# Patient Record
Sex: FEMALE | Race: BLACK OR AFRICAN AMERICAN | NOT HISPANIC OR LATINO | Employment: OTHER | URBAN - METROPOLITAN AREA
[De-identification: names, ages, dates, MRNs, and addresses within clinical notes are randomized per-mention and may not be internally consistent; named-entity substitution may affect disease eponyms.]

---

## 2017-11-04 ENCOUNTER — HOSPITAL ENCOUNTER (INPATIENT)
Facility: HOSPITAL | Age: 72
LOS: 1 days | Discharge: HOME | DRG: 305 | End: 2017-11-06
Attending: EMERGENCY MEDICINE | Admitting: INTERNAL MEDICINE
Payer: MEDICARE

## 2017-11-04 ENCOUNTER — APPOINTMENT (EMERGENCY)
Dept: CT IMAGING | Facility: HOSPITAL | Age: 72
DRG: 305 | End: 2017-11-04
Payer: MEDICARE

## 2017-11-04 ENCOUNTER — APPOINTMENT (EMERGENCY)
Dept: RADIOLOGY | Facility: HOSPITAL | Age: 72
DRG: 305 | End: 2017-11-04
Payer: MEDICARE

## 2017-11-04 ENCOUNTER — APPOINTMENT (OUTPATIENT)
Dept: CT IMAGING | Facility: HOSPITAL | Age: 72
DRG: 305 | End: 2017-11-04
Payer: MEDICARE

## 2017-11-04 DIAGNOSIS — R07.9 CHEST PAIN: Primary | ICD-10-CM

## 2017-11-04 DIAGNOSIS — R07.89 CHEST DISCOMFORT: ICD-10-CM

## 2017-11-04 PROBLEM — R42 DIZZINESS: Status: ACTIVE | Noted: 2017-11-04

## 2017-11-04 LAB
ALBUMIN SERPL BCP-MCNC: 4.7 G/DL (ref 3.5–5)
ALP SERPL-CCNC: 89 U/L (ref 46–116)
ALT SERPL W P-5'-P-CCNC: 81 U/L (ref 12–78)
ANION GAP SERPL CALCULATED.3IONS-SCNC: 12 MMOL/L (ref 4–13)
AST SERPL W P-5'-P-CCNC: 69 U/L (ref 5–45)
BASOPHILS # BLD MANUAL: 0 THOUSAND/UL (ref 0–0.1)
BASOPHILS NFR MAR MANUAL: 0 % (ref 0–1)
BILIRUB SERPL-MCNC: 0.8 MG/DL (ref 0.2–1)
BUN SERPL-MCNC: 7 MG/DL (ref 5–25)
CALCIUM SERPL-MCNC: 9.8 MG/DL (ref 8.3–10.1)
CHLORIDE SERPL-SCNC: 99 MMOL/L (ref 100–108)
CO2 SERPL-SCNC: 29 MMOL/L (ref 21–32)
CREAT SERPL-MCNC: 0.77 MG/DL (ref 0.6–1.3)
EOSINOPHIL # BLD MANUAL: 0.06 THOUSAND/UL (ref 0–0.4)
EOSINOPHIL NFR BLD MANUAL: 2 % (ref 0–6)
ERYTHROCYTE [DISTWIDTH] IN BLOOD BY AUTOMATED COUNT: 17.9 % (ref 11.6–15.1)
GFR SERPL CREATININE-BSD FRML MDRD: 90 ML/MIN/1.73SQ M
GLUCOSE SERPL-MCNC: 101 MG/DL (ref 65–140)
HCT VFR BLD AUTO: 33.8 % (ref 34.8–46.1)
HGB BLD-MCNC: 11 G/DL (ref 11.5–15.4)
LYMPHOCYTES # BLD AUTO: 0.96 THOUSAND/UL (ref 0.6–4.47)
LYMPHOCYTES # BLD AUTO: 32 % (ref 14–44)
MCH RBC QN AUTO: 28 PG (ref 26.8–34.3)
MCHC RBC AUTO-ENTMCNC: 32.5 G/DL (ref 31.4–37.4)
MCV RBC AUTO: 86 FL (ref 82–98)
MONOCYTES # BLD AUTO: 0.18 THOUSAND/UL (ref 0–1.22)
MONOCYTES NFR BLD: 6 % (ref 4–12)
NEUTROPHILS # BLD MANUAL: 1.81 THOUSAND/UL (ref 1.85–7.62)
NEUTS BAND NFR BLD MANUAL: 2 % (ref 0–8)
NEUTS SEG NFR BLD AUTO: 58 % (ref 43–75)
NRBC BLD AUTO-RTO: 3 /100 WBCS
PLATELET # BLD AUTO: 164 THOUSANDS/UL (ref 149–390)
PLATELET BLD QL SMEAR: ADEQUATE
PMV BLD AUTO: 9.1 FL (ref 8.9–12.7)
POTASSIUM SERPL-SCNC: 3.3 MMOL/L (ref 3.5–5.3)
PROT SERPL-MCNC: 8.8 G/DL (ref 6.4–8.2)
RBC # BLD AUTO: 3.93 MILLION/UL (ref 3.81–5.12)
RBC MORPH BLD: NORMAL
SODIUM SERPL-SCNC: 140 MMOL/L (ref 136–145)
TOTAL CELLS COUNTED SPEC: 100
TROPONIN I SERPL-MCNC: <0.02 NG/ML
WBC # BLD AUTO: 3.01 THOUSAND/UL (ref 4.31–10.16)

## 2017-11-04 PROCEDURE — 99285 EMERGENCY DEPT VISIT HI MDM: CPT

## 2017-11-04 PROCEDURE — 85027 COMPLETE CBC AUTOMATED: CPT | Performed by: PHYSICIAN ASSISTANT

## 2017-11-04 PROCEDURE — 71020 HB CHEST X-RAY 2VW FRONTAL&LATL: CPT

## 2017-11-04 PROCEDURE — 70450 CT HEAD/BRAIN W/O DYE: CPT

## 2017-11-04 PROCEDURE — 93005 ELECTROCARDIOGRAM TRACING: CPT

## 2017-11-04 PROCEDURE — 36415 COLL VENOUS BLD VENIPUNCTURE: CPT | Performed by: PHYSICIAN ASSISTANT

## 2017-11-04 PROCEDURE — 72125 CT NECK SPINE W/O DYE: CPT

## 2017-11-04 PROCEDURE — 93005 ELECTROCARDIOGRAM TRACING: CPT | Performed by: PHYSICIAN ASSISTANT

## 2017-11-04 PROCEDURE — 84484 ASSAY OF TROPONIN QUANT: CPT | Performed by: PHYSICIAN ASSISTANT

## 2017-11-04 PROCEDURE — 85007 BL SMEAR W/DIFF WBC COUNT: CPT | Performed by: PHYSICIAN ASSISTANT

## 2017-11-04 PROCEDURE — 84484 ASSAY OF TROPONIN QUANT: CPT | Performed by: INTERNAL MEDICINE

## 2017-11-04 PROCEDURE — 80053 COMPREHEN METABOLIC PANEL: CPT | Performed by: PHYSICIAN ASSISTANT

## 2017-11-04 RX ORDER — NICOTINE 21 MG/24HR
1 PATCH, TRANSDERMAL 24 HOURS TRANSDERMAL DAILY
Status: DISCONTINUED | OUTPATIENT
Start: 2017-11-05 | End: 2017-11-05

## 2017-11-04 RX ORDER — LOSARTAN POTASSIUM 50 MG/1
50 TABLET ORAL DAILY
Status: DISCONTINUED | OUTPATIENT
Start: 2017-11-05 | End: 2017-11-05

## 2017-11-04 RX ORDER — ASPIRIN 81 MG/1
81 TABLET, CHEWABLE ORAL DAILY
Status: DISCONTINUED | OUTPATIENT
Start: 2017-11-04 | End: 2017-11-06 | Stop reason: HOSPADM

## 2017-11-04 RX ORDER — FOLIC ACID 1 MG/1
1 TABLET ORAL DAILY
Status: DISCONTINUED | OUTPATIENT
Start: 2017-11-04 | End: 2017-11-06 | Stop reason: HOSPADM

## 2017-11-04 RX ORDER — ASPIRIN 81 MG/1
81 TABLET, CHEWABLE ORAL DAILY
Status: DISCONTINUED | OUTPATIENT
Start: 2017-11-05 | End: 2017-11-04

## 2017-11-04 RX ORDER — POTASSIUM CHLORIDE 20 MEQ/1
40 TABLET, EXTENDED RELEASE ORAL ONCE
Status: COMPLETED | OUTPATIENT
Start: 2017-11-04 | End: 2017-11-04

## 2017-11-04 RX ORDER — MECLIZINE HCL 12.5 MG/1
12.5 TABLET ORAL ONCE
Status: COMPLETED | OUTPATIENT
Start: 2017-11-04 | End: 2017-11-04

## 2017-11-04 RX ORDER — METOPROLOL TARTRATE 5 MG/5ML
5 INJECTION INTRAVENOUS ONCE
Status: COMPLETED | OUTPATIENT
Start: 2017-11-04 | End: 2017-11-04

## 2017-11-04 RX ORDER — LORAZEPAM 0.5 MG/1
0.5 TABLET ORAL EVERY 8 HOURS PRN
Status: DISCONTINUED | OUTPATIENT
Start: 2017-11-04 | End: 2017-11-06 | Stop reason: HOSPADM

## 2017-11-04 RX ORDER — THIAMINE MONONITRATE (VIT B1) 100 MG
100 TABLET ORAL DAILY
Status: DISCONTINUED | OUTPATIENT
Start: 2017-11-04 | End: 2017-11-06 | Stop reason: HOSPADM

## 2017-11-04 RX ADMIN — Medication 100 MG: at 17:53

## 2017-11-04 RX ADMIN — MECLIZINE 12.5 MG: 12.5 TABLET ORAL at 16:34

## 2017-11-04 RX ADMIN — Medication 1 TABLET: at 17:53

## 2017-11-04 RX ADMIN — FOLIC ACID 1 MG: 1 TABLET ORAL at 17:53

## 2017-11-04 RX ADMIN — METOPROLOL TARTRATE 5 MG: 5 INJECTION, SOLUTION INTRAVENOUS at 19:27

## 2017-11-04 RX ADMIN — POTASSIUM CHLORIDE 40 MEQ: 1500 TABLET, EXTENDED RELEASE ORAL at 17:15

## 2017-11-04 RX ADMIN — ASPIRIN 81 MG 81 MG: 81 TABLET ORAL at 17:53

## 2017-11-04 NOTE — PLAN OF CARE
CARDIOVASCULAR - ADULT     Maintains optimal cardiac output and hemodynamic stability Progressing     Absence of cardiac dysrhythmias or at baseline rhythm Progressing        DISCHARGE PLANNING     Discharge to home or other facility with appropriate resources Progressing        Knowledge Deficit     Patient/family/caregiver demonstrates understanding of disease process, treatment plan, medications, and discharge instructions Progressing

## 2017-11-04 NOTE — H&P
History and Physical - [facility]    Patient Information: Macrina Merino 80 y o  female MRN: [id number]  Unit/Bed#: ED 08 Encounter: [id number]  Admitting Physician: Sahra Cifuentes MD  PCP: No primary care provider on file  Date of Admission:  11/04/17    Assessment/Plan:    Hospital Problem List:     Principal Problem:    Chest pain  Active Problems:    Dizziness      Plan for the Primary Problem(s):-    1   Dizziness, with neck pain, CT cervical spine, no nystagmus, no presyncopal symptomatology loss of consciousness mention, consider neurology evaluation of the pinch symptoms persistent otherwise outpatient follow-up   2  Chest pain, at this time seems reproducible has a strong family history, active smoker, likely hyperlipidemia, obesity, hypertensive in the ER has not seen physician in a while, cardiac enzymes, telemetry monitoring  3   Transthoracic echo, stress test would be beneficial either inpatient or outpatient  4  Will start the patient on losartan for blood pressure control  5   Mentioned history of alcohol use, unclear how much she drinks, will put her on thiamine folic acid multivitamin she does not seem volume depleted though, LFTs are elevated  Check comprehensive metabolic panel tomorrow  6  Nicotine patch  7  Lovenox DVT prophylaxis  8  Will start aspirin 81 mg    VTE Prophylaxis: Enoxaparin (Lovenox)  /      Anticipated Length of Stay:  Patient will be admitted on an Observation basis with an anticipated length of stay of  <2 midnights  Justification for Hospital Stay: cx pain    Total Time for Visit, including Counseling / Coordination of Care: 45 minutes  Greater than 50% of this total time spent on direct patient counseling and coordination of care      Chief Complaint:   - S, chest pain    History of Present Illness:    Macrina Merino is a 80 y o  female who presents with multiple complaints, the patient mentioned that her primary concern was dizziness, and pain in her neck which started 2-3 days ago she does not recall having a trauma, patient mentioned that she was feeling dizzy, which means that she was drifting in either direction, denied having a spinning movement, also denied having any visual disturbances headaches, complained of neck pain on the lateral side right, symptoms as per her were not worsened by movements of the head and did not get worse on getting from bed to a sitting position and then standing  She complained of chest pain which on my exam has been reproducible, on both right and left side, does mention that the chest pain has been there at least for a week, but 10 also mentions that she has had this chest pain before, particularly when she exerts herself or is emotional   The patient smokes at least half a pack a day of cigarettes, could be more, and mentions that she drinks 1-2 rum and Coke drinks almost on a daily basis as per the family, but she mentioned she does of 2 to 3 times a week, the patient does mention that she has been eating and drinking okay, which seems to be accurate, the patient's blood pressure is been very high in the 216 systolic, the loss of she saw physician was at least 6-7 years ago and mentioned that that is when she believes she had some blood work done and it is possible that she was told that she has high cholesterol, she has family history of heart disease, both her sisters have diabetes, father had heart disease, and her brother  unclear reason  She is pain-free right now also mentions to me that her dizziness has resolved, but on pressing her chest the patient does complain of the pain which she says is different from the kind of pain which she constantly has  No shortness of breath no lower extremity edema abdominal pain fevers chills, no visual disturbances of nystagmus  Review of Systems:    Review of Systems   Constitutional: Negative for activity change and appetite change     HENT: Negative for congestion  Eyes: Negative for discharge  Respiratory: Negative for apnea and shortness of breath  Cardiovascular: Negative for chest pain  Gastrointestinal: Negative for abdominal distention  Endocrine: Negative for cold intolerance  Genitourinary: Negative for difficulty urinating  Musculoskeletal: Negative for arthralgias  Allergic/Immunologic: Negative for environmental allergies  Neurological: Negative for dizziness  Hematological: Negative for adenopathy  Psychiatric/Behavioral: Negative for agitation  Past Medical and Surgical History:     History reviewed  No pertinent past medical history  Past Surgical History:   Procedure Laterality Date    APPENDECTOMY         Meds/Allergies:    Prior to Admission medications    Not on File     I have reviewed home medications with patient personally  Allergies: No Known Allergies    Social History:     Marital Status:      Substance Use History:   History   Alcohol Use    Yes     Comment: weekly     History   Smoking Status    Current Every Day Smoker    Packs/day: 0 25    Types: Cigarettes   Smokeless Tobacco    Never Used     History   Drug Use No       Family History:    non-contributory    Physical Exam:     Vitals:   Blood Pressure: (!) 187/89 (11/04/17 1635)  Pulse: 79 (11/04/17 1635)  Temperature: 98 7 °F (37 1 °C) (11/04/17 1502)  Temp Source: Oral (11/04/17 1502)  Respirations: 18 (11/04/17 1635)  Height: 5' 4" (162 6 cm) (11/04/17 1502)  Weight - Scale: 88 9 kg (195 lb 15 8 oz) (11/04/17 1502)  SpO2: 97 % (11/04/17 1635)    Physical Exam   Constitutional: She is oriented to person, place, and time  No distress  HENT:   Head: Normocephalic  Eyes: Pupils are equal, round, and reactive to light  Neck: No tracheal deviation present  Cardiovascular: Normal rate  No murmur heard  Pulmonary/Chest: No respiratory distress  She has no wheezes  Abdominal: She exhibits no distension  There is no tenderness  There is no rebound  Musculoskeletal: She exhibits no edema  Neurological: She is alert and oriented to person, place, and time  Skin: She is not diaphoretic  Additional Data:     Lab Results: I have personally reviewed pertinent reports  Results from last 7 days  Lab Units 11/04/17  1629   WBC Thousand/uL 3 01*   HEMOGLOBIN g/dL 11 0*   HEMATOCRIT % 33 8*   PLATELETS Thousands/uL 164   LYMPHO PCT % 32   MONO PCT MAN % 6   EOSINO PCT MANUAL % 2       Results from last 7 days  Lab Units 11/04/17  1629   SODIUM mmol/L 140   POTASSIUM mmol/L 3 3*   CHLORIDE mmol/L 99*   CO2 mmol/L 29   BUN mg/dL 7   CREATININE mg/dL 0 77   CALCIUM mg/dL 9 8   TOTAL PROTEIN g/dL 8 8*   BILIRUBIN TOTAL mg/dL 0 80   ALK PHOS U/L 89   ALT U/L 81*   AST U/L 69*   GLUCOSE RANDOM mg/dL 101           Imaging: I have personally reviewed pertinent reports  Xr Chest 2 Views    Result Date: 11/4/2017  Narrative: CHEST DUAL ENERGY INDICATION:  Chest pain  Smoker  COMPARISON:  None VIEWS:  PA (including soft tissue and bone algorithms) and lateral projections; 4 images FINDINGS:     Cardiomediastinal silhouette appears unremarkable  The lungs are clear  No pneumothorax or pleural effusion  Visualized osseous structures appear within normal limits for the patient's age  Impression: No active pulmonary disease  Workstation performed: [id number]     Ct Head Without Contrast    Result Date: 11/4/2017  Narrative: CT BRAIN - WITHOUT CONTRAST INDICATION: Headache  COMPARISON:    None TECHNIQUE:  Multiple contiguous axial CT images were obtained at 2 5 mm intervals through the posterior fossa followed by 5 mm intervals through the remainder of the brain  This examination, like all CT scans performed in the [facility],  was performed utilizing techniques to minimize radiation dose exposure, including the use of iterative reconstruction and automated exposure control    Rad dosage 989 mGy-cm IMAGE QUALITY: Diagnostic FINDINGS:  PARENCHYMA: Mild age-appropriate atrophy  Scattered periventricular white matter hypodensity suggestive of chronic microvascular ischemic disease  No mass, mass effect or midline shift  No hemorrhage  No signs of acute ischemia  VENTRICLES AND EXTRA-AXIAL SPACES:    Mildly enlarged, consistent with the degree of atrophy  VISUALIZED ORBITS AND PARANASAL SINUSES:  Normal  CALVARIUM AND EXTRACRANIAL SOFT TISSUES:  Normal      Impression: 1  No acute intracranial pathology  Workstation performed: [id number]       EKG, Pathology, and Other Studies Reviewed on Admission:   · EKG:     Allscripts / Epic Records Reviewed: No     ** Please Note: This note has been constructed using a voice recognition system   **

## 2017-11-04 NOTE — DISCHARGE INSTRUCTIONS

## 2017-11-04 NOTE — ED PROVIDER NOTES
History  Chief Complaint   Patient presents with    Chest Pain     patient is c/o substernal chest pain x 1 week  also notes pain behind her right ear  difficulty sleeping, headache,dizziness  Manish Stanley is a 80 y o  female w PMH none significant who presents for evaluation of chest pain  Pt not reliable historian  She reports chest pain, dizziness and headache but cant tell me chronicity for any sx other than dizziness ongoing x 1 week  Reports initially dizziness only comes on w her anxiety but then later does tell me it is positional in nature  No abd pain / n/v/d/c  No d/c  Ha ha of the R side of head and R cheek but sparing eye  Spreads to frontal region, came on gradually this week, unlike prior ha but mild in severity  None       History reviewed  No pertinent past medical history  Past Surgical History:   Procedure Laterality Date    APPENDECTOMY         History reviewed  No pertinent family history  I have reviewed and agree with the history as documented  Social History   Substance Use Topics    Smoking status: Current Every Day Smoker     Packs/day: 0 25     Types: Cigarettes    Smokeless tobacco: Never Used    Alcohol use Yes      Comment: weekly        Review of Systems   Constitutional: Negative for chills, diaphoresis and fever  HENT: Negative for congestion and sore throat  Eyes: Negative for visual disturbance  Respiratory: Negative for cough, chest tightness, shortness of breath and wheezing  Cardiovascular: Negative for chest pain and leg swelling  Gastrointestinal: Negative for abdominal pain, constipation, diarrhea, nausea and vomiting  Genitourinary: Negative for difficulty urinating, dysuria, frequency, hematuria, urgency, vaginal bleeding, vaginal discharge and vaginal pain  Musculoskeletal: Negative for arthralgias and myalgias  Neurological: Positive for dizziness and light-headedness  Negative for weakness, numbness and headaches  Psychiatric/Behavioral: The patient is not nervous/anxious  Physical Exam  ED Triage Vitals [11/04/17 1502]   Temperature Pulse Respirations Blood Pressure SpO2   98 7 °F (37 1 °C) 94 16 (!) 187/89 94 %      Temp Source Heart Rate Source Patient Position - Orthostatic VS BP Location FiO2 (%)   Oral Monitor Sitting Right arm --      Pain Score       No Pain           Orthostatic Vital Signs  Vitals:    11/04/17 1502 11/04/17 1635 11/04/17 1753   BP: (!) 187/89 (!) 187/89 (!) 176/98   Pulse: 94 79 80   Patient Position - Orthostatic VS: Sitting Lying Sitting       Physical Exam   Constitutional: She is oriented to person, place, and time  She appears well-developed and well-nourished  No distress  HENT:   Head: Normocephalic and atraumatic  Eyes: Pupils are equal, round, and reactive to light  Neck: Neck supple  No tracheal deviation present  Cardiovascular: Normal rate, regular rhythm, normal heart sounds and intact distal pulses  Exam reveals no gallop and no friction rub  No murmur heard  Pulmonary/Chest: Effort normal and breath sounds normal  No respiratory distress  She has no wheezes  She has no rales  Abdominal: Soft  Bowel sounds are normal  She exhibits no distension and no mass  There is no tenderness  There is no guarding  Musculoskeletal: She exhibits no edema or deformity  Neurological: She is alert and oriented to person, place, and time  gcs 15 non focal    Skin: Skin is warm and dry  She is not diaphoretic  Psychiatric: She has a normal mood and affect  Her behavior is normal    Nursing note and vitals reviewed        ED Medications  Medications   losartan (COZAAR) tablet 50 mg (not administered)   thiamine (VITAMIN B1) tablet 100 mg (100 mg Oral Given 79/5/69 4004)   folic acid (FOLVITE) tablet 1 mg (1 mg Oral Given 11/4/17 1753)   multivitamin-minerals (CENTRUM) tablet 1 tablet (1 tablet Oral Given 11/4/17 1753)   aspirin chewable tablet 81 mg (81 mg Oral Given 11/4/17 1753)   LORazepam (ATIVAN) tablet 0 5 mg (not administered)   nicotine (NICODERM CQ) 14 mg/24hr TD 24 hr patch 1 patch (not administered)   enoxaparin (LOVENOX) subcutaneous injection 40 mg (not administered)   meclizine (ANTIVERT) tablet 12 5 mg (12 5 mg Oral Given 11/4/17 1634)   potassium chloride (K-DUR,KLOR-CON) CR tablet 40 mEq (40 mEq Oral Given 11/4/17 1715)       Diagnostic Studies  Results Reviewed     Procedure Component Value Units Date/Time    Troponin I [[id number]]     Lab Status:  No result Specimen:  Blood     CBC and differential [[id number]]  (Abnormal) Collected:  11/04/17 1629    Lab Status:  Final result Specimen:  Blood from Arm, Left Updated:  11/04/17 1712     WBC 3 01 (L) Thousand/uL      RBC 3 93 Million/uL      Hemoglobin 11 0 (L) g/dL      Hematocrit 33 8 (L) %      MCV 86 fL      MCH 28 0 pg      MCHC 32 5 g/dL      RDW 17 9 (H) %      MPV 9 1 fL      Platelets 689 Thousands/uL      nRBC 3 /100 WBCs     Narrative: This is an appended report  These results have been appended to a previously verified report  Troponin I [[id number]]  (Normal) Collected:  11/04/17 1629    Lab Status:  Final result Specimen:  Blood from Arm, Left Updated:  11/04/17 1654     Troponin I <0 02 ng/mL     Narrative:         [employer] Chemistry analyzer 99% cutoff is > 0 04 ng/mL in network labs    o cTnI 99% cutoff is useful only when applied to patients in the clinical setting of myocardial ischemia  o cTnI 99% cutoff should be interpreted in the context of clinical history, ECG findings and possibly cardiac imaging to establish correct diagnosis  o cTnI 99% cutoff may be suggestive but clearly not indicative of a coronary event without the clinical setting of myocardial ischemia      Comprehensive metabolic panel [[id number]]  (Abnormal) Collected:  11/04/17 1629    Lab Status:  Final result Specimen:  Blood from Arm, Left Updated:  11/04/17 1650     Sodium 140 mmol/L      Potassium 3 3 (L) mmol/L      Chloride 99 (L) mmol/L      CO2 29 mmol/L      Anion Gap 12 mmol/L      BUN 7 mg/dL      Creatinine 0 77 mg/dL      Glucose 101 mg/dL      Calcium 9 8 mg/dL      AST 69 (H) U/L      ALT 81 (H) U/L      Alkaline Phosphatase 89 U/L      Total Protein 8 8 (H) g/dL      Albumin 4 7 g/dL      Total Bilirubin 0 80 mg/dL      eGFR 90 ml/min/1 73sq m     Narrative:         National Kidney Disease Education Program recommendations are as follows:  GFR calculation is accurate only with a steady state creatinine  Chronic Kidney disease less than 60 ml/min/1 73 sq  meters  Kidney failure less than 15 ml/min/1 73 sq  meters  CT spine cervical wo contrast   Final Result by Eloy Hickey MD (11/04 1755)      No cervical spine fracture or traumatic malalignment  Thyroid goiter with bilateral nodules  Incidental thyroid nodule(s) for which thyroid ultrasound is recommended  ##sigslh##sigslh                   Workstation performed: [id number]         XR chest 2 views   ED Interpretation by Caren Cabrera PA-C (11/04 7300)   No acute cardiopulmonary disease       Final Result by Eloy Hickey MD (11/04 1721)      No active pulmonary disease  Workstation performed: [id number]         CT head without contrast   Final Result by Eloy Hickey MD (11/04 1602)      1  No acute intracranial pathology  Workstation performed: [id number]                    Procedures  Procedures       Phone Contacts  ED Phone Contact    ED Course  ED Course as of Nov 04 1758   Sat Nov 04, 2017   1642 EKG Interpretation    Rate: 95 BPM  Rhythm: NSR  Axis: normal  Intervals: Normal, no blocks, QTc 467ms  Q waves: no  T waves: flattened in III   ST segments: no depressions / elevations     Impression: nonspecific EKG  EKG for comparison: none  EKG interpreted by me              HEART Risk Score    Flowsheet Row Most Recent Value   History  0 Filed at: 11/04/2017 1658   ECG  1 Filed at: 11/04/2017 1658   Age  2 Filed at: 11/04/2017 1658   Risk Factors  1 [smoker / HTN ] Filed at: 11/04/2017 1658   Troponin  0 Filed at: 11/04/2017 1658   Heart Score Risk Calculator   History  0 Filed at: 11/04/2017 1658   ECG  1 Filed at: 11/04/2017 1658   Age  2 Filed at: 11/04/2017 1658   Risk Factors  1 [smoker / HTN ] Filed at: 11/04/2017 1658   Troponin  0 Filed at: 11/04/2017 1658   HEART Score  4 Filed at: 11/04/2017 1658   HEART Score  4 Filed at: 11/04/2017 1658                            MDM  Number of Diagnoses or Management Options  Diagnosis management comments: DDX includes but not ltd to:   acs vs bronchitis vs msk related pain vs anxiety  Dizziness seems most consistent w peripheral dizziness or anxiety    Plan is to obtain:  EKG/trop to check for ischemic changes   CXR to check for congestive changes, active pulmonary disease   CBC to check for anemia, leukocytosis, hydration status  Chemistry panel to check for lyte abnormalities, organ function   CT head to check for any acute intracranial abnormality/ ICH/ SAH/ SDH/ lesion/ mass/ CVA     Based on results:  Admit to medicine for CP obvs given CP and her RF  I am concerned she has uncontrolled HTN at baseline that I am uncomfortable lowering here as do not want to cause acute decrease in cerebral perfusion  Return parameters discussed  Pt requires f/u as an outpt  Pt expresses understanding w above treatment plan  All questions answered prior to d/c  Portions of the record may have been created with voice recognition software   Occasional wrong word or "sound a like" substitutions may have occurred due to the inherent limitations of voice recognition software   Read the chart carefully and recognize, using context, where substitutions have occurred        CritCare Time    Disposition  Final diagnoses:   Chest pain     Time reflects when diagnosis was documented in both MDM as applicable and the Disposition within this note     Time User Action Codes Description Comment 11/4/2017  5:09 PM TiffanySaBryan Add [R07 9] Chest pain     11/4/2017  5:53 PM Lester Hill [R07 89] Chest tightness     11/4/2017  5:53 PM Jose J Miles [R07 89] Chest tightness       ED Disposition     ED Disposition Condition Comment    Discharge  [place] discharge to home/self care  Condition at discharge: Good        Follow-up Information     Follow up With Specialties Details Why Contact Info Additional Information    [address] [place] [address] Emergency Department Emergency Medicine  If symptoms worsen [address] [place] [address] ED, [address] [place] [address], [facility]        Patient's Medications    No medications on file     No discharge procedures on file      ED Provider  Electronically Signed by           Dakota Sewell PA-C  11/04/17 9035

## 2017-11-05 ENCOUNTER — APPOINTMENT (OUTPATIENT)
Dept: NON INVASIVE DIAGNOSTICS | Facility: HOSPITAL | Age: 72
DRG: 305 | End: 2017-11-05
Payer: MEDICARE

## 2017-11-05 LAB
ALBUMIN SERPL BCP-MCNC: 3.9 G/DL (ref 3.5–5)
ALP SERPL-CCNC: 75 U/L (ref 46–116)
ALT SERPL W P-5'-P-CCNC: 79 U/L (ref 12–78)
ANION GAP SERPL CALCULATED.3IONS-SCNC: 9 MMOL/L (ref 4–13)
AST SERPL W P-5'-P-CCNC: 86 U/L (ref 5–45)
BILIRUB SERPL-MCNC: 0.6 MG/DL (ref 0.2–1)
BUN SERPL-MCNC: 8 MG/DL (ref 5–25)
CALCIUM SERPL-MCNC: 9.2 MG/DL (ref 8.3–10.1)
CHLORIDE SERPL-SCNC: 101 MMOL/L (ref 100–108)
CHOLEST SERPL-MCNC: 164 MG/DL (ref 50–200)
CO2 SERPL-SCNC: 27 MMOL/L (ref 21–32)
CREAT SERPL-MCNC: 0.7 MG/DL (ref 0.6–1.3)
ERYTHROCYTE [DISTWIDTH] IN BLOOD BY AUTOMATED COUNT: 17.9 % (ref 11.6–15.1)
EST. AVERAGE GLUCOSE BLD GHB EST-MCNC: 120 MG/DL
GFR SERPL CREATININE-BSD FRML MDRD: 102 ML/MIN/1.73SQ M
GLUCOSE SERPL-MCNC: 98 MG/DL (ref 65–140)
HBA1C MFR BLD: 5.8 % (ref 4.2–6.3)
HCT VFR BLD AUTO: 30.8 % (ref 34.8–46.1)
HDLC SERPL-MCNC: 51 MG/DL (ref 40–60)
HGB BLD-MCNC: 10.2 G/DL (ref 11.5–15.4)
LDLC SERPL CALC-MCNC: 105 MG/DL (ref 0–100)
MAGNESIUM SERPL-MCNC: 1.6 MG/DL (ref 1.6–2.6)
MCH RBC QN AUTO: 28.7 PG (ref 26.8–34.3)
MCHC RBC AUTO-ENTMCNC: 33.1 G/DL (ref 31.4–37.4)
MCV RBC AUTO: 87 FL (ref 82–98)
PLATELET # BLD AUTO: 143 THOUSANDS/UL (ref 149–390)
PMV BLD AUTO: 9.8 FL (ref 8.9–12.7)
POTASSIUM SERPL-SCNC: 3.7 MMOL/L (ref 3.5–5.3)
PROT SERPL-MCNC: 7.4 G/DL (ref 6.4–8.2)
RBC # BLD AUTO: 3.55 MILLION/UL (ref 3.81–5.12)
SODIUM SERPL-SCNC: 137 MMOL/L (ref 136–145)
TRIGL SERPL-MCNC: 40 MG/DL
TSH SERPL DL<=0.05 MIU/L-ACNC: 0.36 UIU/ML (ref 0.36–3.74)
WBC # BLD AUTO: 3.16 THOUSAND/UL (ref 4.31–10.16)

## 2017-11-05 PROCEDURE — G8978 MOBILITY CURRENT STATUS: HCPCS

## 2017-11-05 PROCEDURE — 80053 COMPREHEN METABOLIC PANEL: CPT | Performed by: INTERNAL MEDICINE

## 2017-11-05 PROCEDURE — 80061 LIPID PANEL: CPT | Performed by: INTERNAL MEDICINE

## 2017-11-05 PROCEDURE — 97162 PT EVAL MOD COMPLEX 30 MIN: CPT

## 2017-11-05 PROCEDURE — 83735 ASSAY OF MAGNESIUM: CPT | Performed by: INTERNAL MEDICINE

## 2017-11-05 PROCEDURE — 93306 TTE W/DOPPLER COMPLETE: CPT

## 2017-11-05 PROCEDURE — 83036 HEMOGLOBIN GLYCOSYLATED A1C: CPT | Performed by: INTERNAL MEDICINE

## 2017-11-05 PROCEDURE — 85027 COMPLETE CBC AUTOMATED: CPT | Performed by: INTERNAL MEDICINE

## 2017-11-05 PROCEDURE — 84443 ASSAY THYROID STIM HORMONE: CPT | Performed by: INTERNAL MEDICINE

## 2017-11-05 PROCEDURE — 97116 GAIT TRAINING THERAPY: CPT

## 2017-11-05 PROCEDURE — G8979 MOBILITY GOAL STATUS: HCPCS

## 2017-11-05 RX ORDER — LOSARTAN POTASSIUM 25 MG/1
25 TABLET ORAL DAILY
Status: DISCONTINUED | OUTPATIENT
Start: 2017-11-06 | End: 2017-11-06 | Stop reason: HOSPADM

## 2017-11-05 RX ORDER — NICOTINE 21 MG/24HR
21 PATCH, TRANSDERMAL 24 HOURS TRANSDERMAL DAILY
Status: DISCONTINUED | OUTPATIENT
Start: 2017-11-05 | End: 2017-11-06 | Stop reason: HOSPADM

## 2017-11-05 RX ADMIN — NICOTINE 21 MG: 21 PATCH, EXTENDED RELEASE TRANSDERMAL at 12:27

## 2017-11-05 RX ADMIN — NICOTINE 1 PATCH: 14 PATCH TRANSDERMAL at 08:09

## 2017-11-05 RX ADMIN — METOPROLOL TARTRATE 12.5 MG: 25 TABLET ORAL at 21:54

## 2017-11-05 RX ADMIN — ASPIRIN 81 MG 81 MG: 81 TABLET ORAL at 08:08

## 2017-11-05 RX ADMIN — Medication 100 MG: at 08:08

## 2017-11-05 RX ADMIN — ENOXAPARIN SODIUM 40 MG: 40 INJECTION SUBCUTANEOUS at 08:09

## 2017-11-05 RX ADMIN — FOLIC ACID 1 MG: 1 TABLET ORAL at 08:09

## 2017-11-05 RX ADMIN — Medication 1 TABLET: at 08:08

## 2017-11-05 RX ADMIN — LOSARTAN POTASSIUM 50 MG: 50 TABLET, FILM COATED ORAL at 08:08

## 2017-11-05 RX ADMIN — METOPROLOL TARTRATE 12.5 MG: 25 TABLET ORAL at 13:18

## 2017-11-05 NOTE — PHYSICAL THERAPY NOTE
Physical Therapy Evaluation Note     11/05/17 0835   Pain Assessment   Pain Assessment No/denies pain   Home Living   Type of Home Apartment   Home Layout Performs ADLs on one level;Elevator   Home Equipment Other (Comment)  (no DME )   Prior Function   Level of Jay Independent with ADLs and functional mobility   Lives With Family   Receives Help From Family   ADL Assistance Independent   IADLs Independent   Falls in the last 6 months 0   Vocational Retired   Comments +driving    Restrictions/Precautions   [place] Bearing Precautions Per Order No   General   Family/Caregiver Present No   Cognition   Overall Cognitive Status WFL   Arousal/Participation Alert   Orientation Level Oriented X4   Memory Within functional limits   Following Commands Follows all commands and directions without difficulty   Strength RLE   RLE Overall Strength 4-/5   Strength LLE   LLE Overall Strength 4-/5   Coordination   Movements are Fluid and Coordinated 1   Sensation WFL   Light Touch   RLE Light Touch Grossly intact   LLE Light Touch Grossly intact   Bed Mobility   Additional Comments pt was received in recliner   Transfers   Sit to Stand 5  Supervision   Additional items Assist x 1   Stand to Sit 5  Supervision   Additional items Assist x 1   Ambulation/Elevation   Gait pattern Narrow ANDRZEJ; Decreased R stance;Decreased L stance   Gait Assistance 5  Supervision  (SBA)   Additional items Assist x 1   Assistive Device None   Distance 100'   Balance   Static Sitting Good   Dynamic Sitting Good   Static Standing Fair   Dynamic Standing Fair   Ambulatory Fair   Endurance Deficit   Endurance Deficit Yes   Endurance Deficit Description weakness of BLE; pt reports her legs are tired from short walk   Activity Tolerance   Activity Tolerance Patient limited by fatigue   Medical Staff Stephon 1923 nurse cleared pt for PT amy and was made aware of PT session and recommendation   Assessment   Prognosis Good   Problem List Decreased strength;Decreased endurance;Decreased mobility   Assessment Pt is a 81 y/o female who went to the hospital due to chest pain, dizziness and pain in the neck  Pt lives in an apartment building that has elevator to get to her unit on the 3rd floor  She reports that she still drives and does not have any DMEs in the house  She lives with her sister and granddaughter and was independent with PLOF  Today, pt denies any pain and reports feeling okay  Supervision with transfers, SBA with ambulation without AD but reports that her legs are tired after short distance of walking  +SOB with RPE dyspnea scale of 1 and was able to recuperate after resting  Discussed with pt plan for PT to work on strengthening, endurance and balance as well as the recommendation for home PT to assess her at home when she is discharged  Pt was agreeable to plan  Pt will benefit from skilled PT intervention to help improve mobility, balance and endurance and help return to home/community safely  Pt was left in comfortable position in the recliner, needs within reach and ready to eat breakfast   Care was endorsed back to [address] [place] [address]  Barriers to Discharge None   Goals   Patient Goals to go home   STG Expiration Date 11/15/17   Short Term Goal #1 Improve strength of BLE to 4/5 to assist with mobility; Amb without  ft modified independently; improve ambulation balance to fair plus to dec risk of falling  Treatment Day 1   Plan   Treatment/Interventions Functional transfer training;LE strengthening/ROM; Therapeutic exercise; Endurance training;Patient/family training;Gait training   PT Frequency 5x/wk   Recommendation   Recommendation Home PT   Equipment Recommended Walker   PT - OK to Discharge Yes   Additional Comments when medically cleared    Modified Lawrence Scale   Modified Lawrence Scale 3   Barthel Index   Feeding 10   Bathing 5   Grooming Score 5   Dressing Score 10   Bladder Score 10   Bowels Score 10   Toilet Use Score 10 Transfers (Bed/Chair) Score 10   Mobility (Level Surface) Score 10   Stairs Score 5   Barthel Index Score 85       Mere Watts, PT

## 2017-11-05 NOTE — PLAN OF CARE
CARDIOVASCULAR - ADULT     Maintains optimal cardiac output and hemodynamic stability Progressing     Absence of cardiac dysrhythmias or at baseline rhythm Progressing        DISCHARGE PLANNING     Discharge to home or other facility with appropriate resources Progressing        Knowledge Deficit     Patient/family/caregiver demonstrates understanding of disease process, treatment plan, medications, and discharge instructions Progressing        Potential for Falls     Patient will remain free of falls Progressing

## 2017-11-05 NOTE — CONSULTS
Consultation - Cardiology    Tammie Gonzalez 80 y o  female MRN: [id number]  Unit/Bed#: -01 Encounter: [id number]    Physician Requesting Consult: Feliz Veronica MD    Reason for Consult / Principal Problem:  Chest pain    HPI: Tammie Gonzalez is a 80y o  year old female presented with complaints chest pain  She describes it as a central and right-sided chest pressure that is typically exacerbated exertion or emotional stress  She had admits that she has had chest pain, TAN, and palpitations for some time now however she did not seek medical treatment  She has poor follow-up with primary care physician has not seen a doctor in over 10 years  She denies prior known history of hypertension, hyperlipidemia, diabetes mellitus, CAD/mi, PVD  However, she was noted to have elevated blood pressure this admission was subsequently started on antihypertensive medications  Patient is also complaining of dizziness associated with right-sided neck pain  Patient has current every day smoker of 1/2 to 1 pack daily  She also admits to fairly regular alcohol use of 1-2 drinks daily  She admits to high sodium diet  She states that her father  in his 62s of MI  Historical Information   History reviewed  No pertinent past medical history  Past Surgical History:   Procedure Laterality Date    APPENDECTOMY       History   Alcohol Use    Yes     Comment: weekly     History   Drug Use No     History   Smoking Status    Current Every Day Smoker    Packs/day: 0 25    Types: Cigarettes   Smokeless Tobacco    Never Used       FAMILY HISTORY:  History reviewed  No pertinent family history      MEDS & ALLERGIES:  all current active meds have been reviewed and current meds:   Current Facility-Administered Medications   Medication Dose Route Frequency    aspirin chewable tablet 81 mg  81 mg Oral Daily    enoxaparin (LOVENOX) subcutaneous injection 40 mg  40 mg Subcutaneous Daily    folic acid (FOLVITE) tablet 1 mg  1 bed   Head: Normocephalic  Atraumatic  HEENT: Both pupils normal sized, round and reactive to light  Nonicteric  Neck: JVP not elevated  Trachea central    Respiratory: Bilateral bronchovascular breath sounds with no crackles or rhonchi  Cardiovascular: RRR  S1 and S2 normal  No murmur, rub or gallop  GI: Abdomen soft, nontender  No guarding or rigidity  Neurologic: Patient is awake, alert, responding to commands  Well-oriented to time, place and person   Moving all extremities  Extremities: No clubbing, cyanosis or edema  Integument: No skin rashes or ulceration      LABORATORY RESULTS:    Results from last 7 days  Lab Units 11/04/17  2302 11/04/17  1941 11/04/17  1629   TROPONIN I ng/mL <0 02 <0 02 <0 02       CBC with diff:   Results from last 7 days  Lab Units 11/05/17  0440 11/04/17  1629   WBC Thousand/uL 3 16* 3 01*   HEMOGLOBIN g/dL 10 2* 11 0*   HEMATOCRIT % 30 8* 33 8*   MCV fL 87 86   PLATELETS Thousands/uL 143* 164   MCH pg 28 7 28 0   MCHC g/dL 33 1 32 5   RDW % 17 9* 17 9*   MPV fL 9 8 9 1   NRBC AUTO /100 WBCs  --  3       CMP:  Results from last 7 days  Lab Units 11/05/17  0440 11/04/17  1629   SODIUM mmol/L 137 140   POTASSIUM mmol/L 3 7 3 3*   CHLORIDE mmol/L 101 99*   CO2 mmol/L 27 29   ANION GAP mmol/L 9 12   BUN mg/dL 8 7   CREATININE mg/dL 0 70 0 77   GLUCOSE RANDOM mg/dL 98 101   CALCIUM mg/dL 9 2 9 8   AST U/L 86* 69*   ALT U/L 79* 81*   ALK PHOS U/L 75 89   TOTAL PROTEIN g/dL 7 4 8 8*   ALBUMIN g/dL 3 9 4 7   BILIRUBIN TOTAL mg/dL 0 60 0 80   EGFR ml/min/1 73sq m 102 90       BMP:  Results from last 7 days  Lab Units 11/05/17  0440 11/04/17  1629   SODIUM mmol/L 137 140   POTASSIUM mmol/L 3 7 3 3*   CHLORIDE mmol/L 101 99*   CO2 mmol/L 27 29   BUN mg/dL 8 7   CREATININE mg/dL 0 70 0 77   GLUCOSE RANDOM mg/dL 98 101   CALCIUM mg/dL 9 2 9 8                              Lipid Profile:   Lab Results   Component Value Date    CHOL 164 11/05/2017     Lab Results   Component Value Date    HDL 51 11/05/2017     Lab Results   Component Value Date    LDLCALC 105 (H) 11/05/2017     Lab Results   Component Value Date    TRIG 40 11/05/2017           Imaging: I have personally reviewed pertinent reports  Telemetry reviewed personally:  Multiple periods of tachycardia, likely SVT, however interpretation complicated by baseline artifact      Assessment and Plan:  1  Tachycardia- likely SVT  -May be contributing to patient's symptoms of chest pain   -will decrease dose of losartan and start patient on beta-blocker  2   Chest pain/TAN  -patient has strong family history as well as undiagnosed risk factors  -would recommend stress test to evaluate for ischemic etiology    3  Hypertension  -Controlled after addition of losartan  However may need to adjust medications due to tachycardia     Code Status: Level 1 - Full Code      Thank you for allowing us to participate in this patient's care  Counseling / Coordination of Care  Total floor / unit time spent today 35 minutes  Greater than 50% of total time was spent with the patient and / or family counseling and / or coordination of care  A description of the counseling / coordination of care: Review of history, current assessment, development of a plan         Cassia Bonds PA-C  11/5/2017,9:36 AM

## 2017-11-05 NOTE — PROGRESS NOTES
[facility] Progress Note  Patient: Dinora Eldridge 80 y o  female   MRN: [id number]  PCP: No primary care provider on file  Unit/Bed#: -01 Encounter: [id number]  Date Of Visit: 17    Assessment:    Principal Problem:    Chest pain  Active Problems:    Dizziness      Plan:    1  Chest pain yesterday which was reproducible, cardiology has evaluated on multiple risk factors therefore due for cardiac stress test tomorrow  2  SVTs since yesterday, beta-blocker added by Cardiology, continue to monitor  3  Mentioned history of alcohol abuse by the family, this time p r n  had Ativan thiamine folic acid multivitamin  4  Lovenox for DVT prophylaxis  5  Will check a reflex TSH because of thyroid nodule incidentally found on the CT cervical spine  6  Symptoms of dizziness have not returned likely was benign positional to vertigo  CT cervical spine unremarkable  7  Uncontrolled hypertension has improved yesterday agree with losartan and low-dose beta-blocker    VTE Pharmacologic Prophylaxis:   Pharmacologic: Enoxaparin (Lovenox)  Mechanical VTE Prophylaxis in Place: Yes    Patient Centered Rounds: I have performed bedside rounds with nursing staff today  Discussions with Specialists or Other Care Team Provider: y    Education and Discussions with Family / Patient: y    Time Spent for Care: 20 minutes  More than 50% of total time spent on counseling and coordination of care as described above      Current Length of Stay: 0 day(s)    Current Patient Status: Inpatient   Certification Statement: The patient will continue to require additional inpatient hospital stay due to cx pain      Code Status: Level 1 - Full Code      Subjective:   Patient mentions she feels okay is just anxious but staying in the hospital    Objective:     Vitals:   Temp (24hrs), Av 5 °F (36 9 °C), Min:98 2 °F (36 8 °C), Max:98 8 °F (37 1 °C)    HR:  [] 77  Resp:  [16-23] 20  BP: (115-198)/(66-98) 136/72  SpO2: [94 %-100 %] 100 %  Body mass index is 33 64 kg/m²  Input and Output Summary (last 24 hours): Intake/Output Summary (Last 24 hours) at 11/05/17 1319  Last data filed at 11/05/17 0900   Gross per 24 hour   Intake              380 ml   Output              100 ml   Net              280 ml       Physical Exam:     Physical Exam   Constitutional: She is oriented to person, place, and time  No distress  HENT:   Head: Normocephalic and atraumatic  Eyes: Pupils are equal, round, and reactive to light  Neck: No tracheal deviation present  Cardiovascular: Normal rate  No murmur heard  Pulmonary/Chest: No respiratory distress  She has no wheezes  Abdominal: She exhibits no distension  There is no tenderness  Musculoskeletal: She exhibits no edema  Neurological: She is alert and oriented to person, place, and time  Skin: She is not diaphoretic  Additional Data:     Labs:      Results from last 7 days  Lab Units 11/05/17  0440 11/04/17  1629   WBC Thousand/uL 3 16* 3 01*   HEMOGLOBIN g/dL 10 2* 11 0*   HEMATOCRIT % 30 8* 33 8*   PLATELETS Thousands/uL 143* 164   LYMPHO PCT %  --  32   MONO PCT MAN %  --  6   EOSINO PCT MANUAL %  --  2       Results from last 7 days  Lab Units 11/05/17  0440   SODIUM mmol/L 137   POTASSIUM mmol/L 3 7   CHLORIDE mmol/L 101   CO2 mmol/L 27   BUN mg/dL 8   CREATININE mg/dL 0 70   CALCIUM mg/dL 9 2   TOTAL PROTEIN g/dL 7 4   BILIRUBIN TOTAL mg/dL 0 60   ALK PHOS U/L 75   ALT U/L 79*   AST U/L 86*   GLUCOSE RANDOM mg/dL 98           * I Have Reviewed All Lab Data Listed Above  * Additional Pertinent Lab Tests Reviewed:  [facility] Admission Reviewed    Imaging:      Recent Cultures (last 7 days):           Last 24 Hours Medication List:     aspirin 81 mg Oral Daily   enoxaparin 40 mg Subcutaneous Daily   folic acid 1 mg Oral Daily   [START ON 11/6/2017] losartan 25 mg Oral Daily   metoprolol tartrate 12 5 mg Oral Q12H [facility] & half-way   multivitamin-minerals 1 tablet Oral Daily   nicotine 21 mg Transdermal Daily   thiamine 100 mg Oral Daily        Today, Patient Was Seen By: Sahra Cifuentes MD    ** Please Note: Dragon 360 Dictation voice to text software may have been used in the creation of this document   **

## 2017-11-05 NOTE — CASE MANAGEMENT
Initial Clinical Review    Admission: Date/Time/Statement:   OBS  ORDER    11/4  @    1715    Orders Placed This Encounter   Procedures    Place in Observation (expected length of stay for this patient is less than two midnights)     Standing Status:   Standing     Number of Occurrences:   1     Order Specific Question:   Admitting Physician     Answer:   Concetta Krause [[id number]]     Order Specific Question:   Level of Care     Answer:   Med Surg [16]         ED: Date/Time/Mode of Arrival:   ED Arrival Information     Expected Arrival Acuity Means of Arrival Escorted By Service Admission Type    - 11/4/2017 14:54 Urgent Walk-In Family Member General Medicine Urgent    Arrival Complaint    Chest Pain          Chief Complaint:   Chief Complaint   Patient presents with    Chest Pain     patient is c/o substernal chest pain x 1 week  also notes pain behind her right ear  difficulty sleeping, headache,dizziness  History of Illness:   Josephine Mukherjee is a 80 y o  female who presents with multiple complaints, the patient mentioned that her primary concern was dizziness, and pain in her neck which started 2-3 days ago she does not recall having a trauma, patient mentioned that she was feeling dizzy, which means that she was drifting in either direction, denied having a spinning movement, also denied having any visual disturbances headaches, complained of neck pain on the lateral side right, symptoms as per her were not worsened by movements of the head and did not get worse on getting from bed to a sitting position and then standing    She complained of chest pain which on my exam has been reproducible, on both right and left side, does mention that the chest pain has been there at least for a week, but 10 also mentions that she has had this chest pain before, particularly when she exerts herself or is emotional   The patient smokes at least half a pack a day of cigarettes, could be more, and mentions that she drinks 1-2 rum and Coke drinks almost on a daily basis as per the family, but she mentioned she does of 2 to 3 times a week, the patient does mention that she has been eating and drinking okay, which seems to be accurate, the patient's blood pressure is been very high in the 846 systolic, the loss of she saw physician was at least 6-7 years ago and mentioned that that is when she believes she had some blood work done and it is possible that she was told that she has high cholesterol, she has family history of heart disease, both her sisters have diabetes, father had heart disease, and her brother  unclear reason  She is pain-free right now also mentions to me that her dizziness has resolved, but on pressing her chest the patient does complain of the pain which she says is different from the kind of pain which she constantly has    No shortness of breath no lower extremity edema abdominal pain fevers chills, no visual disturbances of nystagmus    ED Vital Signs:   ED Triage Vitals [17 1502]   Temperature Pulse Respirations Blood Pressure SpO2   98 7 °F (37 1 °C) 94 16 (!) 187/89 94 %      Temp Source Heart Rate Source Patient Position - Orthostatic VS BP Location FiO2 (%)   Oral Monitor Sitting Right arm --      Pain Score       No Pain        Wt Readings from Last 1 Encounters:   17 88 9 kg (195 lb 15 8 oz)       Vital Signs (abnormal):  above    Abnormal Labs/Diagnostic Test Results:    WBC    3 01  H/H    8  K  3 3  AST   69  ALT    81  Ct  C/spine:  No Fx   Or  Traumatic malalignemnt  CXR:  NAD  Ct  Head:    No acute intracranial abnormality    ED Treatment:   Medication Administration from 2017 1454 to 2017 1814       Date/Time Order Dose Route Action Action by Comments     2017 1634 meclizine (ANTIVERT) tablet 12 5 mg 12 5 mg Oral Given Meagan Mauro RN      2017 1715 potassium chloride (K-DUR,KLOR-CON) CR tablet 40 mEq 40 mEq Oral Given Meagan Mauro RN 11/04/2017 1753 thiamine (VITAMIN B1) tablet 100 mg 100 mg Oral Given Mekhi Gross RN      77/56/6726 3600 folic acid (FOLVITE) tablet 1 mg 1 mg Oral Given Mekhi Gross RN      11/04/2017 1753 multivitamin-minerals (CENTRUM) tablet 1 tablet 1 tablet Oral Given Mekhi Gross RN      11/04/2017 1753 aspirin chewable tablet 81 mg 81 mg Oral Given Mekhi Gross RN           Past Medical/Surgical History: Active Ambulatory Problems     Diagnosis Date Noted    No Active Ambulatory Problems     Resolved Ambulatory Problems     Diagnosis Date Noted    No Resolved Ambulatory Problems     No Additional Past Medical History       Admitting Diagnosis: Dizziness [R42]  Chest pain [R07 9]    Age/Sex: 80 y o  female    Assessment/Plan:    Dizziness, with neck pain, CT cervical spine, no nystagmus, no presyncopal symptomatology loss of consciousness mention, consider neurology evaluation of the pinch symptoms persistent otherwise outpatient follow-up   2  Chest pain, at this time seems reproducible has a strong family history, active smoker, likely hyperlipidemia, obesity, hypertensive in the ER has not seen physician in a while, cardiac enzymes, telemetry monitoring  3   Transthoracic echo, stress test would be beneficial either inpatient or outpatient  4  Will start the patient on losartan for blood pressure control  5   Mentioned history of alcohol use, unclear how much she drinks, will put her on thiamine folic acid multivitamin she does not seem volume depleted though, LFTs are elevated  Check comprehensive metabolic panel tomorrow  6  Nicotine patch  7  Lovenox DVT prophylaxis  8  Will start aspirin 81 mg     VTE Prophylaxis: Enoxaparin (Lovenox)  /        Anticipated Length of Stay:  Patient will be admitted on an Observation basis with an anticipated length of stay of  <2 midnights     Justification for Hospital Stay: cx pain    Admission Orders:   OBS  ORDER    11/4  @    [id number]  Scheduled Meds: aspirin 81 mg Oral Daily   enoxaparin 40 mg Subcutaneous Daily   folic acid 1 mg Oral Daily   losartan 50 mg Oral Daily   multivitamin-minerals 1 tablet Oral Daily   nicotine 1 patch Transdermal Daily   thiamine 100 mg Oral Daily     Continuous Infusions:    PRN Meds: influenza vaccine    LORazepam    pneumococcal 13-valent conjugate vaccine     Tele  PT/OT  Serial troponin  Cardiac  diet

## 2017-11-06 ENCOUNTER — APPOINTMENT (INPATIENT)
Dept: NON INVASIVE DIAGNOSTICS | Facility: HOSPITAL | Age: 72
DRG: 305 | End: 2017-11-06
Payer: MEDICARE

## 2017-11-06 ENCOUNTER — APPOINTMENT (INPATIENT)
Dept: NUCLEAR MEDICINE | Facility: HOSPITAL | Age: 72
DRG: 305 | End: 2017-11-06
Payer: MEDICARE

## 2017-11-06 VITALS
HEART RATE: 68 BPM | WEIGHT: 195.99 LBS | RESPIRATION RATE: 19 BRPM | BODY MASS INDEX: 33.46 KG/M2 | TEMPERATURE: 97.9 F | SYSTOLIC BLOOD PRESSURE: 128 MMHG | HEIGHT: 64 IN | DIASTOLIC BLOOD PRESSURE: 60 MMHG | OXYGEN SATURATION: 96 %

## 2017-11-06 LAB
ATRIAL RATE: 93 BPM
ATRIAL RATE: 95 BPM
ATRIAL RATE: 95 BPM
ERYTHROCYTE [DISTWIDTH] IN BLOOD BY AUTOMATED COUNT: 17.9 % (ref 11.6–15.1)
HCT VFR BLD AUTO: 33.2 % (ref 34.8–46.1)
HGB BLD-MCNC: 10.8 G/DL (ref 11.5–15.4)
MAX DIASTOLIC BP: 90 MMHG
MAX HEART RATE: 109 BPM
MAX PREDICTED HEART RATE: 150 BPM
MAX. SYSTOLIC BP: 140 MMHG
MCH RBC QN AUTO: 28.1 PG (ref 26.8–34.3)
MCHC RBC AUTO-ENTMCNC: 32.5 G/DL (ref 31.4–37.4)
MCV RBC AUTO: 87 FL (ref 82–98)
P AXIS: 55 DEGREES
P AXIS: 64 DEGREES
P AXIS: 74 DEGREES
PLATELET # BLD AUTO: 153 THOUSANDS/UL (ref 149–390)
PMV BLD AUTO: 9.4 FL (ref 8.9–12.7)
PR INTERVAL: 178 MS
PR INTERVAL: 180 MS
PR INTERVAL: 182 MS
PROTOCOL NAME: NORMAL
QRS AXIS: 65 DEGREES
QRS AXIS: 68 DEGREES
QRS AXIS: 75 DEGREES
QRSD INTERVAL: 70 MS
QRSD INTERVAL: 76 MS
QRSD INTERVAL: 78 MS
QT INTERVAL: 348 MS
QT INTERVAL: 372 MS
QT INTERVAL: 378 MS
QTC INTERVAL: 437 MS
QTC INTERVAL: 467 MS
QTC INTERVAL: 469 MS
RBC # BLD AUTO: 3.84 MILLION/UL (ref 3.81–5.12)
REASON FOR TERMINATION: NORMAL
T WAVE AXIS: 37 DEGREES
T WAVE AXIS: 43 DEGREES
T WAVE AXIS: 74 DEGREES
TARGET HR FORMULA: NORMAL
TEST INDICATION: NORMAL
TIME IN EXERCISE PHASE: 180 S
VENTRICULAR RATE: 93 BPM
VENTRICULAR RATE: 95 BPM
VENTRICULAR RATE: 95 BPM
WBC # BLD AUTO: 3.53 THOUSAND/UL (ref 4.31–10.16)

## 2017-11-06 PROCEDURE — 93017 CV STRESS TEST TRACING ONLY: CPT

## 2017-11-06 PROCEDURE — 90670 PCV13 VACCINE IM: CPT | Performed by: INTERNAL MEDICINE

## 2017-11-06 PROCEDURE — 78452 HT MUSCLE IMAGE SPECT MULT: CPT

## 2017-11-06 PROCEDURE — A9502 TC99M TETROFOSMIN: HCPCS

## 2017-11-06 PROCEDURE — G0008 ADMIN INFLUENZA VIRUS VAC: HCPCS | Performed by: INTERNAL MEDICINE

## 2017-11-06 PROCEDURE — 85027 COMPLETE CBC AUTOMATED: CPT | Performed by: INTERNAL MEDICINE

## 2017-11-06 PROCEDURE — 90686 IIV4 VACC NO PRSV 0.5 ML IM: CPT | Performed by: INTERNAL MEDICINE

## 2017-11-06 PROCEDURE — G0009 ADMIN PNEUMOCOCCAL VACCINE: HCPCS | Performed by: INTERNAL MEDICINE

## 2017-11-06 RX ORDER — LANOLIN ALCOHOL/MO/W.PET/CERES
100 CREAM (GRAM) TOPICAL DAILY
Qty: 30 TABLET | Refills: 0 | Status: SHIPPED | OUTPATIENT
Start: 2017-11-06 | End: 2018-04-09

## 2017-11-06 RX ORDER — LOSARTAN POTASSIUM 25 MG/1
25 TABLET ORAL DAILY
Qty: 30 TABLET | Refills: 0 | Status: SHIPPED | OUTPATIENT
Start: 2017-11-06 | End: 2019-05-31

## 2017-11-06 RX ORDER — ASPIRIN 81 MG/1
81 TABLET, CHEWABLE ORAL DAILY
Qty: 30 TABLET | Refills: 0 | Status: SHIPPED | OUTPATIENT
Start: 2017-11-06 | End: 2020-02-06 | Stop reason: HOSPADM

## 2017-11-06 RX ORDER — LOSARTAN POTASSIUM 25 MG/1
25 TABLET ORAL DAILY
Qty: 30 TABLET | Refills: 0 | Status: SHIPPED | OUTPATIENT
Start: 2017-11-06 | End: 2017-11-06

## 2017-11-06 RX ORDER — FOLIC ACID 1 MG/1
1 TABLET ORAL DAILY
Qty: 30 TABLET | Refills: 0 | Status: SHIPPED | OUTPATIENT
Start: 2017-11-06 | End: 2018-04-09 | Stop reason: SDUPTHER

## 2017-11-06 RX ORDER — FOLIC ACID 1 MG/1
1 TABLET ORAL DAILY
Qty: 30 TABLET | Refills: 0 | Status: SHIPPED | OUTPATIENT
Start: 2017-11-06 | End: 2017-11-06

## 2017-11-06 RX ORDER — ASPIRIN 81 MG/1
81 TABLET, CHEWABLE ORAL DAILY
Qty: 30 TABLET | Refills: 0 | Status: SHIPPED | OUTPATIENT
Start: 2017-11-06 | End: 2017-11-06

## 2017-11-06 RX ADMIN — LOSARTAN POTASSIUM 25 MG: 25 TABLET, FILM COATED ORAL at 12:12

## 2017-11-06 RX ADMIN — Medication 100 MG: at 12:13

## 2017-11-06 RX ADMIN — INFLUENZA VIRUS VACCINE 0.5 ML: 15; 15; 15; 15 SUSPENSION INTRAMUSCULAR at 14:24

## 2017-11-06 RX ADMIN — Medication 1 TABLET: at 12:13

## 2017-11-06 RX ADMIN — REGADENOSON 0.4 MG: 0.08 INJECTION, SOLUTION INTRAVENOUS at 10:42

## 2017-11-06 RX ADMIN — ENOXAPARIN SODIUM 40 MG: 40 INJECTION SUBCUTANEOUS at 12:14

## 2017-11-06 RX ADMIN — NICOTINE 21 MG: 21 PATCH, EXTENDED RELEASE TRANSDERMAL at 12:15

## 2017-11-06 RX ADMIN — PNEUMOCOCCAL 13-VALENT CONJUGATE VACCINE 0.5 ML: 2.2; 2.2; 2.2; 2.2; 2.2; 4.4; 2.2; 2.2; 2.2; 2.2; 2.2; 2.2; 2.2 INJECTION, SUSPENSION INTRAMUSCULAR at 14:23

## 2017-11-06 RX ADMIN — FOLIC ACID 1 MG: 1 TABLET ORAL at 12:12

## 2017-11-06 RX ADMIN — METOPROLOL TARTRATE 12.5 MG: 25 TABLET ORAL at 12:13

## 2017-11-06 RX ADMIN — ASPIRIN 81 MG 81 MG: 81 TABLET ORAL at 12:13

## 2017-11-06 NOTE — DISCHARGE SUMMARY
Discharge Summary - [facility]    Patient Information: Marcia Robert 80 y o  female MRN: [id number]  Unit/Bed#: -01 Encounter: [id number]    Discharging Physician / Practitioner: Key Mcqueen MD  PCP: No primary care provider on file  Admission Date: 11/4/2017  Discharge Date: 11/06/17    Reason for Admission:  Chest pain    Discharge Diagnoses:     Principal Problem:    Chest pain  Active Problems:    Dizziness  Resolved Problems:    * No resolved hospital problems  *      Consultations During Hospital Stay:  Cardiology    Hospital Course:     Marcia Robert is a 80 y o  female patient who originally presented to the hospital on 11/4/2017 due to initial complaints of chest pain, central right-sided, also when I saw her in the ER was complaining of left-sided pain and neck pain, CT cervical spine unremarkable other than the thyroid nodule, I checked a TSH, which was not elevated, further workup of the thyroid nodule can be done outpatient, which I have discussed with the patient and the patient's daughter, will find a PCP appointment for her and she can order the thyroid ultrasound  Patient will get a nuclear stress test today, echocardiogram has already been done reviewed and unremarkable, dizziness which she had initially did not recur likely was benign positional, no more symptomatology, did well, blood pressure was very high which could also have contributed to the dizziness, responded well to low-dose losartan and metoprolol  Likely SVTs responded well to but beta-blocker as mentioned, discussed with Cardiology if stress test negative could be discharged home  She has risk factors he is a smoker has hypertension, currently will hold off on a statin will defer to the primary care physician LDL is 105  Also will have to await the stress test results  If normal will discharge her home    Discussed this in detail with the patient's daughter  No active signs of withdrawal were noted will send her home with thiamine folic acid and asked her to take multivitamins  Discharge Day Visit / Exam:     Subjective:  No further pains since initial episodes, was anxious to go home  Vitals: Blood Pressure: 131/69 (11/06/17 0700)  Pulse: 66 (11/06/17 0700)  Temperature: 98 1 °F (36 7 °C) (11/06/17 0700)  Temp Source: Oral (11/06/17 0700)  Respirations: 19 (11/06/17 0700)  Height: 5' 4" (162 6 cm) (11/04/17 1502)  Weight - Scale: 88 9 kg (195 lb 15 8 oz) (11/04/17 1502)  SpO2: 98 % (11/06/17 0700)  Exam:   Physical Exam   Constitutional: She is oriented to person, place, and time  No distress  HENT:   Head: Normocephalic and atraumatic  Eyes: Pupils are equal, round, and reactive to light  Neck: No tracheal deviation present  Cardiovascular: Normal rate  No murmur heard  Pulmonary/Chest: No respiratory distress  She has no wheezes  Abdominal: She exhibits no distension  There is no tenderness  Musculoskeletal: She exhibits no edema  Neurological: She is alert and oriented to person, place, and time  Skin: She is not diaphoretic  Discussion with Family:  Daughter    Discharge instructions/Information to patient and family:   See after visit summary for information provided to patient and family  Provisions for Follow-Up Care:  See after visit summary for information related to follow-up care and any pertinent home health orders  Disposition:     Home      Planned Readmission: no     Discharge Statement:  I spent 30 minutes discharging the patient  This time was spent on the day of discharge  I had direct contact with the patient on the day of discharge  Greater than 50% of the total time was spent examining patient, answering all patient questions, arranging and discussing plan of care with patient as well as directly providing post-discharge instructions  Additional time then spent on discharge activities      Discharge Medications:  See after visit summary for reconciled discharge medications provided to patient and family        ** Please Note: This note has been constructed using a voice recognition system **

## 2017-11-06 NOTE — PROGRESS NOTES
General Cardiology   Progress Note   [place] 80 y o  female MRN: [id number]  Unit/Bed#: -01 Encounter: [id number]        Subjective:    No significant events since the last encounter  Patient returned from stress test  Felt a reflux sensation during the test but was able to finish the stress test without any other issues  She currently has no chest pain, SOB, lightheadedness, or palpitations  Objective:   Vitals:  Vitals:    11/06/17 0700   BP: 131/69   Pulse: 66   Resp: 19   Temp: 98 1 °F (36 7 °C)   SpO2: 98%       Body mass index is 33 64 kg/m²  Systolic (56EUG), WWO:804 , Min:110 , GZB:403     Diastolic (39VHW), TVK:64, Min:56, Max:74      Intake/Output Summary (Last 24 hours) at 11/06/17 1202  Last data filed at 11/05/17 1801   Gross per 24 hour   Intake              400 ml   Output                0 ml   Net              400 ml     Weight (last 2 days)     Date/Time   Weight    11/04/17 1502  88 9 (195 99)                PHYSICAL EXAMS:  General:  Patient is not in acute distress, laying in the bed comfortably, awake, alert responding to commands  Head: Normocephalic, Atraumatic  HEENT: White sclera, pink conjunctiva,  PERRLA,pharynx benign  Neck:  Supple, no neck vein distention, carotids+2/+2 no bruits, thyromegaly, adenopathy  Respiratory: clear to P/A  Cardiovascular:  PMI normal, S1-S2 normal, No  Murmurs, thrills, gallops, rubs   Regular rhythm  GI:  Abdomen soft nontender   No hepatosplenomegaly, adenopathy, ascites,or rebound tenderness  Extremities: No edema, normal pulses, no calf tenderness, no joint deformities, no venous disease   Integument:  No skin rashes or ulceration  Lymphatic:  No cervical or inguinal lymphadenopathy  Neurologic:  Patient is awake alert, responding to command, well-oriented to time and place and person moving all extremities      LABORATORY RESULTS:    Results from last 7 days  Lab Units 11/04/17  2302 11/04/17  1941 11/04/17  1629   TROPONIN I ng/mL <0 02 <0 02 <0 02     CBC with diff:   Results from last 7 days  Lab Units 11/06/17  0611 11/05/17  0440 11/04/17  1629   WBC Thousand/uL 3 53* 3 16* 3 01*   HEMOGLOBIN g/dL 10 8* 10 2* 11 0*   HEMATOCRIT % 33 2* 30 8* 33 8*   MCV fL 87 87 86   PLATELETS Thousands/uL 153 143* 164   MCH pg 28 1 28 7 28 0   MCHC g/dL 32 5 33 1 32 5   RDW % 17 9* 17 9* 17 9*   MPV fL 9 4 9 8 9 1   NRBC AUTO /100 WBCs  --   --  3       CMP:  Results from last 7 days  Lab Units 11/05/17  0440 11/04/17  1629   SODIUM mmol/L 137 140   POTASSIUM mmol/L 3 7 3 3*   CHLORIDE mmol/L 101 99*   CO2 mmol/L 27 29   ANION GAP mmol/L 9 12   BUN mg/dL 8 7   CREATININE mg/dL 0 70 0 77   GLUCOSE RANDOM mg/dL 98 101   CALCIUM mg/dL 9 2 9 8   AST U/L 86* 69*   ALT U/L 79* 81*   ALK PHOS U/L 75 89   TOTAL PROTEIN g/dL 7 4 8 8*   ALBUMIN g/dL 3 9 4 7   BILIRUBIN TOTAL mg/dL 0 60 0 80   EGFR ml/min/1 73sq m 102 90       BMP:  Results from last 7 days  Lab Units 11/05/17  0440 11/04/17  1629   SODIUM mmol/L 137 140   POTASSIUM mmol/L 3 7 3 3*   CHLORIDE mmol/L 101 99*   CO2 mmol/L 27 29   BUN mg/dL 8 7   CREATININE mg/dL 0 70 0 77   GLUCOSE RANDOM mg/dL 98 101   CALCIUM mg/dL 9 2 9 8                Results from last 7 days  Lab Units 11/05/17  0440   MAGNESIUM mg/dL 1 6       Results from last 7 days  Lab Units 11/05/17  0440   HEMOGLOBIN A1C % 5 8       Results from last 7 days  Lab Units 11/05/17  0440   TSH 3RD GENERATON uIU/mL 0 364           Lipid Profile:   Lab Results   Component Value Date    CHOL 164 11/05/2017     Lab Results   Component Value Date    HDL 51 11/05/2017     Lab Results   Component Value Date    LDLCALC 105 (H) 11/05/2017     Lab Results   Component Value Date    TRIG 40 11/05/2017       Cardiac testing:   Results for orders placed during the hospital encounter of 11/04/17   Echo complete with contrast if indicated    Narrative [address] [place] [address],[address] Paresh [address]  [phone]    Transthoracic Echocardiogram  2D, M-mode, Doppler, and Color Doppler    Study date:  2017    Patient: Jona Cardenas  MR number: [id number]  Account number: [de-identified]  : [id number]  Age: 80 years  Gender: Female  Status: Inpatient  Location: Bedside  Height: 64 in  Weight: 194 9 lb  BP: 115/ 67 mmHg    Indications: Chest Pain    Diagnoses: R07 9 - Chest pain, unspecified    Sonographer:  HARPAL Davey,RDCS  Interpreting Physician:  Marcus Lund MD  Referring Physician:  Kendal Rider MD  Group:  [facility]    SUMMARY    PROCEDURE INFORMATION:  This was a technically difficult study  Echocardiographic views were limited due to poor patient compliance, lung interference, and combative patient  LEFT VENTRICLE:  Systolic function was normal  Ejection fraction was estimated to be 60 %  There were no regional wall motion abnormalities  Wall thickness was mildly increased  There was mild concentric hypertrophy  Doppler parameters were consistent with abnormal left ventricular relaxation (grade 1 diastolic dysfunction)  RIGHT VENTRICLE:  The size was normal   Systolic function was normal     LEFT ATRIUM:  The atrium was mildly dilated  RIGHT ATRIUM:  The atrium was mildly dilated  MITRAL VALVE:  There was mild annular calcification  There was trace regurgitation  TRICUSPID VALVE:  There was trace regurgitation  Pulmonary artery systolic pressure was within the normal range  AORTA:  The root exhibited normal size and mild fibrocalcific change  HISTORY: PRIOR HISTORY: Chest Pain, Dizziness    PROCEDURE: The procedure was performed at the bedside  This was a routine study  The transthoracic approach was used  The study included complete 2D imaging, M-mode, complete spectral Doppler, and color Doppler  The heart rate was 72 bpm,  at the start of the study  Images were obtained from the parasternal, apical, subcostal, and suprasternal notch acoustic windows  Echocardiographic views were limited due to poor patient compliance, lung interference, and combative  patient  This was a technically difficult study  LEFT VENTRICLE: Size was normal  Systolic function was normal  Ejection fraction was estimated to be 60 %  There were no regional wall motion abnormalities  Wall thickness was mildly increased  There was mild concentric hypertrophy  No  evidence of apical thrombus  DOPPLER: Doppler parameters were consistent with abnormal left ventricular relaxation (grade 1 diastolic dysfunction)  RIGHT VENTRICLE: The size was normal  Systolic function was normal  Wall thickness was normal     LEFT ATRIUM: The atrium was mildly dilated  RIGHT ATRIUM: The atrium was mildly dilated  MITRAL VALVE: There was mild annular calcification  There was normal leaflet separation  DOPPLER: The transmitral velocity was within the normal range  There was no evidence for stenosis  There was trace regurgitation  AORTIC VALVE: The valve was trileaflet  Leaflets exhibited normal thickness and normal cuspal separation  DOPPLER: Transaortic velocity was within the normal range  There was no evidence for stenosis  There was no significant  regurgitation  TRICUSPID VALVE: The valve structure was normal  There was normal leaflet separation  DOPPLER: The transtricuspid velocity was within the normal range  There was no evidence for stenosis  There was trace regurgitation  Pulmonary artery  systolic pressure was within the normal range  PULMONIC VALVE: Leaflets exhibited normal thickness, no calcification, and normal cuspal separation  DOPPLER: The transpulmonic velocity was within the normal range  There was no significant regurgitation  PERICARDIUM: There was no pericardial effusion  The pericardium was normal in appearance  AORTA: The root exhibited normal size and mild fibrocalcific change  SYSTEMIC VEINS: IVC: The inferior vena cava was normal in size      SYSTEM MEASUREMENT TABLES    2D  %FS: 30 9 %  Ao Diam: 3 1 cm  EDV(Teich): 88 3 ml  EF(Teich): 58 8 %  ESV(Teich): 36 4 ml  IVSd: 1 3 cm  LA Area: 20 5 cm2  LA Diam: 4 5 cm  LVEDV MOD A4C: 87 8 ml  LVEF MOD A4C: 56 1 %  LVESV MOD A4C: 38 5 ml  LVIDd: 4 4 cm  LVIDs: 3 cm  LVLd A4C: 7 5 cm  LVLs A4C: 6 1 cm  LVOT Diam: 2 cm  LVPWd: 1 2 cm  RA Area: 25 3 cm2  RVIDd: 3 4 cm  SV MOD A4C: 49 3 ml  SV(Teich): 51 9 ml    CW  AV Env  Ti: 319 7 ms  AV VTI: 36 9 cm  AV Vmax: 1 9 m/s  AV Vmean: 1 2 m/s  AV maxP 7 mmHg  AV meanP 4 mmHg  HR: 62 8 BPM  MV VTI: 43 1 cm  MV Vmax: 1 5 m/s  MV Vmean: 0 7 m/s  MV maxP 5 mmHg  MV meanP 3 mmHg  TR Vmax: 2 6 m/s  TR maxP 8 mmHg    MM  TAPSE: 2 6 cm    PW  DIANA (VTI): 2 3 cm2  DIANA Vmax: 2 2 cm2  E': 0 1 m/s  E/E': 21 7  LVOT Env  Ti: 350 1 ms  LVOT VTI: 27 7 cm  LVOT Vmax: 1 3 m/s  LVOT Vmean: 0 8 m/s  LVOT maxP 2 mmHg  LVOT meanPG: 3 2 mmHg  LVSV Dopp: 85 ml  MV A Duglas: 1 4 m/s  MV Dec Howell: 3 9 m/s2  MV DecT: 281 7 ms  MV E Duglas: 1 1 m/s  MV E/A Ratio: 0 8  MV PHT: 81 7 ms  MVA (VTI): 2 cm2  MVA By PHT: 2 7 cm2    [employer] Accredited Echocardiography Laboratory    Prepared and electronically signed by    Danna Davis MD  Signed [id number] 14:15:19         Meds/Allergies   all current active meds have been reviewed and current meds:   Current Facility-Administered Medications   Medication Dose Route Frequency    aspirin chewable tablet 81 mg  81 mg Oral Daily    enoxaparin (LOVENOX) subcutaneous injection 40 mg  40 mg Subcutaneous Daily    folic acid (FOLVITE) tablet 1 mg  1 mg Oral Daily    influenza inactivated quadrivalent vaccine (FLULAVAL) IM injection 0 5 mL  0 5 mL Intramuscular Prior to discharge    LORazepam (ATIVAN) tablet 0 5 mg  0 5 mg Oral Q8H PRN    losartan (COZAAR) tablet 25 mg  25 mg Oral Daily    metoprolol tartrate (LOPRESSOR) partial tablet 12 5 mg  12 5 mg Oral Q12H [address]    multivitamin-minerals (CENTRUM) tablet 1 tablet  1 tablet Oral Daily    nicotine (NICODERM CQ) 21 mg/24 hr TD 24 hr patch 21 mg  21 mg Transdermal Daily    pneumococcal 13-valent conjugate vaccine (PREVNAR-13) IM injection 0 5 mL  0 5 mL Intramuscular Prior to discharge    thiamine (VITAMIN B1) tablet 100 mg  100 mg Oral Daily     No prescriptions prior to admission  Assessment/Plan:  1  Tachycardia- likely SVT: Pulse is now within normal limits  Continue Losartan 25 mg daily, Lopressor 12 5 mg BID  2  Chest pain/TAN: ECHO showed EF 60% with no regional wall motion abnormalities  Nuclear stress test showed normal perfusion  Patient is cleared for discharge from a cardiac standpoint  Continue ASA 81 mg     3  HTN: /69  Stable  Continue Losartan 25 mg daily, Lopressor 12 5 mg BID  Patient cleared for discharge from a cardiac standpoint  ** Please Note: Dragon 360 Dictation voice to text software may have been used in the creation of this document   **

## 2017-11-06 NOTE — SOCIAL WORK
CM met with pt at bedside to discuss PCP appointment  Pt was agreeable  Pt has no other needs at this time  CM department to follow pt through discharge

## 2017-11-06 NOTE — PLAN OF CARE
Problem: DISCHARGE PLANNING - CARE MANAGEMENT  Goal: Discharge to post-acute care or home with appropriate resources  INTERVENTIONS:  - Conduct assessment to determine patient/family and health care team treatment goals, and need for post-acute services based on payer coverage, community resources, and patient preferences, and barriers to discharge  - Address psychosocial, clinical, and financial barriers to discharge as identified in assessment in conjunction with the patient/family and health care team  - Arrange appropriate level of post-acute services according to patients   needs and preference and payer coverage in collaboration with the physician and health care team  - Communicate with and update the patient/family, physician, and health care team regarding progress on the discharge plan  - Arrange appropriate transportation to post-acute venues   Outcome: Completed Date Met: 11/06/17  CM met with pt at bedside to discuss PCP appointment  Pt was agreeable  Pt has no other needs at this time  CM department to follow pt through discharge

## 2017-11-06 NOTE — SOCIAL WORK
LOS: 1 CM met with pt at bedside  Pt reports she lives in [place] with her sister and granddaughter  Pt lives in 3 story apartment building on 3rd floor and uses elevator  Pt denies DME  Pt reports she can ambulate and complete ADL's independently  Pt denies hx of HHC and Rehab  Pt uses [facility] and can afford her medication as prescribed  Pt reports she has anxiety and does not have PCP  Pt denies drug use and reports she drinks rum and coke, 2-3 times per week  Pt's son is POA  Pt is retired and drives  Pt transported herself here and plans to drive home  CM discussed discharge planning and pt was agreeable to [facility] for home PT  CM reported she would make [facility] aware of anxiety and setup PCP appointment  CM spoke with [facility] KHOURY sent referral for [facility]  CM spoke with Jamey Obrien's Office to setup new PCP appointment and scheduled for 11/10/17 at [address] [place] [address]  CM attempted to follow up with pt at bedside and she was at stress test  Pt has no other needs at this time  CM to follow pt through discharge

## 2017-11-06 NOTE — PLAN OF CARE
Problem: DISCHARGE PLANNING - CARE MANAGEMENT  Goal: Discharge to post-acute care or home with appropriate resources  INTERVENTIONS:  - Conduct assessment to determine patient/family and health care team treatment goals, and need for post-acute services based on payer coverage, community resources, and patient preferences, and barriers to discharge  - Address psychosocial, clinical, and financial barriers to discharge as identified in assessment in conjunction with the patient/family and health care team  - Arrange appropriate level of post-acute services according to patients   needs and preference and payer coverage in collaboration with the physician and health care team  - Communicate with and update the patient/family, physician, and health care team regarding progress on the discharge plan  - Arrange appropriate transportation to post-acute venues  Outcome: Progressing  LOS: 1 CM met with pt at bedside  Pt reports she lives in [facility] with her sister and granddaughter  Pt lives in 3 story apartment building on 3rd floor and uses elevator  Pt denies DME  Pt reports she can ambulate and complete ADL's independently  Pt denies hx of HHC and Rehab  Pt uses [facility] and can afford her medication as prescribed  Pt reports she has anxiety and does not have PCP  Pt denies drug use and reports she drinks rum and coke, 2-3 times per week  Pt's son is POA  Pt is retired and drives  Pt transported herself here and plans to drive home  CM discussed discharge planning and pt was agreeable to [facility] for home PT  CM reported she would make [facility] aware of anxiety and setup PCP appointment  CM spoke with [facility]  CM sent referral for [facility]  CM spoke with Jamey Obrien's Office to setup new PCP appointment and scheduled for 11/10/17 at [address] [place] [address]  CM attempted to follow up with pt at bedside and she was at stress test  Pt has no other needs at this time  CM to follow pt through discharge

## 2017-11-06 NOTE — CASE MANAGEMENT
Estefany Alejandro, RN Registered Nurse Signed   Case Management Date of Service: 11/5/2017  9:10 AM           Initial Clinical Review     Admission: Date/Time/Statement:     OBS  ORDER    11/4  @    [address] [place] [address] 11/05/17 @ 1315            Orders Placed This Encounter   Procedures    Place in Observation (expected length of stay for this patient is less than two midnights)       Standing Status:   Standing       Number of Occurrences:   1       Order Specific Question:   Admitting Physician       Answer:   Todd Kaye [[id number]]       Order Specific Question:   Level of Care       Answer:   Med Surg [16]         Admitting Physician ODALIS ROYAL    Level of Care Med Surg    Estimated length of stay More than 2 Midnights    Certification I certify that inpatient services are medically necessary for this patient for a duration of greater than two midnights  See H&P and MD Progress Notes for additional information about the patient's course of treatment            ED: Date/Time/Mode of Arrival:             ED Arrival Information      Expected Arrival Acuity Means of Arrival Escorted By Service Admission Type     - 11/4/2017 14:54 Urgent Walk-In Family Member General Medicine Urgent     Arrival Complaint     Chest Pain             Chief Complaint:        Chief Complaint   Patient presents with    Chest Pain       patient is c/o substernal chest pain x 1 week  also notes pain behind her right ear   difficulty sleeping, headache,dizziness          History of Illness:   Sung Divers a 80 y o  female who presents with multiple complaints, the patient mentioned that her primary concern was dizziness, and pain in her neck which started 2-3 days ago she does not recall having a trauma, patient mentioned that she was feeling dizzy, which means that she was drifting in either direction, denied having a spinning movement, also denied having any visual disturbances headaches, complained of neck pain on the lateral side right, symptoms as per her were not worsened by movements of the head and did not get worse on getting from bed to a sitting position and then standing   She complained of chest pain which on my exam has been reproducible, on both right and left side, does mention that the chest pain has been there at least for a week, but 10 also mentions that she has had this chest pain before, particularly when she exerts herself or is emotional   The patient smokes at least half a pack a day of cigarettes, could be more, and mentions that she drinks 1-2 rum and Coke drinks almost on a daily basis as per the family, but she mentioned she does of 2 to 3 times a week, the patient does mention that she has been eating and drinking okay, which seems to be accurate, the patient's blood pressure is been very high in the 961 systolic, the loss of she saw physician was at least 6-7 years ago and mentioned that that is when she believes she had some blood work done and it is possible that she was told that she has high cholesterol, she has family history of heart disease, both her sisters have diabetes, father had heart disease, and her brother  unclear reason  Liliana Mariee is pain-free right now also mentions to me that her dizziness has resolved, but on pressing her chest the patient does complain of the pain which she says is different from the kind of pain which she constantly has   No shortness of breath no lower extremity edema abdominal pain fevers chills, no visual disturbances of nystagmus     ED Vital Signs:            ED Triage Vitals [17 1502]   Temperature Pulse Respirations Blood Pressure SpO2   98 7 °F (37 1 °C) 94 16 (!) 187/89 94 %       Temp Source Heart Rate Source Patient Position - Orthostatic VS BP Location FiO2 (%)   Oral Monitor Sitting Right arm --       Pain Score           No Pain                Wt Readings from Last 1 Encounters:   17 88 9 kg (195 lb 15 8 oz)         Vital Signs (abnormal): above     Abnormal Labs/Diagnostic Test Results:    WBC    3 01  H/H   11/33 8  K  3 3  AST   69  ALT    81  Ct  C/spine:  No Fx   Or  Traumatic malalignemnt  CXR:  NAD  Ct  Head:    No acute intracranial abnormality     ED Treatment:              Medication Administration from 11/04/2017 1454 to 11/04/2017 1814        Date/Time Order Dose Route Action Action by Comments       11/04/2017 1634 meclizine (ANTIVERT) tablet 12 5 mg 12 5 mg Oral Given Raman Balbuena RN         11/04/2017 1715 potassium chloride (K-DUR,KLOR-CON) CR tablet 40 mEq 40 mEq Oral Given Raman Balbuena RN         11/04/2017 1753 thiamine (VITAMIN B1) tablet 100 mg 100 mg Oral Given Raman Balbuena RN         18/00/9963 6857 folic acid (FOLVITE) tablet 1 mg 1 mg Oral Given Raman Balbuena RN         11/04/2017 1753 multivitamin-minerals (CENTRUM) tablet 1 tablet 1 tablet Oral Given Raman Balbuena RN         11/04/2017 1753 aspirin chewable tablet 81 mg 81 mg Oral Given Raman Balbuena RN               Past Medical/Surgical History: Active Ambulatory Problems     Diagnosis Date Noted    No Active Ambulatory Problems           Resolved Ambulatory Problems     Diagnosis Date Noted    No Resolved Ambulatory Problems      No Additional Past Medical History         Admitting Diagnosis: Dizziness [R42]  Chest pain [R07 9]     Age/Sex: 80 y o  female     Assessment/Plan:    Dizziness, with neck pain, CT cervical spine, no nystagmus, no presyncopal symptomatology loss of consciousness mention, consider neurology evaluation of the pinch symptoms persistent otherwise outpatient follow-up   2   Chest pain, at this time seems reproducible has a strong family history, active smoker, likely hyperlipidemia, obesity, hypertensive in the ER has not seen physician in a while, cardiac enzymes, telemetry monitoring    3   Transthoracic echo, stress test would be beneficial either inpatient or outpatient  4   Will start the patient on losartan for blood pressure control    5   Mentioned history of alcohol use, unclear how much she drinks, will put her on thiamine folic acid multivitamin she does not seem volume depleted though, LFTs are elevated   Check comprehensive metabolic panel tomorrow  6   Nicotine patch  7   Lovenox DVT prophylaxis  8   Will start aspirin 81 mg     VTE Prophylaxis: Enoxaparin (Lovenox)  /        Anticipated Length of Stay: Anh Orozco will be admitted on an Observation basis with an anticipated length of stay of  <2 midnights    Justification for Hospital Stay: cx pain     Admission Orders:   OBS  ORDER    11/4  @    1715  Scheduled Meds:   aspirin 81 mg Oral Daily   enoxaparin 40 mg Subcutaneous Daily   folic acid 1 mg Oral Daily   losartan 50 mg Oral Daily   multivitamin-minerals 1 tablet Oral Daily   nicotine 1 patch Transdermal Daily   thiamine 100 mg Oral Daily      Continuous Infusions:    PRN Meds: influenza vaccine    LORazepam    pneumococcal 13-valent conjugate vaccine      Tele  PT/OT  Serial troponin  Cardiac  diet

## 2017-11-07 NOTE — PHYSICIAN ADVISOR
Current patient class: Inpatient  The patient is currently on Hospital Day: 3      The patient was admitted to the hospital at 1315 on 11/5/17 for the following diagnosis:  Dizziness [R42]  Chest pain [R07 9]       There is documentation in the medical record of an expected length of stay of at least 2 midnights  The patient is therefore expected to satisfy the 2 midnight benchmark and given the 2 midnight presumption is appropriate for INPATIENT ADMISSION  Given this expectation of a satisfying stay, CMS instructs us that the patient is most often appropriate for inpatient admission under part A provided medical necessity is documented in the chart  After review of the relevant documentation, labs, vital signs and test results, the patient is appropriate for INPATIENT ADMISSION  Admission to the hospital as an inpatient is a complex decision making process which requires the practitioner to consider the patients presenting complaint, history and physical examination and all relevant testing  With this in mind, in this case, the patient was deemed appropriate for INPATIENT ADMISSION  After review of the documentation and testing available at the time of the admission I concur with this clinical determination of medical necessity  Rationale is as follows: The patient is a 80 yrs old Female who presented to the ED at 11/4/2017  2:58 PM with a chief complaint of Chest Pain (patient is c/o substernal chest pain x 1 week  also notes pain behind her right ear  difficulty sleeping, headache,dizziness )    The patients vitals on arrival were ED Triage Vitals [11/04/17 1502]   Temperature Pulse Respirations Blood Pressure SpO2   98 7 °F (37 1 °C) 94 16 (!) 187/89 94 %      Temp Source Heart Rate Source Patient Position - Orthostatic VS BP Location FiO2 (%)   Oral Monitor Sitting Right arm --      Pain Score       No Pain           History reviewed  No pertinent past medical history    Past Surgical History: Procedure Laterality Date    APPENDECTOMY             Consults have been placed to:   IP CONSULT TO CARDIOLOGY  IP CONSULT TO CASE MANAGEMENT    Vitals:    11/05/17 2300 11/06/17 0308 11/06/17 0700 11/06/17 1100   BP: 117/56 122/74 131/69 128/60   Pulse: 67 63 66 68   Resp: 20 19 19 19   Temp: 98 2 °F (36 8 °C) 98 °F (36 7 °C) 98 1 °F (36 7 °C) 97 9 °F (36 6 °C)   TempSrc: Oral Oral Oral Oral   SpO2: 96% 94% 98% 96%   Weight:       Height:           Most recent labs:    Recent Labs      11/04/17   1629   11/04/17   2302  11/05/17   0440  11/06/17   0611   WBC  3 01*   --    --   3 16*  3 53*   HGB  11 0*   --    --   10 2*  10 8*   HCT  33 8*   --    --   30 8*  33 2*   PLT  164   --    --   143*  153   K  3 3*   --    --   3 7   --    NA  140   --    --   137   --    CALCIUM  9 8   --    --   9 2   --    BUN  7   --    --   8   --    CREATININE  0 77   --    --   0 70   --    TROPONINI  <0 02   < >  <0 02   --    --    AST  69*   --    --   86*   --    ALT  81*   --    --   79*   --    ALKPHOS  89   --    --   75   --    BILITOT  0 80   --    --   0 60   --     < > = values in this interval not displayed  Scheduled Meds:  Continuous Infusions:  No current facility-administered medications for this encounter  PRN Meds:      Surgical procedures (if appropriate):

## 2017-11-16 ENCOUNTER — ALLSCRIPTS OFFICE VISIT (OUTPATIENT)
Dept: OTHER | Facility: OTHER | Age: 72
End: 2017-11-16

## 2017-11-16 DIAGNOSIS — Z12.31 ENCOUNTER FOR SCREENING MAMMOGRAM FOR MALIGNANT NEOPLASM OF BREAST: ICD-10-CM

## 2017-11-16 DIAGNOSIS — Z78.0 ASYMPTOMATIC MENOPAUSAL STATE: ICD-10-CM

## 2017-11-17 NOTE — PROGRESS NOTES
Assessment  Assessed    1  Paroxysmal SVT (supraventricular tachycardia) (427 0) (I47 1)   2  History of Benign essential hypertension (401 1) (I10)   3  Family history of myocardial infarction (V17 3) (Z82 49) : Father   4  Current smoker (305 1) (F17 200)    Plan  Paroxysmal SVT (supraventricular tachycardia)    · Metoprolol Tartrate 25 MG Oral Tablet; TAKE 1 TABLET TWICE DAILY   Rx By: Leif Medina; Dispense: 30 Days ; #:60 Tablet; Refill: 11; For: Paroxysmal SVT (supraventricular tachycardia); TRISTON = N; Sent To: [id number]  PMH: Benign essential hypertension    · Metoprolol Succinate ER 25 MG Oral Tablet Extended Release 24 Hour   Rx By: Gricelda Contreras; Dispense: 90 Days ; #:90 Tablet Extended Release 24 Hour; Refill: 1;PMH: Benign essential hypertension; TRISTON = N; Transmitted To: [facility]; Last Updated By: Leif Medina; 11/16/2017 3:36:25 PM  SocHx: Current smoker    · Follow-up visit in 3 months Evaluation and Treatment  Follow-up  Status: Hold For -Scheduling  Requested for: [id number]   Ordered;SocHx: Current smoker; Ordered By: Leif Medina Performed:  Due: 65PQX4271    Discussion/Summary  Cardiology Discussion Summary Free Text Note Form [facility]:   #1  Paroxysmal SVT, asymptomatic  I'll increase the dose of metoprolol to 25 mg by mouth twice a day  Hypertension, blood pressure well-controlled  Chest pain, probably noncardiac  Recommend smoking cessation  follow up in 3 months to evaluate for response to higher dose of metoprolol  Chief Complaint  Chief Complaint Free Text Note Form: hospital follow-up for chest pain and palpitations      History of Present Illness  Cardiology HPI Free Text Note [facility]: 67-year-old female patient with past medical history of hypertension is here for follow-up after admission to the hospital with chest pain and palpitations   She had SVT while she was the hospital  Since she left the hospital she had one episode of palpitations yesterday that lasted for about an hour and resolved spontaneously  showed mild Concentric left ventricle hypertrophy, normal systolic function  stress test was normal      Review of Systems  Cardiology Female ROS:    Cardiac: No complaints of chest pain, no palpitations, no fainting  Skin: No complaints of nonhealing sores or skin rash  Genitourinary: No complaints of recurrent urinary tract infections, frequent urination at night, difficult urination, blood in urine, kidney stones, loss of bladder control, kidney problems, denies any birth control or hormone replacement, is not post menopausal, not currently pregnant  Psychological: No complaints of feeling depressed, anxiety, panic attacks, or difficulty concentrating  General: No complaints of trouble sleeping, lack of energy, fatigue, appetite changes, weight changes, fever, frequent infections, or night sweats  Respiratory: No complaints of shortness of breath, cough with sputum, or wheezing  HEENT: No complaints of serious problems, hearing problems, nose problems, throat problems, or snoring  Gastrointestinal: No complaints of liver problems, nausea, vomiting, heartburn, constipation, bloody stools, diarrhea, problems swallowing, adbominal pain, or rectal bleeding  Hematologic: No complaints of bleeding disorders, anemia, blood clots, or excessive brusing  Neurological: No complaints of numbness, tingling, dizziness, weakness, seizures, headaches, syncope or fainting, AM fatigue, daytime sleepiness, no witnessed apnea episodes  Musculoskeletal: No complaints of arthritis, back pain, or painfull swelling  ROS Reviewed:   ROS reviewed  Active Problems  Problems    1  Alcohol abuse (305 00) (F10 10)   2  Anemia of chronic disease (285 29) (D63 8)   3  Asymptomatic postmenopausal state (V49 81) (Z78 0)   4  Cigarette nicotine dependence without complication (744 8) (B73 130)   5  Encounter for screening mammogram for breast cancer (V76 12) (Z12 31)   6  Gastroesophageal reflux disease (530 81) (K21 9)   7  Hyperglycemia (790 29) (R73 9)   8  No advance directives (V49 89) (Z78 9)   9  Obesity (278 00) (E66 9)   10  Screening for genitourinary condition (V81 6) (Z13 89)   11  Sinus tachycardia (427 89) (R00 0)   12  Urinary incontinence (788 30) (R32)    Past Medical History  Problems    1  History of Benign essential hypertension (401 1) (I10)  Active Problems And Past Medical History Reviewed: The active problems and past medical history were reviewed and updated today  Surgical History  Problems    1  Denied: History Of Prior Surgery  Surgical History Reviewed: The surgical history was reviewed and updated today  Family History  Father    1  Family history of myocardial infarction (V17 3) (Z82 49)  Family History Reviewed: The family history was reviewed and updated today  Social History  Problems    · Alcohol abuse (305 00) (F10 10)   · Current smoker (305 1) (F17 200)   · No advance directives (V49 89) (Z78 9)  Social History Reviewed: The social history was reviewed and updated today  The social history was reviewed and is unchanged  Current Meds   1  Aspirin EC 81 MG Oral Tablet Delayed Release; Take one tablet once daily; Therapy: 08ZMC3963 to (Complete:68Fvx7905) Recorded   2  Folic Acid 1 MG Oral Tablet; TAKE 1 TABLET DAILY; Therapy: 56RWH0745 to ([facility] Last)  Requested for: 33SYB1661; Last Rx:16Nov2017 Ordered   3  Losartan Potassium 25 MG Oral Tablet; TAKE 1 TABLET DAILY AS DIRECTED; Therapy: 52XUP6135 to ([facility] Last)  Requested for: 77AXP8950; Last Rx:16Nov2017 Ordered   4  Metoprolol Succinate ER 25 MG Oral Tablet Extended Release 24 Hour; TAKE 1 TABLET DAILY; Therapy: 27SNH7453 to ([facility] Last)  Requested for: 30HJH3926; Last Rx:16Nov2017 Ordered   5  Pantoprazole Sodium 40 MG Oral Tablet Delayed Release; TAKE 1 TABLET DAILY;  Therapy: 61GZJ7851 to ([facility] Last)  Requested for: 79TZJ1835; Last Rx:16Nov2017 Ordered   6  RA Vitamin B-1 100 MG Oral Tablet; TAKE 1 TABLET DAILY AS DIRECTED; Therapy: 97ZTS5067 to ([facility])  Requested for: 81EUZ4697; Last Rx:49Dcw5116 Ordered  Medication List Reviewed: The medication list was reviewed and updated today  Allergies  Medication    1  No Known Drug Allergies  Non-Medication    2  No Known Environmental Allergies   3  No Known Food Allergies    Vitals  Vital Signs    Recorded: 82OFO2606 04:01PM   Heart Rate 70   Systolic 445   Diastolic 74   Height 5 ft 2 in   Weight 195 lb    BMI Calculated 35 67   BSA Calculated 1 89   O2 Saturation 99       Physical Exam   Constitutional  General appearance: No acute distress, well appearing and well nourished  Eyes  Conjunctiva and Sclera examination: Conjunctiva pink, sclera anicteric  Ears, Nose, Mouth, and Throat - Oropharynx: Clear, nares are clear, mucous membranes are moist   Neck  Neck and thyroid: Normal, supple, trachea midline, no thyromegaly  Pulmonary  Respiratory effort: No increased work of breathing or signs of respiratory distress  Auscultation of lungs: Clear to auscultation, no rales, no rhonchi, no wheezing, good air movement  Cardiovascular  Auscultation of heart: Normal rate and rhythm, normal S1 and S2, no murmurs  Carotid pulses: Normal, 2+ bilaterally  Peripheral vascular exam: Normal pulses throughout, no tenderness, erythema or swelling  Pedal pulses: Normal, 2+ bilaterally  Examination of extremities for edema and/or varicosities: Abnormal   bilateral pretibial TRACE+ pitting edema-- and-- right pretibial TRACE+ pitting edema  Abdomen  Abdomen: Non-tender and no distention  Liver and spleen: No hepatomegaly or splenomegaly  Musculoskeletal Gait and station: Normal gait  -- Digits and nails: Normal without clubbing or cyanosis  -- Inspection/palpation of joints, bones, and muscles: Normal, ROM normal    Skin - Skin and subcutaneous tissue: Normal without rashes or lesions   Skin is warm and well perfused, normal turgor  Neurologic - Cranial nerves: II - XII intact  -- Speech: Normal    Psychiatric - Orientation to person, place, and time: Normal -- Mood and affect: Normal       Future Appointments    Date/Time Provider Specialty Site   04/02/2018 09:45 AM LOAN Islas   Internal Medicine [facility]       Signatures   Electronically signed by : LOAN Meléndez ; Nov 16 2017  4:19PM EST                       (Author)

## 2018-01-12 VITALS
BODY MASS INDEX: 35.88 KG/M2 | HEIGHT: 62 IN | SYSTOLIC BLOOD PRESSURE: 136 MMHG | HEART RATE: 70 BPM | WEIGHT: 195 LBS | DIASTOLIC BLOOD PRESSURE: 74 MMHG | OXYGEN SATURATION: 99 %

## 2018-01-13 VITALS
DIASTOLIC BLOOD PRESSURE: 70 MMHG | HEIGHT: 62 IN | HEART RATE: 96 BPM | WEIGHT: 195.38 LBS | BODY MASS INDEX: 35.95 KG/M2 | OXYGEN SATURATION: 93 % | SYSTOLIC BLOOD PRESSURE: 134 MMHG

## 2018-03-16 DIAGNOSIS — I10 ESSENTIAL (PRIMARY) HYPERTENSION: ICD-10-CM

## 2018-03-16 DIAGNOSIS — R73.9 HYPERGLYCEMIA: ICD-10-CM

## 2018-04-06 RX ORDER — PANTOPRAZOLE SODIUM 40 MG/1
1 TABLET, DELAYED RELEASE ORAL DAILY
COMMUNITY
Start: 2017-11-16 | End: 2018-09-27 | Stop reason: SDUPTHER

## 2018-04-06 RX ORDER — GAUZE BANDAGE 2" X 2"
1 BANDAGE TOPICAL DAILY
COMMUNITY
Start: 2017-11-16 | End: 2019-05-31

## 2018-04-09 ENCOUNTER — OFFICE VISIT (OUTPATIENT)
Dept: INTERNAL MEDICINE CLINIC | Facility: CLINIC | Age: 73
End: 2018-04-09
Payer: MEDICARE

## 2018-04-09 VITALS
OXYGEN SATURATION: 97 % | WEIGHT: 193 LBS | RESPIRATION RATE: 16 BRPM | SYSTOLIC BLOOD PRESSURE: 142 MMHG | HEART RATE: 90 BPM | DIASTOLIC BLOOD PRESSURE: 84 MMHG | BODY MASS INDEX: 35.51 KG/M2 | HEIGHT: 62 IN

## 2018-04-09 DIAGNOSIS — R41.3 MEMORY DIFFICULTIES: ICD-10-CM

## 2018-04-09 DIAGNOSIS — F17.210 CIGARETTE NICOTINE DEPENDENCE WITHOUT COMPLICATION: ICD-10-CM

## 2018-04-09 DIAGNOSIS — D63.8 ANEMIA OF CHRONIC DISEASE: ICD-10-CM

## 2018-04-09 DIAGNOSIS — E66.9 OBESITY (BMI 30-39.9): ICD-10-CM

## 2018-04-09 DIAGNOSIS — I47.1 PAROXYSMAL SVT (SUPRAVENTRICULAR TACHYCARDIA) (HCC): ICD-10-CM

## 2018-04-09 DIAGNOSIS — R41.3 MEMORY DIFFICULTIES: Primary | ICD-10-CM

## 2018-04-09 DIAGNOSIS — R26.9 GAIT DISTURBANCE: ICD-10-CM

## 2018-04-09 DIAGNOSIS — K21.9 GASTROESOPHAGEAL REFLUX DISEASE WITHOUT ESOPHAGITIS: ICD-10-CM

## 2018-04-09 DIAGNOSIS — R32 URINARY INCONTINENCE, UNSPECIFIED TYPE: ICD-10-CM

## 2018-04-09 DIAGNOSIS — I10 BENIGN ESSENTIAL HYPERTENSION: ICD-10-CM

## 2018-04-09 DIAGNOSIS — R73.9 HYPERGLYCEMIA: ICD-10-CM

## 2018-04-09 PROBLEM — R42 DIZZINESS: Status: RESOLVED | Noted: 2017-11-04 | Resolved: 2018-04-09

## 2018-04-09 PROBLEM — R00.0 SINUS TACHYCARDIA: Status: ACTIVE | Noted: 2017-11-16

## 2018-04-09 PROBLEM — R07.9 CHEST PAIN: Status: RESOLVED | Noted: 2017-11-04 | Resolved: 2018-04-09

## 2018-04-09 PROCEDURE — 99214 OFFICE O/P EST MOD 30 MIN: CPT | Performed by: INTERNAL MEDICINE

## 2018-04-09 RX ORDER — FOLIC ACID 1 MG/1
1 TABLET ORAL DAILY
Qty: 90 TABLET | Refills: 1 | Status: SHIPPED | OUTPATIENT
Start: 2018-04-09 | End: 2019-05-31

## 2018-04-09 RX ORDER — FOLIC ACID 1 MG/1
1 TABLET ORAL DAILY
Qty: 90 TABLET | Refills: 1 | Status: SHIPPED | OUTPATIENT
Start: 2018-04-09 | End: 2018-04-09 | Stop reason: SDUPTHER

## 2018-04-09 NOTE — PROGRESS NOTES
INTERNAL MEDICINE OFFICE VISIT  [facility] AT [address]  Tel: [phone]      NAME: Jorge Butcher  AGE: 71 y o  SEX: female  : 1945   MRN: [id number]    DATE: 2018  TIME: 1:18 PM      Assessment and Plan:  1  Memory difficulties  Patient has recently realized that she has difficulty remembering things  She has also started to lose her way as she does not remember where she is going  She was told to make an appointment with the neurologist for further workup and treatment  - Ambulatory referral to Neurology; Future  - folic acid (FOLVITE) 1 mg tablet; Take 1 tablet (1 mg total) by mouth daily  Dispense: 90 tablet; Refill: 1    2  Gait disturbance  She has difficulty walking and says that it feels she is losing her balance at times  3  Benign essential hypertension  Blood pressure is well controlled, continue same medications  4  Gastroesophageal reflux disease without esophagitis  Symptoms are stable on pantoprazole, continue the same  5  Anemia of chronic disease  Follow-up CBC    6  Hyperglycemia  She was told to cut down on the carbohydrates  7  Paroxysmal SVT (supraventricular tachycardia) (HCC)  Stable, follows up with Cardiology    8  Urinary incontinence, unspecified type  Still has symptoms, was told to do Kegel exercises  9  Cigarette nicotine dependence without complication  She continues to smoke, she was counseled to quit smoking  10  Obesity (BMI 30-39  9)  She was told to cut down on the calories and increase her activity  - Counseling Documentation: patient was counseled regarding: diagnostic results, instructions for management, risk factor reductions, prognosis, patient and family education, impressions, risks and benefits of treatment options and importance of compliance with treatment  - Medication Side Effects:  Adverse side effects of medications were reviewed with the patient/guardian today  Return for follow up visit in 4 months or earlier, if needed  Chief Complaint:  Chief Complaint   Patient presents with    Well Check     5 month         History of Present Illness:   Memory problems-recently she has started to forget a lot of things and she is concerned about it  Gait disturbance-she loses her balance when she is walking and also complains of pain in her thighs  Hypertension-blood pressure stable on present medications  GERD-stable on pantoprazole  Anemia of chronic disease-will follow up labs  Hyperglycemia-she has been told to cut down the carbohydrates  PSVT-follows up with Cardiology  Urinary incontinence-stable  Current smoker-continues to smoke  Obesity-she has not been able to lose weight  Active Problem List:  Patient Active Problem List   Diagnosis    Anemia of chronic disease    Benign essential hypertension    Cigarette nicotine dependence without complication    Gastroesophageal reflux disease without esophagitis    Hyperglycemia    Paroxysmal SVT (supraventricular tachycardia) (HCC)    Sinus tachycardia    Urinary incontinence    Memory difficulties    Gait disturbance    Obesity (BMI 30-39  9)         Past Medical History:  Past Medical History:   Diagnosis Date    Benign essential hypertension     last assessed - [id number]    Chest pain 11/4/2017         Past Surgical History:  Past Surgical History:   Procedure Laterality Date    APPENDECTOMY           Family History:  Family History   Problem Relation Age of Onset    Heart attack Father      myocardial infarction         Social History:  Social History     Social History    Marital status:      Spouse name: N/A    Number of children: N/A    Years of education: N/A     Social History Main Topics    Smoking status: Current Every Day Smoker     Packs/day: 0 25     Types: Cigarettes    Smokeless tobacco: Never Used    Alcohol use Yes      Comment: weekly;  Per [employer] - Alcohol abuse    Drug use: No    Sexual activity: No     Other Topics Concern    None     Social History Narrative    No advance directives         Allergies:  No Known Allergies      Medications:    Current Outpatient Prescriptions:     aspirin 81 mg chewable tablet, Chew 1 tablet daily, Disp: 30 tablet, Rfl: 0    folic acid (FOLVITE) 1 mg tablet, Take 1 tablet (1 mg total) by mouth daily, Disp: 90 tablet, Rfl: 1    losartan (COZAAR) 25 mg tablet, Take 1 tablet by mouth daily, Disp: 30 tablet, Rfl: 0    metoprolol tartrate (LOPRESSOR) 25 mg tablet, Take 0 5 tablets by mouth every 12 (twelve) hours, Disp: 60 tablet, Rfl: 0    pantoprazole (PROTONIX) 40 mg tablet, Take 1 tablet by mouth daily, Disp: , Rfl:     Thiamine Mononitrate (RA VITAMIN B-1) 100 MG TABS, Take 1 tablet by mouth daily, Disp: , Rfl:       The following portions of the patient's history were reviewed and updated as appropriate: past medical history, past surgical history, family history, social history, allergies, current medications and active problem list       Review of Systems:  Constitutional: Denies fever, chills, weight gain, weight loss, fatigue  Eyes: Denies eye redness, eye discharge, double vision, change in visual acuity  ENT: Denies hearing loss, tinnitus, sneezing, nasal congestion, nasal discharge, sore throat   Respiratory: Denies cough, expectoration, hemoptysis, shortness of breath, wheezing  Cardiovascular: Denies chest pain, palpitations, lower extremity swelling, orthopnea, PND  Gastrointestinal: Denies abdominal pain, heartburn, nausea, vomiting, hematemesis, diarrhea, bloody stools  Genito-Urinary: Denies dysuria, frequency, difficulty in micturition, nocturia, incontinence  Musculoskeletal: Denies back pain, joint pain, muscle pain  Neurologic: Denies confusion, lightheadedness, syncope, headache, focal weakness, sensory changes, seizures  She complains of memory problems    Endocrine: Denies polyuria, polydipsia, temperature intolerance  Allergy and Immunology: Denies hives, insect bite sensitivity  Hematological and Lymphatic: Denies bleeding problems, swollen glands   Psychological: Denies depression, suicidal ideation, anxiety, panic, mood swings  Dermatological: Denies pruritus, rash, skin lesion changes      Vitals:  Vitals:    04/09/18 1257   BP: 142/84   Pulse: 90   Resp: 16   SpO2: 97%       Body mass index is 35 3 kg/m²  Weight (last 2 days)     Date/Time   Weight    04/09/18 1257  87 5 (193)                Physical Examination:  General: Patient is not in acute distress  Awake, alert, responding to commands  No weight gain or loss  Head: Normocephalic  Atraumatic  Eyes: Conjunctiva and lids with no swelling, erythema or discharge  Both pupils normal sized, round and reactive to light  Sclera nonicteric  ENT: External examination of nose and ear normal  Otoscopic examination shows translucent tympanic membranes with patent canals without erythema  Oropharynx moist with no erythema, edema, exudate or lesions  Neck: Supple  JVP not raised  Trachea midline  No masses  No thyromegaly  Lungs: No signs of increased work of breathing or respiratory distress  Bilateral bronchovascular breath sounds with no crackles or rhonchi  Chest wall: No tenderness  Cardiovascular: Normal PMI  No thrills  Regular rate and rhythm  S1 and S2 normal  No murmur, rub or gallop  Gastrointestinal: Abdomen soft, nontender  No guarding or rigidity  Liver and spleen not palpable  Bowel sounds present  Neurologic: Cranial nerves II-XII intact   Cortical functions normal  Motor system - Reflexes 2+ and symmetrical  Sensations normal  Musculoskeletal: Gait normal  No joint tenderness  Integumentary: Skin normal with no rash or lesions  Lymphatic: No palpable lymph nodes in neck, axilla or groin  Extremities: No clubbing, cyanosis, edema or varicosities  Psychological: Judgement and insight normal  Mood and affect normal      Laboratory Results:  CBC with diff:   Lab Results   Component Value Date    WBC 3 53 (L) 11/06/2017    RBC 3 84 11/06/2017    HGB 10 8 (L) 11/06/2017    HCT 33 2 (L) 11/06/2017    MCV 87 11/06/2017    MCH 28 1 11/06/2017    RDW 17 9 (H) 11/06/2017     11/06/2017       CMP:  Lab Results   Component Value Date    CREATININE 0 70 11/05/2017    BUN 8 11/05/2017     11/05/2017    K 3 7 11/05/2017     11/05/2017    CO2 27 11/05/2017    GLUCOSE 98 11/05/2017    PROT 7 4 11/05/2017    ALKPHOS 75 11/05/2017    ALT 79 (H) 11/05/2017    AST 86 (H) 11/05/2017       Lab Results   Component Value Date    HGBA1C 5 8 11/05/2017    MG 1 6 11/05/2017       Lab Results   Component Value Date    TROPONINI <0 02 11/04/2017    TROPONINI <0 02 11/04/2017    TROPONINI <0 02 11/04/2017       Lipid Profile:   Lab Results   Component Value Date    CHOL 164 11/05/2017     Lab Results   Component Value Date    HDL 51 11/05/2017     Lab Results   Component Value Date    LDLCALC 105 (H) 11/05/2017     Lab Results   Component Value Date    TRIG 40 11/05/2017         Health Maintenance:  Health Maintenance   Topic Date Due    Hepatitis C Screening  1945    COLONOSCOPY  1945    DTaP,Tdap,and Td Vaccines (1 - Tdap) 11/13/1967    GLAUCOMA SCREENING 67+ YR  11/13/2013    Fall Risk  11/16/2018    Depression Screening PHQ-9  11/16/2018    Urinary Incontinence Screening  11/16/2018    INFLUENZA VACCINE  Completed    PNEUMOCOCCAL POLYSACCHARIDE VACCINE AGE 72 AND OVER  Completed     Immunization History   Administered Date(s) Administered    Influenza Quadrivalent Preservative Free 3 years and older IM 11/06/2017    Influenza Split High Dose Preservative Free IM 1945    Pneumococcal Conjugate 13-Valent 11/06/2017    Pneumococcal Polysaccharide PPV23 1945         Heidi Diaz MD  4/9/2018,1:18 PM

## 2018-04-10 ENCOUNTER — TELEPHONE (OUTPATIENT)
Dept: NEUROLOGY | Facility: CLINIC | Age: 73
End: 2018-04-10

## 2018-09-27 DIAGNOSIS — I10 ESSENTIAL HYPERTENSION: Primary | ICD-10-CM

## 2018-09-27 DIAGNOSIS — K21.9 GASTROESOPHAGEAL REFLUX DISEASE WITHOUT ESOPHAGITIS: ICD-10-CM

## 2018-09-28 RX ORDER — PANTOPRAZOLE SODIUM 40 MG/1
TABLET, DELAYED RELEASE ORAL
Qty: 90 TABLET | Refills: 1 | Status: SHIPPED | OUTPATIENT
Start: 2018-09-28 | End: 2019-05-31

## 2018-09-28 RX ORDER — METHION/INOS/CHOL BT/B COM/LIV 110MG-86MG
CAPSULE ORAL
Qty: 90 TABLET | Refills: 1 | Status: SHIPPED | OUTPATIENT
Start: 2018-09-28 | End: 2019-05-31 | Stop reason: SDUPTHER

## 2018-09-28 RX ORDER — METOPROLOL SUCCINATE 25 MG/1
TABLET, EXTENDED RELEASE ORAL
Qty: 90 TABLET | Refills: 1 | Status: SHIPPED | OUTPATIENT
Start: 2018-09-28 | End: 2019-05-31 | Stop reason: SDUPTHER

## 2018-12-11 ENCOUNTER — TELEPHONE (OUTPATIENT)
Dept: INTERNAL MEDICINE CLINIC | Facility: CLINIC | Age: 73
End: 2018-12-11

## 2019-04-11 DIAGNOSIS — K21.9 GASTROESOPHAGEAL REFLUX DISEASE WITHOUT ESOPHAGITIS: ICD-10-CM

## 2019-04-11 DIAGNOSIS — I10 ESSENTIAL HYPERTENSION: ICD-10-CM

## 2019-04-12 RX ORDER — PANTOPRAZOLE SODIUM 40 MG/1
TABLET, DELAYED RELEASE ORAL
Qty: 90 TABLET | Refills: 1 | OUTPATIENT
Start: 2019-04-12

## 2019-04-12 RX ORDER — METOPROLOL SUCCINATE 25 MG/1
TABLET, EXTENDED RELEASE ORAL
Qty: 90 TABLET | Refills: 1 | OUTPATIENT
Start: 2019-04-12

## 2019-05-31 ENCOUNTER — OFFICE VISIT (OUTPATIENT)
Dept: INTERNAL MEDICINE CLINIC | Facility: CLINIC | Age: 74
End: 2019-05-31
Payer: MEDICARE

## 2019-05-31 ENCOUNTER — TELEPHONE (OUTPATIENT)
Dept: INTERNAL MEDICINE CLINIC | Facility: CLINIC | Age: 74
End: 2019-05-31

## 2019-05-31 VITALS
SYSTOLIC BLOOD PRESSURE: 146 MMHG | HEIGHT: 64 IN | OXYGEN SATURATION: 98 % | WEIGHT: 182 LBS | HEART RATE: 85 BPM | DIASTOLIC BLOOD PRESSURE: 80 MMHG | BODY MASS INDEX: 31.07 KG/M2

## 2019-05-31 DIAGNOSIS — K21.9 GASTROESOPHAGEAL REFLUX DISEASE WITHOUT ESOPHAGITIS: ICD-10-CM

## 2019-05-31 DIAGNOSIS — R73.9 HYPERGLYCEMIA: ICD-10-CM

## 2019-05-31 DIAGNOSIS — D63.8 ANEMIA OF CHRONIC DISEASE: ICD-10-CM

## 2019-05-31 DIAGNOSIS — Z12.11 COLON CANCER SCREENING: ICD-10-CM

## 2019-05-31 DIAGNOSIS — I10 BENIGN ESSENTIAL HYPERTENSION: Primary | ICD-10-CM

## 2019-05-31 DIAGNOSIS — I47.1 PAROXYSMAL SVT (SUPRAVENTRICULAR TACHYCARDIA) (HCC): ICD-10-CM

## 2019-05-31 DIAGNOSIS — F10.10 ALCOHOL ABUSE: ICD-10-CM

## 2019-05-31 DIAGNOSIS — N39.46 MIXED STRESS AND URGE URINARY INCONTINENCE: ICD-10-CM

## 2019-05-31 DIAGNOSIS — E28.39 MENOPAUSE OVARIAN FAILURE: ICD-10-CM

## 2019-05-31 DIAGNOSIS — F17.210 CIGARETTE NICOTINE DEPENDENCE WITHOUT COMPLICATION: ICD-10-CM

## 2019-05-31 DIAGNOSIS — E66.9 OBESITY (BMI 30-39.9): ICD-10-CM

## 2019-05-31 DIAGNOSIS — Z12.39 SCREENING FOR MALIGNANT NEOPLASM OF BREAST: ICD-10-CM

## 2019-05-31 PROCEDURE — 99214 OFFICE O/P EST MOD 30 MIN: CPT | Performed by: INTERNAL MEDICINE

## 2019-05-31 RX ORDER — METOPROLOL SUCCINATE 25 MG/1
25 TABLET, EXTENDED RELEASE ORAL DAILY
Qty: 90 TABLET | Refills: 3 | Status: ON HOLD | OUTPATIENT
Start: 2019-05-31 | End: 2020-02-06 | Stop reason: SDUPTHER

## 2019-05-31 RX ORDER — METHION/INOS/CHOL BT/B COM/LIV 110MG-86MG
100 CAPSULE ORAL DAILY
Qty: 90 TABLET | Refills: 3 | Status: SHIPPED | OUTPATIENT
Start: 2019-05-31 | End: 2019-05-31 | Stop reason: SDUPTHER

## 2019-05-31 RX ORDER — METOPROLOL SUCCINATE 25 MG/1
25 TABLET, EXTENDED RELEASE ORAL DAILY
Qty: 90 TABLET | Refills: 3 | Status: SHIPPED | OUTPATIENT
Start: 2019-05-31 | End: 2019-05-31 | Stop reason: SDUPTHER

## 2019-05-31 RX ORDER — METHION/INOS/CHOL BT/B COM/LIV 110MG-86MG
100 CAPSULE ORAL DAILY
Qty: 90 TABLET | Refills: 3
Start: 2019-05-31 | End: 2020-01-28

## 2019-06-07 ENCOUNTER — LAB (OUTPATIENT)
Dept: LAB | Facility: CLINIC | Age: 74
End: 2019-06-07
Payer: MEDICARE

## 2019-06-07 DIAGNOSIS — R73.9 HYPERGLYCEMIA: ICD-10-CM

## 2019-06-07 DIAGNOSIS — I10 BENIGN ESSENTIAL HYPERTENSION: ICD-10-CM

## 2019-06-07 PROCEDURE — 84443 ASSAY THYROID STIM HORMONE: CPT

## 2019-06-07 PROCEDURE — 85007 BL SMEAR W/DIFF WBC COUNT: CPT

## 2019-06-07 PROCEDURE — 80061 LIPID PANEL: CPT

## 2019-06-07 PROCEDURE — 36415 COLL VENOUS BLD VENIPUNCTURE: CPT

## 2019-06-07 PROCEDURE — 85027 COMPLETE CBC AUTOMATED: CPT

## 2019-06-07 PROCEDURE — 80053 COMPREHEN METABOLIC PANEL: CPT

## 2019-06-07 PROCEDURE — 83036 HEMOGLOBIN GLYCOSYLATED A1C: CPT

## 2019-06-08 LAB
ALBUMIN SERPL BCP-MCNC: 3.6 G/DL (ref 3.5–5)
ALP SERPL-CCNC: 143 U/L (ref 46–116)
ALT SERPL W P-5'-P-CCNC: 23 U/L (ref 12–78)
ANION GAP SERPL CALCULATED.3IONS-SCNC: 10 MMOL/L (ref 4–13)
ANISOCYTOSIS BLD QL SMEAR: PRESENT
AST SERPL W P-5'-P-CCNC: 34 U/L (ref 5–45)
BASOPHILS # BLD MANUAL: 0 THOUSAND/UL (ref 0–0.1)
BASOPHILS NFR MAR MANUAL: 0 % (ref 0–1)
BILIRUB SERPL-MCNC: 0.68 MG/DL (ref 0.2–1)
BUN SERPL-MCNC: 9 MG/DL (ref 5–25)
CALCIUM SERPL-MCNC: 9.1 MG/DL (ref 8.3–10.1)
CHLORIDE SERPL-SCNC: 109 MMOL/L (ref 100–108)
CHOLEST SERPL-MCNC: 141 MG/DL (ref 50–200)
CO2 SERPL-SCNC: 26 MMOL/L (ref 21–32)
CREAT SERPL-MCNC: 0.69 MG/DL (ref 0.6–1.3)
EOSINOPHIL # BLD MANUAL: 0.04 THOUSAND/UL (ref 0–0.4)
EOSINOPHIL NFR BLD MANUAL: 1 % (ref 0–6)
ERYTHROCYTE [DISTWIDTH] IN BLOOD BY AUTOMATED COUNT: 22.5 % (ref 11.6–15.1)
EST. AVERAGE GLUCOSE BLD GHB EST-MCNC: 94 MG/DL
GFR SERPL CREATININE-BSD FRML MDRD: 101 ML/MIN/1.73SQ M
GLUCOSE SERPL-MCNC: 91 MG/DL (ref 65–140)
HBA1C MFR BLD: 4.9 % (ref 4.2–6.3)
HCT VFR BLD AUTO: 26.6 % (ref 34.8–46.1)
HDLC SERPL-MCNC: 26 MG/DL (ref 40–60)
HGB BLD-MCNC: 8.1 G/DL (ref 11.5–15.4)
HYPERCHROMIA BLD QL SMEAR: PRESENT
LDLC SERPL CALC-MCNC: 93 MG/DL (ref 0–100)
LYMPHOCYTES # BLD AUTO: 1.04 THOUSAND/UL (ref 0.6–4.47)
LYMPHOCYTES # BLD AUTO: 28 % (ref 14–44)
MCH RBC QN AUTO: 28 PG (ref 26.8–34.3)
MCHC RBC AUTO-ENTMCNC: 30.5 G/DL (ref 31.4–37.4)
MCV RBC AUTO: 92 FL (ref 82–98)
MONOCYTES # BLD AUTO: 0.22 THOUSAND/UL (ref 0–1.22)
MONOCYTES NFR BLD: 6 % (ref 4–12)
NEUTROPHILS # BLD MANUAL: 2.24 THOUSAND/UL (ref 1.85–7.62)
NEUTS SEG NFR BLD AUTO: 60 % (ref 43–75)
NONHDLC SERPL-MCNC: 115 MG/DL
NRBC BLD AUTO-RTO: 7 /100 WBC (ref 0–2)
NRBC BLD AUTO-RTO: 7 /100 WBCS
PLATELET # BLD AUTO: 68 THOUSANDS/UL (ref 149–390)
PLATELET BLD QL SMEAR: ABNORMAL
POIKILOCYTOSIS BLD QL SMEAR: PRESENT
POLYCHROMASIA BLD QL SMEAR: PRESENT
POTASSIUM SERPL-SCNC: 3.5 MMOL/L (ref 3.5–5.3)
PROT SERPL-MCNC: 6.7 G/DL (ref 6.4–8.2)
RBC # BLD AUTO: 2.89 MILLION/UL (ref 3.81–5.12)
RBC MORPH BLD: PRESENT
SODIUM SERPL-SCNC: 145 MMOL/L (ref 136–145)
TARGETS BLD QL SMEAR: PRESENT
TRIGL SERPL-MCNC: 108 MG/DL
TSH SERPL DL<=0.05 MIU/L-ACNC: 0.45 UIU/ML (ref 0.36–3.74)
VARIANT LYMPHS # BLD AUTO: 5 %
WBC # BLD AUTO: 3.73 THOUSAND/UL (ref 4.31–10.16)

## 2019-06-10 ENCOUNTER — TELEPHONE (OUTPATIENT)
Dept: INTERNAL MEDICINE CLINIC | Facility: CLINIC | Age: 74
End: 2019-06-10

## 2020-01-15 ENCOUNTER — TELEPHONE (OUTPATIENT)
Dept: INTERNAL MEDICINE CLINIC | Facility: CLINIC | Age: 75
End: 2020-01-15

## 2020-01-28 ENCOUNTER — TELEPHONE (OUTPATIENT)
Dept: OTHER | Facility: OTHER | Age: 75
End: 2020-01-28

## 2020-01-28 ENCOUNTER — APPOINTMENT (OUTPATIENT)
Dept: LAB | Facility: CLINIC | Age: 75
End: 2020-01-28
Payer: MEDICARE

## 2020-01-28 ENCOUNTER — OFFICE VISIT (OUTPATIENT)
Dept: INTERNAL MEDICINE CLINIC | Facility: CLINIC | Age: 75
End: 2020-01-28
Payer: MEDICARE

## 2020-01-28 ENCOUNTER — HOSPITAL ENCOUNTER (INPATIENT)
Facility: HOSPITAL | Age: 75
LOS: 8 days | Discharge: HOME HEALTH | DRG: 808 | End: 2020-02-06
Attending: EMERGENCY MEDICINE | Admitting: ANESTHESIOLOGY
Payer: MEDICARE

## 2020-01-28 VITALS
BODY MASS INDEX: 29.5 KG/M2 | HEART RATE: 90 BPM | OXYGEN SATURATION: 100 % | HEIGHT: 64 IN | SYSTOLIC BLOOD PRESSURE: 126 MMHG | WEIGHT: 172.8 LBS | DIASTOLIC BLOOD PRESSURE: 60 MMHG

## 2020-01-28 DIAGNOSIS — D61.818 PANCYTOPENIA (HCC): Primary | ICD-10-CM

## 2020-01-28 DIAGNOSIS — Z11.59 NEED FOR HEPATITIS C SCREENING TEST: ICD-10-CM

## 2020-01-28 DIAGNOSIS — D63.8 ANEMIA OF CHRONIC DISEASE: ICD-10-CM

## 2020-01-28 DIAGNOSIS — J96.01 ACUTE RESPIRATORY FAILURE WITH HYPOXIA (HCC): ICD-10-CM

## 2020-01-28 DIAGNOSIS — I10 BENIGN ESSENTIAL HYPERTENSION: ICD-10-CM

## 2020-01-28 DIAGNOSIS — G93.40 ENCEPHALOPATHY: ICD-10-CM

## 2020-01-28 DIAGNOSIS — R77.8 ELEVATED TROPONIN: ICD-10-CM

## 2020-01-28 DIAGNOSIS — K70.30 ALCOHOLIC CIRRHOSIS OF LIVER WITHOUT ASCITES (HCC): ICD-10-CM

## 2020-01-28 DIAGNOSIS — F17.210 CIGARETTE NICOTINE DEPENDENCE WITHOUT COMPLICATION: ICD-10-CM

## 2020-01-28 DIAGNOSIS — I47.1 PAROXYSMAL SVT (SUPRAVENTRICULAR TACHYCARDIA) (HCC): ICD-10-CM

## 2020-01-28 DIAGNOSIS — E55.9 VITAMIN D DEFICIENCY: ICD-10-CM

## 2020-01-28 DIAGNOSIS — D64.9 NORMOCYTIC ANEMIA: ICD-10-CM

## 2020-01-28 DIAGNOSIS — I10 BENIGN ESSENTIAL HYPERTENSION: Primary | ICD-10-CM

## 2020-01-28 DIAGNOSIS — E66.3 OVERWEIGHT (BMI 25.0-29.9): ICD-10-CM

## 2020-01-28 DIAGNOSIS — R41.3 MEMORY DIFFICULTIES: ICD-10-CM

## 2020-01-28 DIAGNOSIS — Z00.00 MEDICARE ANNUAL WELLNESS VISIT, INITIAL: ICD-10-CM

## 2020-01-28 DIAGNOSIS — D64.9 SYMPTOMATIC ANEMIA: ICD-10-CM

## 2020-01-28 DIAGNOSIS — F10.11 HISTORY OF ALCOHOL ABUSE: ICD-10-CM

## 2020-01-28 DIAGNOSIS — K74.60 CIRRHOSIS (HCC): ICD-10-CM

## 2020-01-28 DIAGNOSIS — F33.41 RECURRENT MAJOR DEPRESSIVE DISORDER, IN PARTIAL REMISSION (HCC): ICD-10-CM

## 2020-01-28 PROBLEM — E66.9 OBESITY (BMI 30-39.9): Status: RESOLVED | Noted: 2018-04-09 | Resolved: 2020-01-28

## 2020-01-28 PROBLEM — K21.9 GASTROESOPHAGEAL REFLUX DISEASE WITHOUT ESOPHAGITIS: Status: RESOLVED | Noted: 2017-11-16 | Resolved: 2020-01-28

## 2020-01-28 LAB
25(OH)D3 SERPL-MCNC: 45.6 NG/ML (ref 30–100)
ALBUMIN SERPL BCP-MCNC: 3.5 G/DL (ref 3.5–5)
ALP SERPL-CCNC: 238 U/L (ref 46–116)
ALT SERPL W P-5'-P-CCNC: 20 U/L (ref 12–78)
ANION GAP SERPL CALCULATED.3IONS-SCNC: 8 MMOL/L (ref 4–13)
AST SERPL W P-5'-P-CCNC: 38 U/L (ref 5–45)
BILIRUB SERPL-MCNC: 0.79 MG/DL (ref 0.2–1)
BUN SERPL-MCNC: 7 MG/DL (ref 5–25)
CALCIUM SERPL-MCNC: 9.5 MG/DL (ref 8.3–10.1)
CHLORIDE SERPL-SCNC: 110 MMOL/L (ref 100–108)
CHOLEST SERPL-MCNC: 103 MG/DL (ref 50–200)
CO2 SERPL-SCNC: 23 MMOL/L (ref 21–32)
CREAT SERPL-MCNC: 0.77 MG/DL (ref 0.6–1.3)
GFR SERPL CREATININE-BSD FRML MDRD: 89 ML/MIN/1.73SQ M
GLUCOSE SERPL-MCNC: 59 MG/DL (ref 65–140)
HCV AB SER QL: NORMAL
HDLC SERPL-MCNC: 27 MG/DL
LDLC SERPL CALC-MCNC: 66 MG/DL (ref 0–100)
NONHDLC SERPL-MCNC: 76 MG/DL
POTASSIUM SERPL-SCNC: 4.1 MMOL/L (ref 3.5–5.3)
PROT SERPL-MCNC: 7.3 G/DL (ref 6.4–8.2)
SODIUM SERPL-SCNC: 141 MMOL/L (ref 136–145)
TRIGL SERPL-MCNC: 50 MG/DL
TSH SERPL DL<=0.05 MIU/L-ACNC: 0.84 UIU/ML (ref 0.36–3.74)

## 2020-01-28 PROCEDURE — 85007 BL SMEAR W/DIFF WBC COUNT: CPT | Performed by: PHYSICIAN ASSISTANT

## 2020-01-28 PROCEDURE — 36430 TRANSFUSION BLD/BLD COMPNT: CPT

## 2020-01-28 PROCEDURE — 83540 ASSAY OF IRON: CPT | Performed by: PHYSICIAN ASSISTANT

## 2020-01-28 PROCEDURE — 86901 BLOOD TYPING SEROLOGIC RH(D): CPT | Performed by: PHYSICIAN ASSISTANT

## 2020-01-28 PROCEDURE — 85027 COMPLETE CBC AUTOMATED: CPT | Performed by: PHYSICIAN ASSISTANT

## 2020-01-28 PROCEDURE — 86803 HEPATITIS C AB TEST: CPT

## 2020-01-28 PROCEDURE — 80053 COMPREHEN METABOLIC PANEL: CPT

## 2020-01-28 PROCEDURE — 83550 IRON BINDING TEST: CPT | Performed by: PHYSICIAN ASSISTANT

## 2020-01-28 PROCEDURE — 82728 ASSAY OF FERRITIN: CPT | Performed by: PHYSICIAN ASSISTANT

## 2020-01-28 PROCEDURE — 82746 ASSAY OF FOLIC ACID SERUM: CPT | Performed by: PHYSICIAN ASSISTANT

## 2020-01-28 PROCEDURE — 36415 COLL VENOUS BLD VENIPUNCTURE: CPT

## 2020-01-28 PROCEDURE — G0438 PPPS, INITIAL VISIT: HCPCS | Performed by: INTERNAL MEDICINE

## 2020-01-28 PROCEDURE — 99214 OFFICE O/P EST MOD 30 MIN: CPT | Performed by: INTERNAL MEDICINE

## 2020-01-28 PROCEDURE — 82306 VITAMIN D 25 HYDROXY: CPT

## 2020-01-28 PROCEDURE — 86900 BLOOD TYPING SEROLOGIC ABO: CPT | Performed by: PHYSICIAN ASSISTANT

## 2020-01-28 PROCEDURE — 86850 RBC ANTIBODY SCREEN: CPT | Performed by: PHYSICIAN ASSISTANT

## 2020-01-28 PROCEDURE — 99285 EMERGENCY DEPT VISIT HI MDM: CPT

## 2020-01-28 PROCEDURE — 84443 ASSAY THYROID STIM HORMONE: CPT

## 2020-01-28 PROCEDURE — 82607 VITAMIN B-12: CPT | Performed by: PHYSICIAN ASSISTANT

## 2020-01-28 PROCEDURE — 85007 BL SMEAR W/DIFF WBC COUNT: CPT

## 2020-01-28 PROCEDURE — 1123F ACP DISCUSS/DSCN MKR DOCD: CPT | Performed by: INTERNAL MEDICINE

## 2020-01-28 PROCEDURE — 36415 COLL VENOUS BLD VENIPUNCTURE: CPT | Performed by: PHYSICIAN ASSISTANT

## 2020-01-28 PROCEDURE — 85027 COMPLETE CBC AUTOMATED: CPT

## 2020-01-28 PROCEDURE — 80061 LIPID PANEL: CPT

## 2020-01-28 PROCEDURE — 84484 ASSAY OF TROPONIN QUANT: CPT | Performed by: PHYSICIAN ASSISTANT

## 2020-01-28 PROCEDURE — 80053 COMPREHEN METABOLIC PANEL: CPT | Performed by: PHYSICIAN ASSISTANT

## 2020-01-28 NOTE — PROGRESS NOTES
Assessment and Plan:     Problem List Items Addressed This Visit        Cardiovascular and Mediastinum    Benign essential hypertension - Primary    Relevant Orders    Lipid panel    TSH, 3rd generation    Paroxysmal SVT (supraventricular tachycardia) (HCC)       Other    Anemia of chronic disease    Relevant Orders    CBC and differential    Comprehensive metabolic panel    Memory difficulties    Overweight (BMI 25 0-29  9)    Vitamin D deficiency    Relevant Orders    Vitamin D 25 hydroxy      Other Visit Diagnoses     Medicare annual wellness visit, initial        Need for hepatitis C screening test        Relevant Orders    Hepatitis C antibody           Preventive health issues were discussed with patient, and age appropriate screening tests were ordered as noted in patient's After Visit Summary  Personalized health advice and appropriate referrals for health education or preventive services given if needed, as noted in patient's After Visit Summary  History of Present Illness:     Patient presents for Medicare Annual Wellness visit    Patient Care Team:  Christine Bolse MD as PCP - General     Problem List:     Patient Active Problem List   Diagnosis    Anemia of chronic disease    Benign essential hypertension    Cigarette nicotine dependence without complication    Hyperglycemia    Paroxysmal SVT (supraventricular tachycardia) (HCC)    Sinus tachycardia    Urinary incontinence    Memory difficulties    Gait disturbance    Overweight (BMI 25 0-29  9)    Vitamin D deficiency      Past Medical and Surgical History:     Past Medical History:   Diagnosis Date    Benign essential hypertension     last assessed - [id number]    Chest pain 11/4/2017    Gastroesophageal reflux disease without esophagitis 11/16/2017     Past Surgical History:   Procedure Laterality Date    APPENDECTOMY        Family History:     Family History   Problem Relation Age of Onset    Heart attack Father         myocardial infarction      Social History:     Social History     Socioeconomic History    Marital status:      Spouse name: None    Number of children: None    Years of education: None    Highest education level: None   Occupational History    None   Social Needs    Financial resource strain: None    Food insecurity:     Worry: None     Inability: None    Transportation needs:     Medical: None     Non-medical: None   Tobacco Use    Smoking status: Current Every Day Smoker     Packs/day: 0 25     Types: Cigarettes    Smokeless tobacco: Never Used   Substance and Sexual Activity    Alcohol use: Yes     Frequency: Monthly or less     Drinks per session: 1 or 2     Binge frequency: Never     Comment: weekly; Per [employer] - Alcohol abuse    Drug use: No    Sexual activity: Never   Lifestyle    Physical activity:     Days per week: 3 days     Minutes per session: 20 min    Stress: None   Relationships    Social connections:     Talks on phone: None     Gets together: None     Attends Buddhist service: None     Active member of club or organization: None     Attends meetings of clubs or organizations: None     Relationship status: None    Intimate partner violence:     Fear of current or ex partner: None     Emotionally abused: None     Physically abused: None     Forced sexual activity: None   Other Topics Concern    None   Social History Narrative    No advance directives       Medications and Allergies:     Current Outpatient Medications   Medication Sig Dispense Refill    aspirin 81 mg chewable tablet Chew 1 tablet daily 30 tablet 0    metoprolol succinate (TOPROL-XL) 25 mg 24 hr tablet Take 1 tablet (25 mg total) by mouth daily 90 tablet 3     No current facility-administered medications for this visit        No Known Allergies   Immunizations:     Immunization History   Administered Date(s) Administered    Influenza Quadrivalent Preservative Free 3 years and older IM 11/06/2017    Influenza Split High Dose Preservative Free IM 1945    Pneumococcal Conjugate 13-Valent 11/06/2017    Pneumococcal Polysaccharide PPV23 1945      Health Maintenance:         Topic Date Due    Hepatitis C Screening  1945    MAMMOGRAM  1945    DXA SCAN  1945    CRC Screening: Colonoscopy  1945         Topic Date Due    DTaP,Tdap,and Td Vaccines (1 - Tdap) 11/13/1957    Pneumococcal Vaccine: 65+ Years (2 of 2 - PPSV23) 11/06/2018    Influenza Vaccine  07/01/2019      Medicare Health Risk Assessment:     /60   Pulse 90   Ht 5' 4" (1 626 m)   Wt 78 4 kg (172 lb 12 8 oz)   SpO2 100%   BMI 29 66 kg/m²      Leena De Paz is here for her Initial Wellness visit  Health Risk Assessment:   Patient rates overall health as good  Patient feels that their physical health rating is slightly worse  Eyesight was rated as same  Hearing was rated as same  Patient feels that their emotional and mental health rating is slightly worse  Pain experienced in the last 7 days has been some  Patient's pain rating has been 5/10  Depression Screening:   PHQ-2 Score: 5  PHQ-9 Score: 17      Fall Risk Screening: In the past year, patient has experienced: no history of falling in past year      Urinary Incontinence Screening:   Patient has leaked urine accidently in the last six months  Home Safety:  Patient has trouble with stairs inside or outside of their home  Patient has working smoke alarms and has working carbon monoxide detector  Home safety hazards include: none  Nutrition:   Current diet is Regular and Limited junk food  Medications:   Patient is currently taking over-the-counter supplements  OTC medications include: see medication list  Patient is not able to manage medications       Activities of Daily Living (ADLs)/Instrumental Activities of Daily Living (IADLs):   Walk and transfer into and out of bed and chair?: Yes  Dress and groom yourself?: Yes    Bathe or shower yourself?: Yes    Feed yourself? Yes  Do your laundry/housekeeping?: No  Manage your money, pay your bills and track your expenses?: No  Make your own meals?: No    Do your own shopping?: No    Previous Hospitalizations:   Any hospitalizations or ED visits within the last 12 months?: No      Advance Care Planning:   Living will: No    End of Life Decisions reviewed with patient: Yes      Cognitive Screening:   Provider or family/friend/caregiver concerned regarding cognition?: No    PREVENTIVE SCREENINGS      Cardiovascular Screening:    General: Screening Current      Diabetes Screening:     General: Screening Current      Colorectal Cancer Screening:     General: Risks and Benefits Discussed      Breast Cancer Screening:     General: Risks and Benefits Discussed      Cervical Cancer Screening:    General: Screening Not Indicated      Osteoporosis Screening:    General: Risks and Benefits Discussed      Abdominal Aortic Aneurysm (AAA) Screening:        General: Risks and Benefits Discussed      Lung Cancer Screening:     General: Risks and Benefits Discussed      Hepatitis C Screening:    General: Risks and Benefits Discussed    Other Counseling Topics:   Alcohol use counseling, car/seat belt/driving safety, skin self-exam, sunscreen and calcium and vitamin D intake and regular weightbearing exercise         Beth Molina MD

## 2020-01-28 NOTE — PROGRESS NOTES
INTERNAL MEDICINE OFFICE VISIT  [facility] [place] [address], [address]  Tel: [phone]      NAME: Rogelio Sparrow  AGE: 69 y o  SEX: female  : 1945   MRN: [id number]    DATE: 2020  TIME: 2:53 PM      Assessment and Plan:  1  Benign essential hypertension   continue metoprolol  - Lipid panel; Future  - TSH, 3rd generation; Future    2  Paroxysmal SVT (supraventricular tachycardia) (HCC)   continue metoprolol    3  Anemia of chronic disease   follow-up CBC  She was advised to eat iron rich foods  She was also advised to do the Cologuard exam to  rule out blood in the stools    - CBC and differential; Future  - Comprehensive metabolic panel; Future    4  Memory difficulties   patient is increasingly having problems with her memory  I did not start her on any medication at present  If her symptoms increase, the family will let me know if they need me to start the medication    5  Depression  She does not want me to start her on any medication at present  She is also refusing therapy  BMI Counseling: Body mass index is 29 66 kg/m²  The BMI is above normal  Nutrition recommendations include decreasing portion sizes, encouraging healthy choices of fruits and vegetables and moderation in carbohydrate intake  Exercise recommendations include moderate physical activity 150 minutes/week  No pharmacotherapy was ordered  Depression Screening and Follow-up Plan: Patient's depression screening was positive with a PHQ-2 score of 5  Their PHQ-9 score was 17  Patient assessed for underlying major depression  Brief counseling provided and recommend additional follow-up/re-evaluation next office visit  6  Vitamin D deficiency  Check a vitamin-D level  - Vitamin D 25 hydroxy; Future     7  Overweight (BMI 25 0-29 9)   she was told to try to lose weight   8  Medicare annual wellness visit, initial       9   Need for hepatitis C screening test    - Hepatitis C antibody; Future      - Counseling Documentation: patient was counseled regarding: diagnostic results, instructions for management, risk factor reductions, prognosis, patient and family education, risks and benefits of treatment options and importance of compliance with treatment  - Medication Side Effects: Adverse side effects of medications were reviewed with the patient/guardian today  Return for follow up visit in  6 months or earlier, if needed  Chief Complaint:  Chief Complaint   Patient presents with    Medicare Wellness Visit         History of Present Illness:    hypertension- blood pressure stable on present medication  PSVT - the heart rate is under control with the metoprolol   Anemia -her hemoglobin 6 months ago was 8 and she was advised to get colonoscopy done which she refused  She was given the Cologuard which she also did not do  Memory difficulties- according to the family she is having problems with recent memory and recall  Depression -patient is getting increasingly depressed and says that she does not want do anything and wants to be left alone in the house  Vitamin-D deficiency - she was deficient in vitamin-D in the past but is presently not taking any supplement   Overweight -she was advised to lose weight  Active Problem List:  Patient Active Problem List   Diagnosis    Anemia of chronic disease    Benign essential hypertension    Cigarette nicotine dependence without complication    Hyperglycemia    Paroxysmal SVT (supraventricular tachycardia) (HCC)    Sinus tachycardia    Urinary incontinence    Memory difficulties    Gait disturbance    Overweight (BMI 25 0-29  9)    Vitamin D deficiency    [facility]         Past Medical History:  Past Medical History:   Diagnosis Date    Benign essential hypertension     last assessed - [id number]    Chest pain 11/4/2017    Gastroesophageal reflux disease without esophagitis 11/16/2017         Past Surgical History:  Past Surgical History:   Procedure Laterality Date    APPENDECTOMY           Family History:  Family History   Problem Relation Age of Onset    Heart attack Father         myocardial infarction         Social History:  Social History     Socioeconomic History    Marital status:      Spouse name: None    Number of children: None    Years of education: None    Highest education level: None   Occupational History    None   Social Needs    Financial resource strain: None    Food insecurity:     Worry: None     Inability: None    Transportation needs:     Medical: None     Non-medical: None   Tobacco Use    Smoking status: Current Every Day Smoker     Packs/day: 0 25     Types: Cigarettes    Smokeless tobacco: Never Used   Substance and Sexual Activity    Alcohol use: Yes     Frequency: Monthly or less     Drinks per session: 1 or 2     Binge frequency: Never     Comment: weekly;  Per [employer] - Alcohol abuse    Drug use: No    Sexual activity: Never   Lifestyle    Physical activity:     Days per week: 3 days     Minutes per session: 20 min    Stress: None   Relationships    Social connections:     Talks on phone: None     Gets together: None     Attends Yarsani service: None     Active member of club or organization: None     Attends meetings of clubs or organizations: None     Relationship status: None    Intimate partner violence:     Fear of current or ex partner: None     Emotionally abused: None     Physically abused: None     Forced sexual activity: None   Other Topics Concern    None   Social History Narrative    No advance directives         Allergies:  No Known Allergies      Medications:    Current Outpatient Medications:     aspirin 81 mg chewable tablet, Chew 1 tablet daily, Disp: 30 tablet, Rfl: 0    metoprolol succinate (TOPROL-XL) 25 mg 24 hr tablet, Take 1 tablet (25 mg total) by mouth daily, Disp: 90 tablet, Rfl: 3      The following portions of the patient's history were reviewed and updated as appropriate: past medical history, past surgical history, family history, social history, allergies, current medications and active problem list       Review of Systems:  Constitutional: Denies fever, chills, weight gain, weight loss, fatigue  Eyes: Denies eye redness, eye discharge, double vision, change in visual acuity  ENT: Denies hearing loss, tinnitus, sneezing, nasal congestion, nasal discharge, sore throat   Respiratory: Denies cough, expectoration, hemoptysis, shortness of breath, wheezing  Cardiovascular: Denies chest pain, palpitations, lower extremity swelling, orthopnea, PND  Gastrointestinal: Denies abdominal pain, heartburn, nausea, vomiting, hematemesis, diarrhea, bloody stools  Genito-Urinary: Denies dysuria, frequency, difficulty in micturition, nocturia, incontinence  Musculoskeletal: Denies back pain, joint pain, muscle pain  Neurologic: Denies confusion, lightheadedness, syncope, headache, focal weakness, sensory changes, seizures  Endocrine: Denies polyuria, polydipsia, temperature intolerance  Allergy and Immunology: Denies hives, insect bite sensitivity  Hematological and Lymphatic: Denies bleeding problems, swollen glands   Psychological: has depression  Dermatological: Denies pruritus, rash, skin lesion changes      Vitals:  Vitals:    01/28/20 1316   BP: 126/60   Pulse: 90   SpO2: 100%       Body mass index is 29 66 kg/m²  Weight (last 2 days)     Date/Time   Weight    01/28/20 1316   78 4 (172 8)                Physical Examination:  General: Patient is not in acute distress  Awake, alert, responding to commands  No weight gain or loss  Head: Normocephalic  Atraumatic  Eyes: Conjunctiva and lids with no swelling, erythema or discharge  Both pupils normal sized, round and reactive to light   Sclera nonicteric  ENT: External examination of nose and ear normal  Otoscopic examination shows translucent tympanic membranes with patent canals without erythema  Oropharynx moist with no erythema, edema, exudate or lesions  Neck: Supple  JVP not raised  Trachea midline  No masses  No thyromegaly  Lungs: No signs of increased work of breathing or respiratory distress  Bilateral bronchovascular breath sounds with no crackles or rhonchi  Chest wall: No tenderness  Cardiovascular: Normal PMI  No thrills  Regular rate and rhythm  S1 and S2 normal  No murmur, rub or gallop  Gastrointestinal: Abdomen soft, nontender  No guarding or rigidity  Liver and spleen not palpable  Bowel sounds present  Neurologic: Cranial nerves II-XII intact   Cortical functions normal  Motor system - Reflexes 2+ and symmetrical  Sensations normal  Musculoskeletal: Gait normal  No joint tenderness  Integumentary: Skin normal with no rash or lesions  Lymphatic: No palpable lymph nodes in neck, axilla or groin  Extremities: has edema   Psychological: Judgement and insight normal  Depressed and has dementia      Laboratory Results:  CBC with diff:   Lab Results   Component Value Date    WBC 3 73 (L) 06/07/2019    RBC 2 89 (L) 06/07/2019    HGB 8 1 (L) 06/07/2019    HCT 26 6 (L) 06/07/2019    MCV 92 06/07/2019    MCH 28 0 06/07/2019    RDW 22 5 (H) 06/07/2019    PLT 68 (L) 06/07/2019       CMP:  Lab Results   Component Value Date    CREATININE 0 69 06/07/2019    BUN 9 06/07/2019    K 3 5 06/07/2019     (H) 06/07/2019    CO2 26 06/07/2019    ALKPHOS 143 (H) 06/07/2019    ALT 23 06/07/2019    AST 34 06/07/2019       Lab Results   Component Value Date    HGBA1C 4 9 06/07/2019    MG 1 6 11/05/2017       Lab Results   Component Value Date    TROPONINI <0 02 11/04/2017    TROPONINI <0 02 11/04/2017    TROPONINI <0 02 11/04/2017       Lipid Profile:   No results found for: CHOL  Lab Results   Component Value Date    HDL 26 (L) 06/07/2019    HDL 51 11/05/2017     Lab Results   Component Value Date    LDLCALC 93 06/07/2019    LDLCALC 105 (H) 2017     Lab Results   Component Value Date    TRIG 108 2019    TRIG 40 2017       Imaging Results:  Stress strip  Confirmed by Angela Ordoñez ([id number]),  Martha Gastelum ([id number]) on 2017 12:50:52 PM  NM myocardial perfusion spect (rx stress and/or rest)  [address] [facility] 89 [phone]    Rest/Stress Gated SPECT Myocardial Perfusion Imaging After Regadenoson    Patient: Cristiana Tobias  MR number: [id number]  Account number: [de-identified]  : 1945  Age: 80 years  Gender: Female  Status: Inpatient  Location: Stress lab  Height: 64 in  Weight: 195 lb  BP: 118/ 80 mmHg    Allergies: NO KNOWN ALLERGIES    Diagnosis: R06 00 - Dyspnea, unspecified, R07 9 - Chest pain, unspecified    RN:  Daiana Hester RN  Interpreting Physician:  Zee Schwarz MD  Referring Physician:  Zee Schwarz MD  Technician:  Kyara Vargas  Group:  [facility]  Report Prepared By[de-identified]  Kyara Vargas    INDICATIONS: Detection of coronary artery disease  HISTORY: The patient is a 80year old  female  Chest pain status: chest pain  Other symptoms: dyspnea and decreased effort tolerance  Coronary artery disease risk factors: hypertension, smoking, and family history of  premature coronary artery disease  Cardiovascular history: none significant  Co-morbidity: obesity  PHYSICAL EXAM: Baseline physical exam screening: no wheezes audible  REST ECG: Normal baseline ECG  PROCEDURE: The study was performed in the stress lab  A regadenoson infusion pharmacologic stress test was performed  Gated SPECT myocardial perfusion imaging was performed after stress and at rest  Systolic blood pressure was 118 mmHg, at  the start of the study  Diastolic blood pressure was 80 mmHg, at the start of the study  The heart rate was 78 bpm, at the start of the study   Patient was not experiencing chest pain at the time of the injection of the radiopharmaceutical   Regadenoson protocol:  HR bpm SBP mmHg DBP mmHg Symptoms ST change Rhythm/conduct  Baseline 78 118 80 none none NSR, no ectopy  1 min 98 140 90 dyspnea -- --  4 min 97 140 78 none -- --  No medications or fluids given  The patient also performed low level exercise with hand   STRESS SUMMARY: Duration of pharmacologic stress was 3 min and 0 sec  Maximal heart rate during stress was 109 bpm  The heart rate response to stress was normal  There was normal resting blood pressure with an appropriate response to  stress  The rate-pressure product for the peak heart rate and blood pressure was 48320  There was no chest pain during stress  The stress test was terminated due to protocol completion  Pre oxygen saturation: 93 %  Peak oxygen saturation:  97 %  The stress ECG was negative for ischemia and normal  Arrhythmia during stress: isolated atrial premature beats and isolated premature ventricular beats  ISOTOPE ADMINISTRATION:  Resting isotope administration Stress isotope administration  Agent Sestamibi Sestamibi  Dose 11 mCi 33 mCi  Date 11/06/2017 11/06/2017  Injection-image interval 30 min 45 min    The radiopharmaceutical was injected at the peak effect of pharmacologic stress  MYOCARDIAL PERFUSION IMAGING:  The image quality was fair  Left ventricular size was normal     PERFUSION DEFECTS:  -  There were no perfusion defects  A fixed perfusion defect in basal-mid inferior wall was not seen in the prone stress images indicating that it was likely a diaphragmatic attenuation artifact  A reversible perfusion defect seen in the mid-apical anterior wall, apical cap and apical inferior wall in the supine stress images was not seen in the prone stress images indicating that it was likely a breast attenuation artifact  GATED SPECT:  The calculated left ventricular ejection fraction was 59 %   Left ventricular ejection fraction was within normal limits by visual estimate  There was no diagnostic evidence for left ventricular regional abnormality  SUMMARY:  -  Stress results: There was no chest pain during stress  -  ECG conclusions: The stress ECG was negative for ischemia and normal  Arrhythmia during stress: isolated atrial premature beats and isolated premature ventricular beats  -  Perfusion imaging: There were no perfusion defects  A fixed perfusion defect in basal-mid inferior wall was not seen in the prone stress images indicating that it was likely a diaphragmatic attenuation artifact  A reversible perfusion defect seen in the mid-apical anterior wall, apical cap and apical inferior wall in the supine stress images was not seen in the prone stress images indicating that it was likely a breast attenuation artifact   -  Gated SPECT: The calculated left ventricular ejection fraction was 59 %  Left ventricular ejection fraction was within normal limits by visual estimate  There was no diagnostic evidence for left ventricular regional abnormality  IMPRESSIONS: Normal study after pharmacologic stress  Myocardial perfusion imaging was normal at rest and with stress   Left ventricular systolic function was normal     Prepared and signed by    Dustin Kelley MD  Signed 11/06/2017 12:25:23       Health Maintenance:  Health Maintenance   Topic Date Due    Hepatitis C Screening  1945    MAMMOGRAM  1945    DXA SCAN  1945    CRC Screening: Colonoscopy  1945    DTaP,Tdap,and Td Vaccines (1 - Tdap) 11/13/1957    Annual Physical  11/13/1964    Pneumococcal Vaccine: 65+ Years (2 of 2 - PPSV23) 11/06/2018    Influenza Vaccine  07/01/2019    Fall Risk  05/31/2020    Depression Screening PHQ  05/31/2020    BMI: Followup Plan  05/31/2020    BMI: Adult  05/31/2020    Pneumococcal Vaccine: Pediatrics (0 to 5 Years) and At-Risk Patients (6 to 59 Years)  Aged Out    HIB Vaccine  Aged Out    Hepatitis B Vaccine  Aged Out    IPV Vaccine  Aged Out    Hepatitis A Vaccine  Aged Out    Meningococcal ACWY Vaccine  Aged Out    HPV Vaccine  Aged Out     Immunization History   Administered Date(s) Administered    Influenza Quadrivalent Preservative Free 3 years and older IM 11/06/2017    Influenza Split High Dose Preservative Free IM 1945    Pneumococcal Conjugate 13-Valent 11/06/2017    Pneumococcal Polysaccharide PPV23 1945         Jacy Rivera MD  1/28/2020,2:53 PM

## 2020-01-29 ENCOUNTER — APPOINTMENT (EMERGENCY)
Dept: CT IMAGING | Facility: HOSPITAL | Age: 75
DRG: 808 | End: 2020-01-29
Payer: MEDICARE

## 2020-01-29 ENCOUNTER — APPOINTMENT (INPATIENT)
Dept: ULTRASOUND IMAGING | Facility: HOSPITAL | Age: 75
DRG: 808 | End: 2020-01-29
Payer: MEDICARE

## 2020-01-29 PROBLEM — G92.8 TOXIC METABOLIC ENCEPHALOPATHY: Status: ACTIVE | Noted: 2020-01-29

## 2020-01-29 PROBLEM — D64.9 SYMPTOMATIC ANEMIA: Status: ACTIVE | Noted: 2017-11-16

## 2020-01-29 PROBLEM — D61.818 PANCYTOPENIA (HCC): Status: ACTIVE | Noted: 2020-01-29

## 2020-01-29 PROBLEM — F10.11 HISTORY OF ALCOHOL ABUSE: Status: ACTIVE | Noted: 2020-01-29

## 2020-01-29 LAB
ABO GROUP BLD: NORMAL
ABO GROUP BLD: NORMAL
ALBUMIN SERPL BCP-MCNC: 3.5 G/DL (ref 3.5–5)
ALP SERPL-CCNC: 243 U/L (ref 46–116)
ALT SERPL W P-5'-P-CCNC: 19 U/L (ref 12–78)
AMMONIA PLAS-SCNC: 59 UMOL/L (ref 11–35)
ANION GAP SERPL CALCULATED.3IONS-SCNC: 11 MMOL/L (ref 4–13)
ANISOCYTOSIS BLD QL SMEAR: PRESENT
ANISOCYTOSIS BLD QL SMEAR: PRESENT
APTT PPP: 38 SECONDS (ref 23–37)
AST SERPL W P-5'-P-CCNC: 41 U/L (ref 5–45)
ATRIAL RATE: 92 BPM
BASOPHILS # BLD MANUAL: 0 THOUSAND/UL (ref 0–0.1)
BASOPHILS # BLD MANUAL: 0 THOUSAND/UL (ref 0–0.1)
BASOPHILS NFR MAR MANUAL: 0 % (ref 0–1)
BASOPHILS NFR MAR MANUAL: 0 % (ref 0–1)
BILIRUB SERPL-MCNC: 0.9 MG/DL (ref 0.2–1)
BLD GP AB SCN SERPL QL: NEGATIVE
BLD GP AB SCN SERPL QL: NEGATIVE
BUN SERPL-MCNC: 9 MG/DL (ref 5–25)
CALCIUM SERPL-MCNC: 9.2 MG/DL (ref 8.3–10.1)
CHLORIDE SERPL-SCNC: 104 MMOL/L (ref 100–108)
CO2 SERPL-SCNC: 26 MMOL/L (ref 21–32)
CREAT SERPL-MCNC: 0.96 MG/DL (ref 0.6–1.3)
EOSINOPHIL # BLD MANUAL: 0.03 THOUSAND/UL (ref 0–0.4)
EOSINOPHIL # BLD MANUAL: 0.1 THOUSAND/UL (ref 0–0.4)
EOSINOPHIL NFR BLD MANUAL: 1 % (ref 0–6)
EOSINOPHIL NFR BLD MANUAL: 3 % (ref 0–6)
ERYTHROCYTE [DISTWIDTH] IN BLOOD BY AUTOMATED COUNT: 20.7 % (ref 11.6–15.1)
ERYTHROCYTE [DISTWIDTH] IN BLOOD BY AUTOMATED COUNT: 26.4 % (ref 11.6–15.1)
FERRITIN SERPL-MCNC: 777 NG/ML (ref 8–388)
FOLATE SERPL-MCNC: >20 NG/ML (ref 3.1–17.5)
GFR SERPL CREATININE-BSD FRML MDRD: 68 ML/MIN/1.73SQ M
GLUCOSE SERPL-MCNC: 95 MG/DL (ref 65–140)
HCT VFR BLD AUTO: 16.9 % (ref 34.8–46.1)
HCT VFR BLD AUTO: 22.2 % (ref 34.8–46.1)
HGB BLD-MCNC: 5.2 G/DL (ref 11.5–15.4)
HGB BLD-MCNC: 7.2 G/DL (ref 11.5–15.4)
HYPERCHROMIA BLD QL SMEAR: PRESENT
HYPERCHROMIA BLD QL SMEAR: PRESENT
INR PPP: 1.32 (ref 0.84–1.19)
IRON SATN MFR SERPL: 91 %
IRON SERPL-MCNC: 320 UG/DL (ref 50–170)
LDH SERPL-CCNC: 442 U/L (ref 81–234)
LYMPHOCYTES # BLD AUTO: 1.22 THOUSAND/UL (ref 0.6–4.47)
LYMPHOCYTES # BLD AUTO: 1.69 THOUSAND/UL (ref 0.6–4.47)
LYMPHOCYTES # BLD AUTO: 38 % (ref 14–44)
LYMPHOCYTES # BLD AUTO: 49 % (ref 14–44)
MACROCYTES BLD QL AUTO: PRESENT
MCH RBC QN AUTO: 28.4 PG (ref 26.8–34.3)
MCH RBC QN AUTO: 28.8 PG (ref 26.8–34.3)
MCHC RBC AUTO-ENTMCNC: 30.8 G/DL (ref 31.4–37.4)
MCHC RBC AUTO-ENTMCNC: 32.4 G/DL (ref 31.4–37.4)
MCV RBC AUTO: 89 FL (ref 82–98)
MCV RBC AUTO: 92 FL (ref 82–98)
METAMYELOCYTES NFR BLD MANUAL: 1 % (ref 0–1)
METAMYELOCYTES NFR BLD MANUAL: 1 % (ref 0–1)
MICROCYTES BLD QL AUTO: PRESENT
MONOCYTES # BLD AUTO: 0.23 THOUSAND/UL (ref 0–1.22)
MONOCYTES # BLD AUTO: 0.35 THOUSAND/UL (ref 0–1.22)
MONOCYTES NFR BLD: 10 % (ref 4–12)
MONOCYTES NFR BLD: 7 % (ref 4–12)
NEUTROPHILS # BLD MANUAL: 1.21 THOUSAND/UL (ref 1.85–7.62)
NEUTROPHILS # BLD MANUAL: 1.71 THOUSAND/UL (ref 1.85–7.62)
NEUTS BAND NFR BLD MANUAL: 2 % (ref 0–8)
NEUTS BAND NFR BLD MANUAL: 4 % (ref 0–8)
NEUTS SEG NFR BLD AUTO: 31 % (ref 43–75)
NEUTS SEG NFR BLD AUTO: 51 % (ref 43–75)
NRBC BLD AUTO-RTO: 10 /100 WBC (ref 0–2)
NRBC BLD AUTO-RTO: 10 /100 WBCS
NRBC BLD AUTO-RTO: 10 /100 WBCS
NRBC BLD AUTO-RTO: 19 /100 WBC (ref 0–2)
OVALOCYTES BLD QL SMEAR: PRESENT
OVALOCYTES BLD QL SMEAR: PRESENT
P AXIS: 84 DEGREES
PLATELET # BLD AUTO: 32 THOUSANDS/UL (ref 149–390)
PLATELET # BLD AUTO: 43 THOUSANDS/UL (ref 149–390)
PLATELET BLD QL SMEAR: ABNORMAL
PLATELET BLD QL SMEAR: ABNORMAL
POIKILOCYTOSIS BLD QL SMEAR: PRESENT
POIKILOCYTOSIS BLD QL SMEAR: PRESENT
POLYCHROMASIA BLD QL SMEAR: PRESENT
POLYCHROMASIA BLD QL SMEAR: PRESENT
POTASSIUM SERPL-SCNC: 4.1 MMOL/L (ref 3.5–5.3)
PR INTERVAL: 186 MS
PROT SERPL-MCNC: 7.2 G/DL (ref 6.4–8.2)
PROTHROMBIN TIME: 16.4 SECONDS (ref 11.6–14.5)
QRS AXIS: 77 DEGREES
QRSD INTERVAL: 78 MS
QT INTERVAL: 392 MS
QTC INTERVAL: 484 MS
RBC # BLD AUTO: 1.83 MILLION/UL (ref 3.81–5.12)
RBC # BLD AUTO: 2.5 MILLION/UL (ref 3.81–5.12)
RH BLD: POSITIVE
RH BLD: POSITIVE
SCHISTOCYTES BLD QL SMEAR: PRESENT
SCHISTOCYTES BLD QL SMEAR: PRESENT
SODIUM SERPL-SCNC: 141 MMOL/L (ref 136–145)
SPECIMEN EXPIRATION DATE: NORMAL
SPECIMEN EXPIRATION DATE: NORMAL
T WAVE AXIS: 74 DEGREES
TIBC SERPL-MCNC: 351 UG/DL (ref 250–450)
TOTAL CELLS COUNTED SPEC: 100
TOTAL CELLS COUNTED SPEC: 100
TROPONIN I SERPL-MCNC: <0.02 NG/ML
VARIANT LYMPHS # BLD AUTO: 2 %
VENTRICULAR RATE: 92 BPM
VIT B12 SERPL-MCNC: 1169 PG/ML (ref 100–900)
WBC # BLD AUTO: 3.22 THOUSAND/UL (ref 4.31–10.16)
WBC # BLD AUTO: 3.45 THOUSAND/UL (ref 4.31–10.16)

## 2020-01-29 PROCEDURE — P9016 RBC LEUKOCYTES REDUCED: HCPCS

## 2020-01-29 PROCEDURE — 85007 BL SMEAR W/DIFF WBC COUNT: CPT | Performed by: PHYSICIAN ASSISTANT

## 2020-01-29 PROCEDURE — 99223 1ST HOSP IP/OBS HIGH 75: CPT | Performed by: INTERNAL MEDICINE

## 2020-01-29 PROCEDURE — 86920 COMPATIBILITY TEST SPIN: CPT

## 2020-01-29 PROCEDURE — 30233N1 TRANSFUSION OF NONAUTOLOGOUS RED BLOOD CELLS INTO PERIPHERAL VEIN, PERCUTANEOUS APPROACH: ICD-10-PCS | Performed by: INTERNAL MEDICINE

## 2020-01-29 PROCEDURE — 85610 PROTHROMBIN TIME: CPT | Performed by: PHYSICIAN ASSISTANT

## 2020-01-29 PROCEDURE — 86923 COMPATIBILITY TEST ELECTRIC: CPT

## 2020-01-29 PROCEDURE — 83010 ASSAY OF HAPTOGLOBIN QUANT: CPT | Performed by: INTERNAL MEDICINE

## 2020-01-29 PROCEDURE — 99285 EMERGENCY DEPT VISIT HI MDM: CPT | Performed by: PHYSICIAN ASSISTANT

## 2020-01-29 PROCEDURE — 82140 ASSAY OF AMMONIA: CPT | Performed by: PHYSICIAN ASSISTANT

## 2020-01-29 PROCEDURE — 83615 LACTATE (LD) (LDH) ENZYME: CPT | Performed by: PHYSICIAN ASSISTANT

## 2020-01-29 PROCEDURE — 93010 ELECTROCARDIOGRAM REPORT: CPT | Performed by: INTERNAL MEDICINE

## 2020-01-29 PROCEDURE — 93005 ELECTROCARDIOGRAM TRACING: CPT

## 2020-01-29 PROCEDURE — 74177 CT ABD & PELVIS W/CONTRAST: CPT

## 2020-01-29 PROCEDURE — 85027 COMPLETE CBC AUTOMATED: CPT | Performed by: PHYSICIAN ASSISTANT

## 2020-01-29 PROCEDURE — 85384 FIBRINOGEN ACTIVITY: CPT | Performed by: PHYSICIAN ASSISTANT

## 2020-01-29 PROCEDURE — C9113 INJ PANTOPRAZOLE SODIUM, VIA: HCPCS | Performed by: PHYSICIAN ASSISTANT

## 2020-01-29 PROCEDURE — 99222 1ST HOSP IP/OBS MODERATE 55: CPT | Performed by: PHYSICIAN ASSISTANT

## 2020-01-29 PROCEDURE — 36415 COLL VENOUS BLD VENIPUNCTURE: CPT | Performed by: PHYSICIAN ASSISTANT

## 2020-01-29 PROCEDURE — 85730 THROMBOPLASTIN TIME PARTIAL: CPT | Performed by: PHYSICIAN ASSISTANT

## 2020-01-29 PROCEDURE — 99223 1ST HOSP IP/OBS HIGH 75: CPT | Performed by: PSYCHIATRY & NEUROLOGY

## 2020-01-29 RX ORDER — ACETAMINOPHEN 325 MG/1
650 TABLET ORAL EVERY 6 HOURS PRN
Status: DISCONTINUED | OUTPATIENT
Start: 2020-01-29 | End: 2020-02-06 | Stop reason: HOSPADM

## 2020-01-29 RX ORDER — PANTOPRAZOLE SODIUM 40 MG/1
40 INJECTION, POWDER, FOR SOLUTION INTRAVENOUS
Status: DISCONTINUED | OUTPATIENT
Start: 2020-01-29 | End: 2020-01-29

## 2020-01-29 RX ORDER — BISACODYL 10 MG
10 SUPPOSITORY, RECTAL RECTAL DAILY PRN
Status: DISCONTINUED | OUTPATIENT
Start: 2020-01-29 | End: 2020-01-31

## 2020-01-29 RX ORDER — THIAMINE MONONITRATE (VIT B1) 100 MG
100 TABLET ORAL DAILY
Status: DISCONTINUED | OUTPATIENT
Start: 2020-02-02 | End: 2020-02-06 | Stop reason: HOSPADM

## 2020-01-29 RX ORDER — LACTULOSE 20 G/30ML
20 SOLUTION ORAL 2 TIMES DAILY
Status: DISCONTINUED | OUTPATIENT
Start: 2020-01-29 | End: 2020-01-31

## 2020-01-29 RX ORDER — LORAZEPAM 2 MG/ML
0.2 INJECTION INTRAMUSCULAR ONCE
Status: COMPLETED | OUTPATIENT
Start: 2020-01-29 | End: 2020-01-29

## 2020-01-29 RX ORDER — CALCIUM CARBONATE 200(500)MG
1000 TABLET,CHEWABLE ORAL DAILY PRN
Status: DISCONTINUED | OUTPATIENT
Start: 2020-01-29 | End: 2020-02-06 | Stop reason: HOSPADM

## 2020-01-29 RX ORDER — METOPROLOL SUCCINATE 25 MG/1
25 TABLET, EXTENDED RELEASE ORAL DAILY
Status: DISCONTINUED | OUTPATIENT
Start: 2020-01-29 | End: 2020-01-30

## 2020-01-29 RX ORDER — NICOTINE 21 MG/24HR
1 PATCH, TRANSDERMAL 24 HOURS TRANSDERMAL DAILY
Status: DISCONTINUED | OUTPATIENT
Start: 2020-01-29 | End: 2020-02-06 | Stop reason: HOSPADM

## 2020-01-29 RX ORDER — FOLIC ACID 1 MG/1
1 TABLET ORAL DAILY
Status: DISCONTINUED | OUTPATIENT
Start: 2020-01-29 | End: 2020-02-06 | Stop reason: HOSPADM

## 2020-01-29 RX ORDER — HALOPERIDOL 5 MG/ML
1 INJECTION INTRAMUSCULAR EVERY 6 HOURS PRN
Status: DISCONTINUED | OUTPATIENT
Start: 2020-01-29 | End: 2020-02-06 | Stop reason: HOSPADM

## 2020-01-29 RX ORDER — THIAMINE MONONITRATE (VIT B1) 100 MG
100 TABLET ORAL DAILY
Status: DISCONTINUED | OUTPATIENT
Start: 2020-01-29 | End: 2020-01-29

## 2020-01-29 RX ORDER — PANTOPRAZOLE SODIUM 40 MG/1
40 TABLET, DELAYED RELEASE ORAL
Status: DISCONTINUED | OUTPATIENT
Start: 2020-01-29 | End: 2020-01-30

## 2020-01-29 RX ORDER — ONDANSETRON 2 MG/ML
4 INJECTION INTRAMUSCULAR; INTRAVENOUS EVERY 6 HOURS PRN
Status: DISCONTINUED | OUTPATIENT
Start: 2020-01-29 | End: 2020-02-06 | Stop reason: HOSPADM

## 2020-01-29 RX ADMIN — PANTOPRAZOLE SODIUM 40 MG: 40 INJECTION, POWDER, FOR SOLUTION INTRAVENOUS at 08:52

## 2020-01-29 RX ADMIN — LORAZEPAM 0.2 MG: 2 INJECTION INTRAMUSCULAR; INTRAVENOUS at 03:31

## 2020-01-29 RX ADMIN — PANTOPRAZOLE SODIUM 40 MG: 40 TABLET, DELAYED RELEASE ORAL at 18:25

## 2020-01-29 RX ADMIN — NICOTINE 1 PATCH: 14 PATCH TRANSDERMAL at 08:50

## 2020-01-29 RX ADMIN — IOHEXOL 100 ML: 350 INJECTION, SOLUTION INTRAVENOUS at 02:24

## 2020-01-29 RX ADMIN — LACTULOSE 20 G: 10 SOLUTION ORAL at 18:25

## 2020-01-29 RX ADMIN — METOPROLOL SUCCINATE 25 MG: 25 TABLET, FILM COATED, EXTENDED RELEASE ORAL at 08:57

## 2020-01-29 RX ADMIN — FOLIC ACID 1 MG: 1 TABLET ORAL at 08:58

## 2020-01-29 RX ADMIN — LACTULOSE 20 G: 10 SOLUTION ORAL at 10:04

## 2020-01-29 RX ADMIN — THIAMINE HCL TAB 100 MG 100 MG: 100 TAB at 08:58

## 2020-01-29 RX ADMIN — HALOPERIDOL LACTATE 1 MG: 5 INJECTION, SOLUTION INTRAMUSCULAR at 20:40

## 2020-01-29 RX ADMIN — RIFAXIMIN 550 MG: 550 TABLET ORAL at 10:00

## 2020-01-29 NOTE — ASSESSMENT & PLAN NOTE
· BP adequately controlled on current regimen   Had episode of hypotension in ED prior to blood transfusion  · Continue home dose Toprol XL with hold parameters  · Monitor BP per unit protocol

## 2020-01-29 NOTE — ASSESSMENT & PLAN NOTE
She continues to be a smoker  The patient is clearly encephalopathic at this time and unable to participate in discussion concerning nicotine and habits and cessation

## 2020-01-29 NOTE — ASSESSMENT & PLAN NOTE
She has been particularly anemic but now pancytopenic the for Um at least a year to  She is currently post transfusion of 2 units  She is currently being followed by Hematology

## 2020-01-29 NOTE — PROGRESS NOTES
Pt very confused and difficult to reorient  She is combative with staff and family  Educated family on possibility for restraint use for patient safety, and they voice understanding and acceptance  Pt cursing at family and refusing to get in bed, threatening to hit staff  Family wishes to leave at this time  Pt redirected and after some reorientation and diversion, accepting to move from chair to bed  Bilateral upper extremity restraints placed and pt throwing herself out of bed and kicking at staff  Pt attempting to elope, cursing, punching and kicking at nurses  Confusion is interfering with ability to perform care, pt pulled out 2 IVs  Offered bedpan and pt kicked it across the room  As per MD order, pt placed in 4 point restraints and mitts  Offered pt time to diffuse, needs are currently met  Will attempt with primary RN and PCA to re approach pt

## 2020-01-29 NOTE — ASSESSMENT & PLAN NOTE
· Sent for routine outpt labs by PCP today  Hb noted to be 4 9, WBC 3 38, RBC 1 76, platelets 45  Repeat labs obtained in ED similar  Most recent Hb was 8 1 on 6/7/2019  Denies melena or hematochezia  Sister states has been fatigued and dizzy recently  No active bleeding noted  (+) history of alcoholism  Sister states no alcohol intake in the past 6 months  Denies abdominal pain  (+) nausea  Did become hypotensive in ED prior to transusion    · Transfusion of 2 units PRBC initiated in ED  · Repeat CBC at 0900  · FOBT x 3  · GI consult  · Hematology consult  · Initiate IV Protonix daily  · NPO   · Hold home dose ASA

## 2020-01-29 NOTE — ASSESSMENT & PLAN NOTE
Her history of alcohol use and abuse apparently is documented by others  The patient is unable to really participate with the history or exam at this time  Her LFTs are somewhat elevated she is clearly in dangerously pancytopenic  Additionally her ammonia is elevated  Her metabolic abnormalities including her elevated ammonia and her hypoperfusion related to her anemia coupled with the fact that she may be having DTs clearly put her at risk for harming herself and others with her encephalopathy right now

## 2020-01-29 NOTE — ASSESSMENT & PLAN NOTE
This patient is currently restrained in soft 4 point restraints  She is unable to really participate in history or exam   Her behavior with the staff an hour ago includes agitation kicking attempted biting and other a defensive maneuvers  She was able to speak to me for 10 minutes without becoming defensive or accelerating  She may do well on a alcohol withdrawal protocol  We will continue to follow with serial exams  There is no evidence of any lateralizing features suggestive of acute structural neurologic dysfunction at this time

## 2020-01-29 NOTE — PHYSICAL THERAPY NOTE
Physical Therapy Cancellation Note    PT order received  Chart review performed  At this time, PT evaluation cancelled as pt not appropriate for PT/OT evaluations per RN Nicky Day, pt is being restrained & currently "fighting with staff"  PT will follow and evaluate as appropriate      Lois Oswald, PT, DPT

## 2020-01-29 NOTE — ED NOTES
Unable to obtain pt temperature at the start of the second bag of blood  Pt is uncooperative at this time  Pt does not show any signs of reaction from blood transfusion        David Tavera RN  01/29/20 9191

## 2020-01-29 NOTE — ASSESSMENT & PLAN NOTE
Her low hemoglobin and hematocrit, 4 9-5 2, crit Nataly put her risk for hypoperfusion and an encephalopathy coupled with her other metabolic abnormalities

## 2020-01-29 NOTE — CONSULTS
Neurology Consult- Munir Dempsey 1945, 69 y o  female    MRN: [id number] Unit/Bed#: -01 Encounter: [id number]      Inpatient consult to Neurology  Consult performed by: MEREDITH Peralta  Consult ordered by: Kevon Siemens, MD      Reason for Consult / Principal Problem:  AGITATION  Hx and PE limited by: The patient is clearly unable to participate  Review of previous medical records limited but completed  Family, was not present at the bedside for history and examination  Toxic metabolic encephalopathy  Assessment & Plan  This patient is currently restrained in soft 4 point restraints  She is unable to really participate in history or exam   Her behavior with the staff an hour ago includes agitation kicking attempted biting and other a defensive maneuvers  She was able to speak to me for 10 minutes without becoming defensive or accelerating  She may do well on a alcohol withdrawal protocol  We will continue to follow with serial exams  There is no evidence of any lateralizing features suggestive of acute structural neurologic dysfunction at this time  History of alcohol abuse  Assessment & Plan  Her history of alcohol use and abuse apparently is documented by others  The patient is unable to really participate with the history or exam at this time  Her LFTs are somewhat elevated she is clearly in dangerously pancytopenic  Additionally her ammonia is elevated  Her metabolic abnormalities including her elevated ammonia and her hypoperfusion related to her anemia coupled with the fact that she may be having DTs clearly put her at risk for harming herself and others with her encephalopathy right now  Pancytopenia ([address] )  Assessment & Plan  She has been particularly anemic but now pancytopenic the for Um at least a year to  She is currently post transfusion of 2 units  She is currently being followed by Hematology      Memory difficulties  Assessment & Plan  Certainly outpatient testing would be appropriate once this acute care episode is stabilized  Chronic alcohol use and abuse can lead to a progressive memory loss and dementia as well  Cigarette nicotine dependence without complication  Assessment & Plan  She continues to be a smoker  The patient is clearly encephalopathic at this time and unable to participate in discussion concerning nicotine and habits and cessation  Benign essential hypertension  Assessment & Plan  Essentially stable patient has been poorly compliant with her meds  There is no evidence of any accelerated hypertension at this time  * Symptomatic anemia  Assessment & Plan  Her low hemoglobin and hematocrit, 4 9-5 2, crit Nataly put her risk for hypoperfusion and an encephalopathy coupled with her other metabolic abnormalities  HPI: Ladonna Morris is a right handed, by self report, 69 y o  female who  neurology is asked to see after she was admitted late last night for what was labeled a symptomatic anemia  She had as others noted proceeded to her family doctor with the help of her family encouraging her for complaints of increasing memory loss over the last several months and a follow-up to her anemia and blood pressure problems  Her PCP apparently ordered laboratory studies she did get them this time again with the encouragement of her family, as she did not get labs last time  She was called at home later on and her family brought her to the ED for complaint of and anemic lab value of 4 9 on her hemoglobin  She apparently did well cooperating somewhat participating in the exam while her family was present  She apparently became somewhat more impatient in irritable and when her family left several hours later it was apparent the patient was becoming increasingly agitated  She was apparently trying to kick and hit the staff  She was completely disoriented and she was placed in a 4 point restraints  I saw her approximately 330 or 4:00 a m  This afternoon she was able to hold a 5/10 minute conversation with me with information that I am unable to take is accurate she was unable to report her age she did report that she is living in [place], but her facies has her living in [facility] she did report to me that she has a history of a stroke  She was clearly perseverative on wanting to go home  After only 5-7 minutes of non defensive conversation she started to escalate I was unable to really interview her or examine her much further  ROS: 12 system cued query:  She was unreliable historian  She denied any headache she denied that she was hungry she did not understand that she was restrained or that she was in the hospital       Historical Information     Past Medical History:   Diagnosis Date    Benign essential hypertension     last assessed - [id number]    Chest pain 11/4/2017    Gastroesophageal reflux disease without esophagitis 11/16/2017     Past Surgical History:   Procedure Laterality Date    APPENDECTOMY         Social History :  She apparently lives with her twin sister she is apparently   She still maybe half a pack per day smoker  Reportedly she quit drinking her ROM etc 6 months ago and her sister reports that she has given her some little bits of beer to help her over the last several months  No recreational is that I am able to determine          Family History:   Family History   Problem Relation Age of Onset    Heart attack Father         myocardial infarction         No Known Allergies  Meds:all current active meds have been reviewed    Scheduled Meds:  Current Facility-Administered Medications:  acetaminophen 650 mg Oral Q6H PRN   bisacodyl 10 mg Rectal Daily PRN   calcium carbonate 1,000 mg Oral Daily PRN   folic acid 1 mg Oral Daily   lactulose 20 g Oral BID   metoprolol succinate 25 mg Oral Daily   nicotine 1 patch Transdermal Daily   ondansetron 4 mg Intravenous Q6H PRN   pantoprazole 40 mg Oral BID AC rifaximin 550 mg Oral Q12H [address]   thiamine 100 mg Oral Daily     PRN Meds:   acetaminophen    bisacodyl    calcium carbonate    ondansetron      Physical Exam:   Objective   Vitals:Blood pressure 154/68, pulse 100, temperature 98 6 °F (37 °C), temperature source Oral, resp  rate 20, height 5' 4" (1 626 m), weight 85 9 kg (189 lb 6 oz), SpO2 98 %  ,Body mass index is 32 51 kg/m²  Patient was examined in bed there is no family present  She has four-point restraints on  She is poorly clothed, and unaware of it  General:  Restless and fidgety, appears stated age  Head: Normocephalic, without obvious abnormality, atraumatic  Oral exam:  Unable to examine;   Neck: no carotid bruit appreciate   Lungs: clear to auscultation ant  bilaterally  Heart: regular rate and rhythm, S1, S2 normal, no murmur appreciated,   Abdomen:  Unable to examine   Extremities:  Appeared atraumatic, no cyanosis or edema    Neurologic:   Mental status: Alert, somewhat hypervigilant  Her speech is clear there is no dysarthria she reports her name she reports that she is living in [facility]  She has no idea where she is or how she got here or why  She does not find reassurance when I tell her that her family or her sister brought her  She is unable to follow commands she is too distracted  With reorientation she retains only her name  She is perseverative in wanting to go home  CN Exam:  I was unable to examine her cranial nerves hers no Um acutely lateralizing features noted  Her speech is clear there is no dysarthria her gaze is conjugate she is able to shift her gaze to the door verses to the window with the examiner  Motor: full power, age appropriate x 4 limbs as she moves against the restraints and also tries to position herself in bed  Sensory:  Appears grossly intact  X 4 limbs,  Cerebellar: no gross ataxia noted as she attempts to itch her nose etc    DTRs planters gait or all deferred        Lab Results:   I have personally reviewed pertinent reports  , CBC:   Results from last 7 days   Lab Units 01/29/20  0849 01/28/20  2359 01/28/20  1413   WBC Thousand/uL 3 22* 3 45* 3 38*   RBC Million/uL 2 50* 1 83* 1 76*   HEMOGLOBIN g/dL 7 2* 5 2* 4 9*   HEMATOCRIT % 22 2* 16 9* 16 4*   MCV fL 89 92 93   PLATELETS Thousands/uL 32* 43* 45*   , BMP/CMP:   Results from last 7 days   Lab Units 01/28/20  2359 01/28/20  1413   SODIUM mmol/L 141 141   POTASSIUM mmol/L 4 1 4 1   CHLORIDE mmol/L 104 110*   CO2 mmol/L 26 23   BUN mg/dL 9 7   CREATININE mg/dL 0 96 0 77   CALCIUM mg/dL 9 2 9 5   AST U/L 41 38   ALT U/L 19 20   ALK PHOS U/L 243* 238*   EGFR ml/min/1 73sq m 68 89   , Vitamin B12:   Results from last 7 days   Lab Units 01/28/20  2359   VITAMIN B 12 pg/mL 1,169*   , HgBA1C:   , TSH:   Results from last 7 days   Lab Units 01/28/20  1413   TSH 3RD GENERATON uIU/mL 0 837   , Coagulation:   Results from last 7 days   Lab Units 01/29/20  0904   INR  1 32*   , Lipid Profile:   Results from last 7 days   Lab Units 01/28/20  1413   HDL mg/dL 27*   LDL CALC mg/dL 66   TRIGLYCERIDES mg/dL 50        Imaging Studies: No new neuro diagnostics  She had a head CT in November of 2017 for headache and it was within normal limits  Dictation voice to text software has been used in the creation of this document  Please consider this in light of any contextual or grammatical errors

## 2020-01-29 NOTE — ED NOTES
Pt hypotensive, trendelenburg position, rapid infusing MD RAYMOND in the Room  PA in the room    Family present     Ramu Us RN  01/29/20 9314

## 2020-01-29 NOTE — ED NOTES
1  CC-low H&H sent from PCP    2  Orientation status-alert  Oriented to self and family in room but is unable to give names  Forgetful and must be reminded why she is in hospital frequently    3  Abnormal labs/ abnormal focused assessment/ abnormal vitals-low H&H was given two units of blood  Low WBC and platelets  4  Medications/drips-PO meds  No drips    5  Last time narcotics given-n/a    6  IV lines/drains/etc-20 G L AC and wrist    7  Isolation status-n/a    8  Skin-intact    9  Ambulation-independent/ stand-by assist    10  ED nurse's phone number-[phone]  11   Admission related to traumatic injury-no           Darwin Parisi, RN  01/29/20 4401 [facility], RN  01/29/20 9999

## 2020-01-29 NOTE — ASSESSMENT & PLAN NOTE
· Per PCP records and sister had history of daily alcohol intake  Sister states has not had any alcohol intake in the past 6 months    · Thiamine and Folic acid daily

## 2020-01-29 NOTE — ASSESSMENT & PLAN NOTE
Certainly outpatient testing would be appropriate once this acute care episode is stabilized  Chronic alcohol use and abuse can lead to a progressive memory loss and dementia as well

## 2020-01-29 NOTE — ED PROVIDER NOTES
History  Chief Complaint   Patient presents with    Abnormal Lab     Per family, pt had a PCP appt today for routine check up and had blood work drawn  Per family, they got a call saying to come to the ED for evaluation of low hemoglobin  Pt reports generalized weakness beginning today  Patient is a 78-year-old female presents emergency department at the direction of her primary care physician  She had a routine checkup today with blood work performed  He called her this evening instructing her to go to the emergency department due to low hemoglobin  Patient's hemoglobin was 4 9  Patient states that she does feel very weak  She denies any chest pain or shortness of breath  Patient states she has history of chronic anemia but has never required a blood transfusion before  Patient denies bloody stool or black tarry stool  She denies any abdominal pain  Prior to Admission Medications   Prescriptions Last Dose Informant Patient Reported? Taking?   aspirin 81 mg chewable tablet  Family Member No Yes   Sig: Chew 1 tablet daily   metoprolol succinate (TOPROL-XL) 25 mg 24 hr tablet  Family Member No Yes   Sig: Take 1 tablet (25 mg total) by mouth daily      Facility-Administered Medications: None       Past Medical History:   Diagnosis Date    Benign essential hypertension     last assessed - [id number]    Chest pain 11/4/2017    Gastroesophageal reflux disease without esophagitis 11/16/2017       Past Surgical History:   Procedure Laterality Date    APPENDECTOMY         Family History   Problem Relation Age of Onset    Heart attack Father         myocardial infarction     I have reviewed and agree with the history as documented  Social History     Tobacco Use    Smoking status: Current Every Day Smoker     Packs/day: 0 50     Types: Cigarettes    Smokeless tobacco: Never Used   Substance Use Topics    Alcohol use: Not Currently     Comment: History of significant daily alcohol intake  Sister joy no alcohol intake in 6 months    Drug use: No        Review of Systems   Constitutional: Negative for fever  Respiratory: Negative for shortness of breath  Cardiovascular: Negative for chest pain  Neurological: Positive for dizziness and weakness  All other systems reviewed and are negative  Physical Exam  Physical Exam   Constitutional: She is oriented to person, place, and time  She appears well-developed and well-nourished  HENT:   Head: Normocephalic and atraumatic  Eyes: Pupils are equal, round, and reactive to light  Conjunctivae and EOM are normal    Neck: Normal range of motion  Cardiovascular: Normal rate, regular rhythm and normal heart sounds  Pulmonary/Chest: Effort normal and breath sounds normal    Abdominal: Soft  Bowel sounds are normal    Neurological: She is alert and oriented to person, place, and time  Skin: Skin is warm  Capillary refill takes less than 2 seconds  Psychiatric: She has a normal mood and affect  Her behavior is normal  Judgment and thought content normal    Vitals reviewed        Vital Signs  ED Triage Vitals   Temperature Pulse Respirations Blood Pressure SpO2   01/28/20 2350 01/28/20 2350 01/28/20 2350 01/28/20 2350 01/28/20 2350   98 3 °F (36 8 °C) 88 16 131/61 98 %      Temp Source Heart Rate Source Patient Position - Orthostatic VS BP Location FiO2 (%)   01/28/20 2350 01/28/20 2350 01/28/20 2350 01/28/20 2350 --   Oral Monitor Sitting Right arm       Pain Score       01/29/20 0042       No Pain           Vitals:    01/29/20 0215 01/29/20 0230 01/29/20 0300 01/29/20 0342   BP: 108/68 114/57 126/62 131/59   Pulse: 98 100 100 (!) 109   Patient Position - Orthostatic VS:             Visual Acuity      ED Medications  Medications   metoprolol succinate (TOPROL-XL) 24 hr tablet 25 mg (has no administration in time range)   bisacodyl (DULCOLAX) rectal suppository 10 mg (has no administration in time range)   ondansetron (ZOFRAN) injection 4 mg (has no administration in time range)   calcium carbonate (TUMS) chewable tablet 1,000 mg (has no administration in time range)   nicotine (NICODERM CQ) 14 mg/24hr TD 24 hr patch 1 patch (has no administration in time range)   acetaminophen (TYLENOL) tablet 650 mg (has no administration in time range)   pantoprazole (PROTONIX) injection 40 mg (has no administration in time range)   thiamine (VITAMIN B1) tablet 100 mg (has no administration in time range)   folic acid (FOLVITE) tablet 1 mg (has no administration in time range)   iohexol (OMNIPAQUE) 350 MG/ML injection (MULTI-DOSE) 100 mL (100 mL Intravenous Given 1/29/20 0224)   LORazepam (ATIVAN) 2 mg/mL injection 0 2 mg (0 2 mg Intravenous Given 1/29/20 0331)       Diagnostic Studies  Results Reviewed     Procedure Component Value Units Date/Time    Protime-INR [[id number]]     Lab Status:  No result Specimen:  Blood     APTT [[id number]]     Lab Status:  No result Specimen:  Blood     Occult blood 1-3, stool [[id number]]     Lab Status:  No result Specimen:  Stool     CBC and differential [[id number]]  (Abnormal) Collected:  01/28/20 2359    Lab Status:  Final result Specimen:  Blood from Arm, Left Updated:  01/29/20 0103     WBC 3 45 Thousand/uL      RBC 1 83 Million/uL      Hemoglobin 5 2 g/dL      Hematocrit 16 9 %      MCV 92 fL      MCH 28 4 pg      MCHC 30 8 g/dL      RDW 26 4 %      Platelets 43 Thousands/uL      nRBC 10 /100 WBCs     Troponin I [[id number]]  (Normal) Collected:  01/28/20 2359    Lab Status:  Final result Specimen:  Blood from Arm, Left Updated:  01/29/20 0024     Troponin I <0 02 ng/mL     Comprehensive metabolic panel [[id number]]  (Abnormal) Collected:  01/28/20 2359    Lab Status:  Final result Specimen:  Blood from Arm, Left Updated:  01/29/20 0022     Sodium 141 mmol/L      Potassium 4 1 mmol/L      Chloride 104 mmol/L      CO2 26 mmol/L      ANION GAP 11 mmol/L      BUN 9 mg/dL      Creatinine 0 96 mg/dL      Glucose 95 mg/dL      Calcium 9 2 mg/dL      AST 41 U/L      ALT 19 U/L      Alkaline Phosphatase 243 U/L      Total Protein 7 2 g/dL      Albumin 3 5 g/dL      Total Bilirubin 0 90 mg/dL      eGFR 68 ml/min/1 73sq m     Narrative:       [place] guidelines for Chronic Kidney Disease (CKD):     Stage 1 with normal or high GFR (GFR > 90 mL/min/1 73 square meters)    Stage 2 Mild CKD (GFR = 60-89 mL/min/1 73 square meters)    Stage 3A Moderate CKD (GFR = 45-59 mL/min/1 73 square meters)    Stage 3B Moderate CKD (GFR = 30-44 mL/min/1 73 square meters)    Stage 4 Severe CKD (GFR = 15-29 mL/min/1 73 square meters)    Stage 5 End Stage CKD (GFR <15 mL/min/1 73 square meters)  Note: GFR calculation is accurate only with a steady state creatinine                 CT abdomen pelvis w contrast   Final Result by Una Dawkins MD (01/29 0250)      Gallstones are seen within the gallbladder; there is pericholecystic fluid raising the possibility of acute cholecystitis  Cirrhotic liver with right subphrenic ascites  Small area of compression atelectasis in the right lower lobe    Cardiomegaly is noted      Workstation performed: [id number]                    Procedures  ECG 12 Lead Documentation Only  Date/Time: 1/29/2020 4:19 AM  Performed by: Zenia Watkins PA-C  Authorized by: Zenia Watkins PA-C     Indications / Diagnosis:  Anemia  ECG reviewed by me, the ED Provider: yes    Patient location:  ED  Previous ECG:     Previous ECG:  Compared to current    Similarity:  Changes noted  Interpretation:     Interpretation: abnormal    Rate:     ECG rate:  92    ECG rate assessment: normal    Rhythm:     Rhythm: sinus rhythm    Other findings:     Other findings: prolonged qTc interval               ED Course           Identification of Seniors at Risk      Most Recent Value   (ISAR) Identification of Seniors at Risk   Before the illness or injury that brought you to the Emergency, did you need someone to help you on a regular basis? 1 Filed at: 01/28/2020 2347   In the last 24 hours, have you needed more help than usual?  0 Filed at: 01/28/2020 2347   Have you been hospitalized for one or more nights during the past 6 months? 0 Filed at: 01/28/2020 2347   In general, do you see well?  0 Filed at: 01/28/2020 2347   In general, do you have serious problems with your memory? 1 Filed at: 01/28/2020 2347   Do you take more than three different medications every day?  0 Filed at: 01/28/2020 2347   ISAR Score  2 Filed at: 01/28/2020 2347                          MDM  Number of Diagnoses or Management Options  Pancytopenia ([facility] [address] ):   Symptomatic anemia:   Diagnosis management comments: Patient is 76-year-old female presents emergency department at the direction of her family physician due to low hemoglobin  Lab evaluation was performed and she was found to have hemoglobin of 5 2 as well as decreased with cell count and decreased platelet count  Cause of patient's pancytopenia is unknown  She had negative guaiac test   More in the emergency department she did have an episode of hypotension  Patient received fluids and was laid flat  Her blood pressure improved  Patient was consented to received 2 units packed red blood cells  This versus benefits were discussed at length with the patient  CT of the abdomen pelvis was reviewed and does not show any acute changes  It was some question of acute cholecystitis the patient is asymptomatic  Patient will be admitted to the hospital for further evaluation and treatment         Amount and/or Complexity of Data Reviewed  Clinical lab tests: ordered and reviewed  Tests in the radiology section of CPT®: reviewed and ordered  Independent visualization of images, tracings, or specimens: yes    Risk of Complications, Morbidity, and/or Mortality  Presenting problems: moderate  Diagnostic procedures: moderate  Management options: moderate    Patient Progress  Patient progress: stable        Disposition  Final diagnoses:   Pancytopenia ([facility] [address] )   Symptomatic anemia     Time reflects when diagnosis was documented in both MDM as applicable and the Disposition within this note     Time User Action Codes Description Comment    1/29/2020  3:05 AM Micheal Callowayrier Add [Q12 273] Pancytopenia ([facility] [address] )     1/29/2020  3:06 AM Micheal Callowayrier Add [D64 9] Symptomatic anemia       ED Disposition     ED Disposition Condition Date/Time Comment    Admit Stable Wed Jan 29, 2020 0305 Case was discussed with [facility] and the patient's admission status was agreed to be Admission Status: inpatient status to the service of Dr Ivonne Thompson   Follow-up Information    None         Patient's Medications   Discharge Prescriptions    No medications on file     No discharge procedures on file      ED Provider  Electronically Signed by           Thor Pearson PA-C  01/29/20 0422

## 2020-01-29 NOTE — CONSULTS
Hematology/Oncology Consult   Alexandria Dumont 69 y o  female MRN: [id number]  Unit/Bed#: -01 Encounter: [id number]      Hospital Physician Requesting Consult: Natalie Bailye MD  Reason for Consult:  Symptomatic anemia  Hematology/Oncology Supervising Physician: LOAN Arrieta , PhD    HPI: Alexandria Dumont is a 69y o  year old female with a history of  who presented  1/28/2020 from a primary care physician's office for hemoglobin of 4 9 grams/deciliter  And presentation, the patient reported she had been fatigued with lightheadedness  Denied any fever chills nausea vomiting diarrhea  No melena blood in the stool  Per medical record patient has a history of alcohol abuse, apparently according the patient's family  been no alcohol intake over the past 6 months time  Other laboratory studies on admission included w WBC 3 4/ANC 1 2, platelets 45 K  Alkaline phosphatase 238, AST 38, ALT 27, total bilirubin 0 7  Iron studies, B12 and folate were all satisfactory  Ammonia was elevated at 49  Protime 16 4, INR 1 3, PTT 38  TSH 0 83  Hep C antibody nonreactive  CT imaging abdomen pelvis showed a cirrhotic liver with right subphrenic ascites  No other abnormalities were identified  Patient Active Problem List   Diagnosis    Symptomatic anemia    Benign essential hypertension    Cigarette nicotine dependence without complication    Hyperglycemia    Paroxysmal SVT (supraventricular tachycardia) (HCC)    Sinus tachycardia    Urinary incontinence    Memory difficulties    Gait disturbance    Overweight (BMI 25 0-29  9)    Vitamin D deficiency    Recurrent major depressive disorder, in partial remission ([facility] [address] )    Pancytopenia ([facility] [address] )    History of alcohol abuse      Assessment/Plan:   #1 Symptomatic anemia  patient admitted with symptomatic anemia with a hemoglobin of 4 9 grams/deciliter/MCV 89  She received 2 units PRBC with a subsequent bump to 7 2 grams/deciliter    She has a history of chronic anemia, in June of 2019 hemoglobin was 8 1 grams/deciliter with a normal MCV  Presently, she has normocytic hypochromic anemia and therefore recommend further evaluation for GI losses  Additionally, schistocytes were seen on differential and therefore that would warrant a workup for hemolytic anemia especially in the setting of pancytopenia  Although there are other explanations for thrombocytopenia and anemia the patient does have some mild neutropenia which could be explored further with a bone marrow biopsy there is evidence of hemolysis  Recommend haptoglobin and LDH for further evaluation  She is not iron deficient B12 or folate deficient at this time and therefore not necessary replete any nutrients  #2 Thrombocytopenia  Pathology review of peripheral blood showed elevated  nucleated red blood cells with normal platelet morphology  She patient does have some element of coagulopathy due to the underlying hepatic cirrhosis and liver dysfunction which would likely explain her chronic thrombocytopenia,(platelet = 68 K in 7/2346  Prior to that in 11/ 2017 143 K)    -Recommend further evaluation of thrombocytopenia this admission include fibrinogen to rule out DIC  I have a lower suspicion for HIT or ITP at this time given the patient's history (including heparin exposure) do not recommend any additional workup at this time for these etiologies  #3 Neutropenia, mild, unclear etiology  ANC 1 2 - 1 7 this admission  -monitor closely for infectious s/s  Suspect patient pancytopenic 2/2 chronic ETOH use and marrow suppression  However recommend the above work up to rule out other etiology including underlying hematologic disorder  If indicated further discussion regarding bone marrow evaluation will be considered if otherwise negative work up  This will need to be discussed further at that time with the patient when she is less encephalopathic      Discussed with the primary team (Rupali Zendejas) 1/29/2020    We will continue to follow this admission  Please contact us if you have any questions regarding this consult  Thank you for this consult  Past Medical History:   Diagnosis Date    Benign essential hypertension     last assessed - [id number]    Chest pain 11/4/2017    Gastroesophageal reflux disease without esophagitis 11/16/2017     Family History   Problem Relation Age of Onset    Heart attack Father         myocardial infarction     Social History     Socioeconomic History    Marital status:      Spouse name: None    Number of children: None    Years of education: None    Highest education level: None   Occupational History    None   Social Needs    Financial resource strain: None    Food insecurity:     Worry: None     Inability: None    Transportation needs:     Medical: None     Non-medical: None   Tobacco Use    Smoking status: Current Every Day Smoker     Packs/day: 0 50     Types: Cigarettes    Smokeless tobacco: Never Used   Substance and Sexual Activity    Alcohol use: Not Currently     Comment: History of significant daily alcohol intake   Sister joy no alcohol intake in 6 months    Drug use: No    Sexual activity: Never   Lifestyle    Physical activity:     Days per week: 3 days     Minutes per session: 20 min    Stress: None   Relationships    Social connections:     Talks on phone: None     Gets together: None     Attends [employer] service: None     Active member of club or organization: None     Attends meetings of clubs or organizations: None     Relationship status: None    Intimate partner violence:     Fear of current or ex partner: None     Emotionally abused: None     Physically abused: None     Forced sexual activity: None   Other Topics Concern    None   Social History Narrative    No advance directives     No Known Allergies    Subjective:  Kelvin English reports:     Review of Systems   Unable to perform ROS: Mental status change     Patient is not responding to questions appropriately, she is agitated and uncooperatiove    Objective:  /68   Pulse 100   Temp 98 6 °F (37 °C) (Oral)   Resp 20   Ht 5' 4" (1 626 m)   Wt 85 9 kg (189 lb 6 oz)   SpO2 98%   BMI 32 51 kg/m²     Physical Exam   HENT:   Head: Normocephalic and atraumatic  Eyes: No scleral icterus  Pulmonary/Chest: Effort normal  No respiratory distress  Abdominal: She exhibits no distension  Musculoskeletal: She exhibits no edema  Skin: Skin is warm and dry  No pallor  Vitals reviewed          Current Facility-Administered Medications:     acetaminophen (TYLENOL) tablet 650 mg, 650 mg, Oral, Q6H PRN, Martin Estrada PA-C    bisacodyl (DULCOLAX) rectal suppository 10 mg, 10 mg, Rectal, Daily PRN, Martin Estrada PA-C    calcium carbonate (TUMS) chewable tablet 1,000 mg, 1,000 mg, Oral, Daily PRN, Martin Estrada PA-C    folic acid (FOLVITE) tablet 1 mg, 1 mg, Oral, Daily, Christina Munguia PA-C, 1 mg at 01/29/20 0858    lactulose 20 g/30 mL oral solution 20 g, 20 g, Oral, BID, Cheri Catherine PA-C, 20 g at 01/29/20 1004    metoprolol succinate (TOPROL-XL) 24 hr tablet 25 mg, 25 mg, Oral, Daily, Christina Munguia PA-C, 25 mg at 01/29/20 0857    nicotine (NICODERM CQ) 14 mg/24hr TD 24 hr patch 1 patch, 1 patch, Transdermal, Daily, Martin Estrada PA-C, 1 patch at 01/29/20 0850    ondansetron (ZOFRAN) injection 4 mg, 4 mg, Intravenous, Q6H PRN, Christina Munguia PA-C    pantoprazole (PROTONIX) EC tablet 40 mg, 40 mg, Oral, BID ACCheri PA-C    rifaximin (XIFAXAN) tablet 550 mg, 550 mg, Oral, Q12H SHAYNA, Cheri Catherine PA-C, 550 mg at 01/29/20 1000    thiamine (VITAMIN B1) tablet 100 mg, 100 mg, Oral, Daily, Martin Estrada PA-C, 100 mg at 01/29/20 0858      Laboratory studies:  Recent Labs     01/28/20  1413 01/28/20  2359 01/29/20  0849   WBC 3 38* 3 45* 3 22*   HGB 4 9* 5 2* 7 2*   PLT 45* 43* 32*   MCV 93 92 89   RDW 26 1* 26 4* 20 7*   CREATININE 0 77 0 96 --    AST 38 41  --    ALT 20 19  --          Laboratory studies were reviewed    Imaging Studies:    [unfilled]     Pathology: [unfilled]     Code Status: Level 1 - Full Code    Counseling / Coordination of Care  Total floor / unit time spent today 45 minutes  Greater than 50% of total time was spent with the patient and / or family counseling and / or coordination of care

## 2020-01-29 NOTE — QUICK NOTE
Results of CT abdomen/pelvis w/ reviewed  (+) gallstones with pericholecystic fluid consistent with cholecystitis  Pt denied any abdominal pain but was agitated and refused a physical exam  LFTs normal on exam  Will monitor for now

## 2020-01-29 NOTE — H&P
H&P- Munir Dempsey 1945, 69 y o  female MRN: [id number]    Unit/Bed#: ED 10 Encounter: [id number]    Primary Care Provider: Criss Jacobsen MD   Date and time admitted to hospital: 1/28/2020 11:44 PM        * Symptomatic anemia  Assessment & Plan  · Sent for routine outpt labs by PCP today  Hb noted to be 4 9, WBC 3 38, RBC 1 76, platelets 45  Repeat labs obtained in ED similar  Most recent Hb was 8 1 on 6/7/2019  Denies melena or hematochezia  Sister kendrick has been fatigued and dizzy recently  No active bleeding noted  (+) history of alcoholism  Sister states no alcohol intake in the past 6 months  Denies abdominal pain  (+) nausea  Did become hypotensive in ED prior to transusion  · Transfusion of 2 units PRBC initiated in ED  · Repeat CBC at 0900  · FOBT x 3  · GI consult  · Hematology consult  · Initiate IV Protonix daily  · NPO   · Hold home dose ASA    Pancytopenia ([address] )  Assessment & Plan  · See AP above    Benign essential hypertension  Assessment & Plan  · BP adequately controlled on current regimen  Had episode of hypotension in ED prior to blood transfusion  · Continue home dose Toprol XL with hold parameters  · Monitor BP per unit protocol    History of alcohol abuse  Assessment & Plan  · Per PCP records and sister had history of daily alcohol intake  Sister kendrick has not had any alcohol intake in the past 6 months  · Thiamine and Folic acid daily    Cigarette nicotine dependence without complication  Assessment & Plan  · Currently smoking 0 5ppd  · Smoking cessation education  · Nicotine patch    Memory difficulties  Assessment & Plan  · Noted by PCP on most recent visit   Sister kendrick has been becoming increasingly forgetful  · Has not yet been started on any medications  · F/U outpt w/ PCP      VTE Prophylaxis: Pharmacologic VTE Prophylaxis contraindicated due to Pancytopenia  / sequential compression device   Code Status: Level 1 Full Code  POLST: POLST form is not discussed and not completed at this time  Discussion with family: Sister at bedside    Anticipated Length of Stay:  Patient will be admitted on an Inpatient basis with an anticipated length of stay of  Greater than 2 midnights  Justification for Hospital Stay: See AP above    Total Time for Visit, including Counseling / Coordination of Care: 45 minutes  Greater than 50% of this total time spent on direct patient counseling and coordination of care  Chief Complaint:   Sent by PCP after routine lab work revealed pancytopenia    History of Present Illness:    Juany Garcia is a 69 y o  female who presents per PCP recommendation after routine lab work completed today revealed pancytopenia  Ms Gordon Will is confused and agitated and refusing to participate in ROS and physical exam  Information obtained from chart review and from sister who she resides with  Sister reports has been fatigued recently with c/o dizziness  Denies any abdominal pain  (+) nausea, but no vomiting  Denies hematochezia/melena  Per PCP note suffers from chronic alcoholism  Sister kendrick was a heavy, daily drinker, but has had no alcohol intake in the last 6 months  Hb obtained earlier in the day revealed Hb 4 9, WBC 3 38, RBC 1 76, platelets 45  Repeat lab work in ED confirmed this  Most recent Hb prior to today was 8 1 with platelet at 68 back on 6/7/2019  Normal MCV  Became hypotensive in ED prior to blood transfusion  Sister ekndrick has been increasingly forgetful recently and spoke with PCP about it yesterday  Plan was to monitor prior to starting on medications  Review of Systems:    Review of Systems   Reason unable to perform ROS: Agitated and uncooperative         Past Medical and Surgical History:     Past Medical History:   Diagnosis Date    Benign essential hypertension     last assessed - [id number]    Chest pain 11/4/2017    Gastroesophageal reflux disease without esophagitis 11/16/2017       Past Surgical History:   Procedure Laterality Date    APPENDECTOMY         Meds/Allergies:    Prior to Admission medications    Medication Sig Start Date End Date Taking? Authorizing Provider   aspirin 81 mg chewable tablet Chew 1 tablet daily 11/6/17  Yes Ana Harris MD   metoprolol succinate (TOPROL-XL) 25 mg 24 hr tablet Take 1 tablet (25 mg total) by mouth daily 5/31/19  Yes Jian Treadwell MD     I have reviewed home medications with patient family member  Allergies: No Known Allergies    Social History:     Marital Status:    Patient Pre-hospital Living Situation: Lives with sister  Patient Pre-hospital Level of Mobility: Ambulatory w/o assistive device  Patient Pre-hospital Diet Restrictions: None  Substance Use History:   Social History     Substance and Sexual Activity   Alcohol Use Not Currently    Comment: History of significant daily alcohol intake  Sister joy no alcohol intake in 6 months     Social History     Tobacco Use   Smoking Status Current Every Day Smoker    Packs/day: 0 50    Types: Cigarettes   Smokeless Tobacco Never Used     Social History     Substance and Sexual Activity   Drug Use No       Family History:    Family History   Problem Relation Age of Onset    Heart attack Father         myocardial infarction       Physical Exam:     Vitals:   Blood Pressure: 131/59 (01/29/20 0342)  Pulse: (!) 109 (01/29/20 0342)  Temperature: 98 6 °F (37 °C) (01/29/20 0215)  Temp Source: Oral (01/29/20 0215)  Respirations: 20 (01/29/20 0342)  Weight - Scale: 85 9 kg (189 lb 6 oz) (01/28/20 2350)  SpO2: 98 % (01/29/20 0300)    Physical Exam   Constitutional: She appears well-developed and well-nourished  No distress  HENT:   Head: Normocephalic and atraumatic  Eyes: Scleral icterus is present  Neck:   Refused   Cardiovascular:   Refused   Pulmonary/Chest: Effort normal    Refused   Abdominal:   Refused   Musculoskeletal:   Refused   Neurological: She is alert  Refused   Skin: Skin is warm and dry     Psychiatric: She is agitated  Vitals reviewed  Additional Data:     Lab Results: I have personally reviewed pertinent reports  Results from last 7 days   Lab Units 01/28/20  2359   WBC Thousand/uL 3 45*   HEMOGLOBIN g/dL 5 2*   HEMATOCRIT % 16 9*   PLATELETS Thousands/uL 43*   BANDS PCT % 4   LYMPHO PCT % 49*   MONO PCT % 10   EOS PCT % 3     Results from last 7 days   Lab Units 01/28/20  2359   SODIUM mmol/L 141   POTASSIUM mmol/L 4 1   CHLORIDE mmol/L 104   CO2 mmol/L 26   BUN mg/dL 9   CREATININE mg/dL 0 96   ANION GAP mmol/L 11   CALCIUM mg/dL 9 2   ALBUMIN g/dL 3 5   TOTAL BILIRUBIN mg/dL 0 90   ALK PHOS U/L 243*   ALT U/L 19   AST U/L 41   GLUCOSE RANDOM mg/dL 95                       Imaging: I have personally reviewed pertinent reports  CT abdomen pelvis w contrast   Final Result by Emiliano Daniels MD (01/29 0250)      Gallstones are seen within the gallbladder; there is pericholecystic fluid raising the possibility of acute cholecystitis  Cirrhotic liver with right subphrenic ascites  Small area of compression atelectasis in the right lower lobe  Cardiomegaly is noted      Workstation performed: [id number]             EKG, Pathology, and Other Studies Reviewed on Admission:   · EKG: NA    Allscripts / Epic Records Reviewed: Yes     ** Please Note: This note has been constructed using a voice recognition system   **

## 2020-01-29 NOTE — CONSULTS
Consultation - [facility]) [facility]  Bev Mario 69 y o  female MRN: [id number]  Unit/Bed#: -01 Encounter: [id number]        Inpatient consult to gastroenterology  Consult performed by: Maricruz Ramos PA-C  Consult ordered by: Bev Mario PA-C          Reason for Consult / Principal Problem: Pancytopenia    HPI: Ms Tyrell Mojica is a 70 yo F with a PMH of HTN, alcoholism, presenting due to pancytopenia noted on outpatient bloodwork with a Hb of 4 9  The patient is unfortunately confused and her family reports this has been progressive over the past 6 months  She previously consumed alcohol heavily consisting mostly of rum and binge drinking for 3 days at a time  She has not had any rum since about June but her sister has bought beer for her to have instead though she reports she is not drinking it heavily  The patient denies any melena, hematochezia, abdominal pain, nausea, vomiting, heartburn, dysphagia  Unclear if history if reliable given her baseline confusion  Her sister did find aspirin in her room and is not sure how much she has been taking of this  She has never had an endoscopy  She did have a colonoscopy over 10 years ago  There is no family history of liver disease and Onelia Saavedra does not have any known history of cirrhosis  REVIEW OF SYSTEMS: Negative except for as stated as above      Historical Information   Past Medical History:   Diagnosis Date    Benign essential hypertension     last assessed - [id number]    Chest pain 11/4/2017    Gastroesophageal reflux disease without esophagitis 11/16/2017     Past Surgical History:   Procedure Laterality Date    APPENDECTOMY       Social History   Social History     Substance and Sexual Activity   Alcohol Use Not Currently    Comment: History of significant daily alcohol intake   Sister joy no alcohol intake in 6 months     Social History     Substance and Sexual Activity   Drug Use No     Social History     Tobacco Use Smoking Status Current Every Day Smoker    Packs/day: 0 50    Types: Cigarettes   Smokeless Tobacco Never Used     Family History   Problem Relation Age of Onset    Heart attack Father         myocardial infarction       Meds/Allergies     Medications Prior to Admission   Medication    aspirin 81 mg chewable tablet    metoprolol succinate (TOPROL-XL) 25 mg 24 hr tablet     Current Facility-Administered Medications   Medication Dose Route Frequency    acetaminophen (TYLENOL) tablet 650 mg  650 mg Oral Q6H PRN    bisacodyl (DULCOLAX) rectal suppository 10 mg  10 mg Rectal Daily PRN    calcium carbonate (TUMS) chewable tablet 1,000 mg  1,000 mg Oral Daily PRN    folic acid (FOLVITE) tablet 1 mg  1 mg Oral Daily    metoprolol succinate (TOPROL-XL) 24 hr tablet 25 mg  25 mg Oral Daily    nicotine (NICODERM CQ) 14 mg/24hr TD 24 hr patch 1 patch  1 patch Transdermal Daily    ondansetron (ZOFRAN) injection 4 mg  4 mg Intravenous Q6H PRN    pantoprazole (PROTONIX) EC tablet 40 mg  40 mg Oral BID AC    thiamine (VITAMIN B1) tablet 100 mg  100 mg Oral Daily       No Known Allergies        Objective     Blood pressure 140/63, pulse 83, temperature (!) 97 3 °F (36 3 °C), resp  rate 18, height 5' 4" (1 626 m), weight 85 9 kg (189 lb 6 oz), SpO2 100 %        Intake/Output Summary (Last 24 hours) at 1/29/2020 1013  Last data filed at 1/29/2020 4486  Gross per 24 hour   Intake 700 ml   Output    Net 700 ml         PHYSICAL EXAM:      General Appearance:   Alert, oriented to self, disoriented and confused otherwise, unable to consistently follow commands or answer questions appropriately   HEENT:   Normocephalic, atraumatic, anicteric      Neck:  Supple, symmetrical, trachea midline   Lungs:   Clear to auscultation bilaterally; no rales, rhonchi or wheezing; respirations unlabored    Heart[de-identified]   RRR, no murmur   Abdomen:   Soft, non-tender, non-distended; normal bowel sounds; no masses, no organomegaly    Rectal:   Deferred    Extremities:  No cyanosis, 1+ LE edema    Pulses:  2+ and symmetric all extremities    Skin:  No jaundice or pallor, no rashes or lesions      Lab Results:   Results from last 7 days   Lab Units 01/29/20  0849   WBC Thousand/uL 3 22*   HEMOGLOBIN g/dL 7 2*   HEMATOCRIT % 22 2*   PLATELETS Thousands/uL 32*   LYMPHO PCT % 38   MONO PCT % 7   EOS PCT % 1     Results from last 7 days   Lab Units 01/28/20  2359   POTASSIUM mmol/L 4 1   CHLORIDE mmol/L 104   CO2 mmol/L 26   BUN mg/dL 9   CREATININE mg/dL 0 96   CALCIUM mg/dL 9 2   ALK PHOS U/L 243*   ALT U/L 19   AST U/L 41     Results from last 7 days   Lab Units 01/29/20  0904   INR  1 32*           Imaging Studies: I have personally reviewed pertinent imaging studies  Ct Abdomen Pelvis W Contrast  Result Date: 1/29/2020  Impression: Gallstones are seen within the gallbladder; there is pericholecystic fluid raising the possibility of acute cholecystitis  Cirrhotic liver with right subphrenic ascites  Small area of compression atelectasis in the right lower lobe  Cardiomegaly is noted        ASSESSMENT and PLAN:      Pancytopenia  Normocytic Anemia  - Progressively worse pancytopenia compared to June 2019  - Hb 4 9 on admission without any reported overt blood loss s/p 2 U PRBC with appropriate response to 7 2  - Hemoccult stool  - Recommend EGD given reported NSAID use to rule out PUD and new findings of cirrhosis to evaluate for varices, PHG  - She will not be able to perform a colonoscopy at this time due to her confusion and agitation/inability to follow directions well  - Check b12, folate, iron panel  - Hematology consultation pending    Encephalopathy  - Progressive confusion over the past 6 months per family, possibly some confabulation  - Consider neurology consultation  - Check ammonia and start lactulose/rifaximin if elevated  - Consider diagnosis of possible Wernicke's or Korsakoff syndrome  - Check RUQ US with dopplers to exclude portal vein thrombosis/mass    Alcohol Abuse  Cirrhosis  - CT A/P on admission shows cirrhotic appearing liver  - Likely etiology is alcohol abuse but will complete workup for other etiology including AIH, iron overload, etc   - Hep antibody neg, will check for Hep B  - Minimal ascites, 1+ LE edema, cardiomegaly noted on imaging, may need TTE and initiation of low dose diuretics  - Check AFP, contrasted CT abdomen did not note any liver mass to suggest [address] , will check RUQ US with dopplers as well  - Will need EGD as above for varices screening and workup for anemia  - Not a liver transplant candidate due to age      The patient will be seen by Dr Ren Trujillo

## 2020-01-29 NOTE — OCCUPATIONAL THERAPY NOTE
Occupational Therapy Cancellation Note        Patient Name: Majorie Cheadle  NSWDT'[id number] Date: 1/29/2020      OT order received  Chart review performed  At this time, OT evaluation cancelled as pt not appropriate for PT/OT evaluations per ANDREY Sanchez, pt is being restrained & currently "fighting with staff"  OT will follow and evaluate as appropriate

## 2020-01-29 NOTE — ASSESSMENT & PLAN NOTE
Essentially stable patient has been poorly compliant with her meds  There is no evidence of any accelerated hypertension at this time

## 2020-01-29 NOTE — ASSESSMENT & PLAN NOTE
· Noted by PCP on most recent visit   [facility] has been becoming increasingly forgetful  · Has not yet been started on any medications  · F/U outpt w/ PCP

## 2020-01-29 NOTE — TELEPHONE ENCOUNTER
I got hold of the son  He was going to come up and attend to his mother who needs to go to the emergency room because of anemia

## 2020-01-30 ENCOUNTER — ANESTHESIA EVENT (INPATIENT)
Dept: GASTROENTEROLOGY | Facility: HOSPITAL | Age: 75
DRG: 808 | End: 2020-01-30
Payer: MEDICARE

## 2020-01-30 ENCOUNTER — APPOINTMENT (INPATIENT)
Dept: GASTROENTEROLOGY | Facility: HOSPITAL | Age: 75
DRG: 808 | End: 2020-01-30
Payer: MEDICARE

## 2020-01-30 ENCOUNTER — ANESTHESIA (INPATIENT)
Dept: GASTROENTEROLOGY | Facility: HOSPITAL | Age: 75
DRG: 808 | End: 2020-01-30
Payer: MEDICARE

## 2020-01-30 ENCOUNTER — APPOINTMENT (INPATIENT)
Dept: CT IMAGING | Facility: HOSPITAL | Age: 75
DRG: 808 | End: 2020-01-30
Payer: MEDICARE

## 2020-01-30 ENCOUNTER — APPOINTMENT (INPATIENT)
Dept: RADIOLOGY | Facility: HOSPITAL | Age: 75
DRG: 808 | End: 2020-01-30
Payer: MEDICARE

## 2020-01-30 LAB
ABO GROUP BLD BPU: NORMAL
ABO GROUP BLD BPU: NORMAL
AFP-TM SERPL-MCNC: 3.6 NG/ML (ref 0.5–8)
ALBUMIN SERPL BCP-MCNC: 3.2 G/DL (ref 3.5–5)
ALBUMIN SERPL BCP-MCNC: 3.4 G/DL (ref 3.5–5)
ALP SERPL-CCNC: 210 U/L (ref 46–116)
ALP SERPL-CCNC: 211 U/L (ref 46–116)
ALT SERPL W P-5'-P-CCNC: 17 U/L (ref 12–78)
ALT SERPL W P-5'-P-CCNC: 21 U/L (ref 12–78)
AMMONIA PLAS-SCNC: 90 UMOL/L (ref 11–35)
ANION GAP SERPL CALCULATED.3IONS-SCNC: 11 MMOL/L (ref 4–13)
ANION GAP SERPL CALCULATED.3IONS-SCNC: 15 MMOL/L (ref 4–13)
ANISOCYTOSIS BLD QL SMEAR: PRESENT
ANISOCYTOSIS BLD QL SMEAR: PRESENT
AST SERPL W P-5'-P-CCNC: 40 U/L (ref 5–45)
AST SERPL W P-5'-P-CCNC: 57 U/L (ref 5–45)
BACTERIA UR QL AUTO: ABNORMAL /HPF
BASE EXCESS BLDA CALC-SCNC: -4 MMOL/L (ref -2–3)
BASOPHILS # BLD MANUAL: 0.03 THOUSAND/UL (ref 0–0.1)
BASOPHILS NFR MAR MANUAL: 1 % (ref 0–1)
BILIRUB DIRECT SERPL-MCNC: 0.58 MG/DL (ref 0–0.2)
BILIRUB SERPL-MCNC: 1.3 MG/DL (ref 0.2–1)
BILIRUB SERPL-MCNC: 1.7 MG/DL (ref 0.2–1)
BILIRUB UR QL STRIP: NEGATIVE
BPU ID: NORMAL
BPU ID: NORMAL
BUN SERPL-MCNC: 10 MG/DL (ref 5–25)
BUN SERPL-MCNC: 11 MG/DL (ref 5–25)
CA-I BLD-SCNC: 1.11 MMOL/L (ref 1.12–1.32)
CA-I BLD-SCNC: 1.11 MMOL/L (ref 1.12–1.32)
CALCIUM SERPL-MCNC: 8.8 MG/DL (ref 8.3–10.1)
CALCIUM SERPL-MCNC: 9 MG/DL (ref 8.3–10.1)
CHLORIDE SERPL-SCNC: 106 MMOL/L (ref 100–108)
CHLORIDE SERPL-SCNC: 108 MMOL/L (ref 100–108)
CLARITY UR: CLEAR
CO2 SERPL-SCNC: 20 MMOL/L (ref 21–32)
CO2 SERPL-SCNC: 24 MMOL/L (ref 21–32)
COLOR UR: YELLOW
CREAT SERPL-MCNC: 1.04 MG/DL (ref 0.6–1.3)
CREAT SERPL-MCNC: 1.1 MG/DL (ref 0.6–1.3)
CROSSMATCH: NORMAL
CROSSMATCH: NORMAL
EOSINOPHIL # BLD MANUAL: 0.14 THOUSAND/UL (ref 0–0.4)
EOSINOPHIL NFR BLD MANUAL: 4 % (ref 0–6)
ERYTHROCYTE [DISTWIDTH] IN BLOOD BY AUTOMATED COUNT: 20.3 % (ref 11.6–15.1)
ERYTHROCYTE [DISTWIDTH] IN BLOOD BY AUTOMATED COUNT: 20.5 % (ref 11.6–15.1)
ERYTHROCYTE [DISTWIDTH] IN BLOOD BY AUTOMATED COUNT: 21.1 % (ref 11.6–15.1)
ERYTHROCYTE [DISTWIDTH] IN BLOOD BY AUTOMATED COUNT: 26.1 % (ref 11.6–15.1)
FIBRINOGEN PPP-MCNC: 302 MG/DL (ref 227–495)
GFR SERPL CREATININE-BSD FRML MDRD: 58 ML/MIN/1.73SQ M
GFR SERPL CREATININE-BSD FRML MDRD: 62 ML/MIN/1.73SQ M
GLUCOSE SERPL-MCNC: 111 MG/DL (ref 65–140)
GLUCOSE SERPL-MCNC: 112 MG/DL (ref 65–140)
GLUCOSE SERPL-MCNC: 83 MG/DL (ref 65–140)
GLUCOSE UR STRIP-MCNC: NEGATIVE MG/DL
HAPTOGLOB SERPL-MCNC: <10 MG/DL (ref 42–346)
HBV CORE AB SER QL: NORMAL
HBV CORE IGM SER QL: NORMAL
HBV SURFACE AG SER QL: NORMAL
HCO3 BLDA-SCNC: 20.2 MMOL/L (ref 24–30)
HCT VFR BLD AUTO: 16.4 % (ref 34.8–46.1)
HCT VFR BLD AUTO: 21 % (ref 34.8–46.1)
HCT VFR BLD AUTO: 24 % (ref 34.8–46.1)
HCT VFR BLD AUTO: 24.3 % (ref 34.8–46.1)
HCT VFR BLD CALC: 24 % (ref 34.8–46.1)
HCV AB SER QL: NORMAL
HGB BLD-MCNC: 4.9 G/DL (ref 11.5–15.4)
HGB BLD-MCNC: 6.8 G/DL (ref 11.5–15.4)
HGB BLD-MCNC: 7.7 G/DL (ref 11.5–15.4)
HGB BLD-MCNC: 7.7 G/DL (ref 11.5–15.4)
HGB BLDA-MCNC: 8.2 G/DL (ref 11.5–15.4)
HGB UR QL STRIP.AUTO: NEGATIVE
HYPERCHROMIA BLD QL SMEAR: PRESENT
HYPERCHROMIA BLD QL SMEAR: PRESENT
INR PPP: 1.32 (ref 0.84–1.19)
INR PPP: 1.33 (ref 0.84–1.19)
KETONES UR STRIP-MCNC: NEGATIVE MG/DL
LACTATE SERPL-SCNC: 5.1 MMOL/L (ref 0.5–2)
LEUKOCYTE ESTERASE UR QL STRIP: NEGATIVE
LYMPHOCYTES # BLD AUTO: 1.41 THOUSAND/UL (ref 0.6–4.47)
LYMPHOCYTES # BLD AUTO: 1.59 THOUSAND/UL (ref 0.6–4.47)
LYMPHOCYTES # BLD AUTO: 41 % (ref 14–44)
LYMPHOCYTES # BLD AUTO: 47 %
MAGNESIUM SERPL-MCNC: 1.3 MG/DL (ref 1.6–2.6)
MAGNESIUM SERPL-MCNC: 1.4 MG/DL (ref 1.6–2.6)
MCH RBC QN AUTO: 27.8 PG (ref 26.8–34.3)
MCH RBC QN AUTO: 28.5 PG (ref 26.8–34.3)
MCH RBC QN AUTO: 28.7 PG (ref 26.8–34.3)
MCH RBC QN AUTO: 28.8 PG (ref 26.8–34.3)
MCHC RBC AUTO-ENTMCNC: 29.9 G/DL (ref 31.4–37.4)
MCHC RBC AUTO-ENTMCNC: 31.7 G/DL (ref 31.4–37.4)
MCHC RBC AUTO-ENTMCNC: 32.1 G/DL (ref 31.4–37.4)
MCHC RBC AUTO-ENTMCNC: 32.4 G/DL (ref 31.4–37.4)
MCV RBC AUTO: 88 FL (ref 82–98)
MCV RBC AUTO: 90 FL (ref 82–98)
MCV RBC AUTO: 91 FL (ref 82–98)
MCV RBC AUTO: 93 FL (ref 82–98)
METAMYELOCYTES NFR BLD MANUAL: 2 % (ref 0–1)
METAMYELOCYTES NFR BLD MANUAL: 5 % (ref 0–1)
MONOCYTES # BLD AUTO: 0.24 THOUSAND/UL (ref 0–1.22)
MONOCYTES # BLD AUTO: 0.34 THOUSAND/UL (ref 0–1.22)
MONOCYTES NFR BLD AUTO: 10 % (ref 4–12)
MONOCYTES NFR BLD: 7 % (ref 4–12)
MYELOCYTES NFR BLD MANUAL: 1 % (ref 0–1)
NEUTROPHILS # BLD MANUAL: 1.38 THOUSAND/UL (ref 1.85–7.62)
NEUTS BAND NFR BLD MANUAL: 1 % (ref 0–8)
NEUTS BAND NFR BLD MANUAL: 3 % (ref 0–8)
NEUTS SEG # BLD: 1.08 THOUSAND/UL (ref 1.81–6.82)
NEUTS SEG NFR BLD AUTO: 31 %
NEUTS SEG NFR BLD AUTO: 37 % (ref 43–75)
NITRITE UR QL STRIP: NEGATIVE
NON-SQ EPI CELLS URNS QL MICRO: ABNORMAL /HPF
NRBC BLD AUTO-RTO: 10 /100 WBCS
NRBC BLD AUTO-RTO: 12 /100 WBCS
NRBC BLD AUTO-RTO: 22 /100 WBC (ref 0–2)
NRBC BLD AUTO-RTO: 6 /100 WBC (ref 0–2)
PATHOLOGY REVIEW: YES
PCO2 BLD: 21 MMOL/L (ref 21–32)
PCO2 BLD: 31.5 MM HG (ref 42–50)
PH BLD: 7.42 [PH] (ref 7.3–7.4)
PH UR STRIP.AUTO: 5.5 [PH]
PHOSPHATE SERPL-MCNC: 3.9 MG/DL (ref 2.3–4.1)
PLATELET # BLD AUTO: 154 THOUSANDS/UL (ref 149–390)
PLATELET # BLD AUTO: 165 THOUSANDS/UL (ref 149–390)
PLATELET # BLD AUTO: 34 THOUSANDS/UL (ref 149–390)
PLATELET # BLD AUTO: 45 THOUSANDS/UL (ref 149–390)
PLATELET BLD QL SMEAR: ABNORMAL
PMV BLD AUTO: 11.3 FL (ref 8.9–12.7)
PMV BLD AUTO: 9.9 FL (ref 8.9–12.7)
PO2 BLD: 63 MM HG (ref 35–45)
POIKILOCYTOSIS BLD QL SMEAR: PRESENT
POIKILOCYTOSIS BLD QL SMEAR: PRESENT
POLYCHROMASIA BLD QL SMEAR: PRESENT
POTASSIUM BLD-SCNC: 4.3 MMOL/L (ref 3.5–5.3)
POTASSIUM SERPL-SCNC: 4.2 MMOL/L (ref 3.5–5.3)
POTASSIUM SERPL-SCNC: 4.6 MMOL/L (ref 3.5–5.3)
PROCALCITONIN SERPL-MCNC: 0.51 NG/ML
PROT SERPL-MCNC: 6.6 G/DL (ref 6.4–8.2)
PROT SERPL-MCNC: 7.2 G/DL (ref 6.4–8.2)
PROT UR STRIP-MCNC: NEGATIVE MG/DL
PROTHROMBIN TIME: 16.5 SECONDS (ref 11.6–14.5)
PROTHROMBIN TIME: 16.5 SECONDS (ref 11.6–14.5)
RBC # BLD AUTO: 1.76 MILLION/UL (ref 3.81–5.12)
RBC # BLD AUTO: 2.39 MILLION/UL (ref 3.81–5.12)
RBC # BLD AUTO: 2.67 MILLION/UL (ref 3.81–5.12)
RBC # BLD AUTO: 2.68 MILLION/UL (ref 3.81–5.12)
RBC #/AREA URNS AUTO: ABNORMAL /HPF
RETICS # AUTO: NORMAL 10*3/UL (ref 14097–95744)
RETICS # CALC: 1.76 % (ref 0.37–1.87)
SAO2 % BLD FROM PO2: 92 % (ref 60–85)
SCHISTOCYTES BLD QL SMEAR: PRESENT
SCHISTOCYTES BLD QL SMEAR: PRESENT
SODIUM BLD-SCNC: 140 MMOL/L (ref 136–145)
SODIUM SERPL-SCNC: 141 MMOL/L (ref 136–145)
SODIUM SERPL-SCNC: 143 MMOL/L (ref 136–145)
SP GR UR STRIP.AUTO: 1.02 (ref 1–1.03)
SPECIMEN SOURCE: ABNORMAL
TARGETS BLD QL SMEAR: PRESENT
TARGETS BLD QL SMEAR: PRESENT
TOTAL CELLS COUNTED SPEC: 100
TOTAL CELLS COUNTED SPEC: 100
TROPONIN I SERPL-MCNC: 0.64 NG/ML
TROPONIN I SERPL-MCNC: 2.65 NG/ML
UNIT DISPENSE STATUS: NORMAL
UNIT DISPENSE STATUS: NORMAL
UNIT PRODUCT CODE: NORMAL
UNIT PRODUCT CODE: NORMAL
UNIT RH: NORMAL
UNIT RH: NORMAL
UROBILINOGEN UR QL STRIP.AUTO: 0.2 E.U./DL
VARIANT LYMPHS # BLD AUTO: 5 %
VARIANT LYMPHS # BLD AUTO: 5 % (ref 0–0)
WBC # BLD AUTO: 3.38 THOUSAND/UL (ref 4.31–10.16)
WBC # BLD AUTO: 3.45 THOUSAND/UL (ref 4.31–10.16)
WBC # BLD AUTO: 4.12 THOUSAND/UL (ref 4.31–10.16)
WBC # BLD AUTO: 5.93 THOUSAND/UL (ref 4.31–10.16)
WBC #/AREA URNS AUTO: ABNORMAL /HPF
WBC NRBC COR # BLD: 3.38 THOUSAND/UL (ref 4.31–10.16)

## 2020-01-30 PROCEDURE — 83735 ASSAY OF MAGNESIUM: CPT | Performed by: NURSE PRACTITIONER

## 2020-01-30 PROCEDURE — 83735 ASSAY OF MAGNESIUM: CPT | Performed by: PHYSICIAN ASSISTANT

## 2020-01-30 PROCEDURE — C9113 INJ PANTOPRAZOLE SODIUM, VIA: HCPCS | Performed by: NURSE PRACTITIONER

## 2020-01-30 PROCEDURE — 71045 X-RAY EXAM CHEST 1 VIEW: CPT

## 2020-01-30 PROCEDURE — 43239 EGD BIOPSY SINGLE/MULTIPLE: CPT | Performed by: INTERNAL MEDICINE

## 2020-01-30 PROCEDURE — 88341 IMHCHEM/IMCYTCHM EA ADD ANTB: CPT | Performed by: PATHOLOGY

## 2020-01-30 PROCEDURE — 94760 N-INVAS EAR/PLS OXIMETRY 1: CPT

## 2020-01-30 PROCEDURE — 85027 COMPLETE CBC AUTOMATED: CPT | Performed by: NURSE PRACTITIONER

## 2020-01-30 PROCEDURE — 88313 SPECIAL STAINS GROUP 2: CPT | Performed by: PATHOLOGY

## 2020-01-30 PROCEDURE — 82947 ASSAY GLUCOSE BLOOD QUANT: CPT

## 2020-01-30 PROCEDURE — 86704 HEP B CORE ANTIBODY TOTAL: CPT | Performed by: PHYSICIAN ASSISTANT

## 2020-01-30 PROCEDURE — 85045 AUTOMATED RETICULOCYTE COUNT: CPT | Performed by: INTERNAL MEDICINE

## 2020-01-30 PROCEDURE — 82140 ASSAY OF AMMONIA: CPT | Performed by: NURSE PRACTITIONER

## 2020-01-30 PROCEDURE — 0DB78ZX EXCISION OF STOMACH, PYLORUS, VIA NATURAL OR ARTIFICIAL OPENING ENDOSCOPIC, DIAGNOSTIC: ICD-10-PCS | Performed by: INTERNAL MEDICINE

## 2020-01-30 PROCEDURE — 84132 ASSAY OF SERUM POTASSIUM: CPT

## 2020-01-30 PROCEDURE — 85007 BL SMEAR W/DIFF WBC COUNT: CPT | Performed by: INTERNAL MEDICINE

## 2020-01-30 PROCEDURE — 82330 ASSAY OF CALCIUM: CPT | Performed by: NURSE PRACTITIONER

## 2020-01-30 PROCEDURE — 86256 FLUORESCENT ANTIBODY TITER: CPT | Performed by: PHYSICIAN ASSISTANT

## 2020-01-30 PROCEDURE — 80053 COMPREHEN METABOLIC PANEL: CPT | Performed by: NURSE PRACTITIONER

## 2020-01-30 PROCEDURE — P9016 RBC LEUKOCYTES REDUCED: HCPCS

## 2020-01-30 PROCEDURE — 30233R1 TRANSFUSION OF NONAUTOLOGOUS PLATELETS INTO PERIPHERAL VEIN, PERCUTANEOUS APPROACH: ICD-10-PCS | Performed by: INTERNAL MEDICINE

## 2020-01-30 PROCEDURE — 70450 CT HEAD/BRAIN W/O DYE: CPT

## 2020-01-30 PROCEDURE — 86705 HEP B CORE ANTIBODY IGM: CPT | Performed by: PHYSICIAN ASSISTANT

## 2020-01-30 PROCEDURE — 86803 HEPATITIS C AB TEST: CPT | Performed by: PHYSICIAN ASSISTANT

## 2020-01-30 PROCEDURE — 85610 PROTHROMBIN TIME: CPT | Performed by: NURSE PRACTITIONER

## 2020-01-30 PROCEDURE — 84145 PROCALCITONIN (PCT): CPT | Performed by: NURSE PRACTITIONER

## 2020-01-30 PROCEDURE — 82803 BLOOD GASES ANY COMBINATION: CPT

## 2020-01-30 PROCEDURE — 80076 HEPATIC FUNCTION PANEL: CPT | Performed by: INTERNAL MEDICINE

## 2020-01-30 PROCEDURE — 99233 SBSQ HOSP IP/OBS HIGH 50: CPT | Performed by: INTERNAL MEDICINE

## 2020-01-30 PROCEDURE — 88305 TISSUE EXAM BY PATHOLOGIST: CPT | Performed by: PATHOLOGY

## 2020-01-30 PROCEDURE — 88342 IMHCHEM/IMCYTCHM 1ST ANTB: CPT | Performed by: PATHOLOGY

## 2020-01-30 PROCEDURE — 85610 PROTHROMBIN TIME: CPT | Performed by: PHYSICIAN ASSISTANT

## 2020-01-30 PROCEDURE — 80048 BASIC METABOLIC PNL TOTAL CA: CPT | Performed by: PHYSICIAN ASSISTANT

## 2020-01-30 PROCEDURE — 86235 NUCLEAR ANTIGEN ANTIBODY: CPT | Performed by: PHYSICIAN ASSISTANT

## 2020-01-30 PROCEDURE — 93005 ELECTROCARDIOGRAM TRACING: CPT

## 2020-01-30 PROCEDURE — 86038 ANTINUCLEAR ANTIBODIES: CPT | Performed by: PHYSICIAN ASSISTANT

## 2020-01-30 PROCEDURE — P9035 PLATELET PHERES LEUKOREDUCED: HCPCS

## 2020-01-30 PROCEDURE — 82330 ASSAY OF CALCIUM: CPT

## 2020-01-30 PROCEDURE — 85014 HEMATOCRIT: CPT

## 2020-01-30 PROCEDURE — 99232 SBSQ HOSP IP/OBS MODERATE 35: CPT | Performed by: PHYSICIAN ASSISTANT

## 2020-01-30 PROCEDURE — 99291 CRITICAL CARE FIRST HOUR: CPT | Performed by: NURSE PRACTITIONER

## 2020-01-30 PROCEDURE — 81001 URINALYSIS AUTO W/SCOPE: CPT | Performed by: NURSE PRACTITIONER

## 2020-01-30 PROCEDURE — NC001 PR NO CHARGE: Performed by: NURSE PRACTITIONER

## 2020-01-30 PROCEDURE — 82105 ALPHA-FETOPROTEIN SERUM: CPT | Performed by: PHYSICIAN ASSISTANT

## 2020-01-30 PROCEDURE — 87040 BLOOD CULTURE FOR BACTERIA: CPT | Performed by: NURSE PRACTITIONER

## 2020-01-30 PROCEDURE — 0DB68ZX EXCISION OF STOMACH, VIA NATURAL OR ARTIFICIAL OPENING ENDOSCOPIC, DIAGNOSTIC: ICD-10-PCS | Performed by: INTERNAL MEDICINE

## 2020-01-30 PROCEDURE — 84484 ASSAY OF TROPONIN QUANT: CPT | Performed by: NURSE PRACTITIONER

## 2020-01-30 PROCEDURE — 87340 HEPATITIS B SURFACE AG IA: CPT | Performed by: PHYSICIAN ASSISTANT

## 2020-01-30 PROCEDURE — 84295 ASSAY OF SERUM SODIUM: CPT

## 2020-01-30 PROCEDURE — 84100 ASSAY OF PHOSPHORUS: CPT | Performed by: NURSE PRACTITIONER

## 2020-01-30 PROCEDURE — 85027 COMPLETE CBC AUTOMATED: CPT | Performed by: INTERNAL MEDICINE

## 2020-01-30 PROCEDURE — 82948 REAGENT STRIP/BLOOD GLUCOSE: CPT

## 2020-01-30 PROCEDURE — 83605 ASSAY OF LACTIC ACID: CPT | Performed by: NURSE PRACTITIONER

## 2020-01-30 PROCEDURE — 94660 CPAP INITIATION&MGMT: CPT

## 2020-01-30 RX ORDER — GLYCOPYRROLATE 0.2 MG/ML
INJECTION INTRAMUSCULAR; INTRAVENOUS AS NEEDED
Status: DISCONTINUED | OUTPATIENT
Start: 2020-01-30 | End: 2020-01-30 | Stop reason: SURG

## 2020-01-30 RX ORDER — GABAPENTIN 100 MG/1
100 CAPSULE ORAL 3 TIMES DAILY
Status: DISCONTINUED | OUTPATIENT
Start: 2020-01-30 | End: 2020-01-30

## 2020-01-30 RX ORDER — LORAZEPAM 2 MG/ML
0.2 INJECTION INTRAMUSCULAR ONCE AS NEEDED
Status: COMPLETED | OUTPATIENT
Start: 2020-01-30 | End: 2020-01-30

## 2020-01-30 RX ORDER — FUROSEMIDE 10 MG/ML
20 INJECTION INTRAMUSCULAR; INTRAVENOUS ONCE
Status: COMPLETED | OUTPATIENT
Start: 2020-01-30 | End: 2020-01-30

## 2020-01-30 RX ORDER — CALCIUM GLUCONATE 20 MG/ML
2 INJECTION, SOLUTION INTRAVENOUS ONCE
Status: COMPLETED | OUTPATIENT
Start: 2020-01-30 | End: 2020-01-30

## 2020-01-30 RX ORDER — PROPOFOL 10 MG/ML
INJECTION, EMULSION INTRAVENOUS AS NEEDED
Status: DISCONTINUED | OUTPATIENT
Start: 2020-01-30 | End: 2020-01-30 | Stop reason: SURG

## 2020-01-30 RX ORDER — SODIUM CHLORIDE 9 MG/ML
INJECTION, SOLUTION INTRAVENOUS CONTINUOUS PRN
Status: DISCONTINUED | OUTPATIENT
Start: 2020-01-30 | End: 2020-01-30 | Stop reason: SURG

## 2020-01-30 RX ORDER — KETAMINE HCL IN NACL, ISO-OSM 100MG/10ML
SYRINGE (ML) INJECTION AS NEEDED
Status: DISCONTINUED | OUTPATIENT
Start: 2020-01-30 | End: 2020-01-30 | Stop reason: SURG

## 2020-01-30 RX ORDER — PANTOPRAZOLE SODIUM 40 MG/1
40 INJECTION, POWDER, FOR SOLUTION INTRAVENOUS EVERY 12 HOURS SCHEDULED
Status: DISCONTINUED | OUTPATIENT
Start: 2020-01-30 | End: 2020-01-31

## 2020-01-30 RX ORDER — MAGNESIUM SULFATE HEPTAHYDRATE 40 MG/ML
2 INJECTION, SOLUTION INTRAVENOUS ONCE
Status: COMPLETED | OUTPATIENT
Start: 2020-01-30 | End: 2020-01-30

## 2020-01-30 RX ORDER — VANCOMYCIN HYDROCHLORIDE 1 G/200ML
12.5 INJECTION, SOLUTION INTRAVENOUS EVERY 24 HOURS
Status: DISCONTINUED | OUTPATIENT
Start: 2020-01-30 | End: 2020-01-31

## 2020-01-30 RX ORDER — METOPROLOL TARTRATE 5 MG/5ML
5 INJECTION INTRAVENOUS EVERY 6 HOURS
Status: DISCONTINUED | OUTPATIENT
Start: 2020-01-30 | End: 2020-01-31

## 2020-01-30 RX ORDER — CHLORHEXIDINE GLUCONATE 0.12 MG/ML
15 RINSE ORAL EVERY 12 HOURS SCHEDULED
Status: DISCONTINUED | OUTPATIENT
Start: 2020-01-30 | End: 2020-01-31

## 2020-01-30 RX ADMIN — PROPOFOL 50 MG: 10 INJECTION, EMULSION INTRAVENOUS at 13:39

## 2020-01-30 RX ADMIN — PROPOFOL 50 MG: 10 INJECTION, EMULSION INTRAVENOUS at 13:46

## 2020-01-30 RX ADMIN — PANTOPRAZOLE SODIUM 40 MG: 40 INJECTION, POWDER, FOR SOLUTION INTRAVENOUS at 20:55

## 2020-01-30 RX ADMIN — Medication 5 MG: at 13:57

## 2020-01-30 RX ADMIN — FUROSEMIDE 20 MG: 10 INJECTION, SOLUTION INTRAMUSCULAR; INTRAVENOUS at 20:02

## 2020-01-30 RX ADMIN — MAGNESIUM SULFATE HEPTAHYDRATE 2 G: 40 INJECTION, SOLUTION INTRAVENOUS at 20:09

## 2020-01-30 RX ADMIN — Medication: at 13:39

## 2020-01-30 RX ADMIN — VANCOMYCIN HYDROCHLORIDE 1000 MG: 1 INJECTION, SOLUTION INTRAVENOUS at 23:15

## 2020-01-30 RX ADMIN — CHLORHEXIDINE GLUCONATE 0.12% ORAL RINSE 15 ML: 1.2 LIQUID ORAL at 20:55

## 2020-01-30 RX ADMIN — RIFAXIMIN 550 MG: 550 TABLET ORAL at 15:32

## 2020-01-30 RX ADMIN — METRONIDAZOLE 500 MG: 500 INJECTION, SOLUTION INTRAVENOUS at 23:15

## 2020-01-30 RX ADMIN — PROPOFOL 50 MG: 10 INJECTION, EMULSION INTRAVENOUS at 13:58

## 2020-01-30 RX ADMIN — Medication 15 MG: at 13:58

## 2020-01-30 RX ADMIN — PANTOPRAZOLE SODIUM 40 MG: 40 TABLET, DELAYED RELEASE ORAL at 15:32

## 2020-01-30 RX ADMIN — GABAPENTIN 100 MG: 100 CAPSULE ORAL at 15:32

## 2020-01-30 RX ADMIN — GLYCOPYRROLATE 0.1 MG: 0.2 INJECTION, SOLUTION INTRAMUSCULAR; INTRAVENOUS at 13:39

## 2020-01-30 RX ADMIN — METOPROLOL TARTRATE 5 MG: 1 INJECTION, SOLUTION INTRAVENOUS at 23:54

## 2020-01-30 RX ADMIN — FOLIC ACID 1 MG: 1 TABLET ORAL at 15:32

## 2020-01-30 RX ADMIN — CALCIUM GLUCONATE 2 G: 20 INJECTION, SOLUTION INTRAVENOUS at 20:02

## 2020-01-30 RX ADMIN — METOPROLOL SUCCINATE 25 MG: 25 TABLET, FILM COATED, EXTENDED RELEASE ORAL at 15:33

## 2020-01-30 RX ADMIN — THIAMINE HYDROCHLORIDE 500 MG: 100 INJECTION, SOLUTION INTRAMUSCULAR; INTRAVENOUS at 15:39

## 2020-01-30 RX ADMIN — LACTULOSE 20 G: 10 SOLUTION ORAL at 15:32

## 2020-01-30 RX ADMIN — PROPOFOL 50 MG: 10 INJECTION, EMULSION INTRAVENOUS at 13:53

## 2020-01-30 RX ADMIN — LORAZEPAM 0.2 MG: 2 INJECTION INTRAMUSCULAR; INTRAVENOUS at 02:20

## 2020-01-30 NOTE — ASSESSMENT & PLAN NOTE
I think this is most likely related to her chronic alcoholism with bone marrow suppression and hypersplenism/cirrhosis  Hematology/oncology and GI input appreciated  Obtained consent again today via telephone from her son  As per son, patient had signed the consent before as she has already received blood transfusions on admission  GI following and would order blood products as needed    Patient has received a total of 2u PRBC and 3u PLT

## 2020-01-30 NOTE — SOCIAL WORK
CM name and role reviewed  Discharge Checklist reviewed and CM will continue to monitor for progress toward discharge goals in nursing and provider rounds  CM met with pt's family at bedside  RN present to provide meds and then left after providing meds  Pt's sister Eloy, son Feli Barakat and his SO present  Pt lives with Eloy  Pt is ADL independent  No dme needed  Pt does not have any vna at this time but family may consider it if recommended  Pt has no hx of SNF  Eloy stated that pt plans to come home  Pt uses [facility] in [facility]  Pt has prescription coverage  No reported issues with copayments  Eloy reported that pt was taking her meds on her own but then they started disappearing last year so Eloy will leave meds out in the morning for pt to take  Pt does not have any dx, hx or tx of Mh  Pt smokes less than half a pack of cigarettes daily  Eloy reported that she has not drank alcohol in 6 months  However, she did mention that pt had a few beers recently  Eloy stated that pt will not be drinking any longer  She stated that pt does not want to leave the house sometimes  She said that pt had some difficulty making it to her appointments  Eloy said that she provides transportation  She stated that pt follows up with Dr Lianna Sandoval  CM reviewed discharge planning process including the following: identifying help at home, patient preference for discharge planning needs, pharmacy preference, and availability of treatment team to discuss questions or concerns patient and/or family may have regarding understanding medications and recognizing signs and symptoms once discharged  CM also encouraged patient to follow up with all recommended appointments after discharge  Patient advised of importance for patient and family to participate in managing patients medical well being

## 2020-01-30 NOTE — ASSESSMENT & PLAN NOTE
Continue lactulose/rifaximin  Avoid further sedating medications  Will hold gabapentin, appreciate neurology recommendations

## 2020-01-30 NOTE — PROGRESS NOTES
Progress Note - Neurology   Franklin Khalil 69 y o  female MRN: [id number]  Unit/Bed#: -01 Encounter: [id number]    Assessment:  Suspected delirium superimposed on a baseline dementia process with chronic alcohol use playing a role, somewhat atypical presentation for Wernicke's however started on thiamine, this does not appear to be encephalopathic process or subclinical seizures  Suspect acute medical problem also contributing to her change in mental status as well  Pancytopenia - GI and hematology following  Nicotine dependence  Hypertension  Anemia    Plan:  -  continue supportive care continue to monitor for infectious metabolic derangements, GI and hematology following as well as Internal Medicine in regards her pancytopenia/anemia  -  conservative management of delirium - minimize noise, maintain day/night cycle, provide frequent redirection, safe supportive environment, fall precautions  -  continue Thiamine supplementation  -  will review with attending need for imaging of the brain  -  neurological checks notify neurology with changes  Subjective:   Patient is a 71-year-old female, admitted to the hospital for anemia, she went to her family doctor secondary to complaints of increasing memory loss over the last several months, as well as follow-up with her hypertension and anemia  Per records her PCP apparently ordered laboratory tests, she was noted to be anemic, she came to the ED and received transfusions  She was seen in consultation by GI and heme/oncology  She is being followed by IM/GI/oncology - for pancytopenia, she has a hx of etoh abuse - it is reported she is not having DTs  Neurology was asked to see the patient in regards to her memory loss - it was thought that she was likely having delirium superimposed on a baseline dementia process with chronic alcohol use playing a role, no evidence to suspect an encephalitic process or evidence of subclinical seizures    No a typical presentation for wernicke's but given thiamine, she was no lateralizing on examination  No neurological imaging was completed  The patient was placed on thiamine  Vitals: Blood pressure 131/67, pulse 98, temperature 98 6 °F (37 °C), temperature source Axillary, resp  rate 20, height 5' 4" (1 626 m), weight 85 9 kg (189 lb 6 oz), SpO2 90 %  ,Body mass index is 32 51 kg/m²  Current Facility-Administered Medications:  acetaminophen 650 mg Oral Q6H PRN Peggy Cheadle, PA-MOY   bisacodyl 10 mg Rectal Daily PRN Peggy Cheadle, PA-MOY   calcium carbonate 1,000 mg Oral Daily PRN Peggy Cheadle, PA-MOY   folic acid 1 mg Oral Daily Peggy Cheadle, PA-MOY   haloperidol lactate 1 mg Intravenous Q6H PRN Skip Conklin MD   lactulose 20 g Oral BID Cheri Catherine PA-C   metoprolol succinate 25 mg Oral Daily Peggy Cheadle, FRANCISCA   nicotine 1 patch Transdermal Daily Peggy Cheadle, FRANCISCA   ondansetron 4 mg Intravenous Q6H PRN Peggy Cheadle, FRANCISCA   pantoprazole 40 mg Oral BID AC Cheri Catherine PA-C   rifaximin 550 mg Oral Q12H [facility] Cheri Catherine PA-C   thiamine 500 mg Intravenous Daily MEREDITH Herr   Followed by       Jodi Aguilera ON 2/2/2020] thiamine 250 mg Intravenous Daily MEREDITH Herr   [START ON 2/2/2020] thiamine 100 mg Oral Daily MEREDITH Herr     Physical Exam:     Vital signs reviewed  Constitutional - in NAD, no meningismus  HEENT - NC/AT  Cardiac - rate regular, tachycardic  Lungs - Clear to auscultation bilaterally, no wheezing noted  Abdomen - Soft and non tender, non distended    Neurological    Mental status - She reports she would like to go home, to see her mom in [place], she was able to tell me her name, perseverates at times, she does follow one step commands,  She has more difficulty with more complex commands - unable to perform showing two fingers     She is oriented to person, not place, or year, poor insight / poor historian, she cannot tell me current events at this time      Cranial nerves 2 through 12 are intact - resists to eye lid opening, appears dysconjugate - she looking upward reporting pain - she did track side to side, blink to threat intact, no facial droop, no slurring  Motor -  4/5 on the left side at hand , 5/5 on the left  Nl tone and bulk, no involuntary movement noted, 4/5 hand  5/5 on the right  She is in restraints  In lowers increased tones in the lowers  No tremor noted or observed  Sensation - nonfocal to touch reported    Reflexes are equal - 2 in uppers bilaterally and lowers    Negative Hoffmans    Toes are downgoing bilaterally    No evidence of clonus or myoclonus or tremor observed    No evidence of seizure activity, observed  Lab, Imaging and other studies:   I have personally reviewed pertinent reports    , CBC:   Results from last 7 days   Lab Units 01/30/20  0846 01/29/20  0849 01/28/20  2359   WBC Thousand/uL 3 45* 3 22* 3 45*   RBC Million/uL 2 39* 2 50* 1 83*   HEMOGLOBIN g/dL 6 8* 7 2* 5 2*   HEMATOCRIT % 21 0* 22 2* 16 9*   MCV fL 88 89 92   PLATELETS Thousands/uL 34* 32* 43*   , BMP/CMP:   Results from last 7 days   Lab Units 01/30/20  0846 01/28/20  2359 01/28/20  1413   SODIUM mmol/L 143 141 141   POTASSIUM mmol/L 4 2 4 1 4 1   CHLORIDE mmol/L 108 104 110*   CO2 mmol/L 24 26 23   BUN mg/dL 10 9 7   CREATININE mg/dL 1 04 0 96 0 77   CALCIUM mg/dL 9 0 9 2 9 5   AST U/L 40 41 38   ALT U/L 17 19 20   ALK PHOS U/L 211* 243* 238*   EGFR ml/min/1 73sq m 62 68 89   , Vitamin B12:   Results from last 7 days   Lab Units 01/28/20  2359   VITAMIN B 12 pg/mL 1,169*   , HgBA1C:   , TSH:   Results from last 7 days   Lab Units 01/28/20  1413   TSH 3RD GENERATON uIU/mL 0 837   , Coagulation:   Results from last 7 days   Lab Units 01/30/20  0847   INR  1 33*   , Lipid Profile:   Results from last 7 days   Lab Units 01/28/20  1413   HDL mg/dL 27*   LDL CALC mg/dL 66   TRIGLYCERIDES mg/dL 50   , Ammonia:   Results from last 7 days   Lab Units 01/29/20  0904   AMMONIA umol/L 59*   , Urinalysis:       Invalid input(s): URIBILINOGEN, Drug Screen:   , Medication Drug Levels:       Invalid input(s): CARBAMAZEPINE,  PHENOBARB, LACOSAMIDE, OXCARBAZEPINE     Per radiology interpretation -    "  Procedure: Ct Abdomen Pelvis W Contrast    Result Date: 1/29/2020  Narrative: CT ABDOMEN AND PELVIS WITH IV CONTRAST INDICATION:   possible GI bleed, anemia  COMPARISON:  None  TECHNIQUE:  CT examination of the abdomen and pelvis was performed  Axial, sagittal, and coronal 2D reformatted images were created from the source data and submitted for interpretation  Radiation dose length product (DLP) for this visit:  613 mGy-cm   This examination, like all CT scans performed in the [facility], was performed utilizing techniques to minimize radiation dose exposure, including the use of iterative reconstruction and automated exposure control  IV Contrast:  100 mL of iohexol (OMNIPAQUE) Enteric Contrast:  Enteric contrast was not administered  FINDINGS: ABDOMEN LOWER CHEST: Small area of compression atelectasis in the right lower lobe  Cardiomegaly is noted  LIVER/BILIARY TREE:  The liver demonstrates cirrhotic morphology  Within the limitations of this examination there is no evidence of suspicious hepatic mass  No biliary dilatation  Portal vein is patent  GALLBLADDER:  Gallstones are seen within the gallbladder; there is pericholecystic fluid raising the possibility of acute cholecystitis  SPLEEN:  Simple cysts are seen within the spleen  Jamie Guymon PANCREAS:  Unremarkable  ADRENAL GLANDS:  Unremarkable  KIDNEYS/URETERS:  One or more sharply circumscribed subcentimeter renal hypodensities are noted  These lesions are too small to accurately characterize, but are statistically most likely to represent benign cortical renal cyst(s)    According to the guidelines published in the [facility] Paper of the ACR Incidental Findings Committee (Radiology 2010), no further workup of these lesions is recommended  STOMACH AND BOWEL:  There is colonic diverticulosis without evidence of acute diverticulitis  APPENDIX:  No findings to suggest appendicitis  ABDOMINOPELVIC CAVITY:  Trace ascites  No free intraperitoneal air  No lymphadenopathy  VESSELS:  Ectatic aorta  PELVIS REPRODUCTIVE ORGANS:  Myomatous uterus  URINARY BLADDER:  Unremarkable  ABDOMINAL WALL/INGUINAL REGIONS:  Unremarkable  OSSEOUS STRUCTURES:  No acute fracture or destructive osseous lesion  Impression: Gallstones are seen within the gallbladder; there is pericholecystic fluid raising the possibility of acute cholecystitis  Cirrhotic liver with right subphrenic ascites  Small area of compression atelectasis in the right lower lobe  Cardiomegaly is noted Workstation performed: [id number]     "    Counseling / Coordination of Care  Total time spent today 20 minutes  Greater than 50% of total time was spent with the patient and / or family counseling and / or coordination of care  A description of the counseling / coordination of care: floor time, reviewing records, physical examination  Reviewed with SLIM plan of care, also reviewed medications that where prescribed - reviewed medications

## 2020-01-30 NOTE — PLAN OF CARE
Problem: Potential for Falls  Goal: Patient will remain free of falls  Description  INTERVENTIONS:  - Assess patient frequently for physical needs  -  Identify cognitive and physical deficits and behaviors that affect risk of falls    -  [place] fall precautions as indicated by assessment   - Educate patient/family on patient safety including physical limitations  - Instruct patient to call for assistance with activity based on assessment  - Modify environment to reduce risk of injury  - Consider OT/PT consult to assist with strengthening/mobility  Outcome: Progressing     Problem: PAIN - ADULT  Goal: Verbalizes/displays adequate comfort level or baseline comfort level  Description  Interventions:  - Encourage patient to monitor pain and request assistance  - Assess pain using appropriate pain scale  - Administer analgesics based on type and severity of pain and evaluate response  - Implement non-pharmacological measures as appropriate and evaluate response  - Consider cultural and social influences on pain and pain management  - Notify physician/advanced practitioner if interventions unsuccessful or patient reports new pain  Outcome: Progressing     Problem: INFECTION - ADULT  Goal: Absence or prevention of progression during hospitalization  Description  INTERVENTIONS:  - Assess and monitor for signs and symptoms of infection  - Monitor lab/diagnostic results  - Monitor all insertion sites, i e  indwelling lines, tubes, and drains  - Monitor endotracheal if appropriate and nasal secretions for changes in amount and color  - [place] appropriate cooling/warming therapies per order  - Administer medications as ordered  - Instruct and encourage patient and family to use good hand hygiene technique  - Identify and instruct in appropriate isolation precautions for identified infection/condition  Outcome: Progressing  Goal: Absence of fever/infection during neutropenic period  Description  INTERVENTIONS:  - Monitor WBC    Outcome: Progressing     Problem: SAFETY ADULT  Goal: Maintain or return to baseline ADL function  Description  INTERVENTIONS:  -  Assess patient's ability to carry out ADLs; assess patient's baseline for ADL function and identify physical deficits which impact ability to perform ADLs (bathing, care of mouth/teeth, toileting, grooming, dressing, etc )  - Assess/evaluate cause of self-care deficits   - Assess range of motion  - Assess patient's mobility; develop plan if impaired  - Assess patient's need for assistive devices and provide as appropriate  - Encourage maximum independence but intervene and supervise when necessary  - Involve family in performance of ADLs  - Assess for home care needs following discharge   - Consider OT consult to assist with ADL evaluation and planning for discharge  - Provide patient education as appropriate  Outcome: Progressing  Goal: Maintain or return mobility status to optimal level  Description  INTERVENTIONS:  - Assess patient's baseline mobility status (ambulation, transfers, stairs, etc )    - Identify cognitive and physical deficits and behaviors that affect mobility  - Identify mobility aids required to assist with transfers and/or ambulation (gait belt, sit-to-stand, lift, walker, cane, etc )  - [place] fall precautions as indicated by assessment  - Record patient progress and toleration of activity level on Mobility SBAR; progress patient to next Phase/Stage  - Instruct patient to call for assistance with activity based on assessment  - Consider rehabilitation consult to assist with strengthening/weightbearing, etc   Outcome: Progressing     Problem: DISCHARGE PLANNING  Goal: Discharge to home or other facility with appropriate resources  Description  INTERVENTIONS:  - Identify barriers to discharge w/patient and caregiver  - Arrange for needed discharge resources and transportation as appropriate  - Identify discharge learning needs (meds, wound care, etc )  - Arrange for interpretive services to assist at discharge as needed  - Refer to Case Management Department for coordinating discharge planning if the patient needs post-hospital services based on physician/advanced practitioner order or complex needs related to functional status, cognitive ability, or social support system  Outcome: Progressing     Problem: Knowledge Deficit  Goal: Patient/family/caregiver demonstrates understanding of disease process, treatment plan, medications, and discharge instructions  Description  Complete learning assessment and assess knowledge base  Interventions:  - Provide teaching at level of understanding  - Provide teaching via preferred learning methods  Outcome: Progressing     Problem: SAFETY,RESTRAINT: NV/NON-SELF DESTRUCTIVE BEHAVIOR  Goal: Remains free of harm/injury (restraint for non violent/non self-detsructive behavior)  Description  INTERVENTIONS:  - Instruct patient/family regarding restraint use   - Assess and monitor physiologic and psychological status   - Provide interventions and comfort measures to meet assessed patient needs   - Identify and implement measures to help patient regain control  - Assess readiness for release of restraint   Outcome: Progressing  Goal: Returns to optimal restraint-free functioning  Description  INTERVENTIONS:  - Assess the patient's behavior and symptoms that indicate continued need for restraint  - Identify and implement measures to help patient regain control  - Assess readiness for release of restraint   Outcome: Progressing     Problem: Nutrition/Hydration-ADULT  Goal: Nutrient/Hydration intake appropriate for improving, restoring or maintaining nutritional needs  Description  Monitor and assess patient's nutrition/hydration status for malnutrition  Collaborate with interdisciplinary team and initiate plan and interventions as ordered  Monitor patient's weight and dietary intake as ordered or per policy   Utilize nutrition screening tool and intervene as necessary  Determine patient's food preferences and provide high-protein, high-caloric foods as appropriate       INTERVENTIONS:  - Monitor oral intake, urinary output, labs, and treatment plans  - Assess nutrition and hydration status and recommend course of action  - Evaluate amount of meals eaten  - Assist patient with eating if necessary   - Allow adequate time for meals  - Recommend/ encourage appropriate diets, oral nutritional supplements, and vitamin/mineral supplements  - Order, calculate, and assess calorie counts as needed  - Recommend, monitor, and adjust tube feedings and TPN/PPN based on assessed needs  - Assess need for intravenous fluids  - Provide specific nutrition/hydration education as appropriate  - Include patient/family/caregiver in decisions related to nutrition  Outcome: Progressing

## 2020-01-30 NOTE — ASSESSMENT & PLAN NOTE
It does not appear that patient is in DTs at all  She has been having these symptoms for many months now  Neurology input appreciated  Tried Haldol last night which did not help much  I believe she got some Ativan for agitation  She has somewhat prolonged QT

## 2020-01-30 NOTE — ASSESSMENT & PLAN NOTE
EGD without evidence of bleeding  Hemolysis workup with elevated LDH and decreased haptoglobin, hemolysis smear demonstrates   Patient has received 2u PRBC with effect  Appreciate hematology recommendations

## 2020-01-30 NOTE — PROGRESS NOTES
Pt became very agitated during blood transfusion  Redirected several times by staff  Kept a visual eye on pt frequently  Will continue to monitor

## 2020-01-30 NOTE — PLAN OF CARE
Problem: Potential for Falls  Goal: Patient will remain free of falls  Description  INTERVENTIONS:  - Assess patient frequently for physical needs  -  Identify cognitive and physical deficits and behaviors that affect risk of falls    -  [place] fall precautions as indicated by assessment   - Educate patient/family on patient safety including physical limitations  - Instruct patient to call for assistance with activity based on assessment  - Modify environment to reduce risk of injury  - Consider OT/PT consult to assist with strengthening/mobility  Outcome: Progressing     Problem: PAIN - ADULT  Goal: Verbalizes/displays adequate comfort level or baseline comfort level  Description  Interventions:  - Encourage patient to monitor pain and request assistance  - Assess pain using appropriate pain scale  - Administer analgesics based on type and severity of pain and evaluate response  - Implement non-pharmacological measures as appropriate and evaluate response  - Consider cultural and social influences on pain and pain management  - Notify physician/advanced practitioner if interventions unsuccessful or patient reports new pain  Outcome: Progressing     Problem: INFECTION - ADULT  Goal: Absence or prevention of progression during hospitalization  Description  INTERVENTIONS:  - Assess and monitor for signs and symptoms of infection  - Monitor lab/diagnostic results  - Monitor all insertion sites, i e  indwelling lines, tubes, and drains  - Monitor endotracheal if appropriate and nasal secretions for changes in amount and color  - [place] appropriate cooling/warming therapies per order  - Administer medications as ordered  - Instruct and encourage patient and family to use good hand hygiene technique  - Identify and instruct in appropriate isolation precautions for identified infection/condition  Outcome: Progressing  Goal: Absence of fever/infection during neutropenic period  Description  INTERVENTIONS:  - Monitor WBC    Outcome: Progressing     Problem: SAFETY ADULT  Goal: Maintain or return to baseline ADL function  Description  INTERVENTIONS:  -  Assess patient's ability to carry out ADLs; assess patient's baseline for ADL function and identify physical deficits which impact ability to perform ADLs (bathing, care of mouth/teeth, toileting, grooming, dressing, etc )  - Assess/evaluate cause of self-care deficits   - Assess range of motion  - Assess patient's mobility; develop plan if impaired  - Assess patient's need for assistive devices and provide as appropriate  - Encourage maximum independence but intervene and supervise when necessary  - Involve family in performance of ADLs  - Assess for home care needs following discharge   - Consider OT consult to assist with ADL evaluation and planning for discharge  - Provide patient education as appropriate  Outcome: Progressing  Goal: Maintain or return mobility status to optimal level  Description  INTERVENTIONS:  - Assess patient's baseline mobility status (ambulation, transfers, stairs, etc )    - Identify cognitive and physical deficits and behaviors that affect mobility  - Identify mobility aids required to assist with transfers and/or ambulation (gait belt, sit-to-stand, lift, walker, cane, etc )  - [place] fall precautions as indicated by assessment  - Record patient progress and toleration of activity level on Mobility SBAR; progress patient to next Phase/Stage  - Instruct patient to call for assistance with activity based on assessment  - Consider rehabilitation consult to assist with strengthening/weightbearing, etc   Outcome: Progressing     Problem: DISCHARGE PLANNING  Goal: Discharge to home or other facility with appropriate resources  Description  INTERVENTIONS:  - Identify barriers to discharge w/patient and caregiver  - Arrange for needed discharge resources and transportation as appropriate  - Identify discharge learning needs (meds, wound care, etc )  - Arrange for interpretive services to assist at discharge as needed  - Refer to Case Management Department for coordinating discharge planning if the patient needs post-hospital services based on physician/advanced practitioner order or complex needs related to functional status, cognitive ability, or social support system  Outcome: Progressing     Problem: Knowledge Deficit  Goal: Patient/family/caregiver demonstrates understanding of disease process, treatment plan, medications, and discharge instructions  Description  Complete learning assessment and assess knowledge base  Interventions:  - Provide teaching at level of understanding  - Provide teaching via preferred learning methods  Outcome: Progressing     Problem: SAFETY,RESTRAINT: NV/NON-SELF DESTRUCTIVE BEHAVIOR  Goal: Remains free of harm/injury (restraint for non violent/non self-detsructive behavior)  Description  INTERVENTIONS:  - Instruct patient/family regarding restraint use   - Assess and monitor physiologic and psychological status   - Provide interventions and comfort measures to meet assessed patient needs   - Identify and implement measures to help patient regain control  - Assess readiness for release of restraint   Outcome: Progressing  Goal: Returns to optimal restraint-free functioning  Description  INTERVENTIONS:  - Assess the patient's behavior and symptoms that indicate continued need for restraint  - Identify and implement measures to help patient regain control  - Assess readiness for release of restraint   Outcome: Progressing     Problem: Nutrition/Hydration-ADULT  Goal: Nutrient/Hydration intake appropriate for improving, restoring or maintaining nutritional needs  Description  Monitor and assess patient's nutrition/hydration status for malnutrition  Collaborate with interdisciplinary team and initiate plan and interventions as ordered  Monitor patient's weight and dietary intake as ordered or per policy   Utilize nutrition screening tool and intervene as necessary  Determine patient's food preferences and provide high-protein, high-caloric foods as appropriate       INTERVENTIONS:  - Monitor oral intake, urinary output, labs, and treatment plans  - Assess nutrition and hydration status and recommend course of action  - Evaluate amount of meals eaten  - Assist patient with eating if necessary   - Allow adequate time for meals  - Recommend/ encourage appropriate diets, oral nutritional supplements, and vitamin/mineral supplements  - Order, calculate, and assess calorie counts as needed  - Recommend, monitor, and adjust tube feedings and TPN/PPN based on assessed needs  - Assess need for intravenous fluids  - Provide specific nutrition/hydration education as appropriate  - Include patient/family/caregiver in decisions related to nutrition  Outcome: Progressing

## 2020-01-30 NOTE — PROGRESS NOTES
[facility] Internal Medicine Progress Note  Patient: Suzy Barrera 69 y o  female   MRN: [id number]  PCP: Tae Horton MD  Unit/Bed#: -01 Encounter: [id number]  Date Of Visit: 01/30/20          * Pancytopenia [facility])  Assessment & Plan  I think this is most likely related to her chronic alcoholism with bone marrow suppression and hypersplenism/cirrhosis  Hematology/oncology and GI input appreciated  Obtained consent again today via telephone from her son  As per son, patient had signed the consent before as she has already received blood transfusions on admission  GI following and would order blood products as needed  Toxic metabolic encephalopathy  Assessment & Plan  Slightly elevated ammonia  On lactulose and Xifaxan  Think she has underlying significant dementia and alcohol playing a role in it  Would need further workup on outpatient basis  History of alcohol abuse  Assessment & Plan  It does not appear that patient is in DTs at all  She has been having these symptoms for many months now  Neurology input appreciated  Tried Haldol last night which did not help much  I believe she got some Ativan for agitation  She has somewhat prolonged QT  Memory difficulties  Assessment & Plan  Patient does have family history of dementia  I think alcohol is playing a major role in her encephalopathy/dementia  Cigarette nicotine dependence without complication  Assessment & Plan  Encouraged to quit    Continue with nicotine patch    Benign essential hypertension  Assessment & Plan  Blood pressure stable    Symptomatic anemia  Assessment & Plan  Plan as above      Present on Admission:   Pancytopenia (HCC)   Symptomatic anemia   Benign essential hypertension   Cigarette nicotine dependence without complication   History of alcohol abuse   Memory difficulties   Toxic metabolic encephalopathy            VTE Pharmacologic Prophylaxis:   Pharmacologic: Pharmacologic VTE Prophylaxis contraindicated due to Severe anemia/thrombocytopenia  Mechanical VTE Prophylaxis in Place: Yes    Patient Centered Rounds: I have performed bedside rounds with nursing staff today  Discussions with Specialists or Other Care Team Provider:  Yes    Education and Discussions with Family / Patient:  Yes    Time Spent for Care: 40+ minutes  More than 50% of total time spent on counseling and coordination of care as described above  Current Length of Stay: 1 day(s)    Current Patient Status: Inpatient   Certification Statement: The patient will continue to require additional inpatient hospital stay due to Pancytopenia/encephalopathy    Discharge Plan: To be determined    Code Status: Level 1 - Full Code      Subjective:   Patient has been agitated off and on  Yesterday in the afternoon/early evening, without any medications, she was just sleepy  Required restrains  She answered some of the questions appropriately and others she did not  She wanted to be out of the restraints  Objective:     Vitals:   Temp (24hrs), Av 6 °F (37 °C), Min:98 6 °F (37 °C), Max:98 6 °F (37 °C)    Temp:  [98 6 °F (37 °C)] 98 6 °F (37 °C)  HR:  [] 98  Resp:  [20] 20  BP: (131-154)/(67-68) 131/67  SpO2:  [90 %-98 %] 90 %  Body mass index is 32 51 kg/m²  Input and Output Summary (last 24 hours): Intake/Output Summary (Last 24 hours) at 2020 1016  Last data filed at 2020 0900  Gross per 24 hour   Intake 100 ml   Output 700 ml   Net -600 ml           Physical Exam:     Vital signs are reviewed as above  Lying in bed and she was resting  We pulled her up and she was asking to take her restraints off  Open her eyes briefly and then kept them closed  Has pale conjunctiva  S1 and S2 audible  Poor air entry on auscultation as she really did not follow commands  Abdomen is soft    Bowel sounds are audible  No cyanosis or clubbing  Has chronic lower extremities edema  Has been agitated at times  Other examination is limited because of her current condition    Additional Data:     Labs:    Results from last 7 days   Lab Units 01/30/20  0846   WBC Thousand/uL 3 45*   HEMOGLOBIN g/dL 6 8*   HEMATOCRIT % 21 0*   PLATELETS Thousands/uL 34*   LYMPHO PCT % 41   MONO PCT % 7   EOS PCT % 4     Results from last 7 days   Lab Units 01/30/20  0846   POTASSIUM mmol/L 4 2   CHLORIDE mmol/L 108   CO2 mmol/L 24   BUN mg/dL 10   CREATININE mg/dL 1 04   CALCIUM mg/dL 9 0   ALK PHOS U/L 211*   ALT U/L 17   AST U/L 40     Results from last 7 days   Lab Units 01/30/20  0847   INR  1 33*       * I Have Reviewed All Lab Data Listed Above  * Additional Pertinent Lab Tests Reviewed: All Labs Within Last 24 Hours Reviewed            Last 24 Hours Medication List:     Current Facility-Administered Medications:  acetaminophen 650 mg Oral Q6H PRN Guillermina Kussmaul, PA-MOY   bisacodyl 10 mg Rectal Daily PRN Guillermina Kussmaul, PA-MOY   calcium carbonate 1,000 mg Oral Daily PRN Tati Kussmaul, PA-C   folic acid 1 mg Oral Daily Guillermina Kussmaul, FRANCISCA   haloperidol lactate 1 mg Intravenous Q6H PRN Aneesh Youssef MD   lactulose 20 g Oral BID Cheri Catherine PA-C   metoprolol succinate 25 mg Oral Daily Guillermina Kussmaul, PA-C   nicotine 1 patch Transdermal Daily Guillermina Kussmaul, PA-C   ondansetron 4 mg Intravenous Q6H PRN Guillermina Kussmaul, PA-C   pantoprazole 40 mg Oral BID AC Cheri Catherine PA-C   rifaximin 550 mg Oral Q12H [address] Cheri Catherine PA-C   thiamine 500 mg Intravenous Daily MEREDITH Herr   Followed by       Mike Baer ON 2/2/2020] thiamine 250 mg Intravenous Daily MEREDITH Herr   [START ON 2/2/2020] thiamine 100 mg Oral Daily MEREDITH Herr        Today, Patient Was Seen By: Aneesh Youssef MD    ** Please Note: Dragon 360 Dictation voice to text software may have been used in the creation of this document   **

## 2020-01-30 NOTE — ANESTHESIA POSTPROCEDURE EVALUATION
Post-Op Assessment Note    CV Status:  Stable  Pain Score: 0    Pain management: adequate     Mental Status:  Sleepy   Hydration Status:  Stable   PONV Controlled:  None   Airway Patency:  Patent and adequate   Post Op Vitals Reviewed: Yes      Staff: CRNA           /74 (01/30/20 1409)    Temp 98 2 °F (36 8 °C) (01/30/20 1409)    Pulse (!) 107 (01/30/20 1409)   Resp 20 (01/30/20 1409)    SpO2 99 % (01/30/20 1409)

## 2020-01-30 NOTE — ANESTHESIA PREPROCEDURE EVALUATION
Review of Systems/Medical History  Patient summary reviewed  Chart reviewed  No history of anesthetic complications     Cardiovascular  Hypertension ,    Pulmonary  Smoker ,        GI/Hepatic    GERD ,        Negative  ROS        Endo/Other  Negative endo/other ROS      GYN  Negative gynecology ROS          Hematology  Anemia anemia of chronic disease and acute blood loss anemia,  Thrombocytopenia,    Musculoskeletal       Neurology  Negative neurology ROS      Psychology   Depression ,   Dementia           Physical Exam    Airway    Mallampati score: II  TM Distance: >3 FB  Neck ROM: full     Dental       Cardiovascular      Pulmonary      Other Findings        Anesthesia Plan  ASA Score- 3     Anesthesia Type- IV sedation with anesthesia with ASA Monitors  Additional Monitors:   Airway Plan:         Plan Factors-    Induction- intravenous  Postoperative Plan-     Informed Consent- Anesthetic plan and risks discussed with patient  I personally reviewed this patient with the CRNA  Discussed and agreed on the Anesthesia Plan with the CRNA  Miguelangel Garzon

## 2020-01-30 NOTE — ASSESSMENT & PLAN NOTE
Patient does have family history of dementia  I think alcohol is playing a major role in her encephalopathy/dementia

## 2020-01-30 NOTE — PHYSICAL THERAPY NOTE
Physical Therapy Cancellation Note    PT orders received + chart review completed  Pt c hgb 6 8  Unstable for PT eval at this time c transfusion pending per nsg  Will cont to follow + perform PT eval as appropriate       Wayne Sahni PT, DPT

## 2020-01-30 NOTE — PROGRESS NOTES
Pt had episode of low O2  Sats, attempted to place  Oxygen but pt kept refusing and ripping off  Pt became agitated  Pt's family helped to calm down and Dr Disha Lao made aware

## 2020-01-31 ENCOUNTER — APPOINTMENT (INPATIENT)
Dept: NON INVASIVE DIAGNOSTICS | Facility: HOSPITAL | Age: 75
DRG: 808 | End: 2020-01-31
Payer: MEDICARE

## 2020-01-31 PROBLEM — R77.8 ELEVATED TROPONIN: Status: ACTIVE | Noted: 2020-01-31

## 2020-01-31 PROBLEM — J96.01 ACUTE RESPIRATORY FAILURE WITH HYPOXIA (HCC): Status: ACTIVE | Noted: 2020-01-31

## 2020-01-31 PROBLEM — K74.60 CIRRHOSIS (HCC): Status: ACTIVE | Noted: 2020-01-31

## 2020-01-31 LAB
ABO GROUP BLD BPU: NORMAL
ACTIN IGG SERPL-ACNC: 7 UNITS (ref 0–19)
ALBUMIN SERPL BCP-MCNC: 3.4 G/DL (ref 3.5–5)
ALP SERPL-CCNC: 191 U/L (ref 46–116)
ALT SERPL W P-5'-P-CCNC: 22 U/L (ref 12–78)
ANION GAP SERPL CALCULATED.3IONS-SCNC: 12 MMOL/L (ref 4–13)
APTT PPP: 37 SECONDS (ref 23–37)
AST SERPL W P-5'-P-CCNC: 59 U/L (ref 5–45)
BILIRUB SERPL-MCNC: 1.6 MG/DL (ref 0.2–1)
BLD SMEAR INTERP: NORMAL
BLD SMEAR INTERP: NORMAL
BPU ID: NORMAL
BUN SERPL-MCNC: 15 MG/DL (ref 5–25)
CA-I BLD-SCNC: 1.15 MMOL/L (ref 1.12–1.32)
CALCIUM SERPL-MCNC: 9.2 MG/DL (ref 8.3–10.1)
CHLORIDE SERPL-SCNC: 106 MMOL/L (ref 100–108)
CO2 SERPL-SCNC: 25 MMOL/L (ref 21–32)
CREAT SERPL-MCNC: 1.01 MG/DL (ref 0.6–1.3)
CROSSMATCH: NORMAL
CROSSMATCH: NORMAL
D DIMER PPP FEU-MCNC: 1.99 UG/ML FEU
DAT POLY-SP REAG RBC QL: NEGATIVE
ERYTHROCYTE [DISTWIDTH] IN BLOOD BY AUTOMATED COUNT: 18.4 % (ref 11.6–15.1)
FDP BLD QL AGGL: <10
FIBRINOGEN PPP-MCNC: 389 MG/DL (ref 227–495)
GFR SERPL CREATININE-BSD FRML MDRD: 64 ML/MIN/1.73SQ M
GLUCOSE SERPL-MCNC: 107 MG/DL (ref 65–140)
GLUCOSE SERPL-MCNC: 111 MG/DL (ref 65–140)
HCT VFR BLD AUTO: 26 % (ref 34.8–46.1)
HGB BLD-MCNC: 6.2 G/DL (ref 11.5–15.4)
HGB BLD-MCNC: 8.5 G/DL (ref 11.5–15.4)
INR PPP: 1.43 (ref 0.84–1.19)
MAGNESIUM SERPL-MCNC: 1.7 MG/DL (ref 1.6–2.6)
MCH RBC QN AUTO: 29 PG (ref 26.8–34.3)
MCHC RBC AUTO-ENTMCNC: 32.7 G/DL (ref 31.4–37.4)
MCV RBC AUTO: 89 FL (ref 82–98)
MITOCHONDRIA M2 IGG SER-ACNC: <20 UNITS (ref 0–20)
PHOSPHATE SERPL-MCNC: 4.6 MG/DL (ref 2.3–4.1)
PLATELET # BLD AUTO: 112 THOUSANDS/UL (ref 149–390)
PLATELET # BLD AUTO: 125 THOUSANDS/UL (ref 149–390)
PMV BLD AUTO: 10.6 FL (ref 8.9–12.7)
PMV BLD AUTO: 9.5 FL (ref 8.9–12.7)
POTASSIUM SERPL-SCNC: 3.8 MMOL/L (ref 3.5–5.3)
PROCALCITONIN SERPL-MCNC: 0.46 NG/ML
PROT SERPL-MCNC: 6.9 G/DL (ref 6.4–8.2)
PROTHROMBIN TIME: 17.5 SECONDS (ref 11.6–14.5)
RBC # BLD AUTO: 2.93 MILLION/UL (ref 3.81–5.12)
RYE IGE QN: NEGATIVE
SODIUM SERPL-SCNC: 143 MMOL/L (ref 136–145)
T4 FREE SERPL-MCNC: 1.16 NG/DL (ref 0.76–1.46)
TROPONIN I SERPL-MCNC: 3.59 NG/ML
TROPONIN I SERPL-MCNC: 4.33 NG/ML
TROPONIN I SERPL-MCNC: 4.55 NG/ML
TROPONIN I SERPL-MCNC: 4.79 NG/ML
TSH SERPL DL<=0.05 MIU/L-ACNC: 0.19 UIU/ML (ref 0.36–3.74)
UNIT DISPENSE STATUS: NORMAL
UNIT PRODUCT CODE: NORMAL
UNIT RH: NORMAL
WBC # BLD AUTO: 3.1 THOUSAND/UL (ref 4.31–10.16)

## 2020-01-31 PROCEDURE — C9113 INJ PANTOPRAZOLE SODIUM, VIA: HCPCS | Performed by: NURSE PRACTITIONER

## 2020-01-31 PROCEDURE — 85610 PROTHROMBIN TIME: CPT | Performed by: INTERNAL MEDICINE

## 2020-01-31 PROCEDURE — 93306 TTE W/DOPPLER COMPLETE: CPT | Performed by: INTERNAL MEDICINE

## 2020-01-31 PROCEDURE — 80053 COMPREHEN METABOLIC PANEL: CPT | Performed by: NURSE PRACTITIONER

## 2020-01-31 PROCEDURE — 85379 FIBRIN DEGRADATION QUANT: CPT | Performed by: INTERNAL MEDICINE

## 2020-01-31 PROCEDURE — 99222 1ST HOSP IP/OBS MODERATE 55: CPT | Performed by: INTERNAL MEDICINE

## 2020-01-31 PROCEDURE — 85420 FIBRINOLYTIC PLASMINOGEN: CPT | Performed by: INTERNAL MEDICINE

## 2020-01-31 PROCEDURE — 93005 ELECTROCARDIOGRAM TRACING: CPT

## 2020-01-31 PROCEDURE — 83735 ASSAY OF MAGNESIUM: CPT | Performed by: NURSE PRACTITIONER

## 2020-01-31 PROCEDURE — 99233 SBSQ HOSP IP/OBS HIGH 50: CPT | Performed by: INTERNAL MEDICINE

## 2020-01-31 PROCEDURE — 84145 PROCALCITONIN (PCT): CPT | Performed by: NURSE PRACTITIONER

## 2020-01-31 PROCEDURE — 84100 ASSAY OF PHOSPHORUS: CPT | Performed by: NURSE PRACTITIONER

## 2020-01-31 PROCEDURE — 87081 CULTURE SCREEN ONLY: CPT | Performed by: NURSE PRACTITIONER

## 2020-01-31 PROCEDURE — 85300 ANTITHROMBIN III ACTIVITY: CPT | Performed by: INTERNAL MEDICINE

## 2020-01-31 PROCEDURE — 84484 ASSAY OF TROPONIN QUANT: CPT | Performed by: NURSE PRACTITIONER

## 2020-01-31 PROCEDURE — 99232 SBSQ HOSP IP/OBS MODERATE 35: CPT | Performed by: INTERNAL MEDICINE

## 2020-01-31 PROCEDURE — 85397 CLOTTING FUNCT ACTIVITY: CPT | Performed by: ANESTHESIOLOGY

## 2020-01-31 PROCEDURE — 85730 THROMBOPLASTIN TIME PARTIAL: CPT | Performed by: INTERNAL MEDICINE

## 2020-01-31 PROCEDURE — NC001 PR NO CHARGE: Performed by: NURSE PRACTITIONER

## 2020-01-31 PROCEDURE — 84439 ASSAY OF FREE THYROXINE: CPT | Performed by: NURSE PRACTITIONER

## 2020-01-31 PROCEDURE — 85027 COMPLETE CBC AUTOMATED: CPT | Performed by: NURSE PRACTITIONER

## 2020-01-31 PROCEDURE — P9016 RBC LEUKOCYTES REDUCED: HCPCS

## 2020-01-31 PROCEDURE — 93306 TTE W/DOPPLER COMPLETE: CPT

## 2020-01-31 PROCEDURE — 94760 N-INVAS EAR/PLS OXIMETRY 1: CPT

## 2020-01-31 PROCEDURE — 82330 ASSAY OF CALCIUM: CPT | Performed by: NURSE PRACTITIONER

## 2020-01-31 PROCEDURE — 86880 COOMBS TEST DIRECT: CPT | Performed by: INTERNAL MEDICINE

## 2020-01-31 PROCEDURE — 99233 SBSQ HOSP IP/OBS HIGH 50: CPT | Performed by: NURSE PRACTITIONER

## 2020-01-31 PROCEDURE — 85018 HEMOGLOBIN: CPT | Performed by: NURSE PRACTITIONER

## 2020-01-31 PROCEDURE — 85362 FIBRIN DEGRADATION PRODUCTS: CPT | Performed by: INTERNAL MEDICINE

## 2020-01-31 PROCEDURE — 85049 AUTOMATED PLATELET COUNT: CPT | Performed by: INTERNAL MEDICINE

## 2020-01-31 PROCEDURE — 94660 CPAP INITIATION&MGMT: CPT

## 2020-01-31 PROCEDURE — 83918 ORGANIC ACIDS TOTAL QUANT: CPT | Performed by: ANESTHESIOLOGY

## 2020-01-31 PROCEDURE — 85384 FIBRINOGEN ACTIVITY: CPT | Performed by: INTERNAL MEDICINE

## 2020-01-31 PROCEDURE — 84443 ASSAY THYROID STIM HORMONE: CPT | Performed by: NURSE PRACTITIONER

## 2020-01-31 PROCEDURE — 81256 HFE GENE: CPT | Performed by: NURSE PRACTITIONER

## 2020-01-31 RX ORDER — HALOPERIDOL 5 MG/ML
5 INJECTION INTRAMUSCULAR ONCE
Status: COMPLETED | OUTPATIENT
Start: 2020-01-31 | End: 2020-01-31

## 2020-01-31 RX ORDER — MAGNESIUM SULFATE HEPTAHYDRATE 40 MG/ML
2 INJECTION, SOLUTION INTRAVENOUS ONCE
Status: COMPLETED | OUTPATIENT
Start: 2020-01-31 | End: 2020-01-31

## 2020-01-31 RX ORDER — LACTULOSE 20 G/30ML
30 SOLUTION ORAL EVERY 4 HOURS
Status: DISCONTINUED | OUTPATIENT
Start: 2020-01-31 | End: 2020-02-02

## 2020-01-31 RX ORDER — PANTOPRAZOLE SODIUM 40 MG/1
40 TABLET, DELAYED RELEASE ORAL
Status: DISCONTINUED | OUTPATIENT
Start: 2020-01-31 | End: 2020-02-06 | Stop reason: HOSPADM

## 2020-01-31 RX ORDER — PREDNISONE 20 MG/1
60 TABLET ORAL DAILY
Status: DISCONTINUED | OUTPATIENT
Start: 2020-01-31 | End: 2020-02-04

## 2020-01-31 RX ORDER — METRONIDAZOLE 500 MG/1
500 TABLET ORAL EVERY 8 HOURS SCHEDULED
Status: DISCONTINUED | OUTPATIENT
Start: 2020-01-31 | End: 2020-02-06 | Stop reason: HOSPADM

## 2020-01-31 RX ORDER — HALOPERIDOL 5 MG/ML
5 INJECTION INTRAMUSCULAR ONCE
Status: DISCONTINUED | OUTPATIENT
Start: 2020-01-31 | End: 2020-01-31

## 2020-01-31 RX ORDER — POTASSIUM CHLORIDE 14.9 MG/ML
20 INJECTION INTRAVENOUS ONCE
Status: COMPLETED | OUTPATIENT
Start: 2020-01-31 | End: 2020-01-31

## 2020-01-31 RX ORDER — FUROSEMIDE 10 MG/ML
20 INJECTION INTRAMUSCULAR; INTRAVENOUS ONCE
Status: COMPLETED | OUTPATIENT
Start: 2020-01-31 | End: 2020-01-31

## 2020-01-31 RX ADMIN — CHLORHEXIDINE GLUCONATE 0.12% ORAL RINSE 15 ML: 1.2 LIQUID ORAL at 08:32

## 2020-01-31 RX ADMIN — CEFEPIME HYDROCHLORIDE 1000 MG: 1 INJECTION, POWDER, FOR SOLUTION INTRAMUSCULAR; INTRAVENOUS at 11:00

## 2020-01-31 RX ADMIN — CEFEPIME HYDROCHLORIDE 1000 MG: 1 INJECTION, POWDER, FOR SOLUTION INTRAMUSCULAR; INTRAVENOUS at 00:00

## 2020-01-31 RX ADMIN — LACTULOSE 30 G: 10 SOLUTION ORAL at 22:00

## 2020-01-31 RX ADMIN — METOPROLOL TARTRATE 5 MG: 1 INJECTION, SOLUTION INTRAVENOUS at 05:49

## 2020-01-31 RX ADMIN — CEFEPIME HYDROCHLORIDE 1000 MG: 1 INJECTION, POWDER, FOR SOLUTION INTRAMUSCULAR; INTRAVENOUS at 22:11

## 2020-01-31 RX ADMIN — POTASSIUM CHLORIDE 20 MEQ: 200 INJECTION, SOLUTION INTRAVENOUS at 08:06

## 2020-01-31 RX ADMIN — METRONIDAZOLE 500 MG: 500 TABLET ORAL at 21:45

## 2020-01-31 RX ADMIN — FOLIC ACID 1 MG: 1 TABLET ORAL at 08:30

## 2020-01-31 RX ADMIN — HALOPERIDOL LACTATE 5 MG: 5 INJECTION INTRAMUSCULAR at 14:43

## 2020-01-31 RX ADMIN — RIFAXIMIN 550 MG: 550 TABLET ORAL at 21:45

## 2020-01-31 RX ADMIN — METRONIDAZOLE 500 MG: 500 INJECTION, SOLUTION INTRAVENOUS at 05:33

## 2020-01-31 RX ADMIN — LACTULOSE 30 G: 10 SOLUTION ORAL at 08:50

## 2020-01-31 RX ADMIN — LACTULOSE 30 G: 10 SOLUTION ORAL at 14:35

## 2020-01-31 RX ADMIN — FUROSEMIDE 20 MG: 10 INJECTION, SOLUTION INTRAMUSCULAR; INTRAVENOUS at 09:53

## 2020-01-31 RX ADMIN — MAGNESIUM SULFATE HEPTAHYDRATE 2 G: 40 INJECTION, SOLUTION INTRAVENOUS at 08:06

## 2020-01-31 RX ADMIN — METOPROLOL TARTRATE 12.5 MG: 25 TABLET ORAL at 21:45

## 2020-01-31 RX ADMIN — METRONIDAZOLE 500 MG: 500 TABLET ORAL at 14:31

## 2020-01-31 RX ADMIN — METOPROLOL TARTRATE 12.5 MG: 25 TABLET ORAL at 11:05

## 2020-01-31 RX ADMIN — VANCOMYCIN HYDROCHLORIDE 750 MG: 750 INJECTION, SOLUTION INTRAVENOUS at 11:01

## 2020-01-31 RX ADMIN — PANTOPRAZOLE SODIUM 40 MG: 40 INJECTION, POWDER, FOR SOLUTION INTRAVENOUS at 08:32

## 2020-01-31 RX ADMIN — LACTULOSE 200 G: 10 SOLUTION ORAL at 01:33

## 2020-01-31 RX ADMIN — PREDNISONE 60 MG: 20 TABLET ORAL at 14:31

## 2020-01-31 RX ADMIN — VANCOMYCIN HYDROCHLORIDE 750 MG: 750 INJECTION, SOLUTION INTRAVENOUS at 23:04

## 2020-01-31 RX ADMIN — RIFAXIMIN 550 MG: 550 TABLET ORAL at 08:30

## 2020-01-31 RX ADMIN — THIAMINE HYDROCHLORIDE 500 MG: 100 INJECTION, SOLUTION INTRAMUSCULAR; INTRAVENOUS at 08:32

## 2020-01-31 NOTE — CONSULTS
Consultation - Cardiology   Concetta Ferreira 69 y o  female MRN: [id number]  Unit/Bed#:  Encounter: [id number]  01/31/20  2:08 PM    Assessment/ Plan:  1-elevated troponins unclear etiology; likely type 2, secondary to anemia/panctopenia, AFib with RVR, underlying hematologic disorder  Troponin 0 64/2 65/3 59/4 55/4 79/4  33  No need to further trend  Echocardiogram ordered and pending  NO ischemic workup planned given pts current medical conditions, hematologic workup and mental status    2-new onset of atrial fibrillation with RVR at times on metoprolol 12 5 b i d   Continue telemetry  TSH 0 18  [id number] = 3 (3 2% annual stroke risk) HASBLED score 5 (9 1% annual bleeding risk)  Pt is NOT a good anticoagulation candidate  Echo pending  Monitor tele    3-toxic metabolic encephalopathy continue with lactulose/rifaximin  Management per Neurology    4-anemia with hemolysis workup in process, elevated LDH, decreased haptoglobin, schistocytes noted  Management per Hematology  5-pancytopenia likely secondary to chronic alcoholism, continue to monitor labs closely  Management per Hematology/GI/St [address] [place] [address] Internal Medicine Hospitalist    6-history of alcohol abuse/cirrhosis, management per GI/neurology  Monitor for withdrawals    7-hypertension, stable  Continue all medications  Monitor vital signs  History of Present Illness   Physician Requesting Consult: Weston Larkin MD  Reason for Consult / Principal Problem: +troponins  HPI: Concetta Ferreira is a 69y o  year old female who presents to the emergency room at the recommendation of her primary care physician after routine lab work showed pancytopenia  Patient was confused and agitated and refused to participate in review of systems are physical exam per H and P  History is obtained from the chart and from the sister who was at the bedside at the time  This sister reported the patient had fatigue, dizziness  She had nausea but no vomiting  She had no bloody stools  The primary care physician notes stated that she suffered from chronic alcoholism  The sister stated she was previously a heavy daily drinker but most recently in the last 6 months had no alcohol intake  Upon evaluation emergency room hemoglobin is found to be 4 9, white blood cell count 3 3, wbc's 1 7, platelet count 45  Patient became hypotensive in the emergency room after blood transfusion  Patient became rapid response last night after EGD, when she had acute change in her heart rate, change in her O2 sat and change in mental status  Patient had EGD done which showed gastritis  Currently upon evaluation patient knows who she is but is not oriented to place she thinks she is in [place]  She appears comfortable in no acute distress  Denies any pain  Inpatient consult to Cardiology  Consult performed by: Jolynn Armstrong PA-C  Consult ordered by: MEREDITH Milligan          EKG:  Sinus rhythm, PACs, aberrant conduction, prolonged QT  EKG  Last night during rapid response: afib, rad, nsstwa      Review of Systems   Unable to perform ROS: Mental status change       Historical Information   Past Medical History:   Diagnosis Date    Benign essential hypertension     last assessed - [id number]    Chest pain 11/4/2017    Gastroesophageal reflux disease without esophagitis 11/16/2017     Past Surgical History:   Procedure Laterality Date    APPENDECTOMY       Social History     Substance and Sexual Activity   Alcohol Use Not Currently    Comment: History of significant daily alcohol intake   Sister joy no alcohol intake in 6 months     Social History     Substance and Sexual Activity   Drug Use No     Social History     Tobacco Use   Smoking Status Current Every Day Smoker    Packs/day: 0 50    Types: Cigarettes   Smokeless Tobacco Never Used       Family History:   Family History   Problem Relation Age of Onset    Heart attack Father         myocardial infarction Meds/Allergies   all current active meds have been reviewed and current meds:   Current Facility-Administered Medications   Medication Dose Route Frequency    acetaminophen (TYLENOL) tablet 650 mg  650 mg Oral Q6H PRN    bisacodyl (DULCOLAX) rectal suppository 10 mg  10 mg Rectal Daily PRN    calcium carbonate (TUMS) chewable tablet 1,000 mg  1,000 mg Oral Daily PRN    cefepime (MAXIPIME) 1,000 mg in dextrose 5 % 50 mL IVPB  1,000 mg Intravenous Q12H    chlorhexidine (PERIDEX) 0 12 % oral rinse 15 mL  15 mL Swish & Spit O41F [address]    folic acid (FOLVITE) tablet 1 mg  1 mg Oral Daily    haloperidol lactate (HALDOL) injection 1 mg  1 mg Intravenous Q6H PRN    lactulose 20 g/30 mL oral solution 30 g  30 g Oral Q4H    metoprolol tartrate (LOPRESSOR) partial tablet 12 5 mg  12 5 mg Oral Q12H SHAYNA    metroNIDAZOLE (FLAGYL) tablet 500 mg  500 mg Oral Q8H [address]    nicotine (NICODERM CQ) 14 mg/24hr TD 24 hr patch 1 patch  1 patch Transdermal Daily    ondansetron (ZOFRAN) injection 4 mg  4 mg Intravenous Q6H PRN    pantoprazole (PROTONIX) EC tablet 40 mg  40 mg Oral BID AC    predniSONE tablet 60 mg  60 mg Oral Daily    rifaximin (XIFAXAN) tablet 550 mg  550 mg Oral Q12H HSAYNA    thiamine (VITAMIN B1) 500 mg in sodium chloride 0 9 % 50 mL IVPB  500 mg Intravenous Daily    Followed by   Priti Hernandez ON 2/2/2020] thiamine (VITAMIN B1) 250 mg in sodium chloride 0 9 % 50 mL IVPB  250 mg Intravenous Daily    [START ON 2/2/2020] thiamine (VITAMIN B1) tablet 100 mg  100 mg Oral Daily    vancomycin (VANCOCIN) IVPB (premix) 750 mg  10 mg/kg (Adjusted) Intravenous Q12H     No Known Allergies    Objective   Vitals: Blood pressure 124/59, pulse 90, temperature 98 1 °F (36 7 °C), temperature source Oral, resp  rate 21, height 5' 4" (1 626 m), weight 81 kg (178 lb 9 2 oz), SpO2 95 %  , Body mass index is 30 65 kg/m² ,   Orthostatic Blood Pressures      Most Recent Value   Blood Pressure  124/59 filed at 01/31/2020 1100 Patient Position - Orthostatic VS  Lying filed at 01/31/2020 3635          Systolic (28VOF), ICH:873 , Min:94 , OGC:051     Diastolic (39GAU), DYP:09, Min:55, Max:84        Intake/Output Summary (Last 24 hours) at 1/31/2020 1408  Last data filed at 1/31/2020 1201  Gross per 24 hour   Intake 1880 ml   Output 1755 ml   Net 125 ml       Invasive Devices     Peripheral Intravenous Line            Peripheral IV 01/30/20 Left Forearm less than 1 day    Peripheral IV 01/30/20 Right Hand less than 1 day          Drain            Urethral Catheter less than 1 day                    Physical Exam:  GEN: Alert and oriented x 1, in no acute distress  Well appearing and well nourished  HEENT: Sclera anicteric, conjunctivae pink, mucous membranes moist  Oropharynx clear  NECK: Supple, no carotid bruits, no significant JVD  Trachea midline, no thyromegaly  HEART: Regular rhythm, normal S1 and S2, no murmurs, clicks, gallops or rubs  PMI nondisplaced, no thrills  LUNGS: Clear to auscultation bilaterally; no wheezes, rales, or rhonchi  No increased work of breathing or signs of respiratory distress  ABDOMEN: Soft, nontender, nondistended, normoactive bowel sounds  EXTREMITIES: Skin warm and well perfused, no clubbing, cyanosis, or edema  NEURO: +Disoriented  Normal speech  Mood and affect normal    SKIN: Normal without suspicious lesions on exposed skin        Lab Results:     Troponins:   Results from last 7 days   Lab Units 01/31/20  0944 01/31/20  0621 01/31/20  0033   TROPONIN I ng/mL 4 79* 4 55* 3 59*       CBC with diff:   Results from last 7 days   Lab Units 01/31/20  0527 01/31/20  0033 01/30/20  1932 01/30/20  1917 01/30/20  1819 01/30/20  0846 01/29/20  0849 01/28/20  2359 01/28/20  1413   WBC Thousand/uL 3 10*  --  4 12* 5 93  --  3 45* 3 22* 3 45* 3 38*   HEMOGLOBIN g/dL 8 5* 6 2* 7 7* 7 7*  --  6 8* 7 2* 5 2* 4 9*   I STAT HEMOGLOBIN g/dl  --   --   --   --  8 2*  --   --   --   --    HEMATOCRIT % 26 0* --  24 0* 24 3*  --  21 0* 22 2* 16 9* 16 4*   HEMATOCRIT, ISTAT %  --   --   --   --  24*  --   --   --   --    MCV fL 89  --  90 91  --  88 89 92 93   PLATELETS Thousands/uL 125*  --  154 165  --  34* 32* 43* 45*   ADJUSTED WBC Thousand/uL  --   --   --   --   --   --   --   --  3 38*   MCH pg 29 0  --  28 8 28 7  --  28 5 28 8 28 4 27 8   MCHC g/dL 32 7  --  32 1 31 7  --  32 4 32 4 30 8* 29 9*   RDW % 18 4*  --  20 3* 20 5*  --  21 1* 20 7* 26 4* 26 1*   MPV fL 9 5  --  9 9 11 3  --   --   --   --   --    NRBC AUTO /100 WBCs  --   --   --   --   --  10 10 10 12   NRBC /100 WBC  --   --   --   --   --  6* 19* 10* 22*         CMP:   Results from last 7 days   Lab Units 01/31/20  0527 01/30/20  1827 01/30/20  1819 01/30/20  0846 01/28/20  2359 01/28/20  1413   POTASSIUM mmol/L 3 8 4 6  --  4 2 4 1 4 1   CHLORIDE mmol/L 106 106  --  108 104 110*   CO2 mmol/L 25 20*  --  24 26 23   CO2, I-STAT mmol/L  --   --  21  --   --   --    BUN mg/dL 15 11  --  10 9 7   CREATININE mg/dL 1 01 1 10  --  1 04 0 96 0 77   GLUCOSE, ISTAT mg/dl  --   --  112  --   --   --    CALCIUM mg/dL 9 2 8 8  --  9 0 9 2 9 5   AST U/L 59* 57*  --  40 41 38   ALT U/L 22 21  --  17 19 20   ALK PHOS U/L 191* 210*  --  211* 243* 238*   EGFR ml/min/1 73sq m 64 58  --  62 68 89

## 2020-01-31 NOTE — RESPIRATORY THERAPY NOTE
Patient weaned from [facility] to [facility] then to [facility]  Tolerating well  No distress  spo2 post wean is 98%

## 2020-01-31 NOTE — ASSESSMENT & PLAN NOTE
· Will switch extended release oral metoprolol to scheduled IV dosing  · Continue close hemodynamic monitoring

## 2020-01-31 NOTE — RAPID RESPONSE
Progress Note - Rapid Response   Concetta Ferreira 69 y o  female MRN: [id number]    Time Called ( Time): 9885  Date Called: 1/30/2020  Level of Care: MS  Room#: 506  [id number] Time ( Time): 6065  Event End Time ( Time): 2264  Primary reason for call: Acute change in HR, Acute change in SPO2 and Acute change in mental status  Interventions:  Airway/Breathing:  O2 Mask/Nasal, ABG and CXR  Circulation: EKG  Other Treatments: CT head, lab studies       Assessment:   1  Toxic metabolic encephalopathy  2  Acute hypoxic respiratory failure  3  Pancytopenia  4  History of alcohol abuse  5  HTN  6  Pulmonary edema      Plan:   · CT head, EKG, labs, freq neuro checks  Will place on HFNC once in the stepdown unit  Lasix for pulmonary edema  Further recommendations and plans based on diagnostic data results  HPI/Chief Complaint (Background/Situation):   Concetta Ferreira is a 69y o  year old female who presents with pancytopenia  The patient had routine lab work done per her PCP recommendations and was found to be abnormal   The patient was confused and agitated on admission which has been getting progressively worse over the past few months according to family  She was found to have cirrhosis and GI was consulted  This was thought to be 2/2 alcohol abuse  Her drinking has decreased recently, but she is still drinking beer  Her ammonia on admission was 59  She was started on lactulose and rifaximin  Neurology was consulted as well  She underwent EGD today that showed gastritis  Upon returning to the floor she was noted to be more hypoxic and agitated, ripping off her oxygen  She then became acutely tachycardiac, hypoxic with sats in the 50s%, and hypertensive  Rapid response was called  On my arrival, she was able to look when you called her name, but not follow commands  She was able to move all extremities  Her oxygen saturation improved to 97% with NRBM    She was stabilized and transferred to the stepdown unit  Historical Information   Past Medical History:   Diagnosis Date    Benign essential hypertension     last assessed - [id number]    Chest pain 11/4/2017    Gastroesophageal reflux disease without esophagitis 11/16/2017     Past Surgical History:   Procedure Laterality Date    APPENDECTOMY       Social History   Social History     Substance and Sexual Activity   Alcohol Use Not Currently    Comment: History of significant daily alcohol intake   Sister joy no alcohol intake in 6 months     Social History     Substance and Sexual Activity   Drug Use No     Social History     Tobacco Use   Smoking Status Current Every Day Smoker    Packs/day: 0 50    Types: Cigarettes   Smokeless Tobacco Never Used     Family History: non-contributory    Meds/Allergies     Current Facility-Administered Medications:  acetaminophen 650 mg Oral Q6H PRN MEREDITH Catalan    bisacodyl 10 mg Rectal Daily PRN MEREDITH Catalan    calcium carbonate 1,000 mg Oral Daily PRN MEREDITH Catalan    calcium gluconate 2 g Intravenous Once MEREDITH Catalan    chlorhexidine 15 mL Swish & Spit Q12H [address] MEREDITH Catalan    folic acid 1 mg Oral Daily MEREDITH Catalan    furosemide 20 mg Intravenous Once MEREDITH Catalan    haloperidol lactate 1 mg Intravenous Q6H PRN MEREDITH Catalan    lactulose 20 g Oral BID MEREDITH Catalan    magnesium sulfate 2 g Intravenous Once MEREDITH Catalan    metoprolol 5 mg Intravenous Q6H MEREDITH Catalan    nicotine 1 patch Transdermal Daily MEREDITH Catalan    ondansetron 4 mg Intravenous Q6H PRN MEREDITH Catalan    pantoprazole 40 mg Intravenous Q12H [address] MEREDITH Catalan    rifaximin 550 mg Oral Q12H [address] MEREDITH Catalan    thiamine 500 mg Intravenous Daily MEREDITH Catalan Last Rate: 500 mg (01/30/20 1539)   Followed by        Mango Sifuentes ON 2/2/2020] thiamine 250 mg Intravenous Daily MEREDITH Catalan    [START ON 2/2/2020] thiamine 100 mg Oral Daily Laurine Kawasaki, CRNP No Known Allergies    ROS: Patient with AMS and unable to participate in ROS    Vitals: 97 7, P 145, R 28, 166/77, initial SpO2 50%, improved to 97% on NRB mask    Physical Exam:  Gen:lethargic, arousable, GAVIRIA, does not follow commands  HEENT:NC/AT, PERRL  Neck:supple, no JVD  Chest:coarse rhonchi and rales throughout  Cor:S1, S2, tachy  Abd:soft, NT  Ext:no edema  Neuro:GCS 11-3/3/5, nonfocal, GAVIRIA  Skin:warm, dry      Intake/Output Summary (Last 24 hours) at 1/30/2020 2011  Last data filed at 1/30/2020 1408  Gross per 24 hour   Intake 1157 5 ml   Output    Net 1157 5 ml       Respiratory    Lab Data (Last 4 hours)    None         O2/Vent Data (Last 4 hours)      01/30 1855          Non-Invasive Ventilation Mode HFNC                 Invasive Devices     Peripheral Intravenous Line            Peripheral IV 01/29/20 Right Forearm 1 day    Peripheral IV 01/30/20 Left Wrist less than 1 day                DIAGNOSTIC DATA:    Lab: I have personally reviewed pertinent lab results     CBC:   Results from last 7 days   Lab Units 01/30/20  1932   WBC Thousand/uL 4 12*   HEMOGLOBIN g/dL 7 7*   HEMATOCRIT % 24 0*   PLATELETS Thousands/uL 154     CMP:   Results from last 7 days   Lab Units 01/30/20  1827 01/30/20  1819 01/30/20  0846 01/28/20  2359   POTASSIUM mmol/L 4 6  --  4 2 4 1   CHLORIDE mmol/L 106  --  108 104   CO2 mmol/L 20*  --  24 26   CO2, I-STAT mmol/L  --  21  --   --    BUN mg/dL 11  --  10 9   CREATININE mg/dL 1 10  --  1 04 0 96   CALCIUM mg/dL 8 8  --  9 0 9 2   ALK PHOS U/L 210*  --  211* 243*   ALT U/L 21  --  17 19   AST U/L 57*  --  40 41   GLUCOSE, ISTAT mg/dl  --  112  --   --      PT/INR:   Lab Results   Component Value Date    INR 1 32 (H) 01/30/2020   ,   Magnesium: No components found for: MAG,   Phosphorous:   Lab Results   Component Value Date    PHOS 3 9 01/30/2020       Microbiology:  No results found for: Karina Beecham, SPUTUMCULTMORTEZA      OUTCOME:   Critical Care Attending notified to transfer, Attending service notified and Transfer to step down unit  Family notified of transfer: yes  Family member contacted: patient's son and sister who were present at bedside  Code Status: Level 1 - Full Code  Critical Care Time: Total Critical Care time spent 42 minutes excluding procedures, teaching and family updates

## 2020-01-31 NOTE — RESPIRATORY THERAPY NOTE
Settings titrated down at this time         01/30/20 9762   Non-Invasive Information   Interface HFNC prongs   Non-Invasive Ventilation Mode HFNC   SpO2 100 %   $ Pulse Oximetry Spot Check Charge Completed   Resp Comments titrating down as tolerated   Non-Invasive Settings   FiO2 (%) 60   Flow (lpm) 50   Temperature (Set) 31   Non-Invasive Readings   Skin Intervention Skin intact   Maintenance   Water bag changed No  (new)

## 2020-01-31 NOTE — RESPIRATORY THERAPY NOTE
RT Ventilator Management Note  Daria Core 69 y o  female MRN: [id number]  Unit/Bed#:  Encounter: [id number]      Daily Screen     No data found in the last 10 encounters  Physical Exam:   Assessment Type: Assess only  General Appearance: Awake  Respiratory Pattern: Irregular, Labored, Dyspnea at rest, Tachypneic, Spontaneous, Assisted  Chest Assessment: Chest expansion symmetrical  Bilateral Breath Sounds: Diminished, Crackles, Coarse, Expiratory wheezes  Cough: None  O2 Device: HFNC  Subjective Data: restless and agitated with nursing care      Resp Comments: patient received from [place] # 3 s/p rapid response for change in mental status requiringin HHFNC to mainatain adeqaute oxygenation  patient with noted increased wotk of breathing with nursing care tolerating HFNC after being restrained due to pateint very restless and agitated prior to being restrained  b s  with note dscattered coarseness wheezes and rales noted throughout opateint to receive lasix and will wena HFNC as tolerated

## 2020-01-31 NOTE — ASSESSMENT & PLAN NOTE
Appreciate heme/onc recommendations  · Doubt ITP/HIT  · Appears chronic in nature, although has worsened  · May be 2/2 cirrhosis noted on CT scan  · Received transfusion yesterday with robust improvement in platelet count  · Continue to trend and transfuse as needed

## 2020-01-31 NOTE — ASSESSMENT & PLAN NOTE
Appreciate heme/onc recommendations  · Doubt ITP/HIT  · Appears chronic in nature, although has worsened  · May be 2/2 cirrhosis noted on CT scan  · Received transfusion yesterday with robust improvement in platelet count

## 2020-01-31 NOTE — ASSESSMENT & PLAN NOTE
Likely 2/2 hepatic encephalopathy  · CT head negative  · Serial neuro exams  · Will initiate lactulose via NGT or enema as patient is not alert enough to safely swallow  · There is also a concern for underlying advanced dementia in the setting of chronic alcohol use  · Appreciate neurology's recommendations  · Delirium precautions  · Regulate sleep/wake cycle  · [facility] ICU

## 2020-01-31 NOTE — PLAN OF CARE
Problem: Potential for Falls  Goal: Patient will remain free of falls  Description  INTERVENTIONS:  - Assess patient frequently for physical needs  -  Identify cognitive and physical deficits and behaviors that affect risk of falls    -  [place] fall precautions as indicated by assessment   - Educate patient/family on patient safety including physical limitations  - Instruct patient to call for assistance with activity based on assessment  - Modify environment to reduce risk of injury  - Consider OT/PT consult to assist with strengthening/mobility  Outcome: Progressing     Problem: PAIN - ADULT  Goal: Verbalizes/displays adequate comfort level or baseline comfort level  Description  Interventions:  - Encourage patient to monitor pain and request assistance  - Assess pain using appropriate pain scale  - Administer analgesics based on type and severity of pain and evaluate response  - Implement non-pharmacological measures as appropriate and evaluate response  - Consider cultural and social influences on pain and pain management  - Notify physician/advanced practitioner if interventions unsuccessful or patient reports new pain  Outcome: Progressing     Problem: INFECTION - ADULT  Goal: Absence or prevention of progression during hospitalization  Description  INTERVENTIONS:  - Assess and monitor for signs and symptoms of infection  - Monitor lab/diagnostic results  - Monitor all insertion sites, i e  indwelling lines, tubes, and drains  - Monitor endotracheal if appropriate and nasal secretions for changes in amount and color  - [place] appropriate cooling/warming therapies per order  - Administer medications as ordered  - Instruct and encourage patient and family to use good hand hygiene technique  - Identify and instruct in appropriate isolation precautions for identified infection/condition  Outcome: Progressing  Goal: Absence of fever/infection during neutropenic period  Description  INTERVENTIONS:  - Monitor WBC    Outcome: Progressing     Problem: SAFETY ADULT  Goal: Maintain or return to baseline ADL function  Description  INTERVENTIONS:  -  Assess patient's ability to carry out ADLs; assess patient's baseline for ADL function and identify physical deficits which impact ability to perform ADLs (bathing, care of mouth/teeth, toileting, grooming, dressing, etc )  - Assess/evaluate cause of self-care deficits   - Assess range of motion  - Assess patient's mobility; develop plan if impaired  - Assess patient's need for assistive devices and provide as appropriate  - Encourage maximum independence but intervene and supervise when necessary  - Involve family in performance of ADLs  - Assess for home care needs following discharge   - Consider OT consult to assist with ADL evaluation and planning for discharge  - Provide patient education as appropriate  Outcome: Progressing  Goal: Maintain or return mobility status to optimal level  Description  INTERVENTIONS:  - Assess patient's baseline mobility status (ambulation, transfers, stairs, etc )    - Identify cognitive and physical deficits and behaviors that affect mobility  - Identify mobility aids required to assist with transfers and/or ambulation (gait belt, sit-to-stand, lift, walker, cane, etc )  - [place] fall precautions as indicated by assessment  - Record patient progress and toleration of activity level on Mobility SBAR; progress patient to next Phase/Stage  - Instruct patient to call for assistance with activity based on assessment  - Consider rehabilitation consult to assist with strengthening/weightbearing, etc   Outcome: Progressing     Problem: DISCHARGE PLANNING  Goal: Discharge to home or other facility with appropriate resources  Description  INTERVENTIONS:  - Identify barriers to discharge w/patient and caregiver  - Arrange for needed discharge resources and transportation as appropriate  - Identify discharge learning needs (meds, wound care, etc )  - Arrange for interpretive services to assist at discharge as needed  - Refer to Case Management Department for coordinating discharge planning if the patient needs post-hospital services based on physician/advanced practitioner order or complex needs related to functional status, cognitive ability, or social support system  Outcome: Progressing     Problem: Knowledge Deficit  Goal: Patient/family/caregiver demonstrates understanding of disease process, treatment plan, medications, and discharge instructions  Description  Complete learning assessment and assess knowledge base  Interventions:  - Provide teaching at level of understanding  - Provide teaching via preferred learning methods  Outcome: Progressing     Problem: SAFETY,RESTRAINT: NV/NON-SELF DESTRUCTIVE BEHAVIOR  Goal: Remains free of harm/injury (restraint for non violent/non self-detsructive behavior)  Description  INTERVENTIONS:  - Instruct patient/family regarding restraint use   - Assess and monitor physiologic and psychological status   - Provide interventions and comfort measures to meet assessed patient needs   - Identify and implement measures to help patient regain control  - Assess readiness for release of restraint   Outcome: Progressing  Goal: Returns to optimal restraint-free functioning  Description  INTERVENTIONS:  - Assess the patient's behavior and symptoms that indicate continued need for restraint  - Identify and implement measures to help patient regain control  - Assess readiness for release of restraint   Outcome: Progressing     Problem: Nutrition/Hydration-ADULT  Goal: Nutrient/Hydration intake appropriate for improving, restoring or maintaining nutritional needs  Description  Monitor and assess patient's nutrition/hydration status for malnutrition  Collaborate with interdisciplinary team and initiate plan and interventions as ordered  Monitor patient's weight and dietary intake as ordered or per policy   Utilize nutrition screening tool and intervene as necessary  Determine patient's food preferences and provide high-protein, high-caloric foods as appropriate       INTERVENTIONS:  - Monitor oral intake, urinary output, labs, and treatment plans  - Assess nutrition and hydration status and recommend course of action  - Evaluate amount of meals eaten  - Assist patient with eating if necessary   - Allow adequate time for meals  - Recommend/ encourage appropriate diets, oral nutritional supplements, and vitamin/mineral supplements  - Order, calculate, and assess calorie counts as needed  - Recommend, monitor, and adjust tube feedings and TPN/PPN based on assessed needs  - Assess need for intravenous fluids  - Provide specific nutrition/hydration education as appropriate  - Include patient/family/caregiver in decisions related to nutrition  Outcome: Progressing     Problem: Prexisting or High Potential for Compromised Skin Integrity  Goal: Skin integrity is maintained or improved  Description  INTERVENTIONS:  - Identify patients at risk for skin breakdown  - Assess and monitor skin integrity  - Assess and monitor nutrition and hydration status  - Monitor labs   - Assess for incontinence   - Turn and reposition patient  - Assist with mobility/ambulation  - Relieve pressure over bony prominences  - Avoid friction and shearing  - Provide appropriate hygiene as needed including keeping skin clean and dry  - Evaluate need for skin moisturizer/barrier cream  - Collaborate with interdisciplinary team   - Patient/family teaching  - Consider wound care consult   Outcome: Progressing     Problem: RESPIRATORY - ADULT  Goal: Achieves optimal ventilation and oxygenation  Description  INTERVENTIONS:  - Assess for changes in respiratory status  - Assess for changes in mentation and behavior  - Position to facilitate oxygenation and minimize respiratory effort  - Oxygen administered by appropriate delivery if ordered  - Initiate smoking cessation education as indicated  - Encourage broncho-pulmonary hygiene including cough, deep breathe, Incentive Spirometry  - Assess the need for suctioning and aspirate as needed  - Assess and instruct to report SOB or any respiratory difficulty  - Respiratory Therapy support as indicated  Outcome: Progressing     Problem: GENITOURINARY - ADULT  Goal: Maintains or returns to baseline urinary function  Description  INTERVENTIONS:  - Assess urinary function  - Encourage oral fluids to ensure adequate hydration if ordered  - Administer IV fluids as ordered to ensure adequate hydration  - Administer ordered medications as needed  - Offer frequent toileting  - Follow urinary retention protocol if ordered  Outcome: Progressing  Goal: Urinary catheter remains patent  Description  INTERVENTIONS:  - Assess patency of urinary catheter  - If patient has a chronic goodman, consider changing catheter if non-functioning  - Follow guidelines for intermittent irrigation of non-functioning urinary catheter  Outcome: Progressing     Problem: HEMATOLOGIC - ADULT  Goal: Maintains hematologic stability  Description  INTERVENTIONS  - Assess for signs and symptoms of bleeding or hemorrhage  - Monitor labs  - Administer supportive blood products/factors as ordered and appropriate  Outcome: Progressing

## 2020-01-31 NOTE — PROGRESS NOTES
GI Progress Note - Anna Marie Carrillo 69 y o  female MRN: [id number]    Unit/Bed#:  Encounter: [id number]    Subjective: Ms Debby Hodge became hypoxic last night and more altered prompting transfer to ICU  Require HFNC and diuresis due to CXR showing pulmonary edema  She does not have any complaints this morning  Received lactulose enema overnight and has been compliant with oral lactulose so far today  No BMs today yet  Objective:     Vitals: Blood pressure 122/70, pulse 83, temperature 97 5 °F (36 4 °C), temperature source Oral, resp  rate 14, height 5' 4" (1 626 m), weight 81 kg (178 lb 9 2 oz), SpO2 99 %  ,Body mass index is 30 65 kg/m²  Intake/Output Summary (Last 24 hours) at 1/31/2020 1018  Last data filed at 1/31/2020 0600  Gross per 24 hour   Intake 2157 5 ml   Output 1340 ml   Net 817 5 ml       Physical Exam:     General Appearance: Alert, oriented to self, disoriented to time and place, no acute distrss  Lungs: Clear to auscultation bilaterally, no respiratory distress  Heart: RRR, no murmur  Abdomen: Soft, non-tender, non-distended; bowel sounds normal; no masses or no organomegaly  Extremities: No cyanosis or LE edema    Invasive Devices     Peripheral Intravenous Line            Peripheral IV 01/30/20 Left Forearm less than 1 day    Peripheral IV 01/30/20 Right Hand less than 1 day          Drain            Urethral Catheter less than 1 day                Lab Results:  Results from last 7 days   Lab Units 01/31/20 0527 01/30/20  0846   WBC Thousand/uL 3 10*   < > 3 45*   HEMOGLOBIN g/dL 8 5*   < > 6 8*   I STAT HEMOGLOBIN   --    < >  --    HEMATOCRIT % 26 0*   < > 21 0*   HEMATOCRIT, ISTAT   --    < >  --    PLATELETS Thousands/uL 125*   < > 34*   LYMPHO PCT %  --   --  41   MONO PCT %  --   --  7   EOS PCT %  --   --  4    < > = values in this interval not displayed       Results from last 7 days   Lab Units 01/31/20  0527 01/30/20  1819   POTASSIUM mmol/L 3 8   < >  --    CHLORIDE mmol/L 106   < >  --    CO2 mmol/L 25   < >  --    CO2, I-STAT mmol/L  --   --  21   BUN mg/dL 15   < >  --    CREATININE mg/dL 1 01   < >  --    CALCIUM mg/dL 9 2   < >  --    ALK PHOS U/L 191*   < >  --    ALT U/L 22   < >  --    AST U/L 59*   < >  --    GLUCOSE, ISTAT mg/dl  --   --  112    < > = values in this interval not displayed  Results from last 7 days   Lab Units 01/30/20  1827   INR  1 32*           Imaging Studies: I have personally reviewed pertinent imaging studies  Xr Chest Portable  Result Date: 1/30/2020  Impression: Cardiac enlargement with central vascular congestion and peripheral interstitial edema suggestive of moderate diffuse pulmonary edema  Workstation performed: [id number]     Ct Head Wo Contrast  Result Date: 1/30/2020  Impression: No acute intracranial abnormality  Workstation performed: [id number]     Ct Abdomen Pelvis W Contrast  Result Date: 1/29/2020  Impression: Gallstones are seen within the gallbladder; there is pericholecystic fluid raising the possibility of acute cholecystitis  Cirrhotic liver with right subphrenic ascites  Small area of compression atelectasis in the right lower lobe  Cardiomegaly is noted        Assessment and Plan:     Pancytopenia  Normocytic Anemia  - Progressively worse pancytopenia compared to June 2019  - Hb 8 5 today s/p 4 U PRBC total since admission, initially presented with Hb 4 9 without overt blood loss  - S/P EGD yesterday showing severe erosive antral gastritis without active bleeding as well as duodenitis, no EV/GV and no PHG  - Suspect pancytopenia/anemia is 2/2 bone marrow suppression from alcohol abuse and possible hemolysis given elevated LDH and low haptoglobin  - Recommend hematology follow up  - Avoid NSAIDs  - Pt ideally should have a colonoscopy but given her mental status and intermittent agitation, she will not be compliant with prep  - No evidence of iron, b12, or folate deficiency    Encephalopathy  - Progressive confusion over the past 6 months per family  - Continue lactulose PO and titrate for goal of 3-4 BMs daily, continue rifaximin 550 mg BID  - CT A/P on admission showed patent portal vein     Alcohol Abuse  Cirrhosis  - CT A/P on admission shows cirrhotic appearing liver, new diagnosis for patient  - HUSSAIN, AMA, anti-smooth muscle ab, hemochromatosis mutation pending  - Chronic hep panel neg  - No significant ascites or LE edema on exam today, cardiomegaly noted on imaging, follow up TTE and currently being diuresed by ICU due to pulmonary edema  - Contrasted CT abdomen did not note any liver mass to suggest HCC, AFP normal, repeat screening in 6 months  - EGD on 1/30 did not show any varices or PHG  - Not a liver transplant candidate due to age and due to her encephalopathy    The patient will be seen by Dr Ning Wall

## 2020-01-31 NOTE — PROGRESS NOTES
Pt with O2 dropping into the 80's and 70's, noncompliant with keeping O2 in place and keeps removing  Heart rate shooting up in the 140's, pt appears agitated and fighting staff  Rapid response team called and pt transferred to ICU for further care

## 2020-01-31 NOTE — ASSESSMENT & PLAN NOTE
Likely 2/2 alcohol abuse  · Seen on CT A/P  · GI following, appreciate their recommendations  · Underwent EGD yesterday-severe erosive antral gastritis, no varices, no portal HTN  · Continue rifaximin and lactulose  · Protonix BID

## 2020-01-31 NOTE — ASSESSMENT & PLAN NOTE
POA  · Unclear etiology   · Appreciate heme/onc recommendations  · Received transfusion this morning  · Underwent EGD-did not show active bleeding  · Continue to trend hemoglobin and transfuse as needed-received one unit yesterday with appropriate response, [place] hemoglobin was 6 2-receiving another unit now

## 2020-01-31 NOTE — RESPIRATORY THERAPY NOTE
Titrated patient's settings down to 50% and 40L       01/31/20 0345   Non-Invasive Information   Interface HFNC prongs   Non-Invasive Ventilation Mode HFNC   $ Pulse Oximetry Spot Check Charge Completed   Resp Comments titrating settings at this time   Non-Invasive Settings   FiO2 (%) 50   Flow (lpm) 40   Temperature (Set) 31   Non-Invasive Readings   Heater Temperature (Obs) 31   Skin Intervention Skin intact

## 2020-01-31 NOTE — PROGRESS NOTES
Vancomycin Assessment    Dagmar Portillo is a 69 y o  female who is currently receiving vancomycin 1000mg IV Q24H for Pneumonia     Relevant clinical data and objective history reviewed:  Creatinine   Date Value Ref Range Status   01/30/2020 1 10 0 60 - 1 30 mg/dL Final     Comment:     Standardized to IDMS reference method   01/30/2020 1 04 0 60 - 1 30 mg/dL Final     Comment:     Standardized to IDMS reference method   01/28/2020 0 96 0 60 - 1 30 mg/dL Final     Comment:     Standardized to IDMS reference method     /73   Pulse 83   Temp 98 2 °F (36 8 °C) (Axillary)   Resp 17   Ht 5' 4" (1 626 m)   Wt 85 9 kg (189 lb 6 oz)   SpO2 100%   BMI 32 51 kg/m²   I/O last 3 completed shifts: In: 1257 5 [P O :100; I V :100; Blood:1057 5]  Out: 700 [Urine:700]  Lab Results   Component Value Date/Time    BUN 11 01/30/2020 06:27 PM    WBC 4 12 (L) 01/30/2020 07:32 PM    HGB 6 2 (LL) 01/31/2020 12:33 AM    HCT 24 0 (L) 01/30/2020 07:32 PM    MCV 90 01/30/2020 07:32 PM     01/30/2020 07:32 PM     Temp Readings from Last 3 Encounters:   01/31/20 98 2 °F (36 8 °C) (Axillary)   11/06/17 97 9 °F (36 6 °C) (Oral)     Vancomycin Days of Therapy: 1    Assessment/Plan  The patient is currently on vancomycin utilizing scheduled dosing based on adjusted body weight (due to obesity)  Baseline risks associated with therapy include: pre-existing renal impairment, concomitant nephrotoxic medications and advanced age  The patient is currently receiving 1000mg IV Q24H and after clinical evaluation will be changed to 750mg IV Q12H  Pharmacy will also follow closely for s/sx of nephrotoxicity, infusion reactions and appropriateness of therapy  BMP and CBC will be ordered per protocol  Plan for trough as patient approaches steady state, prior to the 4th  dose at approximately 1030 on 2/1/20  Due to infection severity, will target a trough of 15-20 (appropriate for most indications)     Pharmacy will continue to follow the patients culture results and clinical progress daily      Aylin Block, Pharmacist

## 2020-01-31 NOTE — ASSESSMENT & PLAN NOTE
Patient asymptomatic, EKG unrevealing for ischemia  Continue to trend  Follow ECHO results  Resume home metoprolol  Hold aspirin in setting of pancytopenia  Would begin statin today

## 2020-01-31 NOTE — ASSESSMENT & PLAN NOTE
POA  · Unclear etiology   · Appreciate heme/onc recommendations  · Received transfusion this morning  · Underwent EGD-did not show active bleeding  · Continue to trend hemoglobin and transfuse as needed

## 2020-01-31 NOTE — ASSESSMENT & PLAN NOTE
Likely 2/2 vascular congestion, no previous history of heart failure  · Received 1 dose of lasix  · Monitor I/O, daily weights  · Will check Echo-previous echo from 2017 showed EF 60% and Grade 1 diastolic dysfunction  · Placed on HFNC with improvement in oxygenation-wean as able to keep sats>92%

## 2020-01-31 NOTE — CONSULTS
Consult- Mauricio Woodward 1945, 69 y o  female MRN: [id number]    Unit/Bed#:  Encounter: [id number]    Primary Care Provider: Nancy Troncoso MD   Date and time admitted to hospital: 1/28/2020 11:44 PM      Consults    Toxic metabolic encephalopathy  Assessment & Plan  Likely 2/2 hepatic encephalopathy  · CT head negative  · Serial neuro exams  · Will initiate lactulose via NGT or enema as patient is not alert enough to safely swallow  · There is also a concern for underlying advanced dementia in the setting of chronic alcohol use  · Appreciate neurology's recommendations  · Delirium precautions  · Regulate sleep/wake cycle  · CAM ICU    Acute respiratory failure with hypoxia [facility])  Assessment & Plan  Likely 2/2 vascular congestion, no previous history of heart failure  · Received 1 dose of lasix  · Monitor I/O, daily weights  · Will check Echo-previous echo from 2017 showed EF 60% and Grade 1 diastolic dysfunction  · Placed on HFNC with improvement in oxygenation-wean as able to keep sats>92%    Cirrhosis ([address] )  Assessment & Plan  Likely 2/2 alcohol abuse  · Seen on CT A/P  · GI following, appreciate their recommendations  · Underwent EGD yesterday-severe erosive antral gastritis, no varices, no portal HTN  · Continue rifaximin and lactulose  · Protonix BID    History of alcohol abuse  Assessment & Plan  Per family reports, the patient has cut back from heavily drinking, but is still drinking beer  Unclear when her last drink was    · Monitor for signs/symptoms of withdrawal  · Doubt her mental status change is 2/2 alcohol withdrawal at this time, as it seems to be present and worsening over the last few months  · Will hold CIWA protocol as benzodiazipines would not be appropriate for patient with her respiratory failure  · Daily thiamine, folate, MVI    Cigarette nicotine dependence without complication  Assessment & Plan  Continue nicotine patch    Benign essential hypertension  Assessment & Plan  · Will switch extended release oral metoprolol to scheduled IV dosing  · Continue close hemodynamic monitoring      Symptomatic anemia  Assessment & Plan  POA  · Unclear etiology   · Appreciate heme/onc recommendations  · Received transfusion this morning  · Underwent EGD-did not show active bleeding  · Continue to trend hemoglobin and transfuse as needed    * Pancytopenia [facility])  Assessment & Plan  Appreciate heme/onc recommendations  · Doubt ITP/HIT  · Appears chronic in nature, although has worsened  · May be 2/2 cirrhosis noted on CT scan  · Received transfusion yesterday with robust improvement in platelet count      -------------------------------------------------------------------------------------------------------------  Chief Complaint: AMS    History of Present Illness   HX and PE limited by: Mateo Andrade is a 69 y o  female who presented to the ED a day ago with pancytopenia and altered mental status  She also has a history of alcohol abuse  Her ammonia was elevated on admission and she was noted to have cirrhosis on CT scan  She was also was pancytopenic and has received PRBC and platelets  She underwent EGD yesterday and was found to have severe gastritis, no varices  Post procedure she was more confused and was ripping her oxygen off  She was noted to be hypoxic, tachycardiac, and hypertensive  Rapid response was called  She was transferred to the stepdown unit for HFNC and encephalopathy      History obtained from sibling and child, chart review and unobtainable from patient due to mental status   -------------------------------------------------------------------------------------------------------------  Dispo: Transfer to Stepdown Level 2    Code Status: Level 1 - Full Code  --------------------------------------------------------------------------------------------------------------  Review of Systems   Unable to perform ROS: Mental status change       Review of systems was unable to be performed secondary to AMS    Physical Exam   Constitutional: She appears lethargic  She is easily aroused  She appears ill  HENT:   Head: Normocephalic and atraumatic  Eyes: Pupils are equal, round, and reactive to light  EOM are normal    Neck: JVD present  Cardiovascular: Regular rhythm and normal heart sounds  Tachycardia present  Pulmonary/Chest: Tachypnea noted  She has rales  Abdominal: Soft  Bowel sounds are decreased  Neurological: She is easily aroused  She appears lethargic  GCS eye subscore is 3  GCS verbal subscore is 3  GCS motor subscore is 5  Skin: Skin is warm and dry      --------------------------------------------------------------------------------------------------------------  Vitals:   Vitals:    01/30/20 2100 01/30/20 2200 01/30/20 2353 01/31/20 0000   BP: 159/84 153/83  105/56   Pulse: (!) 106 100  97   Resp: (!) 35 14  15   Temp: 100 2 °F (37 9 °C) 99 5 °F (37 5 °C)  99 °F (37 2 °C)   TempSrc:       SpO2: 100% 100% 100% 100%   Weight:       Height:         Temp  Min: 97 3 °F (36 3 °C)  Max: 100 6 °F (38 1 °C)  IBW: 54 7 kg  Height: 5' 4" (162 6 cm)  Body mass index is 32 51 kg/m²      Laboratory and Diagnostics:  Results from last 7 days   Lab Units 01/30/20  1932 01/30/20  1917 01/30/20  1819 01/30/20  0846 01/29/20  0849 01/28/20  2359 01/28/20  1413   WBC Thousand/uL 4 12* 5 93  --  3 45* 3 22* 3 45* 3 38*   HEMOGLOBIN g/dL 7 7* 7 7*  --  6 8* 7 2* 5 2* 4 9*   I STAT HEMOGLOBIN g/dl  --   --  8 2*  --   --   --   --    HEMATOCRIT % 24 0* 24 3*  --  21 0* 22 2* 16 9* 16 4*   HEMATOCRIT, ISTAT %  --   --  24*  --   --   --   --    PLATELETS Thousands/uL 154 165  --  34* 32* 43* 45*   BANDS PCT %  --   --   --  3 2 4 1   MONO PCT %  --   --   --  7 7 10 10     Results from last 7 days   Lab Units 01/30/20  1827 01/30/20  1819 01/30/20  0846 01/28/20  2359 01/28/20  1413   SODIUM mmol/L 141  --  143 141 141   POTASSIUM mmol/L 4 6  --  4 2 4 1 4 1   CHLORIDE mmol/L 106  --  108 104 110*   CO2 mmol/L 20*  --  24 26 23   CO2, I-STAT mmol/L  --  21  --   --   --    ANION GAP mmol/L 15*  --  11 11 8   BUN mg/dL 11  --  10 9 7   CREATININE mg/dL 1 10  --  1 04 0 96 0 77   CALCIUM mg/dL 8 8  --  9 0 9 2 9 5   GLUCOSE RANDOM mg/dL 111  --  83 95 59*   ALT U/L 21  --  17 19 20   AST U/L 57*  --  40 41 38   ALK PHOS U/L 210*  --  211* 243* 238*   ALBUMIN g/dL 3 4*  --  3 2* 3 5 3 5   TOTAL BILIRUBIN mg/dL 1 70*  --  1 30* 0 90 0 79     Results from last 7 days   Lab Units 01/30/20  1827 01/30/20  0846   MAGNESIUM mg/dL 1 3* 1 4*   PHOSPHORUS mg/dL 3 9  --       Results from last 7 days   Lab Units 01/30/20  1827 01/30/20  0847 01/29/20  0904   INR  1 32* 1 33* 1 32*   PTT seconds  --   --  38*      Results from last 7 days   Lab Units 01/30/20  2224 01/30/20  1928 01/28/20  2359   TROPONIN I ng/mL 2 65* 0 64* <0 02     Results from last 7 days   Lab Units 01/30/20  1827   LACTIC ACID mmol/L 5 1*     ABG:    VBG:    Results from last 7 days   Lab Units 01/30/20  2046   PROCALCITONIN ng/ml 0 51*       EKG: ST  Imaging: I have personally reviewed pertinent reports  CT head wo contrast   Final Result      No acute intracranial abnormality  Workstation performed: [id number]         XR chest portable   Final Result      Cardiac enlargement with central vascular congestion and peripheral interstitial edema suggestive of moderate diffuse pulmonary edema  Workstation performed: [id number]         CT abdomen pelvis w contrast   Final Result      Gallstones are seen within the gallbladder; there is pericholecystic fluid raising the possibility of acute cholecystitis  Cirrhotic liver with right subphrenic ascites  Small area of compression atelectasis in the right lower lobe    Cardiomegaly is noted      Workstation performed: [id number]             Historical Information   Past Medical History:   Diagnosis Date    Benign essential hypertension     last assessed - [id number]    Chest pain 11/4/2017    Gastroesophageal reflux disease without esophagitis 11/16/2017     Past Surgical History:   Procedure Laterality Date    APPENDECTOMY       Social History   Social History     Substance and Sexual Activity   Alcohol Use Not Currently    Comment: History of significant daily alcohol intake   Sister joy no alcohol intake in 6 months     Social History     Substance and Sexual Activity   Drug Use No     Social History     Tobacco Use   Smoking Status Current Every Day Smoker    Packs/day: 0 50    Types: Cigarettes   Smokeless Tobacco Never Used       Family History:   Family History   Problem Relation Age of Onset    Heart attack Father         myocardial infarction     I have reviewed this patient's family history and commented on sigificant items within the HPI      Medications:  Current Facility-Administered Medications   Medication Dose Route Frequency    acetaminophen (TYLENOL) tablet 650 mg  650 mg Oral Q6H PRN    bisacodyl (DULCOLAX) rectal suppository 10 mg  10 mg Rectal Daily PRN    calcium carbonate (TUMS) chewable tablet 1,000 mg  1,000 mg Oral Daily PRN    cefepime (MAXIPIME) 1,000 mg in dextrose 5 % 50 mL IVPB  1,000 mg Intravenous Q12H    chlorhexidine (PERIDEX) 0 12 % oral rinse 15 mL  15 mL Swish & Spit V42C [facility]    folic acid (FOLVITE) tablet 1 mg  1 mg Oral Daily    haloperidol lactate (HALDOL) injection 1 mg  1 mg Intravenous Q6H PRN    lactulose 20 g/30 mL oral solution 20 g  20 g Oral BID    metoprolol (LOPRESSOR) injection 5 mg  5 mg Intravenous Q6H    metroNIDAZOLE (FLAGYL) IVPB (premix) 500 mg  500 mg Intravenous Q8H    nicotine (NICODERM CQ) 14 mg/24hr TD 24 hr patch 1 patch  1 patch Transdermal Daily    ondansetron (ZOFRAN) injection 4 mg  4 mg Intravenous Q6H PRN    pantoprazole (PROTONIX) injection 40 mg  40 mg Intravenous Q12H SHAYNA    rifaximin (XIFAXAN) tablet 550 mg  550 mg Oral Q12H [facility]    thiamine (VITAMIN B1) 500 mg in sodium chloride 0 9 % 50 mL IVPB  500 mg Intravenous Daily    Followed by   Jerral Kocher ON 2/2/2020] thiamine (VITAMIN B1) 250 mg in sodium chloride 0 9 % 50 mL IVPB  250 mg Intravenous Daily    [START ON 2/2/2020] thiamine (VITAMIN B1) tablet 100 mg  100 mg Oral Daily    vancomycin (VANCOCIN) IVPB (premix) 1,000 mg  12 5 mg/kg Intravenous Q24H     Home medications:  Prior to Admission Medications   Prescriptions Last Dose Informant Patient Reported? Taking?   aspirin 81 mg chewable tablet 1/29/2020 at Unknown time Family Member No Yes   Sig: Chew 1 tablet daily   metoprolol succinate (TOPROL-XL) 25 mg 24 hr tablet 1/28/2020 at Unknown time Family Member No Yes   Sig: Take 1 tablet (25 mg total) by mouth daily      Facility-Administered Medications: None     Allergies:  No Known Allergies  ------------------------------------------------------------------------------------------------------------  Advance Directive and Living Will:      Power of :    POLST:    ------------------------------------------------------------------------------------------------------------  Counseling / Coordination of Care  Total Critical Care time spent 25 minutes excluding procedures, teaching and family updates  MEREDITH Wood        Portions of the record may have been created with voice recognition software  Occasional wrong word or "sound a like" substitutions may have occurred due to the inherent limitations of voice recognition software    Read the chart carefully and recognize, using context, where substitutions have occurred

## 2020-01-31 NOTE — RESPIRATORY THERAPY NOTE
01/31/20 0834   Non-Invasive Information   Interface HFNC prongs   Non-Invasive Ventilation Mode HFNC   $ CPAP/BiPAP Charge - Initial & Daily Mgmt Yes   SpO2 100 %   $ Pulse Oximetry Spot Check Charge Completed   Non-Invasive Settings   FiO2 (%) 35   Flow (lpm) 30   Temperature (Set) 31   Non-Invasive Readings   Heater Temperature (Obs) 31   Skin Intervention Skin intact       Fio2 weaned to 35%  Flow weaned to 30 L/M  Patient tolerates well  No distress  Alida Mccall

## 2020-01-31 NOTE — ASSESSMENT & PLAN NOTE
Per family reports, the patient has cut back from heavily drinking, but is still drinking beer  Unclear when her last drink was    · Monitor for signs/symptoms of withdrawal  · Doubt her mental status change is 2/2 alcohol withdrawal at this time, as it seems to be present and worsening over the last few months  · Will hold CIWA protocol as benzodiazipines would not be appropriate for patient with her respiratory failure  · Daily thiamine, folate, MVI

## 2020-01-31 NOTE — PROGRESS NOTES
Progress Note - Keith Cowden 1945, 69 y o  female MRN: [id number]    Unit/Bed#:  Encounter: [id number]    Primary Care Provider: Evelia Lewis MD   Date and time admitted to hospital: 1/28/2020 11:44 PM        Toxic metabolic encephalopathy  Assessment & Plan  Likely 2/2 hepatic encephalopathy  · CT head negative  · Serial neuro exams  · Will initiate lactulose via NGT or enema as patient is not alert enough to safely swallow  · There is also a concern for underlying advanced dementia in the setting of chronic alcohol use  · Appreciate neurology's recommendations  · Delirium precautions  · Regulate sleep/wake cycle  · CAM ICU    Acute respiratory failure with hypoxia [facility])  Assessment & Plan  Likely 2/2 vascular congestion, no previous history of heart failure  · Received 1 dose of lasix  · Monitor I/O, daily weights  · Will check Echo-previous echo from 2017 showed EF 60% and Grade 1 diastolic dysfunction  · Placed on HFNC with improvement in oxygenation-wean as able to keep sats>92%    Cirrhosis ([address] )  Assessment & Plan  Likely 2/2 alcohol abuse  · Seen on CT A/P  · GI following, appreciate their recommendations  · Underwent EGD yesterday-severe erosive antral gastritis, no varices, no portal HTN  · Continue rifaximin and lactulose  · Protonix BID    History of alcohol abuse  Assessment & Plan  Per family reports, the patient has cut back from heavily drinking, but is still drinking beer  Unclear when her last drink was    · Monitor for signs/symptoms of withdrawal  · Doubt her mental status change is 2/2 alcohol withdrawal at this time, as it seems to be present and worsening over the last few months  · Will hold CIWA protocol as benzodiazipines would not be appropriate for patient with her respiratory failure  · Daily thiamine, folate, MVI    Cigarette nicotine dependence without complication  Assessment & Plan  Continue nicotine patch    Benign essential hypertension  Assessment & Plan  · Will switch extended release oral metoprolol to scheduled IV dosing  · Continue close hemodynamic monitoring      Symptomatic anemia  Assessment & Plan  POA  · Unclear etiology   · Appreciate heme/onc recommendations  · Received transfusion this morning  · Underwent EGD-did not show active bleeding  · Continue to trend hemoglobin and transfuse as needed-received one unit yesterday with appropriate response, MN hemoglobin was 6 2-receiving another unit now    * Pancytopenia [facility])  Assessment & Plan  Appreciate heme/onc recommendations  · Doubt ITP/HIT  · Appears chronic in nature, although has worsened  · May be 2/2 cirrhosis noted on CT scan  · Received transfusion yesterday with robust improvement in platelet count  · Continue to trend and transfuse as needed      ----------------------------------------------------------------------------------------  HPI/24hr events: Rapid response called for hypoxia, altered mental status  Placed on HFNC, given lasix and transferred to stepdown  Disposition: Continue Stepdown Level 1 level of care   Code Status: Level 1 - Full Code  ---------------------------------------------------------------------------------------  SUBJECTIVE  Resting comfortably in bed      Review of Systems   Unable to perform ROS: Mental status change     Review of systems was unable to be performed secondary to AMS  ---------------------------------------------------------------------------------------  OBJECTIVE    Vitals   Vitals:    20 0000 20 0110 20 0119 20 0145   BP: 105/56  94/55 115/73   Pulse: 97  83    Resp: 15  17    Temp: 99 °F (37 2 °C)  98 6 °F (37 °C) 98 2 °F (36 8 °C)   TempSrc:  Probe Rectal Axillary   SpO2: 100%  100%    Weight:       Height:         Temp (24hrs), Av 1 °F (37 3 °C), Min:98 2 °F (36 8 °C), Max:100 6 °F (38 1 °C)  Current: Temperature: 98 2 °F (36 8 °C)        SpO2: SpO2: 100 %  O2 Flow Rate (L/min): 60 L/min    Physical Exam Constitutional: She appears lethargic  She has a sickly appearance  HENT:   Head: Normocephalic and atraumatic  Eyes: Pupils are equal, round, and reactive to light  EOM are normal    Neck: No JVD present  Cardiovascular: Normal rate, regular rhythm and normal heart sounds  Pulmonary/Chest: No accessory muscle usage  No respiratory distress  She has decreased breath sounds  Abdominal: Soft  Bowel sounds are decreased  Neurological: She appears lethargic  GCS eye subscore is 3  GCS verbal subscore is 3  GCS motor subscore is 5  Skin: Skin is warm and dry         Invasive/non-invasive ventilation settings   Respiratory    Lab Data (Last 4 hours)    None         O2/Vent Data (Last 4 hours)      01/30 2353          Non-Invasive Ventilation Mode HFNC                   Laboratory and Diagnostics:  Results from last 7 days   Lab Units 01/31/20  0033 01/30/20  1932 01/30/20 1917 01/30/20 1819 01/30/20  0846 01/29/20  0849 01/28/20 2359 01/28/20  1413   WBC Thousand/uL  --  4 12* 5 93  --  3 45* 3 22* 3 45* 3 38*   HEMOGLOBIN g/dL 6 2* 7 7* 7 7*  --  6 8* 7 2* 5 2* 4 9*   I STAT HEMOGLOBIN g/dl  --   --   --  8 2*  --   --   --   --    HEMATOCRIT %  --  24 0* 24 3*  --  21 0* 22 2* 16 9* 16 4*   HEMATOCRIT, ISTAT %  --   --   --  24*  --   --   --   --    PLATELETS Thousands/uL  --  154 165  --  34* 32* 43* 45*   BANDS PCT %  --   --   --   --  3 2 4 1   MONO PCT %  --   --   --   --  7 7 10 10     Results from last 7 days   Lab Units 01/30/20  1827 01/30/20  1819 01/30/20  0846 01/28/20  2359 01/28/20  1413   SODIUM mmol/L 141  --  143 141 141   POTASSIUM mmol/L 4 6  --  4 2 4 1 4 1   CHLORIDE mmol/L 106  --  108 104 110*   CO2 mmol/L 20*  --  24 26 23   CO2, I-STAT mmol/L  --  21  --   --   --    ANION GAP mmol/L 15*  --  11 11 8   BUN mg/dL 11  --  10 9 7   CREATININE mg/dL 1 10  --  1 04 0 96 0 77   CALCIUM mg/dL 8 8  --  9 0 9 2 9 5   GLUCOSE RANDOM mg/dL 111  --  83 95 59*   ALT U/L 21  --  17 19 20 AST U/L 57*  --  40 41 38   ALK PHOS U/L 210*  --  211* 243* 238*   ALBUMIN g/dL 3 4*  --  3 2* 3 5 3 5   TOTAL BILIRUBIN mg/dL 1 70*  --  1 30* 0 90 0 79     Results from last 7 days   Lab Units 01/30/20  1827 01/30/20  0846   MAGNESIUM mg/dL 1 3* 1 4*   PHOSPHORUS mg/dL 3 9  --       Results from last 7 days   Lab Units 01/30/20  1827 01/30/20  0847 01/29/20  0904   INR  1 32* 1 33* 1 32*   PTT seconds  --   --  38*      Results from last 7 days   Lab Units 01/31/20  0033 01/30/20  2224 01/30/20  1928 01/28/20  2359   TROPONIN I ng/mL 3 59* 2 65* 0 64* <0 02     Results from last 7 days   Lab Units 01/30/20  1827   LACTIC ACID mmol/L 5 1*     ABG:    VBG:    Results from last 7 days   Lab Units 01/30/20  2046   PROCALCITONIN ng/ml 0 51*       Micro        EKG: NSR  Imaging: I have personally reviewed pertinent reports  Intake and Output  I/O       01/29 0701 - 01/30 0700 01/30 0701 - 01/31 0700    P  O  100 0    I V  (mL/kg)  100 (1 2)    Blood  1057 5    IV Piggyback  550    Total Intake(mL/kg) 100 (1 2) 1707 5 (19 9)    Urine (mL/kg/hr) 700 (0 3) 1150 (0 6)    Total Output 700 1150    Net -600 +557 5          Unmeasured Urine Occurrence 1 x           Height and Weights   Height: 5' 4" (162 6 cm)  IBW: 54 7 kg  Body mass index is 32 51 kg/m²  Weight (last 2 days)     Date/Time   Weight    01/30/20 1352   85 9 (189 38)                Nutrition       Diet Orders   (From admission, onward)             Start     Ordered    01/30/20 1854  Diet NPO  Diet effective now     Question Answer Comment   Diet Type NPO    RD to adjust diet per protocol?  Yes        01/30/20 1853                  Active Medications  Scheduled Meds:  Current Facility-Administered Medications:  acetaminophen 650 mg Oral Q6H PRN Wandalee Grave, CRNP    bisacodyl 10 mg Rectal Daily PRN MEREDITH Farias    calcium carbonate 1,000 mg Oral Daily PRN MEREDITH Catalan    cefepime 1,000 mg Intravenous Q12H MEREDITH Catalan Last Rate: 1,000 mg (01/31/20 0000)   chlorhexidine 15 mL Swish & Spit Q12H [address] Emerson martine Rossana, MEREDITH    folic acid 1 mg Oral Daily Gardenia Tracy, ZACHARYNP    haloperidol lactate 1 mg Intravenous Q6H PRN Gardenia Tracy, CRNP    lactulose 200 g Rectal Q6H Gardenia Tracy, CRNP    metoprolol 5 mg Intravenous Q6H Gardenia Tracy, CRNP    metroNIDAZOLE 500 mg Intravenous Q8H Gardenia Tracy, CRNP Last Rate: 500 mg (01/30/20 2315)   nicotine 1 patch Transdermal Daily Gardenia Tracy, ZACHARYNP    ondansetron 4 mg Intravenous Q6H PRN Gardenia Tracy, ZACHARYNP    pantoprazole 40 mg Intravenous Q12H [address] Gardenia Dawe, ZACHARYNP    rifaximin 550 mg Oral Q12H [address] Gardenia Tracy, CRNP    thiamine 500 mg Intravenous Daily Gardenia Tracy, ZACHARYNP Last Rate: 500 mg (01/30/20 1539)   Followed by        Virgen Higuera ON 2/2/2020] thiamine 250 mg Intravenous Daily Gardenia Tracy, MEREDITH    [START ON 2/2/2020] thiamine 100 mg Oral Daily Gardenia Tracy, MEREDITH    vancomycin 12 5 mg/kg Intravenous Q24H MEREDITH Catalan Last Rate: 1,000 mg (01/30/20 2315)     Continuous Infusions:     PRN Meds:     acetaminophen 650 mg Q6H PRN   bisacodyl 10 mg Daily PRN   calcium carbonate 1,000 mg Daily PRN   haloperidol lactate 1 mg Q6H PRN   ondansetron 4 mg Q6H PRN     ---------------------------------------------------------------------------------------  Advance Directive and Living Will:      Power of :    POLST:    ---------------------------------------------------------------------------------------  Counseling / Coordination of Care  Total Critical Care time spent 0 minutes excluding procedures, teaching and family updates  MEREDITH Eaton Chi      Portions of the record may have been created with voice recognition software  Occasional wrong word or "sound a like" substitutions may have occurred due to the inherent limitations of voice recognition software    Read the chart carefully and recognize, using context, where substitutions have occurred

## 2020-01-31 NOTE — PROGRESS NOTES
Progress Note - Laura Contreras 1945, 69 y o  female MRN: [id number]    Unit/Bed#:  Encounter: [id number]    Primary Care Provider: Joaquina Bose MD   Date and time admitted to hospital: 1/28/2020 11:44 PM    Toxic metabolic encephalopathy  Assessment & Plan  Continue lactulose/rifaximin  Avoid further sedating medications  Will hold gabapentin, appreciate neurology recommendations    Elevated troponin  Assessment & Plan  Patient asymptomatic, EKG unrevealing for ischemia  Continue to trend  Follow ECHO results  Resume home metoprolol  Hold aspirin in setting of pancytopenia  Would begin statin today    Acute respiratory failure with hypoxia ([facility] [address] )  Assessment & Plan  Slowly improving with diuretics  Wean O2 as able  Respiratory protocol    Cirrhosis ([facility] [address] )  Assessment & Plan  Appreciate GI recommendations  Optimize nutrition    History of alcohol abuse  Assessment & Plan  It does not appear that patient is in DTs at all  She has been having these symptoms for many months now  Neurology input appreciated  Tried Haldol last night which did not help much  I believe she got some Ativan for agitation  She has somewhat prolonged QT  Memory difficulties  Assessment & Plan  Patient does have family history of dementia  I think alcohol is playing a major role in her encephalopathy/dementia  Cigarette nicotine dependence without complication  Assessment & Plan  Encouraged to quit  Continue with nicotine patch    Benign essential hypertension  Assessment & Plan  Blood pressure stable    Symptomatic anemia  Assessment & Plan  EGD without evidence of bleeding  Hemolysis workup with elevated LDH and decreased haptoglobin, hemolysis smear demonstrates   Patient has received 2u PRBC with effect  Appreciate hematology recommendations    * Pancytopenia ([facility] [address] )  Assessment & Plan  I think this is most likely related to her chronic alcoholism with bone marrow suppression and hypersplenism/cirrhosis  Hematology/oncology and GI input appreciated  Obtained consent again today via telephone from her son  As per son, patient had signed the consent before as she has already received blood transfusions on admission  GI following and would order blood products as needed  Patient has received a total of 2u PRBC and 3u PLT    Code Status: Level 1 - Full Code  POA:    POLST:      Reason for ICU admission:   Acute hypoxic respiratory failure- improving    Active problems:   Principal Problem:    Pancytopenia ([facility] )  Active Problems:    Toxic metabolic encephalopathy    Symptomatic anemia    Benign essential hypertension    Cigarette nicotine dependence without complication    History of alcohol abuse    Cirrhosis ([facility] )    Acute respiratory failure with hypoxia (HCC)    Elevated troponin  Resolved Problems:    * No resolved hospital problems  *      Consultants:   Gastroenterology, neurology, hematology    History of Present Illness:   Patient is a 52-year-old female presenting on 01/29 with weakness, altered mental status, an outpatient finding of pancytopenia  She has a past medical history of hypertension, paroxysmal supraventricular tachycardia, gastroesophageal reflux disease, and ongoing alcohol use  In the emergency room workup was concerning for worsening anemia and thrombocytopenia  She has received 2 units total of packed red blood cells and 2 units of platelets  She was referred to the floor for admission  Summary of clinical course:   She was seen by the Neurology team for continued altered mental status, this was felt the be possibly related to worsening dementia  She was seen by gastroenterology secondary findings of cirrhosis on CT as well as hyperammonemia at which point she was started on lactulose and rifaximin  She developed shortness of breath over the course of the evening on 01/30 at work for point a rapid response was called    On arrival she was hypoxic despite nasal cannula, she was started on high-flow nasal cannula, and transferred to the step-down unit  Her chest x-ray was concerning volume overload at which point she received 20 mg of IV Lasix with good response  She recently had an EGD that day and there is concern for aspiration liz procedure so she was cultured and started on broad-spectrum antibiotics  She was able to successfully transitioned off nasal cannula  Her laboratory workup is concerning for an elevated LDH, decreased haptoglobin, and increasing troponin  Her echocardiogram is presently pending  At this point she is felt to be stable for transition to a medical-surgical floor with telemetry  Recent or scheduled procedures:   1/30 CT Head- no acute intracranial abnormality  1/30 CXR- cardiac enlargement with central vascular congestion peripheral interstitial edema suggestive moderate diffuse pulmonary edema  1/30 EGD- normal esophagus, nodule in the body of the stomach with cold biopsy performed, severe localized edematous eroded erythematous and ulcerated mucosa in the antrum, cold biopsy performed, moderate generalized edematous eroded erythematous nodular mucosa in the duodenal bulb and 2nd part of the duodenum  1/29 CT A/P- gallstones within the gallbladder, pericholecystic fluid, cirrhotic liver with right sub phrenic ascites, small area of compression atelectasis in the right lower lobe, cardiomegaly    Outstanding/pending diagnostics:   Hematology work-up, ECHO    Cultures:   1/31 MRSA- pending  1/30 Blood x2- pending       Mobilization Plan:    With significant assistance    Nutrition Plan:   Oral diet    VTE Pharmacologic Prophylaxis: Anemia work-up  VTE Mechanical Prophylaxis: sequential compression device    Discharge Plan:   Patient should be ready for discharge to home/rehab after anemia work-up, ECHO, PT/OT    Initial Physical Therapy Recommendations: Pending  Initial Occupational Therapy Recommendations: Pending  Initial /Plan: Following    Home medications that are not reordered and reason why:   Aspirin- anemia of uncertain etiology      Spoke with Dr Himanshu Sandoval regarding transfer  Please call [phone] with any questions or concerns  Portions of the record may have been created with voice recognition software  Occasional wrong word or "sound a like" substitutions may have occurred due to the inherent limitations of voice recognition software  Read the chart carefully and recognize, using context, where substitutions have occurred

## 2020-02-01 ENCOUNTER — APPOINTMENT (INPATIENT)
Dept: RADIOLOGY | Facility: HOSPITAL | Age: 75
DRG: 808 | End: 2020-02-01
Payer: MEDICARE

## 2020-02-01 PROBLEM — J96.01 ACUTE RESPIRATORY FAILURE WITH HYPOXIA (HCC): Status: RESOLVED | Noted: 2020-01-31 | Resolved: 2020-02-01

## 2020-02-01 LAB
ANION GAP SERPL CALCULATED.3IONS-SCNC: 10 MMOL/L (ref 4–13)
ATRIAL RATE: 75 BPM
BUN SERPL-MCNC: 20 MG/DL (ref 5–25)
CALCIUM SERPL-MCNC: 8.9 MG/DL (ref 8.3–10.1)
CHLORIDE SERPL-SCNC: 105 MMOL/L (ref 100–108)
CO2 SERPL-SCNC: 24 MMOL/L (ref 21–32)
CREAT SERPL-MCNC: 1.12 MG/DL (ref 0.6–1.3)
DEPRECATED AT III PPP: 80 % OF NORMAL (ref 92–136)
ERYTHROCYTE [DISTWIDTH] IN BLOOD BY AUTOMATED COUNT: 18.7 % (ref 11.6–15.1)
GFR SERPL CREATININE-BSD FRML MDRD: 56 ML/MIN/1.73SQ M
GLUCOSE SERPL-MCNC: 132 MG/DL (ref 65–140)
HCT VFR BLD AUTO: 25.4 % (ref 34.8–46.1)
HGB BLD-MCNC: 8.3 G/DL (ref 11.5–15.4)
MAGNESIUM SERPL-MCNC: 1.9 MG/DL (ref 1.6–2.6)
MCH RBC QN AUTO: 29.1 PG (ref 26.8–34.3)
MCHC RBC AUTO-ENTMCNC: 32.7 G/DL (ref 31.4–37.4)
MCV RBC AUTO: 89 FL (ref 82–98)
MRSA NOSE QL CULT: NORMAL
P AXIS: 90 DEGREES
PLATELET # BLD AUTO: 96 THOUSANDS/UL (ref 149–390)
PMV BLD AUTO: 9.9 FL (ref 8.9–12.7)
POTASSIUM SERPL-SCNC: 4.1 MMOL/L (ref 3.5–5.3)
PR INTERVAL: 186 MS
PROCALCITONIN SERPL-MCNC: 0.43 NG/ML
QRS AXIS: 94 DEGREES
QRSD INTERVAL: 70 MS
QT INTERVAL: 464 MS
QTC INTERVAL: 518 MS
RBC # BLD AUTO: 2.85 MILLION/UL (ref 3.81–5.12)
SODIUM SERPL-SCNC: 139 MMOL/L (ref 136–145)
T WAVE AXIS: 79 DEGREES
TPA PPP QL CHRO: 81 % OF NORMAL (ref 77–138)
VANCOMYCIN TROUGH SERPL-MCNC: 15 UG/ML (ref 10–20)
VENTRICULAR RATE: 75 BPM
WBC # BLD AUTO: 3.62 THOUSAND/UL (ref 4.31–10.16)

## 2020-02-01 PROCEDURE — 99232 SBSQ HOSP IP/OBS MODERATE 35: CPT | Performed by: INTERNAL MEDICINE

## 2020-02-01 PROCEDURE — 80202 ASSAY OF VANCOMYCIN: CPT | Performed by: NURSE PRACTITIONER

## 2020-02-01 PROCEDURE — 83735 ASSAY OF MAGNESIUM: CPT | Performed by: NURSE PRACTITIONER

## 2020-02-01 PROCEDURE — 99233 SBSQ HOSP IP/OBS HIGH 50: CPT | Performed by: INTERNAL MEDICINE

## 2020-02-01 PROCEDURE — 93010 ELECTROCARDIOGRAM REPORT: CPT | Performed by: INTERNAL MEDICINE

## 2020-02-01 PROCEDURE — 93005 ELECTROCARDIOGRAM TRACING: CPT

## 2020-02-01 PROCEDURE — 80048 BASIC METABOLIC PNL TOTAL CA: CPT | Performed by: NURSE PRACTITIONER

## 2020-02-01 PROCEDURE — 85027 COMPLETE CBC AUTOMATED: CPT | Performed by: NURSE PRACTITIONER

## 2020-02-01 PROCEDURE — 84145 PROCALCITONIN (PCT): CPT | Performed by: NURSE PRACTITIONER

## 2020-02-01 PROCEDURE — 82948 REAGENT STRIP/BLOOD GLUCOSE: CPT

## 2020-02-01 RX ORDER — MAGNESIUM SULFATE HEPTAHYDRATE 40 MG/ML
2 INJECTION, SOLUTION INTRAVENOUS DAILY PRN
Status: DISCONTINUED | OUTPATIENT
Start: 2020-02-02 | End: 2020-02-06 | Stop reason: HOSPADM

## 2020-02-01 RX ORDER — FUROSEMIDE 10 MG/ML
40 INJECTION INTRAMUSCULAR; INTRAVENOUS
Status: DISCONTINUED | OUTPATIENT
Start: 2020-02-01 | End: 2020-02-02

## 2020-02-01 RX ADMIN — LACTULOSE 30 G: 10 SOLUTION ORAL at 06:00

## 2020-02-01 RX ADMIN — METOPROLOL TARTRATE 12.5 MG: 25 TABLET ORAL at 21:47

## 2020-02-01 RX ADMIN — PANTOPRAZOLE SODIUM 40 MG: 40 TABLET, DELAYED RELEASE ORAL at 15:09

## 2020-02-01 RX ADMIN — MAGNESIUM OXIDE TAB 400 MG (241.3 MG ELEMENTAL MG) 400 MG: 400 (241.3 MG) TAB at 11:27

## 2020-02-01 RX ADMIN — HALOPERIDOL LACTATE 1 MG: 5 INJECTION, SOLUTION INTRAMUSCULAR at 22:08

## 2020-02-01 RX ADMIN — CEFEPIME HYDROCHLORIDE 1000 MG: 1 INJECTION, POWDER, FOR SOLUTION INTRAMUSCULAR; INTRAVENOUS at 22:08

## 2020-02-01 RX ADMIN — FUROSEMIDE 40 MG: 10 INJECTION, SOLUTION INTRAMUSCULAR; INTRAVENOUS at 15:11

## 2020-02-01 RX ADMIN — METRONIDAZOLE 500 MG: 500 TABLET ORAL at 21:47

## 2020-02-01 RX ADMIN — LACTULOSE 30 G: 10 SOLUTION ORAL at 00:10

## 2020-02-01 RX ADMIN — PANTOPRAZOLE SODIUM 40 MG: 40 TABLET, DELAYED RELEASE ORAL at 06:13

## 2020-02-01 RX ADMIN — VANCOMYCIN HYDROCHLORIDE 750 MG: 750 INJECTION, SOLUTION INTRAVENOUS at 22:46

## 2020-02-01 RX ADMIN — THIAMINE HYDROCHLORIDE 500 MG: 100 INJECTION, SOLUTION INTRAMUSCULAR; INTRAVENOUS at 10:14

## 2020-02-01 RX ADMIN — METRONIDAZOLE 500 MG: 500 TABLET ORAL at 06:04

## 2020-02-01 RX ADMIN — PREDNISONE 60 MG: 20 TABLET ORAL at 09:46

## 2020-02-01 RX ADMIN — LACTULOSE 30 G: 10 SOLUTION ORAL at 13:21

## 2020-02-01 RX ADMIN — RIFAXIMIN 550 MG: 550 TABLET ORAL at 09:48

## 2020-02-01 RX ADMIN — FOLIC ACID 1 MG: 1 TABLET ORAL at 09:46

## 2020-02-01 RX ADMIN — METOPROLOL TARTRATE 12.5 MG: 25 TABLET ORAL at 09:47

## 2020-02-01 RX ADMIN — CEFEPIME HYDROCHLORIDE 1000 MG: 1 INJECTION, POWDER, FOR SOLUTION INTRAMUSCULAR; INTRAVENOUS at 10:10

## 2020-02-01 RX ADMIN — LACTULOSE 30 G: 10 SOLUTION ORAL at 21:47

## 2020-02-01 RX ADMIN — LACTULOSE 30 G: 10 SOLUTION ORAL at 17:15

## 2020-02-01 RX ADMIN — RIFAXIMIN 550 MG: 550 TABLET ORAL at 21:48

## 2020-02-01 RX ADMIN — LACTULOSE 30 G: 10 SOLUTION ORAL at 09:47

## 2020-02-01 RX ADMIN — MAGNESIUM OXIDE TAB 400 MG (241.3 MG ELEMENTAL MG) 400 MG: 400 (241.3 MG) TAB at 17:14

## 2020-02-01 RX ADMIN — VANCOMYCIN HYDROCHLORIDE 750 MG: 750 INJECTION, SOLUTION INTRAVENOUS at 11:27

## 2020-02-01 RX ADMIN — PANTOPRAZOLE SODIUM 40 MG: 40 TABLET, DELAYED RELEASE ORAL at 09:47

## 2020-02-01 RX ADMIN — METRONIDAZOLE 500 MG: 500 TABLET ORAL at 15:09

## 2020-02-01 RX ADMIN — HALOPERIDOL LACTATE 1 MG: 5 INJECTION, SOLUTION INTRAMUSCULAR at 10:16

## 2020-02-01 NOTE — PROGRESS NOTES
[facility] Progress Note  Patient: Laura Contreras 69 y o  female   MRN: [id number]  PCP: Joaquina Bose MD  Unit/Bed#:  Encounter: [id number]  Date Of Visit: 02/01/20          * Pancytopenia [facility])  Assessment & Plan  Patient received blood transfusion/platelets  Her platelets likely came up  Hemoglobin also stable  Still has low white cell count  I thing her pancytopenia is related to her chronic alcoholism  I doubt that she has TTP  Hematology/oncology service has been on board  Continue to monitor  Elevated troponin  Assessment & Plan  Likely secondary to type 2 MI-respiratory issues  Ejection fraction about 50-55%  Cardiology input appreciated  Cirrhosis ([facility] [address] )  Assessment & Plan  Secondary to chronic alcoholism  Toxic metabolic encephalopathy  Assessment & Plan  Events noted  Patient after EGD, was more lethargic and also became more hypoxic/short of breath  Went into respiratory failure  Likely secondary to fluid overload with congested x-ray  Not sure if she aspirated  Her mental status has been waxing and waning for the last 6 months or more  If she may have underlying dementia and most likely secondary to chronic alcoholism or made worse with alcohol use  She answered many of the questions appropriately  She still believes that she is in [place]  History of alcohol abuse  Assessment & Plan  Doubt that she has Wernicke's encephalopathy or Korsakoff psychosis  She received thiamine intravenously  Also on folate  Continue to monitor  She does not appear to be in withdrawals     Paroxysmal SVT (supraventricular tachycardia) [facility])  Assessment & Plan  Patient appears to be in sinus rhythm with lot of ectopy on the monitor  He has had episodes of paroxysmal AFib  Discussed with Cardiology  No further workup or treatment warranted at this time    Continue to monitor    Cigarette nicotine dependence without complication  Assessment & Plan  Encouraged to quit  To Continue with nicotine patch    Benign essential hypertension  Assessment & Plan  Blood pressure currently stable    Symptomatic anemia  Assessment & Plan  ? Etiology  EGD unremarkable  ?  Low-grade hemolysis  Patient is a blood transfusion and platelets  Continue to monitor    Acute respiratory failure with hypoxia (HCC)resolved as of 2/1/2020  Assessment & Plan  Likely multifactorial including fluid overload and possibly aspiration pneumonia/pneumonitis  Has slightly elevated procalcitonin  On empiric antibiotics  Would deescalate antibiotics soon  MRSA screen still pending  If negative, would discontinue vancomycin  Cultures so far have been negative    Hypomagnesemia:  Repleted  Recheck magnesium tomorrow  Would also put her on oral magnesium      Present on Admission:   Pancytopenia ([address] )   Symptomatic anemia   Benign essential hypertension   Cigarette nicotine dependence without complication   History of alcohol abuse   Toxic metabolic encephalopathy   Cirrhosis (HCC)   (Resolved) Acute respiratory failure with hypoxia (HCC)   Elevated troponin   Paroxysmal SVT (supraventricular tachycardia) (HCC)            VTE Pharmacologic Prophylaxis:   Pharmacologic: Pharmacologic VTE Prophylaxis contraindicated due to Severe anemia/thrombocytopenia requiring blood transfusion and platelet transfusion  Mechanical VTE Prophylaxis in Place: Yes    Patient Centered Rounds: I have performed bedside rounds with nursing staff today  Discussions with Specialists or Other Care Team Provider:  Yes    Education and Discussions with Family / Patient:  Yes    Time Spent for Care: 40+ minutes  More than 50% of total time spent on counseling and coordination of care as described above      Current Length of Stay: 3 day(s)    Current Patient Status: Inpatient   Certification Statement: The patient will continue to require additional inpatient hospital stay due to Acute respiratory failure/encephalopathy/pancytopenia    Discharge Plan: To be determined    Code Status: Level 1 - Full Code      Subjective:   Patient is lying flat on her right side in ICU  She woke up in answer many of the questions appropriately  She denied any pain  Denies being short of breath  She also mentioned that she had her breakfast   She does not know where she is-she thinks that she is in [place]  She denies any nausea or vomiting  Complaining of some head pain    Objective:     Vitals:   Temp (24hrs), Av 9 °F (36 6 °C), Min:97 6 °F (36 4 °C), Max:98 1 °F (36 7 °C)    Temp:  [97 6 °F (36 4 °C)-98 1 °F (36 7 °C)] 98 °F (36 7 °C)  HR:  [82-99] 82  Resp:  [17-29] 19  BP: (108-124)/(57-63) 110/58  SpO2:  [91 %-95 %] 95 %  Body mass index is 29 63 kg/m²  Input and Output Summary (last 24 hours): Intake/Output Summary (Last 24 hours) at 2020 1041  Last data filed at 2020 0602  Gross per 24 hour   Intake 880 ml   Output 835 ml   Net 45 ml           Physical Exam:     Vital signs reviewed as above  S1 and S2 audible  Patient is a sleepy although easily arousable an answer from some of the questions as above  Abdomen is soft  Nontender  Bowel sounds are audible  Not in any respiratory distress  Poor air entry on auscultation because of poor inspiratory effort  No cyanosis or clubbing  Pale conjunctiva  Depressed  Oropharynx are slightly dry  Neck is supple  Patient's other examination is somewhat limited because of current condition      Additional Data:     Labs:    Results from last 7 days   Lab Units 20  0602  20  0846   WBC Thousand/uL 3 62*   < > 3 45*   HEMOGLOBIN g/dL 8 3*   < > 6 8*   I STAT HEMOGLOBIN   --    < >  --    HEMATOCRIT % 25 4*   < > 21 0*   HEMATOCRIT, ISTAT   --    < >  --    PLATELETS Thousands/uL 96*   < > 34*   LYMPHO PCT %  --   --  41   MONO PCT %  --   --  7   EOS PCT %  --   --  4    < > = values in this interval not displayed       Results from last 7 days   Lab Units 02/01/20  0602 01/31/20  0527  01/30/20  1819   POTASSIUM mmol/L 4 1 3 8   < >  --    CHLORIDE mmol/L 105 106   < >  --    CO2 mmol/L 24 25   < >  --    CO2, I-STAT mmol/L  --   --   --  21   BUN mg/dL 20 15   < >  --    CREATININE mg/dL 1 12 1 01   < >  --    CALCIUM mg/dL 8 9 9 2   < >  --    ALK PHOS U/L  --  191*   < >  --    ALT U/L  --  22   < >  --    AST U/L  --  59*   < >  --    GLUCOSE, ISTAT mg/dl  --   --   --  112    < > = values in this interval not displayed  Results from last 7 days   Lab Units 01/31/20  1351   INR  1 43*       * I Have Reviewed All Lab Data Listed Above  * Additional Pertinent Lab Tests Reviewed: All Labs Within Last 24 Hours Reviewed    I  Recent Cultures (last 7 days):     Results from last 7 days   Lab Units 01/30/20  2333   BLOOD CULTURE  No Growth at 24 hrs  No Growth at 24 hrs         Last 24 Hours Medication List:     Current Facility-Administered Medications:  acetaminophen 650 mg Oral Q6H PRN Eugenia [place], CRNP    calcium carbonate 1,000 mg Oral Daily PRN Eugenia Carola, ZACHARYNP    cefepime 1,000 mg Intravenous Q12H Eugenia [place], CRNP Last Rate: 1,000 mg (04/47/53 1443)   folic acid 1 mg Oral Daily Eugenia Carola, CRNP    furosemide 40 mg Intravenous BID (diuretic) Leena Booker DO    haloperidol lactate 1 mg Intravenous Q6H PRN Eugenia Carola, CRNP    lactulose 30 g Oral Q4H Eugenia Carola, CRNP    metoprolol tartrate 12 5 mg Oral Q12H [address] MEREDITH Estrada    metroNIDAZOLE 500 mg Oral [facility] MEREDITH Armendariz    nicotine 1 patch Transdermal Daily MEREDITH Estrada    ondansetron 4 mg Intravenous Q6H PRN Eugenia Carola, MEREDITH    pantoprazole 40 mg Oral BID AC MEREDITH Estrada    predniSONE 60 mg Oral Daily Eugenia Carola, MEREDIHT    rifaximin 550 mg Oral Q12H [address] Eugenia [place], CRNP    thiamine 500 mg Intravenous Daily Euegnia Carola, CRNP Last Rate: 500 mg (02/01/20 1014)   Followed by        Edith Lloyd ON 2/2/2020] thiamine 250 mg Intravenous Daily Unknown Dawn, CRNP    [START ON 2/2/2020] thiamine 100 mg Oral Daily Unknown [place], CRNP    vancomycin 10 mg/kg (Adjusted) Intravenous Q12H Unknown Dawn, CRNP Last Rate: 750 mg (01/31/20 2304)        Today, Patient Was Seen By: Kevon Siemens, MD    ** Please Note: Dragon 360 Dictation voice to text software may have been used in the creation of this document   **

## 2020-02-01 NOTE — ASSESSMENT & PLAN NOTE
As per RN, patient's heart rate has been in 140s and 150s  I do not see any documentation anywhere  In vital signs, her rate is   Increased the dose/frequency of her Lopressor and changed it from Toprol-XL to Lopressor  Also added p r n  IV Lopressor  Replete potassium and electrolytes  Patient also had paroxysmal AFib  Not a candidate for anticoagulation    Heart rate remains stable now

## 2020-02-01 NOTE — PROGRESS NOTES
Patient: Al Lal  Patient MRN: [id number]  Service date: 2/1/2020  Attending Physician:       CHIEF COMPLAIN  Chief Complaint   Patient presents with    Abnormal Lab     Per family, pt had a PCP appt today for routine check up and had blood work drawn  Per family, they got a call saying to come to the ED for evaluation of low hemoglobin  Pt reports generalized weakness beginning today  Heme / Oncology history:  Al Lal is a 69 y o  female     1  Chronic pancytopenia likely due to underlying cirrhosis    2, alcohol cirrhosis, patient reported has been sober for 6 months      HISTORY OF PRESENT ILLNESS:  Al Lal is a 69 y o  female who has above summarized history , presents with fatigue, lab showed hemoglobin of 4, admitted to the hospital for further evaluation  Hematology was consulted for cytopenia  Patient reported has been feeling better since admission  Afebrile, no bleeding  No easy bruise  ASSESSMENT/PLAN:  Al Lal is a 69 y o  female with:    1) leukocytopenia - ANC 1 38 on 01/30, mild to moderate  Questionable pneumonia, on cefepime    2) acute on chronic - require transfusion, appears stable;     3) hemolysis - low haptoglobin, high LDH, the common cell and she to size were seen in smear, suggesting hemolysis; kai is negative  DIC panel is essentially negative ; Lyell Close is pending;       4) thrombocytopenia  - likely due to liver cirrhosis and ? Infection, antibiotics use  - continue monitor CBC  - hemolysis is likely due to underlying liver disease TTP is possible, however less likely, follow MICHAEL IRIZARRY 13    - on prednisone, weekly taper  - limit abtibiotocs use              minutes were spent face to face with patient with greater than 50% of the time spent in counseling or coordination of care including discussions of treatment instructions  All of the patient's questions were answered to their satisfactory during this discussion  Gerry Nunez MD PhD  Hematology / Oncology              PROBLEM LIST:  Patient Active Problem List   Diagnosis    Symptomatic anemia    Benign essential hypertension    Cigarette nicotine dependence without complication    Hyperglycemia    Paroxysmal SVT (supraventricular tachycardia) (HCC)    Sinus tachycardia    Urinary incontinence    Memory difficulties    Gait disturbance    Overweight (BMI 25 0-29  9)    Vitamin D deficiency    Recurrent major depressive disorder, in partial remission ([facility] [address] )    Pancytopenia ([facility] [address] )    History of alcohol abuse    Toxic metabolic encephalopathy    Cirrhosis ([facility] [address] )    Elevated troponin                     PAST MEDICAL HISTORY:   has a past medical history of Benign essential hypertension, Chest pain (11/4/2017), and Gastroesophageal reflux disease without esophagitis (11/16/2017)  PAST SURGICAL HISTORY:   has a past surgical history that includes Appendectomy      CURRENT MEDICATIONS  Scheduled Meds:  Current Facility-Administered Medications:  acetaminophen 650 mg Oral Q6H PRN Charlcie Manner, CRNP    calcium carbonate 1,000 mg Oral Daily PRN Charlcie Manner, CRNP    cefepime 1,000 mg Intravenous Q12H Charolgaie Manner, CRNP Last Rate: 1,000 mg (93/28/84 4704)   folic acid 1 mg Oral Daily Charlcie Manner, CRNP    furosemide 40 mg Intravenous BID (diuretic) Lesley Quach, DO    haloperidol lactate 1 mg Intravenous Q6H PRN Trevinie Manner, CRNP    lactulose 30 g Oral Q4H Trevinie Manner, CRNP    magnesium oxide 400 mg Oral BID Hilaria Adame MD    Jesica Bi ON 2/2/2020] magnesium sulfate 2 g Intravenous Daily PRN Hilaria Adame MD    metoprolol tartrate 12 5 mg Oral Q12H [address] MEREDITH Etsrada    metroNIDAZOLE 500 mg Oral [facility] Chardwight Manner, CRNP    nicotine 1 patch Transdermal Daily Charlcie Manner, CRANDRE    ondansetron 4 mg Intravenous Q6H PRN Lyle Manner, CRNP    pantoprazole 40 mg Oral BID AC MEREDITH Estrada    predniSONE 60 mg Oral Daily MEREDITH Vazquez    rifaximin 550 mg Oral Q12H [facility] Payton Vazquez [address]    [START ON 2/2/2020] thiamine 250 mg Intravenous Daily MEREDITH Vazquez    [START ON 2/2/2020] thiamine 100 mg Oral Daily MEREDITH Vazquez    vancomycin 10 mg/kg (Adjusted) Intravenous Q12H MEREDITH Vazquez Last Rate: 750 mg (02/01/20 1127)     Continuous Infusions:   PRN Meds:   acetaminophen    calcium carbonate    haloperidol lactate    [START ON 2/2/2020] magnesium sulfate    ondansetron    SOCIAL HISTORY:   reports that she has been smoking cigarettes  She has been smoking about 0 50 packs per day  She has never used smokeless tobacco  She reports that she drank alcohol  She reports that she does not use drugs  FAMILY HISTORY:  family history includes Heart attack in her father  ALLERGIES:  has No Known Allergies  REVIEW OF SYSTEMS:  Please note that a 14-point review of systems was performed to include Constitutional, HEENT, Respiratory, CVS, GI, , Musculoskeletal, Integumentary, Neurologic, Rheumatologic, Endocrinologic, Psychiatric, Lymphatic, and Hematologic/Oncologic systems were reviewed and are negative unless otherwise stated in HPI  Positive and negative findings pertinent to this evaluation are incorporated into the history of present illness  PHYSICAL EXAMINATION:  Vital Signs: /54 (BP Location: Left arm)   Pulse 86   Temp 98 4 °F (36 9 °C) (Oral)   Resp 16   Ht 5' 4" (1 626 m)   Wt 78 3 kg (172 lb 9 9 oz)   SpO2 95%   BMI 29 63 kg/m²   Body surface area is 1 84 meters squared   Ht Readings from Last 3 Encounters:   01/30/20 5' 4" (1 626 m)   01/28/20 5' 4" (1 626 m)   05/31/19 5' 4" (1 626 m)     Wt Readings from Last 3 Encounters:   02/01/20 78 3 kg (172 lb 9 9 oz)   01/28/20 78 4 kg (172 lb 12 8 oz)   05/31/19 82 6 kg (182 lb)      Temp Readings from Last 3 Encounters:   02/01/20 98 4 °F (36 9 °C) (Oral)   11/06/17 97 9 °F (36 6 °C) (Oral)      BP Readings from Last 3 Encounters:   02/01/20 105/54   01/28/20 126/60   05/31/19 146/80       Pulse Readings from Last 3 Encounters:   02/01/20 86   01/28/20 90   05/31/19 85         Constitutional: Alert and oriented    HEENT: Anicteric, PERRLA  Chest: Decreased breathing sound bilaterally, No wheezes/rales/rhonchi  CVS: Regular rhythm  Normal rate  Abdomen: Soft, nontender, nondistended  No palpable organomegaly  Extremities: No cyanosis/clubbing/edema  Integumentary: No obvious rashes or bruises  Musculoskeletal: No obvious bony or joint deformities  Psychiatric: Appropriate affect and mood  Lymph Node Survey: No palpable preauricular, submandibular, cervical, supraclavicular, axillary, epitrochlear or inguinal lymphadenopathy      LABS:  Results from last 7 days   Lab Units 01/31/20  1351 01/31/20  0944 01/31/20  0621   TROPONIN I ng/mL 4 33* 4 79* 4 55*     CBC with diff: Results from last 7 days   Lab Units 02/01/20  0602 01/31/20  1351 01/31/20  0527 01/31/20  0033 01/30/20  1932 01/30/20  1917 01/30/20  1819 01/30/20  0846 01/29/20  0849 01/28/20  2359 01/28/20  1413   WBC Thousand/uL 3 62*  --  3 10*  --  4 12* 5 93  --  3 45* 3 22* 3 45* 3 38*   HEMOGLOBIN g/dL 8 3*  --  8 5* 6 2* 7 7* 7 7*  --  6 8* 7 2* 5 2* 4 9*   I STAT HEMOGLOBIN g/dl  --   --   --   --   --   --  8 2*  --   --   --   --    HEMATOCRIT % 25 4*  --  26 0*  --  24 0* 24 3*  --  21 0* 22 2* 16 9* 16 4*   HEMATOCRIT, ISTAT %  --   --   --   --   --   --  24*  --   --   --   --    MCV fL 89  --  89  --  90 91  --  88 89 92 93   PLATELETS Thousands/uL 96* 112* 125*  --  154 165  --  34* 32* 43* 45*   ADJUSTED WBC Thousand/uL  --   --   --   --   --   --   --   --   --   --  3 38*   MCH pg 29 1  --  29 0  --  28 8 28 7  --  28 5 28 8 28 4 27 8   MCHC g/dL 32 7  --  32 7  --  32 1 31 7  --  32 4 32 4 30 8* 29 9*   RDW % 18 7*  --  18 4*  --  20 3* 20 5*  --  21 1* 20 7* 26 4* 26 1*   MPV fL 9 9 10 6 9 5  --  9 9 11 3  --   --   --   --   --    NRBC AUTO /100 WBCs  --   --   --   --   --   --   --  10 10 10 12   NRBC /100 WBC  --   --   --   --   --   --   --  6* 19* 10* 22*       CMP:  Results from last 7 days   Lab Units 02/01/20  0602 01/31/20  0527 01/30/20  1827 01/30/20  1819 01/30/20  0846 01/28/20  2359 01/28/20  1413   POTASSIUM mmol/L 4 1 3 8 4 6  --  4 2 4 1 4 1   CHLORIDE mmol/L 105 106 106  --  108 104 110*   CO2 mmol/L 24 25 20*  --  24 26 23   CO2, I-STAT mmol/L  --   --   --  21  --   --   --    BUN mg/dL 20 15 11  --  10 9 7   CREATININE mg/dL 1 12 1 01 1 10  --  1 04 0 96 0 77   GLUCOSE, ISTAT mg/dl  --   --   --  112  --   --   --    CALCIUM mg/dL 8 9 9 2 8 8  --  9 0 9 2 9 5   AST U/L  --  59* 57*  --  40 41 38   ALT U/L  --  22 21  --  17 19 20   ALK PHOS U/L  --  191* 210*  --  211* 243* 238*   EGFR ml/min/1 73sq m 56 64 58  --  62 68 89       Results from last 7 days   Lab Units 01/31/20  1351 01/30/20  1827 01/30/20  0847 01/29/20  0904   INR  1 43* 1 32* 1 33* 1 32*   PTT seconds 37  --   --  38*           Invalid input(s): TNI,  PCT    Results from last 7 days   Lab Units 01/29/20  1635   LD U/L 442*           IMAGING:  CT head wo contrast   Final Result      No acute intracranial abnormality  Workstation performed: [id number]         XR chest portable   Final Result      Cardiac enlargement with central vascular congestion and peripheral interstitial edema suggestive of moderate diffuse pulmonary edema  Workstation performed: [id number]         CT abdomen pelvis w contrast   Final Result      Gallstones are seen within the gallbladder; there is pericholecystic fluid raising the possibility of acute cholecystitis  Cirrhotic liver with right subphrenic ascites  Small area of compression atelectasis in the right lower lobe    Cardiomegaly is noted      Workstation performed: [id number]         XR chest pa & lateral    (Results Pending)

## 2020-02-01 NOTE — ASSESSMENT & PLAN NOTE
Likely secondary to type 2 MI-respiratory issues  Ejection fraction about 50-55%  Cardiology input appreciated  No further workup as per Cardiology service  Patient on diuretics  Patient with heart failure with preserved ejection fraction

## 2020-02-01 NOTE — PROGRESS NOTES
Vancomycin Assessment    Aliya Willis is a 69 y o  female who is currently receiving vancomycin 750 mg q 12 for Pneumonia     Relevant clinical data and objective history reviewed:  Creatinine   Date Value Ref Range Status   02/01/2020 1 12 0 60 - 1 30 mg/dL Final     Comment:     Standardized to IDMS reference method   01/31/2020 1 01 0 60 - 1 30 mg/dL Final     Comment:     Standardized to IDMS reference method   01/30/2020 1 10 0 60 - 1 30 mg/dL Final     Comment:     Standardized to IDMS reference method     /54 (BP Location: Left arm)   Pulse 86   Temp 98 6 °F (37 °C) (Oral)   Resp 16   Ht 5' 4" (1 626 m)   Wt 78 3 kg (172 lb 9 9 oz)   SpO2 95%   BMI 29 63 kg/m²   I/O last 3 completed shifts: In: 2320 [P O :720; Blood:350; IV Piggyback:1250]  Out: 2265 [Urine:2265]  Lab Results   Component Value Date/Time    BUN 20 02/01/2020 06:02 AM    WBC 3 62 (L) 02/01/2020 06:02 AM    HGB 8 3 (L) 02/01/2020 06:02 AM    HCT 25 4 (L) 02/01/2020 06:02 AM    MCV 89 02/01/2020 06:02 AM    PLT 96 (L) 02/01/2020 06:02 AM     Temp Readings from Last 3 Encounters:   02/01/20 98 6 °F (37 °C) (Oral)   11/06/17 97 9 °F (36 6 °C) (Oral)     Vancomycin Days of Therapy: 2    Assessment/Plan  The patient is currently on vancomycin utilizing scheduled dosing  Baseline risks associated with therapy include: pre-existing renal impairment and concomitant nephrotoxic medications  The patient is receiving 750 mg q 12 with the most recent vancomycin level being at steady-state and therapeutic based on a goal of 15-20 (appropriate for most indications) ; therefore, is clinically appropriate and dose will be continued   Pharmacy will continue to follow closely for s/sx of nephrotoxicity, infusion reactions and appropriateness of therapy  BMP and CBC will be ordered per protocol  Pharmacy will continue to follow the patients culture results and clinical progress daily      Patrcia Simmonds, Pharmacist

## 2020-02-01 NOTE — ASSESSMENT & PLAN NOTE
Likely multifactorial including fluid overload and possibly aspiration pneumonia/pneumonitis  MRSA negative  Discontinue vancomycin    Change antibiotics to oral

## 2020-02-01 NOTE — ASSESSMENT & PLAN NOTE
Secondary to chronic alcoholism  Patient on lactulose  Ammonia is better    Continue with current other treatment

## 2020-02-01 NOTE — ASSESSMENT & PLAN NOTE
Currently on steroids  Her white cell count and hemoglobin stable/better  Platelets still low, however, better than before although slightly lower than yesterday  ? Etiology for thrombocytopenia  Likely secondary to chronic alcoholism    Continue to monitor

## 2020-02-01 NOTE — RESPIRATORY THERAPY NOTE
Pt came up from the OR intubated, pt is currently on full mechanical ventilation with AC/VC settings  Pt has a 7mm ETT secured with a catherine 22 at the lip  The ETT does not have a HiLO  Will continue with current settings at this time, will titrate FiO2 as tolerated  01/31/20 2305   Vent Information   Vent ID Linda Fernandez   Vent type Drager   Drager Vent Mode AC/VC+   $ Vent Charge-INITIAL Yes   Ventilator Start Yes   Is the patient reintubated? No   $ Pulse Oximetry Spot Check Charge Completed   AC/VC+ Settings   Resp Rate (BPM) 20 BPM   VT (mL) 400 mL   Insp Time (S) 0 8 S   FIO2 (%) 100 %   PEEP (cmH2O) 5 cmH2O   Rise Time (%) 0 2 %   Trigger Sensitivity Flow (LPM) 2 LPM   Humidification Heater   Heater Temp 98 6 °F (37 °C)   AC/VC+ Actuals   Resp Rate (BPM) 20 BPM   VT (mL) 401 mL   MV (Obs) 7 68   MAP (cmH2O) 11 cmH2O   Peak Pressure (cmH2O) 26 cmH2O   I:E Ratio (Obs) 1:2 7   Static Compliance (mL/cmH20) 29 8 mL/cmH2O   Plateau Pressure (cm H2O) 26 5 cm H2O   Heater Temperature (Obs) 74 3 °F (23 5 °C)   AC/VC+ ALARMS   High Peak Pressure (cmH2O) 50 cmH2O   High Resp Rate (BPM) 30 BPM   High MV (L/min) 10 L/min   Low MV (L/min) 3 L/min   High VT (mL) 800 mL   Maintenance   Alarm (pink) cable attached Yes   Resuscitation bag with peep valve at bedside Yes   Water bag changed No   Circuit changed No   Daily Screen   Patient safety screen outcome: Failed   Not Ready for Weaning due to: Underline problem not resolved   IHI Ventilator Associated Pneumonia Bundle   Head of Bed Elevated HOB 30   ETT  Oral;Cuffed 7 mm   Placement Date/Time: 01/31/20 2100   Type: Oral;Cuffed  Tube Size: 7 mm  Location: Oral  Grade View: Neither glottis nor epiglottis visible  Placement Verification: Auscultation;Symmetrical chest wall movement; Chest x-ray  Secured at (cm): 22  Comment       Secured at (cm) 22   Measured from Lips   Secured Location Right   Secured by Commercial tube garcia  (with 2 fingers space)   Site Condition Dry;Cool  (skin intact no discoloration)   Cuff Pressure (cm H2O) 24 cm H2O   HI-LO Suction  Other (Comment)  (no HiLo)

## 2020-02-01 NOTE — ASSESSMENT & PLAN NOTE
Patient with underlying cirrhosis secondary to chronic alcohol abuse  She likely has underlying dementia-possibly made worse with chronic alcohol use  She has been agitated at times  Continue to reoriented use restraints as needed  Discussed with RN  Consider discontinuing restrain

## 2020-02-02 ENCOUNTER — APPOINTMENT (INPATIENT)
Dept: RADIOLOGY | Facility: HOSPITAL | Age: 75
DRG: 808 | End: 2020-02-02
Payer: MEDICARE

## 2020-02-02 LAB
ALBUMIN SERPL BCP-MCNC: 3.2 G/DL (ref 3.5–5)
ALP SERPL-CCNC: 174 U/L (ref 46–116)
ALT SERPL W P-5'-P-CCNC: 24 U/L (ref 12–78)
ANION GAP SERPL CALCULATED.3IONS-SCNC: 10 MMOL/L (ref 4–13)
AST SERPL W P-5'-P-CCNC: 59 U/L (ref 5–45)
BILIRUB SERPL-MCNC: 1.4 MG/DL (ref 0.2–1)
BUN SERPL-MCNC: 20 MG/DL (ref 5–25)
CALCIUM SERPL-MCNC: 8.8 MG/DL (ref 8.3–10.1)
CHLORIDE SERPL-SCNC: 105 MMOL/L (ref 100–108)
CO2 SERPL-SCNC: 26 MMOL/L (ref 21–32)
CREAT SERPL-MCNC: 1.08 MG/DL (ref 0.6–1.3)
ERYTHROCYTE [DISTWIDTH] IN BLOOD BY AUTOMATED COUNT: 18.7 % (ref 11.6–15.1)
GFR SERPL CREATININE-BSD FRML MDRD: 59 ML/MIN/1.73SQ M
GLUCOSE SERPL-MCNC: 105 MG/DL (ref 65–140)
GLUCOSE SERPL-MCNC: 157 MG/DL (ref 65–140)
HCT VFR BLD AUTO: 26.7 % (ref 34.8–46.1)
HGB BLD-MCNC: 8.8 G/DL (ref 11.5–15.4)
MAGNESIUM SERPL-MCNC: 1.7 MG/DL (ref 1.6–2.6)
MCH RBC QN AUTO: 29.5 PG (ref 26.8–34.3)
MCHC RBC AUTO-ENTMCNC: 33 G/DL (ref 31.4–37.4)
MCV RBC AUTO: 90 FL (ref 82–98)
PLATELET # BLD AUTO: 81 THOUSANDS/UL (ref 149–390)
POTASSIUM SERPL-SCNC: 3.8 MMOL/L (ref 3.5–5.3)
PROT SERPL-MCNC: 6.8 G/DL (ref 6.4–8.2)
RBC # BLD AUTO: 2.98 MILLION/UL (ref 3.81–5.12)
SODIUM SERPL-SCNC: 141 MMOL/L (ref 136–145)
VIT B12 SERPL-MCNC: 1336 PG/ML (ref 100–900)
WBC # BLD AUTO: 4.38 THOUSAND/UL (ref 4.31–10.16)

## 2020-02-02 PROCEDURE — 80053 COMPREHEN METABOLIC PANEL: CPT | Performed by: INTERNAL MEDICINE

## 2020-02-02 PROCEDURE — 99233 SBSQ HOSP IP/OBS HIGH 50: CPT | Performed by: INTERNAL MEDICINE

## 2020-02-02 PROCEDURE — 83735 ASSAY OF MAGNESIUM: CPT | Performed by: INTERNAL MEDICINE

## 2020-02-02 PROCEDURE — 85027 COMPLETE CBC AUTOMATED: CPT | Performed by: NURSE PRACTITIONER

## 2020-02-02 PROCEDURE — 99232 SBSQ HOSP IP/OBS MODERATE 35: CPT | Performed by: INTERNAL MEDICINE

## 2020-02-02 PROCEDURE — 71046 X-RAY EXAM CHEST 2 VIEWS: CPT

## 2020-02-02 PROCEDURE — 82607 VITAMIN B-12: CPT | Performed by: INTERNAL MEDICINE

## 2020-02-02 RX ORDER — LACTULOSE 20 G/30ML
20 SOLUTION ORAL 4 TIMES DAILY
Status: DISCONTINUED | OUTPATIENT
Start: 2020-02-02 | End: 2020-02-05

## 2020-02-02 RX ORDER — FUROSEMIDE 80 MG
80 TABLET ORAL DAILY
Status: DISCONTINUED | OUTPATIENT
Start: 2020-02-02 | End: 2020-02-02

## 2020-02-02 RX ORDER — CEFDINIR 300 MG/1
300 CAPSULE ORAL EVERY 12 HOURS SCHEDULED
Status: DISCONTINUED | OUTPATIENT
Start: 2020-02-02 | End: 2020-02-06 | Stop reason: HOSPADM

## 2020-02-02 RX ORDER — FUROSEMIDE 80 MG
80 TABLET ORAL DAILY
Status: DISCONTINUED | OUTPATIENT
Start: 2020-02-02 | End: 2020-02-06 | Stop reason: HOSPADM

## 2020-02-02 RX ORDER — METOPROLOL SUCCINATE 25 MG/1
25 TABLET, EXTENDED RELEASE ORAL DAILY
Status: DISCONTINUED | OUTPATIENT
Start: 2020-02-02 | End: 2020-02-03

## 2020-02-02 RX ADMIN — CEFDINIR 300 MG: 300 CAPSULE ORAL at 23:10

## 2020-02-02 RX ADMIN — FUROSEMIDE 40 MG: 10 INJECTION, SOLUTION INTRAMUSCULAR; INTRAVENOUS at 08:55

## 2020-02-02 RX ADMIN — METOPROLOL TARTRATE 12.5 MG: 25 TABLET ORAL at 08:54

## 2020-02-02 RX ADMIN — METOPROLOL SUCCINATE 25 MG: 25 TABLET, EXTENDED RELEASE ORAL at 10:38

## 2020-02-02 RX ADMIN — PANTOPRAZOLE SODIUM 40 MG: 40 TABLET, DELAYED RELEASE ORAL at 08:53

## 2020-02-02 RX ADMIN — HALOPERIDOL LACTATE 1 MG: 5 INJECTION, SOLUTION INTRAMUSCULAR at 04:29

## 2020-02-02 RX ADMIN — METRONIDAZOLE 500 MG: 500 TABLET ORAL at 08:54

## 2020-02-02 RX ADMIN — RIFAXIMIN 550 MG: 550 TABLET ORAL at 23:11

## 2020-02-02 RX ADMIN — PANTOPRAZOLE SODIUM 40 MG: 40 TABLET, DELAYED RELEASE ORAL at 17:44

## 2020-02-02 RX ADMIN — FOLIC ACID 1 MG: 1 TABLET ORAL at 08:53

## 2020-02-02 RX ADMIN — MAGNESIUM OXIDE TAB 400 MG (241.3 MG ELEMENTAL MG) 400 MG: 400 (241.3 MG) TAB at 08:54

## 2020-02-02 RX ADMIN — LACTULOSE 30 G: 10 SOLUTION ORAL at 04:29

## 2020-02-02 RX ADMIN — RIFAXIMIN 550 MG: 550 TABLET ORAL at 08:55

## 2020-02-02 RX ADMIN — MAGNESIUM SULFATE HEPTAHYDRATE 2 G: 40 INJECTION, SOLUTION INTRAVENOUS at 06:14

## 2020-02-02 RX ADMIN — METRONIDAZOLE 500 MG: 500 TABLET ORAL at 14:43

## 2020-02-02 RX ADMIN — THIAMINE HCL TAB 100 MG 100 MG: 100 TAB at 08:54

## 2020-02-02 RX ADMIN — THIAMINE HYDROCHLORIDE 250 MG: 100 INJECTION, SOLUTION INTRAMUSCULAR; INTRAVENOUS at 08:56

## 2020-02-02 RX ADMIN — CEFEPIME HYDROCHLORIDE 1000 MG: 1 INJECTION, POWDER, FOR SOLUTION INTRAMUSCULAR; INTRAVENOUS at 10:34

## 2020-02-02 RX ADMIN — LACTULOSE 30 G: 10 SOLUTION ORAL at 08:54

## 2020-02-02 RX ADMIN — VANCOMYCIN HYDROCHLORIDE 750 MG: 750 INJECTION, SOLUTION INTRAVENOUS at 10:34

## 2020-02-02 RX ADMIN — LACTULOSE 20 G: 10 SOLUTION ORAL at 23:10

## 2020-02-02 RX ADMIN — PREDNISONE 60 MG: 20 TABLET ORAL at 08:54

## 2020-02-02 RX ADMIN — NICOTINE 1 PATCH: 14 PATCH TRANSDERMAL at 08:54

## 2020-02-02 RX ADMIN — METRONIDAZOLE 500 MG: 500 TABLET ORAL at 23:11

## 2020-02-02 NOTE — PROGRESS NOTES
Vancomycin IV Pharmacy-to-Dose Consultation    Lory Frazier is a 69 y o  female who is currently receiving Vancomycin IV with management by the Pharmacy Consult service  Assessment/Plan:  The patient was reviewed  Renal function is stable and no signs or symptoms of nephrotoxicity and/or infusion reactions were documented in the chart  Based on todays assessment, continue current vancomycin (day # 3) dosing of 750 mg q 12  We will continue to follow the patients culture results and clinical progress daily      Juan Luis Mon, Pharmacist

## 2020-02-02 NOTE — PLAN OF CARE
Problem: Potential for Falls  Goal: Patient will remain free of falls  Description  INTERVENTIONS:  - Assess patient frequently for physical needs  -  Identify cognitive and physical deficits and behaviors that affect risk of falls    -  [place] fall precautions as indicated by assessment   - Educate patient/family on patient safety including physical limitations  - Instruct patient to call for assistance with activity based on assessment  - Modify environment to reduce risk of injury  - Consider OT/PT consult to assist with strengthening/mobility  Outcome: Progressing     Problem: PAIN - ADULT  Goal: Verbalizes/displays adequate comfort level or baseline comfort level  Description  Interventions:  - Encourage patient to monitor pain and request assistance  - Assess pain using appropriate pain scale  - Administer analgesics based on type and severity of pain and evaluate response  - Implement non-pharmacological measures as appropriate and evaluate response  - Consider cultural and social influences on pain and pain management  - Notify physician/advanced practitioner if interventions unsuccessful or patient reports new pain  Outcome: Progressing     Problem: INFECTION - ADULT  Goal: Absence or prevention of progression during hospitalization  Description  INTERVENTIONS:  - Assess and monitor for signs and symptoms of infection  - Monitor lab/diagnostic results  - Monitor all insertion sites, i e  indwelling lines, tubes, and drains  - Monitor endotracheal if appropriate and nasal secretions for changes in amount and color  - [place] appropriate cooling/warming therapies per order  - Administer medications as ordered  - Instruct and encourage patient and family to use good hand hygiene technique  - Identify and instruct in appropriate isolation precautions for identified infection/condition  Outcome: Progressing  Goal: Absence of fever/infection during neutropenic period  Description  INTERVENTIONS:  - Monitor WBC    Outcome: Progressing     Problem: SAFETY ADULT  Goal: Maintain or return to baseline ADL function  Description  INTERVENTIONS:  -  Assess patient's ability to carry out ADLs; assess patient's baseline for ADL function and identify physical deficits which impact ability to perform ADLs (bathing, care of mouth/teeth, toileting, grooming, dressing, etc )  - Assess/evaluate cause of self-care deficits   - Assess range of motion  - Assess patient's mobility; develop plan if impaired  - Assess patient's need for assistive devices and provide as appropriate  - Encourage maximum independence but intervene and supervise when necessary  - Involve family in performance of ADLs  - Assess for home care needs following discharge   - Consider OT consult to assist with ADL evaluation and planning for discharge  - Provide patient education as appropriate  Outcome: Progressing  Goal: Maintain or return mobility status to optimal level  Description  INTERVENTIONS:  - Assess patient's baseline mobility status (ambulation, transfers, stairs, etc )    - Identify cognitive and physical deficits and behaviors that affect mobility  - Identify mobility aids required to assist with transfers and/or ambulation (gait belt, sit-to-stand, lift, walker, cane, etc )  - [place] fall precautions as indicated by assessment  - Record patient progress and toleration of activity level on Mobility SBAR; progress patient to next Phase/Stage  - Instruct patient to call for assistance with activity based on assessment  - Consider rehabilitation consult to assist with strengthening/weightbearing, etc   Outcome: Progressing     Problem: DISCHARGE PLANNING  Goal: Discharge to home or other facility with appropriate resources  Description  INTERVENTIONS:  - Identify barriers to discharge w/patient and caregiver  - Arrange for needed discharge resources and transportation as appropriate  - Identify discharge learning needs (meds, wound care, etc )  - Arrange for interpretive services to assist at discharge as needed  - Refer to Case Management Department for coordinating discharge planning if the patient needs post-hospital services based on physician/advanced practitioner order or complex needs related to functional status, cognitive ability, or social support system  Outcome: Progressing     Problem: Knowledge Deficit  Goal: Patient/family/caregiver demonstrates understanding of disease process, treatment plan, medications, and discharge instructions  Description  Complete learning assessment and assess knowledge base  Interventions:  - Provide teaching at level of understanding  - Provide teaching via preferred learning methods  Outcome: Progressing     Problem: SAFETY,RESTRAINT: NV/NON-SELF DESTRUCTIVE BEHAVIOR  Goal: Remains free of harm/injury (restraint for non violent/non self-detsructive behavior)  Description  INTERVENTIONS:  - Instruct patient/family regarding restraint use   - Assess and monitor physiologic and psychological status   - Provide interventions and comfort measures to meet assessed patient needs   - Identify and implement measures to help patient regain control  - Assess readiness for release of restraint   Outcome: Progressing  Goal: Returns to optimal restraint-free functioning  Description  INTERVENTIONS:  - Assess the patient's behavior and symptoms that indicate continued need for restraint  - Identify and implement measures to help patient regain control  - Assess readiness for release of restraint   Outcome: Progressing     Problem: Nutrition/Hydration-ADULT  Goal: Nutrient/Hydration intake appropriate for improving, restoring or maintaining nutritional needs  Description  Monitor and assess patient's nutrition/hydration status for malnutrition  Collaborate with interdisciplinary team and initiate plan and interventions as ordered  Monitor patient's weight and dietary intake as ordered or per policy   Utilize nutrition screening tool and intervene as necessary  Determine patient's food preferences and provide high-protein, high-caloric foods as appropriate       INTERVENTIONS:  - Monitor oral intake, urinary output, labs, and treatment plans  - Assess nutrition and hydration status and recommend course of action  - Evaluate amount of meals eaten  - Assist patient with eating if necessary   - Allow adequate time for meals  - Recommend/ encourage appropriate diets, oral nutritional supplements, and vitamin/mineral supplements  - Order, calculate, and assess calorie counts as needed  - Recommend, monitor, and adjust tube feedings and TPN/PPN based on assessed needs  - Assess need for intravenous fluids  - Provide specific nutrition/hydration education as appropriate  - Include patient/family/caregiver in decisions related to nutrition  Outcome: Progressing     Problem: Prexisting or High Potential for Compromised Skin Integrity  Goal: Skin integrity is maintained or improved  Description  INTERVENTIONS:  - Identify patients at risk for skin breakdown  - Assess and monitor skin integrity  - Assess and monitor nutrition and hydration status  - Monitor labs   - Assess for incontinence   - Turn and reposition patient  - Assist with mobility/ambulation  - Relieve pressure over bony prominences  - Avoid friction and shearing  - Provide appropriate hygiene as needed including keeping skin clean and dry  - Evaluate need for skin moisturizer/barrier cream  - Collaborate with interdisciplinary team   - Patient/family teaching  - Consider wound care consult   Outcome: Progressing     Problem: RESPIRATORY - ADULT  Goal: Achieves optimal ventilation and oxygenation  Description  INTERVENTIONS:  - Assess for changes in respiratory status  - Assess for changes in mentation and behavior  - Position to facilitate oxygenation and minimize respiratory effort  - Oxygen administered by appropriate delivery if ordered  - Initiate smoking cessation education as indicated  - Encourage broncho-pulmonary hygiene including cough, deep breathe, Incentive Spirometry  - Assess the need for suctioning and aspirate as needed  - Assess and instruct to report SOB or any respiratory difficulty  - Respiratory Therapy support as indicated  Outcome: Progressing     Problem: GENITOURINARY - ADULT  Goal: Maintains or returns to baseline urinary function  Description  INTERVENTIONS:  - Assess urinary function  - Encourage oral fluids to ensure adequate hydration if ordered  - Administer IV fluids as ordered to ensure adequate hydration  - Administer ordered medications as needed  - Offer frequent toileting  - Follow urinary retention protocol if ordered  Outcome: Progressing  Goal: Urinary catheter remains patent  Description  INTERVENTIONS:  - Assess patency of urinary catheter  - If patient has a chronic goodman, consider changing catheter if non-functioning  - Follow guidelines for intermittent irrigation of non-functioning urinary catheter  Outcome: Progressing     Problem: HEMATOLOGIC - ADULT  Goal: Maintains hematologic stability  Description  INTERVENTIONS  - Assess for signs and symptoms of bleeding or hemorrhage  - Monitor labs  - Administer supportive blood products/factors as ordered and appropriate  Outcome: Progressing

## 2020-02-02 NOTE — CONSULTS
This patient's vancomycin therapy has been discontinued/ completed  Thank you for this Consult; pharmacy will sign-off now

## 2020-02-02 NOTE — PROGRESS NOTES
Cardiology Progress Note    Assessment/Plan   HFpEF  Atrial fibrillation  Elevated troponin  Pancytopenia, improving, likely for EtOH marrow suppression  Microangiopathic hemolytic anemia  Encephalopathy  Hepatic cirrhosis  EtOH abuse      Convert furosemide to 80 mg PO daily  Change metoprolol to 50 mg of succinate daily  Start daily supplemental magnesium  Streamline medication dosing to improve compliance  No role for invasive management of elevated troponin  She would benefit from low dose ASA as her blood counts improve  No role for clopidogrel or therapeutic anticoagulation at this time, given her pancytopenia  Monitor renal function  Keep K > 4, Mg > 2, Ca > 8 and PO4 > 2 5  Thank you for this interesting consult  Signing off  Please call with questions  LOS: 4 days     Subjective   Better diuresis overnight  Inaccurate weights  No longer on supplemental oxygen  She is more lucid  She has converted to sinus rhythm with PACs and PVCs  Objective     Vital signs in last 24 hours:  Temp:  [97 8 °F (36 6 °C)-98 6 °F (37 °C)] 97 8 °F (36 6 °C)  HR:  [70-97] 70  Resp:  [16-31] 20  BP: (105-147)/(54-80) 111/67      Intake/Output Summary (Last 24 hours) at 2/2/2020 0825  Last data filed at 2/2/2020 6240  Gross per 24 hour   Intake 200 ml   Output 1625 ml   Net -1425 ml     Weight (last 2 days)     Date/Time   Weight    02/02/20 0551   82 (180 78)    02/01/20 0600   78 3 (172 62)    01/31/20 0600   81 (178 57)                Physical Exam:  General: AAOx2, NAD  HEENT: Normocephalic/Atraumatic, No thyromegaly, lymphadenopathy, JVD or carotid bruits  Cardiovasc: Regular rhythm with a normal rate  No murmurs, rubs or gallops  Radial, carotid, femoral, dorsalis pedis and posterior tibial pulses are 2+/4  Chest/Pulm: CTAB, no wheezes, rales or rhonchi  Abdomen: +BSx4, Soft, non-tender  No organomegaly, rebound, rigidity or guarding  Extremities: No edema            Scheduled Meds:  Current Facility-Administered Medications:  acetaminophen 650 mg Oral Q6H PRN Leon Calk, CRNP    calcium carbonate 1,000 mg Oral Daily PRN Leon Calk, CRNP    cefepime 1,000 mg Intravenous Q12H Leon Calk, CRNP Last Rate: Stopped (73/79/60 1584)   folic acid 1 mg Oral Daily Leon Calk, CRNP    furosemide 40 mg Intravenous BID (diuretic) Danielle Cramer, DO    haloperidol lactate 1 mg Intravenous Q6H PRN Leon Calk, CRNP    lactulose 30 g Oral Q4H Leon Calk, CRNP    magnesium oxide 400 mg Oral BID Nasrin Brian MD    magnesium sulfate 2 g Intravenous Daily PRN Nasrin Brain MD    metoprolol tartrate 12 5 mg Oral Q12H [address] Leon Calk, CRNP    metroNIDAZOLE 500 mg Oral [facility] Leon Calk, CRNP    nicotine 1 patch Transdermal Daily Shaji Ribera, MEREDITH    ondansetron 4 mg Intravenous Q6H PRN Leon Calk, CRNP    pantoprazole 40 mg Oral BID AC Leon Calk, CRNP    predniSONE 60 mg Oral Daily Leon Calk, CRNP    rifaximin 550 mg Oral Q12H [address] Leon Calk, CRNP    thiamine 250 mg Intravenous Daily Leon Calk, CRNP    thiamine 100 mg Oral Daily Leon Calk, CRNP    vancomycin 10 mg/kg (Adjusted) Intravenous Q12H Leon Calk, CRNP Last Rate: Stopped (02/02/20 0020)     Continuous Infusions:   PRN Meds:   acetaminophen    calcium carbonate    haloperidol lactate    magnesium sulfate    ondansetron        Lab Review   Results for Otoniel Grimaldo (MRN [id number]) as of 2/2/2020 08:21   2/2/2020 04:43   Sodium 141   Potassium 3 8   Chloride 105   CO2 26   Anion Gap 10   BUN 20   Creatinine 1 08   Glucose, Random 105   Calcium 8 8   AST 59 (H)   ALT 24   Alkaline Phosphatase 174 (H)   Total Protein 6 8   Albumin 3 2 (L)   TOTAL BILIRUBIN 1 40 (H)   eGFR 59   Magnesium 1 7     Results for Otoniel Grimaldo (MRN [id number]) as of 2/2/2020 08:21   2/2/2020 04:43   WBC 4 38   Red Blood Cell Count 2 98 (L)   Hemoglobin 8 8 (L)   HCT 26 7 (L)   MCV 90   MCH 29 5   MCHC 33 0   RDW 18 7 (H)   Platelet Count 81 (L)

## 2020-02-02 NOTE — PROGRESS NOTES
[facility] Internal Medicine Progress Note  Patient: Nathan Randle 69 y o  female   MRN: [id number]  PCP: Deandre Girard MD  Unit/Bed#:  Encounter: [id number]  Date Of Visit: 02/02/20          * Pancytopenia [facility])  Assessment & Plan  Less likely that she has TTP  She was started on prednisone  Hematology/oncologyconsultation appreciated  Continue to monitor  ?  ITP in addition to secondary to alcohol    Elevated troponin  Assessment & Plan  Likely secondary to type 2 MI-respiratory issues  Ejection fraction about 50-55%  Cardiology input appreciated  Cirrhosis ([facility] [address] )  Assessment & Plan  Secondary to chronic alcoholism  Patient on lactulose  Having blood more loose bowel movements  Would cut back on lactulose dose/frequency  Monitor ammonia level  Hepatic encephalopathy likely also contributing factor to her change in mental status/encephalopathy  She has underlying dementia which may be getting worse    Toxic metabolic encephalopathy  Assessment & Plan  Discussed with patient's daughter who is here  As per daughter, her grandmother was also the same way as patient is  As per on daughter, her mental status is better now  Continue with current treatment  History of alcohol abuse  Assessment & Plan  One 7 minutes cell moved continue with current treatment  Paroxysmal SVT (supraventricular tachycardia) [facility])  Assessment & Plan  Patient appears to be in sinus rhythm with lot of ectopy here and there  No further workup    Cigarette nicotine dependence without complication  Assessment & Plan  Encouraged to quit  To Continue with nicotine patch    Benign essential hypertension  Assessment & Plan  Blood pressure currently stable    Symptomatic anemia  Assessment & Plan  Status post blood transfusion    Hemoglobin has remained stable lately    Acute respiratory failure with hypoxia (HCC)resolved as of 2/1/2020  Assessment & Plan  Likely multifactorial including fluid overload and possibly aspiration pneumonia/pneumonitis  MRSA negative  Discontinue vancomycin  Change antibiotics to oral       Present on Admission:   Pancytopenia (HCC)   Symptomatic anemia   Benign essential hypertension   Cigarette nicotine dependence without complication   History of alcohol abuse   Toxic metabolic encephalopathy   Cirrhosis (HCC)   (Resolved) Acute respiratory failure with hypoxia (HCC)   Elevated troponin   Paroxysmal SVT (supraventricular tachycardia) (HCC)            VTE Pharmacologic Prophylaxis:   Pharmacologic: Pharmacologic VTE Prophylaxis contraindicated due to Anemia/thrombocytopenia  Mechanical VTE Prophylaxis in Place: Yes    Patient Centered Rounds: I have performed bedside rounds with nursing staff today  Discussions with Specialists or Other Care Team Provider:  Yes    Education and Discussions with Family / Patient:  Yes including daughter and granddaughter who are here    Time Spent for Care: 35+ minutes  More than 50% of total time spent on counseling and coordination of care as described above  Current Length of Stay: 4 day(s)    Current Patient Status: Inpatient   Certification Statement: The patient will continue to require additional inpatient hospital stay due to Pancytopenia/encephalopathy    Discharge Plan:  Hopefully home soon    Code Status: Level 1 - Full Code      Subjective:   Patient herself is up in the chair  She has been having significantly more lose/diarrheal bowel movements as she is on lactulose  Objective:     Vitals:   Temp (24hrs), Av °F (36 7 °C), Min:97 6 °F (36 4 °C), Max:98 4 °F (36 9 °C)    Temp:  [97 6 °F (36 4 °C)-98 4 °F (36 9 °C)] 97 6 °F (36 4 °C)  HR:  [] 93  Resp:  [17-31] 18  BP: (111-147)/(53-80) 113/53  SpO2:  [97 %-98 %] 97 %  Body mass index is 31 03 kg/m²  Input and Output Summary (last 24 hours):        Intake/Output Summary (Last 24 hours) at 2020 1436  Last data filed at 2020 1432  Gross per 24 hour   Intake 1050 ml   Output 2850 ml   Net -1800 ml           Physical Exam:     Vital signs reviewed as above  Patient up in the chair  Not in any respiratory distress  Daughter and granddaughter at bedside  Conjunctivae are pale  She states that where she is  She also knows the names of her daughter and granddaughter were here  S1 and S2 audible  Abdomen is soft  Nontender  Bowel sounds are audible  No cyanosis or clubbing  Becomes agitated at times        Additional Data:     Labs:    Results from last 7 days   Lab Units 02/02/20  0443  01/30/20  0846   WBC Thousand/uL 4 38   < > 3 45*   HEMOGLOBIN g/dL 8 8*   < > 6 8*   I STAT HEMOGLOBIN   --    < >  --    HEMATOCRIT % 26 7*   < > 21 0*   HEMATOCRIT, ISTAT   --    < >  --    PLATELETS Thousands/uL 81*   < > 34*   LYMPHO PCT %  --   --  41   MONO PCT %  --   --  7   EOS PCT %  --   --  4    < > = values in this interval not displayed  Results from last 7 days   Lab Units 02/02/20  0443  01/30/20  1819   POTASSIUM mmol/L 3 8   < >  --    CHLORIDE mmol/L 105   < >  --    CO2 mmol/L 26   < >  --    CO2, I-STAT mmol/L  --   --  21   BUN mg/dL 20   < >  --    CREATININE mg/dL 1 08   < >  --    CALCIUM mg/dL 8 8   < >  --    ALK PHOS U/L 174*   < >  --    ALT U/L 24   < >  --    AST U/L 59*   < >  --    GLUCOSE, ISTAT mg/dl  --   --  112    < > = values in this interval not displayed  Results from last 7 days   Lab Units 01/31/20  1351   INR  1 43*       * I Have Reviewed All Lab Data Listed Above  * Additional Pertinent Lab Tests Reviewed: All Labs Within Last 24 Hours Reviewed      Recent Cultures (last 7 days):     Results from last 7 days   Lab Units 01/30/20  2333   BLOOD CULTURE  No Growth at 48 hrs  No Growth at 48 hrs         Last 24 Hours Medication List:     Current Facility-Administered Medications:  acetaminophen 650 mg Oral Q6H PRN Darral Teodoro, CRNP    calcium carbonate 1,000 mg Oral Daily PRN Darral MEREDITH Valerio    cefdinir 300 mg Oral Q12H [address] Joseph Frias MD    folic acid 1 mg Oral Daily MEREDITH Verdugo    furosemide 80 mg Oral Daily Reji Cardenas DO    haloperidol lactate 1 mg Intravenous Q6H PRN MEREDITH Verdugo    lactulose 20 g Oral 4x Daily Joseph Frias MD    magnesium oxide 400 mg Oral BID Joseph Frias MD    magnesium sulfate 2 g Intravenous Daily PRN Joseph Frias MD    metoprolol succinate 25 mg Oral Daily Reji Cardenas DO    metroNIDAZOLE 500 mg Oral [facility] MEREDITH Verdugo    nicotine 1 patch Transdermal Daily MEREDITH Estrada    ondansetron 4 mg Intravenous Q6H PRN MEREDITH Verdugo    pantoprazole 40 mg Oral BID AC MEREDITH Estrada    predniSONE 60 mg Oral Daily MEREDITH Verdugo    rifaximin 550 mg Oral Q12H [address] MEREDITH Verdugo    thiamine 250 mg Intravenous Daily MEREDITH Verdugo Last Rate: 250 mg (02/02/20 0856)   thiamine 100 mg Oral Daily MEREDITH Verdugo         Today, Patient Was Seen By: Joseph Frias MD    ** Please Note: Dragon 360 Dictation voice to text software may have been used in the creation of this document   **

## 2020-02-03 ENCOUNTER — TELEPHONE (OUTPATIENT)
Dept: INTERNAL MEDICINE CLINIC | Facility: CLINIC | Age: 75
End: 2020-02-03

## 2020-02-03 DIAGNOSIS — D63.8 ANEMIA OF CHRONIC DISEASE: Primary | ICD-10-CM

## 2020-02-03 LAB
ALBUMIN SERPL BCP-MCNC: 3.5 G/DL (ref 3.5–5)
ALP SERPL-CCNC: 195 U/L (ref 46–116)
ALT SERPL W P-5'-P-CCNC: 31 U/L (ref 12–78)
AMMONIA PLAS-SCNC: 27 UMOL/L (ref 11–35)
ANION GAP SERPL CALCULATED.3IONS-SCNC: 13 MMOL/L (ref 4–13)
ANISOCYTOSIS BLD QL SMEAR: PRESENT
AST SERPL W P-5'-P-CCNC: 59 U/L (ref 5–45)
BASOPHILS # BLD MANUAL: 0 THOUSAND/UL (ref 0–0.1)
BASOPHILS NFR MAR MANUAL: 0 % (ref 0–1)
BILIRUB SERPL-MCNC: 1.5 MG/DL (ref 0.2–1)
BUN SERPL-MCNC: 17 MG/DL (ref 5–25)
CALCIUM SERPL-MCNC: 9.3 MG/DL (ref 8.3–10.1)
CHLORIDE SERPL-SCNC: 103 MMOL/L (ref 100–108)
CO2 SERPL-SCNC: 23 MMOL/L (ref 21–32)
CREAT SERPL-MCNC: 1.02 MG/DL (ref 0.6–1.3)
EOSINOPHIL # BLD MANUAL: 0 THOUSAND/UL (ref 0–0.4)
EOSINOPHIL NFR BLD MANUAL: 0 % (ref 0–6)
ERYTHROCYTE [DISTWIDTH] IN BLOOD BY AUTOMATED COUNT: 19 % (ref 11.6–15.1)
GFR SERPL CREATININE-BSD FRML MDRD: 63 ML/MIN/1.73SQ M
GLUCOSE SERPL-MCNC: 97 MG/DL (ref 65–140)
HCT VFR BLD AUTO: 31.7 % (ref 34.8–46.1)
HELMET CELLS BLD QL SMEAR: PRESENT
HGB BLD-MCNC: 10.1 G/DL (ref 11.5–15.4)
LYMPHOCYTES # BLD AUTO: 1.28 THOUSAND/UL (ref 0.6–4.47)
LYMPHOCYTES # BLD AUTO: 28 % (ref 14–44)
MAGNESIUM SERPL-MCNC: 1.6 MG/DL (ref 1.6–2.6)
MCH RBC QN AUTO: 29.3 PG (ref 26.8–34.3)
MCHC RBC AUTO-ENTMCNC: 31.9 G/DL (ref 31.4–37.4)
MCV RBC AUTO: 92 FL (ref 82–98)
MONOCYTES # BLD AUTO: 0.59 THOUSAND/UL (ref 0–1.22)
MONOCYTES NFR BLD: 13 % (ref 4–12)
NEUTROPHILS # BLD MANUAL: 2.7 THOUSAND/UL (ref 1.85–7.62)
NEUTS SEG NFR BLD AUTO: 59 % (ref 43–75)
NRBC BLD AUTO-RTO: 13 /100 WBC (ref 0–2)
NRBC BLD AUTO-RTO: 13 /100 WBCS
OVALOCYTES BLD QL SMEAR: PRESENT
PLATELET # BLD AUTO: 75 THOUSANDS/UL (ref 149–390)
PLATELET BLD QL SMEAR: ABNORMAL
POLYCHROMASIA BLD QL SMEAR: PRESENT
POTASSIUM SERPL-SCNC: 3.2 MMOL/L (ref 3.5–5.3)
PROCALCITONIN SERPL-MCNC: 0.18 NG/ML
PROT SERPL-MCNC: 7.6 G/DL (ref 6.4–8.2)
RBC # BLD AUTO: 3.45 MILLION/UL (ref 3.81–5.12)
SODIUM SERPL-SCNC: 139 MMOL/L (ref 136–145)
TARGETS BLD QL SMEAR: PRESENT
TOTAL CELLS COUNTED SPEC: 100
VWF CP ACT/NOR PPP CHRO: >100 %
VWF CP ACT/NOR PPP CHRO: NORMAL %
WBC # BLD AUTO: 4.57 THOUSAND/UL (ref 4.31–10.16)

## 2020-02-03 PROCEDURE — 83735 ASSAY OF MAGNESIUM: CPT | Performed by: INTERNAL MEDICINE

## 2020-02-03 PROCEDURE — 80053 COMPREHEN METABOLIC PANEL: CPT | Performed by: PHYSICIAN ASSISTANT

## 2020-02-03 PROCEDURE — 85007 BL SMEAR W/DIFF WBC COUNT: CPT | Performed by: PHYSICIAN ASSISTANT

## 2020-02-03 PROCEDURE — 85027 COMPLETE CBC AUTOMATED: CPT | Performed by: PHYSICIAN ASSISTANT

## 2020-02-03 PROCEDURE — 84145 PROCALCITONIN (PCT): CPT | Performed by: PHYSICIAN ASSISTANT

## 2020-02-03 PROCEDURE — 99233 SBSQ HOSP IP/OBS HIGH 50: CPT | Performed by: INTERNAL MEDICINE

## 2020-02-03 PROCEDURE — 82140 ASSAY OF AMMONIA: CPT | Performed by: PHYSICIAN ASSISTANT

## 2020-02-03 RX ORDER — METOPROLOL TARTRATE 5 MG/5ML
5 INJECTION INTRAVENOUS EVERY 6 HOURS PRN
Status: DISCONTINUED | OUTPATIENT
Start: 2020-02-03 | End: 2020-02-06 | Stop reason: HOSPADM

## 2020-02-03 RX ORDER — POTASSIUM CHLORIDE 20 MEQ/1
40 TABLET, EXTENDED RELEASE ORAL ONCE
Status: COMPLETED | OUTPATIENT
Start: 2020-02-03 | End: 2020-02-03

## 2020-02-03 RX ORDER — MAGNESIUM SULFATE HEPTAHYDRATE 40 MG/ML
2 INJECTION, SOLUTION INTRAVENOUS ONCE
Status: DISCONTINUED | OUTPATIENT
Start: 2020-02-03 | End: 2020-02-06 | Stop reason: HOSPADM

## 2020-02-03 RX ADMIN — METOPROLOL TARTRATE 25 MG: 25 TABLET ORAL at 16:47

## 2020-02-03 RX ADMIN — FOLIC ACID 1 MG: 1 TABLET ORAL at 08:26

## 2020-02-03 RX ADMIN — RIFAXIMIN 550 MG: 550 TABLET ORAL at 08:26

## 2020-02-03 RX ADMIN — HALOPERIDOL LACTATE 1 MG: 5 INJECTION, SOLUTION INTRAMUSCULAR at 04:45

## 2020-02-03 RX ADMIN — NICOTINE 1 PATCH: 14 PATCH TRANSDERMAL at 08:26

## 2020-02-03 RX ADMIN — MAGNESIUM OXIDE TAB 400 MG (241.3 MG ELEMENTAL MG) 400 MG: 400 (241.3 MG) TAB at 08:26

## 2020-02-03 RX ADMIN — METRONIDAZOLE 500 MG: 500 TABLET ORAL at 14:17

## 2020-02-03 RX ADMIN — THIAMINE HCL TAB 100 MG 100 MG: 100 TAB at 08:26

## 2020-02-03 RX ADMIN — METRONIDAZOLE 500 MG: 500 TABLET ORAL at 05:22

## 2020-02-03 RX ADMIN — LACTULOSE 20 G: 10 SOLUTION ORAL at 08:27

## 2020-02-03 RX ADMIN — MAGNESIUM OXIDE TAB 400 MG (241.3 MG ELEMENTAL MG) 400 MG: 400 (241.3 MG) TAB at 16:51

## 2020-02-03 RX ADMIN — METOPROLOL SUCCINATE 25 MG: 25 TABLET, EXTENDED RELEASE ORAL at 08:26

## 2020-02-03 RX ADMIN — POTASSIUM CHLORIDE 40 MEQ: 1500 TABLET, EXTENDED RELEASE ORAL at 11:07

## 2020-02-03 RX ADMIN — LACTULOSE 20 G: 10 SOLUTION ORAL at 16:47

## 2020-02-03 RX ADMIN — CEFDINIR 300 MG: 300 CAPSULE ORAL at 08:26

## 2020-02-03 RX ADMIN — PANTOPRAZOLE SODIUM 40 MG: 40 TABLET, DELAYED RELEASE ORAL at 16:51

## 2020-02-03 RX ADMIN — METOPROLOL TARTRATE 25 MG: 25 TABLET ORAL at 10:58

## 2020-02-03 RX ADMIN — PANTOPRAZOLE SODIUM 40 MG: 40 TABLET, DELAYED RELEASE ORAL at 05:22

## 2020-02-03 RX ADMIN — PREDNISONE 60 MG: 20 TABLET ORAL at 08:25

## 2020-02-03 RX ADMIN — FUROSEMIDE 80 MG: 80 TABLET ORAL at 08:26

## 2020-02-03 NOTE — TELEPHONE ENCOUNTER
Patient is currently at  [facility]  Family is requesting a referral to a [address] [place], [place] [address]cielo that Dr Kristina Noe would recommend for the patient after discharge   Please call Almita President, patient's son at [phone]

## 2020-02-03 NOTE — PROGRESS NOTES
Pt pulling at telemetry box and lines, climbing out of bed, restless and confused  Pt does not have IV access; she pulled out another IV line  Notified Dr Carmen Tarango  New orders for restraints place  Wrist restraints applied  Pt continued to manage to pull off telemetry box and leads  Soft mitt restraints applied  Pt is now resting and watching TV

## 2020-02-03 NOTE — PROGRESS NOTES
[facility] Internal Medicine Progress Note  Patient: Aliya Willis 69 y o  female   MRN: [id number]  PCP: Jacy Rivera MD  Unit/Bed#: -24 Encounter: [id number]  Date Of Visit: 02/03/20          * Pancytopenia [facility])  Assessment & Plan  Currently on steroids  Her white cell count and hemoglobin stable/better  Platelets still low, however, better than before although slightly lower than yesterday  ? Etiology for thrombocytopenia  Likely secondary to chronic alcoholism  Continue to monitor    Elevated troponin  Assessment & Plan  Likely secondary to type 2 MI-respiratory issues  Ejection fraction about 50-55%  Cardiology input appreciated  No further workup as per Cardiology service  Patient on diuretics  Patient with heart failure with preserved ejection fraction  F    Cirrhosis ([facility] )  Assessment & Plan  Secondary to chronic alcoholism  Patient on lactulose  Ammonia is better  Continue with current other treatment    Toxic metabolic encephalopathy  Assessment & Plan  Plan as above    History of alcohol abuse  Assessment & Plan  Patient with underlying cirrhosis secondary to chronic alcohol abuse  She likely has underlying dementia-possibly made worse with chronic alcohol use  She has been agitated at times  Continue to reoriented use restraints as needed  Paroxysmal SVT (supraventricular tachycardia) ([facility] )  Assessment & Plan  As per RN, patient's heart rate has been in 140s and 150s  I do not see any documentation anywhere  In vital signs, her rate is   Increased the dose/frequency of her Lopressor and changed it from Toprol-XL to Lopressor  Also added p r n  IV Lopressor  Replete potassium and electrolytes  Patient also had paroxysmal AFib  Not a candidate for anticoagulation or any further workup  Cigarette nicotine dependence without complication  Assessment & Plan  Encouraged to quit    To Continue with nicotine patch    Benign essential hypertension  Assessment & Plan  Blood pressure currently stable    Symptomatic anemia  Assessment & Plan  Status post blood transfusion  Hemoglobin has remained stable lately    Acute respiratory failure with hypoxia (HCC)resolved as of 2/1/2020  Assessment & Plan  Likely multifactorial including fluid overload and possibly aspiration pneumonia/pneumonitis  MRSA negative  Discontinue vancomycin  Change antibiotics to oral       Present on Admission:   Pancytopenia (HCC)   Symptomatic anemia   Benign essential hypertension   Cigarette nicotine dependence without complication   History of alcohol abuse   Toxic metabolic encephalopathy   Cirrhosis (HCC)   (Resolved) Acute respiratory failure with hypoxia (HCC)   Elevated troponin   Paroxysmal SVT (supraventricular tachycardia) (HCC)            VTE Pharmacologic Prophylaxis:   Pharmacologic: Anemia/thrombocytopenia  Mechanical VTE Prophylaxis in Place: Yes    Patient Centered Rounds: I have performed bedside rounds with nursing staff today  Discussions with Specialists or Other Care Team Provider:  Yes    Education and Discussions with Family / Patient:  Yes    Time Spent for Care: 35+ minutes  More than 50% of total time spent on counseling and coordination of care as described above  Current Length of Stay: 5 day(s)    Current Patient Status: Inpatient   Certification Statement: The patient will continue to require additional inpatient hospital stay due to Encephalopathy/cardiac arrhythmia    Discharge Plan: Home when stable    Code Status: Level 1 - Full Code      Subjective:   Received a call from the RN that patient was more agitated and also having tachycardia  Patient hold her tele monitor off  This is not really new for patient and she has had done this previously  When I saw the patient, she was in restraints which she has been off and on  When family is here , she is more calm and is not as agitated and has been out of restraints  She is still confused    She has been doing this off and on for many months now  She denies any pain and then she told me that she has little bit of everything  Objective:     Vitals:   Temp (24hrs), Av 2 °F (36 8 °C), Min:98 °F (36 7 °C), Max:98 4 °F (36 9 °C)    Temp:  [98 °F (36 7 °C)-98 4 °F (36 9 °C)] 98 °F (36 7 °C)  HR:  [] 77  Resp:  [18] 18  BP: (112-140)/(57-69) 140/65  SpO2:  [95 %-96 %] 95 %  Body mass index is 31 03 kg/m²  Input and Output Summary (last 24 hours): Intake/Output Summary (Last 24 hours) at 2/3/2020 1238  Last data filed at 2/3/2020 4092  Gross per 24 hour   Intake 300 ml   Output 2350 ml   Net -2050 ml           Physical Exam:     Vital signs are reviewed as above  Constitutional:  Patient sitting in bed  Not in any respiratory distress  Conjunctivae are slightly pale  S1 and S2 audible  She has significant ectopy/irregular rhythm on auscultation, however, she is not on the telemonitor  Abdomen is soft  Nontender  Bowel sounds are audible  Awake and alert  At the moment, she is not agitated  She is not oriented to time or place or person  No cyanosis or clubbing  Enlarged toenails  Poor inspiratory effort in general    Additional Data:     Labs:    Results from last 7 days   Lab Units 20  0640   WBC Thousand/uL 4 57   HEMOGLOBIN g/dL 10 1*   HEMATOCRIT % 31 7*   PLATELETS Thousands/uL 75*   LYMPHO PCT % 28   MONO PCT % 13*   EOS PCT % 0     Results from last 7 days   Lab Units 20  0640  20  1819   POTASSIUM mmol/L 3 2*   < >  --    CHLORIDE mmol/L 103   < >  --    CO2 mmol/L 23   < >  --    CO2, I-STAT mmol/L  --   --  21   BUN mg/dL 17   < >  --    CREATININE mg/dL 1 02   < >  --    CALCIUM mg/dL 9 3   < >  --    ALK PHOS U/L 195*   < >  --    ALT U/L 31   < >  --    AST U/L 59*   < >  --    GLUCOSE, ISTAT mg/dl  --   --  112    < > = values in this interval not displayed       Results from last 7 days   Lab Units 20  1351   INR  1 43*       * I Have Reviewed All Lab Data Listed Above  * Additional Pertinent Lab Tests Reviewed: All Labs Within Last 24 Hours Reviewed    Recent Cultures (last 7 days):     Results from last 7 days   Lab Units 01/30/20  5480   BLOOD CULTURE  No Growth at 72 hrs  No Growth at 72 hrs  Last 24 Hours Medication List:     Current Facility-Administered Medications:  acetaminophen 650 mg Oral Q6H PRN Will FRANCISCA Tripp    calcium carbonate 1,000 mg Oral Daily PRN Will FRANCISCA Tripp    cefdinir 300 mg Oral Q12H [address] Will FRANCISCA Tripp    folic acid 1 mg Oral Daily Will FRANCISCA Tripp    furosemide 80 mg Oral Daily Will FRANCISCA Tripp    haloperidol lactate 1 mg Intravenous Q6H PRN Will FRANCISCA Tripp    lactulose 20 g Oral 4x Daily Will FRANCISCA Tripp    magnesium oxide 400 mg Oral BID Will FRANCISCA Tripp    magnesium sulfate 2 g Intravenous Daily PRN Will FRANCISCA Tripp    magnesium sulfate 2 g Intravenous Once Andrey Taylor MD    metoprolol 5 mg Intravenous Q6H PRN Andrey Taylor MD    metoprolol tartrate 25 mg Oral Q8H Andrey Taylor MD    metroNIDAZOLE 500 mg Oral [facility] Will FRANCISCA Tripp    nicotine 1 patch Transdermal Daily Will FRANCISCA Tripp    ondansetron 4 mg Intravenous Q6H PRN Will FRANCISCA Tripp    pantoprazole 40 mg Oral BID AC Will FRANCISCA Tripp    predniSONE 60 mg Oral Daily Will FRANCISCA Tripp    rifaximin 550 mg Oral Q12H [address] Will FRANCISCA Tripp    thiamine 250 mg Intravenous Daily Will FRANCISCA Tripp Last Rate: 250 mg (02/02/20 0856)   thiamine 100 mg Oral Daily Will FRANCISCA Tripp         Today, Patient Was Seen By: Andrey Taylor MD    ** Please Note: Dragon 360 Dictation voice to text software may have been used in the creation of this document   **

## 2020-02-04 LAB
ALBUMIN SERPL BCP-MCNC: 3.3 G/DL (ref 3.5–5)
ALP SERPL-CCNC: 161 U/L (ref 46–116)
ALT SERPL W P-5'-P-CCNC: 26 U/L (ref 12–78)
ANION GAP SERPL CALCULATED.3IONS-SCNC: 10 MMOL/L (ref 4–13)
AST SERPL W P-5'-P-CCNC: 38 U/L (ref 5–45)
ATRIAL RATE: 117 BPM
ATRIAL RATE: 123 BPM
BASOPHILS # BLD MANUAL: 0 THOUSAND/UL (ref 0–0.1)
BASOPHILS NFR MAR MANUAL: 0 % (ref 0–1)
BILIRUB SERPL-MCNC: 1.5 MG/DL (ref 0.2–1)
BUN SERPL-MCNC: 17 MG/DL (ref 5–25)
CALCIUM SERPL-MCNC: 8.7 MG/DL (ref 8.3–10.1)
CHLORIDE SERPL-SCNC: 105 MMOL/L (ref 100–108)
CO2 SERPL-SCNC: 28 MMOL/L (ref 21–32)
CREAT SERPL-MCNC: 1.05 MG/DL (ref 0.6–1.3)
EOSINOPHIL # BLD MANUAL: 0 THOUSAND/UL (ref 0–0.4)
EOSINOPHIL NFR BLD MANUAL: 0 % (ref 0–6)
ERYTHROCYTE [DISTWIDTH] IN BLOOD BY AUTOMATED COUNT: 19.3 % (ref 11.6–15.1)
GFR SERPL CREATININE-BSD FRML MDRD: 61 ML/MIN/1.73SQ M
GLUCOSE SERPL-MCNC: 129 MG/DL (ref 65–140)
HCT VFR BLD AUTO: 31 % (ref 34.8–46.1)
HGB BLD-MCNC: 9.8 G/DL (ref 11.5–15.4)
LYMPHOCYTES # BLD AUTO: 1.05 THOUSAND/UL (ref 0.6–4.47)
LYMPHOCYTES # BLD AUTO: 28 % (ref 14–44)
MCH RBC QN AUTO: 28.8 PG (ref 26.8–34.3)
MCHC RBC AUTO-ENTMCNC: 31.6 G/DL (ref 31.4–37.4)
MCV RBC AUTO: 91 FL (ref 82–98)
MONOCYTES # BLD AUTO: 0.3 THOUSAND/UL (ref 0–1.22)
MONOCYTES NFR BLD: 8 % (ref 4–12)
MYELOCYTES NFR BLD MANUAL: 1 % (ref 0–1)
NEUTROPHILS # BLD MANUAL: 2.36 THOUSAND/UL (ref 1.85–7.62)
NEUTS SEG NFR BLD AUTO: 63 % (ref 43–75)
NRBC BLD AUTO-RTO: 18 /100 WBC (ref 0–2)
NRBC BLD AUTO-RTO: 18 /100 WBCS
P AXIS: 87 DEGREES
PLATELET # BLD AUTO: 69 THOUSANDS/UL (ref 149–390)
PLATELET BLD QL SMEAR: ABNORMAL
POLYCHROMASIA BLD QL SMEAR: PRESENT
POTASSIUM SERPL-SCNC: 3 MMOL/L (ref 3.5–5.3)
PR INTERVAL: 216 MS
PR INTERVAL: 232 MS
PROT SERPL-MCNC: 6.8 G/DL (ref 6.4–8.2)
QRS AXIS: 87 DEGREES
QRS AXIS: 87 DEGREES
QRSD INTERVAL: 70 MS
QRSD INTERVAL: 70 MS
QT INTERVAL: 320 MS
QT INTERVAL: 330 MS
QTC INTERVAL: 458 MS
QTC INTERVAL: 460 MS
RBC # BLD AUTO: 3.4 MILLION/UL (ref 3.81–5.12)
SCHISTOCYTES BLD QL SMEAR: PRESENT
SODIUM SERPL-SCNC: 143 MMOL/L (ref 136–145)
T WAVE AXIS: 54 DEGREES
T WAVE AXIS: 64 DEGREES
TARGETS BLD QL SMEAR: PRESENT
TOTAL CELLS COUNTED SPEC: 100
VENTRICULAR RATE: 117 BPM
VENTRICULAR RATE: 123 BPM
WBC # BLD AUTO: 3.74 THOUSAND/UL (ref 4.31–10.16)

## 2020-02-04 PROCEDURE — 99232 SBSQ HOSP IP/OBS MODERATE 35: CPT | Performed by: INTERNAL MEDICINE

## 2020-02-04 PROCEDURE — 85027 COMPLETE CBC AUTOMATED: CPT | Performed by: INTERNAL MEDICINE

## 2020-02-04 PROCEDURE — 85007 BL SMEAR W/DIFF WBC COUNT: CPT | Performed by: INTERNAL MEDICINE

## 2020-02-04 PROCEDURE — 80053 COMPREHEN METABOLIC PANEL: CPT | Performed by: INTERNAL MEDICINE

## 2020-02-04 PROCEDURE — 93010 ELECTROCARDIOGRAM REPORT: CPT | Performed by: INTERNAL MEDICINE

## 2020-02-04 RX ORDER — POTASSIUM CHLORIDE 20 MEQ/1
40 TABLET, EXTENDED RELEASE ORAL ONCE
Status: COMPLETED | OUTPATIENT
Start: 2020-02-04 | End: 2020-02-04

## 2020-02-04 RX ADMIN — PANTOPRAZOLE SODIUM 40 MG: 40 TABLET, DELAYED RELEASE ORAL at 06:43

## 2020-02-04 RX ADMIN — PREDNISONE 60 MG: 20 TABLET ORAL at 09:13

## 2020-02-04 RX ADMIN — RIFAXIMIN 550 MG: 550 TABLET ORAL at 00:17

## 2020-02-04 RX ADMIN — CEFDINIR 300 MG: 300 CAPSULE ORAL at 00:17

## 2020-02-04 RX ADMIN — FOLIC ACID 1 MG: 1 TABLET ORAL at 09:13

## 2020-02-04 RX ADMIN — METRONIDAZOLE 500 MG: 500 TABLET ORAL at 13:48

## 2020-02-04 RX ADMIN — CEFDINIR 300 MG: 300 CAPSULE ORAL at 21:43

## 2020-02-04 RX ADMIN — THIAMINE HCL TAB 100 MG 100 MG: 100 TAB at 09:14

## 2020-02-04 RX ADMIN — METRONIDAZOLE 500 MG: 500 TABLET ORAL at 21:43

## 2020-02-04 RX ADMIN — METOPROLOL TARTRATE 25 MG: 25 TABLET ORAL at 09:14

## 2020-02-04 RX ADMIN — FUROSEMIDE 80 MG: 80 TABLET ORAL at 09:13

## 2020-02-04 RX ADMIN — RIFAXIMIN 550 MG: 550 TABLET ORAL at 09:14

## 2020-02-04 RX ADMIN — METRONIDAZOLE 500 MG: 500 TABLET ORAL at 00:17

## 2020-02-04 RX ADMIN — RIFAXIMIN 550 MG: 550 TABLET ORAL at 21:43

## 2020-02-04 RX ADMIN — MAGNESIUM OXIDE TAB 400 MG (241.3 MG ELEMENTAL MG) 400 MG: 400 (241.3 MG) TAB at 09:14

## 2020-02-04 RX ADMIN — LACTULOSE 20 G: 10 SOLUTION ORAL at 09:14

## 2020-02-04 RX ADMIN — METOPROLOL TARTRATE 25 MG: 25 TABLET ORAL at 18:34

## 2020-02-04 RX ADMIN — LACTULOSE 20 G: 10 SOLUTION ORAL at 18:34

## 2020-02-04 RX ADMIN — NICOTINE 1 PATCH: 14 PATCH TRANSDERMAL at 09:11

## 2020-02-04 RX ADMIN — THIAMINE HYDROCHLORIDE 250 MG: 100 INJECTION, SOLUTION INTRAMUSCULAR; INTRAVENOUS at 09:17

## 2020-02-04 RX ADMIN — MAGNESIUM OXIDE TAB 400 MG (241.3 MG ELEMENTAL MG) 400 MG: 400 (241.3 MG) TAB at 18:34

## 2020-02-04 RX ADMIN — POTASSIUM CHLORIDE 40 MEQ: 1500 TABLET, EXTENDED RELEASE ORAL at 18:34

## 2020-02-04 RX ADMIN — PANTOPRAZOLE SODIUM 40 MG: 40 TABLET, DELAYED RELEASE ORAL at 18:34

## 2020-02-04 RX ADMIN — METRONIDAZOLE 500 MG: 500 TABLET ORAL at 06:43

## 2020-02-04 RX ADMIN — CEFDINIR 300 MG: 300 CAPSULE ORAL at 09:14

## 2020-02-04 RX ADMIN — HALOPERIDOL LACTATE 1 MG: 5 INJECTION, SOLUTION INTRAMUSCULAR at 01:30

## 2020-02-04 RX ADMIN — LACTULOSE 20 G: 10 SOLUTION ORAL at 13:48

## 2020-02-04 NOTE — PLAN OF CARE
Problem: Potential for Falls  Goal: Patient will remain free of falls  Description  INTERVENTIONS:  - Assess patient frequently for physical needs  -  Identify cognitive and physical deficits and behaviors that affect risk of falls    -  [place] fall precautions as indicated by assessment   - Educate patient/family on patient safety including physical limitations  - Instruct patient to call for assistance with activity based on assessment  - Modify environment to reduce risk of injury  - Consider OT/PT consult to assist with strengthening/mobility  Outcome: Progressing     Problem: PAIN - ADULT  Goal: Verbalizes/displays adequate comfort level or baseline comfort level  Description  Interventions:  - Encourage patient to monitor pain and request assistance  - Assess pain using appropriate pain scale  - Administer analgesics based on type and severity of pain and evaluate response  - Implement non-pharmacological measures as appropriate and evaluate response  - Consider cultural and social influences on pain and pain management  - Notify physician/advanced practitioner if interventions unsuccessful or patient reports new pain  Outcome: Progressing     Problem: INFECTION - ADULT  Goal: Absence or prevention of progression during hospitalization  Description  INTERVENTIONS:  - Assess and monitor for signs and symptoms of infection  - Monitor lab/diagnostic results  - Monitor all insertion sites, i e  indwelling lines, tubes, and drains  - Monitor endotracheal if appropriate and nasal secretions for changes in amount and color  - [place] appropriate cooling/warming therapies per order  - Administer medications as ordered  - Instruct and encourage patient and family to use good hand hygiene technique  - Identify and instruct in appropriate isolation precautions for identified infection/condition  Outcome: Progressing  Goal: Absence of fever/infection during neutropenic period  Description  INTERVENTIONS:  - Monitor WBC    Outcome: Progressing     Problem: SAFETY ADULT  Goal: Maintain or return to baseline ADL function  Description  INTERVENTIONS:  -  Assess patient's ability to carry out ADLs; assess patient's baseline for ADL function and identify physical deficits which impact ability to perform ADLs (bathing, care of mouth/teeth, toileting, grooming, dressing, etc )  - Assess/evaluate cause of self-care deficits   - Assess range of motion  - Assess patient's mobility; develop plan if impaired  - Assess patient's need for assistive devices and provide as appropriate  - Encourage maximum independence but intervene and supervise when necessary  - Involve family in performance of ADLs  - Assess for home care needs following discharge   - Consider OT consult to assist with ADL evaluation and planning for discharge  - Provide patient education as appropriate  Outcome: Progressing  Goal: Maintain or return mobility status to optimal level  Description  INTERVENTIONS:  - Assess patient's baseline mobility status (ambulation, transfers, stairs, etc )    - Identify cognitive and physical deficits and behaviors that affect mobility  - Identify mobility aids required to assist with transfers and/or ambulation (gait belt, sit-to-stand, lift, walker, cane, etc )  - [place] fall precautions as indicated by assessment  - Record patient progress and toleration of activity level on Mobility SBAR; progress patient to next Phase/Stage  - Instruct patient to call for assistance with activity based on assessment  - Consider rehabilitation consult to assist with strengthening/weightbearing, etc   Outcome: Progressing     Problem: DISCHARGE PLANNING  Goal: Discharge to home or other facility with appropriate resources  Description  INTERVENTIONS:  - Identify barriers to discharge w/patient and caregiver  - Arrange for needed discharge resources and transportation as appropriate  - Identify discharge learning needs (meds, wound care, etc )  - Arrange for interpretive services to assist at discharge as needed  - Refer to Case Management Department for coordinating discharge planning if the patient needs post-hospital services based on physician/advanced practitioner order or complex needs related to functional status, cognitive ability, or social support system  Outcome: Progressing     Problem: Knowledge Deficit  Goal: Patient/family/caregiver demonstrates understanding of disease process, treatment plan, medications, and discharge instructions  Description  Complete learning assessment and assess knowledge base  Interventions:  - Provide teaching at level of understanding  - Provide teaching via preferred learning methods  Outcome: Progressing     Problem: SAFETY,RESTRAINT: NV/NON-SELF DESTRUCTIVE BEHAVIOR  Goal: Remains free of harm/injury (restraint for non violent/non self-detsructive behavior)  Description  INTERVENTIONS:  - Instruct patient/family regarding restraint use   - Assess and monitor physiologic and psychological status   - Provide interventions and comfort measures to meet assessed patient needs   - Identify and implement measures to help patient regain control  - Assess readiness for release of restraint   Outcome: Progressing  Goal: Returns to optimal restraint-free functioning  Description  INTERVENTIONS:  - Assess the patient's behavior and symptoms that indicate continued need for restraint  - Identify and implement measures to help patient regain control  - Assess readiness for release of restraint   Outcome: Progressing     Problem: Nutrition/Hydration-ADULT  Goal: Nutrient/Hydration intake appropriate for improving, restoring or maintaining nutritional needs  Description  Monitor and assess patient's nutrition/hydration status for malnutrition  Collaborate with interdisciplinary team and initiate plan and interventions as ordered  Monitor patient's weight and dietary intake as ordered or per policy   Utilize nutrition screening indicated  - Encourage broncho-pulmonary hygiene including cough, deep breathe, Incentive Spirometry  - Assess the need for suctioning and aspirate as needed  - Assess and instruct to report SOB or any respiratory difficulty  - Respiratory Therapy support as indicated  Outcome: Progressing     Problem: GENITOURINARY - ADULT  Goal: Maintains or returns to baseline urinary function  Description  INTERVENTIONS:  - Assess urinary function  - Encourage oral fluids to ensure adequate hydration if ordered  - Administer IV fluids as ordered to ensure adequate hydration  - Administer ordered medications as needed  - Offer frequent toileting  - Follow urinary retention protocol if ordered  Outcome: Progressing  Goal: Urinary catheter remains patent  Description  INTERVENTIONS:  - Assess patency of urinary catheter  - If patient has a chronic goodman, consider changing catheter if non-functioning  - Follow guidelines for intermittent irrigation of non-functioning urinary catheter  Outcome: Progressing     Problem: HEMATOLOGIC - ADULT  Goal: Maintains hematologic stability  Description  INTERVENTIONS  - Assess for signs and symptoms of bleeding or hemorrhage  - Monitor labs  - Administer supportive blood products/factors as ordered and appropriate  Outcome: Progressing

## 2020-02-04 NOTE — SOCIAL WORK
Pt discussed during care coordination rounds  Pt not yet stable for discharge per Dr Wilder Denver  Pt still in restraints, agitated and combative at times  PT/OT has not been able to assess due to same  CM left message for pt son Adriana Esteban to discuss discharge planning  Dr Wilder Denver will also reach out to son to discuss goals of care  CM will continue to follow

## 2020-02-04 NOTE — PLAN OF CARE
Problem: Potential for Falls  Goal: Patient will remain free of falls  Description  INTERVENTIONS:  - Assess patient frequently for physical needs  -  Identify cognitive and physical deficits and behaviors that affect risk of falls    -  [place] fall precautions as indicated by assessment   - Educate patient/family on patient safety including physical limitations  - Instruct patient to call for assistance with activity based on assessment  - Modify environment to reduce risk of injury  - Consider OT/PT consult to assist with strengthening/mobility  2/4/2020 1432 by Lillian Humphries RN  Outcome: Progressing  2/4/2020 1432 by Lillian Humphries RN  Outcome: Progressing     Problem: PAIN - ADULT  Goal: Verbalizes/displays adequate comfort level or baseline comfort level  Description  Interventions:  - Encourage patient to monitor pain and request assistance  - Assess pain using appropriate pain scale  - Administer analgesics based on type and severity of pain and evaluate response  - Implement non-pharmacological measures as appropriate and evaluate response  - Consider cultural and social influences on pain and pain management  - Notify physician/advanced practitioner if interventions unsuccessful or patient reports new pain  2/4/2020 1432 by Lillian Humphries RN  Outcome: Progressing  2/4/2020 1432 by Lillian Humphries RN  Outcome: Progressing     Problem: INFECTION - ADULT  Goal: Absence or prevention of progression during hospitalization  Description  INTERVENTIONS:  - Assess and monitor for signs and symptoms of infection  - Monitor lab/diagnostic results  - Monitor all insertion sites, i e  indwelling lines, tubes, and drains  - Monitor endotracheal if appropriate and nasal secretions for changes in amount and color  - [place] appropriate cooling/warming therapies per order  - Administer medications as ordered  - Instruct and encourage patient and family to use good hand hygiene technique  - Identify and instruct in appropriate isolation precautions for identified infection/condition  2/4/2020 1432 by Racheal Harvey RN  Outcome: Progressing  2/4/2020 1432 by Racheal Harvey RN  Outcome: Progressing  Goal: Absence of fever/infection during neutropenic period  Description  INTERVENTIONS:  - Monitor WBC    2/4/2020 1432 by Racheal Harvey RN  Outcome: Progressing  2/4/2020 1432 by Racheal Harvey RN  Outcome: Progressing     Problem: SAFETY ADULT  Goal: Maintain or return to baseline ADL function  Description  INTERVENTIONS:  -  Assess patient's ability to carry out ADLs; assess patient's baseline for ADL function and identify physical deficits which impact ability to perform ADLs (bathing, care of mouth/teeth, toileting, grooming, dressing, etc )  - Assess/evaluate cause of self-care deficits   - Assess range of motion  - Assess patient's mobility; develop plan if impaired  - Assess patient's need for assistive devices and provide as appropriate  - Encourage maximum independence but intervene and supervise when necessary  - Involve family in performance of ADLs  - Assess for home care needs following discharge   - Consider OT consult to assist with ADL evaluation and planning for discharge  - Provide patient education as appropriate  2/4/2020 1432 by Racheal Harvey RN  Outcome: Progressing  2/4/2020 1432 by Racheal Harvey RN  Outcome: Progressing  Goal: Maintain or return mobility status to optimal level  Description  INTERVENTIONS:  - Assess patient's baseline mobility status (ambulation, transfers, stairs, etc )    - Identify cognitive and physical deficits and behaviors that affect mobility  - Identify mobility aids required to assist with transfers and/or ambulation (gait belt, sit-to-stand, lift, walker, cane, etc )  - [place] fall precautions as indicated by assessment  - Record patient progress and toleration of activity level on Mobility SBAR; progress patient to next Phase/Stage  - Instruct patient to call for assistance with activity based on assessment  - Consider rehabilitation consult to assist with strengthening/weightbearing, etc   2/4/2020 1432 by Shahnaz Rees RN  Outcome: Progressing  2/4/2020 1432 by Shahnaz Rees RN  Outcome: Progressing     Problem: DISCHARGE PLANNING  Goal: Discharge to home or other facility with appropriate resources  Description  INTERVENTIONS:  - Identify barriers to discharge w/patient and caregiver  - Arrange for needed discharge resources and transportation as appropriate  - Identify discharge learning needs (meds, wound care, etc )  - Arrange for interpretive services to assist at discharge as needed  - Refer to Case Management Department for coordinating discharge planning if the patient needs post-hospital services based on physician/advanced practitioner order or complex needs related to functional status, cognitive ability, or social support system  2/4/2020 1432 by Shahnaz Rees RN  Outcome: Progressing  2/4/2020 1432 by Shahnaz Rees RN  Outcome: Progressing     Problem: Knowledge Deficit  Goal: Patient/family/caregiver demonstrates understanding of disease process, treatment plan, medications, and discharge instructions  Description  Complete learning assessment and assess knowledge base    Interventions:  - Provide teaching at level of understanding  - Provide teaching via preferred learning methods  2/4/2020 1432 by Shahnaz Rees RN  Outcome: Progressing  2/4/2020 1432 by Shahnaz Rees RN  Outcome: Progressing     Problem: SAFETY,RESTRAINT: NV/NON-SELF DESTRUCTIVE BEHAVIOR  Goal: Remains free of harm/injury (restraint for non violent/non self-detsructive behavior)  Description  INTERVENTIONS:  - Instruct patient/family regarding restraint use   - Assess and monitor physiologic and psychological status   - Provide interventions and comfort measures to meet assessed patient needs   - Identify and implement measures to help patient regain control  - Assess readiness for release of restraint   2/4/2020 1432 by Estephania Blanco RN  Outcome: Progressing  2/4/2020 1432 by Estephania Blanco RN  Outcome: Progressing  Goal: Returns to optimal restraint-free functioning  Description  INTERVENTIONS:  - Assess the patient's behavior and symptoms that indicate continued need for restraint  - Identify and implement measures to help patient regain control  - Assess readiness for release of restraint   2/4/2020 1432 by Estephania Blanco RN  Outcome: Progressing  2/4/2020 1432 by Estephania Blanco RN  Outcome: Progressing     Problem: Nutrition/Hydration-ADULT  Goal: Nutrient/Hydration intake appropriate for improving, restoring or maintaining nutritional needs  Description  Monitor and assess patient's nutrition/hydration status for malnutrition  Collaborate with interdisciplinary team and initiate plan and interventions as ordered  Monitor patient's weight and dietary intake as ordered or per policy  Utilize nutrition screening tool and intervene as necessary  Determine patient's food preferences and provide high-protein, high-caloric foods as appropriate       INTERVENTIONS:  - Monitor oral intake, urinary output, labs, and treatment plans  - Assess nutrition and hydration status and recommend course of action  - Evaluate amount of meals eaten  - Assist patient with eating if necessary   - Allow adequate time for meals  - Recommend/ encourage appropriate diets, oral nutritional supplements, and vitamin/mineral supplements  - Order, calculate, and assess calorie counts as needed  - Recommend, monitor, and adjust tube feedings and TPN/PPN based on assessed needs  - Assess need for intravenous fluids  - Provide specific nutrition/hydration education as appropriate  - Include patient/family/caregiver in decisions related to nutrition  2/4/2020 1432 by Estephania Blanco RN  Outcome: Progressing  2/4/2020 1432 by Ton Bergman RN  Outcome: Progressing     Problem: Prexisting or High Potential for Compromised Skin Integrity  Goal: Skin integrity is maintained or improved  Description  INTERVENTIONS:  - Identify patients at risk for skin breakdown  - Assess and monitor skin integrity  - Assess and monitor nutrition and hydration status  - Monitor labs   - Assess for incontinence   - Turn and reposition patient  - Assist with mobility/ambulation  - Relieve pressure over bony prominences  - Avoid friction and shearing  - Provide appropriate hygiene as needed including keeping skin clean and dry  - Evaluate need for skin moisturizer/barrier cream  - Collaborate with interdisciplinary team   - Patient/family teaching  - Consider wound care consult   2/4/2020 1432 by Ton Bergman RN  Outcome: Progressing  2/4/2020 1432 by Ton Bergman RN  Outcome: Progressing     Problem: RESPIRATORY - ADULT  Goal: Achieves optimal ventilation and oxygenation  Description  INTERVENTIONS:  - Assess for changes in respiratory status  - Assess for changes in mentation and behavior  - Position to facilitate oxygenation and minimize respiratory effort  - Oxygen administered by appropriate delivery if ordered  - Initiate smoking cessation education as indicated  - Encourage broncho-pulmonary hygiene including cough, deep breathe, Incentive Spirometry  - Assess the need for suctioning and aspirate as needed  - Assess and instruct to report SOB or any respiratory difficulty  - Respiratory Therapy support as indicated  2/4/2020 1432 by Ton Bergman RN  Outcome: Progressing  2/4/2020 1432 by Ton Bergman RN  Outcome: Progressing     Problem: GENITOURINARY - ADULT  Goal: Maintains or returns to baseline urinary function  Description  INTERVENTIONS:  - Assess urinary function  - Encourage oral fluids to ensure adequate hydration if ordered  - Administer IV fluids as ordered to ensure adequate hydration  - Administer ordered medications as needed  - Offer frequent toileting  - Follow urinary retention protocol if ordered  2/4/2020 1432 by Carole Santana RN  Outcome: Progressing  2/4/2020 1432 by Carole Santana RN  Outcome: Progressing  Goal: Urinary catheter remains patent  Description  INTERVENTIONS:  - Assess patency of urinary catheter  - If patient has a chronic goodman, consider changing catheter if non-functioning  - Follow guidelines for intermittent irrigation of non-functioning urinary catheter  2/4/2020 1432 by Carole Santana RN  Outcome: Progressing  2/4/2020 1432 by Carole Santana RN  Outcome: Progressing     Problem: HEMATOLOGIC - ADULT  Goal: Maintains hematologic stability  Description  INTERVENTIONS  - Assess for signs and symptoms of bleeding or hemorrhage  - Monitor labs  - Administer supportive blood products/factors as ordered and appropriate  2/4/2020 1432 by Carole Santana RN  Outcome: Progressing  2/4/2020 1432 by Carole Santana RN  Outcome: Progressing

## 2020-02-04 NOTE — PROGRESS NOTES
Progress Note - Beryle Brittle 1945, 69 y o  female MRN: [id number]    Unit/Bed#: -01 Encounter: [id number]    Primary Care Provider: Nga Banks MD   Date and time admitted to hospital: 1/28/2020 11:44 PM        Elevated troponin  Assessment & Plan  Likely secondary to type 2 MI-respiratory issues  Ejection fraction about 50-55%  Cardiology input appreciated  No further workup as per Cardiology service  Patient on diuretics  Patient with heart failure with preserved ejection fraction  Cirrhosis ([facility] [address] )  Assessment & Plan  Secondary to chronic alcoholism  Patient on lactulose  Ammonia is better  Continue with current other treatment    Toxic metabolic encephalopathy  Assessment & Plan  Plan as above    History of alcohol abuse  Assessment & Plan  Patient with underlying cirrhosis secondary to chronic alcohol abuse  She likely has underlying dementia-possibly made worse with chronic alcohol use  She has been agitated at times  Continue to reoriented use restraints as needed  Discussed with RN  Consider discontinuing restrain  [facility]oxys[facility] SV[facility]ra[facility]ricular tachy[facility]) [facility]  Assessment & Plan  As per RN, patient's heart rate has been in 140s and 150s  I do not see any documentation anywhere  In vital signs, her rate is   Increased the dose/frequency of her Lopressor and changed it from Toprol-XL to Lopressor  Also added p r n  IV Lopressor  Replete potassium and electrolytes  Patient also had paroxysmal AFib  Not a candidate for anticoagulation  Heart rate remains stable now    Cigarette nicotine dependence without complication  Assessment & Plan  Encouraged to quit  To Continue with nicotine patch    Benign essential hypertension  Assessment & Plan  Blood pressure currently stable    Symptomatic anemia  Assessment & Plan  Status post blood transfusion  Hemoglobin has remained stable lately    * Pancytopenia [facility])  Assessment & Plan  Currently on steroids    Her white cell count and hemoglobin stable/better  Platelets still low, however, better than before although slightly lower than yesterday  ? Etiology for thrombocytopenia  Likely secondary to chronic alcoholism  Continue to monitor    Acute respiratory failure with hypoxia (HCC)resolved as of 2020  Assessment & Plan  Likely multifactorial including fluid overload and possibly aspiration pneumonia/pneumonitis  MRSA negative  Discontinue vancomycin  Change antibiotics to oral       VTE Pharmacologic Prophylaxis:   Pharmacologic: Pharmacologic VTE Prophylaxis contraindicated due to Thrombocytopenia  Mechanical VTE Prophylaxis in Place: Yes    Patient Centered Rounds: I have performed bedside rounds with nursing staff today  Discussions with Specialists or Other Care Team Provider:     Education and Discussions with Family / Patient:  Called her son    Time Spent for Care: More than 50% of total time spent on counseling and coordination of care as described above  Current Length of Stay: 6 day(s)    Current Patient Status: Inpatient   Certification Statement: The patient will continue to require additional inpatient hospital stay due to Cirrhosis, thrombocytopenia    Discharge Plan:  In next 48-72 hours    Code Status: Level 1 - Full Code      Subjective:   I have seen and examined the patient bedside this morning  Patient sitting on recliner  No abdominal pain, nausea or vomiting  afebrile    Objective:     Vitals:   Temp (24hrs), Av 4 °F (36 9 °C), Min:98 2 °F (36 8 °C), Max:98 5 °F (36 9 °C)    Temp:  [98 2 °F (36 8 °C)-98 5 °F (36 9 °C)] 98 2 °F (36 8 °C)  HR:  [67-68] 68  Resp:  [17-18] 17  BP: (104-130)/(58-60) 130/60  SpO2:  [96 %] 96 %  Body mass index is 31 14 kg/m²  Input and Output Summary (last 24 hours):        Intake/Output Summary (Last 24 hours) at 2020 1741  Last data filed at 2020 1501  Gross per 24 hour   Intake 600 ml   Output 500 ml   Net 100 ml       Physical Exam: Physical Exam  General- Awake, looks comfortable  HEENT- Normocephalic, atraumatic, oral mucosa- moist  Neck- Supple,  no JVD  CVS- Normal S1/ S2, Regular rate and rhythm  Respiratory system- B/L clear breath sounds, no wheezing  Abdomen- Soft, Non distended, no tenderness, Bowel sound- present  CNS- No acute focal neurologic deficit noted    Additional Data:     Labs:    Results from last 7 days   Lab Units 02/04/20  1010  01/30/20  0846   WBC Thousand/uL 3 74*   < > 3 45*   HEMOGLOBIN g/dL 9 8*   < > 6 8*   I STAT HEMOGLOBIN   --    < >  --    HEMATOCRIT % 31 0*   < > 21 0*   HEMATOCRIT, ISTAT   --    < >  --    PLATELETS Thousands/uL 69*   < > 34*   BANDS PCT %  --   --  3   LYMPHO PCT % 28   < > 41   MONO PCT % 8   < > 7   EOS PCT % 0   < > 4    < > = values in this interval not displayed  Results from last 7 days   Lab Units 02/04/20  1010   SODIUM mmol/L 143   POTASSIUM mmol/L 3 0*   CHLORIDE mmol/L 105   CO2 mmol/L 28   BUN mg/dL 17   CREATININE mg/dL 1 05   ANION GAP mmol/L 10   CALCIUM mg/dL 8 7   ALBUMIN g/dL 3 3*   TOTAL BILIRUBIN mg/dL 1 50*   ALK PHOS U/L 161*   ALT U/L 26   AST U/L 38   GLUCOSE RANDOM mg/dL 129     Results from last 7 days   Lab Units 01/31/20  1351   INR  1 43*     Results from last 7 days   Lab Units 02/01/20  1058 01/30/20  1810   POC GLUCOSE mg/dl 157* 111         Results from last 7 days   Lab Units 02/03/20  0640 02/01/20  0602 01/31/20  0527 01/30/20  2046 01/30/20  1827   LACTIC ACID mmol/L  --   --   --   --  5 1*   PROCALCITONIN ng/ml 0 18 0 43* 0 46* 0 51*  --            * I Have Reviewed All Lab Data Listed Above  * Additional Pertinent Lab Tests Reviewed: All Labs Within Last 24 Hours Reviewed    Imaging:    Imaging Reports Reviewed Today Include:   Imaging Personally Reviewed by Myself Includes:      Recent Cultures (last 7 days):     Results from last 7 days   Lab Units 01/30/20  2333   BLOOD CULTURE  No Growth After 4 Days  No Growth After 4 Days         Last 24 Hours Medication List:     Current Facility-Administered Medications:  acetaminophen 650 mg Oral Q6H PRN Galen Hence, PA-C   calcium carbonate 1,000 mg Oral Daily PRN Galen Hence, PA-C   cefdinir 300 mg Oral Q12H [address] Galen Hence, PA-C   folic acid 1 mg Oral Daily Galen Hence, PA-C   furosemide 80 mg Oral Daily Galen Hence, PA-C   haloperidol lactate 1 mg Intravenous Q6H PRN Galen Hence, PA-C   lactulose 20 g Oral 4x Daily Galen Hence, PA-C   magnesium oxide 400 mg Oral BID Galen Hence, PA-C   magnesium sulfate 2 g Intravenous Daily PRN Galen Hence, PA-C   magnesium sulfate 2 g Intravenous Once Elissa Pablo MD   metoprolol 5 mg Intravenous Q6H PRN Elissa Pablo MD   metoprolol tartrate 25 mg Oral Q8H Elissa Pablo MD   metroNIDAZOLE 500 mg Oral [facility] Galen Hence, PA-C   nicotine 1 patch Transdermal Daily Galen Hence, PA-C   ondansetron 4 mg Intravenous Q6H PRN Galen Hence, PA-C   pantoprazole 40 mg Oral BID AC Galen Hence, PA-C   predniSONE 60 mg Oral Daily Galen Hence, PA-C   rifaximin 550 mg Oral Q12H [address] Galen Hence, PA-C   thiamine 100 mg Oral Daily Galen Hence, PA-C        Today, Patient Was Seen By: Cesar Acosta MD    ** Please Note: Dictation voice to text software may have been used in the creation of this document   **

## 2020-02-04 NOTE — PROGRESS NOTES
Pt trying to get out of bed, tried to help pt to the bathroom  Pt agitated pulling off telemetry and gown  Pt then grabbed this RN's arm when trying to help her go to the bathroom and scratched it  After a lot of convincing able to get pt safely back into bed reapplied wrist restraints and applied waist belt   Will continue to monitor pt

## 2020-02-05 PROBLEM — I50.33 ACUTE ON CHRONIC DIASTOLIC CHF (CONGESTIVE HEART FAILURE) (HCC): Status: ACTIVE | Noted: 2020-02-05

## 2020-02-05 LAB
ALBUMIN SERPL BCP-MCNC: 3.1 G/DL (ref 3.5–5)
ALP SERPL-CCNC: 156 U/L (ref 46–116)
ALT SERPL W P-5'-P-CCNC: 23 U/L (ref 12–78)
ANION GAP SERPL CALCULATED.3IONS-SCNC: 5 MMOL/L (ref 4–13)
AST SERPL W P-5'-P-CCNC: 39 U/L (ref 5–45)
ATRIAL RATE: 136 BPM
ATRIAL RATE: 86 BPM
BACTERIA BLD CULT: NORMAL
BACTERIA BLD CULT: NORMAL
BASOPHILS # BLD MANUAL: 0 THOUSAND/UL (ref 0–0.1)
BASOPHILS NFR MAR MANUAL: 0 % (ref 0–1)
BILIRUB SERPL-MCNC: 1.3 MG/DL (ref 0.2–1)
BUN SERPL-MCNC: 28 MG/DL (ref 5–25)
CALCIUM SERPL-MCNC: 9.1 MG/DL (ref 8.3–10.1)
CHLORIDE SERPL-SCNC: 106 MMOL/L (ref 100–108)
CO2 SERPL-SCNC: 30 MMOL/L (ref 21–32)
CREAT SERPL-MCNC: 1.21 MG/DL (ref 0.6–1.3)
EOSINOPHIL # BLD MANUAL: 0 THOUSAND/UL (ref 0–0.4)
EOSINOPHIL NFR BLD MANUAL: 0 % (ref 0–6)
ERYTHROCYTE [DISTWIDTH] IN BLOOD BY AUTOMATED COUNT: 19.1 % (ref 11.6–15.1)
GFR SERPL CREATININE-BSD FRML MDRD: 51 ML/MIN/1.73SQ M
GLUCOSE SERPL-MCNC: 139 MG/DL (ref 65–140)
HCT VFR BLD AUTO: 27.2 % (ref 34.8–46.1)
HGB BLD-MCNC: 8.6 G/DL (ref 11.5–15.4)
LYMPHOCYTES # BLD AUTO: 1.16 THOUSAND/UL (ref 0.6–4.47)
LYMPHOCYTES # BLD AUTO: 27 % (ref 14–44)
MCH RBC QN AUTO: 29.1 PG (ref 26.8–34.3)
MCHC RBC AUTO-ENTMCNC: 31.6 G/DL (ref 31.4–37.4)
MCV RBC AUTO: 92 FL (ref 82–98)
METAMYELOCYTES NFR BLD MANUAL: 1 % (ref 0–1)
METHYLMALONATE SERPL-SCNC: 121 NMOL/L (ref 0–378)
MONOCYTES # BLD AUTO: 0.6 THOUSAND/UL (ref 0–1.22)
MONOCYTES NFR BLD: 14 % (ref 4–12)
NEUTROPHILS # BLD MANUAL: 2.49 THOUSAND/UL (ref 1.85–7.62)
NEUTS BAND NFR BLD MANUAL: 1 % (ref 0–8)
NEUTS SEG NFR BLD AUTO: 57 % (ref 43–75)
NRBC BLD AUTO-RTO: 18 /100 WBCS
NRBC BLD AUTO-RTO: 3 /100 WBC (ref 0–2)
P AXIS: 78 DEGREES
PLATELET # BLD AUTO: 64 THOUSANDS/UL (ref 149–390)
PLATELET BLD QL SMEAR: ABNORMAL
PMV BLD AUTO: 10.8 FL (ref 8.9–12.7)
POLYCHROMASIA BLD QL SMEAR: PRESENT
POTASSIUM SERPL-SCNC: 4.6 MMOL/L (ref 3.5–5.3)
PR INTERVAL: 194 MS
PROT SERPL-MCNC: 6.5 G/DL (ref 6.4–8.2)
QRS AXIS: 90 DEGREES
QRS AXIS: 94 DEGREES
QRSD INTERVAL: 72 MS
QRSD INTERVAL: 74 MS
QT INTERVAL: 304 MS
QT INTERVAL: 410 MS
QTC INTERVAL: 462 MS
QTC INTERVAL: 490 MS
RBC # BLD AUTO: 2.96 MILLION/UL (ref 3.81–5.12)
SCHISTOCYTES BLD QL SMEAR: PRESENT
SL AMB DISCLAIMER: NORMAL
SODIUM SERPL-SCNC: 141 MMOL/L (ref 136–145)
T WAVE AXIS: 57 DEGREES
T WAVE AXIS: 78 DEGREES
TARGETS BLD QL SMEAR: PRESENT
TOTAL CELLS COUNTED SPEC: 100
VENTRICULAR RATE: 139 BPM
VENTRICULAR RATE: 86 BPM
WBC # BLD AUTO: 4.29 THOUSAND/UL (ref 4.31–10.16)

## 2020-02-05 PROCEDURE — 97167 OT EVAL HIGH COMPLEX 60 MIN: CPT

## 2020-02-05 PROCEDURE — 97163 PT EVAL HIGH COMPLEX 45 MIN: CPT

## 2020-02-05 PROCEDURE — 93010 ELECTROCARDIOGRAM REPORT: CPT | Performed by: INTERNAL MEDICINE

## 2020-02-05 PROCEDURE — 85027 COMPLETE CBC AUTOMATED: CPT | Performed by: INTERNAL MEDICINE

## 2020-02-05 PROCEDURE — 80053 COMPREHEN METABOLIC PANEL: CPT | Performed by: INTERNAL MEDICINE

## 2020-02-05 PROCEDURE — 85007 BL SMEAR W/DIFF WBC COUNT: CPT | Performed by: INTERNAL MEDICINE

## 2020-02-05 PROCEDURE — 99232 SBSQ HOSP IP/OBS MODERATE 35: CPT | Performed by: INTERNAL MEDICINE

## 2020-02-05 PROCEDURE — 97116 GAIT TRAINING THERAPY: CPT

## 2020-02-05 RX ORDER — LACTULOSE 20 G/30ML
20 SOLUTION ORAL 3 TIMES DAILY
Status: DISCONTINUED | OUTPATIENT
Start: 2020-02-05 | End: 2020-02-06 | Stop reason: HOSPADM

## 2020-02-05 RX ADMIN — RIFAXIMIN 550 MG: 550 TABLET ORAL at 20:55

## 2020-02-05 RX ADMIN — METRONIDAZOLE 500 MG: 500 TABLET ORAL at 06:39

## 2020-02-05 RX ADMIN — RIFAXIMIN 550 MG: 550 TABLET ORAL at 08:41

## 2020-02-05 RX ADMIN — CEFDINIR 300 MG: 300 CAPSULE ORAL at 20:55

## 2020-02-05 RX ADMIN — METOPROLOL TARTRATE 25 MG: 25 TABLET ORAL at 17:49

## 2020-02-05 RX ADMIN — CEFDINIR 300 MG: 300 CAPSULE ORAL at 08:45

## 2020-02-05 RX ADMIN — PANTOPRAZOLE SODIUM 40 MG: 40 TABLET, DELAYED RELEASE ORAL at 17:50

## 2020-02-05 RX ADMIN — PREDNISONE 50 MG: 20 TABLET ORAL at 08:46

## 2020-02-05 RX ADMIN — METRONIDAZOLE 500 MG: 500 TABLET ORAL at 14:56

## 2020-02-05 RX ADMIN — FUROSEMIDE 80 MG: 80 TABLET ORAL at 08:46

## 2020-02-05 RX ADMIN — FOLIC ACID 1 MG: 1 TABLET ORAL at 08:46

## 2020-02-05 RX ADMIN — MAGNESIUM OXIDE TAB 400 MG (241.3 MG ELEMENTAL MG) 400 MG: 400 (241.3 MG) TAB at 17:49

## 2020-02-05 RX ADMIN — NICOTINE 1 PATCH: 14 PATCH TRANSDERMAL at 08:47

## 2020-02-05 RX ADMIN — LACTULOSE 20 G: 10 SOLUTION ORAL at 08:46

## 2020-02-05 RX ADMIN — PANTOPRAZOLE SODIUM 40 MG: 40 TABLET, DELAYED RELEASE ORAL at 06:39

## 2020-02-05 RX ADMIN — METRONIDAZOLE 500 MG: 500 TABLET ORAL at 21:10

## 2020-02-05 RX ADMIN — METOPROLOL TARTRATE 25 MG: 25 TABLET ORAL at 08:41

## 2020-02-05 RX ADMIN — MAGNESIUM OXIDE TAB 400 MG (241.3 MG ELEMENTAL MG) 400 MG: 400 (241.3 MG) TAB at 08:46

## 2020-02-05 RX ADMIN — THIAMINE HCL TAB 100 MG 100 MG: 100 TAB at 08:46

## 2020-02-05 RX ADMIN — LACTULOSE 20 G: 10 SOLUTION ORAL at 20:54

## 2020-02-05 NOTE — ASSESSMENT & PLAN NOTE
Wt Readings from Last 3 Encounters:   02/04/20 82 3 kg (181 lb 7 oz)   01/28/20 78 4 kg (172 lb 12 8 oz)   05/31/19 82 6 kg (182 lb)     Acute on chronic diastolic heart failure in the setting of HTN as evidenced by Echo shows evidence of volume overload, acute respiratory failure and CXR findings, treated with high flow oxygen, IV Lasix, I/O's, daily weights  Currently improved  Change to oral Lasix  Present on admission

## 2020-02-05 NOTE — ASSESSMENT & PLAN NOTE
Secondary to chronic alcoholism  Patient on lactulose  Ammonia is better    Continue with current other treatment  Will decrease lactulose because of diarrhea

## 2020-02-05 NOTE — PROGRESS NOTES
Progress Note - Mauricio Clarity 1945, 69 y o  female MRN: [id number]    Unit/Bed#: -01 Encounter: [id number]    Primary Care Provider: Nancy Troncoso MD   Date and time admitted to hospital: 1/28/2020 11:44 PM        Acute on chronic diastolic CHF (congestive heart failure) ([facility] )  Assessment & Plan  Wt Readings from Last 3 Encounters:   02/04/20 82 3 kg (181 lb 7 oz)   01/28/20 78 4 kg (172 lb 12 8 oz)   05/31/19 82 6 kg (182 lb)     Acute on chronic diastolic heart failure in the setting of HTN as evidenced by Echo shows evidence of volume overload, acute respiratory failure and CXR findings, treated with high flow oxygen, IV Lasix, I/O's, daily weights  Currently improved  Change to oral Lasix  Present on admission  Elevated troponin  Assessment & Plan  Likely secondary to type 2 MI-respiratory issues  Ejection fraction about 50-55%  Cardiology input appreciated  No further workup as per Cardiology service  Patient on diuretics  Patient with heart failure with preserved ejection fraction  Cirrhosis ([facility] )  Assessment & Plan  Secondary to chronic alcoholism  Patient on lactulose  Ammonia is better  Continue with current other treatment  Will decrease lactulose because of diarrhea    Toxic metabolic encephalopathy  Assessment & Plan  Plan as above    History of alcohol abuse  Assessment & Plan  Patient with underlying cirrhosis secondary to chronic alcohol abuse  She likely has underlying dementia-possibly made worse with chronic alcohol use  She has been agitated at times  Continue to reoriented use restraints as needed  Discussed with RN  Consider discontinuing restrain  Paroxysmal SVT (supraventricular tachycardia) [facility])  Assessment & Plan  Patient has paroxysmal AFib  Not a candidate for anticoagulation  Heart rate remains stable now    Cigarette nicotine dependence without complication  Assessment & Plan  Encouraged to quit    To Continue with nicotine patch    Benign essential hypertension  Assessment & Plan  Blood pressure currently stable    Symptomatic anemia  Assessment & Plan  Status post blood transfusion  Hemoglobin has remained stable lately    * Pancytopenia [facility])  Assessment & Plan  Currently on steroids  Her white cell count and hemoglobin stable/better  Platelets still low, however, better than before although slightly lower than yesterday  ? Etiology for thrombocytopenia  Likely secondary to chronic alcoholism  Continue to monitor  Evaluated by hematology  On tapering prednisone  Taper weekly      VTE Pharmacologic Prophylaxis:   Pharmacologic: Pharmacologic VTE Prophylaxis contraindicated due to Pancytopenia  Mechanical VTE Prophylaxis in Place: Yes    Patient Centered Rounds: I have performed bedside rounds with nursing staff today  Discussions with Specialists or Other Care Team Provider:    Education and Discussions with Family / Patient:  Sister, call son    Time Spent for Care: More than 50% of total time spent on counseling and coordination of care as described above  Current Length of Stay: 7 day(s)    Current Patient Status: Inpatient   Certification Statement: The patient will continue to require additional inpatient hospital stay due to Pancytopenia, cirrhosis    Discharge Plan:  In next 24-48 hours    Code Status: Level 1 - Full Code      Subjective:   I have seen and examined the patient bedside this morning  Patient having diarrhea from lactulose  Denies any chest pain, palpitation or shortness of breath  Discussed with sister bedside  Agree with rehab placement    Objective:     Vitals:   Temp (24hrs), Av 3 °F (36 8 °C), Min:97 9 °F (36 6 °C), Max:98 7 °F (37 1 °C)    Temp:  [97 9 °F (36 6 °C)-98 7 °F (37 1 °C)] 97 9 °F (36 6 °C)  HR:  [65-88] 74  Resp:  [16-18] 18  BP: (113-130)/(56-60) 130/59  SpO2:  [94 %-99 %] 96 %  Body mass index is 31 14 kg/m²       Input and Output Summary (last 24 hours): Intake/Output Summary (Last 24 hours) at 2/5/2020 1737  Last data filed at 2/5/2020 1300  Gross per 24 hour   Intake 720 ml   Output    Net 720 ml       Physical Exam:     Physical Exam  General- Awake, looks comfortable  HEENT- Normocephalic, atraumatic  Neck- Supple, no JVD  CVS- Normal S1/ S2, Regular rate and rhythm  Respiratory system- B/L clear breath sounds, no wheezing  Abdomen- Soft, distended, no tenderness, Bowel sound- present  CNS- No acute focal neurologic deficit noted    Additional Data:     Labs:    Results from last 7 days   Lab Units 02/05/20  0503   WBC Thousand/uL 4 29*   HEMOGLOBIN g/dL 8 6*   HEMATOCRIT % 27 2*   PLATELETS Thousands/uL 64*   BANDS PCT % 1   LYMPHO PCT % 27   MONO PCT % 14*   EOS PCT % 0     Results from last 7 days   Lab Units 02/05/20  0503   SODIUM mmol/L 141   POTASSIUM mmol/L 4 6   CHLORIDE mmol/L 106   CO2 mmol/L 30   BUN mg/dL 28*   CREATININE mg/dL 1 21   ANION GAP mmol/L 5   CALCIUM mg/dL 9 1   ALBUMIN g/dL 3 1*   TOTAL BILIRUBIN mg/dL 1 30*   ALK PHOS U/L 156*   ALT U/L 23   AST U/L 39   GLUCOSE RANDOM mg/dL 139     Results from last 7 days   Lab Units 01/31/20  1351   INR  1 43*     Results from last 7 days   Lab Units 02/01/20  1058 01/30/20  1810   POC GLUCOSE mg/dl 157* 111         Results from last 7 days   Lab Units 02/03/20  0640 02/01/20  0602 01/31/20  0527 01/30/20  2046 01/30/20  1827   LACTIC ACID mmol/L  --   --   --   --  5 1*   PROCALCITONIN ng/ml 0 18 0 43* 0 46* 0 51*  --            * I Have Reviewed All Lab Data Listed Above  * Additional Pertinent Lab Tests Reviewed: All Labs Within Last 24 Hours Reviewed    Imaging:    Imaging Reports Reviewed Today Include:   Imaging Personally Reviewed by Myself Includes:      Recent Cultures (last 7 days):     Results from last 7 days   Lab Units 01/30/20  2333   BLOOD CULTURE  No Growth After 5 Days  No Growth After 5 Days         Last 24 Hours Medication List:     Current Facility-Administered Medications:  acetaminophen 650 mg Oral Q6H PRN Galen Hence, PA-C   calcium carbonate 1,000 mg Oral Daily PRN Galen Hence, PA-C   cefdinir 300 mg Oral Q12H [address] Galen Hence, PA-C   folic acid 1 mg Oral Daily Galen Hence, PA-C   furosemide 80 mg Oral Daily Galen Hence, PA-C   haloperidol lactate 1 mg Intravenous Q6H PRN Galen Hence, PA-C   lactulose 20 g Oral TID Gloria Rodriguez MD   magnesium oxide 400 mg Oral BID Galen Hence, PA-C   magnesium sulfate 2 g Intravenous Daily PRN Galen Hence, PA-C   magnesium sulfate 2 g Intravenous Once Elissa Pablo MD   metoprolol 5 mg Intravenous Q6H PRN Elissa Pablo MD   metoprolol tartrate 25 mg Oral Q8H Elissa Pablo MD   metroNIDAZOLE 500 mg Oral [facility] Galen Hence, PA-C   nicotine 1 patch Transdermal Daily Galen Hence, PA-C   ondansetron 4 mg Intravenous Q6H PRN Galen Hence, PA-C   pantoprazole 40 mg Oral BID AC Galen Hence, PA-C   predniSONE 50 mg Oral Daily Gloria Rodriguez MD   rifaximin 550 mg Oral Q12H [address] Galen Hence, PA-C   thiamine 100 mg Oral Daily Galen Hence, PA-C        Today, Patient Was Seen By: Cesar Acosta MD    ** Please Note: Dictation voice to text software may have been used in the creation of this document   **

## 2020-02-05 NOTE — ASSESSMENT & PLAN NOTE
Currently on steroids  Her white cell count and hemoglobin stable/better  Platelets still low, however, better than before although slightly lower than yesterday  ? Etiology for thrombocytopenia  Likely secondary to chronic alcoholism  Continue to monitor  Evaluated by hematology  On tapering prednisone    Taper weekly

## 2020-02-05 NOTE — SOCIAL WORK
Cm met with the pt sister regarding the STR rec  The pt sister states that she is in the home during the day and would prefer to have [facility]  The pt sister states that she will be in the home with the pt and that would be better for the pt  Cm made referral for [facility]

## 2020-02-05 NOTE — NURSING NOTE
Placed call to patient's son as per his request and updated him on patient's status and plan of care

## 2020-02-05 NOTE — PLAN OF CARE
Problem: PHYSICAL THERAPY ADULT  Goal: Performs mobility at highest level of function for planned discharge setting  See evaluation for individualized goals  Description  Treatment/Interventions: Functional transfer training, LE strengthening/ROM, Elevations, Therapeutic exercise, Endurance training, Patient/family training, Equipment eval/education, Bed mobility, Gait training, Spoke to nursing, OT          See flowsheet documentation for full assessment, interventions and recommendations  Note:   Prognosis: Fair  Problem List: Decreased strength, Decreased endurance, Impaired balance, Decreased mobility, Decreased cognition, Decreased safety awareness, Impaired judgement  Assessment: pt is a 75y/o f who [place] [facility] [place]  PMH significant for symptomatic anemia, alcohol abuse, cirrhosis, memory difficulty, + gait disturbance  pt unreliable historian; social hx obtained from CM note in pt's chart  at baseline, pt (I) c functional mobility tasks s AD  resides c sister who (A) c needs prn  pt oriented only to person during PT eval + required frequent re-direction to tasks  pt easily becomes agitated c potential to become combative  currently requires min (A)x2 to complete OOB mobility tasks 2* deficits in strength, balance, gait quality, cognition, + activity tolerance noted in PT exam above  Barthel Index 25/100  ambulated 5' bed>BSC c RW c mod cues for sequencing + task completion  would benefit from skilled PT to maximize functional mobility + improve quality of life  upon d/c, recommend STR  PT eval of high complexity 2* unstable med status c pt requiring ongoing medical management 2* pancytopenia  pt c multiple co-morbidities + mobility deficits requiring (A)x2 for OOB mobility tasks  pt c significantly impaired cognitive status + impaired safety awareness           Recommendation: Short-term skilled PT     PT - OK to Discharge: Yes(to STR when stable )    See flowsheet documentation for full assessment

## 2020-02-05 NOTE — PHYSICAL THERAPY NOTE
PT Progress Note (17min)  (9:56-10:13)       02/05/20 1013   Pain Assessment   Pain Assessment FLACC   Pain Rating: FLACC (Rest) - Face 0   Pain Rating: FLACC (Rest) - Legs 0   Pain Rating: FLACC (Rest) - Activity 0   Pain Rating: FLACC (Rest) - Cry 0   Pain Rating: FLACC (Rest) - Consolability 0   Score: FLACC (Rest) 0   Pain Rating: FLACC (Activity) - Face 1   Pain Rating: FLACC (Activity) - Legs 0   Pain Rating: FLACC (Activity) - Activity 1   Pain Rating: FLACC (Activity) - Cry 1   Pain Rating: FLACC (Activity) - Consolability 1   Score: FLACC (Activity) 4   Restrictions/Precautions   Weight Bearing Precautions Per Order No   Other Precautions Cognitive; Chair Alarm; Bed Alarm; Fall Risk  (waist Litzy, potential to become combative)   General   Chart Reviewed Yes   Response to Previous Treatment Patient with no complaints from previous session  Family/Caregiver Present No   Cognition   Orientation Level Oriented to person   Subjective   Subjective pt on BSC upon arrival  "I want to go to the toilet"  Bed Mobility   Supine to Sit Unable to assess  (pt OOB on BSC upon arrival)   Transfers   Sit to Stand 4  Minimal assistance   Additional items Assist x 2;Verbal cues; Increased time required;Armrests   Stand to Sit 4  Minimal assistance   Additional items Assist x 2;Verbal cues; Increased time required;Armrests   Toilet transfer 4  Minimal assistance   Additional items Assist x 1; Armrests; Verbal cues; Increased time required;Commode   Ambulation/Elevation   Gait pattern Narrow ANDRZEJ; Decreased foot clearance; Short stride; Forward Flexion   Gait Assistance 4  Minimal assist   Additional items Assist x 2;Verbal cues   Assistive Device Rolling walker   Distance 7'; limited by impaired cognitive status  ("Let me go, let me go! ")   Stair Management Assistance Not tested   Balance   Static Sitting Fair +   Dynamic Sitting Fair   Static Standing Poor +   Dynamic Standing Poor   Ambulatory Poor   Activity Tolerance   Activity Tolerance Patient limited by fatigue;Treatment limited secondary to agitation  (impaired cognition)   Nurse Made Aware RN Abdiaziz Vera aware pt OOB in chair at end of session c waist Posey donned + chair alarm activated  Assessment   Prognosis Fair   Problem List Decreased strength;Decreased endurance; Impaired balance;Decreased mobility; Decreased cognition;Decreased safety awareness; Impaired judgement   Assessment pt on [facility] upon arrival  cont to require frequent re-direction to task + impaired cognitive status  performed mobility tasks min (A)x2  progressed ambulation distance to 7' c RW  mobility limited 2* pt agitation c pt stating, "let me go! let me go! get out of here!"  seated in chair at end of session c chair alarm activated + waist Lavaca donned  will cont skilled PT to further maximize functional mobility + improve quality of life  upon d/c, recommend STR  Goals   Patient Goals "to sit in the chair"  [facility] Expiration Date 02/15/20   PT Treatment Day 1   Plan   Treatment/Interventions Functional transfer training;LE strengthening/ROM; Elevations; Therapeutic exercise; Endurance training;Patient/family training;Equipment eval/education; Bed mobility;Gait training;Spoke to nursing   Progress Slow progress, cognitive deficits   PT Frequency 2-3x/wk   Recommendation   Recommendation Short-term skilled PT   PT - OK to Discharge Yes  (to STR when stable )     Amira Ro, PT, DPT

## 2020-02-05 NOTE — PLAN OF CARE
Problem: Potential for Falls  Goal: Patient will remain free of falls  Description  INTERVENTIONS:  - Assess patient frequently for physical needs  -  Identify cognitive and physical deficits and behaviors that affect risk of falls    -  [place] fall precautions as indicated by assessment   - Educate patient/family on patient safety including physical limitations  - Instruct patient to call for assistance with activity based on assessment  - Modify environment to reduce risk of injury  - Consider OT/PT consult to assist with strengthening/mobility  Outcome: Progressing     Problem: PAIN - ADULT  Goal: Verbalizes/displays adequate comfort level or baseline comfort level  Description  Interventions:  - Encourage patient to monitor pain and request assistance  - Assess pain using appropriate pain scale  - Administer analgesics based on type and severity of pain and evaluate response  - Implement non-pharmacological measures as appropriate and evaluate response  - Consider cultural and social influences on pain and pain management  - Notify physician/advanced practitioner if interventions unsuccessful or patient reports new pain  Outcome: Progressing     Problem: INFECTION - ADULT  Goal: Absence or prevention of progression during hospitalization  Description  INTERVENTIONS:  - Assess and monitor for signs and symptoms of infection  - Monitor lab/diagnostic results  - Monitor all insertion sites, i e  indwelling lines, tubes, and drains  - Monitor endotracheal if appropriate and nasal secretions for changes in amount and color  - [place] appropriate cooling/warming therapies per order  - Administer medications as ordered  - Instruct and encourage patient and family to use good hand hygiene technique  - Identify and instruct in appropriate isolation precautions for identified infection/condition  Outcome: Progressing  Goal: Absence of fever/infection during neutropenic period  Description  INTERVENTIONS:  - Monitor WBC    Outcome: Progressing     Problem: SAFETY ADULT  Goal: Maintain or return to baseline ADL function  Description  INTERVENTIONS:  -  Assess patient's ability to carry out ADLs; assess patient's baseline for ADL function and identify physical deficits which impact ability to perform ADLs (bathing, care of mouth/teeth, toileting, grooming, dressing, etc )  - Assess/evaluate cause of self-care deficits   - Assess range of motion  - Assess patient's mobility; develop plan if impaired  - Assess patient's need for assistive devices and provide as appropriate  - Encourage maximum independence but intervene and supervise when necessary  - Involve family in performance of ADLs  - Assess for home care needs following discharge   - Consider OT consult to assist with ADL evaluation and planning for discharge  - Provide patient education as appropriate  Outcome: Progressing  Goal: Maintain or return mobility status to optimal level  Description  INTERVENTIONS:  - Assess patient's baseline mobility status (ambulation, transfers, stairs, etc )    - Identify cognitive and physical deficits and behaviors that affect mobility  - Identify mobility aids required to assist with transfers and/or ambulation (gait belt, sit-to-stand, lift, walker, cane, etc )  - [place] fall precautions as indicated by assessment  - Record patient progress and toleration of activity level on Mobility SBAR; progress patient to next Phase/Stage  - Instruct patient to call for assistance with activity based on assessment  - Consider rehabilitation consult to assist with strengthening/weightbearing, etc   Outcome: Progressing     Problem: DISCHARGE PLANNING  Goal: Discharge to home or other facility with appropriate resources  Description  INTERVENTIONS:  - Identify barriers to discharge w/patient and caregiver  - Arrange for needed discharge resources and transportation as appropriate  - Identify discharge learning needs (meds, wound care, etc )  - Arrange for interpretive services to assist at discharge as needed  - Refer to Case Management Department for coordinating discharge planning if the patient needs post-hospital services based on physician/advanced practitioner order or complex needs related to functional status, cognitive ability, or social support system  Outcome: Progressing     Problem: Knowledge Deficit  Goal: Patient/family/caregiver demonstrates understanding of disease process, treatment plan, medications, and discharge instructions  Description  Complete learning assessment and assess knowledge base  Interventions:  - Provide teaching at level of understanding  - Provide teaching via preferred learning methods  Outcome: Progressing     Problem: SAFETY,RESTRAINT: NV/NON-SELF DESTRUCTIVE BEHAVIOR  Goal: Remains free of harm/injury (restraint for non violent/non self-detsructive behavior)  Description  INTERVENTIONS:  - Instruct patient/family regarding restraint use   - Assess and monitor physiologic and psychological status   - Provide interventions and comfort measures to meet assessed patient needs   - Identify and implement measures to help patient regain control  - Assess readiness for release of restraint   Outcome: Progressing  Goal: Returns to optimal restraint-free functioning  Description  INTERVENTIONS:  - Assess the patient's behavior and symptoms that indicate continued need for restraint  - Identify and implement measures to help patient regain control  - Assess readiness for release of restraint   Outcome: Progressing     Problem: Nutrition/Hydration-ADULT  Goal: Nutrient/Hydration intake appropriate for improving, restoring or maintaining nutritional needs  Description  Monitor and assess patient's nutrition/hydration status for malnutrition  Collaborate with interdisciplinary team and initiate plan and interventions as ordered  Monitor patient's weight and dietary intake as ordered or per policy   Utilize nutrition screening tool and intervene as necessary  Determine patient's food preferences and provide high-protein, high-caloric foods as appropriate       INTERVENTIONS:  - Monitor oral intake, urinary output, labs, and treatment plans  - Assess nutrition and hydration status and recommend course of action  - Evaluate amount of meals eaten  - Assist patient with eating if necessary   - Allow adequate time for meals  - Recommend/ encourage appropriate diets, oral nutritional supplements, and vitamin/mineral supplements  - Order, calculate, and assess calorie counts as needed  - Recommend, monitor, and adjust tube feedings and TPN/PPN based on assessed needs  - Assess need for intravenous fluids  - Provide specific nutrition/hydration education as appropriate  - Include patient/family/caregiver in decisions related to nutrition  Outcome: Progressing     Problem: Prexisting or High Potential for Compromised Skin Integrity  Goal: Skin integrity is maintained or improved  Description  INTERVENTIONS:  - Identify patients at risk for skin breakdown  - Assess and monitor skin integrity  - Assess and monitor nutrition and hydration status  - Monitor labs   - Assess for incontinence   - Turn and reposition patient  - Assist with mobility/ambulation  - Relieve pressure over bony prominences  - Avoid friction and shearing  - Provide appropriate hygiene as needed including keeping skin clean and dry  - Evaluate need for skin moisturizer/barrier cream  - Collaborate with interdisciplinary team   - Patient/family teaching  - Consider wound care consult   Outcome: Progressing     Problem: RESPIRATORY - ADULT  Goal: Achieves optimal ventilation and oxygenation  Description  INTERVENTIONS:  - Assess for changes in respiratory status  - Assess for changes in mentation and behavior  - Position to facilitate oxygenation and minimize respiratory effort  - Oxygen administered by appropriate delivery if ordered  - Initiate smoking cessation education as indicated  - Encourage broncho-pulmonary hygiene including cough, deep breathe, Incentive Spirometry  - Assess the need for suctioning and aspirate as needed  - Assess and instruct to report SOB or any respiratory difficulty  - Respiratory Therapy support as indicated  Outcome: Progressing     Problem: GENITOURINARY - ADULT  Goal: Maintains or returns to baseline urinary function  Description  INTERVENTIONS:  - Assess urinary function  - Encourage oral fluids to ensure adequate hydration if ordered  - Administer IV fluids as ordered to ensure adequate hydration  - Administer ordered medications as needed  - Offer frequent toileting  - Follow urinary retention protocol if ordered  Outcome: Progressing  Goal: Urinary catheter remains patent  Description  INTERVENTIONS:  - Assess patency of urinary catheter  - If patient has a chronic goodman, consider changing catheter if non-functioning  - Follow guidelines for intermittent irrigation of non-functioning urinary catheter  Outcome: Progressing     Problem: HEMATOLOGIC - ADULT  Goal: Maintains hematologic stability  Description  INTERVENTIONS  - Assess for signs and symptoms of bleeding or hemorrhage  - Monitor labs  - Administer supportive blood products/factors as ordered and appropriate  Outcome: Progressing

## 2020-02-05 NOTE — OCCUPATIONAL THERAPY NOTE
[address] Evaluation     Patient Name: Aliya Willis  LZQQP'O Date: 2/5/2020  Problem List  Principal Problem:    Pancytopenia ([facility] [address] )  Active Problems:    Symptomatic anemia    Benign essential hypertension    Cigarette nicotine dependence without complication    Paroxysmal SVT (supraventricular tachycardia) (HCC)    History of alcohol abuse    Toxic metabolic encephalopathy    Cirrhosis ([facility] [address] )    Elevated troponin    Past Medical History  Past Medical History:   Diagnosis Date    Benign essential hypertension     last assessed - [id number]    Chest pain 11/4/2017    Gastroesophageal reflux disease without esophagitis 11/16/2017     Past Surgical History  Past Surgical History:   Procedure Laterality Date    APPENDECTOMY           02/05/20 1012   Note Type   Note type Eval only   Restrictions/Precautions   Weight Bearing Precautions Per Order No   Other Precautions Cognitive; Chair Alarm; Bed Alarm;Agitated; Fall Risk;Pain   Pain Assessment   Pain Assessment FLACC   Pain Rating: FLACC (Rest) - Face 0   Pain Rating: FLACC (Rest) - Legs 0   Pain Rating: FLACC (Rest) - Activity 0   Pain Rating: FLACC (Rest) - Cry 0   Pain Rating: FLACC (Rest) - Consolability 0   Score: FLACC (Rest) 0   Pain Rating: FLACC (Activity) - Face 1   Pain Rating: FLACC (Activity) - Legs 0   Pain Rating: FLACC (Activity) - Activity 1   Pain Rating: FLACC (Activity) - Cry 1   Pain Rating: FLACC (Activity) - Consolability 1   Score: FLACC (Activity) 4   Home Living   Type of Home House   Home Equipment   (no DME/AD at baseline)   Additional Comments pt unreliable historian  most social hx obtained from CM note in pt's chart     Prior Function   Level of [place] Independent with ADLs and functional mobility   Lives With Family  (sister)   Receives Help From Family   ADL Assistance Independent   IADLs Needs assistance   Falls in the last 6 months 0  (per pt report)   Vocational Retired   Lifestyle   Autonomy Pt required A with IADLs, was INDEP with ADLs  Functional mobility performance at baseline not obtained as pt is poor historian  Reciprocal Relationships Pt lives with her sister   Service to Others Pt is retired   Intrinsic Gratification Pt enjoys reading and "watching the tele"   Psychosocial   Psychosocial (WDL) X   Patient Behaviors/Mood Irritable   Subjective   Subjective "Get out, get out, get out"   ADL   Eating Assistance 4  Minimal Assistance   Grooming Assistance 4  Minimal Assistance   UB Bathing Assistance 3  Moderate Assistance   LB Bathing Assistance 2  Maximal Assistance   UB Dressing Assistance 3  Moderate Assistance   LB Dressing Assistance 2  Maximal [address]  2  Maximal Assistance   Additional Comments Assist levels listed above are based on functional assessment of performance skills and deficits  Bed Mobility   Additional Comments Pt seated at EOB with PT at start of session and agreeable to OT  Pt seen with PT due to decreased activity tolerance and need for skilled Ax2 for safe pt handling  Pt seated in b/s chair with posey in place, call bell within reach, and chair alarm active  Transfers   Sit to Stand 4  Minimal assistance   Additional items Assist x 2;Impulsive;Verbal cues; Increased time required;Armrests   Stand to Sit 4  Minimal assistance   Additional items Assist x 2; Increased time required;Verbal cues; Impulsive;Armrests   Toilet transfer 4  Minimal assistance   Additional items Assist x 2; Increased time required;Verbal cues; Commode;Armrests   Additional Comments During transfers, pt required extensive multisensory cues (simple 1 step commands; light tactile cuing, and visual cues)  Pt required increased cognitive assistance for commode transfer and toileting completion  Functional Mobility   Functional Mobility 4  Minimal assistance   Additional Comments Min Ax2 using RW with maximal verbal cuing required for cognitive assistance for sequencing and safe use of RW   When transferring from commode to b/s chair, pt abandoned RW  Due to agitation, pt provided with hand held assist vs use of RW  Balance   Static Sitting Fair +   Dynamic Sitting Fair   Static Standing Poor +   Dynamic Standing Poor   Ambulatory Poor   Activity Tolerance   Activity Tolerance Patient limited by fatigue;Treatment limited secondary to agitation  (limited by decreased cognition)   Medical Staff Made Aware PT Raheem Grajeda   Nurse Made Aware ANDREY Pineda Chi verbalized pt appropriate for therapy and notified of outcomes  RN present at end of session to assist with the securing of the posey  RUE Assessment   RUE Assessment X   RUE Overall AROM   R Shoulder Flexion Limited to ~110*   R Elbow Flexion WFL   R Wrist Flexion WFL   R Mass Grasp WFL   RUE Strength   RUE Overall Strength Deficits   R Shoulder Flexion 4/5   R Elbow Extension 5/5   LUE Assessment   LUE Assessment X   LUE Overall AROM   L Shoulder Flexion Limited to ~110*   L Elbow Flexion WFL   L Wrist Flexion WFL   L Mass Grasp WFL   LUE Strength   LUE Overall Strength Deficits   L Shoulder Flexion 4/5   L Elbow Extension 5/5   Hand Function   Gross Motor Coordination Impaired   Fine Motor Coordination Impaired   Sensation   Light Touch No apparent deficits   Vision - Complex Assessment   Additional Comments Assessment limited by cognition  Pt unable to read clock stating "its November   "   Cognition   Overall Cognitive Status Impaired   Arousal/Participation Responsive   Attention Difficulty attending to directions   Orientation Level Oriented to person;Disoriented to place; Disoriented to time;Disoriented to situation   Memory Decreased recall of biographical information;Decreased short term memory;Decreased recall of recent events;Decreased recall of precautions   Following Commands Follows one step commands inconsistently   Comments Pt identified self by full name however unable to state birthdate correctly  Birthdate verified by ID bracelet  Pt required extensive multisensory cues to follow 1 step commands  Pt had difficulty with comprehension of language at times  For example, pt had difficulty understanding how to use the commode  Pt initially was very pleasantly confused; however toward end of session pt became verbally agressive and agitated  Pt required frequent VCs to remind her that she is safe  Pt was provided with wash cloths to fold in order to occupy her time productively  Pt was receptive to this activity  Assessment   Limitation Decreased ADL status; Decreased UE ROM; Decreased UE strength;Decreased Safe judgement during ADL;Decreased cognition;Decreased endurance;Decreased self-care trans;Decreased high-level ADLs   Assessment Pt is a 69 y o  female seen for OT evaluation (time 5191-2199) s/p admit to [facility] on 1/28/2020 w/ Pancytopenia ([address] )  Comorbidities affecting pt's functional performance at time of assessment include: symptomatic anemai, HTN, cigarette nicotine dependence, paroxysmal SVT, hx of alcohol abuse, toxic metabolic encephalopathy, cirrhosis, elevated troponin, hx of memory difficulty  Evaluation required a review of medical and/ or therapy records and extensive additional review of physical, cognitive, or psychosocial history related to current functional performance    Personal factors affecting pt at time of IE include:behavioral pattern, difficulty performing ADLS, difficulty performing IADLS , limited insight into deficits, decreased initiation and engagement , health management  and environment  Prior to admission, Pt required A with IADLs, was INDEP with ADLs  Functional mobility performance at baseline not obtained as pt is poor historian   Upon evaluation: Based on functional assessment, pt requires Min A for feeding, Min A for grooming, Mod A for UB ADLs, Max A for LB ADLs, Max A for toileting, Min A x 2 for sit<>stand transfers with RW, Min A x 2 for commode transfer, and Min A x 2 for functional mobility from EOB> commode>bedside chair 2* the following deficits impacting occupational performance: weakness, decreased ROM, decreased strength, decreased balance, decreased tolerance, impaired [address], impaired [address], impaired attention, impaired initiation, impaired memory, impaired sequencing, impaired problem solving, impulsivity, decreased safety awareness, impaired interpersonal skills and decreased coping skills  Cognition appears to be impaired - please refer to flowsheet above for details  Pt to benefit from continued skilled OT tx while in the hospital to address deficits as defined above and maximize level of functional independence w ADL's and functional mobility  Occupational Performance areas to address include: eating, grooming, bathing/shower, toilet hygiene, dressing, socialization, health maintenance, functional mobility and social participation  From OT standpoint, recommendation at time of d/c would be short term rehab  Goals   Patient Goals to sit in the chair   Plan   Treatment Interventions ADL retraining;Functional transfer training;UE strengthening/ROM; Endurance training;Cognitive reorientation;Patient/family training;Equipment evaluation/education; Compensatory technique education;Continued evaluation; Energy conservation; Activityengagement   Goal Expiration Date 02/15/20   OT Frequency 3-5x/wk   Recommendation   OT Discharge Recommendation Short Term Rehab   Equipment Recommended   (continue to assess at rehab)   OT - OK to Discharge   (once medically cleared)   Barthel Index   Feeding 5   Bathing 0   Grooming Score 0   Dressing Score 0   Bladder Score 5   Bowels Score 5   Toilet Use Score 5   Transfers (Bed/Chair) Score 5   Mobility (Level Surface) Score 0   Stairs Score 0   Barthel Index Score 25   Modified Calvin Scale   Modified Calvin Scale 4     Goals to be met within 10 days:    Pt will perform self-feeding with set-up assist to maximize independence with daily routine      Pt will complete grooming/oral hygiene tasks with supervision after set-up while seated at sink    Pt will complete UB bathing/dressing with supervision after set-up while seated    Pt will complete LB bathing/dressing with Min A while seated     Pt will improve functional activity tolerance while standing at sink to 10+ minutes in order to increase safety and independence during functional transfers and ADL tasks  Pt will improve dynamic sitting/standing balance to fair+ to increase safety and independence during functional transfers and ADL and decrease risk for falls  Pt will increase independence during STS transfers with least restrictive AD with supervision after set-up     Pt will complete transfer to raised toilet with grab bars with with supervision after set-up     Pt will complete toileting tasks (clothing management up/down, hygiene) with Min A     Pt will attend to continued cognitive assessment 100% of the time in order to provide most appropriate recommendations for d/c plans  Pt will identify and implement at least 3 healthy coping strategies to increase participation in meaningful activities and decrease risk for readmission      Paulie Elder OTR/L

## 2020-02-05 NOTE — PLAN OF CARE
Problem: OCCUPATIONAL THERAPY ADULT  Goal: Performs self-care activities at highest level of function for planned discharge setting  See evaluation for individualized goals  Description  Treatment Interventions: ADL retraining, Functional transfer training, UE strengthening/ROM, Endurance training, Cognitive reorientation, Patient/family training, Equipment evaluation/education, Compensatory technique education, Continued evaluation, Energy conservation, Activityengagement  Equipment Recommended: (continue to assess at rehab)       See flowsheet documentation for full assessment, interventions and recommendations  Note:   Limitation: Decreased ADL status, Decreased UE ROM, Decreased UE strength, Decreased Safe judgement during ADL, Decreased cognition, Decreased endurance, Decreased self-care trans, Decreased high-level ADLs     Assessment: Pt is a 69 y o  female seen for OT evaluation (time 7125-2813) s/p admit to [facility] on 1/28/2020 w/ Pancytopenia ([facility] [address] )  Comorbidities affecting pt's functional performance at time of assessment include: symptomatic anemai, HTN, cigarette nicotine dependence, paroxysmal SVT, hx of alcohol abuse, toxic metabolic encephalopathy, cirrhosis, elevated troponin, hx of memory difficulty  Evaluation required a review of medical and/ or therapy records and extensive additional review of physical, cognitive, or psychosocial history related to current functional performance    Personal factors affecting pt at time of IE include:behavioral pattern, difficulty performing ADLS, difficulty performing IADLS , limited insight into deficits, decreased initiation and engagement , health management  and environment  Prior to admission, Pt required A with IADLs, was INDEP with ADLs  Functional mobility performance at baseline not obtained as pt is poor historian   Upon evaluation: Based on functional assessment, pt requires Min A for feeding, Min A for grooming, Mod A for UB ADLs, Max A for LB ADLs, Max A for toileting, Min A x 2 for sit<>stand transfers with RW, Min A x 2 for commode transfer, and Min A x 2 for functional mobility from EOB> commode>bedside chair 2* the following deficits impacting occupational performance: weakness, decreased ROM, decreased strength, decreased balance, decreased tolerance, impaired [facility], impaired [facility], impaired attention, impaired initiation, impaired memory, impaired sequencing, impaired problem solving, impulsivity, decreased safety awareness, impaired interpersonal skills and decreased coping skills  Cognition appears to be impaired - please refer to flowsheet above for details  Pt to benefit from continued skilled OT tx while in the hospital to address deficits as defined above and maximize level of functional independence w ADL's and functional mobility  Occupational Performance areas to address include: eating, grooming, bathing/shower, toilet hygiene, dressing, socialization, health maintenance, functional mobility and social participation  From OT standpoint, recommendation at time of d/c would be short term rehab        OT Discharge Recommendation: Short Term Rehab  OT - OK to Discharge: (once medically cleared)

## 2020-02-05 NOTE — PHYSICAL THERAPY NOTE
PT Evaluation (28min)  (9:27-9:55)    Past Medical History:   Diagnosis Date    Benign essential hypertension     last assessed - [id number]    Chest pain 11/4/2017    Gastroesophageal reflux disease without esophagitis 11/16/2017 02/05/20 4453   Note Type   Note type Eval only   Pain Assessment   Pain Assessment FLACC   Pain Rating: FLACC (Rest) - Face 0   Pain Rating: FLACC (Rest) - Legs 0   Pain Rating: FLACC (Rest) - Activity 0   Pain Rating: FLACC (Rest) - Cry 0   Pain Rating: FLACC (Rest) - Consolability 0   Score: FLACC (Rest) 0   Pain Rating: FLACC (Activity) - Face 1   Pain Rating: FLACC (Activity) - Legs 0   Pain Rating: FLACC (Activity) - Activity 1   Pain Rating: FLACC (Activity) - Cry 1   Pain Rating: FLACC (Activity) - Consolability 1   Score: FLACC (Activity) 4   Home Living   Type of Home House   Home Equipment Other (Comment)  (no DME at baseline)   Additional Comments pt unreliable historian  most social hx obtained from CM note in pt's chart  Prior Function   Level of [place] Independent with ADLs and functional mobility   Lives With Family  (sister)   Receives Help From Family   ADL Assistance Independent   IADLs Needs assistance   Falls in the last 6 months 0  (per pt report)   Vocational Retired   Restrictions/Precautions   [place] Bearing Precautions Per Order No   Other Precautions Cognitive; Chair Alarm; Bed Alarm; Fall Risk  (waist Posey; potential to become combative)   General   Additional Pertinent History [facility]* pancytopenia  PT consulted for mobility + d/c planning  activity as tolerated  Family/Caregiver Present No   Cognition   Orientation Level Oriented to person;Disoriented to place; Disoriented to time;Disoriented to situation   RUE Assessment   RUE Assessment WFL   LUE Assessment   LUE Assessment WFL   RLE Assessment   RLE Assessment WFL  (4-/5)   LLE Assessment   LLE Assessment WFL  (4-/5)   Coordination   Sensation [facility] Mobility Supine to Sit 4  Minimal assistance   Additional items Assist x 1;HOB elevated; Bedrails; Increased time required;Verbal cues   Transfers   Sit to Stand 4  Minimal assistance   Additional items Assist x 2;Verbal cues; Increased time required;Armrests  (frequent mod verbal cues required for attention to task)   Stand to Sit 4  Minimal assistance   Additional items Assist x 2;Armrests; Verbal cues; Increased time required   Toilet transfer 4  Minimal assistance   Additional items Assist x 2;Verbal cues; Commode; Increased time required   Ambulation/Elevation   Gait pattern Decreased foot clearance; Short stride;Narrow ANDRZEJ; Forward Flexion   Gait Assistance 4  Minimal assist   Additional items Assist x 2;Verbal cues   Assistive Device Rolling walker   Distance 5' bed>BSC   Stair Management Assistance Not tested   Balance   Static Sitting Fair +   Dynamic Sitting Fair   Static Standing Poor +   Dynamic Standing Poor   Ambulatory Poor   Activity Tolerance   Activity Tolerance Treatment limited secondary to agitation;Patient limited by fatigue   Nurse Made Aware ANDREY Alicia aware pt ambulated (A)x2 c RW requiring multiple cues for re-direction  pt easily becomes agitated  Waist Posey applied while pt in chair  Assessment   Prognosis Fair   Problem List Decreased strength;Decreased endurance; Impaired balance;Decreased mobility; Decreased cognition;Decreased safety awareness; Impaired judgement   Assessment pt is a 73y/o f who presents to [facility] c pancytopenia  PMH significant for symptomatic anemia, alcohol abuse, cirrhosis, memory difficulty, + gait disturbance  pt unreliable historian; social hx obtained from CM note in pt's chart  at baseline, pt (I) c functional mobility tasks s AD  resides c sister who (A) c needs prn  pt oriented only to person during PT eval + required frequent re-direction to tasks  pt easily becomes agitated c potential to become combative   currently requires min (A)x2 to complete OOB mobility tasks 2* deficits in strength, balance, gait quality, cognition, + activity tolerance noted in PT exam above  Barthel Index 25/100  ambulated 5' bed>BSC c RW c mod cues for sequencing + task completion  would benefit from skilled PT to maximize functional mobility + improve quality of life  upon d/c, recommend STR  PT eval of high complexity 2* unstable med status c pt requiring ongoing medical management 2* pancytopenia  pt c multiple co-morbidities + mobility deficits requiring (A)x2 for OOB mobility tasks  pt c significantly impaired cognitive status + impaired safety awareness  Goals   Patient Goals "to sit in the chair"  STG Expiration Date 02/15/20   Short Term Goal #1 1  increase strength 1/2 grade to improve overall functional mobility, 2  perform bed mobility c (S) to decrease caregiver burden, 3  perform transfers c (S) to safely perform ADLs, 4  ambulate 150' c (S) + no or least restrictive AD to safely navigate home environment, 5  negotiate 12 stairs c (S) to safely access 2nd floor of home   Plan   Treatment/Interventions Functional transfer training;LE strengthening/ROM; Elevations; Therapeutic exercise; Endurance training;Patient/family training;Equipment eval/education; Bed mobility;Gait training;Spoke to nursing;OT   PT Frequency 2-3x/wk   Recommendation   Recommendation Short-term skilled PT   PT - OK to Discharge Yes  (to STR when stable )   Modified [place] Scale   Modified [place] Scale 4   Barthel Index   Feeding 5   Bathing 0   Grooming Score 0   Dressing Score 0   Bladder Score 5   Bowels Score 5   Toilet Use Score 5   Transfers (Bed/Chair) Score 5   Mobility (Level Surface) Score 0   Stairs Score 0   Barthel Index Score 25     Kaitlin Rees, PT, DPT

## 2020-02-05 NOTE — PLAN OF CARE
Problem: PHYSICAL THERAPY ADULT  Goal: Performs mobility at highest level of function for planned discharge setting  See evaluation for individualized goals  Description  Treatment/Interventions: Functional transfer training, LE strengthening/ROM, Elevations, Therapeutic exercise, Endurance training, Patient/family training, Equipment eval/education, Bed mobility, Gait training, Spoke to nursing, OT          See flowsheet documentation for full assessment, interventions and recommendations  2/5/2020 1312 by Merle Chery, PT  Outcome: Progressing  Note:   Prognosis: Fair  Problem List: Decreased strength, Decreased endurance, Impaired balance, Decreased mobility, Decreased cognition, Decreased safety awareness, Impaired judgement  Assessment: pt on BSC upon arrival  cont to require frequent re-direction to task + impaired cognitive status  performed mobility tasks min (A)x2  progressed ambulation distance to 7' c RW  mobility limited 2* pt agitation c pt stating, "let me go! let me go! get out of here!"  seated in chair at end of session c chair alarm activated + waist [place] donned  will cont skilled PT to further maximize functional mobility + improve quality of life  upon d/c, recommend STR  Recommendation: Short-term skilled PT     PT - OK to Discharge: Yes(to STR when stable )    See flowsheet documentation for full assessment  2/5/2020 1302 by Merle Chery, PT  Note:   Prognosis: Fair  Problem List: Decreased strength, Decreased endurance, Impaired balance, Decreased mobility, Decreased cognition, Decreased safety awareness, Impaired judgement  Assessment: pt is a 75y/o f who presents to [facility] [place] c pancytopenia  PMH significant for symptomatic anemia, alcohol abuse, cirrhosis, memory difficulty, + gait disturbance  pt unreliable historian; social hx obtained from CM note in pt's chart  at baseline, pt (I) c functional mobility tasks s AD  resides c sister who (A) c needs prn   pt oriented only to person during PT eval + required frequent re-direction to tasks  pt easily becomes agitated c potential to become combative  currently requires min (A)x2 to complete OOB mobility tasks 2* deficits in strength, balance, gait quality, cognition, + activity tolerance noted in PT exam above  Barthel Index 25/100  ambulated 5' bed>BSC c RW c mod cues for sequencing + task completion  would benefit from skilled PT to maximize functional mobility + improve quality of life  upon d/c, recommend STR  PT eval of high complexity 2* unstable med status c pt requiring ongoing medical management 2* pancytopenia  pt c multiple co-morbidities + mobility deficits requiring (A)x2 for OOB mobility tasks  pt c significantly impaired cognitive status + impaired safety awareness  Recommendation: Short-term skilled PT     PT - OK to Discharge: Yes(to STR when stable )    See flowsheet documentation for full assessment

## 2020-02-06 VITALS
OXYGEN SATURATION: 96 % | TEMPERATURE: 98 F | WEIGHT: 182.98 LBS | RESPIRATION RATE: 16 BRPM | SYSTOLIC BLOOD PRESSURE: 132 MMHG | DIASTOLIC BLOOD PRESSURE: 60 MMHG | HEART RATE: 68 BPM | BODY MASS INDEX: 31.24 KG/M2 | HEIGHT: 64 IN

## 2020-02-06 LAB
ALBUMIN SERPL BCP-MCNC: 3.7 G/DL (ref 3.5–5)
ALP SERPL-CCNC: 178 U/L (ref 46–116)
ALT SERPL W P-5'-P-CCNC: 30 U/L (ref 12–78)
ANION GAP SERPL CALCULATED.3IONS-SCNC: 9 MMOL/L (ref 4–13)
AST SERPL W P-5'-P-CCNC: 41 U/L (ref 5–45)
BASOPHILS # BLD MANUAL: 0 THOUSAND/UL (ref 0–0.1)
BASOPHILS NFR MAR MANUAL: 0 % (ref 0–1)
BILIRUB SERPL-MCNC: 1.5 MG/DL (ref 0.2–1)
BUN SERPL-MCNC: 24 MG/DL (ref 5–25)
CALCIUM SERPL-MCNC: 9.6 MG/DL (ref 8.3–10.1)
CHLORIDE SERPL-SCNC: 102 MMOL/L (ref 100–108)
CO2 SERPL-SCNC: 28 MMOL/L (ref 21–32)
CREAT SERPL-MCNC: 1.08 MG/DL (ref 0.6–1.3)
EOSINOPHIL # BLD MANUAL: 0 THOUSAND/UL (ref 0–0.4)
EOSINOPHIL NFR BLD MANUAL: 0 % (ref 0–6)
ERYTHROCYTE [DISTWIDTH] IN BLOOD BY AUTOMATED COUNT: 19.3 % (ref 11.6–15.1)
GFR SERPL CREATININE-BSD FRML MDRD: 59 ML/MIN/1.73SQ M
GLUCOSE SERPL-MCNC: 127 MG/DL (ref 65–140)
HCT VFR BLD AUTO: 29.5 % (ref 34.8–46.1)
HFE GENE MUT ANL BLD/T: NORMAL
HGB BLD-MCNC: 9.5 G/DL (ref 11.5–15.4)
LYMPHOCYTES # BLD AUTO: 1.65 THOUSAND/UL (ref 0.6–4.47)
LYMPHOCYTES # BLD AUTO: 28 % (ref 14–44)
MCH RBC QN AUTO: 29.6 PG (ref 26.8–34.3)
MCHC RBC AUTO-ENTMCNC: 32.2 G/DL (ref 31.4–37.4)
MCV RBC AUTO: 92 FL (ref 82–98)
MONOCYTES # BLD AUTO: 0.83 THOUSAND/UL (ref 0–1.22)
MONOCYTES NFR BLD: 14 % (ref 4–12)
MYELOCYTES NFR BLD MANUAL: 2 % (ref 0–1)
NEUTROPHILS # BLD MANUAL: 3.31 THOUSAND/UL (ref 1.85–7.62)
NEUTS BAND NFR BLD MANUAL: 1 % (ref 0–8)
NEUTS SEG NFR BLD AUTO: 55 % (ref 43–75)
NRBC BLD AUTO-RTO: 16 /100 WBCS
NRBC BLD AUTO-RTO: 19 /100 WBC (ref 0–2)
PLATELET # BLD AUTO: 58 THOUSANDS/UL (ref 149–390)
PLATELET BLD QL SMEAR: ABNORMAL
POLYCHROMASIA BLD QL SMEAR: PRESENT
POTASSIUM SERPL-SCNC: 4.6 MMOL/L (ref 3.5–5.3)
PROT SERPL-MCNC: 7.4 G/DL (ref 6.4–8.2)
RBC # BLD AUTO: 3.21 MILLION/UL (ref 3.81–5.12)
SCHISTOCYTES BLD QL SMEAR: PRESENT
SODIUM SERPL-SCNC: 139 MMOL/L (ref 136–145)
TARGETS BLD QL SMEAR: PRESENT
TOTAL CELLS COUNTED SPEC: 100
WBC # BLD AUTO: 5.91 THOUSAND/UL (ref 4.31–10.16)

## 2020-02-06 PROCEDURE — 80053 COMPREHEN METABOLIC PANEL: CPT | Performed by: INTERNAL MEDICINE

## 2020-02-06 PROCEDURE — 85007 BL SMEAR W/DIFF WBC COUNT: CPT | Performed by: INTERNAL MEDICINE

## 2020-02-06 PROCEDURE — 85027 COMPLETE CBC AUTOMATED: CPT | Performed by: INTERNAL MEDICINE

## 2020-02-06 PROCEDURE — 99239 HOSP IP/OBS DSCHRG MGMT >30: CPT | Performed by: INTERNAL MEDICINE

## 2020-02-06 RX ORDER — LANOLIN ALCOHOL/MO/W.PET/CERES
100 CREAM (GRAM) TOPICAL DAILY
Qty: 30 TABLET | Refills: 0 | Status: SHIPPED | OUTPATIENT
Start: 2020-02-07 | End: 2020-02-17 | Stop reason: SDUPTHER

## 2020-02-06 RX ORDER — NICOTINE 21 MG/24HR
1 PATCH, TRANSDERMAL 24 HOURS TRANSDERMAL DAILY
Qty: 28 PATCH | Refills: 0 | Status: SHIPPED | OUTPATIENT
Start: 2020-02-07 | End: 2021-04-01 | Stop reason: CLARIF

## 2020-02-06 RX ORDER — METOPROLOL SUCCINATE 25 MG/1
50 TABLET, EXTENDED RELEASE ORAL DAILY
Qty: 90 TABLET | Refills: 0 | Status: SHIPPED | OUTPATIENT
Start: 2020-02-06 | End: 2020-05-04 | Stop reason: SDUPTHER

## 2020-02-06 RX ORDER — FOLIC ACID 1 MG/1
1 TABLET ORAL DAILY
Qty: 30 TABLET | Refills: 0 | Status: SHIPPED | OUTPATIENT
Start: 2020-02-07 | End: 2020-02-17 | Stop reason: SDUPTHER

## 2020-02-06 RX ORDER — FUROSEMIDE 80 MG
80 TABLET ORAL DAILY
Qty: 30 TABLET | Refills: 0 | Status: SHIPPED | OUTPATIENT
Start: 2020-02-07 | End: 2020-02-17 | Stop reason: SDUPTHER

## 2020-02-06 RX ORDER — LACTULOSE 20 G/30ML
20 SOLUTION ORAL 3 TIMES DAILY
Qty: 2700 ML | Refills: 0 | Status: SHIPPED | OUTPATIENT
Start: 2020-02-06 | End: 2020-02-17 | Stop reason: SDUPTHER

## 2020-02-06 RX ORDER — PANTOPRAZOLE SODIUM 40 MG/1
40 TABLET, DELAYED RELEASE ORAL
Qty: 60 TABLET | Refills: 0 | Status: SHIPPED | OUTPATIENT
Start: 2020-02-06 | End: 2020-02-17 | Stop reason: SDUPTHER

## 2020-02-06 RX ORDER — PREDNISONE 10 MG/1
TABLET ORAL
Qty: 95 TABLET | Refills: 0 | Status: SHIPPED | OUTPATIENT
Start: 2020-02-06 | End: 2020-05-20 | Stop reason: HOSPADM

## 2020-02-06 RX ADMIN — PREDNISONE 50 MG: 20 TABLET ORAL at 08:11

## 2020-02-06 RX ADMIN — MAGNESIUM OXIDE TAB 400 MG (241.3 MG ELEMENTAL MG) 400 MG: 400 (241.3 MG) TAB at 08:11

## 2020-02-06 RX ADMIN — NICOTINE 1 PATCH: 14 PATCH TRANSDERMAL at 08:15

## 2020-02-06 RX ADMIN — FUROSEMIDE 80 MG: 80 TABLET ORAL at 08:11

## 2020-02-06 RX ADMIN — METOPROLOL TARTRATE 25 MG: 25 TABLET ORAL at 00:32

## 2020-02-06 RX ADMIN — PANTOPRAZOLE SODIUM 40 MG: 40 TABLET, DELAYED RELEASE ORAL at 08:11

## 2020-02-06 RX ADMIN — CEFDINIR 300 MG: 300 CAPSULE ORAL at 08:12

## 2020-02-06 RX ADMIN — FOLIC ACID 1 MG: 1 TABLET ORAL at 08:11

## 2020-02-06 RX ADMIN — THIAMINE HCL TAB 100 MG 100 MG: 100 TAB at 08:11

## 2020-02-06 RX ADMIN — METOPROLOL TARTRATE 25 MG: 25 TABLET ORAL at 08:10

## 2020-02-06 RX ADMIN — METRONIDAZOLE 500 MG: 500 TABLET ORAL at 13:53

## 2020-02-06 RX ADMIN — RIFAXIMIN 550 MG: 550 TABLET ORAL at 08:11

## 2020-02-06 RX ADMIN — ACETAMINOPHEN 650 MG: 325 TABLET, FILM COATED ORAL at 11:27

## 2020-02-06 RX ADMIN — LACTULOSE 20 G: 10 SOLUTION ORAL at 08:12

## 2020-02-06 RX ADMIN — METRONIDAZOLE 500 MG: 500 TABLET ORAL at 06:32

## 2020-02-06 NOTE — ASSESSMENT & PLAN NOTE
Secondary to chronic alcoholism  Patient on lactulose  Ammonia is better  Continue with current other treatment  Will decrease lactulose because of diarrhea    Continue PPI

## 2020-02-06 NOTE — ASSESSMENT & PLAN NOTE
Wt Readings from Last 3 Encounters:   02/06/20 83 kg (182 lb 15 7 oz)   01/28/20 78 4 kg (172 lb 12 8 oz)   05/31/19 82 6 kg (182 lb)     Acute on chronic diastolic heart failure in the setting of HTN as evidenced by Echo shows evidence of volume overload, acute respiratory failure and CXR findings, treated with high flow oxygen, IV Lasix, I/O's, daily weights  Currently improved  Change to oral Lasix  Present on admission

## 2020-02-06 NOTE — ASSESSMENT & PLAN NOTE
Likely secondary to type 2 MI-respiratory issues  Ejection fraction about 50-55%  Cardiology input appreciated  No further workup as per Cardiology service  Patient on diuretics  Patient with heart failure with preserved ejection fraction  Continue same dose of Lasix  Potassium level remained normal without supplement  So will recheck BMP in 1 week and follow up with PCP

## 2020-02-06 NOTE — DISCHARGE SUMMARY
Discharge- Slick Mccarthy 1945, 69 y o  female MRN: [id number]    Unit/Bed#: -01 Encounter: [id number]    Primary Care Provider: Jyoti Pathak MD   Date and time admitted to hospital: 1/28/2020 11:44 PM        Acute on chronic diastolic CHF (congestive heart failure) ([facility] )  Assessment & Plan  Wt Readings from Last 3 Encounters:   02/06/20 83 kg (182 lb 15 7 oz)   01/28/20 78 4 kg (172 lb 12 8 oz)   05/31/19 82 6 kg (182 lb)     Acute on chronic diastolic heart failure in the setting of HTN as evidenced by Echo shows evidence of volume overload, acute respiratory failure and CXR findings, treated with high flow oxygen, IV Lasix, I/O's, daily weights  Currently improved  Change to oral Lasix  Present on admission  Elevated troponin  Assessment & Plan  Likely secondary to type 2 MI-respiratory issues  Ejection fraction about 50-55%  Cardiology input appreciated  No further workup as per Cardiology service  Patient on diuretics  Patient with heart failure with preserved ejection fraction  Continue same dose of Lasix  Potassium level remained normal without supplement  So will recheck BMP in 1 week and follow up with PCP  Cirrhosis ([place] [id number] )  Assessment & Plan  Secondary to chronic alcoholism  Patient on lactulose  Ammonia is better  Continue with current other treatment  Will decrease lactulose because of diarrhea  Continue PPI    Toxic metabolic encephalopathy  Assessment & Plan  Plan as above    History of alcohol abuse  Assessment & Plan  Patient with underlying cirrhosis secondary to chronic alcohol abuse  She likely has underlying dementia-possibly made worse with chronic alcohol use  She has been agitated at times  Recommended rehab placement  But family wants to take her home with home health    Paroxysmal SVT (supraventricular tachycardia) [facility])  Assessment & Plan  Patient has paroxysmal AFib  Not a candidate for anticoagulation    Heart rate remains stable now  Resume aspirin once thrombocytopenia resolved    Cigarette nicotine dependence without complication  Assessment & Plan  Encouraged to quit  To Continue with nicotine patch    Benign essential hypertension  Assessment & Plan  Blood pressure currently stable    Symptomatic anemia  Assessment & Plan  Status post blood transfusion  Hemoglobin has remained stable lately    * Pancytopenia [facility])  Assessment & Plan  Currently on steroids  Her white cell count and hemoglobin stable/better  Platelets still low, however, better than before although slightly lower than yesterday  Etiology for thrombocytopenia  Likely secondary to chronic alcoholism  Continue to monitor  Evaluated by hematology  On tapering prednisone  Taper weekly  O[address] 13 level was normal       Discharging Physician / Practitioner: Curtis Gruber MD  PCP: Criss Jacobsen MD  Admission Date:   Admission Orders (From admission, onward)     Ordered        01/29/20 0306  Inpatient Admission  Once                   Discharge Date: 02/06/20    Resolved Problems  Date Reviewed: 2/6/2020          Resolved    Acute respiratory failure with hypoxia ([facility] ) 2/1/2020     Resolved by  Kevon Siemens, MD          Consultations During Hospital Stay:  · ICU, Cardiology, Hematology, GI, Neurology    Procedures Performed:   · EGD    Significant Findings / Test Results:   · egd-  IMPRESSION:  · The esophagus appeared normal  · Nodule in the body of the stomach; performed cold biopsy  · Severe, localized edematous, eroded, erythematous and ulcerated mucosa in the antrum; performed cold biopsy  · Moderate, generalized edematous, eroded, erythematous and nodular mucosa in the duodenal bulb and 2nd part of the duodenum      Ct heead-     IMPRESSION:     No acute intracranial abnormality      cxr-  IMPRESSION:     Improved congestive heart failure with mild vascular congestion and small effusions      Echo-  LEFT VENTRICLE:  Size was at the upper limits of normal   Systolic function was normal  LVEF 50-55%  Although no diagnostic regional wall motion abnormality was identified, this possibility cannot be completely excluded on the basis of this study  Wall thickness was mildly increased  There was mild concentric hypertrophy  Features were consistent with a pseudonormal left ventricular filling pattern, with concomitant abnormal relaxation and increased filling pressure (grade 2 diastolic dysfunction)  Incidental Findings:   ·      Test Results Pending at Discharge (will require follow up):   ·      Outpatient Tests Requested:  · CBC and CMP in 1 week and follow up with PCP  · Follow-up with GI, Hematology, Cardiology and Neurology as outpatient    Complications:      Reason for Admission:     Hospital Course:     HPI on admission-  Jeni Hoyos is a 69 y o  female who presents per PCP recommendation after routine lab work completed today revealed pancytopenia  Ms Genesis Chiu is confused and agitated and refusing to participate in ROS and physical exam  Information obtained from chart review and from sister who she resides with  Sister reports has been fatigued recently with c/o dizziness  Denies any abdominal pain  (+) nausea, but no vomiting  Denies hematochezia/melena  Per PCP note suffers from chronic alcoholism  Sister kendrick was a heavy, daily drinker, but has had no alcohol intake in the last 6 months  Hb obtained earlier in the day revealed Hb 4 9, WBC 3 38, RBC 1 76, platelets 45  Repeat lab work in ED confirmed this  Most recent Hb prior to today was 8 1 with platelet at 68 back on 6/7/2019  Normal MCV  Became hypotensive in ED prior to blood transfusion  Sister kendrick has been increasingly forgetful recently and spoke with PCP about it yesterday  Plan was to monitor prior to starting on medications  Hospital course-    Patient was admitted for pancytopenia  Being evaluated by neurology for monitor mental status    Found to have liver cirrhosis and hepatic encephalopathy  Started on lactulose and rifaximin  EGD done, no active bleeding  However during the procedure patient was hypoxic and requiring high-flow oxygen  Transferred to ICU  Etiology possibly volume overload  Started on IV Lasix  Evaluated by Cardiology  Diastolic dysfunction  Also started on IV antibiotics  Her shortness of breath improved patient afebrile  Currently off oxygen  On room air  Pancytopenia also improved  On steroids per hematology  TTP ruled out  Patient's mental status at baseline now  Labs stable  Cleared by all subspecialties for discharge  Physical therapy recommended short-term rehab  But family refused  They want her to take home with home health  Discussed with case management  Patient currently clinically and hemodynamically stable to be discharged  Overall prognosis remains poor  Please see above list of diagnoses and related plan for additional information  Condition at Discharge: fair     Discharge Day Visit / Exam:     Subjective:    Vitals: Blood Pressure: 132/60 (02/06/20 0803)  Pulse: 68 (02/06/20 0803)  Temperature: 98 °F (36 7 °C) (02/06/20 0803)  Temp Source: Oral (02/06/20 0803)  Respirations: 16 (02/06/20 0803)  Height: 5' 4" (162 6 cm) (01/30/20 1352)  Weight - Scale: 83 kg (182 lb 15 7 oz) (02/06/20 0600)  SpO2: 96 % (02/05/20 1510)  Exam:   Physical Exam  General- Awake, looks comfortable  HEENT- Normocephalic, atraumatic  Neck- Supple, no JVD  CVS- Normal S1/ S2, Regular rate and rhythm  Respiratory system- B/L clear breath sounds  Abdomen- Soft, Non distended, no tenderness, Bowel sound- present  CNS-  No acute focal neurologic deficit noted  Discussion with Family:  Sister bedside    Discharge instructions/Information to patient and family:   See after visit summary for information provided to patient and family        Provisions for Follow-Up Care:  See after visit summary for information related to follow-up care and any pertinent home health orders  Disposition:     Home with VNA Services (Reminder: Complete face to face encounter)    For Discharges to [facility] SNF:   · Not Applicable to this Patient - Not Applicable to this Patient    Planned Readmission:      Discharge Statement:  I spent 40 minutes discharging the patient  This time was spent on the day of discharge  I had direct contact with the patient on the day of discharge  Greater than 50% of the total time was spent examining patient, answering all patient questions, arranging and discussing plan of care with patient as well as directly providing post-discharge instructions  Additional time then spent on discharge activities  Discharge Medications:  See after visit summary for reconciled discharge medications provided to patient and family        ** Please Note: This note has been constructed using a voice recognition system **

## 2020-02-06 NOTE — ASSESSMENT & PLAN NOTE
Currently on steroids  Her white cell count and hemoglobin stable/better  Platelets still low, however, better than before although slightly lower than yesterday  Etiology for thrombocytopenia  Likely secondary to chronic alcoholism  Continue to monitor  Evaluated by hematology  On tapering prednisone  Taper weekly    Olivarez 13 level was normal

## 2020-02-06 NOTE — DISCHARGE INSTRUCTIONS
Cirrhosis   WHAT YOU NEED TO KNOW:   Cirrhosis is long-term scarring of the liver  The liver makes enzymes and bile that help digest food and gives your body energy  It also removes harmful material from your body, such as alcohol and other chemicals  Cirrhosis is caused by repeated damage to your liver over time  Scar tissue starts to replace healthy liver tissue  The scar tissue prevents the liver from working properly  DISCHARGE INSTRUCTIONS:   Return to the emergency department if:   · You have pain during a bowel movement and it is black or contains blood  · You have a fast heart rate and fast breathing  · You are dizzy or confused  · You have severe pain in your abdomen  · You have trouble breathing  · Your vomit looks like it has coffee grinds or blood in it  Contact your healthcare provider if:   · You have a fever  · You have red or itchy skin  · You are in pain and feel weak  · You have questions or concerns about your condition or care  Medicines: You may need medicine to treat the cause of your cirrhosis  You may also need medicine to treat any health problems caused by cirrhosis  · Antiviral medicine  may be needed if your cirrhosis is caused by hepatitis  Antiviral medicine may prevent or decrease swelling and damage to your liver  · Blood pressure medicine  is used to treat high blood pressure in the portal vein (the vein that goes to your liver)  · Diuretics  decrease extra fluid that collects in a part of your body, such as your legs and abdomen  Diuretics can also decrease your blood pressure  You will urinate more often when you take this medicine  · Antibiotics  help prevent or treat a bacterial infection  · Take your medicine as directed  Contact your healthcare provider if you think your medicine is not helping or if you have side effects  Tell him or her if you are allergic to any medicine  Keep a list of the medicines, vitamins, and herbs you take  Include the amounts, and when and why you take them  Bring the list or the pill bottles to follow-up visits  Carry your medicine list with you in case of an emergency  Do not drink alcohol:  Alcohol will cause more damage to your liver  Manage cirrhosis:   · Do not smoke  Nicotine and other chemicals in cigarettes and cigars can cause blood vessel and lung damage  Ask your healthcare provider for information if you currently smoke and need help to quit  E-cigarettes or smokeless tobacco still contain nicotine  Talk to your healthcare provider before you use these products  · Eat a variety of healthy foods  Healthy foods include fruits, vegetables, whole-grain breads, low-fat dairy products, beans, lean meat, and fish  Ask if you need to be on a special diet  · Reach or maintain a healthy weight  You may develop fatty liver disease if you are overweight  Ask your healthcare provider for a healthy weight for you  He can help you create a safe weight loss plan if you are overweight  · Limit sodium (salt)  You may need to decrease the amount of sodium you eat if you have swelling caused by fluid buildup  Sodium is found in table salt and salty foods such as canned foods, frozen foods, and potato chips  · Drink liquids as directed  Ask how much liquid to drink each day and which liquids are best for you  For most people, good liquids to drink are water, juice, and milk  Liquids can help your liver work better  · Ask about vaccines  You may have a hard time fighting infection because of cirrhosis  Vaccines help protect you against viruses that can cause diseases such as the flu or hepatitis  Viral hepatitis is caused by a virus that leads to inflammation of the liver  You may need a hepatitis A or B vaccine  You may also need a pneumonia vaccine  Always get a flu vaccine each year as soon as it becomes available  · Ask about medicines  Some medicines can harm your liver   Acetaminophen is an example  Talk to your healthcare provider about all your medicines  Do not take any over-the-counter medicine or herbal supplements until your healthcare provider says it is okay  Follow up with your healthcare provider as directed:  Write down your questions so you remember to ask them during your visits  © 2017 [employer] [employer] Information is for End User's use only and may not be sold, redistributed or otherwise used for commercial purposes  All illustrations and images included in CareNotes® are the copyrighted property of [employer]  or Ollie Spears  The above information is an  only  It is not intended as medical advice for individual conditions or treatments  Talk to your doctor, nurse or pharmacist before following any medical regimen to see if it is safe and effective for you

## 2020-02-06 NOTE — DISCHARGE INSTR - AVS FIRST PAGE
Follow-up with PCP in 1 week  CBC and CMP in 1 week and follow up with PCP  Consider starting on aspirin once thrombocytopenia resolved  Prednisone tapering every week  Follow-up with hematology in 1-2 weeks  Follow-up with GI in 2 weeks as outpatient  Continue lactulose with a goal of at least 3 bowel movement a day  Can take twice a day if diarrhea  Follow-up with Cardiology and Neurology as outpatient  Come back to the emergency room if condition worsen or recur

## 2020-02-06 NOTE — ASSESSMENT & PLAN NOTE
Patient has paroxysmal AFib  Not a candidate for anticoagulation    Heart rate remains stable now  Resume aspirin once thrombocytopenia resolved

## 2020-02-06 NOTE — ASSESSMENT & PLAN NOTE
Patient with underlying cirrhosis secondary to chronic alcohol abuse  She likely has underlying dementia-possibly made worse with chronic alcohol use  She has been agitated at times  Recommended rehab placement    But family wants to take her home with home health

## 2020-02-07 ENCOUNTER — TRANSITIONAL CARE MANAGEMENT (OUTPATIENT)
Dept: INTERNAL MEDICINE CLINIC | Facility: CLINIC | Age: 75
End: 2020-02-07

## 2020-02-07 ENCOUNTER — TELEPHONE (OUTPATIENT)
Dept: INTERNAL MEDICINE CLINIC | Facility: CLINIC | Age: 75
End: 2020-02-07

## 2020-02-07 NOTE — TELEPHONE ENCOUNTER
Pt's sister called asking if Dr Sanchez Irvin could prescribe a medication to help her sleep  She was d/c from [facility] yesterday and has not slept since  Sister thinks it is because of her confusion       [facility] [place] [facility] at the [facility] [phone]

## 2020-02-07 NOTE — TELEPHONE ENCOUNTER
Patient's sister is not on her HIPPA consent form, so no information can be released to her  Spoke with Valerie Burnett and she said she will have Dmitriy Speaks call which is the patient's son   He is on the consent form

## 2020-02-10 ENCOUNTER — TELEPHONE (OUTPATIENT)
Dept: NEUROLOGY | Facility: CLINIC | Age: 75
End: 2020-02-10

## 2020-02-10 PROBLEM — E55.9 VITAMIN D DEFICIENCY: Status: RESOLVED | Noted: 2020-01-28 | Resolved: 2020-02-10

## 2020-02-10 PROBLEM — E66.9 OBESITY (BMI 30.0-34.9): Status: ACTIVE | Noted: 2020-01-28

## 2020-02-10 NOTE — TELEPHONE ENCOUNTER
----- Message from Wanda Raymundo MD sent at 1/30/2020 11:42 AM EST -----  Regarding: follow up  Please set pt up with follow up in [address] office

## 2020-02-14 ENCOUNTER — TELEPHONE (OUTPATIENT)
Dept: INTERNAL MEDICINE CLINIC | Facility: CLINIC | Age: 75
End: 2020-02-14

## 2020-02-14 NOTE — TELEPHONE ENCOUNTER
The diarrhea and urination was because of the lactulose  It should settle down    Will discuss the other problems on Monday

## 2020-02-14 NOTE — TELEPHONE ENCOUNTER
Manolo Castañeda from [facility] called  Pt's sister requested home care to come out today, pt refused visit  Wanted to let Dr Delmar Souza know that her sister manages her medication since she does have alzheimer's and dementia  Pt got a hold of her lactulose and drank the whole bottle yesterday  She had excessive diarrhea and urination  She urinated on herself and on the floor  Sister cleaned up the floor but pt is refusing to take a bath  No diarrhea today per the sister  She is scheduled to see Troy on Monday for a TCM appt, her son will be at the visit with her  Sister states there is only so much she can do      [facility] is scheduled to see her again on Monday as well FYI     Any questions call Manolo Castañeda  [phone]

## 2020-02-17 ENCOUNTER — OFFICE VISIT (OUTPATIENT)
Dept: INTERNAL MEDICINE CLINIC | Facility: CLINIC | Age: 75
End: 2020-02-17
Payer: MEDICARE

## 2020-02-17 ENCOUNTER — APPOINTMENT (OUTPATIENT)
Dept: LAB | Facility: CLINIC | Age: 75
End: 2020-02-17
Payer: MEDICARE

## 2020-02-17 VITALS
HEART RATE: 82 BPM | SYSTOLIC BLOOD PRESSURE: 120 MMHG | BODY MASS INDEX: 28.85 KG/M2 | TEMPERATURE: 97.4 F | WEIGHT: 169 LBS | HEIGHT: 64 IN | DIASTOLIC BLOOD PRESSURE: 62 MMHG | OXYGEN SATURATION: 98 %

## 2020-02-17 DIAGNOSIS — I47.1 PAROXYSMAL SVT (SUPRAVENTRICULAR TACHYCARDIA) (HCC): ICD-10-CM

## 2020-02-17 DIAGNOSIS — K74.60 CIRRHOSIS (HCC): ICD-10-CM

## 2020-02-17 DIAGNOSIS — F10.11 HISTORY OF ALCOHOL ABUSE: ICD-10-CM

## 2020-02-17 DIAGNOSIS — K70.30 ALCOHOLIC CIRRHOSIS OF LIVER WITHOUT ASCITES (HCC): Primary | ICD-10-CM

## 2020-02-17 DIAGNOSIS — D61.818 PANCYTOPENIA (HCC): ICD-10-CM

## 2020-02-17 DIAGNOSIS — I50.33 ACUTE ON CHRONIC DIASTOLIC CHF (CONGESTIVE HEART FAILURE) (HCC): ICD-10-CM

## 2020-02-17 DIAGNOSIS — J96.01 ACUTE RESPIRATORY FAILURE WITH HYPOXIA (HCC): ICD-10-CM

## 2020-02-17 DIAGNOSIS — N39.46 MIXED STRESS AND URGE URINARY INCONTINENCE: ICD-10-CM

## 2020-02-17 LAB
ALBUMIN SERPL BCP-MCNC: 3.6 G/DL (ref 3.5–5)
ALP SERPL-CCNC: 182 U/L (ref 46–116)
ALT SERPL W P-5'-P-CCNC: 35 U/L (ref 12–78)
ANION GAP SERPL CALCULATED.3IONS-SCNC: 6 MMOL/L (ref 4–13)
ANISOCYTOSIS BLD QL SMEAR: PRESENT
AST SERPL W P-5'-P-CCNC: 32 U/L (ref 5–45)
BASOPHILS # BLD MANUAL: 0.05 THOUSAND/UL (ref 0–0.1)
BASOPHILS NFR MAR MANUAL: 1 % (ref 0–1)
BILIRUB SERPL-MCNC: 1.2 MG/DL (ref 0.2–1)
BUN SERPL-MCNC: 8 MG/DL (ref 5–25)
CALCIUM SERPL-MCNC: 9.8 MG/DL (ref 8.3–10.1)
CHLORIDE SERPL-SCNC: 103 MMOL/L (ref 100–108)
CO2 SERPL-SCNC: 30 MMOL/L (ref 21–32)
CREAT SERPL-MCNC: 0.89 MG/DL (ref 0.6–1.3)
EOSINOPHIL # BLD MANUAL: 0 THOUSAND/UL (ref 0–0.4)
EOSINOPHIL NFR BLD MANUAL: 0 % (ref 0–6)
ERYTHROCYTE [DISTWIDTH] IN BLOOD BY AUTOMATED COUNT: 20.9 % (ref 11.6–15.1)
GFR SERPL CREATININE-BSD FRML MDRD: 74 ML/MIN/1.73SQ M
GLUCOSE P FAST SERPL-MCNC: 157 MG/DL (ref 65–99)
HCT VFR BLD AUTO: 30.2 % (ref 34.8–46.1)
HGB BLD-MCNC: 9.5 G/DL (ref 11.5–15.4)
HYPERCHROMIA BLD QL SMEAR: PRESENT
LYMPHOCYTES # BLD AUTO: 0.78 THOUSAND/UL (ref 0.6–4.47)
LYMPHOCYTES # BLD AUTO: 17 % (ref 14–44)
MCH RBC QN AUTO: 29.1 PG (ref 26.8–34.3)
MCHC RBC AUTO-ENTMCNC: 31.5 G/DL (ref 31.4–37.4)
MCV RBC AUTO: 92 FL (ref 82–98)
METAMYELOCYTES NFR BLD MANUAL: 1 % (ref 0–1)
MONOCYTES # BLD AUTO: 0.46 THOUSAND/UL (ref 0–1.22)
MONOCYTES NFR BLD: 10 % (ref 4–12)
MYELOCYTES NFR BLD MANUAL: 1 % (ref 0–1)
NEUTROPHILS # BLD MANUAL: 3.11 THOUSAND/UL (ref 1.85–7.62)
NEUTS BAND NFR BLD MANUAL: 1 % (ref 0–8)
NEUTS SEG NFR BLD AUTO: 67 % (ref 43–75)
NRBC BLD AUTO-RTO: 12 /100 WBC (ref 0–2)
NRBC BLD AUTO-RTO: 6 /100 WBCS
OVALOCYTES BLD QL SMEAR: PRESENT
PLATELET # BLD AUTO: 57 THOUSANDS/UL (ref 149–390)
PLATELET BLD QL SMEAR: ABNORMAL
POIKILOCYTOSIS BLD QL SMEAR: PRESENT
POLYCHROMASIA BLD QL SMEAR: PRESENT
POTASSIUM SERPL-SCNC: 3.8 MMOL/L (ref 3.5–5.3)
PROT SERPL-MCNC: 7.6 G/DL (ref 6.4–8.2)
RBC # BLD AUTO: 3.27 MILLION/UL (ref 3.81–5.12)
RBC MORPH BLD: PRESENT
SCHISTOCYTES BLD QL SMEAR: PRESENT
SODIUM SERPL-SCNC: 139 MMOL/L (ref 136–145)
TARGETS BLD QL SMEAR: PRESENT
VARIANT LYMPHS # BLD AUTO: 2 %
WBC # BLD AUTO: 4.58 THOUSAND/UL (ref 4.31–10.16)

## 2020-02-17 PROCEDURE — 85007 BL SMEAR W/DIFF WBC COUNT: CPT

## 2020-02-17 PROCEDURE — 80053 COMPREHEN METABOLIC PANEL: CPT

## 2020-02-17 PROCEDURE — 85027 COMPLETE CBC AUTOMATED: CPT

## 2020-02-17 PROCEDURE — 99495 TRANSJ CARE MGMT MOD F2F 14D: CPT | Performed by: PHYSICIAN ASSISTANT

## 2020-02-17 PROCEDURE — 36415 COLL VENOUS BLD VENIPUNCTURE: CPT

## 2020-02-17 RX ORDER — FOLIC ACID 1 MG/1
1 TABLET ORAL DAILY
Qty: 90 TABLET | Refills: 1 | Status: ON HOLD | OUTPATIENT
Start: 2020-02-17 | End: 2020-09-02

## 2020-02-17 RX ORDER — PANTOPRAZOLE SODIUM 40 MG/1
40 TABLET, DELAYED RELEASE ORAL
Qty: 60 TABLET | Refills: 0 | Status: SHIPPED | OUTPATIENT
Start: 2020-02-17 | End: 2020-04-13

## 2020-02-17 RX ORDER — FUROSEMIDE 80 MG
80 TABLET ORAL DAILY
Qty: 30 TABLET | Refills: 2 | Status: ON HOLD | OUTPATIENT
Start: 2020-02-17 | End: 2020-05-20

## 2020-02-17 RX ORDER — LACTULOSE 20 G/30ML
20 SOLUTION ORAL 3 TIMES DAILY
Qty: 2700 ML | Refills: 0 | Status: ON HOLD | OUTPATIENT
Start: 2020-02-17 | End: 2020-05-20

## 2020-02-17 RX ORDER — LANOLIN ALCOHOL/MO/W.PET/CERES
100 CREAM (GRAM) TOPICAL DAILY
Qty: 90 TABLET | Refills: 1 | Status: ON HOLD | OUTPATIENT
Start: 2020-02-17 | End: 2020-09-02

## 2020-02-17 NOTE — PROGRESS NOTES
Assessment/Plan:  I reviewed all of her hospital records  She is currently comfortable asymptomatic physical exam unremarkable  She will get labs today  She will follow-up with GI hematology neurology cardiology will leave all of her meds the same as listed below    No problem-specific Assessment & Plan notes found for this encounter  Diagnoses and all orders for this visit:    Acute respiratory failure with hypoxia (HCC)  -     furosemide (LASIX) 80 mg tablet; Take 1 tablet (80 mg total) by mouth daily    History of alcohol abuse  -     folic acid (FOLVITE) 1 mg tablet; Take 1 tablet (1 mg total) by mouth daily  -     thiamine 100 MG tablet; Take 1 tablet (100 mg total) by mouth daily  -     lactulose 20 g/30 mL; Take 30 mL (20 g total) by mouth 3 (three) times a day  -     pantoprazole (PROTONIX) 40 mg tablet; Take 1 tablet (40 mg total) by mouth 2 (two) times a day before meals  -     magnesium oxide (MAG-OX) 400 mg; Take 1 tablet (400 mg total) by mouth 2 (two) times a day        Subjective:     Patient ID: Daria Win is a 69 y o  female  She was hospitalized for 9 days 2 weeks ago because of increasing confusion  Outpatient lab work indicated pancytopenia and she was admitted she had upper GI endoscopy which showed severe erosive gastritis she had respiratory failure during the procedure and was put in ICU with volume overload given Lasix and she improved she had increasing confusion in the hospital due to hepatic encephalopathy treated with lactulose and Xifaxan  Pancytopenia improved platelets states somewhat low  Refused rehab at home she has been uncooperative with meds has had incontinence she is monitored closely by her family  Unable to get the Xifaxan so far because of cost   Her appetite has been fair she has been ambulatory no complain of any pain    On a course of steroids as treatment for the pancytopenia initially quite confused and jittery but this is better her sleeping is poor but better a previous history of SVT that has been controlled with a beta-blocker  In the hospital elevated troponin felt not to be ACS seen by cardiology      The following portions of the patient's history were reviewed and updated as appropriate:  She  has a past medical history of Benign essential hypertension, Chest pain (11/4/2017), and Gastroesophageal reflux disease without esophagitis (11/16/2017)  She   Patient Active Problem List    Diagnosis Date Noted    Acute on chronic diastolic CHF (congestive heart failure) ([facility] ) 02/05/2020    Cirrhosis ([place] [address] ) 01/31/2020    Elevated troponin 01/31/2020    Pancytopenia ([place] [address] ) 01/29/2020    History of alcohol abuse 01/29/2020    Toxic metabolic encephalopathy 35/80/7926    Obesity (BMI 30 0-34 9) 01/28/2020    Recurrent major depressive disorder, in partial remission ([place] [address] ) 01/28/2020    Memory difficulties 04/09/2018    Gait disturbance 04/09/2018    Symptomatic anemia 11/16/2017    Benign essential hypertension 11/16/2017    Cigarette nicotine dependence without complication 86/90/2885    Hyperglycemia 11/16/2017    Paroxysmal SVT (supraventricular tachycardia) ([place] [address] ) 11/16/2017    Sinus tachycardia 11/16/2017    Urinary incontinence 11/16/2017     She  has a past surgical history that includes Appendectomy  Her family history includes Heart attack in her father  She  reports that she has been smoking cigarettes  She has been smoking about 0 50 packs per day  She has never used smokeless tobacco  She reports that she drank alcohol  She reports that she does not use drugs    Current Outpatient Medications   Medication Sig Dispense Refill    folic acid (FOLVITE) 1 mg tablet Take 1 tablet (1 mg total) by mouth daily 90 tablet 1    furosemide (LASIX) 80 mg tablet Take 1 tablet (80 mg total) by mouth daily 30 tablet 2    lactulose 20 g/30 mL Take 30 mL (20 g total) by mouth 3 (three) times a day 2700 mL 0    magnesium oxide (MAG-OX) 400 mg Take 1 tablet (400 mg total) by mouth 2 (two) times a day 180 tablet 1    metoprolol succinate (TOPROL-XL) 25 mg 24 hr tablet Take 2 tablets (50 mg total) by mouth daily 90 tablet 0    nicotine (NICODERM CQ) 14 mg/24hr TD 24 hr patch Place 1 patch on the skin daily 28 patch 0    pantoprazole (PROTONIX) 40 mg tablet Take 1 tablet (40 mg total) by mouth 2 (two) times a day before meals 60 tablet 0    predniSONE 10 mg tablet 50mg daily 5 days, 40mg daily 7 days, 30mg daily 7 days, 20mg daily 7 days, 10mg daily 7 days  95 tablet 0    thiamine 100 MG tablet Take 1 tablet (100 mg total) by mouth daily 90 tablet 1     No current facility-administered medications for this visit  She has No Known Allergies       Review of Systems   Constitutional: Positive for appetite change  Negative for activity change, chills, diaphoresis, fatigue, fever and unexpected weight change  HENT: Negative for congestion, dental problem, drooling, ear discharge, ear pain, facial swelling, hearing loss, nosebleeds, postnasal drip, rhinorrhea, sinus pressure, sinus pain, sneezing, sore throat, tinnitus, trouble swallowing and voice change  Eyes: Negative for photophobia, pain, discharge, redness, itching and visual disturbance  Respiratory: Negative for apnea, cough, choking, chest tightness, shortness of breath, wheezing and stridor  Cardiovascular: Negative for chest pain, palpitations and leg swelling  Gastrointestinal: Negative for abdominal distention, abdominal pain, anal bleeding, blood in stool, constipation, diarrhea, nausea, rectal pain and vomiting  Endocrine: Negative for cold intolerance, heat intolerance, polydipsia, polyphagia and polyuria  Genitourinary: Negative for decreased urine volume, difficulty urinating, dysuria, enuresis, flank pain, frequency, genital sores, hematuria and urgency  Musculoskeletal: Negative for arthralgias, back pain, gait problem, joint swelling, myalgias, neck pain and neck stiffness  Skin: Negative for color change, pallor, rash and wound  Neurological: Negative for dizziness, tremors, seizures, syncope, facial asymmetry, speech difficulty, weakness, light-headedness, numbness and headaches  Hematological: Negative for adenopathy  Does not bruise/bleed easily  Psychiatric/Behavioral: Positive for agitation, behavioral problems and sleep disturbance  Negative for confusion, decreased concentration, dysphoric mood, hallucinations, self-injury and suicidal ideas  The patient is not nervous/anxious and is not hyperactive  Objective:     Physical Exam   Constitutional: She is oriented to person, place, and time  She appears well-developed and well-nourished  obese   HENT:   Head: Normocephalic and atraumatic  Right Ear: External ear normal    Left Ear: External ear normal    Nose: Nose normal    Mouth/Throat: Oropharynx is clear and moist  No oropharyngeal exudate  Eyes: Pupils are equal, round, and reactive to light  Conjunctivae are normal  No scleral icterus  Neck: Neck supple  No JVD present  No thyromegaly present  Cardiovascular: Normal rate, regular rhythm, normal heart sounds and intact distal pulses  No murmur heard  Pulmonary/Chest: Effort normal and breath sounds normal  No stridor  No respiratory distress  She has no wheezes  She has no rales  She exhibits no tenderness  Abdominal: Soft  Bowel sounds are normal  She exhibits no distension and no mass  There is no tenderness  There is no rebound and no guarding  No hernia  Musculoskeletal: Normal range of motion  She exhibits edema ( trace both ankles)  Lymphadenopathy:     She has no cervical adenopathy  Neurological: She is alert and oriented to person, place, and time  She has normal reflexes  She displays normal reflexes  No cranial nerve deficit or sensory deficit  She exhibits normal muscle tone  Coordination normal    Skin: Skin is warm and dry     Psychiatric: Her speech is normal and behavior is normal  Her affect is blunt  Thought content is delusional  Cognition and memory are impaired  She expresses impulsivity  She is attentive  Vitals reviewed  Vitals:    02/17/20 1307   BP: 120/62   BP Location: Left arm   Patient Position: Sitting   Cuff Size: Standard   Pulse: 82   Temp: (!) 97 4 °F (36 3 °C)   SpO2: 98%   Weight: 76 7 kg (169 lb)   Height: 5' 4" (1 626 m)       Transitional Care Management Review:  Daria Win is a 69 y o  female here for TCM follow up       During the TCM phone call patient stated:    TCM Call (since 1/17/2020)     Date and time call was made  2/7/2020  9:29 AM    Hospital care reviewed  Records reviewed    Patient was hospitialized at  [id number] [place] [address]    Date of Admission  01/28/20    Date of discharge  02/06/20    Diagnosis  Pancytopenia     Disposition  Home      TCM Call (since 1/17/2020)     Patients specialists  Cardiologist; Other (comment) <img src='C:FILES (Z53)    Cardiologist name  Belia Lewis    Cardiologist contact #  [phone]    Other specialists names  Kiera Sanderson DO    Other specialists contcat #  [phone]    Did you obtain your prescribed medications  -- <img src='C:FILES (X86)              Nga Nunez PA-C

## 2020-02-18 ENCOUNTER — TELEPHONE (OUTPATIENT)
Dept: SURGICAL ONCOLOGY | Facility: CLINIC | Age: 75
End: 2020-02-18

## 2020-02-18 NOTE — TELEPHONE ENCOUNTER
New Patient Encounter    New Patient Intake Form   Patient Details:  Lavern Barker  1945  [id number]    Background Information:  [address] starts by opening a telephone encounter and gathering the following information   Who is calling to schedule? If not self, relationship to patient? Sister- tim    Referring Provider Hospital ref Dr Keegan Powers   What is the diagnosis? Abnormal labs   Is this diagnosis confirmed Yes   Is there a confirmed diagnosis from a biopsy/tissue reviewed by pathology? No   Is there any prior history of Cancer? No   If yes, please explain na   When was the diagnosis? 1/29   Is patient aware of diagnosis? Yes   Reason for visit? NP DX   Have you had any testing done? If so: when, where? Yes   Are records in [facility]? yes   Was the patient told to bring a disk? no   Scheduling Information:   Preferred [place]:  [facility]     Requesting Specific Provider? Dr Armandina Scheuermann    Are there any dates/time the patient cannot be seen? na      Miscellaneous: March 8-14 afternoon  Reaching out to Dr Armandina Scheuermann team [facility] f/u  Call back Kalpesh Zimmerman [phone]    After completing the above information, please route to Financial Counselor and the appropriate Nurse Navigator for review

## 2020-03-03 ENCOUNTER — TELEPHONE (OUTPATIENT)
Dept: NEUROLOGY | Facility: CLINIC | Age: 75
End: 2020-03-03

## 2020-03-09 ENCOUNTER — OFFICE VISIT (OUTPATIENT)
Dept: GASTROENTEROLOGY | Facility: CLINIC | Age: 75
End: 2020-03-09
Payer: MEDICARE

## 2020-03-09 VITALS
BODY MASS INDEX: 29.88 KG/M2 | WEIGHT: 175 LBS | DIASTOLIC BLOOD PRESSURE: 70 MMHG | HEART RATE: 82 BPM | HEIGHT: 64 IN | SYSTOLIC BLOOD PRESSURE: 120 MMHG | RESPIRATION RATE: 16 BRPM

## 2020-03-09 DIAGNOSIS — D50.0 IRON DEFICIENCY ANEMIA DUE TO CHRONIC BLOOD LOSS: ICD-10-CM

## 2020-03-09 DIAGNOSIS — K70.31 ALCOHOLIC CIRRHOSIS OF LIVER WITH ASCITES (HCC): ICD-10-CM

## 2020-03-09 PROCEDURE — 99214 OFFICE O/P EST MOD 30 MIN: CPT | Performed by: INTERNAL MEDICINE

## 2020-03-09 RX ORDER — MAGNESIUM OXIDE 400 MG/1
1 TABLET ORAL 2 TIMES DAILY
COMMUNITY
Start: 2020-03-06 | End: 2020-06-26 | Stop reason: CLARIF

## 2020-03-09 NOTE — PROGRESS NOTES
[facility] - Outpatient Consultation  Landon Jaime 69 y o  female MRN: [id number]  Encounter: [id number]          ASSESSMENT AND PLAN:      1  Iron deficiency anemia due to chronic blood loss    2  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)  - ascites, minimal  - coagulopathy, present  - HCC screening, negative January, 2020  - Varices, None based on EGD performed Jan 30 2020  - encephalopathy, none    3  PUD, gastric ulcer, H pylori negative    Schedule colonoscopy, if negative, then VCE    No alcohol    F/U every 6 months with anatomical study on the liver to rule out [address]       ______________________________________________________________________    HPI:  This 52-year-old female comes to the office today for routine follow-up  She had been admitted to the hospital back in January 2020  The reason for admission was related to symptomatic anemia  Her hemoglobin level was found to be 4 9  Her white blood cell count was 3 38 and a platelet count was 08489  CT scan of the abdomen and pelvis showed evidence of gallstones within the gallbladder  There is pericholecystic fluid raising the possibility of acute cholecystitis  She also had a nodular liver consistent with cirrhosis as well as right subphrenic ascites  There was a compression atelectasis in the right lower lobe of the lung  She had cardiomegaly  Esophagogastroduodenoscopy performed on January 30, 2020 showed no evidence of varices  She did have a ulceration within the antrum  There was no active bleeding  There was no stigmata for bleeding  Her HUSSAIN, anti mitochondrial antibody, anti smooth muscle antibody were negative  Hemochromatosis testing was performed and found to be negative for the mutation  Her hepatitis-B surface antigen was negative  Her antibody to hepatitis-C was negative  She was deemed to be a non liver transplant candidate due to Age    She admits to drinking rum on a daily basis and since this hospitalization she has stop drinking rum altogether  Currently she denies any abdominal pain, nausea, vomiting, diarrhea, constipation, rectal bleeding, melena, hematemesis, nose bleeds, or vaginal bleeding  She does admit to fatigue weakness  REVIEW OF SYSTEMS:    CONSTITUTIONAL: Denies any fever, chills, rigors, and weight loss  HEENT: No earache or tinnitus  Denies hearing loss or visual disturbances  CARDIOVASCULAR: No chest pain or palpitations  RESPIRATORY: Denies any cough, hemoptysis, shortness of breath or dyspnea on exertion  GASTROINTESTINAL: As noted in the History of Present Illness  GENITOURINARY: No problems with urination  Denies any hematuria or dysuria  NEUROLOGIC: No dizziness or vertigo, denies headaches  MUSCULOSKELETAL: Denies any muscle or joint pain  SKIN: Denies skin rashes or itching  ENDOCRINE: Denies excessive thirst  Denies intolerance to heat or cold  PSYCHOSOCIAL: Denies depression or anxiety  Denies any recent memory loss  Historical Information   Past Medical History:   Diagnosis Date    Benign essential hypertension     last assessed - [id number]    Chest pain 11/4/2017    Gastroesophageal reflux disease without esophagitis 11/16/2017     Past Surgical History:   Procedure Laterality Date    APPENDECTOMY       Social History   Social History     Substance and Sexual Activity   Alcohol Use Not Currently    Comment: History of significant daily alcohol intake   Sister joy no alcohol intake in 6 months     Social History     Substance and Sexual Activity   Drug Use No     Social History     Tobacco Use   Smoking Status Current Every Day Smoker    Packs/day: 0 50    Types: Cigarettes   Smokeless Tobacco Never Used     Family History   Problem Relation Age of Onset    Heart attack Father         myocardial infarction       Meds/Allergies       Current Outpatient Medications:     folic acid (FOLVITE) 1 mg tablet    furosemide (LASIX) 80 mg tablet   lactulose 20 g/30 mL    magnesium oxide (MAG-OX) 400 mg tablet    magnesium oxide (MAG-OX) 400 mg    metoprolol succinate (TOPROL-XL) 25 mg 24 hr tablet    nicotine (NICODERM CQ) 14 mg/24hr TD 24 hr patch    pantoprazole (PROTONIX) 40 mg tablet    predniSONE 10 mg tablet    thiamine 100 MG tablet    No Known Allergies        Objective     Blood pressure 120/70, pulse 82, resp  rate 16, height 5' 4" (1 626 m), weight 79 4 kg (175 lb)  Body mass index is 30 04 kg/m²  PHYSICAL EXAM:      General Appearance:   Alert, cooperative, no distress   HEENT:   Normocephalic, atraumatic, anicteric      Neck:  Supple, symmetrical, trachea midline   Lungs:   Clear to auscultation bilaterally; no rales, rhonchi or wheezing; respirations unlabored    Heart[de-identified]   Regular rate and rhythm; no murmur, rub, or gallop  Abdomen:   Soft, non-tender, non-distended; normal bowel sounds; no masses, no organomegaly    Genitalia:   Deferred    Rectal:   Deferred    Extremities:  No cyanosis, clubbing with positive bilateral lower extremity edema    Pulses:  2+ and symmetric    Skin:  No jaundice, rashes, or lesions    Lymph nodes:  No palpable cervical lymphadenopathy        Lab Results:   No visits with results within 1 Day(s) from this visit     Latest known visit with results is:   Appointment on 02/17/2020   Component Date Value    WBC 02/17/2020 4 58     RBC 02/17/2020 3 27*    Hemoglobin 02/17/2020 9 5*    Hematocrit 02/17/2020 30 2*    MCV 02/17/2020 92     MCH 02/17/2020 29 1     MCHC 02/17/2020 31 5     RDW 02/17/2020 20 9*    Platelets 30/78/2024 57*    nRBC 02/17/2020 6     Sodium 02/17/2020 139     Potassium 02/17/2020 3 8     Chloride 02/17/2020 103     CO2 02/17/2020 30     ANION GAP 02/17/2020 6     BUN 02/17/2020 8     Creatinine 02/17/2020 0 89     Glucose, Fasting 02/17/2020 157*    Calcium 02/17/2020 9 8     AST 02/17/2020 32     ALT 02/17/2020 35     Alkaline Phosphatase 02/17/2020 182*    Total Protein 02/17/2020 7 6     Albumin 02/17/2020 3 6     Total Bilirubin 02/17/2020 1 20*    eGFR 02/17/2020 74     Segmented % 02/17/2020 67     Bands % 02/17/2020 1     Lymphocytes % 02/17/2020 17     Monocytes % 02/17/2020 10     Eosinophils, % 02/17/2020 0     Basophils % 02/17/2020 1     Metamyelocytes% 02/17/2020 1     Myelocytes % 02/17/2020 1     Atypical Lymphocytes % 02/17/2020 2*    Absolute Neutrophils 02/17/2020 3 11     Lymphocytes Absolute 02/17/2020 0 78     Monocytes Absolute 02/17/2020 0 46     Eosinophils Absolute 02/17/2020 0 00     Basophils Absolute 02/17/2020 0 05     nRBC 02/17/2020 12*    RBC Morphology 02/17/2020 Present     Anisocytosis 02/17/2020 Present     Hypochromia 02/17/2020 Present     Ovalocytes 02/17/2020 Present     Poikilocytes 02/17/2020 Present     Polychromasia 02/17/2020 Present     Schistocytes 02/17/2020 Present     Target Cells 02/17/2020 Present     Platelet Estimate 51/91/5990 Decreased*         Radiology Results:   No results found

## 2020-03-10 ENCOUNTER — TELEPHONE (OUTPATIENT)
Dept: NEUROLOGY | Facility: CLINIC | Age: 75
End: 2020-03-10

## 2020-03-10 NOTE — TELEPHONE ENCOUNTER
Lmom for pt to call back and schedule appt tomorrow [facility]

## 2020-03-10 NOTE — TELEPHONE ENCOUNTER
LMOM for pt to call office to R/S appt with Ivone Fishman  Left message an earlier msg for pt      2ND ATTEMPT

## 2020-03-12 ENCOUNTER — TELEPHONE (OUTPATIENT)
Dept: HEMATOLOGY ONCOLOGY | Facility: CLINIC | Age: 75
End: 2020-03-12

## 2020-03-12 NOTE — TELEPHONE ENCOUNTER
Patient son Shakira Neal called to cancel the patient's 3/12 1:00 pm hospital follow up with Ester Sabillon  He stated that she will call back to reschedule when she feels better

## 2020-03-24 ENCOUNTER — TELEMEDICINE (OUTPATIENT)
Dept: INTERNAL MEDICINE CLINIC | Facility: CLINIC | Age: 75
End: 2020-03-24
Payer: MEDICARE

## 2020-03-24 DIAGNOSIS — F33.41 RECURRENT MAJOR DEPRESSIVE DISORDER, IN PARTIAL REMISSION (HCC): Primary | ICD-10-CM

## 2020-03-24 PROCEDURE — 1111F DSCHRG MED/CURRENT MED MERGE: CPT | Performed by: INTERNAL MEDICINE

## 2020-03-24 PROCEDURE — 4040F PNEUMOC VAC/ADMIN/RCVD: CPT | Performed by: INTERNAL MEDICINE

## 2020-03-24 PROCEDURE — 3078F DIAST BP <80 MM HG: CPT | Performed by: INTERNAL MEDICINE

## 2020-03-24 PROCEDURE — 4004F PT TOBACCO SCREEN RCVD TLK: CPT | Performed by: INTERNAL MEDICINE

## 2020-03-24 PROCEDURE — 3074F SYST BP LT 130 MM HG: CPT | Performed by: INTERNAL MEDICINE

## 2020-03-24 PROCEDURE — 1160F RVW MEDS BY RX/DR IN RCRD: CPT | Performed by: INTERNAL MEDICINE

## 2020-03-24 PROCEDURE — G2012 BRIEF CHECK IN BY MD/QHP: HCPCS | Performed by: INTERNAL MEDICINE

## 2020-03-24 RX ORDER — MIRTAZAPINE 7.5 MG/1
7.5 TABLET, FILM COATED ORAL
Qty: 90 TABLET | Refills: 3 | Status: SHIPPED | OUTPATIENT
Start: 2020-03-24 | End: 2021-01-14 | Stop reason: SDUPTHER

## 2020-03-24 NOTE — PROGRESS NOTES
Virtual Regular Visit    Problem List Items Addressed This Visit        Other    Recurrent major depressive disorder, in partial remission ([facility] ) - Primary               Reason for visit is  For depression    Encounter provider Jason Gutierrez MD    Provider located at [address] [place] [address]      Recent Visits  No visits were found meeting these conditions  Showing recent visits within past 7 days and meeting all other requirements     Future Appointments  No visits were found meeting these conditions  Showing future appointments within next 150 days and meeting all other requirements        After connecting through [employer], the patient was identified by name and date of birth  Al Lal was informed that this is a telemedicine visit and that the visit is being conducted through telephone which may not be secure and therefore, might not be HIPAA-compliant  My office door was closed  No one else was in the room  She acknowledged consent and understanding of privacy and security of the video platform  The patient has agreed to participate and understands they can discontinue the visit at any time  Subjective  Al Lal is a 69 y o  female  Who was told by the visiting nurse that she may have depression  The visiting nurse as well as her sister noticed that she has a lot of fatigue for the last few weeks  The patient herself does not think that she is depressed  She has no other complaints         Past Medical History:   Diagnosis Date    Benign essential hypertension     last assessed - [id number]    Chest pain 11/4/2017    Gastroesophageal reflux disease without esophagitis 11/16/2017       Past Surgical History:   Procedure Laterality Date    APPENDECTOMY         Current Outpatient Medications   Medication Sig Dispense Refill    folic acid (FOLVITE) 1 mg tablet Take 1 tablet (1 mg total) by mouth daily 90 tablet 1  furosemide (LASIX) 80 mg tablet Take 1 tablet (80 mg total) by mouth daily 30 tablet 2    lactulose 20 g/30 mL Take 30 mL (20 g total) by mouth 3 (three) times a day 2700 mL 0    magnesium oxide (MAG-OX) 400 mg tablet Take 1 tablet by mouth 2 (two) times a day      magnesium oxide (MAG-OX) 400 mg Take 1 tablet (400 mg total) by mouth 2 (two) times a day 180 tablet 1    metoprolol succinate (TOPROL-XL) 25 mg 24 hr tablet Take 2 tablets (50 mg total) by mouth daily 90 tablet 0    nicotine (NICODERM CQ) 14 mg/24hr TD 24 hr patch Place 1 patch on the skin daily 28 patch 0    pantoprazole (PROTONIX) 40 mg tablet Take 1 tablet (40 mg total) by mouth 2 (two) times a day before meals 60 tablet 0    predniSONE 10 mg tablet 50mg daily 5 days, 40mg daily 7 days, 30mg daily 7 days, 20mg daily 7 days, 10mg daily 7 days  95 tablet 0    thiamine 100 MG tablet Take 1 tablet (100 mg total) by mouth daily 90 tablet 1     No current facility-administered medications for this visit  No Known Allergies    Review of Systems   Constitutional: Positive for fatigue  Negative for chills, diaphoresis and fever  HENT: Negative for congestion, ear discharge, ear pain, hearing loss, postnasal drip, rhinorrhea, sinus pressure, sinus pain, sneezing, sore throat and voice change  Eyes: Negative for pain, discharge, redness and visual disturbance  Respiratory: Negative for cough, chest tightness, shortness of breath and wheezing  Cardiovascular: Negative for chest pain, palpitations and leg swelling  Gastrointestinal: Negative for abdominal distention, abdominal pain, blood in stool, constipation, diarrhea, nausea and vomiting  Endocrine: Negative for cold intolerance, heat intolerance, polydipsia, polyphagia and polyuria  Genitourinary: Negative for dysuria, flank pain, frequency, hematuria and urgency     Musculoskeletal: Negative for arthralgias, back pain, gait problem, joint swelling, myalgias, neck pain and neck stiffness  Skin: Negative for rash  Neurological: Negative for dizziness, tremors, syncope, facial asymmetry, speech difficulty, weakness, light-headedness, numbness and headaches  Hematological: Does not bruise/bleed easily  Psychiatric/Behavioral: Negative for behavioral problems, confusion and sleep disturbance  The patient is not nervous/anxious  depression- the patient herself does not realize or complain of depression but the visiting nurse did want her evaluated  I spoke to her and the patient clearly said that she is not depressed  I would like to try the Remeron at 7 5 mg daily and see if it helps her  I spent   Ten minutes with the patient during this visit

## 2020-04-12 DIAGNOSIS — F10.11 HISTORY OF ALCOHOL ABUSE: ICD-10-CM

## 2020-04-13 RX ORDER — PANTOPRAZOLE SODIUM 40 MG/1
TABLET, DELAYED RELEASE ORAL
Qty: 60 TABLET | Refills: 0 | Status: SHIPPED | OUTPATIENT
Start: 2020-04-13 | End: 2020-05-20 | Stop reason: HOSPADM

## 2020-05-04 DIAGNOSIS — I10 BENIGN ESSENTIAL HYPERTENSION: ICD-10-CM

## 2020-05-04 RX ORDER — METOPROLOL SUCCINATE 25 MG/1
50 TABLET, EXTENDED RELEASE ORAL DAILY
Qty: 90 TABLET | Refills: 3 | Status: ON HOLD | OUTPATIENT
Start: 2020-05-04 | End: 2020-05-20 | Stop reason: SDUPTHER

## 2020-05-06 ENCOUNTER — APPOINTMENT (EMERGENCY)
Dept: CT IMAGING | Facility: HOSPITAL | Age: 75
DRG: 871 | End: 2020-05-06
Payer: MEDICARE

## 2020-05-06 ENCOUNTER — HOSPITAL ENCOUNTER (INPATIENT)
Facility: HOSPITAL | Age: 75
LOS: 14 days | Discharge: HOME HEALTH | DRG: 871 | End: 2020-05-20
Attending: EMERGENCY MEDICINE | Admitting: STUDENT IN AN ORGANIZED HEALTH CARE EDUCATION/TRAINING PROGRAM
Payer: MEDICARE

## 2020-05-06 DIAGNOSIS — E83.39 HYPERPHOSPHATEMIA: ICD-10-CM

## 2020-05-06 DIAGNOSIS — N17.9 AKI (ACUTE KIDNEY INJURY) (HCC): ICD-10-CM

## 2020-05-06 DIAGNOSIS — K70.30 ALCOHOLIC CIRRHOSIS OF LIVER WITHOUT ASCITES (HCC): ICD-10-CM

## 2020-05-06 DIAGNOSIS — F33.41 RECURRENT MAJOR DEPRESSIVE DISORDER, IN PARTIAL REMISSION (HCC): ICD-10-CM

## 2020-05-06 DIAGNOSIS — A41.9 SEPSIS (HCC): Primary | ICD-10-CM

## 2020-05-06 DIAGNOSIS — R41.3 MEMORY DIFFICULTIES: ICD-10-CM

## 2020-05-06 DIAGNOSIS — R73.9 HYPERGLYCEMIA: ICD-10-CM

## 2020-05-06 DIAGNOSIS — I50.33 ACUTE ON CHRONIC DIASTOLIC CHF (CONGESTIVE HEART FAILURE) (HCC): ICD-10-CM

## 2020-05-06 DIAGNOSIS — R00.0 SINUS TACHYCARDIA: ICD-10-CM

## 2020-05-06 DIAGNOSIS — E80.6 HYPERBILIRUBINEMIA: ICD-10-CM

## 2020-05-06 DIAGNOSIS — F10.27 DEMENTIA ASSOCIATED WITH ALCOHOLISM WITHOUT BEHAVIORAL DISTURBANCE (HCC): ICD-10-CM

## 2020-05-06 DIAGNOSIS — I48.0 PAROXYSMAL ATRIAL FIBRILLATION (HCC): ICD-10-CM

## 2020-05-06 DIAGNOSIS — F10.11 HISTORY OF ALCOHOL ABUSE: ICD-10-CM

## 2020-05-06 DIAGNOSIS — R77.8 ELEVATED TROPONIN: ICD-10-CM

## 2020-05-06 DIAGNOSIS — I47.1 PAROXYSMAL SVT (SUPRAVENTRICULAR TACHYCARDIA) (HCC): ICD-10-CM

## 2020-05-06 DIAGNOSIS — A41.9 SEVERE SEPSIS (HCC): ICD-10-CM

## 2020-05-06 DIAGNOSIS — I48.0 PAROXYSMAL A-FIB (HCC): ICD-10-CM

## 2020-05-06 DIAGNOSIS — F17.210 CIGARETTE NICOTINE DEPENDENCE WITHOUT COMPLICATION: ICD-10-CM

## 2020-05-06 DIAGNOSIS — R77.8 TROPONIN LEVEL ELEVATED: ICD-10-CM

## 2020-05-06 DIAGNOSIS — I10 BENIGN ESSENTIAL HYPERTENSION: ICD-10-CM

## 2020-05-06 DIAGNOSIS — J96.01 ACUTE RESPIRATORY FAILURE WITH HYPOXIA (HCC): ICD-10-CM

## 2020-05-06 DIAGNOSIS — R26.9 GAIT DISTURBANCE: ICD-10-CM

## 2020-05-06 DIAGNOSIS — D70.9 NEUTROPENIA, UNSPECIFIED TYPE (HCC): ICD-10-CM

## 2020-05-06 DIAGNOSIS — R65.20 SEVERE SEPSIS (HCC): ICD-10-CM

## 2020-05-06 DIAGNOSIS — D61.818 PANCYTOPENIA (HCC): ICD-10-CM

## 2020-05-06 DIAGNOSIS — D64.9 ANEMIA, UNSPECIFIED TYPE: ICD-10-CM

## 2020-05-06 DIAGNOSIS — G92.8 TOXIC METABOLIC ENCEPHALOPATHY: ICD-10-CM

## 2020-05-06 DIAGNOSIS — E66.9 OBESITY (BMI 30.0-34.9): ICD-10-CM

## 2020-05-06 PROBLEM — N39.0 URINARY TRACT INFECTION WITHOUT HEMATURIA: Status: ACTIVE | Noted: 2020-05-06

## 2020-05-06 PROBLEM — J18.9 PNEUMONIA: Status: ACTIVE | Noted: 2020-05-06

## 2020-05-06 LAB
ABO GROUP BLD: NORMAL
ALBUMIN SERPL BCP-MCNC: 2.9 G/DL (ref 3.5–5)
ALP SERPL-CCNC: 201 U/L (ref 46–116)
ALT SERPL W P-5'-P-CCNC: 24 U/L (ref 12–78)
AMMONIA PLAS-SCNC: 37 UMOL/L (ref 11–35)
ANION GAP SERPL CALCULATED.3IONS-SCNC: 9 MMOL/L (ref 4–13)
ANISOCYTOSIS BLD QL SMEAR: PRESENT
APTT PPP: 39 SECONDS (ref 23–37)
AST SERPL W P-5'-P-CCNC: 53 U/L (ref 5–45)
BACTERIA UR QL AUTO: ABNORMAL /HPF
BASE EX.OXY STD BLDV CALC-SCNC: 86.4 % (ref 60–80)
BASE EXCESS BLDV CALC-SCNC: 3.5 MMOL/L
BASOPHILS # BLD MANUAL: 0 THOUSAND/UL (ref 0–0.1)
BASOPHILS NFR MAR MANUAL: 0 % (ref 0–1)
BILIRUB SERPL-MCNC: 1.7 MG/DL (ref 0.2–1)
BILIRUB UR QL STRIP: NEGATIVE
BLD GP AB SCN SERPL QL: NEGATIVE
BUN SERPL-MCNC: 32 MG/DL (ref 5–25)
CALCIUM SERPL-MCNC: 9 MG/DL (ref 8.3–10.1)
CHLORIDE SERPL-SCNC: 103 MMOL/L (ref 100–108)
CLARITY UR: CLEAR
CO2 SERPL-SCNC: 29 MMOL/L (ref 21–32)
COLOR UR: YELLOW
CREAT SERPL-MCNC: 1.51 MG/DL (ref 0.6–1.3)
EOSINOPHIL # BLD MANUAL: 0.03 THOUSAND/UL (ref 0–0.4)
EOSINOPHIL NFR BLD MANUAL: 1 % (ref 0–6)
ERYTHROCYTE [DISTWIDTH] IN BLOOD BY AUTOMATED COUNT: 25.9 % (ref 11.6–15.1)
ETHANOL SERPL-MCNC: <3 MG/DL (ref 0–3)
GFR SERPL CREATININE-BSD FRML MDRD: 39 ML/MIN/1.73SQ M
GLUCOSE SERPL-MCNC: 100 MG/DL (ref 65–140)
GLUCOSE UR STRIP-MCNC: NEGATIVE MG/DL
HCO3 BLDV-SCNC: 26.7 MMOL/L (ref 24–30)
HCT VFR BLD AUTO: 17.9 % (ref 34.8–46.1)
HGB BLD-MCNC: 5.3 G/DL (ref 11.5–15.4)
HGB UR QL STRIP.AUTO: ABNORMAL
HYALINE CASTS #/AREA URNS LPF: ABNORMAL /LPF
HYPERCHROMIA BLD QL SMEAR: PRESENT
INR PPP: 1.24 (ref 0.84–1.19)
KETONES UR STRIP-MCNC: NEGATIVE MG/DL
LACTATE SERPL-SCNC: 0.8 MMOL/L (ref 0.5–2)
LACTATE SERPL-SCNC: 1.8 MMOL/L (ref 0.5–2)
LACTATE SERPL-SCNC: 2.1 MMOL/L (ref 0.5–2)
LEUKOCYTE ESTERASE UR QL STRIP: ABNORMAL
LYMPHOCYTES # BLD AUTO: 1.29 THOUSAND/UL (ref 0.6–4.47)
LYMPHOCYTES # BLD AUTO: 40 % (ref 14–44)
MCH RBC QN AUTO: 27.3 PG (ref 26.8–34.3)
MCHC RBC AUTO-ENTMCNC: 29.6 G/DL (ref 31.4–37.4)
MCV RBC AUTO: 92 FL (ref 82–98)
MONOCYTES # BLD AUTO: 0.19 THOUSAND/UL (ref 0–1.22)
MONOCYTES NFR BLD: 6 % (ref 4–12)
NEUTROPHILS # BLD MANUAL: 1.67 THOUSAND/UL (ref 1.85–7.62)
NEUTS SEG NFR BLD AUTO: 52 % (ref 43–75)
NITRITE UR QL STRIP: NEGATIVE
NON-SQ EPI CELLS URNS QL MICRO: ABNORMAL /HPF
NRBC BLD AUTO-RTO: 7 /100 WBC (ref 0–2)
NRBC BLD AUTO-RTO: 8 /100 WBCS
O2 CT BLDV-SCNC: 7.5 ML/DL
OTHER STN SPEC: ABNORMAL
OVALOCYTES BLD QL SMEAR: PRESENT
PCO2 BLDV: 33.5 MM HG (ref 42–50)
PH BLDV: 7.52 [PH] (ref 7.3–7.4)
PH UR STRIP.AUTO: 6 [PH]
PLATELET # BLD AUTO: 46 THOUSANDS/UL (ref 149–390)
PLATELET BLD QL SMEAR: ABNORMAL
PO2 BLDV: 65 MM HG (ref 35–45)
POIKILOCYTOSIS BLD QL SMEAR: PRESENT
POLYCHROMASIA BLD QL SMEAR: PRESENT
POTASSIUM SERPL-SCNC: 4.6 MMOL/L (ref 3.5–5.3)
PROCALCITONIN SERPL-MCNC: 0.96 NG/ML
PROT SERPL-MCNC: 6.6 G/DL (ref 6.4–8.2)
PROT UR STRIP-MCNC: ABNORMAL MG/DL
PROTHROMBIN TIME: 15.6 SECONDS (ref 11.6–14.5)
RBC # BLD AUTO: 1.94 MILLION/UL (ref 3.81–5.12)
RBC #/AREA URNS AUTO: ABNORMAL /HPF
RH BLD: POSITIVE
SARS-COV-2 RNA RESP QL NAA+PROBE: NEGATIVE
SCHISTOCYTES BLD QL SMEAR: PRESENT
SODIUM SERPL-SCNC: 141 MMOL/L (ref 136–145)
SP GR UR STRIP.AUTO: 1.01 (ref 1–1.03)
SPECIMEN EXPIRATION DATE: NORMAL
TARGETS BLD QL SMEAR: PRESENT
TOTAL CELLS COUNTED SPEC: 100
TROPONIN I SERPL-MCNC: <0.02 NG/ML
UROBILINOGEN UR QL STRIP.AUTO: 1 E.U./DL
VARIANT LYMPHS # BLD AUTO: 1 %
WBC # BLD AUTO: 3.22 THOUSAND/UL (ref 4.31–10.16)
WBC #/AREA URNS AUTO: ABNORMAL /HPF

## 2020-05-06 PROCEDURE — 93005 ELECTROCARDIOGRAM TRACING: CPT

## 2020-05-06 PROCEDURE — 87635 SARS-COV-2 COVID-19 AMP PRB: CPT | Performed by: EMERGENCY MEDICINE

## 2020-05-06 PROCEDURE — 82805 BLOOD GASES W/O2 SATURATION: CPT | Performed by: EMERGENCY MEDICINE

## 2020-05-06 PROCEDURE — 71260 CT THORAX DX C+: CPT

## 2020-05-06 PROCEDURE — 86850 RBC ANTIBODY SCREEN: CPT | Performed by: EMERGENCY MEDICINE

## 2020-05-06 PROCEDURE — 81001 URINALYSIS AUTO W/SCOPE: CPT | Performed by: EMERGENCY MEDICINE

## 2020-05-06 PROCEDURE — 96375 TX/PRO/DX INJ NEW DRUG ADDON: CPT

## 2020-05-06 PROCEDURE — 36415 COLL VENOUS BLD VENIPUNCTURE: CPT | Performed by: EMERGENCY MEDICINE

## 2020-05-06 PROCEDURE — 82140 ASSAY OF AMMONIA: CPT | Performed by: EMERGENCY MEDICINE

## 2020-05-06 PROCEDURE — 36430 TRANSFUSION BLD/BLD COMPNT: CPT

## 2020-05-06 PROCEDURE — 84484 ASSAY OF TROPONIN QUANT: CPT | Performed by: EMERGENCY MEDICINE

## 2020-05-06 PROCEDURE — 85730 THROMBOPLASTIN TIME PARTIAL: CPT | Performed by: EMERGENCY MEDICINE

## 2020-05-06 PROCEDURE — 86900 BLOOD TYPING SEROLOGIC ABO: CPT | Performed by: EMERGENCY MEDICINE

## 2020-05-06 PROCEDURE — 87040 BLOOD CULTURE FOR BACTERIA: CPT | Performed by: EMERGENCY MEDICINE

## 2020-05-06 PROCEDURE — 84145 PROCALCITONIN (PCT): CPT | Performed by: EMERGENCY MEDICINE

## 2020-05-06 PROCEDURE — 96365 THER/PROPH/DIAG IV INF INIT: CPT

## 2020-05-06 PROCEDURE — 80053 COMPREHEN METABOLIC PANEL: CPT | Performed by: EMERGENCY MEDICINE

## 2020-05-06 PROCEDURE — 99285 EMERGENCY DEPT VISIT HI MDM: CPT | Performed by: EMERGENCY MEDICINE

## 2020-05-06 PROCEDURE — 74177 CT ABD & PELVIS W/CONTRAST: CPT

## 2020-05-06 PROCEDURE — 87077 CULTURE AEROBIC IDENTIFY: CPT | Performed by: NURSE PRACTITIONER

## 2020-05-06 PROCEDURE — 85610 PROTHROMBIN TIME: CPT | Performed by: EMERGENCY MEDICINE

## 2020-05-06 PROCEDURE — 96361 HYDRATE IV INFUSION ADD-ON: CPT

## 2020-05-06 PROCEDURE — 86923 COMPATIBILITY TEST ELECTRIC: CPT

## 2020-05-06 PROCEDURE — 85027 COMPLETE CBC AUTOMATED: CPT | Performed by: EMERGENCY MEDICINE

## 2020-05-06 PROCEDURE — 86920 COMPATIBILITY TEST SPIN: CPT

## 2020-05-06 PROCEDURE — 30233N1 TRANSFUSION OF NONAUTOLOGOUS RED BLOOD CELLS INTO PERIPHERAL VEIN, PERCUTANEOUS APPROACH: ICD-10-PCS | Performed by: EMERGENCY MEDICINE

## 2020-05-06 PROCEDURE — P9016 RBC LEUKOCYTES REDUCED: HCPCS

## 2020-05-06 PROCEDURE — 96366 THER/PROPH/DIAG IV INF ADDON: CPT

## 2020-05-06 PROCEDURE — 99285 EMERGENCY DEPT VISIT HI MDM: CPT

## 2020-05-06 PROCEDURE — 86901 BLOOD TYPING SEROLOGIC RH(D): CPT | Performed by: EMERGENCY MEDICINE

## 2020-05-06 PROCEDURE — 87186 SC STD MICRODIL/AGAR DIL: CPT | Performed by: NURSE PRACTITIONER

## 2020-05-06 PROCEDURE — 87086 URINE CULTURE/COLONY COUNT: CPT | Performed by: NURSE PRACTITIONER

## 2020-05-06 PROCEDURE — 83605 ASSAY OF LACTIC ACID: CPT | Performed by: NURSE PRACTITIONER

## 2020-05-06 PROCEDURE — 85007 BL SMEAR W/DIFF WBC COUNT: CPT | Performed by: EMERGENCY MEDICINE

## 2020-05-06 PROCEDURE — 70450 CT HEAD/BRAIN W/O DYE: CPT

## 2020-05-06 PROCEDURE — 83605 ASSAY OF LACTIC ACID: CPT | Performed by: EMERGENCY MEDICINE

## 2020-05-06 PROCEDURE — 99223 1ST HOSP IP/OBS HIGH 75: CPT | Performed by: NURSE PRACTITIONER

## 2020-05-06 PROCEDURE — 80320 DRUG SCREEN QUANTALCOHOLS: CPT | Performed by: EMERGENCY MEDICINE

## 2020-05-06 RX ORDER — THIAMINE MONONITRATE (VIT B1) 100 MG
100 TABLET ORAL DAILY
Status: DISCONTINUED | OUTPATIENT
Start: 2020-05-07 | End: 2020-05-20 | Stop reason: HOSPADM

## 2020-05-06 RX ORDER — METOPROLOL SUCCINATE 50 MG/1
50 TABLET, EXTENDED RELEASE ORAL DAILY
Status: DISCONTINUED | OUTPATIENT
Start: 2020-05-07 | End: 2020-05-12

## 2020-05-06 RX ORDER — ONDANSETRON 2 MG/ML
4 INJECTION INTRAMUSCULAR; INTRAVENOUS EVERY 6 HOURS PRN
Status: DISCONTINUED | OUTPATIENT
Start: 2020-05-06 | End: 2020-05-20 | Stop reason: HOSPADM

## 2020-05-06 RX ORDER — ACETAMINOPHEN 325 MG/1
650 TABLET ORAL EVERY 6 HOURS PRN
Status: DISCONTINUED | OUTPATIENT
Start: 2020-05-06 | End: 2020-05-14

## 2020-05-06 RX ORDER — MIRTAZAPINE 15 MG/1
7.5 TABLET, FILM COATED ORAL
Status: DISCONTINUED | OUTPATIENT
Start: 2020-05-06 | End: 2020-05-20 | Stop reason: HOSPADM

## 2020-05-06 RX ORDER — HEPARIN SODIUM 5000 [USP'U]/ML
5000 INJECTION, SOLUTION INTRAVENOUS; SUBCUTANEOUS EVERY 8 HOURS SCHEDULED
Status: DISCONTINUED | OUTPATIENT
Start: 2020-05-06 | End: 2020-05-09

## 2020-05-06 RX ORDER — FUROSEMIDE 80 MG
80 TABLET ORAL DAILY
Status: DISCONTINUED | OUTPATIENT
Start: 2020-05-07 | End: 2020-05-14

## 2020-05-06 RX ORDER — LACTULOSE 20 G/30ML
20 SOLUTION ORAL 3 TIMES DAILY
Status: DISCONTINUED | OUTPATIENT
Start: 2020-05-06 | End: 2020-05-07

## 2020-05-06 RX ORDER — MAGNESIUM HYDROXIDE/ALUMINUM HYDROXICE/SIMETHICONE 120; 1200; 1200 MG/30ML; MG/30ML; MG/30ML
30 SUSPENSION ORAL EVERY 6 HOURS PRN
Status: DISCONTINUED | OUTPATIENT
Start: 2020-05-06 | End: 2020-05-20 | Stop reason: HOSPADM

## 2020-05-06 RX ORDER — AZITHROMYCIN 500 MG/1
500 TABLET, FILM COATED ORAL EVERY 24 HOURS
Status: DISCONTINUED | OUTPATIENT
Start: 2020-05-06 | End: 2020-05-13

## 2020-05-06 RX ORDER — FOLIC ACID 1 MG/1
1 TABLET ORAL DAILY
Status: DISCONTINUED | OUTPATIENT
Start: 2020-05-07 | End: 2020-05-20 | Stop reason: HOSPADM

## 2020-05-06 RX ORDER — LORAZEPAM 2 MG/ML
1 INJECTION INTRAMUSCULAR ONCE
Status: COMPLETED | OUTPATIENT
Start: 2020-05-06 | End: 2020-05-06

## 2020-05-06 RX ORDER — NICOTINE 21 MG/24HR
1 PATCH, TRANSDERMAL 24 HOURS TRANSDERMAL DAILY
Status: DISCONTINUED | OUTPATIENT
Start: 2020-05-07 | End: 2020-05-20 | Stop reason: HOSPADM

## 2020-05-06 RX ORDER — PANTOPRAZOLE SODIUM 40 MG/1
40 TABLET, DELAYED RELEASE ORAL
Status: DISCONTINUED | OUTPATIENT
Start: 2020-05-07 | End: 2020-05-20 | Stop reason: HOSPADM

## 2020-05-06 RX ADMIN — CEFEPIME HYDROCHLORIDE 2000 MG: 2 INJECTION, POWDER, FOR SOLUTION INTRAVENOUS at 18:13

## 2020-05-06 RX ADMIN — SODIUM CHLORIDE 1000 ML: 0.9 INJECTION, SOLUTION INTRAVENOUS at 17:40

## 2020-05-06 RX ADMIN — IOHEXOL 100 ML: 350 INJECTION, SOLUTION INTRAVENOUS at 19:06

## 2020-05-06 RX ADMIN — VANCOMYCIN HYDROCHLORIDE 1250 MG: 5 INJECTION, POWDER, LYOPHILIZED, FOR SOLUTION INTRAVENOUS at 18:16

## 2020-05-06 RX ADMIN — LORAZEPAM 1 MG: 2 INJECTION INTRAMUSCULAR; INTRAVENOUS at 19:26

## 2020-05-07 PROBLEM — R65.20 SEVERE SEPSIS (HCC): Status: ACTIVE | Noted: 2020-05-06

## 2020-05-07 PROBLEM — R13.10 DYSPHAGIA: Status: ACTIVE | Noted: 2020-05-07

## 2020-05-07 LAB
ABO GROUP BLD BPU: NORMAL
ABO GROUP BLD BPU: NORMAL
AMMONIA PLAS-SCNC: 40 UMOL/L (ref 11–35)
ANION GAP SERPL CALCULATED.3IONS-SCNC: 12 MMOL/L (ref 4–13)
ATRIAL RATE: 103 BPM
BPU ID: NORMAL
BPU ID: NORMAL
BUN SERPL-MCNC: 29 MG/DL (ref 5–25)
CALCIUM SERPL-MCNC: 9.1 MG/DL (ref 8.3–10.1)
CHLORIDE SERPL-SCNC: 104 MMOL/L (ref 100–108)
CO2 SERPL-SCNC: 25 MMOL/L (ref 21–32)
CREAT SERPL-MCNC: 1.31 MG/DL (ref 0.6–1.3)
CROSSMATCH: NORMAL
CROSSMATCH: NORMAL
ERYTHROCYTE [DISTWIDTH] IN BLOOD BY AUTOMATED COUNT: 20.9 % (ref 11.6–15.1)
GFR SERPL CREATININE-BSD FRML MDRD: 47 ML/MIN/1.73SQ M
GLUCOSE SERPL-MCNC: 109 MG/DL (ref 65–140)
GLUCOSE SERPL-MCNC: 128 MG/DL (ref 65–140)
GLUCOSE SERPL-MCNC: 159 MG/DL (ref 65–140)
HCT VFR BLD AUTO: 28 % (ref 34.8–46.1)
HGB BLD-MCNC: 8.7 G/DL (ref 11.5–15.4)
L PNEUMO1 AG UR QL IA.RAPID: NEGATIVE
MAGNESIUM SERPL-MCNC: 2.2 MG/DL (ref 1.6–2.6)
MCH RBC QN AUTO: 28.7 PG (ref 26.8–34.3)
MCHC RBC AUTO-ENTMCNC: 31.1 G/DL (ref 31.4–37.4)
MCV RBC AUTO: 92 FL (ref 82–98)
P AXIS: 86 DEGREES
PLATELET # BLD AUTO: 40 THOUSANDS/UL (ref 149–390)
POTASSIUM SERPL-SCNC: 4.4 MMOL/L (ref 3.5–5.3)
PR INTERVAL: 184 MS
PROCALCITONIN SERPL-MCNC: 0.78 NG/ML
PROCALCITONIN SERPL-MCNC: 0.83 NG/ML
PROCALCITONIN SERPL-MCNC: 0.9 NG/ML
QRS AXIS: 108 DEGREES
QRSD INTERVAL: 74 MS
QT INTERVAL: 366 MS
QTC INTERVAL: 479 MS
RBC # BLD AUTO: 3.03 MILLION/UL (ref 3.81–5.12)
S PNEUM AG UR QL: NEGATIVE
SODIUM SERPL-SCNC: 141 MMOL/L (ref 136–145)
T WAVE AXIS: 76 DEGREES
UNIT DISPENSE STATUS: NORMAL
UNIT DISPENSE STATUS: NORMAL
UNIT PRODUCT CODE: NORMAL
UNIT PRODUCT CODE: NORMAL
UNIT RH: NORMAL
UNIT RH: NORMAL
VENTRICULAR RATE: 103 BPM
WBC # BLD AUTO: 3.38 THOUSAND/UL (ref 4.31–10.16)

## 2020-05-07 PROCEDURE — 87449 NOS EACH ORGANISM AG IA: CPT | Performed by: NURSE PRACTITIONER

## 2020-05-07 PROCEDURE — 84145 PROCALCITONIN (PCT): CPT | Performed by: EMERGENCY MEDICINE

## 2020-05-07 PROCEDURE — 93010 ELECTROCARDIOGRAM REPORT: CPT | Performed by: INTERNAL MEDICINE

## 2020-05-07 PROCEDURE — 84145 PROCALCITONIN (PCT): CPT | Performed by: NURSE PRACTITIONER

## 2020-05-07 PROCEDURE — 82140 ASSAY OF AMMONIA: CPT | Performed by: INTERNAL MEDICINE

## 2020-05-07 PROCEDURE — 99233 SBSQ HOSP IP/OBS HIGH 50: CPT | Performed by: INTERNAL MEDICINE

## 2020-05-07 PROCEDURE — 82272 OCCULT BLD FECES 1-3 TESTS: CPT | Performed by: INTERNAL MEDICINE

## 2020-05-07 PROCEDURE — 85027 COMPLETE CBC AUTOMATED: CPT | Performed by: NURSE PRACTITIONER

## 2020-05-07 PROCEDURE — 83735 ASSAY OF MAGNESIUM: CPT | Performed by: NURSE PRACTITIONER

## 2020-05-07 PROCEDURE — 82948 REAGENT STRIP/BLOOD GLUCOSE: CPT

## 2020-05-07 PROCEDURE — 80048 BASIC METABOLIC PNL TOTAL CA: CPT | Performed by: NURSE PRACTITIONER

## 2020-05-07 PROCEDURE — P9016 RBC LEUKOCYTES REDUCED: HCPCS

## 2020-05-07 PROCEDURE — 97163 PT EVAL HIGH COMPLEX 45 MIN: CPT

## 2020-05-07 RX ORDER — METRONIDAZOLE 500 MG/1
500 TABLET ORAL EVERY 8 HOURS SCHEDULED
Status: DISCONTINUED | OUTPATIENT
Start: 2020-05-07 | End: 2020-05-13

## 2020-05-07 RX ORDER — LACTULOSE 20 G/30ML
30 SOLUTION ORAL 3 TIMES DAILY
Status: DISCONTINUED | OUTPATIENT
Start: 2020-05-08 | End: 2020-05-10

## 2020-05-07 RX ADMIN — FUROSEMIDE 80 MG: 80 TABLET ORAL at 08:41

## 2020-05-07 RX ADMIN — PANTOPRAZOLE SODIUM 40 MG: 40 TABLET, DELAYED RELEASE ORAL at 05:48

## 2020-05-07 RX ADMIN — THIAMINE HCL TAB 100 MG 100 MG: 100 TAB at 08:41

## 2020-05-07 RX ADMIN — Medication 400 MG: at 18:14

## 2020-05-07 RX ADMIN — FOLIC ACID 1 MG: 1 TABLET ORAL at 08:41

## 2020-05-07 RX ADMIN — CEFTRIAXONE SODIUM 1000 MG: 10 INJECTION, POWDER, FOR SOLUTION INTRAVENOUS at 08:42

## 2020-05-07 RX ADMIN — METOPROLOL SUCCINATE 50 MG: 50 TABLET, EXTENDED RELEASE ORAL at 08:41

## 2020-05-07 RX ADMIN — NICOTINE 1 PATCH: 21 PATCH TRANSDERMAL at 08:42

## 2020-05-07 RX ADMIN — LACTULOSE 20 G: 10 SOLUTION ORAL at 22:46

## 2020-05-07 RX ADMIN — Medication 400 MG: at 08:41

## 2020-05-07 RX ADMIN — LACTULOSE 20 G: 10 SOLUTION ORAL at 08:42

## 2020-05-07 RX ADMIN — AZITHROMYCIN 500 MG: 500 TABLET, FILM COATED ORAL at 22:46

## 2020-05-07 RX ADMIN — HEPARIN SODIUM 5000 UNITS: 5000 INJECTION INTRAVENOUS; SUBCUTANEOUS at 05:48

## 2020-05-07 RX ADMIN — METRONIDAZOLE 500 MG: 500 INJECTION, SOLUTION INTRAVENOUS at 12:29

## 2020-05-07 RX ADMIN — METRONIDAZOLE 500 MG: 500 TABLET ORAL at 22:46

## 2020-05-07 RX ADMIN — ACETAMINOPHEN 650 MG: 325 TABLET ORAL at 08:41

## 2020-05-07 RX ADMIN — MIRTAZAPINE 7.5 MG: 15 TABLET, FILM COATED ORAL at 22:46

## 2020-05-07 RX ADMIN — HEPARIN SODIUM 5000 UNITS: 5000 INJECTION INTRAVENOUS; SUBCUTANEOUS at 22:47

## 2020-05-08 ENCOUNTER — APPOINTMENT (INPATIENT)
Dept: ULTRASOUND IMAGING | Facility: HOSPITAL | Age: 75
DRG: 871 | End: 2020-05-08
Payer: MEDICARE

## 2020-05-08 LAB
ALBUMIN SERPL BCP-MCNC: 3.1 G/DL (ref 3.5–5)
ALP SERPL-CCNC: 208 U/L (ref 46–116)
ALT SERPL W P-5'-P-CCNC: 23 U/L (ref 12–78)
AMMONIA PLAS-SCNC: 25 UMOL/L (ref 11–35)
ANION GAP SERPL CALCULATED.3IONS-SCNC: 15 MMOL/L (ref 4–13)
AST SERPL W P-5'-P-CCNC: 55 U/L (ref 5–45)
BILIRUB SERPL-MCNC: 2.6 MG/DL (ref 0.2–1)
BUN SERPL-MCNC: 21 MG/DL (ref 5–25)
CALCIUM SERPL-MCNC: 9.5 MG/DL (ref 8.3–10.1)
CHLORIDE SERPL-SCNC: 108 MMOL/L (ref 100–108)
CO2 SERPL-SCNC: 25 MMOL/L (ref 21–32)
CREAT SERPL-MCNC: 1.08 MG/DL (ref 0.6–1.3)
ERYTHROCYTE [DISTWIDTH] IN BLOOD BY AUTOMATED COUNT: 21.6 % (ref 11.6–15.1)
GFR SERPL CREATININE-BSD FRML MDRD: 59 ML/MIN/1.73SQ M
GLUCOSE SERPL-MCNC: 136 MG/DL (ref 65–140)
HCT VFR BLD AUTO: 26.5 % (ref 34.8–46.1)
HEMOCCULT STL QL: NEGATIVE
HGB BLD-MCNC: 8.2 G/DL (ref 11.5–15.4)
MCH RBC QN AUTO: 28.2 PG (ref 26.8–34.3)
MCHC RBC AUTO-ENTMCNC: 30.9 G/DL (ref 31.4–37.4)
MCV RBC AUTO: 91 FL (ref 82–98)
NT-PROBNP SERPL-MCNC: 9407 PG/ML
PLATELET # BLD AUTO: 45 THOUSANDS/UL (ref 149–390)
POTASSIUM SERPL-SCNC: 3.8 MMOL/L (ref 3.5–5.3)
PROCALCITONIN SERPL-MCNC: 1.31 NG/ML
PROCALCITONIN SERPL-MCNC: 1.33 NG/ML
PROT SERPL-MCNC: 6.9 G/DL (ref 6.4–8.2)
RBC # BLD AUTO: 2.91 MILLION/UL (ref 3.81–5.12)
SODIUM SERPL-SCNC: 148 MMOL/L (ref 136–145)
TROPONIN I SERPL-MCNC: 1.6 NG/ML
TROPONIN I SERPL-MCNC: 1.69 NG/ML
TROPONIN I SERPL-MCNC: 1.72 NG/ML
WBC # BLD AUTO: 4.92 THOUSAND/UL (ref 4.31–10.16)

## 2020-05-08 PROCEDURE — 80053 COMPREHEN METABOLIC PANEL: CPT | Performed by: INTERNAL MEDICINE

## 2020-05-08 PROCEDURE — 82140 ASSAY OF AMMONIA: CPT | Performed by: INTERNAL MEDICINE

## 2020-05-08 PROCEDURE — 84484 ASSAY OF TROPONIN QUANT: CPT | Performed by: INTERNAL MEDICINE

## 2020-05-08 PROCEDURE — 83880 ASSAY OF NATRIURETIC PEPTIDE: CPT | Performed by: INTERNAL MEDICINE

## 2020-05-08 PROCEDURE — 93005 ELECTROCARDIOGRAM TRACING: CPT

## 2020-05-08 PROCEDURE — 76705 ECHO EXAM OF ABDOMEN: CPT

## 2020-05-08 PROCEDURE — 99223 1ST HOSP IP/OBS HIGH 75: CPT | Performed by: INTERNAL MEDICINE

## 2020-05-08 PROCEDURE — 84145 PROCALCITONIN (PCT): CPT | Performed by: INTERNAL MEDICINE

## 2020-05-08 PROCEDURE — 85027 COMPLETE CBC AUTOMATED: CPT | Performed by: INTERNAL MEDICINE

## 2020-05-08 PROCEDURE — 99233 SBSQ HOSP IP/OBS HIGH 50: CPT | Performed by: INTERNAL MEDICINE

## 2020-05-08 PROCEDURE — 92610 EVALUATE SWALLOWING FUNCTION: CPT

## 2020-05-08 RX ADMIN — ACETAMINOPHEN 650 MG: 325 TABLET ORAL at 18:07

## 2020-05-08 RX ADMIN — NICOTINE 1 PATCH: 21 PATCH TRANSDERMAL at 08:12

## 2020-05-08 RX ADMIN — Medication 400 MG: at 17:57

## 2020-05-08 RX ADMIN — HEPARIN SODIUM 5000 UNITS: 5000 INJECTION INTRAVENOUS; SUBCUTANEOUS at 21:14

## 2020-05-08 RX ADMIN — METRONIDAZOLE 500 MG: 500 TABLET ORAL at 07:34

## 2020-05-08 RX ADMIN — METRONIDAZOLE 500 MG: 500 TABLET ORAL at 14:41

## 2020-05-08 RX ADMIN — METOPROLOL SUCCINATE 50 MG: 50 TABLET, EXTENDED RELEASE ORAL at 08:12

## 2020-05-08 RX ADMIN — PANTOPRAZOLE SODIUM 40 MG: 40 TABLET, DELAYED RELEASE ORAL at 07:34

## 2020-05-08 RX ADMIN — FUROSEMIDE 80 MG: 80 TABLET ORAL at 08:11

## 2020-05-08 RX ADMIN — Medication 400 MG: at 08:12

## 2020-05-08 RX ADMIN — AZITHROMYCIN 500 MG: 500 TABLET, FILM COATED ORAL at 21:27

## 2020-05-08 RX ADMIN — CEFTRIAXONE SODIUM 1000 MG: 10 INJECTION, POWDER, FOR SOLUTION INTRAVENOUS at 08:07

## 2020-05-08 RX ADMIN — LACTULOSE 30 G: 10 SOLUTION ORAL at 21:13

## 2020-05-08 RX ADMIN — HEPARIN SODIUM 5000 UNITS: 5000 INJECTION INTRAVENOUS; SUBCUTANEOUS at 14:41

## 2020-05-08 RX ADMIN — LACTULOSE 30 G: 10 SOLUTION ORAL at 17:57

## 2020-05-08 RX ADMIN — FOLIC ACID 1 MG: 1 TABLET ORAL at 08:12

## 2020-05-08 RX ADMIN — THIAMINE HCL TAB 100 MG 100 MG: 100 TAB at 08:12

## 2020-05-08 RX ADMIN — LACTULOSE 30 G: 10 SOLUTION ORAL at 08:12

## 2020-05-08 RX ADMIN — MIRTAZAPINE 7.5 MG: 15 TABLET, FILM COATED ORAL at 21:14

## 2020-05-08 RX ADMIN — METRONIDAZOLE 500 MG: 500 TABLET ORAL at 21:14

## 2020-05-08 RX ADMIN — HEPARIN SODIUM 5000 UNITS: 5000 INJECTION INTRAVENOUS; SUBCUTANEOUS at 07:34

## 2020-05-09 PROBLEM — I48.0 PAROXYSMAL A-FIB (HCC): Status: ACTIVE | Noted: 2020-05-09

## 2020-05-09 LAB
ANION GAP SERPL CALCULATED.3IONS-SCNC: 11 MMOL/L (ref 4–13)
ATRIAL RATE: 0 BPM
ATRIAL RATE: 119 BPM
ATRIAL RATE: 136 BPM
BACTERIA UR CULT: ABNORMAL
BUN SERPL-MCNC: 16 MG/DL (ref 5–25)
CALCIUM SERPL-MCNC: 9.2 MG/DL (ref 8.3–10.1)
CHLORIDE SERPL-SCNC: 109 MMOL/L (ref 100–108)
CO2 SERPL-SCNC: 28 MMOL/L (ref 21–32)
CREAT SERPL-MCNC: 0.93 MG/DL (ref 0.6–1.3)
ERYTHROCYTE [DISTWIDTH] IN BLOOD BY AUTOMATED COUNT: 22.2 % (ref 11.6–15.1)
GFR SERPL CREATININE-BSD FRML MDRD: 71 ML/MIN/1.73SQ M
GLUCOSE SERPL-MCNC: 96 MG/DL (ref 65–140)
HCT VFR BLD AUTO: 28.5 % (ref 34.8–46.1)
HGB BLD-MCNC: 8.6 G/DL (ref 11.5–15.4)
MAGNESIUM SERPL-MCNC: 1.7 MG/DL (ref 1.6–2.6)
MCH RBC QN AUTO: 28.1 PG (ref 26.8–34.3)
MCHC RBC AUTO-ENTMCNC: 30.2 G/DL (ref 31.4–37.4)
MCV RBC AUTO: 93 FL (ref 82–98)
PLATELET # BLD AUTO: 44 THOUSANDS/UL (ref 149–390)
POTASSIUM SERPL-SCNC: 3.2 MMOL/L (ref 3.5–5.3)
QRS AXIS: 0 DEGREES
QRS AXIS: 77 DEGREES
QRS AXIS: 79 DEGREES
QRSD INTERVAL: 0 MS
QRSD INTERVAL: 72 MS
QRSD INTERVAL: 76 MS
QT INTERVAL: 0 MS
QT INTERVAL: 344 MS
QT INTERVAL: 370 MS
QTC INTERVAL: 0 MS
QTC INTERVAL: 488 MS
QTC INTERVAL: 531 MS
RBC # BLD AUTO: 3.06 MILLION/UL (ref 3.81–5.12)
SODIUM SERPL-SCNC: 148 MMOL/L (ref 136–145)
T WAVE AXIS: 0 DEGREES
T WAVE AXIS: 71 DEGREES
T WAVE AXIS: 75 DEGREES
VENTRICULAR RATE: 0 BPM
VENTRICULAR RATE: 121 BPM
VENTRICULAR RATE: 124 BPM
WBC # BLD AUTO: 3.34 THOUSAND/UL (ref 4.31–10.16)

## 2020-05-09 PROCEDURE — 83735 ASSAY OF MAGNESIUM: CPT | Performed by: INTERNAL MEDICINE

## 2020-05-09 PROCEDURE — 93010 ELECTROCARDIOGRAM REPORT: CPT | Performed by: INTERNAL MEDICINE

## 2020-05-09 PROCEDURE — 80048 BASIC METABOLIC PNL TOTAL CA: CPT | Performed by: INTERNAL MEDICINE

## 2020-05-09 PROCEDURE — 85027 COMPLETE CBC AUTOMATED: CPT | Performed by: INTERNAL MEDICINE

## 2020-05-09 PROCEDURE — 99232 SBSQ HOSP IP/OBS MODERATE 35: CPT | Performed by: INTERNAL MEDICINE

## 2020-05-09 RX ORDER — METOPROLOL TARTRATE 5 MG/5ML
2.5 INJECTION INTRAVENOUS EVERY 6 HOURS PRN
Status: DISCONTINUED | OUTPATIENT
Start: 2020-05-09 | End: 2020-05-20 | Stop reason: HOSPADM

## 2020-05-09 RX ORDER — DEXTROSE AND SODIUM CHLORIDE 5; .2 G/100ML; G/100ML
75 INJECTION, SOLUTION INTRAVENOUS CONTINUOUS
Status: DISCONTINUED | OUTPATIENT
Start: 2020-05-09 | End: 2020-05-13

## 2020-05-09 RX ORDER — POTASSIUM CHLORIDE 20 MEQ/1
40 TABLET, EXTENDED RELEASE ORAL ONCE
Status: COMPLETED | OUTPATIENT
Start: 2020-05-09 | End: 2020-05-09

## 2020-05-09 RX ADMIN — NICOTINE 1 PATCH: 21 PATCH TRANSDERMAL at 08:52

## 2020-05-09 RX ADMIN — METRONIDAZOLE 500 MG: 500 TABLET ORAL at 21:49

## 2020-05-09 RX ADMIN — METRONIDAZOLE 500 MG: 500 TABLET ORAL at 13:36

## 2020-05-09 RX ADMIN — CEFTRIAXONE SODIUM 1000 MG: 10 INJECTION, POWDER, FOR SOLUTION INTRAVENOUS at 08:56

## 2020-05-09 RX ADMIN — POTASSIUM CHLORIDE 40 MEQ: 1500 TABLET, EXTENDED RELEASE ORAL at 11:00

## 2020-05-09 RX ADMIN — LACTULOSE 30 G: 10 SOLUTION ORAL at 08:53

## 2020-05-09 RX ADMIN — METOPROLOL TARTRATE 2.5 MG: 5 INJECTION INTRAVENOUS at 15:59

## 2020-05-09 RX ADMIN — FOLIC ACID 1 MG: 1 TABLET ORAL at 08:53

## 2020-05-09 RX ADMIN — Medication 400 MG: at 08:53

## 2020-05-09 RX ADMIN — DEXTROSE AND SODIUM CHLORIDE 50 ML/HR: 5; .2 INJECTION, SOLUTION INTRAVENOUS at 12:10

## 2020-05-09 RX ADMIN — LACTULOSE 30 G: 10 SOLUTION ORAL at 21:50

## 2020-05-09 RX ADMIN — METRONIDAZOLE 500 MG: 500 TABLET ORAL at 05:46

## 2020-05-09 RX ADMIN — AZITHROMYCIN 500 MG: 500 TABLET, FILM COATED ORAL at 21:49

## 2020-05-09 RX ADMIN — METOPROLOL SUCCINATE 50 MG: 50 TABLET, EXTENDED RELEASE ORAL at 08:53

## 2020-05-09 RX ADMIN — THIAMINE HCL TAB 100 MG 100 MG: 100 TAB at 08:54

## 2020-05-09 RX ADMIN — MIRTAZAPINE 7.5 MG: 15 TABLET, FILM COATED ORAL at 21:50

## 2020-05-09 RX ADMIN — LACTULOSE 30 G: 10 SOLUTION ORAL at 16:49

## 2020-05-09 RX ADMIN — METOPROLOL TARTRATE 2.5 MG: 5 INJECTION INTRAVENOUS at 07:19

## 2020-05-09 RX ADMIN — PANTOPRAZOLE SODIUM 40 MG: 40 TABLET, DELAYED RELEASE ORAL at 05:46

## 2020-05-09 RX ADMIN — Medication 400 MG: at 17:00

## 2020-05-09 RX ADMIN — FUROSEMIDE 80 MG: 80 TABLET ORAL at 08:53

## 2020-05-10 ENCOUNTER — APPOINTMENT (INPATIENT)
Dept: RADIOLOGY | Facility: HOSPITAL | Age: 75
DRG: 871 | End: 2020-05-10
Payer: MEDICARE

## 2020-05-10 LAB
ANION GAP SERPL CALCULATED.3IONS-SCNC: 11 MMOL/L (ref 4–13)
BUN SERPL-MCNC: 18 MG/DL (ref 5–25)
CALCIUM SERPL-MCNC: 9.1 MG/DL (ref 8.3–10.1)
CHLORIDE SERPL-SCNC: 110 MMOL/L (ref 100–108)
CO2 SERPL-SCNC: 27 MMOL/L (ref 21–32)
CREAT SERPL-MCNC: 1.05 MG/DL (ref 0.6–1.3)
ERYTHROCYTE [DISTWIDTH] IN BLOOD BY AUTOMATED COUNT: 22.5 % (ref 11.6–15.1)
GFR SERPL CREATININE-BSD FRML MDRD: 61 ML/MIN/1.73SQ M
GLUCOSE SERPL-MCNC: 131 MG/DL (ref 65–140)
HCT VFR BLD AUTO: 29.1 % (ref 34.8–46.1)
HGB BLD-MCNC: 8.6 G/DL (ref 11.5–15.4)
MCH RBC QN AUTO: 27.7 PG (ref 26.8–34.3)
MCHC RBC AUTO-ENTMCNC: 29.6 G/DL (ref 31.4–37.4)
MCV RBC AUTO: 94 FL (ref 82–98)
PLATELET # BLD AUTO: 45 THOUSANDS/UL (ref 149–390)
POTASSIUM SERPL-SCNC: 3.4 MMOL/L (ref 3.5–5.3)
RBC # BLD AUTO: 3.1 MILLION/UL (ref 3.81–5.12)
SODIUM SERPL-SCNC: 148 MMOL/L (ref 136–145)
WBC # BLD AUTO: 3.78 THOUSAND/UL (ref 4.31–10.16)

## 2020-05-10 PROCEDURE — 71045 X-RAY EXAM CHEST 1 VIEW: CPT

## 2020-05-10 PROCEDURE — 85027 COMPLETE CBC AUTOMATED: CPT | Performed by: INTERNAL MEDICINE

## 2020-05-10 PROCEDURE — 99232 SBSQ HOSP IP/OBS MODERATE 35: CPT | Performed by: INTERNAL MEDICINE

## 2020-05-10 PROCEDURE — 80048 BASIC METABOLIC PNL TOTAL CA: CPT | Performed by: INTERNAL MEDICINE

## 2020-05-10 RX ORDER — LACTULOSE 20 G/30ML
20 SOLUTION ORAL 2 TIMES DAILY
Status: DISCONTINUED | OUTPATIENT
Start: 2020-05-10 | End: 2020-05-13

## 2020-05-10 RX ORDER — POTASSIUM CHLORIDE 20 MEQ/1
40 TABLET, EXTENDED RELEASE ORAL DAILY
Status: DISCONTINUED | OUTPATIENT
Start: 2020-05-10 | End: 2020-05-11

## 2020-05-10 RX ADMIN — AZITHROMYCIN 500 MG: 500 TABLET, FILM COATED ORAL at 21:31

## 2020-05-10 RX ADMIN — METRONIDAZOLE 500 MG: 500 TABLET ORAL at 21:31

## 2020-05-10 RX ADMIN — FOLIC ACID 1 MG: 1 TABLET ORAL at 08:12

## 2020-05-10 RX ADMIN — METRONIDAZOLE 500 MG: 500 TABLET ORAL at 05:51

## 2020-05-10 RX ADMIN — MIRTAZAPINE 7.5 MG: 15 TABLET, FILM COATED ORAL at 21:31

## 2020-05-10 RX ADMIN — PANTOPRAZOLE SODIUM 40 MG: 40 TABLET, DELAYED RELEASE ORAL at 05:51

## 2020-05-10 RX ADMIN — METOPROLOL SUCCINATE 50 MG: 50 TABLET, EXTENDED RELEASE ORAL at 08:12

## 2020-05-10 RX ADMIN — Medication 400 MG: at 08:12

## 2020-05-10 RX ADMIN — THIAMINE HCL TAB 100 MG 100 MG: 100 TAB at 08:12

## 2020-05-10 RX ADMIN — Medication 400 MG: at 17:16

## 2020-05-10 RX ADMIN — FUROSEMIDE 80 MG: 80 TABLET ORAL at 08:12

## 2020-05-10 RX ADMIN — ACETAMINOPHEN 650 MG: 325 TABLET ORAL at 21:31

## 2020-05-10 RX ADMIN — POTASSIUM CHLORIDE 40 MEQ: 1500 TABLET, EXTENDED RELEASE ORAL at 08:12

## 2020-05-10 RX ADMIN — LACTULOSE 30 G: 10 SOLUTION ORAL at 08:12

## 2020-05-10 RX ADMIN — NICOTINE 1 PATCH: 21 PATCH TRANSDERMAL at 08:13

## 2020-05-10 RX ADMIN — CEFTRIAXONE SODIUM 1000 MG: 10 INJECTION, POWDER, FOR SOLUTION INTRAVENOUS at 08:23

## 2020-05-10 RX ADMIN — LACTULOSE 20 G: 10 SOLUTION ORAL at 17:16

## 2020-05-10 RX ADMIN — METRONIDAZOLE 500 MG: 500 TABLET ORAL at 14:39

## 2020-05-11 LAB
ANION GAP SERPL CALCULATED.3IONS-SCNC: 9 MMOL/L (ref 4–13)
BUN SERPL-MCNC: 21 MG/DL (ref 5–25)
CALCIUM SERPL-MCNC: 8.6 MG/DL (ref 8.3–10.1)
CHLORIDE SERPL-SCNC: 111 MMOL/L (ref 100–108)
CO2 SERPL-SCNC: 25 MMOL/L (ref 21–32)
CREAT SERPL-MCNC: 1.27 MG/DL (ref 0.6–1.3)
ERYTHROCYTE [DISTWIDTH] IN BLOOD BY AUTOMATED COUNT: 22.4 % (ref 11.6–15.1)
GFR SERPL CREATININE-BSD FRML MDRD: 48 ML/MIN/1.73SQ M
GLUCOSE SERPL-MCNC: 124 MG/DL (ref 65–140)
HCT VFR BLD AUTO: 26.7 % (ref 34.8–46.1)
HGB BLD-MCNC: 8 G/DL (ref 11.5–15.4)
MAGNESIUM SERPL-MCNC: 1.5 MG/DL (ref 1.6–2.6)
MCH RBC QN AUTO: 28.4 PG (ref 26.8–34.3)
MCHC RBC AUTO-ENTMCNC: 30 G/DL (ref 31.4–37.4)
MCV RBC AUTO: 95 FL (ref 82–98)
PLATELET # BLD AUTO: 41 THOUSANDS/UL (ref 149–390)
POTASSIUM SERPL-SCNC: 3.3 MMOL/L (ref 3.5–5.3)
RBC # BLD AUTO: 2.82 MILLION/UL (ref 3.81–5.12)
SODIUM SERPL-SCNC: 145 MMOL/L (ref 136–145)
WBC # BLD AUTO: 2.76 THOUSAND/UL (ref 4.31–10.16)

## 2020-05-11 PROCEDURE — 80048 BASIC METABOLIC PNL TOTAL CA: CPT | Performed by: INTERNAL MEDICINE

## 2020-05-11 PROCEDURE — 97129 THER IVNTJ 1ST 15 MIN: CPT

## 2020-05-11 PROCEDURE — 97167 OT EVAL HIGH COMPLEX 60 MIN: CPT

## 2020-05-11 PROCEDURE — 97130 THER IVNTJ EA ADDL 15 MIN: CPT

## 2020-05-11 PROCEDURE — 83735 ASSAY OF MAGNESIUM: CPT | Performed by: INTERNAL MEDICINE

## 2020-05-11 PROCEDURE — 85027 COMPLETE CBC AUTOMATED: CPT | Performed by: INTERNAL MEDICINE

## 2020-05-11 PROCEDURE — 99232 SBSQ HOSP IP/OBS MODERATE 35: CPT | Performed by: INTERNAL MEDICINE

## 2020-05-11 RX ORDER — POTASSIUM CHLORIDE 20 MEQ/1
40 TABLET, EXTENDED RELEASE ORAL 2 TIMES DAILY
Status: DISCONTINUED | OUTPATIENT
Start: 2020-05-11 | End: 2020-05-13

## 2020-05-11 RX ADMIN — Medication 400 MG: at 09:43

## 2020-05-11 RX ADMIN — METRONIDAZOLE 500 MG: 500 TABLET ORAL at 05:43

## 2020-05-11 RX ADMIN — POTASSIUM CHLORIDE 40 MEQ: 1500 TABLET, EXTENDED RELEASE ORAL at 09:44

## 2020-05-11 RX ADMIN — FOLIC ACID 1 MG: 1 TABLET ORAL at 09:43

## 2020-05-11 RX ADMIN — PANTOPRAZOLE SODIUM 40 MG: 40 TABLET, DELAYED RELEASE ORAL at 05:43

## 2020-05-11 RX ADMIN — AZITHROMYCIN 500 MG: 500 TABLET, FILM COATED ORAL at 23:41

## 2020-05-11 RX ADMIN — Medication 400 MG: at 18:00

## 2020-05-11 RX ADMIN — LACTULOSE 20 G: 10 SOLUTION ORAL at 09:46

## 2020-05-11 RX ADMIN — POTASSIUM CHLORIDE 40 MEQ: 1500 TABLET, EXTENDED RELEASE ORAL at 18:00

## 2020-05-11 RX ADMIN — METRONIDAZOLE 500 MG: 500 TABLET ORAL at 23:41

## 2020-05-11 RX ADMIN — LACTULOSE 20 G: 10 SOLUTION ORAL at 18:00

## 2020-05-11 RX ADMIN — DEXTROSE AND SODIUM CHLORIDE 75 ML/HR: 5; .2 INJECTION, SOLUTION INTRAVENOUS at 17:00

## 2020-05-11 RX ADMIN — MIRTAZAPINE 7.5 MG: 15 TABLET, FILM COATED ORAL at 23:41

## 2020-05-11 RX ADMIN — DEXTROSE AND SODIUM CHLORIDE 75 ML/HR: 5; .2 INJECTION, SOLUTION INTRAVENOUS at 01:40

## 2020-05-11 RX ADMIN — METRONIDAZOLE 500 MG: 500 TABLET ORAL at 14:21

## 2020-05-11 RX ADMIN — THIAMINE HCL TAB 100 MG 100 MG: 100 TAB at 09:43

## 2020-05-11 RX ADMIN — CEFTRIAXONE SODIUM 1000 MG: 10 INJECTION, POWDER, FOR SOLUTION INTRAVENOUS at 08:35

## 2020-05-11 RX ADMIN — NICOTINE 1 PATCH: 21 PATCH TRANSDERMAL at 09:46

## 2020-05-12 LAB
AMMONIA PLAS-SCNC: 50 UMOL/L (ref 11–35)
ANION GAP SERPL CALCULATED.3IONS-SCNC: 13 MMOL/L (ref 4–13)
BACTERIA BLD CULT: NORMAL
BACTERIA BLD CULT: NORMAL
BUN SERPL-MCNC: 21 MG/DL (ref 5–25)
CALCIUM SERPL-MCNC: 8.9 MG/DL (ref 8.3–10.1)
CHLORIDE SERPL-SCNC: 111 MMOL/L (ref 100–108)
CO2 SERPL-SCNC: 23 MMOL/L (ref 21–32)
CREAT SERPL-MCNC: 1.2 MG/DL (ref 0.6–1.3)
ERYTHROCYTE [DISTWIDTH] IN BLOOD BY AUTOMATED COUNT: 22.5 % (ref 11.6–15.1)
GFR SERPL CREATININE-BSD FRML MDRD: 52 ML/MIN/1.73SQ M
GLUCOSE SERPL-MCNC: 113 MG/DL (ref 65–140)
HCT VFR BLD AUTO: 26.7 % (ref 34.8–46.1)
HGB BLD-MCNC: 8 G/DL (ref 11.5–15.4)
HIV 1+2 AB+HIV1 P24 AG SERPL QL IA: NORMAL
HIV1 P24 AG SER QL: NORMAL
MAGNESIUM SERPL-MCNC: 1.6 MG/DL (ref 1.6–2.6)
MCH RBC QN AUTO: 28.4 PG (ref 26.8–34.3)
MCHC RBC AUTO-ENTMCNC: 30 G/DL (ref 31.4–37.4)
MCV RBC AUTO: 95 FL (ref 82–98)
PLATELET # BLD AUTO: 43 THOUSANDS/UL (ref 149–390)
POTASSIUM SERPL-SCNC: 4.5 MMOL/L (ref 3.5–5.3)
PROCALCITONIN SERPL-MCNC: 0.54 NG/ML
RBC # BLD AUTO: 2.82 MILLION/UL (ref 3.81–5.12)
SODIUM SERPL-SCNC: 147 MMOL/L (ref 136–145)
WBC # BLD AUTO: 3.22 THOUSAND/UL (ref 4.31–10.16)

## 2020-05-12 PROCEDURE — 87806 HIV AG W/HIV1&2 ANTB W/OPTIC: CPT | Performed by: INTERNAL MEDICINE

## 2020-05-12 PROCEDURE — 83735 ASSAY OF MAGNESIUM: CPT | Performed by: FAMILY MEDICINE

## 2020-05-12 PROCEDURE — 86592 SYPHILIS TEST NON-TREP QUAL: CPT | Performed by: INTERNAL MEDICINE

## 2020-05-12 PROCEDURE — 99232 SBSQ HOSP IP/OBS MODERATE 35: CPT | Performed by: INTERNAL MEDICINE

## 2020-05-12 PROCEDURE — 82140 ASSAY OF AMMONIA: CPT | Performed by: INTERNAL MEDICINE

## 2020-05-12 PROCEDURE — 85027 COMPLETE CBC AUTOMATED: CPT | Performed by: INTERNAL MEDICINE

## 2020-05-12 PROCEDURE — 80048 BASIC METABOLIC PNL TOTAL CA: CPT | Performed by: INTERNAL MEDICINE

## 2020-05-12 PROCEDURE — 99232 SBSQ HOSP IP/OBS MODERATE 35: CPT | Performed by: FAMILY MEDICINE

## 2020-05-12 PROCEDURE — 84145 PROCALCITONIN (PCT): CPT | Performed by: INTERNAL MEDICINE

## 2020-05-12 RX ORDER — METOPROLOL SUCCINATE 25 MG/1
25 TABLET, EXTENDED RELEASE ORAL ONCE
Status: COMPLETED | OUTPATIENT
Start: 2020-05-12 | End: 2020-05-12

## 2020-05-12 RX ADMIN — FOLIC ACID 1 MG: 1 TABLET ORAL at 08:14

## 2020-05-12 RX ADMIN — NICOTINE 1 PATCH: 21 PATCH TRANSDERMAL at 08:37

## 2020-05-12 RX ADMIN — FUROSEMIDE 80 MG: 80 TABLET ORAL at 08:15

## 2020-05-12 RX ADMIN — THIAMINE HCL TAB 100 MG 100 MG: 100 TAB at 08:14

## 2020-05-12 RX ADMIN — METRONIDAZOLE 500 MG: 500 TABLET ORAL at 06:15

## 2020-05-12 RX ADMIN — CEFTRIAXONE SODIUM 1000 MG: 10 INJECTION, POWDER, FOR SOLUTION INTRAVENOUS at 08:14

## 2020-05-12 RX ADMIN — METOPROLOL SUCCINATE 25 MG: 25 TABLET, EXTENDED RELEASE ORAL at 14:56

## 2020-05-12 RX ADMIN — METRONIDAZOLE 500 MG: 500 TABLET ORAL at 21:23

## 2020-05-12 RX ADMIN — METRONIDAZOLE 500 MG: 500 TABLET ORAL at 14:52

## 2020-05-12 RX ADMIN — POTASSIUM CHLORIDE 40 MEQ: 1500 TABLET, EXTENDED RELEASE ORAL at 08:14

## 2020-05-12 RX ADMIN — POTASSIUM CHLORIDE 40 MEQ: 1500 TABLET, EXTENDED RELEASE ORAL at 19:00

## 2020-05-12 RX ADMIN — DEXTROSE AND SODIUM CHLORIDE 75 ML/HR: 5; .2 INJECTION, SOLUTION INTRAVENOUS at 06:17

## 2020-05-12 RX ADMIN — MIRTAZAPINE 7.5 MG: 15 TABLET, FILM COATED ORAL at 21:23

## 2020-05-12 RX ADMIN — Medication 400 MG: at 08:14

## 2020-05-12 RX ADMIN — Medication 400 MG: at 19:00

## 2020-05-12 RX ADMIN — LACTULOSE 20 G: 10 SOLUTION ORAL at 08:15

## 2020-05-12 RX ADMIN — METOPROLOL SUCCINATE 50 MG: 50 TABLET, EXTENDED RELEASE ORAL at 08:15

## 2020-05-12 RX ADMIN — AZITHROMYCIN 500 MG: 500 TABLET, FILM COATED ORAL at 21:23

## 2020-05-13 PROBLEM — R77.8 TROPONIN LEVEL ELEVATED: Status: ACTIVE | Noted: 2020-05-13

## 2020-05-13 LAB
ANION GAP SERPL CALCULATED.3IONS-SCNC: 11 MMOL/L (ref 4–13)
ANISOCYTOSIS BLD QL SMEAR: PRESENT
BASOPHILS # BLD MANUAL: 0.03 THOUSAND/UL (ref 0–0.1)
BASOPHILS NFR MAR MANUAL: 1 % (ref 0–1)
BUN SERPL-MCNC: 18 MG/DL (ref 5–25)
CALCIUM SERPL-MCNC: 9.1 MG/DL (ref 8.3–10.1)
CHLORIDE SERPL-SCNC: 113 MMOL/L (ref 100–108)
CO2 SERPL-SCNC: 21 MMOL/L (ref 21–32)
CREAT SERPL-MCNC: 1.08 MG/DL (ref 0.6–1.3)
EOSINOPHIL # BLD MANUAL: 0.03 THOUSAND/UL (ref 0–0.4)
EOSINOPHIL NFR BLD MANUAL: 1 % (ref 0–6)
ERYTHROCYTE [DISTWIDTH] IN BLOOD BY AUTOMATED COUNT: 22.9 % (ref 11.6–15.1)
GFR SERPL CREATININE-BSD FRML MDRD: 59 ML/MIN/1.73SQ M
GLUCOSE SERPL-MCNC: 109 MG/DL (ref 65–140)
HCT VFR BLD AUTO: 25.3 % (ref 34.8–46.1)
HGB BLD-MCNC: 7.5 G/DL (ref 11.5–15.4)
LYMPHOCYTES # BLD AUTO: 1.3 THOUSAND/UL (ref 0.6–4.47)
LYMPHOCYTES # BLD AUTO: 38 % (ref 14–44)
MAGNESIUM SERPL-MCNC: 1.7 MG/DL (ref 1.6–2.6)
MCH RBC QN AUTO: 28.2 PG (ref 26.8–34.3)
MCHC RBC AUTO-ENTMCNC: 29.6 G/DL (ref 31.4–37.4)
MCV RBC AUTO: 95 FL (ref 82–98)
MONOCYTES # BLD AUTO: 0.17 THOUSAND/UL (ref 0–1.22)
MONOCYTES NFR BLD: 5 % (ref 4–12)
NEUTROPHILS # BLD MANUAL: 1.85 THOUSAND/UL (ref 1.85–7.62)
NEUTS SEG NFR BLD AUTO: 54 % (ref 43–75)
NRBC BLD AUTO-RTO: 1 /100 WBC (ref 0–2)
NRBC BLD AUTO-RTO: 10 /100 WBCS
OVALOCYTES BLD QL SMEAR: PRESENT
PLATELET # BLD AUTO: 42 THOUSANDS/UL (ref 149–390)
PLATELET BLD QL SMEAR: ABNORMAL
POTASSIUM SERPL-SCNC: 5 MMOL/L (ref 3.5–5.3)
PROCALCITONIN SERPL-MCNC: 0.58 NG/ML
RBC # BLD AUTO: 2.66 MILLION/UL (ref 3.81–5.12)
RPR SER QL: NORMAL
SCHISTOCYTES BLD QL SMEAR: PRESENT
SODIUM SERPL-SCNC: 145 MMOL/L (ref 136–145)
TARGETS BLD QL SMEAR: PRESENT
TOTAL CELLS COUNTED SPEC: 100
VARIANT LYMPHS # BLD AUTO: 1 %
WBC # BLD AUTO: 3.42 THOUSAND/UL (ref 4.31–10.16)

## 2020-05-13 PROCEDURE — 85027 COMPLETE CBC AUTOMATED: CPT | Performed by: FAMILY MEDICINE

## 2020-05-13 PROCEDURE — 80048 BASIC METABOLIC PNL TOTAL CA: CPT | Performed by: FAMILY MEDICINE

## 2020-05-13 PROCEDURE — 99232 SBSQ HOSP IP/OBS MODERATE 35: CPT | Performed by: INTERNAL MEDICINE

## 2020-05-13 PROCEDURE — 99232 SBSQ HOSP IP/OBS MODERATE 35: CPT | Performed by: FAMILY MEDICINE

## 2020-05-13 PROCEDURE — 84145 PROCALCITONIN (PCT): CPT | Performed by: INTERNAL MEDICINE

## 2020-05-13 PROCEDURE — 83735 ASSAY OF MAGNESIUM: CPT | Performed by: FAMILY MEDICINE

## 2020-05-13 PROCEDURE — 85007 BL SMEAR W/DIFF WBC COUNT: CPT | Performed by: FAMILY MEDICINE

## 2020-05-13 RX ORDER — POTASSIUM CHLORIDE 20 MEQ/1
40 TABLET, EXTENDED RELEASE ORAL DAILY
Status: DISCONTINUED | OUTPATIENT
Start: 2020-05-14 | End: 2020-05-14

## 2020-05-13 RX ORDER — QUETIAPINE FUMARATE 25 MG/1
25 TABLET, FILM COATED ORAL 2 TIMES DAILY PRN
Status: DISCONTINUED | OUTPATIENT
Start: 2020-05-13 | End: 2020-05-20 | Stop reason: HOSPADM

## 2020-05-13 RX ORDER — LACTULOSE 20 G/30ML
20 SOLUTION ORAL DAILY
Status: DISCONTINUED | OUTPATIENT
Start: 2020-05-14 | End: 2020-05-20 | Stop reason: HOSPADM

## 2020-05-13 RX ADMIN — Medication 400 MG: at 18:27

## 2020-05-13 RX ADMIN — PANTOPRAZOLE SODIUM 40 MG: 40 TABLET, DELAYED RELEASE ORAL at 06:08

## 2020-05-13 RX ADMIN — METRONIDAZOLE 500 MG: 500 TABLET ORAL at 06:08

## 2020-05-13 RX ADMIN — MIRTAZAPINE 7.5 MG: 15 TABLET, FILM COATED ORAL at 22:06

## 2020-05-14 PROBLEM — E80.6 HYPERBILIRUBINEMIA: Status: ACTIVE | Noted: 2020-05-14

## 2020-05-14 LAB
ALBUMIN SERPL BCP-MCNC: 2.7 G/DL (ref 3.5–5)
ALP SERPL-CCNC: 176 U/L (ref 46–116)
ALT SERPL W P-5'-P-CCNC: 16 U/L (ref 12–78)
AMMONIA PLAS-SCNC: 43 UMOL/L (ref 11–35)
ANION GAP SERPL CALCULATED.3IONS-SCNC: 9 MMOL/L (ref 4–13)
AST SERPL W P-5'-P-CCNC: 37 U/L (ref 5–45)
BILIRUB SERPL-MCNC: 1.8 MG/DL (ref 0.2–1)
BUN SERPL-MCNC: 23 MG/DL (ref 5–25)
CALCIUM SERPL-MCNC: 8.9 MG/DL (ref 8.3–10.1)
CHLORIDE SERPL-SCNC: 113 MMOL/L (ref 100–108)
CO2 SERPL-SCNC: 22 MMOL/L (ref 21–32)
CREAT SERPL-MCNC: 1.15 MG/DL (ref 0.6–1.3)
ERYTHROCYTE [DISTWIDTH] IN BLOOD BY AUTOMATED COUNT: 23.2 % (ref 11.6–15.1)
GFR SERPL CREATININE-BSD FRML MDRD: 55 ML/MIN/1.73SQ M
GLUCOSE SERPL-MCNC: 89 MG/DL (ref 65–140)
HAV IGM SER QL: NORMAL
HBV CORE IGM SER QL: NORMAL
HBV SURFACE AG SER QL: NORMAL
HCT VFR BLD AUTO: 23.4 % (ref 34.8–46.1)
HCV AB SER QL: NORMAL
HGB BLD-MCNC: 7 G/DL (ref 11.5–15.4)
MAGNESIUM SERPL-MCNC: 1.9 MG/DL (ref 1.6–2.6)
MCH RBC QN AUTO: 28.3 PG (ref 26.8–34.3)
MCHC RBC AUTO-ENTMCNC: 29.9 G/DL (ref 31.4–37.4)
MCV RBC AUTO: 95 FL (ref 82–98)
NRBC BLD AUTO-RTO: 9 /100 WBCS
PHOSPHATE SERPL-MCNC: 3.6 MG/DL (ref 2.3–4.1)
PLATELET # BLD AUTO: 39 THOUSANDS/UL (ref 149–390)
POTASSIUM SERPL-SCNC: 5.2 MMOL/L (ref 3.5–5.3)
PROCALCITONIN SERPL-MCNC: 0.75 NG/ML
PROT SERPL-MCNC: 6 G/DL (ref 6.4–8.2)
RBC # BLD AUTO: 2.47 MILLION/UL (ref 3.81–5.12)
SODIUM SERPL-SCNC: 144 MMOL/L (ref 136–145)
WBC # BLD AUTO: 3.66 THOUSAND/UL (ref 4.31–10.16)

## 2020-05-14 PROCEDURE — 82140 ASSAY OF AMMONIA: CPT | Performed by: FAMILY MEDICINE

## 2020-05-14 PROCEDURE — 99232 SBSQ HOSP IP/OBS MODERATE 35: CPT | Performed by: INTERNAL MEDICINE

## 2020-05-14 PROCEDURE — 80074 ACUTE HEPATITIS PANEL: CPT | Performed by: INTERNAL MEDICINE

## 2020-05-14 PROCEDURE — 80053 COMPREHEN METABOLIC PANEL: CPT | Performed by: INTERNAL MEDICINE

## 2020-05-14 PROCEDURE — 97530 THERAPEUTIC ACTIVITIES: CPT

## 2020-05-14 PROCEDURE — 97116 GAIT TRAINING THERAPY: CPT

## 2020-05-14 PROCEDURE — 84145 PROCALCITONIN (PCT): CPT | Performed by: INTERNAL MEDICINE

## 2020-05-14 PROCEDURE — 84100 ASSAY OF PHOSPHORUS: CPT | Performed by: INTERNAL MEDICINE

## 2020-05-14 PROCEDURE — 83735 ASSAY OF MAGNESIUM: CPT | Performed by: INTERNAL MEDICINE

## 2020-05-14 PROCEDURE — 85027 COMPLETE CBC AUTOMATED: CPT | Performed by: FAMILY MEDICINE

## 2020-05-14 RX ORDER — FUROSEMIDE 40 MG/1
40 TABLET ORAL DAILY
Status: DISCONTINUED | OUTPATIENT
Start: 2020-05-14 | End: 2020-05-20 | Stop reason: HOSPADM

## 2020-05-14 RX ADMIN — FOLIC ACID 1 MG: 1 TABLET ORAL at 10:07

## 2020-05-14 RX ADMIN — MIRTAZAPINE 7.5 MG: 15 TABLET, FILM COATED ORAL at 21:08

## 2020-05-14 RX ADMIN — Medication 400 MG: at 10:08

## 2020-05-14 RX ADMIN — NICOTINE 1 PATCH: 21 PATCH TRANSDERMAL at 10:08

## 2020-05-14 RX ADMIN — LACTULOSE 20 G: 10 SOLUTION ORAL at 10:08

## 2020-05-14 RX ADMIN — THIAMINE HCL TAB 100 MG 100 MG: 100 TAB at 10:06

## 2020-05-14 RX ADMIN — Medication 400 MG: at 18:41

## 2020-05-15 LAB
ABO GROUP BLD: NORMAL
ALBUMIN SERPL BCP-MCNC: 2.9 G/DL (ref 3.5–5)
ALP SERPL-CCNC: 209 U/L (ref 46–116)
ALT SERPL W P-5'-P-CCNC: 21 U/L (ref 12–78)
AMMONIA PLAS-SCNC: 45 UMOL/L (ref 11–35)
ANION GAP SERPL CALCULATED.3IONS-SCNC: 12 MMOL/L (ref 4–13)
AST SERPL W P-5'-P-CCNC: 44 U/L (ref 5–45)
BILIRUB SERPL-MCNC: 1.9 MG/DL (ref 0.2–1)
BLD GP AB SCN SERPL QL: NEGATIVE
BUN SERPL-MCNC: 17 MG/DL (ref 5–25)
CALCIUM SERPL-MCNC: 9.1 MG/DL (ref 8.3–10.1)
CHLORIDE SERPL-SCNC: 110 MMOL/L (ref 100–108)
CK SERPL-CCNC: 62 U/L (ref 26–192)
CO2 SERPL-SCNC: 23 MMOL/L (ref 21–32)
CREAT SERPL-MCNC: 1.04 MG/DL (ref 0.6–1.3)
ERYTHROCYTE [DISTWIDTH] IN BLOOD BY AUTOMATED COUNT: 23 % (ref 11.6–15.1)
GFR SERPL CREATININE-BSD FRML MDRD: 62 ML/MIN/1.73SQ M
GLUCOSE SERPL-MCNC: 103 MG/DL (ref 65–140)
HCT VFR BLD AUTO: 24.7 % (ref 34.8–46.1)
HGB BLD-MCNC: 7.5 G/DL (ref 11.5–15.4)
MAGNESIUM SERPL-MCNC: 1.8 MG/DL (ref 1.6–2.6)
MCH RBC QN AUTO: 28.2 PG (ref 26.8–34.3)
MCHC RBC AUTO-ENTMCNC: 30.4 G/DL (ref 31.4–37.4)
MCV RBC AUTO: 93 FL (ref 82–98)
NRBC BLD AUTO-RTO: 13 /100 WBCS
PHOSPHATE SERPL-MCNC: 3.1 MG/DL (ref 2.3–4.1)
PLATELET # BLD AUTO: 40 THOUSANDS/UL (ref 149–390)
POTASSIUM SERPL-SCNC: 4.3 MMOL/L (ref 3.5–5.3)
PROCALCITONIN SERPL-MCNC: 0.37 NG/ML
PROT SERPL-MCNC: 6.4 G/DL (ref 6.4–8.2)
RBC # BLD AUTO: 2.66 MILLION/UL (ref 3.81–5.12)
RH BLD: POSITIVE
SODIUM SERPL-SCNC: 145 MMOL/L (ref 136–145)
SPECIMEN EXPIRATION DATE: NORMAL
WBC # BLD AUTO: 2.85 THOUSAND/UL (ref 4.31–10.16)

## 2020-05-15 PROCEDURE — 82140 ASSAY OF AMMONIA: CPT | Performed by: INTERNAL MEDICINE

## 2020-05-15 PROCEDURE — 86901 BLOOD TYPING SEROLOGIC RH(D): CPT | Performed by: INTERNAL MEDICINE

## 2020-05-15 PROCEDURE — 86923 COMPATIBILITY TEST ELECTRIC: CPT

## 2020-05-15 PROCEDURE — 99232 SBSQ HOSP IP/OBS MODERATE 35: CPT | Performed by: INTERNAL MEDICINE

## 2020-05-15 PROCEDURE — 80053 COMPREHEN METABOLIC PANEL: CPT | Performed by: INTERNAL MEDICINE

## 2020-05-15 PROCEDURE — 83735 ASSAY OF MAGNESIUM: CPT | Performed by: INTERNAL MEDICINE

## 2020-05-15 PROCEDURE — 84145 PROCALCITONIN (PCT): CPT | Performed by: INTERNAL MEDICINE

## 2020-05-15 PROCEDURE — 86850 RBC ANTIBODY SCREEN: CPT | Performed by: INTERNAL MEDICINE

## 2020-05-15 PROCEDURE — 82270 OCCULT BLOOD FECES: CPT | Performed by: INTERNAL MEDICINE

## 2020-05-15 PROCEDURE — 82550 ASSAY OF CK (CPK): CPT | Performed by: INTERNAL MEDICINE

## 2020-05-15 PROCEDURE — 86900 BLOOD TYPING SEROLOGIC ABO: CPT | Performed by: INTERNAL MEDICINE

## 2020-05-15 PROCEDURE — 84100 ASSAY OF PHOSPHORUS: CPT | Performed by: INTERNAL MEDICINE

## 2020-05-15 PROCEDURE — 92610 EVALUATE SWALLOWING FUNCTION: CPT

## 2020-05-15 PROCEDURE — 85027 COMPLETE CBC AUTOMATED: CPT | Performed by: INTERNAL MEDICINE

## 2020-05-15 RX ADMIN — THIAMINE HCL TAB 100 MG 100 MG: 100 TAB at 08:57

## 2020-05-15 RX ADMIN — Medication 400 MG: at 08:56

## 2020-05-15 RX ADMIN — QUETIAPINE FUMARATE 25 MG: 25 TABLET ORAL at 08:57

## 2020-05-15 RX ADMIN — QUETIAPINE FUMARATE 25 MG: 25 TABLET ORAL at 00:16

## 2020-05-15 RX ADMIN — METOPROLOL SUCCINATE 75 MG: 50 TABLET, EXTENDED RELEASE ORAL at 08:56

## 2020-05-15 RX ADMIN — PANTOPRAZOLE SODIUM 40 MG: 40 TABLET, DELAYED RELEASE ORAL at 05:24

## 2020-05-15 RX ADMIN — FUROSEMIDE 40 MG: 40 TABLET ORAL at 08:57

## 2020-05-15 RX ADMIN — Medication 400 MG: at 19:01

## 2020-05-15 RX ADMIN — Medication 1 TABLET: at 08:57

## 2020-05-15 RX ADMIN — LACTULOSE 20 G: 10 SOLUTION ORAL at 08:57

## 2020-05-15 RX ADMIN — FOLIC ACID 1 MG: 1 TABLET ORAL at 08:57

## 2020-05-15 RX ADMIN — NICOTINE 1 PATCH: 21 PATCH TRANSDERMAL at 08:58

## 2020-05-16 PROBLEM — D70.9 NEUTROPENIA (HCC): Status: ACTIVE | Noted: 2020-05-16

## 2020-05-16 PROBLEM — E83.39 HYPERPHOSPHATEMIA: Status: ACTIVE | Noted: 2020-05-16

## 2020-05-16 LAB
25(OH)D3 SERPL-MCNC: 40.8 NG/ML (ref 30–100)
ALBUMIN SERPL BCP-MCNC: 2.7 G/DL (ref 3.5–5)
ALP SERPL-CCNC: 204 U/L (ref 46–116)
ALT SERPL W P-5'-P-CCNC: 19 U/L (ref 12–78)
ANION GAP SERPL CALCULATED.3IONS-SCNC: 8 MMOL/L (ref 4–13)
ANISOCYTOSIS BLD QL SMEAR: PRESENT
AST SERPL W P-5'-P-CCNC: 45 U/L (ref 5–45)
BASOPHILS # BLD MANUAL: 0.06 THOUSAND/UL (ref 0–0.1)
BASOPHILS NFR MAR MANUAL: 2 % (ref 0–1)
BILIRUB SERPL-MCNC: 1.5 MG/DL (ref 0.2–1)
BUN SERPL-MCNC: 23 MG/DL (ref 5–25)
CALCIUM SERPL-MCNC: 8.9 MG/DL (ref 8.3–10.1)
CHLORIDE SERPL-SCNC: 112 MMOL/L (ref 100–108)
CO2 SERPL-SCNC: 23 MMOL/L (ref 21–32)
CREAT SERPL-MCNC: 1.25 MG/DL (ref 0.6–1.3)
DACRYOCYTES BLD QL SMEAR: PRESENT
EOSINOPHIL # BLD MANUAL: 0.09 THOUSAND/UL (ref 0–0.4)
EOSINOPHIL NFR BLD MANUAL: 3 % (ref 0–6)
ERYTHROCYTE [DISTWIDTH] IN BLOOD BY AUTOMATED COUNT: 23.5 % (ref 11.6–15.1)
GFR SERPL CREATININE-BSD FRML MDRD: 49 ML/MIN/1.73SQ M
GLUCOSE SERPL-MCNC: 87 MG/DL (ref 65–140)
HCT VFR BLD AUTO: 26.4 % (ref 34.8–46.1)
HGB BLD-MCNC: 7.8 G/DL (ref 11.5–15.4)
HYPERCHROMIA BLD QL SMEAR: PRESENT
LYMPHOCYTES # BLD AUTO: 1.35 THOUSAND/UL (ref 0.6–4.47)
LYMPHOCYTES # BLD AUTO: 43 % (ref 14–44)
MAGNESIUM SERPL-MCNC: 2 MG/DL (ref 1.6–2.6)
MCH RBC QN AUTO: 28.1 PG (ref 26.8–34.3)
MCHC RBC AUTO-ENTMCNC: 29.5 G/DL (ref 31.4–37.4)
MCV RBC AUTO: 95 FL (ref 82–98)
METAMYELOCYTES NFR BLD MANUAL: 1 % (ref 0–1)
MONOCYTES # BLD AUTO: 0.16 THOUSAND/UL (ref 0–1.22)
MONOCYTES NFR BLD: 5 % (ref 4–12)
MYELOCYTES NFR BLD MANUAL: 1 % (ref 0–1)
NEUTROPHILS # BLD MANUAL: 1.41 THOUSAND/UL (ref 1.85–7.62)
NEUTS BAND NFR BLD MANUAL: 3 % (ref 0–8)
NEUTS SEG NFR BLD AUTO: 42 % (ref 43–75)
NRBC BLD AUTO-RTO: 13 /100 WBCS
NRBC BLD AUTO-RTO: 17 /100 WBC (ref 0–2)
PHOSPHATE SERPL-MCNC: 4.3 MG/DL (ref 2.3–4.1)
PLATELET # BLD AUTO: 38 THOUSANDS/UL (ref 149–390)
PLATELET BLD QL SMEAR: ABNORMAL
POIKILOCYTOSIS BLD QL SMEAR: PRESENT
POLYCHROMASIA BLD QL SMEAR: PRESENT
POTASSIUM SERPL-SCNC: 5 MMOL/L (ref 3.5–5.3)
PROCALCITONIN SERPL-MCNC: 0.51 NG/ML
PROT SERPL-MCNC: 6 G/DL (ref 6.4–8.2)
RBC # BLD AUTO: 2.78 MILLION/UL (ref 3.81–5.12)
SCHISTOCYTES BLD QL SMEAR: PRESENT
SODIUM SERPL-SCNC: 143 MMOL/L (ref 136–145)
TARGETS BLD QL SMEAR: PRESENT
TOTAL CELLS COUNTED SPEC: 100
WBC # BLD AUTO: 3.13 THOUSAND/UL (ref 4.31–10.16)

## 2020-05-16 PROCEDURE — 82306 VITAMIN D 25 HYDROXY: CPT | Performed by: INTERNAL MEDICINE

## 2020-05-16 PROCEDURE — 85027 COMPLETE CBC AUTOMATED: CPT | Performed by: INTERNAL MEDICINE

## 2020-05-16 PROCEDURE — 80053 COMPREHEN METABOLIC PANEL: CPT | Performed by: INTERNAL MEDICINE

## 2020-05-16 PROCEDURE — 99232 SBSQ HOSP IP/OBS MODERATE 35: CPT | Performed by: INTERNAL MEDICINE

## 2020-05-16 PROCEDURE — 85007 BL SMEAR W/DIFF WBC COUNT: CPT | Performed by: INTERNAL MEDICINE

## 2020-05-16 PROCEDURE — 83735 ASSAY OF MAGNESIUM: CPT | Performed by: INTERNAL MEDICINE

## 2020-05-16 PROCEDURE — 84145 PROCALCITONIN (PCT): CPT | Performed by: INTERNAL MEDICINE

## 2020-05-16 PROCEDURE — 84100 ASSAY OF PHOSPHORUS: CPT | Performed by: INTERNAL MEDICINE

## 2020-05-16 RX ADMIN — NICOTINE 1 PATCH: 21 PATCH TRANSDERMAL at 11:06

## 2020-05-16 RX ADMIN — PANTOPRAZOLE SODIUM 40 MG: 40 TABLET, DELAYED RELEASE ORAL at 06:45

## 2020-05-16 RX ADMIN — MIRTAZAPINE 7.5 MG: 15 TABLET, FILM COATED ORAL at 00:26

## 2020-05-16 RX ADMIN — Medication 1 TABLET: at 11:05

## 2020-05-16 RX ADMIN — FOLIC ACID 1 MG: 1 TABLET ORAL at 11:05

## 2020-05-16 RX ADMIN — QUETIAPINE FUMARATE 25 MG: 25 TABLET ORAL at 21:50

## 2020-05-16 RX ADMIN — QUETIAPINE FUMARATE 25 MG: 25 TABLET ORAL at 00:27

## 2020-05-16 RX ADMIN — LACTULOSE 20 G: 10 SOLUTION ORAL at 11:05

## 2020-05-16 RX ADMIN — THIAMINE HCL TAB 100 MG 100 MG: 100 TAB at 11:05

## 2020-05-16 RX ADMIN — Medication 400 MG: at 11:05

## 2020-05-16 RX ADMIN — MIRTAZAPINE 7.5 MG: 15 TABLET, FILM COATED ORAL at 21:51

## 2020-05-16 RX ADMIN — Medication 400 MG: at 18:13

## 2020-05-17 LAB
AFP-TM SERPL-MCNC: 4.3 NG/ML (ref 0.5–8)
ALBUMIN SERPL BCP-MCNC: 2.5 G/DL (ref 3.5–5)
ALP SERPL-CCNC: 200 U/L (ref 46–116)
ALT SERPL W P-5'-P-CCNC: 18 U/L (ref 12–78)
ANION GAP SERPL CALCULATED.3IONS-SCNC: 9 MMOL/L (ref 4–13)
ANISOCYTOSIS BLD QL SMEAR: PRESENT
AST SERPL W P-5'-P-CCNC: 42 U/L (ref 5–45)
BASOPHILS # BLD MANUAL: 0 THOUSAND/UL (ref 0–0.1)
BASOPHILS NFR MAR MANUAL: 0 % (ref 0–1)
BILIRUB SERPL-MCNC: 1.4 MG/DL (ref 0.2–1)
BUN SERPL-MCNC: 16 MG/DL (ref 5–25)
CALCIUM SERPL-MCNC: 8.6 MG/DL (ref 8.3–10.1)
CHLORIDE SERPL-SCNC: 110 MMOL/L (ref 100–108)
CO2 SERPL-SCNC: 24 MMOL/L (ref 21–32)
CREAT SERPL-MCNC: 0.98 MG/DL (ref 0.6–1.3)
DATE SPECIMEN #1: NORMAL
DATE SPECIMEN #2: NORMAL
DATE SPECIMEN #3: NORMAL
EOSINOPHIL # BLD MANUAL: 0.03 THOUSAND/UL (ref 0–0.4)
EOSINOPHIL NFR BLD MANUAL: 1 % (ref 0–6)
ERYTHROCYTE [DISTWIDTH] IN BLOOD BY AUTOMATED COUNT: 22.8 % (ref 11.6–15.1)
GFR SERPL CREATININE-BSD FRML MDRD: 66 ML/MIN/1.73SQ M
GLUCOSE SERPL-MCNC: 81 MG/DL (ref 65–140)
HCT VFR BLD AUTO: 21.5 % (ref 34.8–46.1)
HCT VFR BLD AUTO: 29.1 % (ref 34.8–46.1)
HEMOCCULT SP1 STL QL: NEGATIVE
HEMOCCULT SP2 STL QL: NEGATIVE
HEMOCCULT SP3 STL QL: NEGATIVE
HGB BLD-MCNC: 6.6 G/DL (ref 11.5–15.4)
HGB BLD-MCNC: 8.8 G/DL (ref 11.5–15.4)
LYMPHOCYTES # BLD AUTO: 2.01 THOUSAND/UL (ref 0.6–4.47)
LYMPHOCYTES # BLD AUTO: 68 % (ref 14–44)
MACROCYTES BLD QL AUTO: PRESENT
MAGNESIUM SERPL-MCNC: 1.6 MG/DL (ref 1.6–2.6)
MCH RBC QN AUTO: 28.2 PG (ref 26.8–34.3)
MCHC RBC AUTO-ENTMCNC: 30.7 G/DL (ref 31.4–37.4)
MCV RBC AUTO: 92 FL (ref 82–98)
MONOCYTES # BLD AUTO: 0.15 THOUSAND/UL (ref 0–1.22)
MONOCYTES NFR BLD: 5 % (ref 4–12)
NEUTROPHILS # BLD MANUAL: 0.77 THOUSAND/UL (ref 1.85–7.62)
NEUTS SEG NFR BLD AUTO: 26 % (ref 43–75)
NRBC BLD AUTO-RTO: 10 /100 WBC (ref 0–2)
NRBC BLD AUTO-RTO: 12 /100 WBCS
PHOSPHATE SERPL-MCNC: 3.6 MG/DL (ref 2.3–4.1)
PLATELET # BLD AUTO: 36 THOUSANDS/UL (ref 149–390)
PLATELET BLD QL SMEAR: ABNORMAL
POTASSIUM SERPL-SCNC: 4.4 MMOL/L (ref 3.5–5.3)
PROCALCITONIN SERPL-MCNC: 0.35 NG/ML
PROT SERPL-MCNC: 5.6 G/DL (ref 6.4–8.2)
RBC # BLD AUTO: 2.34 MILLION/UL (ref 3.81–5.12)
SODIUM SERPL-SCNC: 143 MMOL/L (ref 136–145)
TOTAL CELLS COUNTED SPEC: 100
WBC # BLD AUTO: 2.95 THOUSAND/UL (ref 4.31–10.16)

## 2020-05-17 PROCEDURE — 99232 SBSQ HOSP IP/OBS MODERATE 35: CPT | Performed by: INTERNAL MEDICINE

## 2020-05-17 PROCEDURE — 80053 COMPREHEN METABOLIC PANEL: CPT | Performed by: INTERNAL MEDICINE

## 2020-05-17 PROCEDURE — 30233N1 TRANSFUSION OF NONAUTOLOGOUS RED BLOOD CELLS INTO PERIPHERAL VEIN, PERCUTANEOUS APPROACH: ICD-10-PCS | Performed by: INTERNAL MEDICINE

## 2020-05-17 PROCEDURE — 82105 ALPHA-FETOPROTEIN SERUM: CPT | Performed by: PHYSICIAN ASSISTANT

## 2020-05-17 PROCEDURE — 85014 HEMATOCRIT: CPT | Performed by: INTERNAL MEDICINE

## 2020-05-17 PROCEDURE — P9016 RBC LEUKOCYTES REDUCED: HCPCS

## 2020-05-17 PROCEDURE — 83735 ASSAY OF MAGNESIUM: CPT | Performed by: INTERNAL MEDICINE

## 2020-05-17 PROCEDURE — 85007 BL SMEAR W/DIFF WBC COUNT: CPT | Performed by: INTERNAL MEDICINE

## 2020-05-17 PROCEDURE — 85027 COMPLETE CBC AUTOMATED: CPT | Performed by: INTERNAL MEDICINE

## 2020-05-17 PROCEDURE — 84100 ASSAY OF PHOSPHORUS: CPT | Performed by: INTERNAL MEDICINE

## 2020-05-17 PROCEDURE — 85018 HEMOGLOBIN: CPT | Performed by: INTERNAL MEDICINE

## 2020-05-17 PROCEDURE — 84145 PROCALCITONIN (PCT): CPT | Performed by: INTERNAL MEDICINE

## 2020-05-17 RX ORDER — MAGNESIUM SULFATE HEPTAHYDRATE 40 MG/ML
2 INJECTION, SOLUTION INTRAVENOUS ONCE
Status: COMPLETED | OUTPATIENT
Start: 2020-05-17 | End: 2020-05-17

## 2020-05-17 RX ADMIN — FOLIC ACID 1 MG: 1 TABLET ORAL at 09:18

## 2020-05-17 RX ADMIN — POLYETHYLENE GLYCOL 3350, SODIUM SULFATE ANHYDROUS, SODIUM BICARBONATE, SODIUM CHLORIDE, POTASSIUM CHLORIDE 4000 ML: 236; 22.74; 6.74; 5.86; 2.97 POWDER, FOR SOLUTION ORAL at 17:27

## 2020-05-17 RX ADMIN — POLYETHYLENE GLYCOL 3350, SODIUM SULFATE ANHYDROUS, SODIUM BICARBONATE, SODIUM CHLORIDE, POTASSIUM CHLORIDE 4000 ML: 236; 22.74; 6.74; 5.86; 2.97 POWDER, FOR SOLUTION ORAL at 19:30

## 2020-05-17 RX ADMIN — POLYETHYLENE GLYCOL 3350, SODIUM SULFATE ANHYDROUS, SODIUM BICARBONATE, SODIUM CHLORIDE, POTASSIUM CHLORIDE 4000 ML: 236; 22.74; 6.74; 5.86; 2.97 POWDER, FOR SOLUTION ORAL at 18:45

## 2020-05-17 RX ADMIN — PANTOPRAZOLE SODIUM 40 MG: 40 TABLET, DELAYED RELEASE ORAL at 09:18

## 2020-05-17 RX ADMIN — POLYETHYLENE GLYCOL 3350, SODIUM SULFATE ANHYDROUS, SODIUM BICARBONATE, SODIUM CHLORIDE, POTASSIUM CHLORIDE 4000 ML: 236; 22.74; 6.74; 5.86; 2.97 POWDER, FOR SOLUTION ORAL at 18:30

## 2020-05-17 RX ADMIN — NICOTINE 1 PATCH: 21 PATCH TRANSDERMAL at 09:20

## 2020-05-17 RX ADMIN — LACTULOSE 20 G: 10 SOLUTION ORAL at 09:19

## 2020-05-17 RX ADMIN — POLYETHYLENE GLYCOL 3350, SODIUM SULFATE ANHYDROUS, SODIUM BICARBONATE, SODIUM CHLORIDE, POTASSIUM CHLORIDE 4000 ML: 236; 22.74; 6.74; 5.86; 2.97 POWDER, FOR SOLUTION ORAL at 19:00

## 2020-05-17 RX ADMIN — Medication 400 MG: at 09:19

## 2020-05-17 RX ADMIN — MIRTAZAPINE 7.5 MG: 15 TABLET, FILM COATED ORAL at 21:08

## 2020-05-17 RX ADMIN — POLYETHYLENE GLYCOL 3350, SODIUM SULFATE ANHYDROUS, SODIUM BICARBONATE, SODIUM CHLORIDE, POTASSIUM CHLORIDE 4000 ML: 236; 22.74; 6.74; 5.86; 2.97 POWDER, FOR SOLUTION ORAL at 18:54

## 2020-05-17 RX ADMIN — POLYETHYLENE GLYCOL 3350, SODIUM SULFATE ANHYDROUS, SODIUM BICARBONATE, SODIUM CHLORIDE, POTASSIUM CHLORIDE 4000 ML: 236; 22.74; 6.74; 5.86; 2.97 POWDER, FOR SOLUTION ORAL at 16:00

## 2020-05-17 RX ADMIN — POLYETHYLENE GLYCOL 3350, SODIUM SULFATE ANHYDROUS, SODIUM BICARBONATE, SODIUM CHLORIDE, POTASSIUM CHLORIDE 4000 ML: 236; 22.74; 6.74; 5.86; 2.97 POWDER, FOR SOLUTION ORAL at 17:34

## 2020-05-17 RX ADMIN — POLYETHYLENE GLYCOL 3350, SODIUM SULFATE ANHYDROUS, SODIUM BICARBONATE, SODIUM CHLORIDE, POTASSIUM CHLORIDE 4000 ML: 236; 22.74; 6.74; 5.86; 2.97 POWDER, FOR SOLUTION ORAL at 16:32

## 2020-05-17 RX ADMIN — POLYETHYLENE GLYCOL 3350, SODIUM SULFATE ANHYDROUS, SODIUM BICARBONATE, SODIUM CHLORIDE, POTASSIUM CHLORIDE 4000 ML: 236; 22.74; 6.74; 5.86; 2.97 POWDER, FOR SOLUTION ORAL at 19:45

## 2020-05-17 RX ADMIN — MAGNESIUM SULFATE HEPTAHYDRATE 2 G: 40 INJECTION, SOLUTION INTRAVENOUS at 17:34

## 2020-05-17 RX ADMIN — POLYETHYLENE GLYCOL 3350, SODIUM SULFATE ANHYDROUS, SODIUM BICARBONATE, SODIUM CHLORIDE, POTASSIUM CHLORIDE 4000 ML: 236; 22.74; 6.74; 5.86; 2.97 POWDER, FOR SOLUTION ORAL at 16:15

## 2020-05-17 RX ADMIN — Medication 1 TABLET: at 09:19

## 2020-05-17 RX ADMIN — QUETIAPINE FUMARATE 25 MG: 25 TABLET ORAL at 21:08

## 2020-05-17 RX ADMIN — POLYETHYLENE GLYCOL 3350, SODIUM SULFATE ANHYDROUS, SODIUM BICARBONATE, SODIUM CHLORIDE, POTASSIUM CHLORIDE 4000 ML: 236; 22.74; 6.74; 5.86; 2.97 POWDER, FOR SOLUTION ORAL at 17:00

## 2020-05-17 RX ADMIN — POLYETHYLENE GLYCOL 3350, SODIUM SULFATE ANHYDROUS, SODIUM BICARBONATE, SODIUM CHLORIDE, POTASSIUM CHLORIDE 4000 ML: 236; 22.74; 6.74; 5.86; 2.97 POWDER, FOR SOLUTION ORAL at 18:15

## 2020-05-17 RX ADMIN — POLYETHYLENE GLYCOL 3350, SODIUM SULFATE ANHYDROUS, SODIUM BICARBONATE, SODIUM CHLORIDE, POTASSIUM CHLORIDE 4000 ML: 236; 22.74; 6.74; 5.86; 2.97 POWDER, FOR SOLUTION ORAL at 17:15

## 2020-05-17 RX ADMIN — POLYETHYLENE GLYCOL 3350, SODIUM SULFATE ANHYDROUS, SODIUM BICARBONATE, SODIUM CHLORIDE, POTASSIUM CHLORIDE 4000 ML: 236; 22.74; 6.74; 5.86; 2.97 POWDER, FOR SOLUTION ORAL at 16:31

## 2020-05-17 RX ADMIN — POLYETHYLENE GLYCOL 3350, SODIUM SULFATE ANHYDROUS, SODIUM BICARBONATE, SODIUM CHLORIDE, POTASSIUM CHLORIDE 4000 ML: 236; 22.74; 6.74; 5.86; 2.97 POWDER, FOR SOLUTION ORAL at 20:00

## 2020-05-17 RX ADMIN — THIAMINE HCL TAB 100 MG 100 MG: 100 TAB at 09:18

## 2020-05-17 RX ADMIN — Medication 400 MG: at 18:53

## 2020-05-17 RX ADMIN — POLYETHYLENE GLYCOL 3350, SODIUM SULFATE ANHYDROUS, SODIUM BICARBONATE, SODIUM CHLORIDE, POTASSIUM CHLORIDE 4000 ML: 236; 22.74; 6.74; 5.86; 2.97 POWDER, FOR SOLUTION ORAL at 18:00

## 2020-05-18 ENCOUNTER — APPOINTMENT (INPATIENT)
Dept: MRI IMAGING | Facility: HOSPITAL | Age: 75
DRG: 871 | End: 2020-05-18
Payer: MEDICARE

## 2020-05-18 ENCOUNTER — ANESTHESIA (INPATIENT)
Dept: GASTROENTEROLOGY | Facility: HOSPITAL | Age: 75
DRG: 871 | End: 2020-05-18
Payer: MEDICARE

## 2020-05-18 ENCOUNTER — TELEPHONE (OUTPATIENT)
Dept: CARDIOLOGY CLINIC | Facility: CLINIC | Age: 75
End: 2020-05-18

## 2020-05-18 ENCOUNTER — ANESTHESIA EVENT (INPATIENT)
Dept: GASTROENTEROLOGY | Facility: HOSPITAL | Age: 75
DRG: 871 | End: 2020-05-18
Payer: MEDICARE

## 2020-05-18 ENCOUNTER — APPOINTMENT (INPATIENT)
Dept: GASTROENTEROLOGY | Facility: HOSPITAL | Age: 75
DRG: 871 | End: 2020-05-18
Payer: MEDICARE

## 2020-05-18 LAB
ABO GROUP BLD BPU: NORMAL
ABO GROUP BLD: NORMAL
ALBUMIN SERPL BCP-MCNC: 3 G/DL (ref 3.5–5)
ALP SERPL-CCNC: 243 U/L (ref 46–116)
ALT SERPL W P-5'-P-CCNC: 22 U/L (ref 12–78)
ANION GAP SERPL CALCULATED.3IONS-SCNC: 10 MMOL/L (ref 4–13)
ANISOCYTOSIS BLD QL SMEAR: PRESENT
AST SERPL W P-5'-P-CCNC: 47 U/L (ref 5–45)
BASOPHILS # BLD MANUAL: 0.03 THOUSAND/UL (ref 0–0.1)
BASOPHILS NFR MAR MANUAL: 1 % (ref 0–1)
BILIRUB SERPL-MCNC: 1.8 MG/DL (ref 0.2–1)
BLD GP AB SCN SERPL QL: NEGATIVE
BPU ID: NORMAL
BUN SERPL-MCNC: 11 MG/DL (ref 5–25)
CALCIUM SERPL-MCNC: 8.9 MG/DL (ref 8.3–10.1)
CHLORIDE SERPL-SCNC: 106 MMOL/L (ref 100–108)
CO2 SERPL-SCNC: 23 MMOL/L (ref 21–32)
CREAT SERPL-MCNC: 0.92 MG/DL (ref 0.6–1.3)
CROSSMATCH: NORMAL
EOSINOPHIL # BLD MANUAL: 0.03 THOUSAND/UL (ref 0–0.4)
EOSINOPHIL NFR BLD MANUAL: 1 % (ref 0–6)
ERYTHROCYTE [DISTWIDTH] IN BLOOD BY AUTOMATED COUNT: 21.9 % (ref 11.6–15.1)
GFR SERPL CREATININE-BSD FRML MDRD: 71 ML/MIN/1.73SQ M
GLUCOSE SERPL-MCNC: 116 MG/DL (ref 65–140)
HCT VFR BLD AUTO: 29.1 % (ref 34.8–46.1)
HGB BLD-MCNC: 9 G/DL (ref 11.5–15.4)
LYMPHOCYTES # BLD AUTO: 1.12 THOUSAND/UL (ref 0.6–4.47)
LYMPHOCYTES # BLD AUTO: 35 % (ref 14–44)
MAGNESIUM SERPL-MCNC: 1.6 MG/DL (ref 1.6–2.6)
MCH RBC QN AUTO: 28.4 PG (ref 26.8–34.3)
MCHC RBC AUTO-ENTMCNC: 30.9 G/DL (ref 31.4–37.4)
MCV RBC AUTO: 92 FL (ref 82–98)
METAMYELOCYTES NFR BLD MANUAL: 2 % (ref 0–1)
MONOCYTES # BLD AUTO: 0.32 THOUSAND/UL (ref 0–1.22)
MONOCYTES NFR BLD: 10 % (ref 4–12)
NEUTROPHILS # BLD MANUAL: 1.48 THOUSAND/UL (ref 1.85–7.62)
NEUTS SEG NFR BLD AUTO: 46 % (ref 43–75)
NRBC BLD AUTO-RTO: 11 /100 WBC (ref 0–2)
NRBC BLD AUTO-RTO: 9 /100 WBCS
OVALOCYTES BLD QL SMEAR: PRESENT
PHOSPHATE SERPL-MCNC: 3 MG/DL (ref 2.3–4.1)
PLATELET # BLD AUTO: 39 THOUSANDS/UL (ref 149–390)
PLATELET BLD QL SMEAR: ABNORMAL
POLYCHROMASIA BLD QL SMEAR: PRESENT
POTASSIUM SERPL-SCNC: 3.8 MMOL/L (ref 3.5–5.3)
PROCALCITONIN SERPL-MCNC: 0.3 NG/ML
PROT SERPL-MCNC: 6.7 G/DL (ref 6.4–8.2)
RBC # BLD AUTO: 3.17 MILLION/UL (ref 3.81–5.12)
RH BLD: POSITIVE
SCHISTOCYTES BLD QL SMEAR: PRESENT
SODIUM SERPL-SCNC: 139 MMOL/L (ref 136–145)
SPECIMEN EXPIRATION DATE: NORMAL
TOTAL CELLS COUNTED SPEC: 100
UNIT DISPENSE STATUS: NORMAL
UNIT PRODUCT CODE: NORMAL
UNIT RH: NORMAL
VARIANT LYMPHS # BLD AUTO: 5 %
WBC # BLD AUTO: 3.21 THOUSAND/UL (ref 4.31–10.16)

## 2020-05-18 PROCEDURE — 86900 BLOOD TYPING SEROLOGIC ABO: CPT | Performed by: PHYSICIAN ASSISTANT

## 2020-05-18 PROCEDURE — 83735 ASSAY OF MAGNESIUM: CPT | Performed by: INTERNAL MEDICINE

## 2020-05-18 PROCEDURE — 84100 ASSAY OF PHOSPHORUS: CPT | Performed by: INTERNAL MEDICINE

## 2020-05-18 PROCEDURE — 85007 BL SMEAR W/DIFF WBC COUNT: CPT | Performed by: INTERNAL MEDICINE

## 2020-05-18 PROCEDURE — 45378 DIAGNOSTIC COLONOSCOPY: CPT | Performed by: INTERNAL MEDICINE

## 2020-05-18 PROCEDURE — 86850 RBC ANTIBODY SCREEN: CPT | Performed by: PHYSICIAN ASSISTANT

## 2020-05-18 PROCEDURE — 99223 1ST HOSP IP/OBS HIGH 75: CPT | Performed by: PHYSICIAN ASSISTANT

## 2020-05-18 PROCEDURE — 84145 PROCALCITONIN (PCT): CPT | Performed by: INTERNAL MEDICINE

## 2020-05-18 PROCEDURE — NC001 PR NO CHARGE: Performed by: INTERNAL MEDICINE

## 2020-05-18 PROCEDURE — 92526 ORAL FUNCTION THERAPY: CPT

## 2020-05-18 PROCEDURE — 0DJ08ZZ INSPECTION OF UPPER INTESTINAL TRACT, VIA NATURAL OR ARTIFICIAL OPENING ENDOSCOPIC: ICD-10-PCS | Performed by: INTERNAL MEDICINE

## 2020-05-18 PROCEDURE — 99233 SBSQ HOSP IP/OBS HIGH 50: CPT | Performed by: INTERNAL MEDICINE

## 2020-05-18 PROCEDURE — 86901 BLOOD TYPING SEROLOGIC RH(D): CPT | Performed by: PHYSICIAN ASSISTANT

## 2020-05-18 PROCEDURE — 85027 COMPLETE CBC AUTOMATED: CPT | Performed by: INTERNAL MEDICINE

## 2020-05-18 PROCEDURE — 0DJD8ZZ INSPECTION OF LOWER INTESTINAL TRACT, VIA NATURAL OR ARTIFICIAL OPENING ENDOSCOPIC: ICD-10-PCS | Performed by: INTERNAL MEDICINE

## 2020-05-18 PROCEDURE — 43235 EGD DIAGNOSTIC BRUSH WASH: CPT | Performed by: INTERNAL MEDICINE

## 2020-05-18 PROCEDURE — 80053 COMPREHEN METABOLIC PANEL: CPT | Performed by: INTERNAL MEDICINE

## 2020-05-18 RX ORDER — PROPOFOL 10 MG/ML
INJECTION, EMULSION INTRAVENOUS AS NEEDED
Status: DISCONTINUED | OUTPATIENT
Start: 2020-05-18 | End: 2020-05-18 | Stop reason: SURG

## 2020-05-18 RX ORDER — SODIUM CHLORIDE 9 MG/ML
INJECTION, SOLUTION INTRAVENOUS CONTINUOUS PRN
Status: DISCONTINUED | OUTPATIENT
Start: 2020-05-18 | End: 2020-05-18 | Stop reason: SURG

## 2020-05-18 RX ORDER — LIDOCAINE HYDROCHLORIDE 20 MG/ML
INJECTION, SOLUTION EPIDURAL; INFILTRATION; INTRACAUDAL; PERINEURAL AS NEEDED
Status: DISCONTINUED | OUTPATIENT
Start: 2020-05-18 | End: 2020-05-18 | Stop reason: SURG

## 2020-05-18 RX ADMIN — NICOTINE 1 PATCH: 21 PATCH TRANSDERMAL at 09:41

## 2020-05-18 RX ADMIN — PROPOFOL 30 MG: 10 INJECTION, EMULSION INTRAVENOUS at 14:52

## 2020-05-18 RX ADMIN — PROPOFOL 30 MG: 10 INJECTION, EMULSION INTRAVENOUS at 14:56

## 2020-05-18 RX ADMIN — FOLIC ACID 1 MG: 1 TABLET ORAL at 09:38

## 2020-05-18 RX ADMIN — Medication 1 TABLET: at 09:38

## 2020-05-18 RX ADMIN — PROPOFOL 30 MG: 10 INJECTION, EMULSION INTRAVENOUS at 14:59

## 2020-05-18 RX ADMIN — Medication 400 MG: at 17:46

## 2020-05-18 RX ADMIN — PROPOFOL 30 MG: 10 INJECTION, EMULSION INTRAVENOUS at 14:54

## 2020-05-18 RX ADMIN — MIRTAZAPINE 7.5 MG: 15 TABLET, FILM COATED ORAL at 22:57

## 2020-05-18 RX ADMIN — Medication 400 MG: at 09:38

## 2020-05-18 RX ADMIN — THIAMINE HCL TAB 100 MG 100 MG: 100 TAB at 09:38

## 2020-05-18 RX ADMIN — PROPOFOL 100 MG: 10 INJECTION, EMULSION INTRAVENOUS at 14:50

## 2020-05-18 RX ADMIN — QUETIAPINE FUMARATE 25 MG: 25 TABLET ORAL at 09:40

## 2020-05-18 RX ADMIN — PROPOFOL 30 MG: 10 INJECTION, EMULSION INTRAVENOUS at 15:02

## 2020-05-18 RX ADMIN — SODIUM CHLORIDE: 0.9 INJECTION, SOLUTION INTRAVENOUS at 14:00

## 2020-05-18 RX ADMIN — PHENYLEPHRINE HYDROCHLORIDE 100 MCG: 10 INJECTION INTRAVENOUS at 15:08

## 2020-05-18 RX ADMIN — PANTOPRAZOLE SODIUM 40 MG: 40 TABLET, DELAYED RELEASE ORAL at 05:53

## 2020-05-18 RX ADMIN — LIDOCAINE HYDROCHLORIDE 100 MG: 20 INJECTION, SOLUTION EPIDURAL; INFILTRATION; INTRACAUDAL; PERINEURAL at 14:50

## 2020-05-19 ENCOUNTER — APPOINTMENT (INPATIENT)
Dept: MRI IMAGING | Facility: HOSPITAL | Age: 75
DRG: 871 | End: 2020-05-19
Payer: MEDICARE

## 2020-05-19 LAB
ALBUMIN SERPL BCP-MCNC: 2.7 G/DL (ref 3.5–5)
ALP SERPL-CCNC: 229 U/L (ref 46–116)
ALT SERPL W P-5'-P-CCNC: 22 U/L (ref 12–78)
AMMONIA PLAS-SCNC: 49 UMOL/L (ref 11–35)
ANION GAP SERPL CALCULATED.3IONS-SCNC: 6 MMOL/L (ref 4–13)
AST SERPL W P-5'-P-CCNC: 51 U/L (ref 5–45)
BILIRUB SERPL-MCNC: 1.5 MG/DL (ref 0.2–1)
BUN SERPL-MCNC: 14 MG/DL (ref 5–25)
CALCIUM SERPL-MCNC: 8.8 MG/DL (ref 8.3–10.1)
CHLORIDE SERPL-SCNC: 109 MMOL/L (ref 100–108)
CO2 SERPL-SCNC: 25 MMOL/L (ref 21–32)
CREAT SERPL-MCNC: 0.96 MG/DL (ref 0.6–1.3)
ERYTHROCYTE [DISTWIDTH] IN BLOOD BY AUTOMATED COUNT: 22 % (ref 11.6–15.1)
GFR SERPL CREATININE-BSD FRML MDRD: 68 ML/MIN/1.73SQ M
GLUCOSE SERPL-MCNC: 77 MG/DL (ref 65–140)
HCT VFR BLD AUTO: 26.2 % (ref 34.8–46.1)
HGB BLD-MCNC: 7.9 G/DL (ref 11.5–15.4)
MCH RBC QN AUTO: 27.9 PG (ref 26.8–34.3)
MCHC RBC AUTO-ENTMCNC: 30.2 G/DL (ref 31.4–37.4)
MCV RBC AUTO: 93 FL (ref 82–98)
PLATELET # BLD AUTO: 35 THOUSANDS/UL (ref 149–390)
POTASSIUM SERPL-SCNC: 4.9 MMOL/L (ref 3.5–5.3)
PROCALCITONIN SERPL-MCNC: 0.27 NG/ML
PROT SERPL-MCNC: 6.1 G/DL (ref 6.4–8.2)
RBC # BLD AUTO: 2.83 MILLION/UL (ref 3.81–5.12)
SODIUM SERPL-SCNC: 140 MMOL/L (ref 136–145)
WBC # BLD AUTO: 2.96 THOUSAND/UL (ref 4.31–10.16)

## 2020-05-19 PROCEDURE — 84145 PROCALCITONIN (PCT): CPT | Performed by: INTERNAL MEDICINE

## 2020-05-19 PROCEDURE — 92526 ORAL FUNCTION THERAPY: CPT

## 2020-05-19 PROCEDURE — 99232 SBSQ HOSP IP/OBS MODERATE 35: CPT | Performed by: INTERNAL MEDICINE

## 2020-05-19 PROCEDURE — 80053 COMPREHEN METABOLIC PANEL: CPT | Performed by: INTERNAL MEDICINE

## 2020-05-19 PROCEDURE — 82140 ASSAY OF AMMONIA: CPT | Performed by: INTERNAL MEDICINE

## 2020-05-19 PROCEDURE — 85027 COMPLETE CBC AUTOMATED: CPT | Performed by: INTERNAL MEDICINE

## 2020-05-19 PROCEDURE — 74181 MRI ABDOMEN W/O CONTRAST: CPT

## 2020-05-19 RX ADMIN — FOLIC ACID 1 MG: 1 TABLET ORAL at 09:52

## 2020-05-19 RX ADMIN — METOPROLOL TARTRATE 2.5 MG: 5 INJECTION INTRAVENOUS at 23:48

## 2020-05-19 RX ADMIN — MIRTAZAPINE 7.5 MG: 15 TABLET, FILM COATED ORAL at 22:39

## 2020-05-19 RX ADMIN — Medication 1 TABLET: at 09:52

## 2020-05-19 RX ADMIN — NICOTINE 1 PATCH: 21 PATCH TRANSDERMAL at 10:43

## 2020-05-19 RX ADMIN — Medication 400 MG: at 17:33

## 2020-05-19 RX ADMIN — QUETIAPINE FUMARATE 25 MG: 25 TABLET ORAL at 11:15

## 2020-05-19 RX ADMIN — Medication 400 MG: at 09:52

## 2020-05-19 RX ADMIN — THIAMINE HCL TAB 100 MG 100 MG: 100 TAB at 09:52

## 2020-05-19 RX ADMIN — QUETIAPINE FUMARATE 25 MG: 25 TABLET ORAL at 22:39

## 2020-05-20 ENCOUNTER — TRANSITIONAL CARE MANAGEMENT (OUTPATIENT)
Dept: INTERNAL MEDICINE CLINIC | Facility: CLINIC | Age: 75
End: 2020-05-20

## 2020-05-20 ENCOUNTER — TELEPHONE (OUTPATIENT)
Dept: GASTROENTEROLOGY | Facility: CLINIC | Age: 75
End: 2020-05-20

## 2020-05-20 VITALS
TEMPERATURE: 98.4 F | DIASTOLIC BLOOD PRESSURE: 70 MMHG | BODY MASS INDEX: 30.41 KG/M2 | HEIGHT: 64 IN | SYSTOLIC BLOOD PRESSURE: 118 MMHG | HEART RATE: 100 BPM | OXYGEN SATURATION: 99 % | RESPIRATION RATE: 18 BRPM | WEIGHT: 178.13 LBS

## 2020-05-20 PROBLEM — R65.20 SEVERE SEPSIS (HCC): Status: RESOLVED | Noted: 2020-05-06 | Resolved: 2020-05-20

## 2020-05-20 PROBLEM — A41.9 SEVERE SEPSIS (HCC): Status: RESOLVED | Noted: 2020-05-06 | Resolved: 2020-05-20

## 2020-05-20 LAB
ALBUMIN SERPL BCP-MCNC: 3 G/DL (ref 3.5–5)
ALP SERPL-CCNC: 242 U/L (ref 46–116)
ALT SERPL W P-5'-P-CCNC: 24 U/L (ref 12–78)
ANION GAP SERPL CALCULATED.3IONS-SCNC: 11 MMOL/L (ref 4–13)
AST SERPL W P-5'-P-CCNC: 48 U/L (ref 5–45)
ATRIAL RATE: 174 BPM
BILIRUB SERPL-MCNC: 1.7 MG/DL (ref 0.2–1)
BUN SERPL-MCNC: 12 MG/DL (ref 5–25)
CALCIUM SERPL-MCNC: 9 MG/DL (ref 8.3–10.1)
CHLORIDE SERPL-SCNC: 109 MMOL/L (ref 100–108)
CO2 SERPL-SCNC: 25 MMOL/L (ref 21–32)
CREAT SERPL-MCNC: 0.94 MG/DL (ref 0.6–1.3)
ERYTHROCYTE [DISTWIDTH] IN BLOOD BY AUTOMATED COUNT: 22.4 % (ref 11.6–15.1)
GFR SERPL CREATININE-BSD FRML MDRD: 70 ML/MIN/1.73SQ M
GLUCOSE SERPL-MCNC: 123 MG/DL (ref 65–140)
HCT VFR BLD AUTO: 32.7 % (ref 34.8–46.1)
HGB BLD-MCNC: 9.8 G/DL (ref 11.5–15.4)
MCH RBC QN AUTO: 27.9 PG (ref 26.8–34.3)
MCHC RBC AUTO-ENTMCNC: 30 G/DL (ref 31.4–37.4)
MCV RBC AUTO: 93 FL (ref 82–98)
PLATELET # BLD AUTO: 41 THOUSANDS/UL (ref 149–390)
POTASSIUM SERPL-SCNC: 4.1 MMOL/L (ref 3.5–5.3)
PROT SERPL-MCNC: 6.6 G/DL (ref 6.4–8.2)
QRS AXIS: 80 DEGREES
QRSD INTERVAL: 70 MS
QT INTERVAL: 284 MS
QTC INTERVAL: 464 MS
RBC # BLD AUTO: 3.51 MILLION/UL (ref 3.81–5.12)
SODIUM SERPL-SCNC: 145 MMOL/L (ref 136–145)
T WAVE AXIS: 33 DEGREES
VENTRICULAR RATE: 161 BPM
WBC # BLD AUTO: 2.79 THOUSAND/UL (ref 4.31–10.16)

## 2020-05-20 PROCEDURE — 93005 ELECTROCARDIOGRAM TRACING: CPT

## 2020-05-20 PROCEDURE — 93010 ELECTROCARDIOGRAM REPORT: CPT | Performed by: INTERNAL MEDICINE

## 2020-05-20 PROCEDURE — 99239 HOSP IP/OBS DSCHRG MGMT >30: CPT | Performed by: INTERNAL MEDICINE

## 2020-05-20 PROCEDURE — 85027 COMPLETE CBC AUTOMATED: CPT | Performed by: INTERNAL MEDICINE

## 2020-05-20 PROCEDURE — 80053 COMPREHEN METABOLIC PANEL: CPT | Performed by: INTERNAL MEDICINE

## 2020-05-20 RX ORDER — LACTULOSE 20 G/30ML
20 SOLUTION ORAL 2 TIMES DAILY
Qty: 2700 ML | Refills: 0
Start: 2020-05-20 | End: 2020-06-02

## 2020-05-20 RX ORDER — PANTOPRAZOLE SODIUM 40 MG/1
40 TABLET, DELAYED RELEASE ORAL
Qty: 30 TABLET | Refills: 2 | Status: SHIPPED | OUTPATIENT
Start: 2020-05-21 | End: 2020-08-21 | Stop reason: SDUPTHER

## 2020-05-20 RX ORDER — METOPROLOL SUCCINATE 25 MG/1
75 TABLET, EXTENDED RELEASE ORAL DAILY
Qty: 90 TABLET | Refills: 2 | Status: SHIPPED | OUTPATIENT
Start: 2020-05-20 | End: 2020-06-09

## 2020-05-20 RX ORDER — FUROSEMIDE 80 MG
40 TABLET ORAL DAILY
Qty: 30 TABLET | Refills: 0
Start: 2020-05-20 | End: 2020-06-08

## 2020-05-20 RX ADMIN — NICOTINE 1 PATCH: 21 PATCH TRANSDERMAL at 09:35

## 2020-05-20 RX ADMIN — FUROSEMIDE 40 MG: 40 TABLET ORAL at 09:35

## 2020-05-20 RX ADMIN — METOPROLOL SUCCINATE 75 MG: 50 TABLET, EXTENDED RELEASE ORAL at 09:34

## 2020-05-20 RX ADMIN — PANTOPRAZOLE SODIUM 40 MG: 40 TABLET, DELAYED RELEASE ORAL at 07:25

## 2020-05-20 RX ADMIN — FOLIC ACID 1 MG: 1 TABLET ORAL at 09:34

## 2020-05-20 RX ADMIN — QUETIAPINE FUMARATE 25 MG: 25 TABLET ORAL at 09:34

## 2020-05-22 ENCOUNTER — TELEPHONE (OUTPATIENT)
Dept: INTERNAL MEDICINE CLINIC | Facility: CLINIC | Age: 75
End: 2020-05-22

## 2020-05-26 LAB
ATRIAL RATE: 136 BPM
ATRIAL RATE: 174 BPM
QRS AXIS: 77 DEGREES
QRS AXIS: 80 DEGREES
QRSD INTERVAL: 70 MS
QRSD INTERVAL: 76 MS
QT INTERVAL: 284 MS
QT INTERVAL: 344 MS
QTC INTERVAL: 464 MS
QTC INTERVAL: 488 MS
T WAVE AXIS: 33 DEGREES
T WAVE AXIS: 71 DEGREES
VENTRICULAR RATE: 121 BPM
VENTRICULAR RATE: 161 BPM

## 2020-05-26 PROCEDURE — 93010 ELECTROCARDIOGRAM REPORT: CPT | Performed by: INTERNAL MEDICINE

## 2020-05-28 ENCOUNTER — OFFICE VISIT (OUTPATIENT)
Dept: INTERNAL MEDICINE CLINIC | Facility: CLINIC | Age: 75
End: 2020-05-28
Payer: MEDICARE

## 2020-05-28 ENCOUNTER — TELEPHONE (OUTPATIENT)
Dept: INTERNAL MEDICINE CLINIC | Facility: CLINIC | Age: 75
End: 2020-05-28

## 2020-05-28 VITALS
HEART RATE: 80 BPM | DIASTOLIC BLOOD PRESSURE: 56 MMHG | OXYGEN SATURATION: 99 % | HEIGHT: 64 IN | SYSTOLIC BLOOD PRESSURE: 100 MMHG | WEIGHT: 165.2 LBS | TEMPERATURE: 98.6 F | BODY MASS INDEX: 28.2 KG/M2

## 2020-05-28 DIAGNOSIS — K70.30 ALCOHOLIC CIRRHOSIS OF LIVER WITHOUT ASCITES (HCC): ICD-10-CM

## 2020-05-28 DIAGNOSIS — G92.8 TOXIC METABOLIC ENCEPHALOPATHY: ICD-10-CM

## 2020-05-28 DIAGNOSIS — D64.9 ANEMIA, UNSPECIFIED TYPE: Primary | ICD-10-CM

## 2020-05-28 DIAGNOSIS — F10.27 DEMENTIA ASSOCIATED WITH ALCOHOLISM WITHOUT BEHAVIORAL DISTURBANCE (HCC): ICD-10-CM

## 2020-05-28 PROCEDURE — 99495 TRANSJ CARE MGMT MOD F2F 14D: CPT | Performed by: INTERNAL MEDICINE

## 2020-06-02 DIAGNOSIS — F10.11 HISTORY OF ALCOHOL ABUSE: ICD-10-CM

## 2020-06-02 RX ORDER — LACTULOSE 10 G/15ML
SOLUTION ORAL
Qty: 2700 ML | Refills: 5 | Status: SHIPPED | OUTPATIENT
Start: 2020-06-02 | End: 2020-10-21 | Stop reason: HOSPADM

## 2020-06-04 ENCOUNTER — OFFICE VISIT (OUTPATIENT)
Dept: GASTROENTEROLOGY | Facility: CLINIC | Age: 75
End: 2020-06-04

## 2020-06-04 VITALS
BODY MASS INDEX: 28.17 KG/M2 | HEART RATE: 72 BPM | SYSTOLIC BLOOD PRESSURE: 90 MMHG | DIASTOLIC BLOOD PRESSURE: 68 MMHG | WEIGHT: 165 LBS | HEIGHT: 64 IN

## 2020-06-04 DIAGNOSIS — D50.0 IRON DEFICIENCY ANEMIA DUE TO CHRONIC BLOOD LOSS: Primary | ICD-10-CM

## 2020-06-04 DIAGNOSIS — K70.30 ALCOHOLIC CIRRHOSIS OF LIVER WITHOUT ASCITES (HCC): ICD-10-CM

## 2020-06-04 PROCEDURE — 3008F BODY MASS INDEX DOCD: CPT | Performed by: PHYSICIAN ASSISTANT

## 2020-06-04 PROCEDURE — 4040F PNEUMOC VAC/ADMIN/RCVD: CPT | Performed by: PHYSICIAN ASSISTANT

## 2020-06-04 PROCEDURE — 1111F DSCHRG MED/CURRENT MED MERGE: CPT | Performed by: PHYSICIAN ASSISTANT

## 2020-06-04 PROCEDURE — 4004F PT TOBACCO SCREEN RCVD TLK: CPT | Performed by: PHYSICIAN ASSISTANT

## 2020-06-04 PROCEDURE — 1160F RVW MEDS BY RX/DR IN RCRD: CPT | Performed by: PHYSICIAN ASSISTANT

## 2020-06-04 PROCEDURE — 3074F SYST BP LT 130 MM HG: CPT | Performed by: PHYSICIAN ASSISTANT

## 2020-06-04 PROCEDURE — 3078F DIAST BP <80 MM HG: CPT | Performed by: PHYSICIAN ASSISTANT

## 2020-06-04 PROCEDURE — 99213 OFFICE O/P EST LOW 20 MIN: CPT | Performed by: PHYSICIAN ASSISTANT

## 2020-06-06 ENCOUNTER — HOSPITAL ENCOUNTER (EMERGENCY)
Facility: HOSPITAL | Age: 75
Discharge: HOME | End: 2020-06-07
Attending: EMERGENCY MEDICINE | Admitting: EMERGENCY MEDICINE
Payer: MEDICARE

## 2020-06-06 ENCOUNTER — APPOINTMENT (OUTPATIENT)
Dept: LAB | Facility: CLINIC | Age: 75
End: 2020-06-06
Payer: MEDICARE

## 2020-06-06 ENCOUNTER — TELEPHONE (OUTPATIENT)
Dept: OTHER | Facility: OTHER | Age: 75
End: 2020-06-06

## 2020-06-06 DIAGNOSIS — Z92.89 HISTORY OF TRANSFUSION OF PACKED RBC: ICD-10-CM

## 2020-06-06 DIAGNOSIS — D50.0 IRON DEFICIENCY ANEMIA DUE TO CHRONIC BLOOD LOSS: ICD-10-CM

## 2020-06-06 DIAGNOSIS — D64.9 ANEMIA, UNSPECIFIED TYPE: Primary | ICD-10-CM

## 2020-06-06 LAB
ABO GROUP BLD: NORMAL
ALBUMIN SERPL BCP-MCNC: 3 G/DL (ref 3.5–5)
ALP SERPL-CCNC: 245 U/L (ref 46–116)
ALT SERPL W P-5'-P-CCNC: 22 U/L (ref 12–78)
ANION GAP SERPL CALCULATED.3IONS-SCNC: 5 MMOL/L (ref 4–13)
ANISOCYTOSIS BLD QL SMEAR: PRESENT
ANISOCYTOSIS BLD QL SMEAR: PRESENT
AST SERPL W P-5'-P-CCNC: 43 U/L (ref 5–45)
BASOPHILS # BLD MANUAL: 0 THOUSAND/UL (ref 0–0.1)
BASOPHILS # BLD MANUAL: 0 THOUSAND/UL (ref 0–0.1)
BASOPHILS NFR MAR MANUAL: 0 % (ref 0–1)
BASOPHILS NFR MAR MANUAL: 0 % (ref 0–1)
BILIRUB SERPL-MCNC: 1.27 MG/DL (ref 0.2–1)
BLD GP AB SCN SERPL QL: NEGATIVE
BUN SERPL-MCNC: 23 MG/DL (ref 5–25)
CALCIUM SERPL-MCNC: 9.3 MG/DL (ref 8.3–10.1)
CHLORIDE SERPL-SCNC: 102 MMOL/L (ref 100–108)
CO2 SERPL-SCNC: 29 MMOL/L (ref 21–32)
CREAT SERPL-MCNC: 1.12 MG/DL (ref 0.6–1.3)
EOSINOPHIL # BLD MANUAL: 0.03 THOUSAND/UL (ref 0–0.4)
EOSINOPHIL # BLD MANUAL: 0.04 THOUSAND/UL (ref 0–0.4)
EOSINOPHIL NFR BLD MANUAL: 1 % (ref 0–6)
EOSINOPHIL NFR BLD MANUAL: 1 % (ref 0–6)
ERYTHROCYTE [DISTWIDTH] IN BLOOD BY AUTOMATED COUNT: 21.4 % (ref 11.6–15.1)
ERYTHROCYTE [DISTWIDTH] IN BLOOD BY AUTOMATED COUNT: 21.4 % (ref 11.6–15.1)
FERRITIN SERPL-MCNC: 1024 NG/ML (ref 8–388)
GFR SERPL CREATININE-BSD FRML MDRD: 56 ML/MIN/1.73SQ M
GLUCOSE SERPL-MCNC: 168 MG/DL (ref 65–140)
HCT VFR BLD AUTO: 21.8 % (ref 34.8–46.1)
HCT VFR BLD AUTO: 22.4 % (ref 34.8–46.1)
HGB BLD-MCNC: 6.8 G/DL (ref 11.5–15.4)
HGB BLD-MCNC: 7 G/DL (ref 11.5–15.4)
IRON SATN MFR SERPL: 99 %
IRON SERPL-MCNC: 243 UG/DL (ref 50–170)
LYMPHOCYTES # BLD AUTO: 1.33 THOUSAND/UL (ref 0.6–4.47)
LYMPHOCYTES # BLD AUTO: 1.54 THOUSAND/UL (ref 0.6–4.47)
LYMPHOCYTES # BLD AUTO: 38 % (ref 14–44)
LYMPHOCYTES # BLD AUTO: 46 % (ref 14–44)
MCH RBC QN AUTO: 28.5 PG (ref 26.8–34.3)
MCH RBC QN AUTO: 28.6 PG (ref 26.8–34.3)
MCHC RBC AUTO-ENTMCNC: 31.2 G/DL (ref 31.4–37.4)
MCHC RBC AUTO-ENTMCNC: 31.3 G/DL (ref 31.4–37.4)
MCV RBC AUTO: 91 FL (ref 82–98)
MCV RBC AUTO: 91 FL (ref 82–98)
MONOCYTES # BLD AUTO: 0.21 THOUSAND/UL (ref 0–1.22)
MONOCYTES # BLD AUTO: 0.23 THOUSAND/UL (ref 0–1.22)
MONOCYTES NFR BLD: 6 % (ref 4–12)
MONOCYTES NFR BLD: 7 % (ref 4–12)
NEUTROPHILS # BLD MANUAL: 1.47 THOUSAND/UL (ref 1.85–7.62)
NEUTROPHILS # BLD MANUAL: 1.65 THOUSAND/UL (ref 1.85–7.62)
NEUTS SEG NFR BLD AUTO: 44 % (ref 43–75)
NEUTS SEG NFR BLD AUTO: 47 % (ref 43–75)
NRBC BLD AUTO-RTO: 10 /100 WBC (ref 0–2)
NRBC BLD AUTO-RTO: 5 /100 WBCS
NRBC BLD AUTO-RTO: 6 /100 WBCS
PLATELET # BLD AUTO: 51 THOUSANDS/UL (ref 149–390)
PLATELET # BLD AUTO: 52 THOUSANDS/UL (ref 149–390)
PLATELET BLD QL SMEAR: ABNORMAL
PLATELET BLD QL SMEAR: ABNORMAL
POLYCHROMASIA BLD QL SMEAR: PRESENT
POLYCHROMASIA BLD QL SMEAR: PRESENT
POTASSIUM SERPL-SCNC: 4.9 MMOL/L (ref 3.5–5.3)
PROT SERPL-MCNC: 6.8 G/DL (ref 6.4–8.2)
RBC # BLD AUTO: 2.39 MILLION/UL (ref 3.81–5.12)
RBC # BLD AUTO: 2.45 MILLION/UL (ref 3.81–5.12)
RH BLD: POSITIVE
SODIUM SERPL-SCNC: 136 MMOL/L (ref 136–145)
SPECIMEN EXPIRATION DATE: NORMAL
TIBC SERPL-MCNC: 246 UG/DL (ref 250–450)
TOTAL CELLS COUNTED SPEC: 100
TOTAL CELLS COUNTED SPEC: 100
VARIANT LYMPHS # BLD AUTO: 2 %
VARIANT LYMPHS # BLD AUTO: 8 %
WBC # BLD AUTO: 3.35 THOUSAND/UL (ref 4.31–10.16)
WBC # BLD AUTO: 3.51 THOUSAND/UL (ref 4.31–10.16)

## 2020-06-06 PROCEDURE — 85007 BL SMEAR W/DIFF WBC COUNT: CPT

## 2020-06-06 PROCEDURE — 99285 EMERGENCY DEPT VISIT HI MDM: CPT | Performed by: NURSE PRACTITIONER

## 2020-06-06 PROCEDURE — 86923 COMPATIBILITY TEST ELECTRIC: CPT

## 2020-06-06 PROCEDURE — 80053 COMPREHEN METABOLIC PANEL: CPT

## 2020-06-06 PROCEDURE — P9016 RBC LEUKOCYTES REDUCED: HCPCS

## 2020-06-06 PROCEDURE — 83550 IRON BINDING TEST: CPT

## 2020-06-06 PROCEDURE — 86850 RBC ANTIBODY SCREEN: CPT | Performed by: EMERGENCY MEDICINE

## 2020-06-06 PROCEDURE — 82272 OCCULT BLD FECES 1-3 TESTS: CPT

## 2020-06-06 PROCEDURE — 85027 COMPLETE CBC AUTOMATED: CPT | Performed by: NURSE PRACTITIONER

## 2020-06-06 PROCEDURE — 83540 ASSAY OF IRON: CPT

## 2020-06-06 PROCEDURE — 36430 TRANSFUSION BLD/BLD COMPNT: CPT

## 2020-06-06 PROCEDURE — 86901 BLOOD TYPING SEROLOGIC RH(D): CPT | Performed by: EMERGENCY MEDICINE

## 2020-06-06 PROCEDURE — 85007 BL SMEAR W/DIFF WBC COUNT: CPT | Performed by: NURSE PRACTITIONER

## 2020-06-06 PROCEDURE — 85027 COMPLETE CBC AUTOMATED: CPT

## 2020-06-06 PROCEDURE — 82728 ASSAY OF FERRITIN: CPT

## 2020-06-06 PROCEDURE — 99284 EMERGENCY DEPT VISIT MOD MDM: CPT

## 2020-06-06 PROCEDURE — 36415 COLL VENOUS BLD VENIPUNCTURE: CPT

## 2020-06-06 PROCEDURE — 86900 BLOOD TYPING SEROLOGIC ABO: CPT | Performed by: EMERGENCY MEDICINE

## 2020-06-07 VITALS
RESPIRATION RATE: 20 BRPM | WEIGHT: 158.2 LBS | DIASTOLIC BLOOD PRESSURE: 60 MMHG | SYSTOLIC BLOOD PRESSURE: 131 MMHG | OXYGEN SATURATION: 99 % | HEIGHT: 65 IN | BODY MASS INDEX: 26.36 KG/M2 | TEMPERATURE: 98 F | HEART RATE: 85 BPM

## 2020-06-07 DIAGNOSIS — J96.01 ACUTE RESPIRATORY FAILURE WITH HYPOXIA (HCC): ICD-10-CM

## 2020-06-07 LAB
ABO GROUP BLD BPU: NORMAL
ABO GROUP BLD BPU: NORMAL
BPU ID: NORMAL
BPU ID: NORMAL
CROSSMATCH: NORMAL
CROSSMATCH: NORMAL
UNIT DISPENSE STATUS: NORMAL
UNIT DISPENSE STATUS: NORMAL
UNIT PRODUCT CODE: NORMAL
UNIT PRODUCT CODE: NORMAL
UNIT RH: NORMAL
UNIT RH: NORMAL

## 2020-06-08 ENCOUNTER — TELEPHONE (OUTPATIENT)
Dept: GASTROENTEROLOGY | Facility: CLINIC | Age: 75
End: 2020-06-08

## 2020-06-08 RX ORDER — FUROSEMIDE 80 MG
80 TABLET ORAL DAILY
Qty: 90 TABLET | Refills: 3 | Status: SHIPPED | OUTPATIENT
Start: 2020-06-08 | End: 2020-10-08 | Stop reason: HOSPADM

## 2020-06-09 ENCOUNTER — TELEPHONE (OUTPATIENT)
Dept: SURGICAL ONCOLOGY | Facility: CLINIC | Age: 75
End: 2020-06-09

## 2020-06-09 ENCOUNTER — OFFICE VISIT (OUTPATIENT)
Dept: GASTROENTEROLOGY | Facility: CLINIC | Age: 75
End: 2020-06-09
Payer: MEDICARE

## 2020-06-09 VITALS
BODY MASS INDEX: 29.07 KG/M2 | HEART RATE: 88 BPM | TEMPERATURE: 97.8 F | DIASTOLIC BLOOD PRESSURE: 60 MMHG | WEIGHT: 172 LBS | SYSTOLIC BLOOD PRESSURE: 122 MMHG

## 2020-06-09 DIAGNOSIS — Z20.822 ENCOUNTER FOR LABORATORY TESTING FOR COVID-19 VIRUS: ICD-10-CM

## 2020-06-09 DIAGNOSIS — D64.9 ANEMIA, UNSPECIFIED TYPE: ICD-10-CM

## 2020-06-09 DIAGNOSIS — D50.0 IRON DEFICIENCY ANEMIA DUE TO CHRONIC BLOOD LOSS: Primary | ICD-10-CM

## 2020-06-09 DIAGNOSIS — I10 BENIGN ESSENTIAL HYPERTENSION: ICD-10-CM

## 2020-06-09 DIAGNOSIS — R79.89 ELEVATED FERRITIN: ICD-10-CM

## 2020-06-09 PROCEDURE — 4040F PNEUMOC VAC/ADMIN/RCVD: CPT | Performed by: PHYSICIAN ASSISTANT

## 2020-06-09 PROCEDURE — 4004F PT TOBACCO SCREEN RCVD TLK: CPT | Performed by: PHYSICIAN ASSISTANT

## 2020-06-09 PROCEDURE — 99213 OFFICE O/P EST LOW 20 MIN: CPT | Performed by: PHYSICIAN ASSISTANT

## 2020-06-09 PROCEDURE — 3074F SYST BP LT 130 MM HG: CPT | Performed by: PHYSICIAN ASSISTANT

## 2020-06-09 PROCEDURE — 3078F DIAST BP <80 MM HG: CPT | Performed by: PHYSICIAN ASSISTANT

## 2020-06-09 PROCEDURE — 1160F RVW MEDS BY RX/DR IN RCRD: CPT | Performed by: PHYSICIAN ASSISTANT

## 2020-06-09 PROCEDURE — 1111F DSCHRG MED/CURRENT MED MERGE: CPT | Performed by: PHYSICIAN ASSISTANT

## 2020-06-09 RX ORDER — METOPROLOL SUCCINATE 25 MG/1
50 TABLET, EXTENDED RELEASE ORAL DAILY
Qty: 30 TABLET | Refills: 3
Start: 2020-06-09 | End: 2020-12-29 | Stop reason: HOSPADM

## 2020-06-15 ENCOUNTER — APPOINTMENT (OUTPATIENT)
Dept: LAB | Facility: CLINIC | Age: 75
End: 2020-06-15
Payer: MEDICARE

## 2020-06-15 DIAGNOSIS — D64.9 ANEMIA, UNSPECIFIED TYPE: ICD-10-CM

## 2020-06-15 DIAGNOSIS — Z20.822 ENCOUNTER FOR LABORATORY TESTING FOR COVID-19 VIRUS: ICD-10-CM

## 2020-06-15 DIAGNOSIS — R79.89 ELEVATED FERRITIN: ICD-10-CM

## 2020-06-15 LAB
ANISOCYTOSIS BLD QL SMEAR: PRESENT
BASOPHILS # BLD MANUAL: 0.03 THOUSAND/UL (ref 0–0.1)
BASOPHILS NFR MAR MANUAL: 1 % (ref 0–1)
EOSINOPHIL # BLD MANUAL: 0 THOUSAND/UL (ref 0–0.4)
EOSINOPHIL NFR BLD MANUAL: 0 % (ref 0–6)
ERYTHROCYTE [DISTWIDTH] IN BLOOD BY AUTOMATED COUNT: 20.5 % (ref 11.6–15.1)
HCT VFR BLD AUTO: 28.7 % (ref 34.8–46.1)
HGB BLD-MCNC: 8.6 G/DL (ref 11.5–15.4)
LYMPHOCYTES # BLD AUTO: 0.85 THOUSAND/UL (ref 0.6–4.47)
LYMPHOCYTES # BLD AUTO: 25 % (ref 14–44)
MCH RBC QN AUTO: 28 PG (ref 26.8–34.3)
MCHC RBC AUTO-ENTMCNC: 30 G/DL (ref 31.4–37.4)
MCV RBC AUTO: 94 FL (ref 82–98)
METAMYELOCYTES NFR BLD MANUAL: 1 % (ref 0–1)
MONOCYTES # BLD AUTO: 0.14 THOUSAND/UL (ref 0–1.22)
MONOCYTES NFR BLD: 4 % (ref 4–12)
NEUTROPHILS # BLD MANUAL: 2.16 THOUSAND/UL (ref 1.85–7.62)
NEUTS BAND NFR BLD MANUAL: 3 % (ref 0–8)
NEUTS SEG NFR BLD AUTO: 61 % (ref 43–75)
NRBC BLD AUTO-RTO: 7 /100 WBCS
PLATELET # BLD AUTO: 51 THOUSANDS/UL (ref 149–390)
PLATELET BLD QL SMEAR: ABNORMAL
POIKILOCYTOSIS BLD QL SMEAR: PRESENT
POLYCHROMASIA BLD QL SMEAR: PRESENT
RBC # BLD AUTO: 3.07 MILLION/UL (ref 3.81–5.12)
TARGETS BLD QL SMEAR: PRESENT
TOTAL CELLS COUNTED SPEC: 100
VARIANT LYMPHS # BLD AUTO: 5 %
WBC # BLD AUTO: 3.38 THOUSAND/UL (ref 4.31–10.16)

## 2020-06-15 PROCEDURE — 85027 COMPLETE CBC AUTOMATED: CPT

## 2020-06-15 PROCEDURE — 85007 BL SMEAR W/DIFF WBC COUNT: CPT

## 2020-06-15 PROCEDURE — U0003 INFECTIOUS AGENT DETECTION BY NUCLEIC ACID (DNA OR RNA); SEVERE ACUTE RESPIRATORY SYNDROME CORONAVIRUS 2 (SARS-COV-2) (CORONAVIRUS DISEASE [COVID-19]), AMPLIFIED PROBE TECHNIQUE, MAKING USE OF HIGH THROUGHPUT TECHNOLOGIES AS DESCRIBED BY CMS-2020-01-R: HCPCS

## 2020-06-15 PROCEDURE — 36415 COLL VENOUS BLD VENIPUNCTURE: CPT

## 2020-06-16 LAB — SARS-COV-2 RNA SPEC QL NAA+PROBE: NOT DETECTED

## 2020-06-17 ENCOUNTER — ANESTHESIA EVENT (OUTPATIENT)
Dept: GASTROENTEROLOGY | Facility: HOSPITAL | Age: 75
End: 2020-06-17

## 2020-06-18 ENCOUNTER — ANESTHESIA (OUTPATIENT)
Dept: GASTROENTEROLOGY | Facility: HOSPITAL | Age: 75
End: 2020-06-18

## 2020-06-18 ENCOUNTER — HOSPITAL ENCOUNTER (OUTPATIENT)
Dept: GASTROENTEROLOGY | Facility: HOSPITAL | Age: 75
Setting detail: OUTPATIENT SURGERY
Discharge: HOME | End: 2020-06-18
Attending: INTERNAL MEDICINE | Admitting: INTERNAL MEDICINE
Payer: MEDICARE

## 2020-06-18 VITALS
DIASTOLIC BLOOD PRESSURE: 54 MMHG | SYSTOLIC BLOOD PRESSURE: 93 MMHG | HEIGHT: 62 IN | OXYGEN SATURATION: 98 % | HEART RATE: 77 BPM | TEMPERATURE: 97.4 F | RESPIRATION RATE: 18 BRPM | BODY MASS INDEX: 31.4 KG/M2 | WEIGHT: 170.64 LBS

## 2020-06-18 DIAGNOSIS — D50.0 IRON DEFICIENCY ANEMIA DUE TO CHRONIC BLOOD LOSS: ICD-10-CM

## 2020-06-18 DIAGNOSIS — D64.9 ANEMIA, UNSPECIFIED TYPE: ICD-10-CM

## 2020-06-18 PROCEDURE — 43235 EGD DIAGNOSTIC BRUSH WASH: CPT | Performed by: INTERNAL MEDICINE

## 2020-06-18 RX ORDER — LIDOCAINE HYDROCHLORIDE 20 MG/ML
INJECTION, SOLUTION INFILTRATION; PERINEURAL AS NEEDED
Status: DISCONTINUED | OUTPATIENT
Start: 2020-06-18 | End: 2020-06-18 | Stop reason: SURG

## 2020-06-18 RX ORDER — METOCLOPRAMIDE HYDROCHLORIDE 5 MG/ML
INJECTION INTRAMUSCULAR; INTRAVENOUS AS NEEDED
Status: DISCONTINUED | OUTPATIENT
Start: 2020-06-18 | End: 2020-06-18 | Stop reason: SURG

## 2020-06-18 RX ORDER — SODIUM CHLORIDE, SODIUM LACTATE, POTASSIUM CHLORIDE, CALCIUM CHLORIDE 600; 310; 30; 20 MG/100ML; MG/100ML; MG/100ML; MG/100ML
125 INJECTION, SOLUTION INTRAVENOUS CONTINUOUS
Status: DISCONTINUED | OUTPATIENT
Start: 2020-06-18 | End: 2020-06-22 | Stop reason: HOSPADM

## 2020-06-18 RX ORDER — PROPOFOL 10 MG/ML
INJECTION, EMULSION INTRAVENOUS AS NEEDED
Status: DISCONTINUED | OUTPATIENT
Start: 2020-06-18 | End: 2020-06-18 | Stop reason: SURG

## 2020-06-18 RX ADMIN — METOCLOPRAMIDE HYDROCHLORIDE 10 MG: 5 INJECTION INTRAMUSCULAR; INTRAVENOUS at 08:09

## 2020-06-18 RX ADMIN — LIDOCAINE HYDROCHLORIDE 5 ML: 20 INJECTION, SOLUTION INFILTRATION; PERINEURAL at 08:05

## 2020-06-18 RX ADMIN — PROPOFOL 150 MG: 10 INJECTION, EMULSION INTRAVENOUS at 08:05

## 2020-06-18 RX ADMIN — SODIUM CHLORIDE, SODIUM LACTATE, POTASSIUM CHLORIDE, AND CALCIUM CHLORIDE 125 ML/HR: .6; .31; .03; .02 INJECTION, SOLUTION INTRAVENOUS at 07:03

## 2020-06-19 ENCOUNTER — TELEPHONE (OUTPATIENT)
Dept: GASTROENTEROLOGY | Facility: CLINIC | Age: 75
End: 2020-06-19

## 2020-06-22 LAB — HFE GENE MUT ANL BLD/T: NORMAL

## 2020-06-26 ENCOUNTER — OFFICE VISIT (OUTPATIENT)
Dept: CARDIOLOGY CLINIC | Facility: CLINIC | Age: 75
End: 2020-06-26
Payer: MEDICARE

## 2020-06-26 VITALS
HEART RATE: 95 BPM | WEIGHT: 168 LBS | HEIGHT: 62 IN | SYSTOLIC BLOOD PRESSURE: 110 MMHG | DIASTOLIC BLOOD PRESSURE: 68 MMHG | BODY MASS INDEX: 30.91 KG/M2 | OXYGEN SATURATION: 99 %

## 2020-06-26 DIAGNOSIS — I50.32 CHRONIC DIASTOLIC HEART FAILURE (HCC): Primary | ICD-10-CM

## 2020-06-26 DIAGNOSIS — I48.0 PAROXYSMAL ATRIAL FIBRILLATION (HCC): ICD-10-CM

## 2020-06-26 PROCEDURE — 4004F PT TOBACCO SCREEN RCVD TLK: CPT | Performed by: INTERNAL MEDICINE

## 2020-06-26 PROCEDURE — 4040F PNEUMOC VAC/ADMIN/RCVD: CPT | Performed by: INTERNAL MEDICINE

## 2020-06-26 PROCEDURE — 1111F DSCHRG MED/CURRENT MED MERGE: CPT | Performed by: INTERNAL MEDICINE

## 2020-06-26 PROCEDURE — 3008F BODY MASS INDEX DOCD: CPT | Performed by: INTERNAL MEDICINE

## 2020-06-26 PROCEDURE — 99214 OFFICE O/P EST MOD 30 MIN: CPT | Performed by: INTERNAL MEDICINE

## 2020-06-26 PROCEDURE — 3078F DIAST BP <80 MM HG: CPT | Performed by: INTERNAL MEDICINE

## 2020-06-26 PROCEDURE — 3074F SYST BP LT 130 MM HG: CPT | Performed by: INTERNAL MEDICINE

## 2020-06-26 PROCEDURE — 1160F RVW MEDS BY RX/DR IN RCRD: CPT | Performed by: INTERNAL MEDICINE

## 2020-07-04 DIAGNOSIS — K70.30 ALCOHOLIC CIRRHOSIS OF LIVER WITHOUT ASCITES (HCC): ICD-10-CM

## 2020-07-06 RX ORDER — RIFAXIMIN 550 MG/1
TABLET ORAL
Qty: 60 TABLET | Refills: 0 | Status: SHIPPED | OUTPATIENT
Start: 2020-07-06 | End: 2020-08-03

## 2020-07-09 ENCOUNTER — TELEPHONE (OUTPATIENT)
Dept: INTERNAL MEDICINE CLINIC | Facility: CLINIC | Age: 75
End: 2020-07-09

## 2020-07-09 NOTE — TELEPHONE ENCOUNTER
Patient sister Audrie Riedel informed me that Brooke Perez is hospitalized due to a high temp  She canceled appointment to see you on 7/10/2020  Eneida Lo (sister) would like a call from you  She is on her hippa consent form

## 2020-07-15 ENCOUNTER — TELEPHONE (OUTPATIENT)
Dept: INTERNAL MEDICINE CLINIC | Facility: CLINIC | Age: 75
End: 2020-07-15

## 2020-07-15 NOTE — TELEPHONE ENCOUNTER
COVID Pre-Visit Screening     1  Is this a family member screening? No  2  Have you traveled outside of your state in the past 2 weeks? No  3  Do you presently have a fever or flu-like symptoms? No  4  Do you have symptoms of an upper respiratory infection like runny nose, sore throat, or cough? No  5  Are you suffering from new headache that you have not had in the past?  No  6  Do you have/have you experienced any new shortness of breath recently? No  7  Do you have any new diarrhea, nausea or vomiting? Yes  8  Have you been in contact with anyone who has been sick or diagnosed with COVID-19? No  9  Do you have any new loss of taste or smell? NO  10  Are you able to wear a mask without a valve for the entire visit?  Rick Yip  [facility]  Thursday

## 2020-07-16 ENCOUNTER — OFFICE VISIT (OUTPATIENT)
Dept: INTERNAL MEDICINE CLINIC | Facility: CLINIC | Age: 75
End: 2020-07-16
Payer: MEDICARE

## 2020-07-16 VITALS
HEART RATE: 64 BPM | SYSTOLIC BLOOD PRESSURE: 100 MMHG | BODY MASS INDEX: 30.54 KG/M2 | TEMPERATURE: 98 F | WEIGHT: 167 LBS | DIASTOLIC BLOOD PRESSURE: 60 MMHG

## 2020-07-16 DIAGNOSIS — I48.0 PAROXYSMAL ATRIAL FIBRILLATION (HCC): ICD-10-CM

## 2020-07-16 DIAGNOSIS — K70.30 ALCOHOLIC CIRRHOSIS OF LIVER WITHOUT ASCITES (HCC): ICD-10-CM

## 2020-07-16 DIAGNOSIS — F10.27 DEMENTIA ASSOCIATED WITH ALCOHOLISM WITHOUT BEHAVIORAL DISTURBANCE (HCC): ICD-10-CM

## 2020-07-16 DIAGNOSIS — I50.33 ACUTE ON CHRONIC DIASTOLIC CHF (CONGESTIVE HEART FAILURE) (HCC): ICD-10-CM

## 2020-07-16 DIAGNOSIS — F33.41 RECURRENT MAJOR DEPRESSIVE DISORDER, IN PARTIAL REMISSION (HCC): ICD-10-CM

## 2020-07-16 DIAGNOSIS — I10 BENIGN ESSENTIAL HYPERTENSION: ICD-10-CM

## 2020-07-16 DIAGNOSIS — R26.9 GAIT DISTURBANCE: ICD-10-CM

## 2020-07-16 DIAGNOSIS — Z12.31 BREAST CANCER SCREENING BY MAMMOGRAM: ICD-10-CM

## 2020-07-16 DIAGNOSIS — Z78.0 ASYMPTOMATIC POSTMENOPAUSAL STATE: Primary | ICD-10-CM

## 2020-07-16 PROCEDURE — 3078F DIAST BP <80 MM HG: CPT | Performed by: PHYSICIAN ASSISTANT

## 2020-07-16 PROCEDURE — 3074F SYST BP LT 130 MM HG: CPT | Performed by: PHYSICIAN ASSISTANT

## 2020-07-16 PROCEDURE — 1160F RVW MEDS BY RX/DR IN RCRD: CPT | Performed by: PHYSICIAN ASSISTANT

## 2020-07-16 PROCEDURE — 1111F DSCHRG MED/CURRENT MED MERGE: CPT | Performed by: PHYSICIAN ASSISTANT

## 2020-07-16 PROCEDURE — 4040F PNEUMOC VAC/ADMIN/RCVD: CPT | Performed by: PHYSICIAN ASSISTANT

## 2020-07-16 PROCEDURE — 4004F PT TOBACCO SCREEN RCVD TLK: CPT | Performed by: PHYSICIAN ASSISTANT

## 2020-07-16 PROCEDURE — 99214 OFFICE O/P EST MOD 30 MIN: CPT | Performed by: PHYSICIAN ASSISTANT

## 2020-07-16 RX ORDER — SULFAMETHOXAZOLE AND TRIMETHOPRIM 800; 160 MG/1; MG/1
TABLET ORAL 2 TIMES DAILY
COMMUNITY
Start: 2020-07-11 | End: 2020-07-21

## 2020-07-16 RX ORDER — DILTIAZEM HYDROCHLORIDE 240 MG/1
240 CAPSULE, COATED, EXTENDED RELEASE ORAL DAILY
COMMUNITY
Start: 2020-07-12 | End: 2020-08-21 | Stop reason: SDUPTHER

## 2020-07-16 RX ORDER — QUETIAPINE FUMARATE 25 MG/1
25 TABLET, FILM COATED ORAL
COMMUNITY
Start: 2020-07-11 | End: 2020-07-16

## 2020-07-16 NOTE — PROGRESS NOTES
Assessment/Plan: I reviewed her hospital records  Answered questions regarding her med list she will take meds as listed below  Stopping Seroquel because she does not tolerate  She is ambulatory feeling well with no complaints  Physical exam is unremarkable continue close follow-up with Heme-Onc       Diagnoses and all orders for this visit:    Asymptomatic postmenopausal state  -     Mammo screening bilateral w 3d & cad; Future  -     DXA bone density spine hip and pelvis; Future    Breast cancer screening by mammogram  -     Mammo screening bilateral w 3d & cad; Future    Alcoholic cirrhosis of liver without ascites (HCC)    Benign essential hypertension    Acute on chronic diastolic CHF (congestive heart failure) (HCC)    Paroxysmal atrial fibrillation (HCC)    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)    Gait disturbance  -     Walker    Recurrent major depressive disorder, in partial remission ([facility] )    Other orders  -     diltiazem (CARDIZEM CD) 240 mg 24 hr capsule; Take 240 mg by mouth daily  -     Discontinue: QUEtiapine (SEROquel) 25 mg tablet; Take 25 mg by mouth  -     sulfamethoxazole-trimethoprim (BACTRIM DS) 800-160 mg per tablet; Take by mouth 2 (two) times a day        No problem-specific Assessment & Plan notes found for this encounter  Subjective:      Patient ID: Ty Ambriz is a 69 y o  female  She is here for post hospital follow-up she was hospitalized for 6 days last week because of a 2 day history abdominal pain weakness fever  She was found have urinary tract infection and pancytopenia she was treated with antibiotics  She got platelet and blood transfusion a bone marrow biopsy was done found to have multiple myeloma and she is now being followed by Heme-Onc  She is being seen tomorrow  She is currently comfortable with no complaints  She had some increased confusion in the hospital she has baseline significant dementia    History of anemia and low platelets going back about 6 months negative workup as an outpatient to find a source of any bleeding  She is followed by GI she has cirrhosis on lactulose and Xifaxan in the hospital had a rapid  AFib and diltiazem was added to the beta-blocker  Sent home on Seroquel some increased confusion with that medication  Seroquel has been stopped and she is now about back to her baseline  She is ambulatory eating well sleeping pretty well no complaint of any pain      The following portions of the patient's history were reviewed and updated as appropriate:   She has a past medical history of Benign essential hypertension, Chest pain (11/4/2017), Cirrhosis ([facility] ), Gastroesophageal reflux disease without esophagitis (11/16/2017), Liver disease, and Multiple myeloma ([facility] )  ,  does not have any pertinent problems on file  ,   has a past surgical history that includes Appendectomy and Bone marrow biopsy  ,  family history includes Heart attack in her father  ,   reports that she has been smoking cigarettes  She has been smoking about 0 25 packs per day  She has never used smokeless tobacco  She reports that she drank alcohol  She reports that she does not use drugs  ,  has No Known Allergies     Current Outpatient Medications   Medication Sig Dispense Refill    diltiazem (CARDIZEM CD) 240 mg 24 hr capsule Take 240 mg by mouth daily      folic acid (FOLVITE) 1 mg tablet Take 1 tablet (1 mg total) by mouth daily 90 tablet 1    furosemide (LASIX) 80 mg tablet Take 1 tablet (80 mg total) by mouth daily (Patient taking differently: Take 80 mg by mouth daily ) 90 tablet 3    lactulose (CHRONULAC) 10 g/15 mL solution TAKE 30 ML THREE TIMES A DAY 2700 mL 5    magnesium oxide (MAG-OX) 400 mg Take 1 tablet (400 mg total) by mouth 2 (two) times a day 180 tablet 1    metoprolol succinate (TOPROL-XL) 25 mg 24 hr tablet Take 2 tablets (50 mg total) by mouth daily 30 tablet 3    mirtazapine (REMERON) 7 5 MG tablet Take 1 tablet (7 5 mg total) by mouth daily at bedtime 90 tablet 3    nicotine (NICODERM CQ) 14 mg/24hr TD 24 hr patch Place 1 patch on the skin daily 28 patch 0    pantoprazole (PROTONIX) 40 mg tablet Take 1 tablet (40 mg total) by mouth daily in the early morning (Patient taking differently: Take 40 mg by mouth 2 (two) times a day ) 30 tablet 2    sulfamethoxazole-trimethoprim (BACTRIM DS) 800-160 mg per tablet Take by mouth 2 (two) times a day      thiamine 100 MG tablet Take 1 tablet (100 mg total) by mouth daily 90 tablet 1    XIFAXAN 550 MG tablet TAKE ONE TABLET BY MOUTH TWICE A DAY 60 tablet 0     No current facility-administered medications for this visit  Review of Systems   Constitutional: Positive for fatigue  Negative for activity change, appetite change, chills, diaphoresis, fever and unexpected weight change  HENT: Negative for congestion, dental problem, drooling, ear discharge, ear pain, facial swelling, hearing loss, nosebleeds, postnasal drip, rhinorrhea, sinus pressure, sinus pain, sneezing, sore throat, tinnitus, trouble swallowing and voice change  Eyes: Negative for photophobia, pain, discharge, redness, itching and visual disturbance  Respiratory: Negative for apnea, cough, choking, chest tightness, shortness of breath, wheezing and stridor  Cardiovascular: Negative for chest pain, palpitations and leg swelling  Gastrointestinal: Negative for abdominal distention, abdominal pain, anal bleeding, blood in stool, constipation, diarrhea, nausea, rectal pain and vomiting  Endocrine: Negative for cold intolerance, heat intolerance, polydipsia, polyphagia and polyuria  Genitourinary: Negative for decreased urine volume, difficulty urinating, dysuria, enuresis, flank pain, frequency, genital sores, hematuria and urgency  Musculoskeletal: Negative for arthralgias, back pain, gait problem, joint swelling, myalgias, neck pain and neck stiffness     Skin: Negative for color change, pallor, rash and wound  Neurological: Negative for dizziness, tremors, seizures, syncope, facial asymmetry, speech difficulty, weakness, light-headedness, numbness and headaches  Hematological: Negative for adenopathy  Does not bruise/bleed easily  Psychiatric/Behavioral: Positive for confusion  Negative for agitation, behavioral problems, decreased concentration, dysphoric mood, hallucinations, self-injury, sleep disturbance and suicidal ideas  The patient is not nervous/anxious and is not hyperactive  Objective:  Vitals:    07/16/20 1418   BP: 100/60   BP Location: Left arm   Patient Position: Sitting   Cuff Size: Standard   Pulse: 64   Temp: 98 °F (36 7 °C)   Weight: 75 8 kg (167 lb)     Body mass index is 30 54 kg/m²  Physical Exam   Constitutional: She appears well-developed  HENT:   Head: Normocephalic  Right Ear: External ear normal    Left Ear: External ear normal    Mouth/Throat: Oropharynx is clear and moist    Eyes: Pupils are equal, round, and reactive to light  Conjunctivae are normal    Neck: Neck supple  No JVD present  No thyromegaly present  Cardiovascular: Normal rate, regular rhythm, normal heart sounds and intact distal pulses  No murmur heard  Pulmonary/Chest: Effort normal and breath sounds normal  No stridor  No respiratory distress  She has no wheezes  She has no rales  She exhibits no tenderness  Abdominal: Soft  Bowel sounds are normal  She exhibits no distension  There is no tenderness  There is no rebound and no guarding  No hernia  Musculoskeletal: Normal range of motion  She exhibits no edema  Lymphadenopathy:     She has no cervical adenopathy  Neurological: She is alert  She has normal reflexes  She displays normal reflexes  No cranial nerve deficit  Coordination normal    Skin: Skin is warm and dry  Psychiatric: Her affect is blunt  She is withdrawn  Cognition and memory are impaired  She is inattentive  Vitals reviewed

## 2020-07-17 ENCOUNTER — TELEPHONE (OUTPATIENT)
Dept: CARDIOLOGY CLINIC | Facility: CLINIC | Age: 75
End: 2020-07-17

## 2020-07-17 NOTE — TELEPHONE ENCOUNTER
----- Message from Mendel Mallick sent at 7/17/2020 10:12 AM EDT -----  Regarding: Prescription Question  Contact: [phone]  Rachele, this is Anna Reynoso sister Anita Rivas was recently discharged from [facility] with a prescription for diltiazem 240mg/day in capsule form  She is unable to swallow the capsule  Is it ok to empty the contents in apple sauce?       I appreciate your advice,    Flako Uribe

## 2020-07-17 NOTE — TELEPHONE ENCOUNTER
Spoke with patient's sister, Salma Bean and advised her that Dr Korin Encarnacion recommends she contact the pharmacy  Salma Bean verbally understood

## 2020-08-01 DIAGNOSIS — K70.30 ALCOHOLIC CIRRHOSIS OF LIVER WITHOUT ASCITES (HCC): ICD-10-CM

## 2020-08-03 RX ORDER — RIFAXIMIN 550 MG/1
TABLET ORAL
Qty: 60 TABLET | Refills: 0 | Status: ON HOLD | OUTPATIENT
Start: 2020-08-03 | End: 2020-09-02

## 2020-08-14 ENCOUNTER — TELEPHONE (OUTPATIENT)
Dept: GASTROENTEROLOGY | Facility: CLINIC | Age: 75
End: 2020-08-14

## 2020-08-14 NOTE — TELEPHONE ENCOUNTER
Spoke to Neno Juan  She has not been able to give her the lactulose 15mL TID as Jose De Jesus Rivas has many office visits and procedures as she was diagnosed with multiple myeloma  She is doing well and is taking it BID most days  She will continue this and closely monitory her mental status    She is also maintained on Xifaxan

## 2020-08-14 NOTE — TELEPHONE ENCOUNTER
Bonnie Corral - patient's sister, Lucina Mott, called  Would like to speak with someone about changing patient's lactulose dose   Please call Lucina Mott at [phone] ty

## 2020-08-21 DIAGNOSIS — D64.9 ANEMIA, UNSPECIFIED TYPE: ICD-10-CM

## 2020-08-21 DIAGNOSIS — I10 BENIGN ESSENTIAL HYPERTENSION: Primary | ICD-10-CM

## 2020-08-21 RX ORDER — PANTOPRAZOLE SODIUM 40 MG/1
40 TABLET, DELAYED RELEASE ORAL
Qty: 30 TABLET | Refills: 2 | Status: SHIPPED | OUTPATIENT
Start: 2020-08-21 | End: 2020-11-02

## 2020-08-21 RX ORDER — DILTIAZEM HYDROCHLORIDE 240 MG/1
240 CAPSULE, COATED, EXTENDED RELEASE ORAL DAILY
Qty: 30 CAPSULE | Refills: 0 | Status: SHIPPED | OUTPATIENT
Start: 2020-08-21 | End: 2020-09-19

## 2020-08-21 NOTE — TELEPHONE ENCOUNTER
Refill :  pantoprazole (PROTONIX) 40 mg tablet    diltiazem (CARDIZEM CD) 240 mg 24 hr capsule    Call back # [phone]    [facility] # [phone]

## 2020-08-29 ENCOUNTER — HOSPITAL ENCOUNTER (INPATIENT)
Facility: HOSPITAL | Age: 75
LOS: 7 days | Discharge: HOME HEALTH | DRG: 091 | End: 2020-09-05
Attending: EMERGENCY MEDICINE | Admitting: INTERNAL MEDICINE
Payer: MEDICARE

## 2020-08-29 DIAGNOSIS — K70.30 ALCOHOLIC CIRRHOSIS OF LIVER WITHOUT ASCITES (HCC): ICD-10-CM

## 2020-08-29 DIAGNOSIS — E87.1 HYPONATREMIA: ICD-10-CM

## 2020-08-29 DIAGNOSIS — I50.32 CHRONIC DIASTOLIC CHF (CONGESTIVE HEART FAILURE) (HCC): ICD-10-CM

## 2020-08-29 DIAGNOSIS — D61.818 PANCYTOPENIA (HCC): ICD-10-CM

## 2020-08-29 DIAGNOSIS — R26.9 GAIT DISTURBANCE: ICD-10-CM

## 2020-08-29 DIAGNOSIS — K74.60 CIRRHOSIS (HCC): ICD-10-CM

## 2020-08-29 DIAGNOSIS — G92.8 TOXIC METABOLIC ENCEPHALOPATHY: ICD-10-CM

## 2020-08-29 DIAGNOSIS — D69.6 THROMBOCYTOPENIA (HCC): ICD-10-CM

## 2020-08-29 DIAGNOSIS — R45.1 AGITATION: ICD-10-CM

## 2020-08-29 DIAGNOSIS — E87.5 HYPERKALEMIA: ICD-10-CM

## 2020-08-29 DIAGNOSIS — N19 UREMIA: ICD-10-CM

## 2020-08-29 DIAGNOSIS — R53.1 GENERALIZED WEAKNESS: ICD-10-CM

## 2020-08-29 DIAGNOSIS — R04.0 EPISTAXIS: ICD-10-CM

## 2020-08-29 DIAGNOSIS — E83.39 HYPERPHOSPHATEMIA: ICD-10-CM

## 2020-08-29 DIAGNOSIS — F10.27 DEMENTIA ASSOCIATED WITH ALCOHOLISM WITHOUT BEHAVIORAL DISTURBANCE (HCC): ICD-10-CM

## 2020-08-29 DIAGNOSIS — D64.9 CHRONIC ANEMIA: ICD-10-CM

## 2020-08-29 DIAGNOSIS — N17.9 AKI (ACUTE KIDNEY INJURY) (HCC): Primary | ICD-10-CM

## 2020-08-29 DIAGNOSIS — R41.3 MEMORY DIFFICULTIES: ICD-10-CM

## 2020-08-29 DIAGNOSIS — E87.0 HYPERNATREMIA: ICD-10-CM

## 2020-08-29 PROBLEM — C90.00 MULTIPLE MYELOMA (HCC): Status: ACTIVE | Noted: 2020-08-29

## 2020-08-29 LAB
ALBUMIN SERPL BCP-MCNC: 3.9 G/DL (ref 3.5–5)
ALP SERPL-CCNC: 154 U/L (ref 46–116)
ALT SERPL W P-5'-P-CCNC: 30 U/L (ref 12–78)
AMMONIA PLAS-SCNC: 61 UMOL/L (ref 11–35)
ANION GAP SERPL CALCULATED.3IONS-SCNC: 15 MMOL/L (ref 4–13)
ANISOCYTOSIS BLD QL SMEAR: PRESENT
APTT PPP: 28 SECONDS (ref 23–37)
AST SERPL W P-5'-P-CCNC: 26 U/L (ref 5–45)
BASOPHILS # BLD MANUAL: 0 THOUSAND/UL (ref 0–0.1)
BASOPHILS NFR MAR MANUAL: 0 % (ref 0–1)
BILIRUB DIRECT SERPL-MCNC: 1.06 MG/DL (ref 0–0.2)
BILIRUB SERPL-MCNC: 1.5 MG/DL (ref 0.2–1)
BUN SERPL-MCNC: 84 MG/DL (ref 5–25)
CALCIUM SERPL-MCNC: 8.2 MG/DL (ref 8.3–10.1)
CHLORIDE SERPL-SCNC: 95 MMOL/L (ref 100–108)
CO2 SERPL-SCNC: 21 MMOL/L (ref 21–32)
CREAT SERPL-MCNC: 5.65 MG/DL (ref 0.6–1.3)
EOSINOPHIL # BLD MANUAL: 0 THOUSAND/UL (ref 0–0.4)
EOSINOPHIL NFR BLD MANUAL: 0 % (ref 0–6)
ERYTHROCYTE [DISTWIDTH] IN BLOOD BY AUTOMATED COUNT: 20.3 % (ref 11.6–15.1)
GFR SERPL CREATININE-BSD FRML MDRD: 8 ML/MIN/1.73SQ M
GLUCOSE SERPL-MCNC: 140 MG/DL (ref 65–140)
GLUCOSE SERPL-MCNC: 148 MG/DL (ref 65–140)
HCT VFR BLD AUTO: 27.4 % (ref 34.8–46.1)
HGB BLD-MCNC: 8.8 G/DL (ref 11.5–15.4)
INR PPP: 1.53 (ref 0.84–1.19)
LYMPHOCYTES # BLD AUTO: 1.16 THOUSAND/UL (ref 0.6–4.47)
LYMPHOCYTES # BLD AUTO: 26 % (ref 14–44)
MAGNESIUM SERPL-MCNC: 2 MG/DL (ref 1.6–2.6)
MCH RBC QN AUTO: 27.8 PG (ref 26.8–34.3)
MCHC RBC AUTO-ENTMCNC: 32.1 G/DL (ref 31.4–37.4)
MCV RBC AUTO: 86 FL (ref 82–98)
MONOCYTES # BLD AUTO: 0.27 THOUSAND/UL (ref 0–1.22)
MONOCYTES NFR BLD: 6 % (ref 4–12)
NEUTROPHILS # BLD MANUAL: 3.03 THOUSAND/UL (ref 1.85–7.62)
NEUTS BAND NFR BLD MANUAL: 3 % (ref 0–8)
NEUTS SEG NFR BLD AUTO: 65 % (ref 43–75)
NRBC BLD AUTO-RTO: 18 /100 WBCS
NRBC BLD AUTO-RTO: 27 /100 WBC (ref 0–2)
PHOSPHATE SERPL-MCNC: 7.9 MG/DL (ref 2.3–4.1)
PLATELET # BLD AUTO: 70 THOUSANDS/UL (ref 149–390)
PLATELET BLD QL SMEAR: ABNORMAL
POTASSIUM SERPL-SCNC: 6.2 MMOL/L (ref 3.5–5.3)
PROT SERPL-MCNC: 6.6 G/DL (ref 6.4–8.2)
PROTHROMBIN TIME: 17.6 SECONDS (ref 11.6–14.5)
RBC # BLD AUTO: 3.17 MILLION/UL (ref 3.81–5.12)
SODIUM SERPL-SCNC: 131 MMOL/L (ref 136–145)
TOTAL CELLS COUNTED SPEC: 100
TROPONIN I SERPL-MCNC: 0.02 NG/ML
TSH SERPL DL<=0.05 MIU/L-ACNC: 0.17 UIU/ML (ref 0.36–3.74)
WBC # BLD AUTO: 4.46 THOUSAND/UL (ref 4.31–10.16)

## 2020-08-29 PROCEDURE — 96365 THER/PROPH/DIAG IV INF INIT: CPT

## 2020-08-29 PROCEDURE — 36415 COLL VENOUS BLD VENIPUNCTURE: CPT | Performed by: EMERGENCY MEDICINE

## 2020-08-29 PROCEDURE — 84443 ASSAY THYROID STIM HORMONE: CPT | Performed by: EMERGENCY MEDICINE

## 2020-08-29 PROCEDURE — 93005 ELECTROCARDIOGRAM TRACING: CPT

## 2020-08-29 PROCEDURE — 80048 BASIC METABOLIC PNL TOTAL CA: CPT | Performed by: EMERGENCY MEDICINE

## 2020-08-29 PROCEDURE — 99285 EMERGENCY DEPT VISIT HI MDM: CPT

## 2020-08-29 PROCEDURE — 85007 BL SMEAR W/DIFF WBC COUNT: CPT | Performed by: EMERGENCY MEDICINE

## 2020-08-29 PROCEDURE — 99223 1ST HOSP IP/OBS HIGH 75: CPT | Performed by: FAMILY MEDICINE

## 2020-08-29 PROCEDURE — 85730 THROMBOPLASTIN TIME PARTIAL: CPT | Performed by: EMERGENCY MEDICINE

## 2020-08-29 PROCEDURE — 84100 ASSAY OF PHOSPHORUS: CPT | Performed by: EMERGENCY MEDICINE

## 2020-08-29 PROCEDURE — 80076 HEPATIC FUNCTION PANEL: CPT | Performed by: EMERGENCY MEDICINE

## 2020-08-29 PROCEDURE — 83735 ASSAY OF MAGNESIUM: CPT | Performed by: EMERGENCY MEDICINE

## 2020-08-29 PROCEDURE — 85027 COMPLETE CBC AUTOMATED: CPT | Performed by: EMERGENCY MEDICINE

## 2020-08-29 PROCEDURE — 84484 ASSAY OF TROPONIN QUANT: CPT | Performed by: EMERGENCY MEDICINE

## 2020-08-29 PROCEDURE — 94640 AIRWAY INHALATION TREATMENT: CPT

## 2020-08-29 PROCEDURE — 82948 REAGENT STRIP/BLOOD GLUCOSE: CPT

## 2020-08-29 PROCEDURE — 84439 ASSAY OF FREE THYROXINE: CPT | Performed by: EMERGENCY MEDICINE

## 2020-08-29 PROCEDURE — 82140 ASSAY OF AMMONIA: CPT | Performed by: EMERGENCY MEDICINE

## 2020-08-29 PROCEDURE — 96375 TX/PRO/DX INJ NEW DRUG ADDON: CPT

## 2020-08-29 PROCEDURE — 85610 PROTHROMBIN TIME: CPT | Performed by: EMERGENCY MEDICINE

## 2020-08-29 PROCEDURE — 99291 CRITICAL CARE FIRST HOUR: CPT | Performed by: EMERGENCY MEDICINE

## 2020-08-29 PROCEDURE — 96361 HYDRATE IV INFUSION ADD-ON: CPT

## 2020-08-29 RX ORDER — ALBUTEROL SULFATE 2.5 MG/3ML
10 SOLUTION RESPIRATORY (INHALATION) ONCE
Status: COMPLETED | OUTPATIENT
Start: 2020-08-29 | End: 2020-08-29

## 2020-08-29 RX ORDER — DILTIAZEM HYDROCHLORIDE 240 MG/1
240 CAPSULE, COATED, EXTENDED RELEASE ORAL DAILY
Status: DISCONTINUED | OUTPATIENT
Start: 2020-08-30 | End: 2020-09-05 | Stop reason: HOSPADM

## 2020-08-29 RX ORDER — LACTULOSE 20 G/30ML
20 SOLUTION ORAL 2 TIMES DAILY
Status: DISCONTINUED | OUTPATIENT
Start: 2020-08-30 | End: 2020-08-30

## 2020-08-29 RX ORDER — SODIUM CHLORIDE 9 MG/ML
50 INJECTION, SOLUTION INTRAVENOUS CONTINUOUS
Status: DISCONTINUED | OUTPATIENT
Start: 2020-08-29 | End: 2020-08-30

## 2020-08-29 RX ORDER — MIRTAZAPINE 15 MG/1
7.5 TABLET, FILM COATED ORAL
Status: DISCONTINUED | OUTPATIENT
Start: 2020-08-29 | End: 2020-09-05 | Stop reason: HOSPADM

## 2020-08-29 RX ORDER — HEPARIN SODIUM 5000 [USP'U]/ML
5000 INJECTION, SOLUTION INTRAVENOUS; SUBCUTANEOUS EVERY 12 HOURS SCHEDULED
Status: DISCONTINUED | OUTPATIENT
Start: 2020-08-29 | End: 2020-09-01

## 2020-08-29 RX ORDER — SODIUM POLYSTYRENE SULFONATE 4.1 MEQ/G
15 POWDER, FOR SUSPENSION ORAL; RECTAL ONCE
Status: COMPLETED | OUTPATIENT
Start: 2020-08-29 | End: 2020-08-29

## 2020-08-29 RX ORDER — PANTOPRAZOLE SODIUM 40 MG/1
40 TABLET, DELAYED RELEASE ORAL
Status: DISCONTINUED | OUTPATIENT
Start: 2020-08-30 | End: 2020-09-05 | Stop reason: HOSPADM

## 2020-08-29 RX ORDER — DEXTROSE MONOHYDRATE 25 G/50ML
25 INJECTION, SOLUTION INTRAVENOUS ONCE
Status: COMPLETED | OUTPATIENT
Start: 2020-08-29 | End: 2020-08-29

## 2020-08-29 RX ORDER — CALCIUM GLUCONATE 20 MG/ML
1 INJECTION, SOLUTION INTRAVENOUS ONCE
Status: COMPLETED | OUTPATIENT
Start: 2020-08-29 | End: 2020-08-29

## 2020-08-29 RX ADMIN — DEXTROSE 50 % IN WATER (D50W) INTRAVENOUS SYRINGE 25 ML: at 20:51

## 2020-08-29 RX ADMIN — SODIUM POLYSTYRENE SULFONATE 15 G: 1 POWDER ORAL; RECTAL at 22:24

## 2020-08-29 RX ADMIN — HEPARIN SODIUM 5000 UNITS: 5000 INJECTION INTRAVENOUS; SUBCUTANEOUS at 23:30

## 2020-08-29 RX ADMIN — SODIUM BICARBONATE 50 MEQ: 84 INJECTION INTRAVENOUS at 20:55

## 2020-08-29 RX ADMIN — ALBUTEROL SULFATE 10 MG: 2.5 SOLUTION RESPIRATORY (INHALATION) at 20:47

## 2020-08-29 RX ADMIN — CALCIUM GLUCONATE 1 G: 20 INJECTION, SOLUTION INTRAVENOUS at 19:53

## 2020-08-29 RX ADMIN — SODIUM CHLORIDE 500 ML: 0.9 INJECTION, SOLUTION INTRAVENOUS at 20:58

## 2020-08-29 RX ADMIN — INSULIN HUMAN 5 UNITS: 100 INJECTION, SOLUTION PARENTERAL at 20:53

## 2020-08-29 NOTE — ED PROVIDER NOTES
History  Chief Complaint   Patient presents with    Abdominal Pain     Patient co "abdominal pain, low kidney numbers, SOB, swollen feet"      HPI    Prior to Admission Medications   Prescriptions Last Dose Informant Patient Reported? Taking?    Xifaxan 550 MG tablet   No No   Sig: TAKE ONE TABLET BY MOUTH TWICE A DAY   diltiazem (CARDIZEM CD) 240 mg 24 hr capsule   No No   Sig: Take 1 capsule (240 mg total) by mouth daily   folic acid (FOLVITE) 1 mg tablet   No No   Sig: Take 1 tablet (1 mg total) by mouth daily   furosemide (LASIX) 80 mg tablet  Family Member No No   Sig: Take 1 tablet (80 mg total) by mouth daily   Patient taking differently: Take 80 mg by mouth daily    lactulose (CHRONULAC) 10 g/15 mL solution  Family Member No No   Sig: TAKE 30 ML THREE TIMES A DAY   magnesium oxide (MAG-OX) 400 mg   No No   Sig: Take 1 tablet (400 mg total) by mouth 2 (two) times a day   metoprolol succinate (TOPROL-XL) 25 mg 24 hr tablet   No No   Sig: Take 2 tablets (50 mg total) by mouth daily   mirtazapine (REMERON) 7 5 MG tablet  Family Member No No   Sig: Take 1 tablet (7 5 mg total) by mouth daily at bedtime   nicotine (NICODERM CQ) 14 mg/24hr TD 24 hr patch  Family Member No No   Sig: Place 1 patch on the skin daily   pantoprazole (PROTONIX) 40 mg tablet   No No   Sig: Take 1 tablet (40 mg total) by mouth daily in the early morning   thiamine 100 MG tablet   No No   Sig: Take 1 tablet (100 mg total) by mouth daily      Facility-Administered Medications: None       Past Medical History:   Diagnosis Date    Benign essential hypertension     last assessed - [id number]    Chest pain 11/4/2017    Cirrhosis ([facility] [address] )     Gastroesophageal reflux disease without esophagitis 11/16/2017    Liver disease     Multiple myeloma (HCC)        Past Surgical History:   Procedure Laterality Date    APPENDECTOMY      BONE MARROW BIOPSY         Family History   Problem Relation Age of Onset    Heart attack Father         myocardial infarction     I have reviewed and agree with the history as documented  E-Cigarette/Vaping    E-Cigarette Use Never User     Start Date 1/1/15     Quit Date 1/24/15      E-Cigarette/Vaping Substances    Nicotine No     THC No     CBD No     Flavoring No     Other No     Unknown No      Social History     Tobacco Use    Smoking status: Light Tobacco Smoker     Packs/day: 0 25     Types: Cigarettes    Smokeless tobacco: Never Used    Tobacco comment: 1/2 pack per month   Substance Use Topics    Alcohol use: Not Currently     Frequency: Never     Binge frequency: Never     Comment: History of significant daily alcohol intake  Sister joy no alcohol intake in 6 months    Drug use: No       Review of Systems    Physical Exam  Physical Exam  Vitals signs and nursing note reviewed  Constitutional:       General: She is not in acute distress  Appearance: She is well-developed  She is ill-appearing (chronically)  Comments: Blood pressure in the 90s, which is baseline per prior visits (mid 90s to low 100s)  HENT:      Head: Normocephalic and atraumatic  Eyes:      Conjunctiva/sclera: Conjunctivae normal       Pupils: Pupils are equal, round, and reactive to light  Neck:      Musculoskeletal: Normal range of motion  Trachea: No tracheal deviation  Cardiovascular:      Rate and Rhythm: Regular rhythm  Bradycardia present  Heart sounds: Normal heart sounds  Pulmonary:      Effort: Pulmonary effort is normal  No respiratory distress  Breath sounds: Normal breath sounds  Abdominal:      General: Bowel sounds are normal  There is no distension  Palpations: Abdomen is soft  There is no fluid wave  Tenderness: There is no abdominal tenderness  Musculoskeletal:      Right lower leg: Edema (3+) present  Left lower leg: Edema (3+) present  Skin:     General: Skin is warm and dry     Neurological:      Mental Status: She is alert and oriented to person, place, and time  GCS: GCS eye subscore is 4  GCS verbal subscore is 5  GCS motor subscore is 6  Comments: Mild generalized weakness, but no focal deficits   Psychiatric:         Behavior: Behavior normal          Vital Signs  ED Triage Vitals   Temperature Pulse Respirations Blood Pressure SpO2   08/29/20 1751 08/29/20 1751 08/29/20 1751 08/29/20 1751 08/29/20 1751   97 5 °F (36 4 °C) 60 18 99/52 95 %      Temp Source Heart Rate Source Patient Position - Orthostatic VS BP Location FiO2 (%)   08/29/20 1751 08/29/20 1951 08/29/20 1751 08/29/20 1751 --   Oral Monitor Sitting Right arm       Pain Score       --                  Vitals:    08/29/20 2000 08/29/20 2100 08/29/20 2130 08/29/20 2145   BP: 91/55 101/59 91/55 93/52   Pulse: (!) 50 (!) 51 56 (!) 54   Patient Position - Orthostatic VS: Lying Lying Lying Lying         Visual Acuity      ED Medications  Medications   heparin (porcine) subcutaneous injection 5,000 Units (has no administration in time range)   calcium gluconate 1 g in sodium chloride 0 9% 50 mL (premix) (0 g Intravenous Stopped 8/29/20 2027)   insulin regular (HumuLIN R,NovoLIN R) injection 5 Units (5 Units Intravenous Given 8/29/20 2053)   dextrose 50 % IV solution 25 mL (25 mL Intravenous Given 8/29/20 2051)   sodium bicarbonate 8 4 % injection 50 mEq (50 mEq Intravenous Given 8/29/20 2055)   albuterol inhalation solution 10 mg (10 mg Nebulization Given 8/29/20 2047)   sodium chloride 0 9 % bolus 500 mL (0 mL Intravenous Stopped 8/29/20 2219)   sodium polystyrene (KAYEXALATE) powder 15 g (15 g Oral Given 8/29/20 2224)       Diagnostic Studies  Results Reviewed     Procedure Component Value Units Date/Time    Fingerstick Glucose (POCT) [[id number]]  (Normal) Collected:  08/29/20 2051    Lab Status:  Final result Updated:  08/29/20 2052     POC Glucose 140 mg/dl     T4, free [[id number]] Collected:  08/29/20 1944    Lab Status:   In process Specimen:  Blood from Arm, Right Updated:  08/29/20 2039 CBC and differential [[id number]]  (Abnormal) Collected:  08/29/20 1944    Lab Status:  Final result Specimen:  Blood from Arm, Right Updated:  08/29/20 2038     WBC 4 46 Thousand/uL      RBC 3 17 Million/uL      Hemoglobin 8 8 g/dL      Hematocrit 27 4 %      MCV 86 fL      MCH 27 8 pg      MCHC 32 1 g/dL      RDW 20 3 %      Platelets 70 Thousands/uL      nRBC 18 /100 WBCs     APTT [[id number]]  (Normal) Collected:  08/29/20 1944    Lab Status:  Final result Specimen:  Blood from Arm, Right Updated:  08/29/20 2033     PTT 28 seconds     Protime-INR [[id number]]  (Abnormal) Collected:  08/29/20 1944    Lab Status:  Final result Specimen:  Blood from Arm, Right Updated:  08/29/20 2033     Protime 17 6 seconds      INR 1 53    Hepatic function panel [[id number]]  (Abnormal) Collected:  08/29/20 1944    Lab Status:  Final result Specimen:  Blood from Arm, Right Updated:  08/29/20 2027     Total Bilirubin 1 50 mg/dL      Bilirubin, Direct 1 06 mg/dL      Alkaline Phosphatase 154 U/L      AST 26 U/L      ALT 30 U/L      Total Protein 6 6 g/dL      Albumin 3 9 g/dL     TSH [[id number]]  (Abnormal) Collected:  08/29/20 1944    Lab Status:  Final result Specimen:  Blood from Arm, Right Updated:  08/29/20 2027     TSH 3RD GENERATON 0 168 uIU/mL     Narrative:       Patients undergoing fluorescein dye angiography may retain small amounts of fluorescein in the body for 48-72 hours post procedure  Samples containing fluorescein can produce falsely depressed TSH values  If the patient had this procedure,a specimen should be resubmitted post fluorescein clearance        Magnesium [[id number]]  (Normal) Collected:  08/29/20 1944    Lab Status:  Final result Specimen:  Blood from Arm, Right Updated:  08/29/20 2027     Magnesium 2 0 mg/dL     Phosphorus [[id number]]  (Abnormal) Collected:  08/29/20 1944    Lab Status:  Final result Specimen:  Blood from Arm, Right Updated:  08/29/20 2027     Phosphorus 7 9 mg/dL     Troponin I [[id number]] (Normal) Collected:  08/29/20 1944    Lab Status:  Final result Specimen:  Blood from Arm, Right Updated:  08/29/20 2021     Troponin I 0 02 ng/mL     Basic metabolic panel [[id number]]  (Abnormal) Collected:  08/29/20 1944    Lab Status:  Final result Specimen:  Blood from Arm, Right Updated:  08/29/20 2018     Sodium 131 mmol/L      Potassium 6 2 mmol/L      Chloride 95 mmol/L      CO2 21 mmol/L      ANION GAP 15 mmol/L      BUN 84 mg/dL      Creatinine 5 65 mg/dL      Glucose 148 mg/dL      Calcium 8 2 mg/dL      eGFR 8 ml/min/1 73sq m     Narrative:       [place] guidelines for Chronic Kidney Disease (CKD):     Stage 1 with normal or high GFR (GFR > 90 mL/min/1 73 square meters)    Stage 2 Mild CKD (GFR = 60-89 mL/min/1 73 square meters)    Stage 3A Moderate CKD (GFR = 45-59 mL/min/1 73 square meters)    Stage 3B Moderate CKD (GFR = 30-44 mL/min/1 73 square meters)    Stage 4 Severe CKD (GFR = 15-29 mL/min/1 73 square meters)    Stage 5 End Stage CKD (GFR <15 mL/min/1 73 square meters)  Note: GFR calculation is accurate only with a steady state creatinine    Ammonia [[id number]] Collected:  08/29/20 1944    Lab Status:   In process Specimen:  Blood from Arm, Right Updated:  08/29/20 1950                 No orders to display              Procedures  ECG 12 Lead Documentation Only    Date/Time: 8/29/2020 7:43 PM  Performed by: Yuly Wilkerson MD  Authorized by: Yuly Wilkerson MD     Indications / Diagnosis:  Weakness, CESAR  Patient location:  ED  Previous ECG:     Previous ECG:  Compared to current    Comparison ECG info:  05/20/20    Similarity:  Changes noted  Interpretation:     Interpretation: abnormal    Rate:     ECG rate:  50s-60    ECG rate assessment: bradycardic    Rhythm:     Rhythm: atrial fibrillation    Ectopy:     Ectopy: none    QRS:     QRS axis:  Right    QRS intervals:  Normal  Conduction:     Conduction: normal    T waves:     T waves: flattening and peaked      Flattening:  AVL    Peaked:  V4 and V5  Comments:      No visible P-waves, so either atrial fibrillation, which patient has known history of, verses irregular junctional rhythm  Low-voltage QRS and most of the leads, with some peaked appearance of T-waves in V4 and V5, though similar to prior  CriticalCare Time  Performed by: Fam Nolen MD  Authorized by: Fam Nolen MD     Critical care provider statement:     Critical care time (minutes):  36    Critical care time was exclusive of:  Separately billable procedures and treating other patients and teaching time    Critical care was necessary to treat or prevent imminent or life-threatening deterioration of the following conditions:  Hepatic failure, renal failure and circulatory failure    Critical care was time spent personally by me on the following activities:  Obtaining history from patient or surrogate, development of treatment plan with patient or surrogate, discussions with consultants, evaluation of patient's response to treatment, examination of patient, review of old charts, re-evaluation of patient's condition, ordering and review of laboratory studies and ordering and performing treatments and interventions    I assumed direction of critical care for this patient from another provider in my specialty: no               ED Course       US AUDIT      Most Recent Value   Initial Alcohol Screen: US AUDIT-C    1  How often do you have a drink containing alcohol? 1 Filed at: 08/29/2020 1753   2  How many drinks containing alcohol do you have on a typical day you are drinking? 0 Filed at: 08/29/2020 1753   3b  FEMALE Any Age, or MALE 65+: How often do you have 4 or more drinks on one occassion?   1 Filed at: 08/29/2020 1753   Audit-C Score  2 Filed at: 08/29/2020 1753              Identification of Seniors at Risk      Most Recent Value   (ISAR) Identification of Seniors at Risk   Before the illness or injury that brought you to the Emergency, did you need someone to help you on a regular basis? 0 Filed at: 08/29/2020 1754   In the last 24 hours, have you needed more help than usual?  1 Filed at: 08/29/2020 1754   Have you been hospitalized for one or more nights during the past 6 months? 0 Filed at: 08/29/2020 1754   In general, do you see well?  0 Filed at: 08/29/2020 1754   In general, do you have serious problems with your memory? 0 Filed at: 08/29/2020 1754   Do you take more than three different medications every day? 1 Filed at: 08/29/2020 1754   ISAR Score  2 Filed at: 08/29/2020 1754          AGUILA/DAST-10      Most Recent Value   How many times in the past year have you    Used an illegal drug or used a prescription medication for non-medical reasons? Never Filed at: 08/29/2020 1753                                MDM  Number of Diagnoses or Management Options  CESAR (acute kidney injury) [facility]): new and requires workup  Chronic anemia:   Cirrhosis ([facility] [address] ):   Generalized weakness: new and requires workup  Hyperkalemia: new and requires workup  Hyperphosphatemia: new and requires workup  Hyponatremia: new and requires workup  Thrombocytopenia ([facility] ):   Uremia: new and requires workup  Diagnosis management comments: This is a 71-year-old female who presents here today with generalized weakness  She feels run down and tired all day, stating she does not have any energy  She denies any other acute symptoms, including fevers, pain, nausea, vomiting, diarrhea, shortness of breath  The son who is with her states that she had seen her hematologist oncologist on 8/27 for routine follow-up, and was told that she needed blood work because she did not look well  The blood work came back with elevated creatinine level, and she was advised to come to the ER for evaluation    The son states she has chronic lower extremity edema and though it waxes and wanes, current amount is not as bad as it has been before  He denies any fevers, states she has been having some nausea but no vomiting, and has chronic shortness of breath that is not significantly worse than baseline  The patient states she has been taking her medications as prescribed  She has a history of alcoholic cirrhosis  She was supposed to start medication for her multiple myeloma, however given lab abnormalities this was delayed  She is not currently on any medications for her multiple myeloma and is not getting any treatment for this  She has problems with her kidneys at baseline and did have an admission earlier this month for CESAR  The patient does endorse recent decreased urine output  Baseline creatinine is around one, however she was admitted this to [facility] at the beginning of August for CESAR, with a creatinine up to 3 23, however has improved to around 2  Blood work on 08/20 she seven showed a creatinine of 3 44, with a potassium of 5 4  CBC showed chronic anemia and thrombocytopenia  Review of systems:  Otherwise negative unless stated as above    She is chronically ill-appearing, but in no acute distress  She has a large abdomen with no fluid wave or tenderness, and 3+ lower extremity edema  Exam is otherwise unremarkable  Weakness could be due to worsening CESAR with electrolyte abnormality, about a renal failure, hyperammonemia, ACS, dysrhythmia, heart failure, electrolyte or thyroid abnormality  We will check lab work to evaluate  Lab work today shows worsening of creatinine to 5 65, with potassium of 6 2  She has hyponatremia and mildly increased anion gap  Her uremia is likely contributing to her generalized weakness  We will treat her hyperkalemia per the order set  Given concern for hyper am anemia, decision was made to hold off on Kayexalate in case she does need lactulose, as this would take precedence  She does have elevated phosphate 7 9    Magnesium in the rest of her electrolytes are normal   She is at her baseline level of anemia and thrombocytopenia  TSH is low 0 168, which is similar to prior, however we will add on a T4  Hyperbilirubinemia and elevated alk-phos are unchanged from prior values  Given history of cirrhosis and current lower extremity edema, with history of grade 2 diastolic dysfunction on prior echo, we will be cautious with fluid boluses so as not to precipitate fluid overload  She will be admitted for further management  I did update the patient and son on findings and plan of care, and they expressed understanding         Amount and/or Complexity of Data Reviewed  Clinical lab tests: ordered and reviewed  Decide to obtain previous medical records or to obtain history from someone other than the patient: yes  Obtain history from someone other than the patient: yes  Review and summarize past medical records: yes  Discuss the patient with other providers: yes  Independent visualization of images, tracings, or specimens: yes          Disposition  Final diagnoses:   CESAR (acute kidney injury) ([place] [address] )   Hyperkalemia   Generalized weakness   Cirrhosis ([place] [address] )   Chronic anemia   Thrombocytopenia ([place] [address] )   Hyperphosphatemia   Hyponatremia   Uremia     Time reflects when diagnosis was documented in both MDM as applicable and the Disposition within this note     Time User Action Codes Description Comment    8/29/2020 10:13 PM Jessika Alamin Add [N17 9] CESAR (acute kidney injury) ([place] [address] )     8/29/2020 10:13 PM Candida Alamin Modify [N17 9] CESRA (acute kidney injury) ([place] [address] )     8/29/2020 10:13 PM Candida Alamin Add [E87 5] Hyperkalemia     8/29/2020 10:15 PM Downs-Tesoriero Lori Jasmine Modify [N17 9] CESAR (acute kidney injury) ([place] [address] )     8/29/2020 10:15 PM Downs-Tesoriero Lori Jasmine Modify [E87 5] Hyperkalemia     8/29/2020 10:16 PM Downs-TesorieroNathalyLori Jasmine Add [R53 1] Generalized weakness     8/29/2020 10:16 PM Downs-Tesoriero Lori Jasmine Add [K74 60] Cirrhosis ([place] [address] )     8/29/2020 10:38 PM Downs-Tesoriero Lori Jasmine Add [D64 9] Chronic anemia     8/29/2020 10:38 PM Richard Toney Add [D69 6] Thrombocytopenia ([address] )     8/29/2020 10:39 PM Dawna Ricahrd Sedrick Add [E83 39] Hyperphosphatemia     8/29/2020 10:40 PM Josétammie Richard Dubose Add [E87 1] Hyponatremia     8/29/2020 10:40 PM DownsJackelineIndytristan Richard Sedrick Add [N19] Uremia       ED Disposition     ED Disposition Condition Date/Time Comment    Admit Stable Sat Aug 29, 2020  9:42 PM Case was discussed with Bill Burns and the patient's admission status was agreed to be Admission Status: inpatient status to the service of Dr Patrick Eid          Follow-up Information    None         Current Discharge Medication List      CONTINUE these medications which have NOT CHANGED    Details   diltiazem (CARDIZEM CD) 240 mg 24 hr capsule Take 1 capsule (240 mg total) by mouth daily  Qty: 30 capsule, Refills: 0    Associated Diagnoses: Benign essential hypertension      folic acid (FOLVITE) 1 mg tablet Take 1 tablet (1 mg total) by mouth daily  Qty: 90 tablet, Refills: 1    Associated Diagnoses: History of alcohol abuse      furosemide (LASIX) 80 mg tablet Take 1 tablet (80 mg total) by mouth daily  Qty: 90 tablet, Refills: 3    Associated Diagnoses: Acute respiratory failure with hypoxia (HCC)      lactulose (CHRONULAC) 10 g/15 mL solution TAKE 30 ML THREE TIMES A DAY  Qty: 2700 mL, Refills: 5    Associated Diagnoses: History of alcohol abuse      magnesium oxide (MAG-OX) 400 mg Take 1 tablet (400 mg total) by mouth 2 (two) times a day  Qty: 180 tablet, Refills: 1    Associated Diagnoses: History of alcohol abuse      metoprolol succinate (TOPROL-XL) 25 mg 24 hr tablet Take 2 tablets (50 mg total) by mouth daily  Qty: 30 tablet, Refills: 3    Associated Diagnoses: Benign essential hypertension      mirtazapine (REMERON) 7 5 MG tablet Take 1 tablet (7 5 mg total) by mouth daily at bedtime  Qty: 90 tablet, Refills: 3    Associated Diagnoses: Recurrent major depressive disorder, in partial remission (HCC)      nicotine (NICODERM CQ) 14 mg/24hr TD 24 hr patch Place 1 patch on the skin daily  Qty: 28 patch, Refills: 0    Associated Diagnoses: Cigarette nicotine dependence without complication      pantoprazole (PROTONIX) 40 mg tablet Take 1 tablet (40 mg total) by mouth daily in the early morning  Qty: 30 tablet, Refills: 2    Associated Diagnoses: Anemia, unspecified type      thiamine 100 MG tablet Take 1 tablet (100 mg total) by mouth daily  Qty: 90 tablet, Refills: 1    Associated Diagnoses: History of alcohol abuse      Xifaxan 550 MG tablet TAKE ONE TABLET BY MOUTH TWICE A DAY  Qty: 60 tablet, Refills: 0    Associated Diagnoses: Alcoholic cirrhosis of liver without ascites (HCC)           No discharge procedures on file      PDMP Review     None          ED Provider  Electronically Signed by           Lake Erickson MD  08/29/20 2781

## 2020-08-30 ENCOUNTER — APPOINTMENT (INPATIENT)
Dept: RADIOLOGY | Facility: HOSPITAL | Age: 75
DRG: 091 | End: 2020-08-30
Payer: MEDICARE

## 2020-08-30 ENCOUNTER — APPOINTMENT (INPATIENT)
Dept: CT IMAGING | Facility: HOSPITAL | Age: 75
DRG: 091 | End: 2020-08-30
Payer: MEDICARE

## 2020-08-30 LAB
ANION GAP SERPL CALCULATED.3IONS-SCNC: 19 MMOL/L (ref 4–13)
ANISOCYTOSIS BLD QL SMEAR: PRESENT
BASOPHILS # BLD MANUAL: 0 THOUSAND/UL (ref 0–0.1)
BASOPHILS NFR MAR MANUAL: 0 % (ref 0–1)
BUN SERPL-MCNC: 96 MG/DL (ref 5–25)
CALCIUM SERPL-MCNC: 9.2 MG/DL (ref 8.3–10.1)
CHLORIDE SERPL-SCNC: 96 MMOL/L (ref 100–108)
CO2 SERPL-SCNC: 18 MMOL/L (ref 21–32)
CREAT SERPL-MCNC: 5.44 MG/DL (ref 0.6–1.3)
EOSINOPHIL # BLD MANUAL: 0 THOUSAND/UL (ref 0–0.4)
EOSINOPHIL NFR BLD MANUAL: 0 % (ref 0–6)
ERYTHROCYTE [DISTWIDTH] IN BLOOD BY AUTOMATED COUNT: 21.2 % (ref 11.6–15.1)
GFR SERPL CREATININE-BSD FRML MDRD: 8 ML/MIN/1.73SQ M
GLUCOSE SERPL-MCNC: 182 MG/DL (ref 65–140)
HCT VFR BLD AUTO: 31 % (ref 34.8–46.1)
HGB BLD-MCNC: 9.7 G/DL (ref 11.5–15.4)
LYMPHOCYTES # BLD AUTO: 1.12 THOUSAND/UL (ref 0.6–4.47)
LYMPHOCYTES # BLD AUTO: 23 % (ref 14–44)
MAGNESIUM SERPL-MCNC: 2.2 MG/DL (ref 1.6–2.6)
MCH RBC QN AUTO: 28.2 PG (ref 26.8–34.3)
MCHC RBC AUTO-ENTMCNC: 31.3 G/DL (ref 31.4–37.4)
MCV RBC AUTO: 90 FL (ref 82–98)
MONOCYTES # BLD AUTO: 0.44 THOUSAND/UL (ref 0–1.22)
MONOCYTES NFR BLD: 9 % (ref 4–12)
MYELOCYTES NFR BLD MANUAL: 3 % (ref 0–1)
NEUTROPHILS # BLD MANUAL: 3.16 THOUSAND/UL (ref 1.85–7.62)
NEUTS SEG NFR BLD AUTO: 65 % (ref 43–75)
NRBC BLD AUTO-RTO: 21 /100 WBCS
NRBC BLD AUTO-RTO: 28 /100 WBC (ref 0–2)
PLATELET # BLD AUTO: 73 THOUSANDS/UL (ref 149–390)
PLATELET BLD QL SMEAR: ABNORMAL
POTASSIUM SERPL-SCNC: 5.8 MMOL/L (ref 3.5–5.3)
RBC # BLD AUTO: 3.44 MILLION/UL (ref 3.81–5.12)
SODIUM SERPL-SCNC: 133 MMOL/L (ref 136–145)
T4 FREE SERPL-MCNC: 1.24 NG/DL (ref 0.76–1.46)
TOTAL CELLS COUNTED SPEC: 100
WBC # BLD AUTO: 4.86 THOUSAND/UL (ref 4.31–10.16)

## 2020-08-30 PROCEDURE — 85027 COMPLETE CBC AUTOMATED: CPT | Performed by: FAMILY MEDICINE

## 2020-08-30 PROCEDURE — 85007 BL SMEAR W/DIFF WBC COUNT: CPT | Performed by: FAMILY MEDICINE

## 2020-08-30 PROCEDURE — 80048 BASIC METABOLIC PNL TOTAL CA: CPT | Performed by: STUDENT IN AN ORGANIZED HEALTH CARE EDUCATION/TRAINING PROGRAM

## 2020-08-30 PROCEDURE — 99231 SBSQ HOSP IP/OBS SF/LOW 25: CPT | Performed by: INTERNAL MEDICINE

## 2020-08-30 PROCEDURE — G1004 CDSM NDSC: HCPCS

## 2020-08-30 PROCEDURE — 83735 ASSAY OF MAGNESIUM: CPT | Performed by: FAMILY MEDICINE

## 2020-08-30 PROCEDURE — 71045 X-RAY EXAM CHEST 1 VIEW: CPT

## 2020-08-30 PROCEDURE — 99233 SBSQ HOSP IP/OBS HIGH 50: CPT | Performed by: STUDENT IN AN ORGANIZED HEALTH CARE EDUCATION/TRAINING PROGRAM

## 2020-08-30 PROCEDURE — 70450 CT HEAD/BRAIN W/O DYE: CPT

## 2020-08-30 RX ORDER — LORAZEPAM 2 MG/ML
1 INJECTION INTRAMUSCULAR ONCE
Status: COMPLETED | OUTPATIENT
Start: 2020-08-30 | End: 2020-08-31

## 2020-08-30 RX ORDER — LACTULOSE 20 G/30ML
30 SOLUTION ORAL 2 TIMES DAILY
Status: DISCONTINUED | OUTPATIENT
Start: 2020-08-30 | End: 2020-09-05 | Stop reason: HOSPADM

## 2020-08-30 RX ORDER — FUROSEMIDE 10 MG/ML
40 INJECTION INTRAMUSCULAR; INTRAVENOUS ONCE
Status: COMPLETED | OUTPATIENT
Start: 2020-08-30 | End: 2020-08-30

## 2020-08-30 RX ORDER — LORAZEPAM 2 MG/ML
0.5 INJECTION INTRAMUSCULAR ONCE
Status: COMPLETED | OUTPATIENT
Start: 2020-08-30 | End: 2020-08-30

## 2020-08-30 RX ADMIN — HEPARIN SODIUM 5000 UNITS: 5000 INJECTION INTRAVENOUS; SUBCUTANEOUS at 21:42

## 2020-08-30 RX ADMIN — FUROSEMIDE 40 MG: 10 INJECTION, SOLUTION INTRAMUSCULAR; INTRAVENOUS at 21:49

## 2020-08-30 RX ADMIN — LACTULOSE 30 G: 10 SOLUTION ORAL at 18:24

## 2020-08-30 RX ADMIN — SODIUM CHLORIDE 50 ML/HR: 0.9 INJECTION, SOLUTION INTRAVENOUS at 00:30

## 2020-08-30 RX ADMIN — RIFAXIMIN 550 MG: 550 TABLET ORAL at 21:41

## 2020-08-30 RX ADMIN — PANTOPRAZOLE SODIUM 40 MG: 40 TABLET, DELAYED RELEASE ORAL at 06:13

## 2020-08-30 RX ADMIN — LORAZEPAM 0.5 MG: 2 INJECTION INTRAMUSCULAR; INTRAVENOUS at 08:06

## 2020-08-30 RX ADMIN — MIRTAZAPINE 7.5 MG: 15 TABLET, FILM COATED ORAL at 00:17

## 2020-08-30 RX ADMIN — MIRTAZAPINE 7.5 MG: 15 TABLET, FILM COATED ORAL at 21:41

## 2020-08-30 RX ADMIN — RIFAXIMIN 550 MG: 550 TABLET ORAL at 00:18

## 2020-08-30 NOTE — H&P
H&P- Ej Cuenca 1945, 69 y o  female MRN: [id number]    Unit/Bed#: ED 06 Encounter: [id number]    Primary Care Provider: Rosio Pepe MD   Date and time admitted to hospital: 8/29/2020  6:35 PM        * Toxic metabolic encephalopathy  Assessment & Plan  -patient has baseline dementia but she has altered mental status at this time likely secondary to uremia from acute kidney injury  -continue to monitor closely  -will treat acute kidney injury  -no focal neurological deficit  CT scan not done in the ED  -will continue to monitor mental status, worsens will get a CT head  -pending ammonia levels  Continue home dose of Xifaxan and lactulose    Hyperkalemia  Assessment & Plan  -hyperkalemia protocol initiated in the ED  -added Kayexalate to the regimen  -monitor shows moderately peaked T-waves  -repeat EKG in the morning    CESAR (acute kidney injury) ([facility] )  Assessment & Plan  -follow CESAR pathway  -bladder scan  -gentle IV hydration secondary to history of cirrhosis causing fluid overload along with CHF  -normal saline at 50 cc an hour  -monitor for any fluid overload  -nephrology consulted    Hyponatremia  Assessment & Plan  -likely secondary to hypervolemia  -normal saline  -recheck in a m      Chronic diastolic CHF (congestive heart failure) (HCC)  Assessment & Plan  Wt Readings from Last 3 Encounters:   08/29/20 74 8 kg (165 lb)   07/16/20 75 8 kg (167 lb)   06/26/20 76 2 kg (168 lb)       -patient has a bilateral pedal edema 2 to 3+ pitting which is usual for her  -no signs respiratory distress, pulmonary edema or orthopnea  -will be very cautious with IV fluid hydration for the treatment of CESAR and hyperkalemia      Paroxysmal atrial fibrillation ([facility] )  Assessment & Plan  -not on any anticoagulation  -continue Cardizem at home dose  -rate controlled AFib    Multiple myeloma ([facility] )  Assessment & Plan  -follows with [facility]  -nephrology consulted for concern of myeloma kidney with sudden worsening of renal functions    Liver cirrhosis ([facility] )  Assessment & Plan  -follow high ammonia levels  -continue home medications of lactulose and Xifaxan  -outpatient follow-up with GI    Pancytopenia ([facility] )  Assessment & Plan  -has been diagnosed by oncology with multiple myeloma  -follows with [facility]  -currently WBC count and hemoglobin are stable  -chronically low platelets  No bleeding    Memory difficulties  Assessment & Plan  -has underlying baseline dementia which has worsened in the last 3- 4 days  -will treat the underlying cause    Anemia  Assessment & Plan  -hemoglobin stable at 8 8  No bleeding endorsed  -continue to monitor closely          VTE Prophylaxis: Pharmacologic VTE Prophylaxis contraindicated due to Thrombocytopenia  / sequential compression device   Code Status:  Full code  POLST: There is no POLST form on file for this patient (pre-hospital)  Discussion with family:  Discussed with nawaf So at bedside  All questions answered in detail    Anticipated Length of Stay:  Patient will be admitted on an Inpatient basis with an anticipated length of stay of  greater than 2 midnights  Justification for Hospital Stay:  Patient with acute toxic/metabolic encephalopathy likely secondary to underlying uremia from acute kidney injury  Associated hyperkalemia which will need continuous cardiac monitoring and IV medications for which hospitalization is necessary  Total Time for Visit, including Counseling / Coordination of Care: 45 minutes  Greater than 50% of this total time spent on direct patient counseling and coordination of care  Chief Complaint:   Worsening weakness    History of Present Illness:    Ej Cuenca is a 69 y o  female who presents with worsening weakness since couple weeks  Patient had outpatient blood work done which showed acute kidney injury with elevated creatinine of around 3 3  Patient came to the ER with a creatinine was found to be 5 65  According to the son who was at bedside patient has been more confused in the last couple days  Even though she has dementia at baseline she is more confused than how she usually is  Patient denies any chest pain, shortness of breath, orthopnea, nausea, vomiting, diarrhea, abdominal pain, headaches, lightheadedness, dizziness  Her history is not very reliable but her son attests to this  In the ED patient had labs done which showed elevated potassium of 6 2    Review of Systems:    Review of Systems   Constitutional: Positive for activity change, appetite change and fatigue  Negative for chills, diaphoresis, fever and unexpected weight change  HENT: Negative for congestion, mouth sores, nosebleeds, sinus pressure, sore throat and trouble swallowing  Eyes: Negative for photophobia, discharge, itching and visual disturbance  Respiratory: Negative for cough, chest tightness, shortness of breath and wheezing  Cardiovascular: Positive for leg swelling  Negative for chest pain and palpitations  Gastrointestinal: Negative for abdominal distention, abdominal pain, blood in stool, constipation, diarrhea, nausea and vomiting  Endocrine: Negative for cold intolerance, heat intolerance, polydipsia, polyphagia and polyuria  Genitourinary: Positive for decreased urine volume  Negative for difficulty urinating, dysuria, flank pain, frequency, hematuria and urgency  Musculoskeletal: Negative for back pain, gait problem, joint swelling, myalgias and neck pain  Neurological: Positive for weakness  Negative for dizziness, tremors, seizures, syncope, speech difficulty, light-headedness and headaches  Psychiatric/Behavioral: Positive for behavioral problems, confusion and decreased concentration  Negative for agitation, sleep disturbance and suicidal ideas  The patient is not nervous/anxious and is not hyperactive          Past Medical and Surgical History:     Past Medical History:   Diagnosis Date    Benign essential hypertension     last assessed - [id number]    Chest pain 11/4/2017    Cirrhosis ([facility] )     Gastroesophageal reflux disease without esophagitis 11/16/2017    Liver disease     Multiple myeloma ([facility] )        Past Surgical History:   Procedure Laterality Date    APPENDECTOMY      BONE MARROW BIOPSY         Meds/Allergies:    Prior to Admission medications    Medication Sig Start Date End Date Taking? Authorizing Provider   diltiazem (CARDIZEM CD) 240 mg 24 hr capsule Take 1 capsule (240 mg total) by mouth daily 8/21/20 9/20/20  Toby Hilton MD   folic acid (FOLVITE) 1 mg tablet Take 1 tablet (1 mg total) by mouth daily 2/17/20 7/16/20  Karthik Ortiz PA-C   furosemide (LASIX) 80 mg tablet Take 1 tablet (80 mg total) by mouth daily  Patient taking differently: Take 80 mg by mouth daily  6/8/20 9/6/20  Karthik Ortiz PA-C   lactulose (CHRONULAC) 10 g/15 mL solution TAKE 30 ML THREE TIMES A DAY 6/2/20   Karthik Ortiz PA-C   magnesium oxide (MAG-OX) 400 mg Take 1 tablet (400 mg total) by mouth 2 (two) times a day 2/17/20 7/16/20  Karthik Ortiz PA-C   metoprolol succinate (TOPROL-XL) 25 mg 24 hr tablet Take 2 tablets (50 mg total) by mouth daily 6/9/20 7/16/20  Reji Carrero PA-C   mirtazapine (REMERON) 7 5 MG tablet Take 1 tablet (7 5 mg total) by mouth daily at bedtime 3/24/20   Toby Hilton MD   nicotine (NICODERM CQ) 14 mg/24hr TD 24 hr patch Place 1 patch on the skin daily 2/7/20   Kathryn Mendenhall MD   pantoprazole (PROTONIX) 40 mg tablet Take 1 tablet (40 mg total) by mouth daily in the early morning 8/21/20   Toby Hilton MD   thiamine 100 MG tablet Take 1 tablet (100 mg total) by mouth daily 2/17/20 7/16/20  Karthik Ortiz PA-C   Xifaxan 550 MG tablet TAKE ONE TABLET BY MOUTH TWICE A DAY 8/3/20 7/29/21  Reji Carrero PA-C     I have reviewed home medications with patient family member  Allergies: No Known Allergies    Social History:     Marital Status:       Social History Substance and Sexual Activity   Alcohol Use Not Currently    Frequency: Never    Binge frequency: Never    Comment: History of significant daily alcohol intake  Sister joy no alcohol intake in 6 months     Social History     Tobacco Use   Smoking Status Light Tobacco Smoker    Packs/day: 0 25    Types: Cigarettes   Smokeless Tobacco Never Used   Tobacco Comment    1/2 pack per month     Social History     Substance and Sexual Activity   Drug Use No       Family History:    Cardiac disease    Physical Exam:     Vitals:   Blood Pressure: 93/52 (08/29/20 2145)  Pulse: (!) 54 (08/29/20 2145)  Temperature: 97 5 °F (36 4 °C) (08/29/20 1751)  Temp Source: Oral (08/29/20 1751)  Respirations: 16 (08/29/20 2145)  Height: 5' 2" (157 5 cm) (08/29/20 1751)  Weight - Scale: 74 8 kg (165 lb) (08/29/20 1751)  SpO2: 99 % (08/29/20 2145)    Physical Exam    General- Awake, alert but not oriented to time place or person  HEENT- Normocephalic, atraumatic, oral mucosa- dry  Neck- Supple, No carotid bruit, no JVD  CVS- Normal S1/ S2, irregularly irregular, No murmur, bilateral 3+ pedal edema pitting   Respiratory system- B/L clear breath sounds, no wheezing, no crackles at the bases  Abdomen- Soft, Non distended, no tenderness, Bowel sound- present 4 quads  Genitourinary- No suprapubic tenderness, No CVA tenderness  Skin- No new bruise or rash  Musculoskeletal- No gross deformity  Psych- No acute psychosis  CNS-  No acute focal neurologic deficit noted      Additional Data:     Lab Results: I have personally reviewed pertinent reports        Results from last 7 days   Lab Units 08/29/20 1944   WBC Thousand/uL 4 46   HEMOGLOBIN g/dL 8 8*   HEMATOCRIT % 27 4*   PLATELETS Thousands/uL 70*   BANDS PCT % 3   LYMPHO PCT % 26   MONO PCT % 6   EOS PCT % 0     Results from last 7 days   Lab Units 08/29/20 1944   SODIUM mmol/L 131*   POTASSIUM mmol/L 6 2*   CHLORIDE mmol/L 95*   CO2 mmol/L 21   BUN mg/dL 84*   CREATININE mg/dL 5 65* ANION GAP mmol/L 15*   CALCIUM mg/dL 8 2*   ALBUMIN g/dL 3 9   TOTAL BILIRUBIN mg/dL 1 50*   ALK PHOS U/L 154*   ALT U/L 30   AST U/L 26   GLUCOSE RANDOM mg/dL 148*     Results from last 7 days   Lab Units 08/29/20  1944   INR  1 53*     Results from last 7 days   Lab Units 08/29/20  2051   POC GLUCOSE mg/dl 140               Imaging: I have personally reviewed pertinent reports  No orders to display       EKG, Pathology, and Other Studies Reviewed on Admission:   · EKG:  AFib, no acute ST T wave changes, mildly peaked T-waves    Allscripts / Epic Records Reviewed: Yes     ** Please Note: This note has been constructed using a voice recognition system   **

## 2020-08-30 NOTE — ASSESSMENT & PLAN NOTE
Wt Readings from Last 3 Encounters:   08/29/20 74 8 kg (165 lb)   07/16/20 75 8 kg (167 lb)   06/26/20 76 2 kg (168 lb)     Echo 01/31/2020 shows EF 50-55% grade 2 dysfunction  -patient has a bilateral pedal edema 2 to 3+ pitting which is usual for her  -no signs respiratory distress, pulmonary edema or orthopnea    Follow-up with chest x-ray  Given hyponatremia, elevated BUN, creatinine, bilateral lower extremity edema will give trial of IV Lasix and monitor BMP

## 2020-08-30 NOTE — ASSESSMENT & PLAN NOTE
-follows with [facility]  -nephrology consulted for concern of myeloma kidney with sudden worsening of renal functions

## 2020-08-30 NOTE — PROGRESS NOTES
1317: Pt  Was seen having shortness of breathe and labored breathing  Put her on O2 at 2L O2 sat is 99  Bladder scanned patient 313 ML  MD made aware

## 2020-08-30 NOTE — ASSESSMENT & PLAN NOTE
-follow CESAR pathway  -bladder scan  -DC IV fluids  I suspect secondary to volume overload    Will treat with trial of Lasix and monitor BMP  Will follow-up with neurology recommendation  -monitor for any fluid overload  -nephrology consulted

## 2020-08-30 NOTE — ASSESSMENT & PLAN NOTE
Currently patient is awake, alert, confused more than her baseline per son at bedside  Patient required multiple orientation, one-to-one close observation, IM Ativan since did not have IV access  Patient has underlying dementia  Acute worsening suspected multifactorial in the settings of elevated ammonia, hyponatremia  Increase lactulose to 30 g b i d  Continue Xifaxan  Follow-up with CT head  Repeat BMP and trend kidney function and potassium  Nephrology following  Continue close one-to-one observation  Frequently orientation  Ativan p r n  For anxiety and behavior  Discussed with son at bedside and is agreeable with above plan

## 2020-08-30 NOTE — SPEECH THERAPY NOTE
Spoke with RN who reports patient is combative refusing to participate with anything including po  Per chart patient presenting with lingual stasis with meds and mastication of meds  Consider crushing medications and administering with abel BALDERAS aware  Will follow up within 24 hours      LOAN Rosario , [id number] [facility]  Speech Language Pathologist   Available via [address] [place] [address] #[id number]  [place] #[id number]

## 2020-08-30 NOTE — PROGRESS NOTES
Came to see the patient for evaluation of acute kidney injury    Patient is quite confused though seems to be awake    She is refusing all the blood test or any urine test   She want even allow me to examine her  Will advised repeat blood test to assess kidney function monitoring    Will try to come back to reassess her once he gets better

## 2020-08-30 NOTE — ASSESSMENT & PLAN NOTE
-follow high ammonia levels  -continue home medications of lactulose and Xifaxan  -outpatient follow-up with GI

## 2020-08-30 NOTE — ASSESSMENT & PLAN NOTE
-hyperkalemia protocol initiated in the ED  -added Kayexalate to the regimen  -monitor shows moderately peaked T-waves  Continue tele monitoring  This morning's BMP was not done  Currently patient is more common and son present at bedside supple try to to [facility] for now

## 2020-08-30 NOTE — ASSESSMENT & PLAN NOTE
-follows with [facility]  -nephrology consulted for concern of myeloma kidney with sudden worsening of renal functions

## 2020-08-30 NOTE — ASSESSMENT & PLAN NOTE
-has underlying baseline dementia which has worsened in the last 3- 4 days  -will treat the underlying cause

## 2020-08-30 NOTE — ASSESSMENT & PLAN NOTE
-has been diagnosed by oncology with multiple myeloma  -follows with [facility]  -currently WBC count and hemoglobin are stable  -chronically low platelets    No bleeding

## 2020-08-30 NOTE — ASSESSMENT & PLAN NOTE
Wt Readings from Last 3 Encounters:   08/29/20 74 8 kg (165 lb)   07/16/20 75 8 kg (167 lb)   06/26/20 76 2 kg (168 lb)       -patient has a bilateral pedal edema 2 to 3+ pitting which is usual for her  -no signs respiratory distress, pulmonary edema or orthopnea  -will be very cautious with IV fluid hydration for the treatment of CESAR and hyperkalemia

## 2020-08-30 NOTE — PROGRESS NOTES
0330: 1) Pt noted to have a distended abdomen, bladder scanned for greater than 600, straight cath for 100  Bladder scan post straight cath noted ~618  Pt had no urge to void  2)  No BM noted over night s/p kaxeylate in ED  3) Pt removed Iv acces  Unable to re-obtain access charge nurse contacted ICU for assistance, awaiting for their assistance to re-obtain access at this time  4) Pt noted to have difficulty  Swallowing pills  When pills are given large and small the pills remain on her tongue, even when given with pudding  Often takes multiple sips of water to get pills to the back of the throat to be swallowed  Pt chews the larger pills like Xifaxin  Pt removed telemetry multiple times throughout the night  Telemetry leads noted to be tied in a knot this morning at the end of the shift  Telemetry reapplied  No respiratory/acute distress noted at this time

## 2020-08-30 NOTE — PROGRESS NOTES
Progress Note - Sebastián Quivers 1945, 69 y o  female MRN: [id number]    Unit/Bed#: -Cathy Encounter: [id number]    Primary Care Provider: Sarah Mello MD   Date and time admitted to hospital: 8/29/2020  6:35 PM        * Toxic metabolic encephalopathy  Assessment & Plan  Currently patient is awake, alert, confused more than her baseline per son at bedside  Patient required multiple orientation, one-to-one close observation, IM Ativan since did not have IV access  Patient has underlying dementia  Acute worsening suspected multifactorial in the settings of elevated ammonia, hyponatremia  Increase lactulose to 30 g b i d  Continue Xifaxan  Follow-up with CT head  Repeat BMP and trend kidney function and potassium  Nephrology following  Continue close one-to-one observation  Frequently orientation  Ativan p r n  For anxiety and behavior  Discussed with son at bedside and is agreeable with above plan  Multiple myeloma [facility])  Assessment & Plan  -follows with [facility]  -nephrology consulted for concern of myeloma kidney with sudden worsening of renal functions  Hyponatremia  Assessment & Plan  -likely secondary to hypervolemia  DC IV fluids  Continue with IV Lasix  Monitor BMP    Hyperkalemia  Assessment & Plan  -hyperkalemia protocol initiated in the ED  -added Kayexalate to the regimen  -monitor shows moderately peaked T-waves  Continue tele monitoring  This morning's BMP was not done  Currently patient is more common and son present at bedside supple try to to [facility] for now  Paroxysmal atrial fibrillation (HCC)  Assessment & Plan  -not on any anticoagulation  -continue Cardizem at home dose  -rate controlled AFib    CESAR (acute kidney injury) ([facility] )  Assessment & Plan  -follow CESAR pathway  -bladder scan  -DC IV fluids  I suspect secondary to volume overload    Will treat with trial of Lasix and monitor BMP  Will follow-up with neurology recommendation  -monitor for any fluid overload  -nephrology consulted    Chronic diastolic CHF (congestive heart failure) (HCC)  Assessment & Plan  Wt Readings from Last 3 Encounters:   08/29/20 74 8 kg (165 lb)   07/16/20 75 8 kg (167 lb)   06/26/20 76 2 kg (168 lb)     Echo 01/31/2020 shows EF 50-55% grade 2 dysfunction  -patient has a bilateral pedal edema 2 to 3+ pitting which is usual for her  -no signs respiratory distress, pulmonary edema or orthopnea  Follow-up with chest x-ray  Given hyponatremia, elevated BUN, creatinine, bilateral lower extremity edema will give trial of IV Lasix and monitor BMP      Liver cirrhosis ([facility] )  Assessment & Plan  -follow high ammonia levels  -continue home medications of lactulose and Xifaxan  -outpatient follow-up with GI    Pancytopenia ([facility] )  Assessment & Plan  -has been diagnosed by oncology with multiple myeloma  -follows with [facility]  -currently WBC count and hemoglobin are stable  -chronically low platelets  No bleeding    Memory difficulties  Assessment & Plan  -has underlying baseline dementia which has worsened in the last 3- 4 days  -will treat the underlying cause    Anemia  Assessment & Plan  -hemoglobin stable at 8 8  No bleeding endorsed  -continue to monitor closely        VTE Pharmacologic Prophylaxis:   Pharmacologic: Heparin    Patient Centered Rounds: I have performed bedside rounds with nursing staff today  Discussions with Specialists or Other Care Team Provider:  Nephrology    Education and Discussions with Family / Patient:  Discussed with son at bedside  Time Spent for Care: 30 minutes  More than 50% of total time spent on counseling and coordination of care as described above  Current Length of Stay: 1 day(s)    Current Patient Status: Inpatient   Certification Statement: The patient will continue to require additional inpatient hospital stay due to Above    Discharge Plan: tbd    Code Status: Prior      Subjective:   Afebrile, hemodynamically stable  Episodes of periods of confusion with aggressive behavior requiring close one-to-one observation, [facility]  Currently was son is present at bedside patient is common  Confused requiring frequent reorientation  Poor historian  Denies any new complaints  No other events reported  Objective:     Vitals:   Temp (24hrs), Av °F (36 1 °C), Min:96 1 °F (35 6 °C), Max:97 6 °F (36 4 °C)    Temp:  [96 1 °F (35 6 °C)-97 6 °F (36 4 °C)] 96 2 °F (35 7 °C)  HR:  [50-87] 87  Resp:  [16-19] 18  BP: ()/(52-84) 132/84  SpO2:  [89 %-99 %] 96 %  Body mass index is 30 18 kg/m²  Input and Output Summary (last 24 hours): Intake/Output Summary (Last 24 hours) at 2020 1713  Last data filed at 2020 1500  Gross per 24 hour   Intake 1040 ml   Output 350 ml   Net 690 ml       Physical Exam:     Physical Exam  Constitutional:       General: She is not in acute distress  Appearance: Normal appearance  She is not ill-appearing  Comments: Elderly patient, acutely nontoxic appearing  HENT:      Head: Normocephalic and atraumatic  Nose: No rhinorrhea  Mouth/Throat:      Pharynx: No oropharyngeal exudate or posterior oropharyngeal erythema  Eyes:      Extraocular Movements: Extraocular movements intact  Conjunctiva/sclera: Conjunctivae normal       Pupils: Pupils are equal, round, and reactive to light  Neck:      Musculoskeletal: Normal range of motion and neck supple  No neck rigidity  Cardiovascular:      Rate and Rhythm: Normal rate and regular rhythm  Pulses: Normal pulses  Heart sounds: Normal heart sounds  Pulmonary:      Effort: Pulmonary effort is normal  No respiratory distress  Breath sounds: Normal breath sounds  No stridor  No wheezing or rhonchi  Abdominal:      General: Bowel sounds are normal  There is no distension  Palpations: Abdomen is soft  Tenderness: There is no abdominal tenderness     Musculoskeletal: Normal range of motion  General: No swelling  Right lower leg: Edema present  Left lower leg: Edema present  Skin:     Findings: No rash  Neurological:      General: No focal deficit present  Mental Status: She is alert  Mental status is at baseline  She is disoriented  Comments: Awake, alert, confused, requires frequent reorientation  Poor historian  Psychiatric:      Comments: Aggressive, agitated at times         Additional Data:     Labs:    Results from last 7 days   Lab Units 08/30/20  1256 08/29/20  1944   WBC Thousand/uL 4 86 4 46   HEMOGLOBIN g/dL 9 7* 8 8*   HEMATOCRIT % 31 0* 27 4*   PLATELETS Thousands/uL 73* 70*   BANDS PCT %  --  3   LYMPHO PCT % 23 26   MONO PCT % 9 6   EOS PCT % 0 0     Results from last 7 days   Lab Units 08/29/20 1944   SODIUM mmol/L 131*   POTASSIUM mmol/L 6 2*   CHLORIDE mmol/L 95*   CO2 mmol/L 21   BUN mg/dL 84*   CREATININE mg/dL 5 65*   ANION GAP mmol/L 15*   CALCIUM mg/dL 8 2*   ALBUMIN g/dL 3 9   TOTAL BILIRUBIN mg/dL 1 50*   ALK PHOS U/L 154*   ALT U/L 30   AST U/L 26   GLUCOSE RANDOM mg/dL 148*     Results from last 7 days   Lab Units 08/29/20  1944   INR  1 53*     Results from last 7 days   Lab Units 08/29/20 2051   POC GLUCOSE mg/dl 140                   * I Have Reviewed All Lab Data Listed Above  * Additional Pertinent Lab Tests Reviewed:  All Labs Within Last 24 Hours Reviewed      Recent Cultures (last 7 days):           Last 24 Hours Medication List:   Current Facility-Administered Medications   Medication Dose Route Frequency Provider Last Rate    diltiazem  240 mg Oral Daily Olena Clark MD      furosemide  40 mg Intravenous Once Cassia Mcgill MD      heparin (porcine)  5,000 Units Subcutaneous Q12H [address] Olena Clark MD      lactulose  30 g Oral BID Cassia Mcgill MD      LORazepam  1 mg Intramuscular Once Cassia Mcgill MD      mirtazapine  7 5 mg Oral HS Olena Clark MD      pantoprazole  40 mg Oral Early Morning Diana Jackson Brayan Love MD      rifaximin  550 mg Oral Q12H Panchito Palma MD          Today, Patient Was Seen By: Joni Frazier MD    ** Please Note: Dictation voice to text software may have been used in the creation of this document   **

## 2020-08-30 NOTE — ASSESSMENT & PLAN NOTE
-hyperkalemia protocol initiated in the ED  -added Kayexalate to the regimen  -monitor shows moderately peaked T-waves  -repeat EKG in the morning

## 2020-08-30 NOTE — PROGRESS NOTES
0730 patient was seen trying to take off telemetry leads and was very agitated  Yelling and cursing to the nurses  MD made aware and ordered 0 25mg ativan IM  After an hour ativan was given patient is still very agitated and verbally abusive to nurses  Charge nurse was made aware and was able to get a 1:1 order from MD  Patient refused IV to be placed on her  IV lasix not given bec  of no IV site  Informed oncoming nurse about meds that was not given

## 2020-08-30 NOTE — ASSESSMENT & PLAN NOTE
-has been diagnosed by oncology with multiple myeloma  -follows with [facility]  -currently WBC count and hemoglobin are stable  -chronically low platelets    No bleeding

## 2020-08-30 NOTE — ASSESSMENT & PLAN NOTE
-follow CESAR pathway  -bladder scan  -gentle IV hydration secondary to history of cirrhosis causing fluid overload along with CHF  -normal saline at 50 cc an hour  -monitor for any fluid overload  -nephrology consulted

## 2020-08-30 NOTE — ASSESSMENT & PLAN NOTE
-patient has baseline dementia but she has altered mental status at this time likely secondary to uremia from acute kidney injury  -continue to monitor closely  -will treat acute kidney injury  -no focal neurological deficit  CT scan not done in the ED  -will continue to monitor mental status, worsens will get a CT head  -pending ammonia levels    Continue home dose of Xifaxan and lactulose

## 2020-08-31 ENCOUNTER — APPOINTMENT (INPATIENT)
Dept: MRI IMAGING | Facility: HOSPITAL | Age: 75
DRG: 091 | End: 2020-08-31
Payer: MEDICARE

## 2020-08-31 ENCOUNTER — TELEPHONE (OUTPATIENT)
Dept: INTERNAL MEDICINE CLINIC | Facility: CLINIC | Age: 75
End: 2020-08-31

## 2020-08-31 ENCOUNTER — APPOINTMENT (INPATIENT)
Dept: CT IMAGING | Facility: HOSPITAL | Age: 75
DRG: 091 | End: 2020-08-31
Payer: MEDICARE

## 2020-08-31 LAB
AMMONIA PLAS-SCNC: 33 UMOL/L (ref 11–35)
ANION GAP SERPL CALCULATED.3IONS-SCNC: 18 MMOL/L (ref 4–13)
BACTERIA UR QL AUTO: ABNORMAL /HPF
BILIRUB UR QL STRIP: NEGATIVE
BUN SERPL-MCNC: 96 MG/DL (ref 5–25)
CALCIUM SERPL-MCNC: 8.9 MG/DL (ref 8.3–10.1)
CHLORIDE SERPL-SCNC: 96 MMOL/L (ref 100–108)
CHLORIDE UR-SCNC: 20 MMOL/L
CLARITY UR: ABNORMAL
CO2 SERPL-SCNC: 19 MMOL/L (ref 21–32)
COLOR UR: YELLOW
CREAT SERPL-MCNC: 4.7 MG/DL (ref 0.6–1.3)
CREAT UR-MCNC: 83.8 MG/DL
GFR SERPL CREATININE-BSD FRML MDRD: 10 ML/MIN/1.73SQ M
GLUCOSE SERPL-MCNC: 173 MG/DL (ref 65–140)
GLUCOSE UR STRIP-MCNC: NEGATIVE MG/DL
HGB UR QL STRIP.AUTO: ABNORMAL
KETONES UR STRIP-MCNC: NEGATIVE MG/DL
LEUKOCYTE ESTERASE UR QL STRIP: ABNORMAL
NITRITE UR QL STRIP: NEGATIVE
NON-SQ EPI CELLS URNS QL MICRO: ABNORMAL /HPF
OSMOLALITY UR: 335 MMOL/KG
PH UR STRIP.AUTO: 5.5 [PH]
POTASSIUM SERPL-SCNC: 5.4 MMOL/L (ref 3.5–5.3)
PROT UR STRIP-MCNC: ABNORMAL MG/DL
RBC #/AREA URNS AUTO: ABNORMAL /HPF
SODIUM 24H UR-SCNC: 26 MOL/L
SODIUM SERPL-SCNC: 133 MMOL/L (ref 136–145)
SP GR UR STRIP.AUTO: 1.02 (ref 1–1.03)
UROBILINOGEN UR QL STRIP.AUTO: 0.2 E.U./DL
WBC #/AREA URNS AUTO: ABNORMAL /HPF

## 2020-08-31 PROCEDURE — 84165 PROTEIN E-PHORESIS SERUM: CPT | Performed by: PATHOLOGY

## 2020-08-31 PROCEDURE — 80048 BASIC METABOLIC PNL TOTAL CA: CPT | Performed by: FAMILY MEDICINE

## 2020-08-31 PROCEDURE — 82140 ASSAY OF AMMONIA: CPT | Performed by: INTERNAL MEDICINE

## 2020-08-31 PROCEDURE — G1004 CDSM NDSC: HCPCS

## 2020-08-31 PROCEDURE — 82570 ASSAY OF URINE CREATININE: CPT | Performed by: INTERNAL MEDICINE

## 2020-08-31 PROCEDURE — 83935 ASSAY OF URINE OSMOLALITY: CPT | Performed by: INTERNAL MEDICINE

## 2020-08-31 PROCEDURE — 86334 IMMUNOFIX E-PHORESIS SERUM: CPT | Performed by: INTERNAL MEDICINE

## 2020-08-31 PROCEDURE — 99223 1ST HOSP IP/OBS HIGH 75: CPT | Performed by: INTERNAL MEDICINE

## 2020-08-31 PROCEDURE — 84165 PROTEIN E-PHORESIS SERUM: CPT | Performed by: INTERNAL MEDICINE

## 2020-08-31 PROCEDURE — 81001 URINALYSIS AUTO W/SCOPE: CPT | Performed by: INTERNAL MEDICINE

## 2020-08-31 PROCEDURE — 70551 MRI BRAIN STEM W/O DYE: CPT

## 2020-08-31 PROCEDURE — 84300 ASSAY OF URINE SODIUM: CPT | Performed by: INTERNAL MEDICINE

## 2020-08-31 PROCEDURE — 99223 1ST HOSP IP/OBS HIGH 75: CPT | Performed by: PSYCHIATRY & NEUROLOGY

## 2020-08-31 PROCEDURE — 82436 ASSAY OF URINE CHLORIDE: CPT | Performed by: INTERNAL MEDICINE

## 2020-08-31 PROCEDURE — 99232 SBSQ HOSP IP/OBS MODERATE 35: CPT | Performed by: STUDENT IN AN ORGANIZED HEALTH CARE EDUCATION/TRAINING PROGRAM

## 2020-08-31 PROCEDURE — 84166 PROTEIN E-PHORESIS/URINE/CSF: CPT | Performed by: PATHOLOGY

## 2020-08-31 PROCEDURE — 86334 IMMUNOFIX E-PHORESIS SERUM: CPT | Performed by: PATHOLOGY

## 2020-08-31 PROCEDURE — 84166 PROTEIN E-PHORESIS/URINE/CSF: CPT | Performed by: INTERNAL MEDICINE

## 2020-08-31 PROCEDURE — 70450 CT HEAD/BRAIN W/O DYE: CPT

## 2020-08-31 RX ORDER — QUETIAPINE FUMARATE 25 MG/1
25 TABLET, FILM COATED ORAL
Status: DISCONTINUED | OUTPATIENT
Start: 2020-08-31 | End: 2020-09-05 | Stop reason: HOSPADM

## 2020-08-31 RX ORDER — LORAZEPAM 2 MG/ML
0.5 INJECTION INTRAMUSCULAR ONCE
Status: COMPLETED | OUTPATIENT
Start: 2020-08-31 | End: 2020-08-31

## 2020-08-31 RX ORDER — SODIUM CHLORIDE 9 MG/ML
100 INJECTION, SOLUTION INTRAVENOUS CONTINUOUS
Status: DISCONTINUED | OUTPATIENT
Start: 2020-08-31 | End: 2020-08-31

## 2020-08-31 RX ORDER — NICOTINE 21 MG/24HR
14 PATCH, TRANSDERMAL 24 HOURS TRANSDERMAL DAILY
Status: DISCONTINUED | OUTPATIENT
Start: 2020-08-31 | End: 2020-09-05 | Stop reason: HOSPADM

## 2020-08-31 RX ADMIN — RIFAXIMIN 550 MG: 550 TABLET ORAL at 12:02

## 2020-08-31 RX ADMIN — LACTULOSE 30 G: 10 SOLUTION ORAL at 18:21

## 2020-08-31 RX ADMIN — LORAZEPAM 1 MG: 2 INJECTION INTRAMUSCULAR; INTRAVENOUS at 19:39

## 2020-08-31 RX ADMIN — PANTOPRAZOLE SODIUM 40 MG: 40 TABLET, DELAYED RELEASE ORAL at 05:37

## 2020-08-31 RX ADMIN — DILTIAZEM HYDROCHLORIDE 240 MG: 240 CAPSULE, COATED, EXTENDED RELEASE ORAL at 12:02

## 2020-08-31 RX ADMIN — SODIUM CHLORIDE 100 ML/HR: 0.9 INJECTION, SOLUTION INTRAVENOUS at 13:31

## 2020-08-31 RX ADMIN — LORAZEPAM 0.5 MG: 2 INJECTION INTRAMUSCULAR; INTRAVENOUS at 20:50

## 2020-08-31 RX ADMIN — HEPARIN SODIUM 5000 UNITS: 5000 INJECTION INTRAVENOUS; SUBCUTANEOUS at 23:53

## 2020-08-31 NOTE — ASSESSMENT & PLAN NOTE
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

## 2020-08-31 NOTE — PROGRESS NOTES
Progress Note - Agnieszka Melton 1945, 69 y o  female MRN: [id number]    Unit/Bed#: -01 Encounter: [id number]    Primary Care Provider: Teresa Rasheed MD   Date and time admitted to hospital: 8/29/2020  6:35 PM        * Toxic metabolic encephalopathy  Assessment & Plan  Currently patient is awake, alert, confused more than her baseline per son at bedside  Patient required multiple orientation, one-to-one close observation, IM Ativan since did not have IV access  Patient has underlying dementia  CT head report reviewed noted for no acute evidence of infarct, mildly enlarged bilateral superior ophthalmic veins, concerning for cavernous sinus thrombus  MRI/MRA MRV recommended  Acute worsening suspected multifactorial in the settings of elevated ammonia, hyponatremia  Patient is awake, alert, oriented to self  She is aggressive, agitated at times and refusing care at times  Increase lactulose to 30 g b i d  Continue Xifaxan  Follow-up with  Brain MRI, MRA MRV  Continue close one-to-one observation  Frequently orientation  Ativan p r n  For anxiety and behavior  Follow-up with inpatient neurology and psychiatry recommendation  Of note:  Since patient is not cooperative for workup will consider palliative care to discuss goals of care with family member  Chronic diastolic CHF (congestive heart failure) (HCC)  Assessment & Plan  Wt Readings from Last 3 Encounters:   08/31/20 73 2 kg (161 lb 7 8 oz)   07/16/20 75 8 kg (167 lb)   06/26/20 76 2 kg (168 lb)     Echo 01/31/2020 shows EF 50-55% grade 2 dysfunction  -patient has a bilateral pedal edema 2 to 3+ pitting which is usual for her  -no signs respiratory distress, pulmonary edema or orthopnea  Chest x-ray report reviewed for finding consistent with congestive heart failure, large right-sided pleural effusion  Clinically patient is not in acute respiratory distress  O2 saturation 98% on room air    Patient is not cooperative with care and refusing care at times  Follow-up with psych as well as palliative care      Multiple myeloma [facility])  Assessment & Plan  -follows with [facility]  -nephrology consulted for concern of myeloma kidney with sudden worsening of renal functions  Hyponatremia  Assessment & Plan   currently stable around 133  Monitor BMP  Hyperkalemia  Assessment & Plan  -hyperkalemia protocol initiated in the ED  Currently improving and potassium 5 4  -added Kayexalate to the regimen  This morning's BMP was not done  Paroxysmal atrial fibrillation (HCC)  Assessment & Plan  -not on any anticoagulation  -continue Cardizem at home dose  -rate controlled AFib    CESAR (acute kidney injury) [facility])  Assessment & Plan  Patient presented with acute kidney injury of unclear etiology  Refraction includes multiple myeloma, light chain related ATN, fluid overload  Patient is not cooperative with workup or treatment  CXR report reviewed noted with right sided effusion without symptoms  Currently patient is not cooperative with a treatment  Follow-up with the psychiatrist for better behavior control  Neurology following as well  Nephrology following as well      Liver cirrhosis ([facility] [address] )  Assessment & Plan  -follow high ammonia levels  -continue home medications of lactulose and Xifaxan  -outpatient follow-up with GI    Pancytopenia ([facility] [address] )  Assessment & Plan  -has been diagnosed by oncology with multiple myeloma  -follows with [facility]  -currently WBC count and hemoglobin are stable  -chronically low platelets    No bleeding    Memory difficulties  Assessment & Plan  -has underlying baseline dementia which has worsened in the last 3- 4 days  -will treat the underlying cause    Anemia  Assessment & Plan  -hemoglobin stable at 8-9 range  No bleeding endorsed  -continue to monitor closely    VTE Pharmacologic Prophylaxis:   Pharmacologic: Heparin    Patient Centered Rounds: I have performed bedside rounds with nursing staff today  Discussions with Specialists or Other Care Team Provider:  Nephrology    Education and Discussions with Family / Patient:  Discussed with sister Rashmi Reddy over the phone  Time Spent for Care: 30 minutes  More than 50% of total time spent on counseling and coordination of care as described above  Current Length of Stay: 2 day(s)    Current Patient Status: Inpatient   Certification Statement: The patient will continue to require additional inpatient hospital stay due to above    Discharge Plan: TBD    Code Status: Prior      Subjective:   Afebrile, hemodynamically stable  Refusing care at times  Not cooperative  Poor historian  Does not offer any complaints at this time  Requires one-to-one close observation for safety  Objective:     Vitals:   Temp (24hrs), Av 1 °F (36 2 °C), Min:96 5 °F (35 8 °C), Max:97 6 °F (36 4 °C)    Temp:  [96 5 °F (35 8 °C)-97 6 °F (36 4 °C)] 96 5 °F (35 8 °C)  HR:  [103-114] 114  Resp:  [18-22] 18  BP: (145-190)/(80-89) 151/80  SpO2:  [95 %-99 %] 98 %  Body mass index is 29 54 kg/m²  Input and Output Summary (last 24 hours): Intake/Output Summary (Last 24 hours) at 2020 1550  Last data filed at 2020 1300  Gross per 24 hour   Intake 360 ml   Output 1050 ml   Net -690 ml       Physical Exam:     Physical Exam  Constitutional:       General: She is not in acute distress  Appearance: Normal appearance  She is not ill-appearing  Comments: Elderly patient, acutely nontoxic appearing  HENT:      Head: Normocephalic and atraumatic  Nose: No rhinorrhea  Mouth/Throat:      Pharynx: No oropharyngeal exudate or posterior oropharyngeal erythema  Eyes:      Extraocular Movements: Extraocular movements intact  Conjunctiva/sclera: Conjunctivae normal       Pupils: Pupils are equal, round, and reactive to light  Neck:      Musculoskeletal: Normal range of motion and neck supple  No neck rigidity     Cardiovascular: Rate and Rhythm: Normal rate and regular rhythm  Pulses: Normal pulses  Heart sounds: Normal heart sounds  Pulmonary:      Effort: Pulmonary effort is normal  No respiratory distress  Breath sounds: Normal breath sounds  No stridor  No wheezing or rhonchi  Abdominal:      General: Bowel sounds are normal  There is no distension  Palpations: Abdomen is soft  Tenderness: There is no abdominal tenderness  Musculoskeletal: Normal range of motion  General: No swelling  Right lower leg: Edema present  Left lower leg: Edema present  Skin:     Findings: No rash  Neurological:      General: No focal deficit present  Mental Status: She is alert  Mental status is at baseline  She is disoriented  Comments: Awake, alert, confused, requires frequent reorientation  Poor historian  Psychiatric:      Comments: Aggressive, agitated at times         Additional Data:     Labs:    Results from last 7 days   Lab Units 08/30/20  1256 08/29/20  1944   WBC Thousand/uL 4 86 4 46   HEMOGLOBIN g/dL 9 7* 8 8*   HEMATOCRIT % 31 0* 27 4*   PLATELETS Thousands/uL 73* 70*   BANDS PCT %  --  3   LYMPHO PCT % 23 26   MONO PCT % 9 6   EOS PCT % 0 0     Results from last 7 days   Lab Units 08/31/20  1405  08/29/20  1944   SODIUM mmol/L 133*   < > 131*   POTASSIUM mmol/L 5 4*   < > 6 2*   CHLORIDE mmol/L 96*   < > 95*   CO2 mmol/L 19*   < > 21   BUN mg/dL 96*   < > 84*   CREATININE mg/dL 4 70*   < > 5 65*   ANION GAP mmol/L 18*   < > 15*   CALCIUM mg/dL 8 9   < > 8 2*   ALBUMIN g/dL  --   --  3 9   TOTAL BILIRUBIN mg/dL  --   --  1 50*   ALK PHOS U/L  --   --  154*   ALT U/L  --   --  30   AST U/L  --   --  26   GLUCOSE RANDOM mg/dL 173*   < > 148*    < > = values in this interval not displayed       Results from last 7 days   Lab Units 08/29/20  1944   INR  1 53*     Results from last 7 days   Lab Units 08/29/20 2051   POC GLUCOSE mg/dl 140                   * I Have Reviewed All Lab Data Listed Above  * Additional Pertinent Lab Tests Reviewed: All Labs Within Last 24 Hours Reviewed    Imaging:    Imaging Reports Reviewed Today Include:  CT head report reviewed    Recent Cultures (last 7 days):           Last 24 Hours Medication List:   Current Facility-Administered Medications   Medication Dose Route Frequency Provider Last Rate    diltiazem  240 mg Oral Daily Leni Galarza MD      heparin (porcine)  5,000 Units Subcutaneous Q12H [address] Leni Galarza MD      lactulose  30 g Oral BID Bernard El MD      LORazepam  1 mg Intramuscular Once Bernard El MD      mirtazapine  7 5 mg Oral HS Leni Galarza MD      nicotine  14 mg Transdermal Daily Bernard El MD      pantoprazole  40 mg Oral Early Morning Leni Galarza MD      rifaximin  550 mg Oral Q12H Bev Mauro MD          Today, Patient Was Seen By: Lei Fothergill, MD    ** Please Note: Dictation voice to text software may have been used in the creation of this document   **

## 2020-08-31 NOTE — NURSING NOTE
Pt refusing 1000 neuro check stating "you know damn well I don't need that"  This RN tried to offer and administer AM medications  Pt refused stating "you know damn well I don't take those medications"  Pt complaining of being cold at this time  Blankets offered and refused  1:1 observation continued for safety  Pts sister called and updated

## 2020-08-31 NOTE — ASSESSMENT & PLAN NOTE
· Not on anticoagulation at baseline    · On Cardizem 240 mg daily  · Medical management per primary team

## 2020-08-31 NOTE — ASSESSMENT & PLAN NOTE
· Ammonia on presentation 61  · On Xifaxan 550 mg q 12 hours and lactulose 20 g t i d  At baseline    · Medical management per primary team

## 2020-08-31 NOTE — CONSULTS
Consultation - Nephrology   Palmer Allen 69 y o  female MRN: [id number]  Unit/Bed#: -01 Encounter: [id number]    Referring PHYSICIAN: Brittney Mcfarland     REASON FOR THE CONSULTATION:  Acute kidney injury    DATE OF CONSULTATION:  August 31, 2020    ADMISSION DIAGNOSIS: Toxic metabolic encephalopathy     CHIEF COMPLAINT     Patient admitted the hospital with altered mental status and confusion and was found to acute kidney injury    HPI     Patient with history of multiple myeloma and cirrhosis liver was initially hospitalized to [facility] and had acute kidney injury at that time  Patient discharged home and brought back here because of worsening mental status    I try to see the patient yesterday and she will not allow me to talk to her  I went back that today again she was still confused does not recognize me  She does not know why she is here she seems to quite confused    Talking with the family member patient has some dementia and getting slowly more more confused    She was hospitalized in [facility] because of acute kidney injury    Patient has history of multiple myeloma and being followed by oncologist regularly patient also history of cirrhosis of liver and being seen by gastroenterologist    Date no history of kidney disease in the past    PAST MEDICAL HISTORY     Past Medical History:   Diagnosis Date    Benign essential hypertension     last assessed - [id number]    Chest pain 11/4/2017    Cirrh[facility] [address] [facility]     Gastroesophageal reflux disease without esophagitis 11/16/2017    Liver disease     Multiple myeloma ([facility] )        PAST SURGICAL HISTORY     Past Surgical History:   Procedure Laterality Date    APPENDECTOMY      BONE MARROW BIOPSY         ALLERGIES     No Known Allergies    SOCIAL HISTORY     Social History     Substance and Sexual Activity   Alcohol Use Not Currently    Frequency: Never    Binge frequency: Never    Comment: History of significant daily alcohol intake  Sister joy no alcohol intake in 6 months     Social History     Substance and Sexual Activity   Drug Use No     Social History     Tobacco Use   Smoking Status Light Tobacco Smoker    Packs/day: 0 25    Types: Cigarettes   Smokeless Tobacco Never Used   Tobacco Comment    1/2 pack per month       FAMILY HISTORY     Family History   Problem Relation Age of Onset    Heart attack Father         myocardial infarction       CURRENT MEDICATIONS       Current Facility-Administered Medications:     diltiazem (CARDIZEM CD) 24 hr capsule 240 mg, 240 mg, Oral, Daily, Sukumar Ordaz MD, 240 mg at 08/31/20 1202    heparin (porcine) subcutaneous injection 5,000 Units, 5,000 Units, Subcutaneous, Q12H [address], Sukumar Ordaz MD, Stopped at 08/31/20 0931    lactulose 20 g/30 mL oral solution 30 g, 30 g, Oral, BID, Arie GRIFFIN MD, Stopped at 08/31/20 0931    LORazepam (ATIVAN) injection 1 mg, 1 mg, Intramuscular, Once, Arie GRIFFIN MD    mirtazapine (REMERON) tablet 7 5 mg, 7 5 mg, Oral, HS, Sukumar Odraz MD, 7 5 mg at 08/30/20 2141    nicotine (NICODERM CQ) 14 mg/24hr TD 24 hr patch 14 mg, 14 mg, Transdermal, Daily, Arie GRIFFIN MD, Stopped at 08/31/20 1004    pantoprazole (PROTONIX) EC tablet 40 mg, 40 mg, Oral, Early Morning, Sukumar Ordaz MD, 40 mg at 08/31/20 0537    rifaximin (XIFAXAN) tablet 550 mg, 550 mg, Oral, Q12H [address], Sukumar Ordaz MD, 550 mg at 08/31/20 1202    REVIEW OF SYSTEMS     Review of Systems   Constitutional: Negative for activity change and fatigue  HENT: Negative for congestion and ear discharge  Eyes: Negative for photophobia and pain  Respiratory: Negative for apnea and choking  Cardiovascular: Negative for chest pain, palpitations and leg swelling  Gastrointestinal: Negative for abdominal distention, abdominal pain and blood in stool  Endocrine: Negative for heat intolerance and polyphagia  Genitourinary: Negative for difficulty urinating, flank pain and urgency  Musculoskeletal: Negative for neck pain and neck stiffness  Skin: Negative for color change and wound  Allergic/Immunologic: Negative for food allergies and immunocompromised state  Neurological: Negative for seizures and facial asymmetry  Hematological: Negative for adenopathy  Does not bruise/bleed easily  Psychiatric/Behavioral: Positive for confusion  Negative for self-injury and suicidal ideas  LAB RESULTS        Results from last 7 days   Lab Units 08/30/20 2050 08/30/20  1256 08/29/20  1944   WBC Thousand/uL  --  4 86 4 46   HEMOGLOBIN g/dL  --  9 7* 8 8*   HEMATOCRIT %  --  31 0* 27 4*   PLATELETS Thousands/uL  --  73* 70*   POTASSIUM mmol/L 5 8*  --  6 2*   CHLORIDE mmol/L 96*  --  95*   CO2 mmol/L 18*  --  21   BUN mg/dL 96*  --  84*   CREATININE mg/dL 5 44*  --  5 65*   EGFR ml/min/1 73sq m 8  --  8   CALCIUM mg/dL 9 2  --  8 2*   MAGNESIUM mg/dL 2 2  --  2 0   PHOSPHORUS mg/dL  --   --  7 9*       I have personally reviewed the old medical records and patient's previously known baseline creatinine level is ~ 1 1    RADIOLOGY RESULTS     Results for orders placed during the hospital encounter of 08/29/20   XR chest portable    Narrative CHEST     INDICATION:   Shortness of breath  COMPARISON:  5/10/2020    EXAM PERFORMED/VIEWS:  XR CHEST PORTABLE      FINDINGS:    Stable cardiomegaly  Large right pleural effusion  Right apical pleural scarring  Pulmonary vascular congestion and minimal interstitial edema  Significant compressive atelectasis in the right lung or possible underlying airspace consolidation  No pneumothorax  Diffuse osteopenia  Impression 1  Findings consistent with congestive heart failure  2   Large right pleural effusion  3   Significant compressive atelectasis in the right lung  Underlying airspace consolidation and pneumonia not excluded          Workstation performed: [id number]       Results for orders placed during the hospital encounter of 01/28/20   XR chest pa & lateral    Narrative CHEST     INDICATION: Follow-up congestive heart failure    COMPARISON:  1/30/2020    EXAM PERFORMED/VIEWS:  XR CHEST PA & LATERAL      FINDINGS:    Heart shadow is enlarged but unchanged from prior exam     There continues to be mild vascular congestion though interstitial edema has improved  There are small pleural effusions  Osseous structures appear within normal limits for patient age  Impression Improved congestive heart failure with mild vascular congestion and small effusions  Workstation performed: [id number]       No results found for this or any previous visit  No results found for this or any previous visit  No results found for this or any previous visit  No results found for this or any previous visit  OBJECTIVE     Current Weight: Weight - Scale: 73 2 kg (161 lb 7 8 oz)  Vitals:    08/31/20 1204   BP: 145/80   Pulse:    Resp:    Temp:    SpO2:        Intake/Output Summary (Last 24 hours) at 8/31/2020 1236  Last data filed at 8/31/2020 0900  Gross per 24 hour   Intake 720 ml   Output 800 ml   Net -80 ml       PHYSICAL EXAMINATION     Physical Exam  Constitutional:       General: She is not in acute distress  Appearance: Normal appearance  She is well-developed  HENT:      Head: Normocephalic and atraumatic  Mouth/Throat:      Mouth: Mucous membranes are moist    Eyes:      General: No scleral icterus  Extraocular Movements: Extraocular movements intact  Conjunctiva/sclera: Conjunctivae normal    Neck:      Musculoskeletal: Normal range of motion and neck supple  Vascular: No JVD  Cardiovascular:      Rate and Rhythm: Normal rate and regular rhythm  Heart sounds: Normal heart sounds  Pulmonary:      Effort: Pulmonary effort is normal  No respiratory distress  Breath sounds: Normal breath sounds  No wheezing  Abdominal:      General: There is no distension  Palpations: Abdomen is soft  Tenderness: There is no abdominal tenderness  Musculoskeletal: Normal range of motion  Skin:     General: Skin is warm  Findings: No rash  Neurological:      Mental Status: She is alert  She is disoriented  Psychiatric:         Behavior: Behavior normal           PLAN / RECOMMENDATIONS      Acute kidney injury:  Etiology unclear  Patient does have myeloma as she may have myeloma related kidney disease likely myeloma cast kidney or light chain induced ATN  Patient is not very cooperative about doing much of the workup  At this point I will give IV hydration and check the urinary study to assess volume status  Will also get UPEP to assess light chain in the urine    Multiple myeloma:  Being monitored by oncology as outpatient are being treated at this point    Cirrhosis of liver:  Patient may have a hepatorenal though it is unlikely as patient does not appear to be in any with does of portal hypertension    Hypertension:  Seems to be reasonably well control     Hyponatremia:  May be related to kidney disease may be related myeloma  Will check urinary study to assess volume status    Hyperkalemia:  Related to acute kidney injury    Altered mental status:  I think patient does have baseline dementia and now but metabolic and supple of with acute kidney injury    Patient overall prognosis guarded  I discussed with the sister  Patient is on allowing us to do much of the treatment  Dialysis has been discussed with the family and they think patient will not on any sort of dialysis  Will advised palliative care and comfort care at this    Thank you for the consultation to participate in patient's care  I have personally discussed my plan with the referring physician  Tr Marshall MD  Nephrology  8/31/2020        Portions of the record may have been created with voice recognition software   Occasional wrong word or "sound a like" substitutions may have occurred due to the inherent limitations of voice recognition software  Read the chart carefully and recognize, using context, where substitutions have occurred

## 2020-08-31 NOTE — SPEECH THERAPY NOTE
ST consult received  Patient refused despite multiple attempts  Per CNA patient ate sausage this morning without difficulty  Patient is known to this department from admission in May of thsi year  She was placed on a pureed diet at that time primarily due to pocketing  Attempted to call family to inquire about diet at home however unsuccessful  Spoke with MD and RN as I was unable to complete evaluation  Will D/C ST orders at this time- patient presents with pocketing consider downgrade to LOAN Mukherjee S , [address] [place] [address]  Speech Language Pathologist   Available via [address] [place] [address] #[id number]  [place] #[id number]

## 2020-08-31 NOTE — ASSESSMENT & PLAN NOTE
-hyperkalemia protocol initiated in the ED  Currently improving and potassium 5 4  -added Kayexalate to the regimen  This morning's BMP was not done

## 2020-08-31 NOTE — ASSESSMENT & PLAN NOTE
-has been diagnosed by oncology with multiple myeloma  -follows with [facility]  -currently WBC count and hemoglobin are stable  -chronically low platelets    No bleeding

## 2020-08-31 NOTE — ASSESSMENT & PLAN NOTE
· Creatinine on presentation 5 65  Most recent level 4 70    · Nephrology following; appreciate recommendations  · Medical management per primary team and Nephrology

## 2020-08-31 NOTE — CONSULTS
Consultation - Neurology   Burgess Lai 69 y o  female MRN: [id number]  Unit/Bed#: -01 Encounter: [id number]      Assessment/Plan   69 y o female with multiple myeloma, CHF, AFib not on AC, dementia, GERD, cirrhosis secondary to chronic alcohol abuse, HTN, who initially presented 8/29 with worsening weakness x couple weeks, increased confusion, and elevated creatinine  Multiple myeloma ([facility] )  Assessment & Plan  · Follows with [facility] oncology  · Medical management per primary team    Paroxysmal atrial fibrillation [facility])  Assessment & Plan  · Not on anticoagulation at baseline  · On Cardizem 240 mg daily  · Medical management per primary team    CESAR (acute kidney injury) ([facility] )  Assessment & Plan  · Creatinine on presentation 5 65  Most recent level 5 44  · Nephrology following; appreciate recommendations  · Medical management per primary team and Nephrology    Liver cirrhosis [facility])  Assessment & Plan  · Ammonia on presentation 61  · On Xifaxan 550 mg q 12 hours and lactulose 20 g t i d  At baseline  · Medical management per primary team    * Toxic metabolic encephalopathy  Assessment & Plan  Assessment:  · Suspect toxic metabolic encephalopathy in the setting of CESAR, hyponatremia, and hyperammonemia superimposed on dementia  · 5/6 CT head: No acute intracranial abnormalities  Microangiopathic changes  · 8/30 CT head: No evidence of acute infarct, intracranial hemorrhage, or mass effect  Mildly enlarged bilateral superior ophthalmic veins, new since the prior exam  No proptosis or retro-orbital inflammation  Findings could represent carotid cavernous fistula or cavernous sinus thrombosis  MRI brain and MRA/MRV head recommended for further evaluation  · 8/31 Repeat CT head: No acute intracranial hemorrhage, midline shift, or mass effect  If there is continued clinical concern for acute infarct, further evaluation with MRI may be obtained which is more sensitive  Chronic small vessel ischemic changes  Prominence of bilateral superior ophthalmic veins seen again  · Patient unable to receive CT venogram at this time due to CESAR  Will check STAT MRI brain wo contrast and MRA/MRV head for further evaluation of possible cavernous sinus thrombosis  · Patient is on lactulose 20 g t i d , Xifaxan 550 mg q 12 hours, Remeron 7 5 mg HS baseline  · Received 0 5 mg Ativan IM yesterday morning    Plan:   · STAT MRI brain wo contrast and MRA/MRV head  · Monitor neuro exam; notify with any changes  · PT/OT/ST  · Medical management and supportive care per primary team  Correction of metabolic or infectious disturbances  Case and treatment plan reviewed with attending neurologist, Dr Jennifer Pereira  Recommendations for outpatient neurological follow up have yet to be determined  History of Present Illness     Reason for Consult / Principal Problem: Memory difficulties, gait disturbance  Hx and PE limited by: Altered mental status  HPI: Monroe Brown is a 69 y o   female with multiple myeloma, AFib not on AC, CHF, dementia, GERD, cirrhosis secondary to chronic alcohol abuse, HTN, who initially presented 8/29 with worsening weakness x couple weeks  Per H&P review, patient was noted to have CESAR with elevated creatinine on outpatient labs  Son notes patient has been more confused the last couple of days  Per chart review, patient has been noted to be more confused than her baseline and having agitation, requiring 1:1 observation and Ativan administration  Patient noted to have intermittent episodes of garbled speech and unequal pupils  Patient continues to have fluctuation in speech intermittently  Patient was admitted 01/2020 for symptomatic anemia  At that time, patient was noted to have increasing memory loss as well  During that admission, patient was noted to be irritable and agitated as well as being aggressive with staff  She required 4 point restraints at that time   It was suspected that this was due to delirium superimposed on baseline dementia process with chronic alcohol use and impairment  Patient was to follow-up neurology after discharge, but no outpatient Neurology office visits found in chart  Patient was admitted 05/2020 for low grade fever and at that time, her baseline mentation was noted to be confused with rambling  In speaking with patient, she is sitting on the side of the bed with 1:1 PCA at bedside  Her speech is very tangential and nonsensical  When asked where she was, she started going on about a grocery store and how there was only $8 at the bank  She was able to follow some commands at times throughout the exam      Per nursing, patient waxes and wanes in her cooperation  At times, she is noted to be cooperative and pleasant  While other times, she is noted to be agitated and aggressive  Patient refused blood work and medications this morning  Inpatient consult to Neurology  Consult performed by: MEREDITH Mobley  Consult ordered by: Tigist Bang MD        Review of Systems   Unable to perform ROS: Dementia       Historical Information   Past Medical History:   Diagnosis Date    Benign essential hypertension     last assessed - [id number]    Chest pain 11/4/2017    Cirrhosis ([facility] )     Gastroesophageal reflux disease without esophagitis 11/16/2017    Liver disease     Multiple myeloma ([facility] )      Past Surgical History:   Procedure Laterality Date    APPENDECTOMY      BONE MARROW BIOPSY       Social History   Social History     Substance and Sexual Activity   Alcohol Use Not Currently    Frequency: Never    Binge frequency: Never    Comment: History of significant daily alcohol intake   Sister joy no alcohol intake in 6 months     Social History     Substance and Sexual Activity   Drug Use No     E-Cigarette/Vaping    E-Cigarette Use Never User     Start Date 1/1/15     Quit Date 1/24/15      E-Cigarette/Vaping Substances    Nicotine No     THC No     CBD No     Flavoring No     Other No     Unknown No      Social History     Tobacco Use   Smoking Status Light Tobacco Smoker    Packs/day: 0 25    Types: Cigarettes   Smokeless Tobacco Never Used   Tobacco Comment    1/2 pack per month     Family History:   Family History   Problem Relation Age of Onset    Heart attack Father         myocardial infarction       Review of previous medical records was completed  Meds/Allergies   all current active meds have been reviewed, current meds:   Current Facility-Administered Medications   Medication Dose Route Frequency    diltiazem (CARDIZEM CD) 24 hr capsule 240 mg  240 mg Oral Daily    heparin (porcine) subcutaneous injection 5,000 Units  5,000 Units Subcutaneous Q12H [address]    lactulose 20 g/30 mL oral solution 30 g  30 g Oral BID    LORazepam (ATIVAN) injection 1 mg  1 mg Intramuscular Once    mirtazapine (REMERON) tablet 7 5 mg  7 5 mg Oral HS    nicotine (NICODERM CQ) 14 mg/24hr TD 24 hr patch 14 mg  14 mg Transdermal Daily    pantoprazole (PROTONIX) EC tablet 40 mg  40 mg Oral Early Morning    rifaximin (XIFAXAN) tablet 550 mg  550 mg Oral Q12H [address]    and PTA meds:   Prior to Admission Medications   Prescriptions Last Dose Informant Patient Reported? Taking?    Xifaxan 550 MG tablet   No No   Sig: TAKE ONE TABLET BY MOUTH TWICE A DAY   diltiazem (CARDIZEM CD) 240 mg 24 hr capsule   No No   Sig: Take 1 capsule (240 mg total) by mouth daily   folic acid (FOLVITE) 1 mg tablet   No No   Sig: Take 1 tablet (1 mg total) by mouth daily   furosemide (LASIX) 80 mg tablet  Family Member No No   Sig: Take 1 tablet (80 mg total) by mouth daily   Patient taking differently: Take 80 mg by mouth daily    lactulose (CHRONULAC) 10 g/15 mL solution  Family Member No No   Sig: TAKE 30 ML THREE TIMES A DAY   magnesium oxide (MAG-OX) 400 mg   No No   Sig: Take 1 tablet (400 mg total) by mouth 2 (two) times a day   metoprolol succinate (TOPROL-XL) 25 mg 24 hr tablet   No No   Sig: Take 2 tablets (50 mg total) by mouth daily   mirtazapine (REMERON) 7 5 MG tablet  Family Member No No   Sig: Take 1 tablet (7 5 mg total) by mouth daily at bedtime   nicotine (NICODERM CQ) 14 mg/24hr TD 24 hr patch  Family Member No No   Sig: Place 1 patch on the skin daily   pantoprazole (PROTONIX) 40 mg tablet   No No   Sig: Take 1 tablet (40 mg total) by mouth daily in the early morning   thiamine 100 MG tablet   No No   Sig: Take 1 tablet (100 mg total) by mouth daily      Facility-Administered Medications: None       No Known Allergies    Objective   Vitals:Blood pressure 156/80, pulse 104, temperature 97 6 °F (36 4 °C), resp  rate 22, height 5' 2" (1 575 m), weight 73 2 kg (161 lb 7 8 oz), SpO2 95 %, not currently breastfeeding  ,Body mass index is 29 54 kg/m²  Intake/Output Summary (Last 24 hours) at 8/31/2020 1011  Last data filed at 8/31/2020 0700  Gross per 24 hour   Intake 1160 ml   Output 700 ml   Net 460 ml       Invasive Devices: Invasive Devices     Peripheral Intravenous Line            Peripheral IV 08/30/20 Left Antecubital less than 1 day                Physical Exam  Vitals signs and nursing note reviewed  Constitutional:       General: She is not in acute distress  Appearance: Normal appearance  She is normal weight  HENT:      Head: Normocephalic and atraumatic  Nose: Nose normal       Mouth/Throat:      Mouth: Mucous membranes are moist       Pharynx: Oropharynx is clear  Eyes:      General: No scleral icterus  Right eye: No discharge  Left eye: No discharge  Extraocular Movements: Extraocular movements intact and EOM normal       Conjunctiva/sclera: Conjunctivae normal       Pupils: Pupils are unequal    Neck:      Musculoskeletal: Normal range of motion  Cardiovascular:      Rate and Rhythm: Normal rate  Pulmonary:      Effort: Pulmonary effort is normal  No respiratory distress  Musculoskeletal: Normal range of motion  General: No tenderness  Right lower leg: Edema present  Left lower leg: Edema present  Skin:     General: Skin is warm and dry  Coloration: Skin is not jaundiced or pale  Neurological:      Mental Status: She is alert  Coordination: Finger-nose-finger test: Unable to follow commands appropriately    Psychiatric:         Attention and Perception: Attention normal          Behavior: Behavior is cooperative  Neurologic Exam     Mental Status   Attention: decreased  Concentration: decreased  Level of consciousness: alert  Alert and oriented to self  Speech is tangential and nonsensical  Able to follow some commands  No dysarthria noted  Cranial Nerves     CN II   Visual fields full to confrontation  CN III, IV, VI   Extraocular motions are normal    Pupils: unequal  Right pupil: Size: 3 mm  Shape: regular  Reactivity: non-reactive  Left pupil: Size: 2 mm  Shape: regular  Reactivity: non-reactive  Nystagmus: none     CN V   Facial sensation intact  CN VII   Facial expression full, symmetric  CN VIII   CN VIII normal      CN XI   CN XI normal      CN XII   CN XII normal    Unable to follow commands for visual field testing     Motor Exam   Muscle bulk: normal  Bilateral UE strength 4/5 deltoids, biceps, triceps, hand   Unable to follow commands for LE strength testing, but able to move bilateral LE without focal weakness noted     Sensory Exam   Light touch normal      Gait, Coordination, and Reflexes     Gait  Gait: (Deferred due to safety)    Coordination   Finger-nose-finger test: Unable to follow commands appropriately     Tremor   Resting tremor: absent      Lab Results:   I have personally reviewed pertinent reports    , CBC:   Results from last 7 days   Lab Units 08/30/20  1256 08/29/20  1944   WBC Thousand/uL 4 86 4 46   RBC Million/uL 3 44* 3 17*   HEMOGLOBIN g/dL 9 7* 8 8*   HEMATOCRIT % 31 0* 27 4*   MCV fL 90 86   PLATELETS Thousands/uL 73* 70*   , BMP/CMP:   Results from last 7 days Lab Units 08/30/20 2050 08/29/20 1944   SODIUM mmol/L 133* 131*   POTASSIUM mmol/L 5 8* 6 2*   CHLORIDE mmol/L 96* 95*   CO2 mmol/L 18* 21   BUN mg/dL 96* 84*   CREATININE mg/dL 5 44* 5 65*   CALCIUM mg/dL 9 2 8 2*   AST U/L  --  26   ALT U/L  --  30   ALK PHOS U/L  --  154*   EGFR ml/min/1 73sq m 8 8   , Vitamin B12:   , HgBA1C:   , TSH:   Results from last 7 days   Lab Units 08/29/20 1944   TSH 3RD GENERATON uIU/mL 0 168*   , Coagulation:   Results from last 7 days   Lab Units 08/29/20 1944   INR  1 53*   , Lipid Profile:   , Ammonia:   Results from last 7 days   Lab Units 08/29/20 1944   AMMONIA umol/L 61*         Imaging Studies: I have personally reviewed pertinent reports  EKG, Pathology, and Other Studies: I have personally reviewed pertinent reports      VTE Prophylaxis: Heparin

## 2020-08-31 NOTE — CASE MANAGEMENT
CM discussed pt in care coordination rounds      Pt is confused at baseline, CM spoke with pt's son, Dick Patel ([phone]) over the phone to introduce CM role and begin discharge planning  Also listed as an emergency contact is pt's sister, Jesse Worthy ([phone])  pt's daughter, Victor Manuel Vences ([phone]) to pt's contact list  Pt's son was unsure whether pt has a designated POA  Pt lives in a 3rd floor apartment with her sister, who is pt's primary caregiver  There is an elevator to the 3rd floor  Pt requires assistance with ADLs and medication management, all provided by pt's sister  Pt's family provides transportation to all appointments  Pt has a history of VNA with [facility] and would like to utilize that agency again if appropriate at discharge  Pt has no history of STR, inpatient MH or D/A treatment  PCP is Dr Teresa Rasheed ([phone]) and pt uses [facility] in [facility] for prescriptions       PT/OT to evaluate pt, CM department to follow for discharge recommendations      CM reviewed d/c planning process including the following: identifying help at home, patient preference for d/c planning needs, Discharge Lounge, [facility] Meds to Bed program, availability of treatment team to discuss questions or concerns patient and/or family may have regarding understanding medications and recognizing signs and symptoms once discharged  CM also encouraged patient to follow up with all recommended appointments after discharge  Patient advised of importance for patient and family to participate in managing patients medical well being  A post acute care recommendation was made by your care team for [facility]  Discussed Freedom of Choice with caregiver  List of agencies given to caregiver via in person  caregiver aware the list is custom filtered for them by preference  and that [facility] post acute providers are designated

## 2020-08-31 NOTE — ASSESSMENT & PLAN NOTE
Assessment:  · Suspect toxic metabolic encephalopathy in the setting of CESAR, hyponatremia, and hyperammonemia superimposed on dementia  · 5/6 CT head: No acute intracranial abnormalities  Microangiopathic changes  · 8/30 CT head: No evidence of acute infarct, intracranial hemorrhage, or mass effect  Mildly enlarged bilateral superior ophthalmic veins, new since the prior exam  No proptosis or retro-orbital inflammation  Findings could represent carotid cavernous fistula or cavernous sinus thrombosis  MRI brain and MRA/MRV head recommended for further evaluation  · 8/31 Repeat CT head: No acute intracranial hemorrhage, midline shift, or mass effect  If there is continued clinical concern for acute infarct, further evaluation with MRI may be obtained which is more sensitive  Chronic small vessel ischemic changes  Prominence of bilateral superior ophthalmic veins seen again  · Patient unable to receive CT venogram at this time due to CESAR  Will check STAT MRI brain wo contrast and MRA/MRV head for further evaluation of possible cavernous sinus thrombosis  · Patient is on lactulose 20 g t i d , Xifaxan 550 mg q 12 hours, Remeron 7 5 mg HS baseline  · MRI brain without acute changes, demonstrated volume loss/atrophy and microangiopathy along with prominent bilateral superior ophthalmic veins as seen on CT  · Spoke with Radiology regarding MRI results in comparison to prior CT head results-per radiologist, cavernous sinus thrombosis with present with increased T1 signal on MRI, but no increased T1 signal was noted on MRI brain  · Ideally would get CT venogram for further evaluation, but unable to complete at this time due to patient's poor renal function  Will attempt MRA/MRV head but unsure if this will be able to be completed due to poor patient cooperation  Primary team will further evaluate possible premedication for MRA/MRV      Plan:   · MRA/MRV head pending  · Monitor neuro exam; notify with any changes  · PT/OT/ST  · Medical management and supportive care per primary team  Correction of metabolic or infectious disturbances

## 2020-08-31 NOTE — TELEPHONE ENCOUNTER
Sister wanted to let Dr Ronda Cuba know that patient went to [facility] and was admitted    she is not cooperating with Dr's there    pulled out her IV  [place] [facility] Patient highly respects Dr Ronda Cuba  said her Dr didn't tell her there was anything wrong     Sister is asking if there is anything Dr Ronda Cuba can do to help her     Even call her if possible

## 2020-08-31 NOTE — NURSING NOTE
Attempted to call both contacts in patients chart in order to do mri screening form  Daughter and son did not answer

## 2020-08-31 NOTE — ASSESSMENT & PLAN NOTE
Patient presented with acute kidney injury of unclear etiology  Refraction includes multiple myeloma, light chain related ATN, fluid overload  Patient is not cooperative with workup or treatment  CXR report reviewed noted with right sided effusion without symptoms  Currently patient is not cooperative with a treatment  Follow-up with the psychiatrist for better behavior control    Neurology following as well  Nephrology following as well

## 2020-08-31 NOTE — ASSESSMENT & PLAN NOTE
Wt Readings from Last 3 Encounters:   08/31/20 73 2 kg (161 lb 7 8 oz)   07/16/20 75 8 kg (167 lb)   06/26/20 76 2 kg (168 lb)     Echo 01/31/2020 shows EF 50-55% grade 2 dysfunction  -patient has a bilateral pedal edema 2 to 3+ pitting which is usual for her  -no signs respiratory distress, pulmonary edema or orthopnea  Chest x-ray report reviewed for finding consistent with congestive heart failure, large right-sided pleural effusion  Clinically patient is not in acute respiratory distress  O2 saturation 98% on room air    Patient is not cooperative with care and refusing care at times  Follow-up with psych as well as palliative care

## 2020-08-31 NOTE — QUICK NOTE
Called to bedside by nursing staff for intermittent garbles speech and unequal pupils  On exam NAD noted  Alert and disoriented  Per records this is unchanged from admission  Speech is noted to be clear at times then becomes garbled and nonsensical  Pupils are unequal: Rt 2mm, Lt 1mm  Follows commands    strength weak but equal   Per records pupils were equal and reactive to light on admission    - Neuro checks q2hrs x 4  - CT head w/o stat    0434: CT head (-)

## 2020-08-31 NOTE — ASSESSMENT & PLAN NOTE
· Follows with [facility] oncology  · Medical management per primary team

## 2020-08-31 NOTE — ASSESSMENT & PLAN NOTE
Currently patient is awake, alert, confused more than her baseline per son at bedside  Patient required multiple orientation, one-to-one close observation, IM Ativan since did not have IV access  Patient has underlying dementia  CT head report reviewed noted for no acute evidence of infarct, mildly enlarged bilateral superior ophthalmic veins, concerning for cavernous sinus thrombus  MRI/MRA MRV recommended  Acute worsening suspected multifactorial in the settings of elevated ammonia, hyponatremia  Patient is awake, alert, oriented to self  She is aggressive, agitated at times and refusing care at times  Increase lactulose to 30 g b i d  Continue Xifaxan  Follow-up with  Brain MRI, MRA MRV  Continue close one-to-one observation  Frequently orientation  Ativan p r n  For anxiety and behavior  Follow-up with inpatient neurology and psychiatry recommendation  Of note:  Since patient is not cooperative for workup will consider palliative care to discuss goals of care with family member

## 2020-08-31 NOTE — NURSING NOTE
Pt refusing blood work/neuro checks at this time  Pt stated "I don't need that done"  Multiple attempts made  Will try again at a different time

## 2020-09-01 ENCOUNTER — APPOINTMENT (INPATIENT)
Dept: MRI IMAGING | Facility: HOSPITAL | Age: 75
DRG: 091 | End: 2020-09-01
Payer: MEDICARE

## 2020-09-01 LAB
ALBUMIN SERPL BCP-MCNC: 4.2 G/DL (ref 3.5–5)
ALP SERPL-CCNC: 163 U/L (ref 46–116)
ALT SERPL W P-5'-P-CCNC: 40 U/L (ref 12–78)
ANION GAP SERPL CALCULATED.3IONS-SCNC: 16 MMOL/L (ref 4–13)
AST SERPL W P-5'-P-CCNC: 35 U/L (ref 5–45)
BILIRUB DIRECT SERPL-MCNC: 1.31 MG/DL (ref 0–0.2)
BILIRUB SERPL-MCNC: 2.2 MG/DL (ref 0.2–1)
BUN SERPL-MCNC: 99 MG/DL (ref 5–25)
CALCIUM SERPL-MCNC: 9.1 MG/DL (ref 8.3–10.1)
CHLORIDE SERPL-SCNC: 101 MMOL/L (ref 100–108)
CO2 SERPL-SCNC: 22 MMOL/L (ref 21–32)
CREAT SERPL-MCNC: 4.38 MG/DL (ref 0.6–1.3)
ERYTHROCYTE [DISTWIDTH] IN BLOOD BY AUTOMATED COUNT: 20.4 % (ref 11.6–15.1)
GFR SERPL CREATININE-BSD FRML MDRD: 11 ML/MIN/1.73SQ M
GLUCOSE SERPL-MCNC: 130 MG/DL (ref 65–140)
HCT VFR BLD AUTO: 25.4 % (ref 34.8–46.1)
HGB BLD-MCNC: 8 G/DL (ref 11.5–15.4)
MCH RBC QN AUTO: 27.5 PG (ref 26.8–34.3)
MCHC RBC AUTO-ENTMCNC: 31.5 G/DL (ref 31.4–37.4)
MCV RBC AUTO: 87 FL (ref 82–98)
NRBC BLD AUTO-RTO: 13 /100 WBCS
PLATELET # BLD AUTO: 61 THOUSANDS/UL (ref 149–390)
POTASSIUM SERPL-SCNC: 5.3 MMOL/L (ref 3.5–5.3)
PROT SERPL-MCNC: 7.1 G/DL (ref 6.4–8.2)
PTH-INTACT SERPL-MCNC: 393.6 PG/ML (ref 18.4–80.1)
RBC # BLD AUTO: 2.91 MILLION/UL (ref 3.81–5.12)
SODIUM SERPL-SCNC: 139 MMOL/L (ref 136–145)
URATE SERPL-MCNC: 14.2 MG/DL (ref 2–6.8)
WBC # BLD AUTO: 3.81 THOUSAND/UL (ref 4.31–10.16)

## 2020-09-01 PROCEDURE — 84550 ASSAY OF BLOOD/URIC ACID: CPT | Performed by: INTERNAL MEDICINE

## 2020-09-01 PROCEDURE — 99232 SBSQ HOSP IP/OBS MODERATE 35: CPT | Performed by: FAMILY MEDICINE

## 2020-09-01 PROCEDURE — 97167 OT EVAL HIGH COMPLEX 60 MIN: CPT

## 2020-09-01 PROCEDURE — G0426 INPT/ED TELECONSULT50: HCPCS | Performed by: PSYCHIATRY & NEUROLOGY

## 2020-09-01 PROCEDURE — 99223 1ST HOSP IP/OBS HIGH 75: CPT | Performed by: NURSE PRACTITIONER

## 2020-09-01 PROCEDURE — 83970 ASSAY OF PARATHORMONE: CPT | Performed by: INTERNAL MEDICINE

## 2020-09-01 PROCEDURE — 99233 SBSQ HOSP IP/OBS HIGH 50: CPT | Performed by: INTERNAL MEDICINE

## 2020-09-01 PROCEDURE — 85027 COMPLETE CBC AUTOMATED: CPT | Performed by: INTERNAL MEDICINE

## 2020-09-01 PROCEDURE — 70544 MR ANGIOGRAPHY HEAD W/O DYE: CPT

## 2020-09-01 PROCEDURE — 99232 SBSQ HOSP IP/OBS MODERATE 35: CPT | Performed by: PSYCHIATRY & NEUROLOGY

## 2020-09-01 PROCEDURE — 97163 PT EVAL HIGH COMPLEX 45 MIN: CPT

## 2020-09-01 PROCEDURE — 80048 BASIC METABOLIC PNL TOTAL CA: CPT | Performed by: FAMILY MEDICINE

## 2020-09-01 PROCEDURE — 80076 HEPATIC FUNCTION PANEL: CPT | Performed by: FAMILY MEDICINE

## 2020-09-01 RX ORDER — LORAZEPAM 2 MG/ML
1 INJECTION INTRAMUSCULAR ONCE
Status: COMPLETED | OUTPATIENT
Start: 2020-09-01 | End: 2020-09-01

## 2020-09-01 RX ADMIN — LORAZEPAM 1 MG: 2 INJECTION INTRAMUSCULAR; INTRAVENOUS at 19:38

## 2020-09-01 RX ADMIN — RIFAXIMIN 550 MG: 550 TABLET ORAL at 10:25

## 2020-09-01 RX ADMIN — LACTULOSE 30 G: 10 SOLUTION ORAL at 18:17

## 2020-09-01 RX ADMIN — LACTULOSE 30 G: 10 SOLUTION ORAL at 10:24

## 2020-09-01 RX ADMIN — DILTIAZEM HYDROCHLORIDE 240 MG: 240 CAPSULE, COATED, EXTENDED RELEASE ORAL at 10:25

## 2020-09-01 RX ADMIN — HEPARIN SODIUM 5000 UNITS: 5000 INJECTION INTRAVENOUS; SUBCUTANEOUS at 10:24

## 2020-09-01 RX ADMIN — LORAZEPAM 1 MG: 2 INJECTION INTRAMUSCULAR; INTRAVENOUS at 18:27

## 2020-09-01 RX ADMIN — MIRTAZAPINE 7.5 MG: 15 TABLET, FILM COATED ORAL at 23:06

## 2020-09-01 RX ADMIN — QUETIAPINE FUMARATE 25 MG: 25 TABLET ORAL at 23:06

## 2020-09-01 RX ADMIN — RIFAXIMIN 550 MG: 550 TABLET ORAL at 23:05

## 2020-09-01 NOTE — PHYSICAL THERAPY NOTE
Physical Therapy Evaluation     Patient's Name: Cruz Gimenez    Admitting Diagnosis  Hyperkalemia [E87 5]  Hyperphosphatemia [E83 39]  Cirrhosis ([place] [address] ) [K74 60]  Hyponatremia [E87 1]  Uremia [N19]  Weakness [R53 1]  Thrombocytopenia ([facility] ) [D69 6]  Chronic anemia [D64 9]  CESAR (acute kidney injury) ([facility] ) [N17 9]  Generalized weakness [R53 1]    Problem List  Patient Active Problem List   Diagnosis    Anemia    Benign essential hypertension    Cigarette nicotine dependence without complication    Hyperglycemia    Paroxysmal SVT (supraventricular tachycardia) (HCC)    Sinus tachycardia    Urinary incontinence    Memory difficulties    Gait disturbance    Obesity (BMI 30 0-34  9)    Recurrent major depressive disorder, in partial remission (HCC)    Pancytopenia ([facility] )    History of alcohol abuse    Toxic metabolic encephalopathy    Liver cirrhosis (HCC)    Elevated troponin    Chronic diastolic CHF (congestive heart failure) (HCC)    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HCC)    Pneumonia    Urinary tract infection without hematuria    CESAR (acute kidney injury) (HCC)    Dysphagia    Paroxysmal atrial fibrillation (HCC)    Troponin level elevated    Hyperbilirubinemia    Neutropenia (HCC)    Hyperphosphatemia    Hyperkalemia    Hyponatremia    Multiple myeloma ([facility] )       Past Medical History  Past Medical History:   Diagnosis Date    Benign essential hypertension     last assessed - [id number]    Chest pain 11/4/2017    Cirrhosis ([place] [address] )     Gastroesophageal reflux disease without esophagitis 11/16/2017    Liver disease     Multiple myeloma ([facility] )        Past Surgical History  Past Surgical History:   Procedure Laterality Date    APPENDECTOMY      BONE MARROW BIOPSY          09/01/20 1035   Note Type   Note type Eval only   Pain Assessment   Pain Assessment Tool Pain Assessment not indicated - pt denies pain   Pain Score No Pain   Home Living   Additional Comments Pt poor historian, PLOF & home environment information obtained from chart review, per CM June on 8/31/20 "Pt lives in a 3rd floor apartment with her sister, who is pt's primary caregiver  There is an elevator to the 3rd floor  Pt requires assistance with ADLs and medication management, all provided by pt's sister  Pt's family provides transportation to all appointments "    Restrictions/Precautions   Weight Bearing Precautions Per Order No   Other Precautions 1:1;Cognitive; Chair Alarm; Bed Alarm;O2;Fall Risk   General   Family/Caregiver Present No   Cognition   Overall Cognitive Status Impaired   Arousal/Participation Alert   Orientation Level Oriented to person;Disoriented to place; Disoriented to time;Disoriented to situation   Memory Decreased recall of biographical information;Decreased short term memory;Decreased recall of recent events;Decreased recall of precautions   Following Commands Follows one step commands inconsistently   Comments pt agreeable to PT evaluation   RUE Assessment   RUE Assessment   (defer to OT eval for comments)   LUE Assessment   LUE Assessment   (defer to OT eval for comments)   RLE Assessment   RLE Assessment X  (grossly 3+/5 assessed c functional mobility; DNT formally)   LLE Assessment   LLE Assessment X  (grossly 3+/5 assessed c functional mobility; DNT formally)   Coordination   Movements are Fluid and Coordinated   (unable to formally assess d/t cognition)   Sensation   (unable to formally assess d/t cognition)   Bed Mobility   Supine to Sit 3  Moderate assistance   Additional items Assist x 2;HOB elevated; Increased time required;Verbal cues;LE management   Sit to Supine 3  Moderate assistance   Additional items Assist x 2;HOB elevated; Bedrails; Increased time required;Verbal cues;LE management   Transfers   Sit to Stand Unable to assess   Additional Comments pt refusing to transfer OOB at this time 2* fatigue  max vc and education on importance of mobility OOB, pt continuing to refuse   Pt's RN reports pt has been OOB mobilizing since admission with A x1  Ambulation/Elevation   Gait pattern Not tested   Balance   Static Sitting Fair   Dynamic Sitting Fair -   Static Standing   (DNT)   Endurance Deficit   Endurance Deficit Yes   Activity Tolerance   Activity Tolerance Patient limited by fatigue; Other (Comment)  (cognition)   Medical Staff Made Aware YUNI Jon CM June   Nurse Made Aware ANDREY Mendes verbalized pt appropriate to see, made aware of session outcome/recs   Assessment   Prognosis Fair   Problem List Decreased strength;Decreased endurance; Impaired balance;Decreased mobility; Decreased cognition;Decreased safety awareness   Assessment Pt is 69 y o  female seen for PT evaluation on 9/1/2020 s/p admit to [facility] on 5/78/4743 w/ Toxic metabolic encephalopathy  Pt initially presented 8/29 with worsening weakness x couple weeks, increased confusion, and elevated creatinine  PT consulted to assess pt's functional mobility and d/c needs  Order placed for PT eval and tx, w/ up w/ A order  Performed at least 2 patient identifiers during session: Name and wristband  Comorbidities affecting pt's physical performance at time of assessment include: benign essential HTN, chest pain, cirrhosis, GERD without esophagitis, liver disease, multiple myeloma  Pt poor historian, PLOF & home environment information obtained from chart review, per PENG June on 8/31/20 "Pt lives in a 3rd floor apartment with her sister, who is pt's primary caregiver  There is an elevator to the 3rd floor  Pt requires assistance with ADLs and medication management, all provided by pt's sister  Pt's family provides transportation to all appointments " Personal factors affecting pt at time of IE include: inaccessible home environment, inability to ambulate household distances, inability to navigate level surfaces w/o external assistance, decreased cognition, limited home support and decreased initiation and engagement   Please find objective findings from PT assessment regarding body systems outlined above with impairments and limitations including weakness, impaired balance, decreased endurance, decreased activity tolerance, decreased safety awareness, fall risk and decreased cognition, as well as mobility assessment (need for cueing for mobility technique and limited to bedlevel assessment, pt not agreeable/refusing to transfer OOB at this time)  The following objective measures performed on IE also reveal limitations: Barthel Index: 25/100 and Modified [place]: 4 (moderate/severe disability)  Pt's clinical presentation is currently unstable/unpredictable seen in pt's presentation of abnormal lab value(s), need for input for task focus and mobility technique, ongoing medical assessment and inconsistent command following  Pt to benefit from continued PT tx to address deficits as defined above and maximize level of functional independent mobility and consistency  From PT/mobility standpoint, recommendation at time of d/c would be STR vs  Home PT pending progress in order to facilitate return to PLOF     Barriers to Discharge Inaccessible home environment;Decreased caregiver support  (pt poor historian)   Goals   Patient Goals none expressed pertaining to therapy   STG Expiration Date 09/11/20   Short Term Goal #1 In 7-10 days: Increase bilateral LE strength 1/2 grade to facilitate independent mobility, Perform all bed mobility tasks with min A of 1 to decrease caregiver burden, Increase static and dynamic sitting balance 1 grade to decrease risk for falls, Tolerate seated at EOB 30 minutes to facilitate functional task performance, Improve Barthel Index score to 40 or greater to facilitate independence, PT provider will perform functional balance assessment to determine fall risk and PT to see and establish goals for transfers & ambulation; standing/ambulatory balance when appropriate   PT Treatment Day 0   Plan   Treatment/Interventions Functional transfer training;LE strengthening/ROM; Therapeutic exercise; Endurance training;Patient/family training;Equipment eval/education; Bed mobility;Spoke to nursing;OT;Continued evaluation   PT Frequency   (3-5x/wk)   Recommendation   PT Discharge Recommendation Post-Acute Rehabilitation Services  (vs HHPT pending progress/ further mobility assessment)   Equipment Recommended   (TBD)   PT - OK to Discharge No   Modified [place] Scale   Modified [place] Scale 4   Barthel Index   Feeding 5   Bathing 0   Grooming Score 0   Dressing Score 0   Bladder Score 5   Bowels Score 5   Toilet Use Score 5   Transfers (Bed/Chair) Score 5   Mobility (Level Surface) Score 0   Stairs Score 0   Barthel Index Score 25           Francisco Martinez, PT, DPT

## 2020-09-01 NOTE — PROGRESS NOTES
Progress Note - Simon Pizarro 1945, 69 y o  female MRN: [id number]    Unit/Bed#: -01 Encounter: [id number]    Primary Care Provider: Janel Sheehan MD   Date and time admitted to hospital: 8/29/2020  6:35 PM        Multiple myeloma [facility])  Assessment & Plan  -follows with [facility]  -nephrology consulted for concern of myeloma kidney with sudden worsening of renal functions  Hyponatremia  Assessment & Plan   currently stable around 139  Monitor BMP  Hyperkalemia  Assessment & Plan  Resolved    Acute on chronic diastolic CHF  Assessment & Plan  Wt Readings from Last 3 Encounters:   09/01/20 72 5 kg (159 lb 13 3 oz)   07/16/20 75 8 kg (167 lb)   06/26/20 76 2 kg (168 lb)     Echo 01/31/2020 shows EF 50-55% grade 2 dysfunction  Currently in exacerbation due to volume overload as evidenced by lower extremity edema and chest x-ray consistent with large right-sided pleural effusion  -no signs respiratory distress, pulmonary edema or orthopnea  Chest x-ray report reviewed for finding consistent with congestive heart failure, large right-sided pleural effusion  Clinically patient is not in acute respiratory distress  O2 saturation 98% on room air  Lasix was discontinued due to acute kidney injury  Continue monitoring volume status with daily weights, intake and outputs    Liver cirrhosis ([facility] )  Assessment & Plan  -improved ammonia levels  -continue lactulose and Xifaxan  -outpatient follow-up with GI    Pancytopenia ([facility] )  Assessment & Plan  -has been diagnosed by oncology with multiple myeloma  -follows with [facility]  -currently WBC count and hemoglobin are stable  -chronically low platelets    No bleeding    Memory difficulties  Assessment & Plan  -has underlying baseline dementia which has worsened in the last 3- 4 days  -will treat the underlying cause and reassess  Appreciate Psychiatry consult    Anemia  Assessment & Plan  -hemoglobin stable at 8-9 range  No bleeding noted  -continue to monitor closely    * Toxic metabolic encephalopathy  Assessment & Plan  Currently patient is awake, alert, confused more than her baseline, per family   Patient continues to require one-to-one close observation, IV Ativan will need to be ordered for MRA/ MRV imaging of the brain   Patient has underlying dementia, but currently presents with acute encephalopathy  CT head report reviewed noted for no acute evidence of infarct, mildly enlarged bilateral superior ophthalmic veins, concerning for cavernous sinus thrombus  MRI/MRA MRV recommended  Acute worsening suspected multifactorial in the settings of elevated ammonia, hyponatremia  Patient is awake, alert, oriented to self only  She is more sleepy today and is no longer aggressive or agitated   Continue lactulose to 30 g b i d  Continue Xifaxan  Continue close one-to-one observation  Frequently orientation  Ativan p r n  For anxiety and behavior  Follow-up with inpatient neurology and psychiatry recommendation  Of note:  Since patient is not cooperative for workup will consider palliative care to discuss goals of care with family member  Acute kidney injury    Patient presented with acute kidney injury of unclear etiology  Refraction includes multiple myeloma, light chain related ATN, fluid overload  Today, creatinine appears to be improving gradually, but slowly  Creatinine on admission was 5 6, but currently this 4 38  CXR report reviewed noted with right sided effusion without symptoms  Nephrology following as well    VTE Pharmacologic Prophylaxis:   Pharmacologic: Pharmacologic VTE Prophylaxis contraindicated due to Thrombocytopenia  Mechanical VTE Prophylaxis in Place: Yes    Patient Centered Rounds: I have performed bedside rounds with nursing staff today      Discussions with Specialists or Other Care Team Provider:  Palliative care    Education and Discussions with Family / Patient:  Patient's son and sister    Time Spent for Care: 30 minutes  More than 50% of total time spent on counseling and coordination of care as described above  Current Length of Stay: 3 day(s)    Current Patient Status: Inpatient   Certification Statement: The patient will continue to require additional inpatient hospital stay due to Metabolic encephalopathy    Discharge Plan:  48 hours    Code Status: Level 1 - Full Code      Subjective:   Patient was seen and examined  She continues to remain confused, but is less agitated today  I have seen the patient later in the afternoon today to reassess her mental state  She is definitely more awake, however, she continues to be disoriented  She thinks that she is in [place]  She tells me that she has 4 kids  She knows the names of her children  However, when she starts talking, she does not make any sense  Objective:     Vitals:   Temp (24hrs), Av 8 °F (36 °C), Min:96 5 °F (35 8 °C), Max:97 1 °F (36 2 °C)    Temp:  [96 5 °F (35 8 °C)-97 1 °F (36 2 °C)] 97 1 °F (36 2 °C)  HR:  [] 76  Resp:  [18] 18  BP: (146-151)/(80-85) 147/85  SpO2:  [96 %-98 %] 96 %  Body mass index is 29 23 kg/m²  Input and Output Summary (last 24 hours): Intake/Output Summary (Last 24 hours) at 2020 1504  Last data filed at 2020 1300  Gross per 24 hour   Intake 795 ml   Output 800 ml   Net -5 ml       Physical Exam:     Physical Exam  Constitutional:       Appearance: She is well-developed  Cardiovascular:      Rate and Rhythm: Normal rate and regular rhythm  Pulmonary:      Effort: Pulmonary effort is normal       Breath sounds: Examination of the right-middle field reveals decreased breath sounds  Examination of the right-lower field reveals decreased breath sounds  Decreased breath sounds present  Abdominal:      General: There is no distension  Palpations: Abdomen is soft  Hernia: A hernia is present  Musculoskeletal:      Right lower leg: Edema present  Left lower leg: Edema present  Skin:     General: Skin is warm  Findings: No erythema  Neurological:      Mental Status: She is alert  She is confused  Cranial Nerves: No cranial nerve deficit  Comments: Oriented to self only       Additional Data:     Labs:    Results from last 7 days   Lab Units 09/01/20  1418 08/30/20  1256 08/29/20 1944   WBC Thousand/uL 3 81* 4 86 4 46   HEMOGLOBIN g/dL 8 0* 9 7* 8 8*   HEMATOCRIT % 25 4* 31 0* 27 4*   PLATELETS Thousands/uL 61* 73* 70*   BANDS PCT %  --   --  3   LYMPHO PCT %  --  23 26   MONO PCT %  --  9 6   EOS PCT %  --  0 0     Results from last 7 days   Lab Units 09/01/20  1418  08/29/20  1944   SODIUM mmol/L 139   < > 131*   POTASSIUM mmol/L 5 3   < > 6 2*   CHLORIDE mmol/L 101   < > 95*   CO2 mmol/L 22   < > 21   BUN mg/dL 99*   < > 84*   CREATININE mg/dL 4 38*   < > 5 65*   ANION GAP mmol/L 16*   < > 15*   CALCIUM mg/dL 9 1   < > 8 2*   ALBUMIN g/dL  --   --  3 9   TOTAL BILIRUBIN mg/dL  --   --  1 50*   ALK PHOS U/L  --   --  154*   ALT U/L  --   --  30   AST U/L  --   --  26   GLUCOSE RANDOM mg/dL 130   < > 148*    < > = values in this interval not displayed  Results from last 7 days   Lab Units 08/29/20  1944   INR  1 53*     Results from last 7 days   Lab Units 08/29/20 2051   POC GLUCOSE mg/dl 140                   * I Have Reviewed All Lab Data Listed Above  * Additional Pertinent Lab Tests Reviewed:  All Labs Within Last 24 Hours Reviewed    Imaging:    MRI of the brain  Chest x-ray  Recent Cultures (last 7 days):           Last 24 Hours Medication List:   Current Facility-Administered Medications   Medication Dose Route Frequency Provider Last Rate    diltiazem  240 mg Oral Daily Kendra Fernandez MD      heparin (porcine)  5,000 Units Subcutaneous Q12H [facility] Kendra Fernandez MD      lactulose  30 g Oral BID Jenae Olivarez MD      LORazepam  1 mg Intravenous Once Grace Whitman MD      mirtazapine  7 5 mg Oral HS MD Ayush Zuleta nicotine  14 mg Transdermal Daily Brent Rincon MD      pantoprazole  40 mg Oral Early Morning Hilary Lara MD      QUEtiapine  25 mg Oral HS Brent Rincon MD      rifaximin  550 mg Oral Q12H Mauricio Montero MD          Today, Patient Was Seen By: Rigoberto Loza MD    ** Please Note: Dictation voice to text software may have been used in the creation of this document   **

## 2020-09-01 NOTE — ASSESSMENT & PLAN NOTE
Patient presented with acute kidney injury of unclear etiology  Refraction includes multiple myeloma, light chain related ATN, fluid overload  Today, creatinine appears to be improving gradually, but slowly  Creatinine on admission was 5 6, but currently it is 3 8    Nephrology following as well

## 2020-09-01 NOTE — ASSESSMENT & PLAN NOTE
Currently patient is awake, alert, confused more than her baseline, per family   Patient continues to require one-to-one close observation, IV Ativan will need to be ordered for MRA/ MRV imaging of the brain   Patient has underlying dementia, but currently presents with acute encephalopathy  CT head report reviewed noted for no acute evidence of infarct, mildly enlarged bilateral superior ophthalmic veins, concerning for cavernous sinus thrombus  MRI/MRA MRV recommended  Acute worsening suspected multifactorial in the settings of elevated ammonia, hyponatremia  Patient is awake, alert, oriented to self only  She is more sleepy today and is no longer aggressive or agitated   Continue lactulose to 30 g b i d  Continue Xifaxan  Continue close one-to-one observation  Frequently orientation  Ativan p r n  For anxiety and behavior  Follow-up with inpatient neurology and psychiatry recommendation  Of note:  Since patient is not cooperative for workup will consider palliative care to discuss goals of care with family member

## 2020-09-01 NOTE — PLAN OF CARE
Problem: PHYSICAL THERAPY ADULT  Goal: Performs mobility at highest level of function for planned discharge setting  See evaluation for individualized goals  Description: Treatment/Interventions: Functional transfer training, LE strengthening/ROM, Therapeutic exercise, Endurance training, Patient/family training, Equipment eval/education, Bed mobility, Spoke to nursing, OT, Continued evaluation  Equipment Recommended: (TBD)       See flowsheet documentation for full assessment, interventions and recommendations  Note: Prognosis: Fair  Problem List: Decreased strength, Decreased endurance, Impaired balance, Decreased mobility, Decreased cognition, Decreased safety awareness  Assessment: Pt is 69 y o  female seen for PT evaluation on 9/1/2020 [facility]/ Toxic metabolic encephalopathy  Pt initially presented 8/29 with worsening weakness x couple weeks, increased confusion, and elevated creatinine  PT consulted to assess pt's functional mobility and d/c needs  Order placed for PT eval and tx, w/ up w/ A order  Performed at least 2 patient identifiers during session: Name and wristband  Comorbidities affecting pt's physical performance at time of assessment include: benign essential HTN, chest pain, cirrhosis, GERD without esophagitis, liver disease, multiple myeloma  Pt poor historian, PLOF & home environment information obtained from chart review, per CM June on 8/31/20 "Pt lives in a 3rd floor apartment with her sister, who is pt's primary caregiver  There is an elevator to the 3rd floor  Pt requires assistance with ADLs and medication management, all provided by pt's sister   Pt's family provides transportation to all appointments " Personal factors affecting pt at time of IE include: inaccessible home environment, inability to ambulate household distances, inability to navigate level surfaces w/o external assistance, decreased cognition, limited home support and decreased initiation and engagement  Please find objective findings from PT assessment regarding body systems outlined above with impairments and limitations including weakness, impaired balance, decreased endurance, decreased activity tolerance, decreased safety awareness, fall risk and decreased cognition, as well as mobility assessment (need for cueing for mobility technique and limited to bedlevel assessment, pt not agreeable/refusing to transfer OOB at this time)  The following objective measures performed on IE also reveal limitations: Barthel Index: 25/100 and Modified [place]: 4 (moderate/severe disability)  Pt's clinical presentation is currently unstable/unpredictable seen in pt's presentation of abnormal lab value(s), need for input for task focus and mobility technique, ongoing medical assessment and inconsistent command following  Pt to benefit from continued PT tx to address deficits as defined above and maximize level of functional independent mobility and consistency  From PT/mobility standpoint, recommendation at time of d/c would be STR vs  Home PT pending progress in order to facilitate return to PLOF  Barriers to Discharge: Inaccessible home environment, Decreased caregiver support(pt poor historian)     PT Discharge Recommendation: Post-Acute Rehabilitation Services(vs HHPT pending progress/ further mobility assessment)     PT - OK to Discharge: No    See flowsheet documentation for full assessment

## 2020-09-01 NOTE — ASSESSMENT & PLAN NOTE
-has been diagnosed by oncology with multiple myeloma  -follows with [facility]  -currently WBC count and hemoglobin are stable  -chronically low platelets    No bleeding

## 2020-09-01 NOTE — PROGRESS NOTES
Progress Note - Neurology   Sudeep Wadsworth 69 y o  female [id number]  Unit/Bed#: /-01    Assessment:  69 y o  female with multiple myeloma, CHF, Afib not on AC, dementia, GERD, cirrhosis secondary to chronic alcohol abuse, HTN, who initially presented 8/29 with worsening weakness x couple weeks, increased confusion, and elevated creatinine  [facility] ([facility] )  Assessment & Plan  · Follows with [facility] oncology  · Medical management per primary team    Paroxysmal atrial fibrillation [facility])  Assessment & Plan  · Not on anticoagulation at baseline  · On Cardizem 240 mg daily  · Medical management per primary team    CESAR (acute kidney injury) ([facility] )  Assessment & Plan  · Creatinine on presentation 5 65  Most recent level 4 70  · Nephrology following; appreciate recommendations  · Medical management per primary team and Nephrology    Liver cirrhosis [facility])  Assessment & Plan  · Ammonia on presentation 61  · On Xifaxan 550 mg q 12 hours and lactulose 20 g t i d  At baseline  · Medical management per primary team    * Toxic metabolic encephalopathy  Assessment & Plan  Assessment:  · Suspect toxic metabolic encephalopathy in the setting of CESAR, hyponatremia, and hyperammonemia superimposed on dementia  · 5/6 CT head: No acute intracranial abnormalities  Microangiopathic changes  · 8/30 CT head: No evidence of acute infarct, intracranial hemorrhage, or mass effect  Mildly enlarged bilateral superior ophthalmic veins, new since the prior exam  No proptosis or retro-orbital inflammation  Findings could represent carotid cavernous fistula or cavernous sinus thrombosis  MRI brain and MRA/MRV head recommended for further evaluation  · 8/31 Repeat CT head: No acute intracranial hemorrhage, midline shift, or mass effect  If there is continued clinical concern for acute infarct, further evaluation with MRI may be obtained which is more sensitive  Chronic small vessel ischemic changes   Prominence of bilateral superior ophthalmic veins seen again  · Patient unable to receive CT venogram at this time due to CESAR  Will check STAT MRI brain wo contrast and MRA/MRV head for further evaluation of possible cavernous sinus thrombosis  · Patient is on lactulose 20 g t i d , Xifaxan 550 mg q 12 hours, Remeron 7 5 mg HS baseline  · MRI brain without acute changes, demonstrated volume loss/atrophy and microangiopathy along with prominent bilateral superior ophthalmic veins as seen on CT  · Spoke with Radiology regarding MRI results in comparison to prior CT head results-per radiologist, cavernous sinus thrombosis with present with increased T1 signal on MRI, but no increased T1 signal was noted on MRI brain  · Ideally would get CT venogram for further evaluation, but unable to complete at this time due to patient's poor renal function  Will attempt MRA/MRV head but unsure if this will be able to be completed due to poor patient cooperation  Primary team will further evaluate possible premedication for MRA/MRV  Plan:   · MRA/MRV head pending  · Monitor neuro exam; notify with any changes  · PT/OT/ST  · Medical management and supportive care per primary team  Correction of metabolic or infectious disturbances  Subjective:   Patient laying in bed, resting comfortably  1:1 PCA at bedside  For PCA, patient has been drowsy all day as she was awake all day yesterday and tired herself out  Patient arousable to tactile and verbal stimuli but remains drowsy  She was able to follow some commands but not all  She currently denies any pain        Past Medical History:   Diagnosis Date    Benign essential hypertension     last assessed - [id number]    Chest pain 11/4/2017    [facility] ([facility] )     Gastroesophageal reflux disease without esophagitis 11/16/2017    Liver disease     Multiple myeloma (HCC)      Past Surgical History:   Procedure Laterality Date    APPENDECTOMY      BONE MARROW BIOPSY       Family History Problem Relation Age of Onset    Heart attack Father         myocardial infarction     Social History     Socioeconomic History    Marital status:      Spouse name: None    Number of children: None    Years of education: None    Highest education level: None   Occupational History    None   Social Needs    Financial resource strain: None    Food insecurity     Worry: None     Inability: None    Transportation needs     Medical: None     Non-medical: None   Tobacco Use    Smoking status: Light Tobacco Smoker     Packs/day: 0 25     Types: Cigarettes    Smokeless tobacco: Never Used    Tobacco comment: 1/2 pack per month   Substance and Sexual Activity    Alcohol use: Not Currently     Frequency: Never     Binge frequency: Never     Comment: History of significant daily alcohol intake  Sister joy no alcohol intake in 6 months    Drug use: No    Sexual activity: Not Currently   Lifestyle    Physical activity     Days per week: 0 days     Minutes per session: 0 min    Stress: None   Relationships    Social connections     Talks on phone: None     Gets together: None     Attends [employer] service: None     Active member of club or organization: None     Attends meetings of clubs or organizations: None     Relationship status: None    Intimate partner violence     Fear of current or ex partner: None     Emotionally abused: None     Physically abused: None     Forced sexual activity: None   Other Topics Concern    None   Social History Narrative    No advance directives         Medications:   All current active meds have been reviewed and current meds:  Scheduled Meds:  Current Facility-Administered Medications   Medication Dose Route Frequency Provider Last Rate    diltiazem  240 mg Oral Daily Pamella Parks MD      heparin (porcine)  5,000 Units Subcutaneous Q12H [facility] Pamella Parks MD      lactulose  30 g Oral BID Eulalio Quiñones MD      mirtazapine  7 5 mg Oral HS MD Shilpi Moore nicotine  14 mg Transdermal Daily Marlen Gordon MD      pantoprazole  40 mg Oral Early Morning Arya Aparicio MD      QUEtiapine  25 mg Oral HS Marlen Gordon MD      rifaximin  550 mg Oral Q12H [facility] Arya Aparicio MD       Continuous Infusions:   PRN Meds:  ROS:   Review of Systems   Unable to perform ROS: Dementia         Vitals:   /82   Pulse 76   Temp (!) 97 1 °F (36 2 °C) (Axillary)   Resp 18   Ht 5' 2" (1 575 m)   Wt 72 5 kg (159 lb 13 3 oz)   SpO2 96%   BMI 29 23 kg/m²       Physical Exam:   Physical Exam  Vitals signs and nursing note reviewed  Constitutional:       General: She is not in acute distress  Appearance: Normal appearance  She is normal weight  She is not diaphoretic  HENT:      Head: Normocephalic and atraumatic  Mouth/Throat:      Mouth: Mucous membranes are moist       Pharynx: Oropharynx is clear  Eyes:      General: No scleral icterus  Right eye: No discharge  Left eye: No discharge  Conjunctiva/sclera: Conjunctivae normal       Pupils: Pupils are unequal    Neck:      Musculoskeletal: Normal range of motion  Cardiovascular:      Rate and Rhythm: Normal rate  Pulmonary:      Effort: Pulmonary effort is normal  No respiratory distress  Musculoskeletal: Normal range of motion  General: No tenderness  Right lower leg: Edema present  Left lower leg: Edema present  Skin:     General: Skin is warm and dry  Coloration: Skin is not jaundiced or pale  Psychiatric:      Comments: Unable to assess due to AMS       Neurologic Exam     Mental Status   Level of consciousness: drowsy  Patient is drowsy today  She is arousable with verbal and tactile stimuli  She was able to follow some commands  Speech was incomprehensible and consisted of mostly mumbling  Cranial Nerves     CN II   Visual fields full to confrontation  CN III, IV, VI   Pupils: unequal  Right pupil: Size: 2 mm  Shape: regular   Reactivity: non-reactive  Left pupil: Size: 1 mm  Shape: regular  Reactivity: non-reactive  CN VIII   CN VIII normal    CN exam limited due to AMS  Face appears symmetric at rest  EOMs unable to be examined due to inability to follow commands, does track examiner bilaterally past midline     Motor Exam   Muscle bulk: normal  Bilateral upper extremity strength 4/5 biceps, triceps, hand   Unable to follow commands for bilateral lower extremity strength testing, was able to withdrawal bilateral lower extremities to noxious stimuli     Gait, Coordination, and Reflexes     Tremor   Resting tremor: absent      Labs: I have personally reviewed pertinent reports     Recent Results (from the past 24 hour(s))   BMP AM Draw X 3 days starting day 2    Collection Time: 08/31/20  2:05 PM   Result Value Ref Range    Sodium 133 (L) 136 - 145 mmol/L    Potassium 5 4 (H) 3 5 - 5 3 mmol/L    Chloride 96 (L) 100 - 108 mmol/L    CO2 19 (L) 21 - 32 mmol/L    ANION GAP 18 (H) 4 - 13 mmol/L    BUN 96 (H) 5 - 25 mg/dL    Creatinine 4 70 (H) 0 60 - 1 30 mg/dL    Glucose 173 (H) 65 - 140 mg/dL    Calcium 8 9 8 3 - 10 1 mg/dL    eGFR 10 ml/min/1 73sq m   Ammonia    Collection Time: 08/31/20  2:05 PM   Result Value Ref Range    Ammonia 33 11 - 35 umol/L   Chloride, urine, random    Collection Time: 08/31/20  2:25 PM   Result Value Ref Range    Chloride, Ur 20 mmol/L   Osmolality, urine    Collection Time: 08/31/20  2:25 PM   Result Value Ref Range    Osmolality, Ur 335 250 - 900 mmol/KG   Sodium, urine, random    Collection Time: 08/31/20  2:25 PM   Result Value Ref Range    Sodium, Ur 26    Creatinine, urine, random    Collection Time: 08/31/20  2:25 PM   Result Value Ref Range    Creatinine, Ur 83 8 mg/dL   UA (URINE) with reflex to Scope    Collection Time: 08/31/20  2:25 PM   Result Value Ref Range    Color, UA Yellow     Clarity, UA Slightly Cloudy     Specific Combs, UA 1 020 1 003 - 1 030    pH, UA 5 5 4 5, 5 0, 5 5, 6 0, 6 5, 7 0, 7 5, 8 0    Leukocytes, UA Trace (A) Negative    Nitrite, UA Negative Negative    Protein, UA 30 (1+) (A) Negative mg/dl    Glucose, UA Negative Negative mg/dl    Ketones, UA Negative Negative mg/dl    Urobilinogen, UA 0 2 0 2, 1 0 E U /dl E U /dl    Bilirubin, UA Negative Negative    Blood, UA Trace-Intact (A) Negative   Urine Microscopic    Collection Time: 08/31/20  2:25 PM   Result Value Ref Range    RBC, UA 0-1 (A) None Seen, 0-5 /hpf    WBC, UA 0-1 (A) None Seen, 0-5, 5-55, 5-65 /hpf    Epithelial Cells Occasional None Seen, Occasional /hpf    Bacteria, UA Moderate (A) None Seen, Occasional /hpf       Imaging: I have personally reviewed pertinent imaging in and I have personally reviewed PACS reports  EKG, Pathology, and Other Studies: I have personally reviewed pertinent reports  VTE Prophylaxis: Heparin        Total time spent today 26 minutes  Greater than 50% of total time was spent with the patient and / or family counseling and / or coordination of care  A description of the counseling / coordination of care: Patient seen and evaluated  Case reviewed with attending  Chart thoroughly reviewed including imaging and labs  Coordination of care with RN and 1:1 PCA  Discussion of pending imaging with patient

## 2020-09-01 NOTE — PROGRESS NOTES
NEPHROLOGY PROGRESS NOTE    Patient: Migdalia Shields               Sex: female          DOA: 8/29/2020  6:35 PM   YOB: 1945        Age:  69 y o         LOS:  LOS: 3 days       HPI     Patient admitted altered mental status    SUBJECTIVE     She remained confused though seems to be,  Denies any acute complaint    No chest pain no palpitation eating well    CURRENT MEDICATIONS       Current Facility-Administered Medications:     diltiazem (CARDIZEM CD) 24 hr capsule 240 mg, 240 mg, Oral, Daily, Sasha Guardado MD, 240 mg at 09/01/20 1025    lactulose 20 g/30 mL oral solution 30 g, 30 g, Oral, BID, Arie GRIFFIN MD, 30 g at 09/01/20 1024    LORazepam (ATIVAN) injection 1 mg, 1 mg, Intravenous, Once, Sasha Cisneros MD    mirtazapine (REMERON) tablet 7 5 mg, 7 5 mg, Oral, HS, Sasha Guardado MD, 7 5 mg at 08/30/20 2141    nicotine (NICODERM CQ) 14 mg/24hr TD 24 hr patch 14 mg, 14 mg, Transdermal, Daily, Orlin GRIFFIN MD, Stopped at 08/31/20 1004    pantoprazole (PROTONIX) EC tablet 40 mg, 40 mg, Oral, Early Morning, Sasha Guardado MD, 40 mg at 08/31/20 0537    QUEtiapine (SEROquel) tablet 25 mg, 25 mg, Oral, HS, Arie GRIFFIN MD    rifaximin (XIFAXAN) tablet 550 mg, 550 mg, Oral, Q12H [facility], Sasha uGardado MD, 550 mg at 09/01/20 1025    OBJECTIVE     Current Weight: Weight - Scale: 72 5 kg (159 lb 13 3 oz)  Vitals:    09/01/20 1513   BP: 144/72   Pulse: (!) 114   Resp:    Temp: 97 7 °F (36 5 °C)   SpO2: 92%       Intake/Output Summary (Last 24 hours) at 9/1/2020 1524  Last data filed at 9/1/2020 1300  Gross per 24 hour   Intake 795 ml   Output 800 ml   Net -5 ml       PHYSICAL EXAMINATION     Physical Exam  Constitutional:       General: She is not in acute distress  Appearance: She is well-developed  HENT:      Head: Normocephalic  Eyes:      General: No scleral icterus  Conjunctiva/sclera: Conjunctivae normal    Neck:      Musculoskeletal: Neck supple  Vascular: No JVD  Cardiovascular:      Rate and Rhythm: Normal rate  Heart sounds: Normal heart sounds  Pulmonary:      Effort: Pulmonary effort is normal       Breath sounds: No wheezing  Abdominal:      Palpations: Abdomen is soft  Tenderness: There is no abdominal tenderness  Musculoskeletal: Normal range of motion  Skin:     General: Skin is warm  Findings: No rash  Neurological:      Mental Status: She is alert  She is disoriented  Psychiatric:         Behavior: Behavior normal           LAB RESULTS     Results from last 7 days   Lab Units 09/01/20  1418 08/31/20  1405 08/30/20  2050 08/30/20  1256 08/29/20  1944   WBC Thousand/uL 3 81*  --   --  4 86 4 46   HEMOGLOBIN g/dL 8 0*  --   --  9 7* 8 8*   HEMATOCRIT % 25 4*  --   --  31 0* 27 4*   PLATELETS Thousands/uL 61*  --   --  73* 70*   POTASSIUM mmol/L 5 3 5 4* 5 8*  --  6 2*   CHLORIDE mmol/L 101 96* 96*  --  95*   CO2 mmol/L 22 19* 18*  --  21   BUN mg/dL 99* 96* 96*  --  84*   CREATININE mg/dL 4 38* 4 70* 5 44*  --  5 65*   EGFR ml/min/1 73sq m 11 10 8  --  8   CALCIUM mg/dL 9 1 8 9 9 2  --  8 2*   MAGNESIUM mg/dL  --   --  2 2  --  2 0   PHOSPHORUS mg/dL  --   --   --   --  7 9*       RADIOLOGY RESULTS      Results for orders placed during the hospital encounter of 08/29/20   XR chest portable    Narrative CHEST     INDICATION:   Shortness of breath  COMPARISON:  5/10/2020    EXAM PERFORMED/VIEWS:  XR CHEST PORTABLE      FINDINGS:    Stable cardiomegaly  Large right pleural effusion  Right apical pleural scarring  Pulmonary vascular congestion and minimal interstitial edema  Significant compressive atelectasis in the right lung or possible underlying airspace consolidation  No pneumothorax  Diffuse osteopenia  Impression 1  Findings consistent with congestive heart failure  2   Large right pleural effusion  3   Significant compressive atelectasis in the right lung    Underlying airspace consolidation and pneumonia not excluded  Workstation performed: [id number]       Results for orders placed during the hospital encounter of 01/28/20   XR chest pa & lateral    Narrative CHEST     INDICATION: Follow-up congestive heart failure    COMPARISON:  1/30/2020    EXAM PERFORMED/VIEWS:  XR CHEST PA & LATERAL      FINDINGS:    Heart shadow is enlarged but unchanged from prior exam     There continues to be mild vascular congestion though interstitial edema has improved  There are small pleural effusions  Osseous structures appear within normal limits for patient age  Impression Improved congestive heart failure with mild vascular congestion and small effusions  Workstation performed: [id number]       No results found for this or any previous visit  No results found for this or any previous visit  No results found for this or any previous visit  No results found for this or any previous visit  PLAN / RECOMMENDATIONS      Acute kidney injury:  Improving  Etiology unclear  Will continue hydration as a part of the treatment based on urine study    Multiple myeloma:  Was being followed by oncologist and [facility]    Hyponatremia:  Improved with hydration will continue to monitor    Altered mental status:  Seems to be better at this point will continue to monitor    Will follow overall prognosis guarded  Tr Marshall MD  Nephrology  9/1/2020        Portions of the record may have been created with voice recognition software  Occasional wrong word or "sound a like" substitutions may have occurred due to the inherent limitations of voice recognition software  Read the chart carefully and recognize, using context, where substitutions have occurred

## 2020-09-01 NOTE — OCCUPATIONAL THERAPY NOTE
Occupational Therapy Evaluation      [facility]    9/1/2020    Patient Active Problem List   Diagnosis    Anemia    Benign essential hypertension    Cigarette nicotine dependence without complication    Hyperglycemia    Paroxysmal SVT (supraventricular tachycardia) (HCC)    Sinus tachycardia    Urinary incontinence    Memory difficulties    Gait disturbance    Obesity (BMI 30 0-34  9)    Recurrent major depressive disorder, in partial remission (HCC)    Pancytopenia ([facility] [address] [facility]    History of alcohol abuse    Toxic metabolic encephalopathy    Liver cirrhosis (HCC)    Elevated troponin    Chronic diastolic CHF (congestive heart failure) (HCC)    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HCC)    Pneumonia    Urinary tract infection without hematuria    CESAR (acute kidney injury) (HCC)    Dysphagia    Paroxysmal atrial fibrillation (HCC)    Troponin level elevated    Hyperbilirubinemia    Neutropenia (HCC)    Hyperphosphatemia    Hyperkalemia    Hyponatremia    Multiple myeloma ([facility] [address] [facility]       Past Medical History:   Diagnosis Date    Benign essential hypertension     last assessed - [id number]    Chest pain 11/4/2017    Cirrhosis ([facility] [address] [facility]     Gastroesophageal reflux disease without esophagitis 11/16/2017    Liver disease     Multiple myeloma ([facility] )        Past Surgical History:   Procedure Laterality Date    APPENDECTOMY      BONE MARROW BIOPSY          09/01/20 1037   Note Type   Note type Eval only   Restrictions/Precautions   Weight Bearing Precautions Per Order No   Other Precautions 1:1;Cognitive; Chair Alarm; Bed Alarm;O2;Fall Risk   Pain Assessment   Pain Assessment Tool Pain Assessment not indicated - pt denies pain   Pain Score No Pain   Home Living   Type of Home Apartment   Home Layout One level;Performs ADLs on one level; Able to live on main level with bedroom/bathroom; Access; Elevator   Additional Comments Pt poor historian, PLOF & home environment information obtained from chart review, per CM June on 8/31/20  Prior Function   Level of [place] Needs assistance with ADLs and functional mobility; Needs assistance with IADLs   Lives With Other (Comment)  (Sister )   Receives Help From Family   ADL Assistance Needs assistance   IADLs Needs assistance   Comments Per chart review patient's sister is pt's primary caregiver  Pt requires assistance with ADLs and medication management, all provided by pt's sister  Pt's family provides transportation to all appointments "    Lifestyle   Autonomy Patient requires assistance with ADLs/IADLs and lives with sister in a one level apartment    Reciprocal Relationships supportive sister    ADL   Eating Assistance 4  Minimal Assistance   Grooming Assistance 4  Minimal Assistance   UB [address] 4  Minimal Assistance   LB [address] 3  Moderate Assistance   [address] 4  Minimal Assistance    [facility] 3  Moderate [address]  3  Moderate Assistance   Additional Comments ADL levels based on functional performance during OT evaluation    Bed Mobility   Supine to Sit 3  Moderate assistance   Additional items Assist x 2;HOB elevated; Increased time required;Verbal cues;LE management   Sit to Supine 3  Moderate assistance   Additional items Assist x 2; Increased time required;Verbal cues;LE management   Additional Comments Pt sat EOB for ~10 minutes with supervision assist to maintain sitting balance    Transfers   Sit to Stand Unable to assess   Additional Comments Pt declined transferring OOB at this time due to fatigue  Maximum encouragement and redirection provided but pt continued to decline  Balance   Static Sitting Fair   Dynamic Sitting Fair -   Activity Tolerance   Activity Tolerance Patient limited by fatigue; Other (Comment)  (cognition)   Nurse Made Aware ANDREY Goddard verbalized pt appropriate for therapy and made aware of outcomes    RUE Assessment   RUE Assessment WFL  (full AROM, grossly 3+/5 MMT)   LUE Assessment   LUE Assessment WFL  (full AROM, grossly 3+/5 MMT)   Hand Function   Gross Motor Coordination Impaired   Fine Motor Coordination Impaired   Cognition   Overall Cognitive Status Impaired   Arousal/Participation Alert; Responsive; Cooperative   Attention Difficulty attending to directions   Orientation Level Oriented to person;Disoriented to place; Disoriented to time;Disoriented to situation   Memory Decreased recall of biographical information;Decreased short term memory;Decreased recall of recent events;Decreased recall of precautions   Following Commands Follows one step commands inconsistently   Comments pt agreeable to OT eval    Assessment   Limitation Decreased ADL status; Decreased UE strength;Decreased Safe judgement during ADL;Decreased cognition;Decreased endurance;Decreased fine motor control;Decreased self-care trans;Decreased high-level ADLs   Prognosis Fair   Assessment Patient is 69 y o  female who was admitted to [address] [place] [address] secondary to Toxic metabolic encephalopathy on 8/29/2020  At this time, patient is also affected by the comorbidities of: CESAR, anemia, CHF, hyperkalemia, liver cirrhosis, memory difficulties, pancytopenia, and A-fib  Additionally, patient is affected by the following personal factors: limited insight to deficits and difficulty performing ADLs/IADLs   Orders placed for OT evaluation/treatment with up with assistance orders  Performed at least two patient identifiers during session including name and wristband  Prior to admission, Patient requires assistance with ADLs/IADLs and lives with sister in a one level apartment  Upon OT evaluation, patient requires minimum assist for UB ADLs and moderate assist for LB ADLs   Occupational performance is affected by the following deficits: decreased strength, decreased endurance, decreased postural control/alignment, decreased orientation , decreased memory , decreased sequencing , decreased activity tolerance, decreased fine motor control  and decreased dynamic sitting/standing balance  Based on the following information, patient would benefit from continued OT treatment while in the hospital to address the identified deficits and increase level of functional independence  Occupational performance areas to address include: bathing/shower, transfer to all surfaces, functional mobility, grooming, oral hygiene, toilet hygiene, dressing and feeding/eating  From OT standpoint, recommendation at time of d/c would be Post-Acute Rehabilitation Services  Goals   Patient Goals none expressed pertaining to therapy    Plan   Treatment Interventions ADL retraining;Functional transfer training;UE strengthening/ROM; Endurance training;Cognitive reorientation;Patient/family training; Compensatory technique education; Fine motor coordination activities; Activityengagement   Goal Expiration Date 09/11/20   OT Treatment Day 0   OT Frequency 3-5x/wk   Recommendation   OT Discharge Recommendation Post-Acute Rehabilitation Services   OT - OK to Discharge Yes  (Once medically cleared )   Barthel Index   Feeding 5   Bathing 0   Grooming Score 0   Dressing Score 0   Bladder Score 5   Bowels Score 5   Toilet Use Score 5   Transfers (Bed/Chair) Score 5   Mobility (Level Surface) Score 0   Stairs Score 0   Barthel Index Score 25     GOALS:    *ADL transfers with (S) for inc'd independence with ADLs/purposeful tasks    *UB ADL with (S) for inc'd independence with self cares    *LB ADL with (S) using AE prn for inc'd independence with self cares    *Toileting with (S) for clothing management and hygiene for return to PLOF with personal care    *Increase stand tolerance x8 m for inc'd tolerance with standing purposeful tasks    *Participate in 10m UE therex to increase overall stamina/activity tolerance for purposeful tasks    *Bed mobility- (S) for inc'd independence to manage own comfort and initiate EOB & OOB purposeful tasks    *Patient will increase OOB/sitting tolerance to 2-4 hours per day to increase participation in self-care and leisure tasks with no s/s of exertion           Marisol Garsia, MS, OTR/L

## 2020-09-01 NOTE — CONSULTS
Patient Name:  Shae Holguin  :   1945  Date & Time:  20 12:38 PM ET  Location of Patient: [facility]   Location of Doctor: [place]  Consult Duration: 50 minutes  This evaluation was conducted via telepsychiatry with the assistance of on-site staff  Chief Complaint: Toxic encephalopathy    History of Present Illness: 69year-old female with hx dementia and multiple medical problems admitted to the hospital two days ago for AMS/delirium/encephalopathy in the setting of CESAR with uremia  Reports indicate pt has been intermittently aggressive, agitated, and refusing care; family has reported pt has baseline memory impairments but that she is off her baseline  At this time pt is calm and cooperative, oriented only to herself, and she remains a poor historian due to confusion, mumbling speech, and poor insight  She states that she feels tired, denies SI/HI/AVH  No alvaro delusions appreciated  Collateral: As above    SI/Self harm: None reported    HI/Violence: Intermittently combative    Trauma history: Unknown    Sex/Human Trafficking: n/a    Access to weapons: Unknown    Legal: Unknown    Psychiatric History/Treatment History: Hx dementia; other aspects of her mental health history are unknown    Drug/Alcohol History: Unknown    Medical History: CESAR, hyponatremia, CHF, PAF, multiple myeloma, cirrhosis, pancytopenia    Psychiatric Medications:   1  Remeron 7 5mg PO qHS  2   Seroquel 25mg PO qHS    Allergies: NKDA    Sleep: Unknown    Family Psych History/History of suicide: unknown    Living Situation & Relationships: unknown  Employment: unknown  Education: unknown  Stressors: Advanced age, multiple medical problems  Strengths/supports: Son is supportive    Appearance and attire: gown  Attitude and behavior: calm and cooperative, giggling  Speech: Mumbles  Affect and mood: giggling at times  Association and thought processes: concrete  Thought content: No SI/HI/VI reported  Perception: No alvaro delusions appreciated  Sensorium, memory, and orientation: oriented only to self  Intellectual functioning: WNL  Insight and judgment: poor    Impression/Risk Assessment: 69year-old female with multifactorial delirium/encephalopathy superimposed on delirium; she remains gravely disabled and she lacks capacity for medical decision-making  NOTE: Capacity is a fluid determination and may be re-assessed as indicated  Diagnosis: See above    Treatment Recommendations:  1  If not already done, identify/assign proxy medical decision-maker  2  Cont  current meds/mgmt; optimize all medical conditions  3  Avoid Ativan and other benzos as they may exacerbation confusion; use Haldol 2mg PO/IV q6 hours prn agitation  4  Re-consult telepsychiatry as indicated  5  PT/OT/SW consults  6  Upon medical clearance, discharge to home/SNF, LTC per #5 above  7  In the outpatient setting, consider addition of a dementia medication i e  Aricept or Namenda    Psychiatric Clearance: Cleared    Observation level: Close observation    Pharmacological: As above    Therapy: Supportive    Level of Care: Inpatient medical    Thanks for inviting us to participate in the care of this patient      Matthew Alvares MD  9/1/20 12:38 PM ET

## 2020-09-01 NOTE — PLAN OF CARE
Problem: Nutrition/Hydration-ADULT  Goal: Nutrient/Hydration intake appropriate for improving, restoring or maintaining nutritional needs  Description: Monitor and assess patient's nutrition/hydration status for malnutrition  Collaborate with interdisciplinary team and initiate plan and interventions as ordered  Monitor patient's weight and dietary intake as ordered or per policy  Utilize nutrition screening tool and intervene as necessary  Determine patient's food preferences and provide high-protein, high-caloric foods as appropriate       INTERVENTIONS:  - Monitor oral intake, urinary output, labs, and treatment plans  - Assess nutrition and hydration status and recommend course of action  - Evaluate amount of meals eaten  - Assist patient with eating if necessary   - Allow adequate time for meals  - Recommend/ encourage appropriate diets, oral nutritional supplements, and vitamin/mineral supplements  - Assess need for intravenous fluids  - Provide specific nutrition/hydration education as appropriate  - Include patient/family/caregiver in decisions related to nutrition  Outcome: Progressing

## 2020-09-01 NOTE — CONSULTS
Consultation - Palliative and Supportive Care   Jennifer Gonzáles 69 y o  female [id number]    Assessment:    -   Patient Active Problem List   Diagnosis    Anemia    Benign essential hypertension    Cigarette nicotine dependence without complication    Hyperglycemia    Paroxysmal SVT (supraventricular tachycardia) (HCC)    Sinus tachycardia    Urinary incontinence    Memory difficulties    Gait disturbance    Obesity (BMI 30 0-34  9)    Recurrent major depressive disorder, in partial remission (HCC)    Pancytopenia ([address] )    History of alcohol abuse    Toxic metabolic encephalopathy    Liver cirrhosis (HCC)    Elevated troponin    Chronic diastolic CHF (congestive heart failure) (HCC)    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HCC)    Pneumonia    Urinary tract infection without hematuria    CESAR (acute kidney injury) (HCC)    Dysphagia    Paroxysmal atrial fibrillation (HCC)    Troponin level elevated    Hyperbilirubinemia    Neutropenia (HCC)    Hyperphosphatemia    Hyperkalemia    Hyponatremia    Multiple myeloma ([facility] [address] )     Plan:  1  Symptom management   · Delirium precautions: please minimize interruptions and prioritize sleep at night  No TV nor screen time at night  Shades drawn at night  During day, shades up, minimize napping, and encourage meals in chair  · Psychiatry consulted for agitation    2  Goals  · Patient's son, Eliane Mercer states he previously has conversations with is mom and she is "a fighter" and would want all interventions in order to have a chance to get better  At this time he is agreeable to intubation and mechanical ventilation  Psychosocial support  · Patient has a son and daughter and a sister who are involved in her care  Code Status:  Full Code- Level 1   Decisional apparatus:  Patient is not competent on my exam today  If competence is lost, patient's substitute decision maker would default to adult children by [place] Act 169     Advance Directive / Daria Duck Will / POLST:  None on file      I have reviewed the patient's controlled substance dispensing history in the Prescription Drug Monitoring Program in compliance with the [facility] regulations before prescribing any controlled substances  We appreciate the invitation to be involved in this patient's care  We will continue to follow  Please do not hesitate to reach our on call provider through our clinic answering service at  should you have acute symptom control concerns  MEREDITH Florentino  Palliative and Supportive Care  Clinic/Answering Service: [phone]  You can find me on [employer]! IDENTIFICATION:  Inpatient consult to Palliative Care  Consult performed by: MEREDITH Shepherd  Consult ordered by: Cassia Mcgill MD        Physician Requesting Consult: Kristen Del Castillo MD  Reason for Consult / Principal Problem: goals of care  Hx and PE limited by: dementia, confusion    HISTORY OF PRESENT ILLNESS:       Saud Saleh is a 69 y o  female past medical history of dementia, current smoker, multiple myeloma not having achieved remission on low dose 5 mg Revlimid and dexamethasone prior to admission, atrial fibrillation CHF, cirrhosis of liver who presents with encephalopathy and acute kidney injury  Since admission she has had worsening confusion agitation and aggression  She was diagnosed with Multiple myeloma in July 2020  At baseline the patient has "significant dementia" which has gotten worse since her hospitalization in June  At this point, her cancer treatment is on hold secondary to kidney and liver dysfunction  The patient lives with her sister, Lukas Reese  Her son Monroe Napoles reports the patient has had a significant decline in function recently  He states previously he had conversations with his mother who did not want to place limits on her care  He states that during those conversations she expressed wanting to have CPR and being on a ventilator if needed    He states he received a medical update today  Will continue conversations with family as goals of care will continue to gonzalez to be addressed  The family may benefit from a multidisciplinary meeting in order to understand prognosis going forward  Per a review of the chart, the patient previously had been non-compliant with clinic visits, labs and procedures  Her sister,  Sharon Dillard had previously expressed frustration about this  Given her dementia history, it is likely the patient may not have the ability to understand importance  Review of Systems   Unable to perform ROS: dementia     Infusion  Past Medical History:   Diagnosis Date    Benign essential hypertension     last assessed - [id number]    Chest pain 11/4/2017    Cirrhosis ([facility] )     Gastroesophageal reflux disease without esophagitis 11/16/2017    Liver disease     Multiple myeloma ([facility] )      Past Surgical History:   Procedure Laterality Date    APPENDECTOMY      BONE MARROW BIOPSY       Social History     Socioeconomic History    Marital status:      Spouse name: Not on file    Number of children: Not on file    Years of education: Not on file    Highest education level: Not on file   Occupational History    Not on file   Social Needs    Financial resource strain: Not on file    Food insecurity     Worry: Not on file     Inability: Not on file    Transportation needs     Medical: Not on file     Non-medical: Not on file   Tobacco Use    Smoking status: Light Tobacco Smoker     Packs/day: 0 25     Types: Cigarettes    Smokeless tobacco: Never Used    Tobacco comment: 1/2 pack per month   Substance and Sexual Activity    Alcohol use: Not Currently     Frequency: Never     Binge frequency: Never     Comment: History of significant daily alcohol intake   Sister joy no alcohol intake in 6 months    Drug use: No    Sexual activity: Not Currently   Lifestyle    Physical activity     Days per week: 0 days     Minutes per session: 0 min    Stress: Not on file   Relationships    Social connections     Talks on phone: Not on file     Gets together: Not on file     Attends Buddhist service: Not on file     Active member of club or organization: Not on file     Attends meetings of clubs or organizations: Not on file     Relationship status: Not on file    Intimate partner violence     Fear of current or ex partner: Not on file     Emotionally abused: Not on file     Physically abused: Not on file     Forced sexual activity: Not on file   Other Topics Concern    Not on file   Social History Narrative    No advance directives     Family History   Problem Relation Age of Onset    Heart attack Father         myocardial infarction       MEDICATIONS / ALLERGIES:    current meds:   Current Facility-Administered Medications   Medication Dose Route Frequency    diltiazem (CARDIZEM CD) 24 hr capsule 240 mg  240 mg Oral Daily    heparin (porcine) subcutaneous injection 5,000 Units  5,000 Units Subcutaneous Q12H [address]    lactulose 20 g/30 mL oral solution 30 g  30 g Oral BID    mirtazapine (REMERON) tablet 7 5 mg  7 5 mg Oral HS    nicotine (NICODERM CQ) 14 mg/24hr TD 24 hr patch 14 mg  14 mg Transdermal Daily    pantoprazole (PROTONIX) EC tablet 40 mg  40 mg Oral Early Morning    QUEtiapine (SEROquel) tablet 25 mg  25 mg Oral HS    rifaximin (XIFAXAN) tablet 550 mg  550 mg Oral Q12H [address]       No Known Allergies    OBJECTIVE:    Physical Exam  Physical Exam  Constitutional:       General: She is sleeping  Appearance: She is ill-appearing  HENT:      Head: Normocephalic and atraumatic  Eyes:      General: Lids are normal       Comments: Pat   Pulmonary:      Effort: Pulmonary effort is normal       Breath sounds: Normal breath sounds  Musculoskeletal:      Comments: Generally weak   Neurological:      Mental Status: She is confused  Psychiatric:         Attention and Perception: She is inattentive           Cognition and Memory: She exhibits impaired recent memory and impaired remote memory  Lab Results:   CBC: No results found for: WBC, HGB, HCT, MCV, PLT, ADJUSTEDWBC, MCH, MCHC, RDW, MPV, NRBC, CMP:   Lab Results   Component Value Date    SODIUM 133 (L) 08/31/2020    K 5 4 (H) 08/31/2020    CL 96 (L) 08/31/2020    CO2 19 (L) 08/31/2020    BUN 96 (H) 08/31/2020    CREATININE 4 70 (H) 08/31/2020    CALCIUM 8 9 08/31/2020    EGFR 10 08/31/2020   , PT/PTT:No results found for: PT, PTT  Imaging Studies: MRI head: No MR evidence of acute ischemia  Volume loss/atrophy and microangiopathy  Counseling / Coordination of Care    Total floor / unit time spent today 30+  minutes  Greater than 50% of total time was spent with the patient and / or family counseling and / or coordination of care  A description of the counseling / coordination of care:  Review of goals of care, encouraged expression offered information

## 2020-09-01 NOTE — ASSESSMENT & PLAN NOTE
Wt Readings from Last 3 Encounters:   09/01/20 72 5 kg (159 lb 13 3 oz)   07/16/20 75 8 kg (167 lb)   06/26/20 76 2 kg (168 lb)     Echo 01/31/2020 shows EF 50-55% grade 2 dysfunction  Currently in exacerbation due to volume overload as evidenced by lower extremity edema and chest x-ray consistent with large right-sided pleural effusion  -no signs respiratory distress, pulmonary edema or orthopnea  Chest x-ray report reviewed for finding consistent with congestive heart failure, large right-sided pleural effusion  Clinically patient is not in acute respiratory distress  O2 saturation 98% on room air    Lasix was discontinued due to acute kidney injury  Continue monitoring volume status with daily weights, intake and outputs

## 2020-09-01 NOTE — PLAN OF CARE
Problem: OCCUPATIONAL THERAPY ADULT  Goal: Performs self-care activities at highest level of function for planned discharge setting  See evaluation for individualized goals  Description: Treatment Interventions: ADL retraining, Functional transfer training, UE strengthening/ROM, Endurance training, Cognitive reorientation, Patient/family training, Compensatory technique education, Fine motor coordination activities, Activityengagement          See flowsheet documentation for full assessment, interventions and recommendations  Note: Limitation: Decreased ADL status, Decreased UE strength, Decreased Safe judgement during ADL, Decreased cognition, Decreased endurance, Decreased fine motor control, Decreased self-care trans, Decreased high-level ADLs  Prognosis: Fair  Assessment: Patient is 69 y o  female who was admitted to [address] [place] [address] secondary to Toxic metabolic encephalopathy on 8/29/2020  At this time, patient is also affected by the comorbidities of: CESAR, anemia, CHF, hyperkalemia, liver cirrhosis, memory difficulties, pancytopenia, and A-fib  Additionally, patient is affected by the following personal factors: limited insight to deficits and difficulty performing ADLs/IADLs   Orders placed for OT evaluation/treatment with up with assistance orders  Performed at least two patient identifiers during session including name and wristband  Prior to admission, Patient requires assistance with ADLs/IADLs and lives with sister in a one level apartment  Upon OT evaluation, patient requires minimum assist for UB ADLs and moderate assist for LB ADLs  Occupational performance is affected by the following deficits: decreased strength, decreased endurance, decreased postural control/alignment, decreased orientation , decreased memory , decreased sequencing , decreased activity tolerance, decreased fine motor control  and decreased dynamic sitting/standing balance   Based on the following information, patient would benefit from continued OT treatment while in the hospital to address the identified deficits and increase level of functional independence  Occupational performance areas to address include: bathing/shower, transfer to all surfaces, functional mobility, grooming, oral hygiene, toilet hygiene, dressing and feeding/eating  From OT standpoint, recommendation at time of d/c would be Post-Acute Rehabilitation Services       OT Discharge Recommendation: Post-Acute Rehabilitation Services  OT - OK to Discharge: Yes(Once medically cleared )     Tiffany Srinivasan, OT

## 2020-09-01 NOTE — PLAN OF CARE
Problem: Prexisting or High Potential for Compromised Skin Integrity  Goal: Skin integrity is maintained or improved  Description: INTERVENTIONS:  - Identify patients at risk for skin breakdown  - Assess and monitor skin integrity  - Assess and monitor nutrition and hydration status  - Monitor labs   - Assess for incontinence   - Turn and reposition patient  - Assist with mobility/ambulation  - Relieve pressure over bony prominences  - Avoid friction and shearing  - Provide appropriate hygiene as needed including keeping skin clean and dry  - Evaluate need for skin moisturizer/barrier cream  - Collaborate with interdisciplinary team   - Patient/family teaching  - Consider wound care consult   Outcome: Progressing     Problem: Potential for Falls  Goal: Patient will remain free of falls  Description: INTERVENTIONS:  - Assess patient frequently for physical needs  -  Identify cognitive and physical deficits and behaviors that affect risk of falls  -  [place] fall precautions as indicated by assessment   - Educate patient/family on patient safety including physical limitations  - Instruct patient to call for assistance with activity based on assessment  - Modify environment to reduce risk of injury  - Consider OT/PT consult to assist with strengthening/mobility  Outcome: Progressing     Problem: Nutrition/Hydration-ADULT  Goal: Nutrient/Hydration intake appropriate for improving, restoring or maintaining nutritional needs  Description: Monitor and assess patient's nutrition/hydration status for malnutrition  Collaborate with interdisciplinary team and initiate plan and interventions as ordered  Monitor patient's weight and dietary intake as ordered or per policy  Utilize nutrition screening tool and intervene as necessary  Determine patient's food preferences and provide high-protein, high-caloric foods as appropriate       INTERVENTIONS:  - Monitor oral intake, urinary output, labs, and treatment plans  - Assess nutrition and hydration status and recommend course of action  - Evaluate amount of meals eaten  - Assist patient with eating if necessary   - Allow adequate time for meals  - Recommend/ encourage appropriate diets, oral nutritional supplements, and vitamin/mineral supplements  - Order, calculate, and assess calorie counts as needed  - Recommend, monitor, and adjust tube feedings and TPN/PPN based on assessed needs  - Assess need for intravenous fluids  - Provide specific nutrition/hydration education as appropriate  - Include patient/family/caregiver in decisions related to nutrition  Outcome: Progressing

## 2020-09-01 NOTE — ASSESSMENT & PLAN NOTE
-has underlying baseline dementia which has worsened in the last 3- 4 days  -will treat the underlying cause and reassess  Appreciate Psychiatry consult

## 2020-09-01 NOTE — ASSESSMENT & PLAN NOTE
-follows with [facility]  -nephrology consulted for concern of myeloma kidney with sudden worsening of renal functions

## 2020-09-02 DIAGNOSIS — K70.30 ALCOHOLIC CIRRHOSIS OF LIVER WITHOUT ASCITES (HCC): ICD-10-CM

## 2020-09-02 DIAGNOSIS — F10.11 HISTORY OF ALCOHOL ABUSE: ICD-10-CM

## 2020-09-02 LAB
ALBUMIN SERPL ELPH-MCNC: 4.45 G/DL (ref 3.5–5)
ALBUMIN SERPL ELPH-MCNC: 64.5 % (ref 52–65)
ALBUMIN UR ELPH-MCNC: 100 %
ALPHA1 GLOB MFR UR ELPH: 0 %
ALPHA1 GLOB SERPL ELPH-MCNC: 0.37 G/DL (ref 0.1–0.4)
ALPHA1 GLOB SERPL ELPH-MCNC: 5.4 % (ref 2.5–5)
ALPHA2 GLOB MFR UR ELPH: 0 %
ALPHA2 GLOB SERPL ELPH-MCNC: 0.77 G/DL (ref 0.4–1.2)
ALPHA2 GLOB SERPL ELPH-MCNC: 11.2 % (ref 7–13)
ANION GAP SERPL CALCULATED.3IONS-SCNC: 16 MMOL/L (ref 4–13)
ANISOCYTOSIS BLD QL SMEAR: PRESENT
B-GLOBULIN MFR UR ELPH: 0 %
BASOPHILS # BLD MANUAL: 0 THOUSAND/UL (ref 0–0.1)
BASOPHILS NFR MAR MANUAL: 0 % (ref 0–1)
BETA GLOB ABNORMAL SERPL ELPH-MCNC: 0.28 G/DL (ref 0.4–0.8)
BETA1 GLOB SERPL ELPH-MCNC: 4.1 % (ref 5–13)
BETA2 GLOB SERPL ELPH-MCNC: 4.1 % (ref 2–8)
BETA2+GAMMA GLOB SERPL ELPH-MCNC: 0.28 G/DL (ref 0.2–0.5)
BUN SERPL-MCNC: 99 MG/DL (ref 5–25)
CALCIUM SERPL-MCNC: 9 MG/DL (ref 8.3–10.1)
CHLORIDE SERPL-SCNC: 104 MMOL/L (ref 100–108)
CO2 SERPL-SCNC: 22 MMOL/L (ref 21–32)
CREAT SERPL-MCNC: 3.88 MG/DL (ref 0.6–1.3)
EOSINOPHIL # BLD MANUAL: 0 THOUSAND/UL (ref 0–0.4)
EOSINOPHIL NFR BLD MANUAL: 0 % (ref 0–6)
ERYTHROCYTE [DISTWIDTH] IN BLOOD BY AUTOMATED COUNT: 20.3 % (ref 11.6–15.1)
GAMMA GLOB ABNORMAL SERPL ELPH-MCNC: 0.74 G/DL (ref 0.5–1.6)
GAMMA GLOB MFR UR ELPH: 0 %
GAMMA GLOB SERPL ELPH-MCNC: 10.7 % (ref 12–22)
GFR SERPL CREATININE-BSD FRML MDRD: 13 ML/MIN/1.73SQ M
GLUCOSE SERPL-MCNC: 106 MG/DL (ref 65–140)
GLUCOSE SERPL-MCNC: 107 MG/DL (ref 65–140)
HCT VFR BLD AUTO: 23 % (ref 34.8–46.1)
HGB BLD-MCNC: 7.6 G/DL (ref 11.5–15.4)
IGG/ALB SER: 1.82 {RATIO} (ref 1.1–1.8)
INTERPRETATION UR IFE-IMP: NORMAL
LYMPHOCYTES # BLD AUTO: 0.58 THOUSAND/UL (ref 0.6–4.47)
LYMPHOCYTES # BLD AUTO: 16 % (ref 14–44)
M PEAK ID 2: 3.7 %
M PROTEIN 1 MFR SERPL ELPH: 2 %
M PROTEIN 1 SERPL ELPH-MCNC: 0.14 G/DL
M PROTEIN 2 SERPL ELPH-MCNC: 0.26 G/DL
MCH RBC QN AUTO: 28.1 PG (ref 26.8–34.3)
MCHC RBC AUTO-ENTMCNC: 33 G/DL (ref 31.4–37.4)
MCV RBC AUTO: 85 FL (ref 82–98)
MONOCYTES # BLD AUTO: 0.07 THOUSAND/UL (ref 0–1.22)
MONOCYTES NFR BLD: 2 % (ref 4–12)
NEUTROPHILS # BLD MANUAL: 2.98 THOUSAND/UL (ref 1.85–7.62)
NEUTS SEG NFR BLD AUTO: 82 % (ref 43–75)
NRBC BLD AUTO-RTO: 11 /100 WBCS
NRBC BLD AUTO-RTO: 6 /100 WBC (ref 0–2)
PLATELET # BLD AUTO: 53 THOUSANDS/UL (ref 149–390)
PLATELET BLD QL SMEAR: ABNORMAL
POTASSIUM SERPL-SCNC: 4.6 MMOL/L (ref 3.5–5.3)
PROT PATTERN SERPL ELPH-IMP: ABNORMAL
PROT PATTERN UR ELPH-IMP: ABNORMAL
PROT SERPL-MCNC: 6.9 G/DL (ref 6.4–8.2)
PROT UR-MCNC: 50 MG/DL
RBC # BLD AUTO: 2.7 MILLION/UL (ref 3.81–5.12)
SODIUM SERPL-SCNC: 142 MMOL/L (ref 136–145)
TOTAL CELLS COUNTED SPEC: 100
WBC # BLD AUTO: 3.63 THOUSAND/UL (ref 4.31–10.16)

## 2020-09-02 PROCEDURE — 84132 ASSAY OF SERUM POTASSIUM: CPT

## 2020-09-02 PROCEDURE — 82947 ASSAY GLUCOSE BLOOD QUANT: CPT

## 2020-09-02 PROCEDURE — 85027 COMPLETE CBC AUTOMATED: CPT | Performed by: INTERNAL MEDICINE

## 2020-09-02 PROCEDURE — 99233 SBSQ HOSP IP/OBS HIGH 50: CPT | Performed by: INTERNAL MEDICINE

## 2020-09-02 PROCEDURE — 82948 REAGENT STRIP/BLOOD GLUCOSE: CPT

## 2020-09-02 PROCEDURE — 82330 ASSAY OF CALCIUM: CPT

## 2020-09-02 PROCEDURE — 99232 SBSQ HOSP IP/OBS MODERATE 35: CPT | Performed by: FAMILY MEDICINE

## 2020-09-02 PROCEDURE — 36600 WITHDRAWAL OF ARTERIAL BLOOD: CPT

## 2020-09-02 PROCEDURE — 82803 BLOOD GASES ANY COMBINATION: CPT

## 2020-09-02 PROCEDURE — 85007 BL SMEAR W/DIFF WBC COUNT: CPT | Performed by: INTERNAL MEDICINE

## 2020-09-02 PROCEDURE — 80048 BASIC METABOLIC PNL TOTAL CA: CPT | Performed by: INTERNAL MEDICINE

## 2020-09-02 PROCEDURE — 84295 ASSAY OF SERUM SODIUM: CPT

## 2020-09-02 PROCEDURE — 85014 HEMATOCRIT: CPT

## 2020-09-02 RX ORDER — FUROSEMIDE 10 MG/ML
40 INJECTION INTRAMUSCULAR; INTRAVENOUS ONCE
Status: COMPLETED | OUTPATIENT
Start: 2020-09-02 | End: 2020-09-02

## 2020-09-02 RX ORDER — METHION/INOS/CHOL BT/B COM/LIV 110MG-86MG
CAPSULE ORAL
Qty: 90 TABLET | Refills: 1 | Status: SHIPPED | OUTPATIENT
Start: 2020-09-02 | End: 2021-03-01

## 2020-09-02 RX ORDER — METOPROLOL TARTRATE 5 MG/5ML
5 INJECTION INTRAVENOUS EVERY 6 HOURS
Status: DISCONTINUED | OUTPATIENT
Start: 2020-09-02 | End: 2020-09-03

## 2020-09-02 RX ORDER — MAGNESIUM OXIDE 400 MG/1
TABLET ORAL
Qty: 180 TABLET | Refills: 1 | Status: SHIPPED | OUTPATIENT
Start: 2020-09-02 | End: 2021-03-01

## 2020-09-02 RX ORDER — FOLIC ACID 1 MG/1
TABLET ORAL
Qty: 90 TABLET | Refills: 1 | Status: SHIPPED | OUTPATIENT
Start: 2020-09-02 | End: 2021-03-01

## 2020-09-02 RX ORDER — METOPROLOL SUCCINATE 50 MG/1
50 TABLET, EXTENDED RELEASE ORAL DAILY
Status: DISCONTINUED | OUTPATIENT
Start: 2020-09-02 | End: 2020-09-02

## 2020-09-02 RX ORDER — FUROSEMIDE 80 MG
80 TABLET ORAL DAILY
Status: DISCONTINUED | OUTPATIENT
Start: 2020-09-03 | End: 2020-09-05 | Stop reason: HOSPADM

## 2020-09-02 RX ORDER — RIFAXIMIN 550 MG/1
TABLET ORAL
Qty: 60 TABLET | Refills: 0 | Status: SHIPPED | OUTPATIENT
Start: 2020-09-02 | End: 2020-09-17

## 2020-09-02 RX ADMIN — LACTULOSE 30 G: 10 SOLUTION ORAL at 18:02

## 2020-09-02 RX ADMIN — PANTOPRAZOLE SODIUM 40 MG: 40 TABLET, DELAYED RELEASE ORAL at 05:43

## 2020-09-02 RX ADMIN — RIFAXIMIN 550 MG: 550 TABLET ORAL at 09:14

## 2020-09-02 RX ADMIN — FUROSEMIDE 40 MG: 10 INJECTION, SOLUTION INTRAMUSCULAR; INTRAVENOUS at 13:40

## 2020-09-02 RX ADMIN — QUETIAPINE FUMARATE 25 MG: 25 TABLET ORAL at 21:51

## 2020-09-02 RX ADMIN — RIFAXIMIN 550 MG: 550 TABLET ORAL at 21:51

## 2020-09-02 RX ADMIN — METOROPROLOL TARTRATE 5 MG: 5 INJECTION, SOLUTION INTRAVENOUS at 23:52

## 2020-09-02 RX ADMIN — MIRTAZAPINE 7.5 MG: 15 TABLET, FILM COATED ORAL at 21:51

## 2020-09-02 RX ADMIN — LACTULOSE 30 G: 10 SOLUTION ORAL at 09:13

## 2020-09-02 RX ADMIN — DILTIAZEM HYDROCHLORIDE 240 MG: 240 CAPSULE, COATED, EXTENDED RELEASE ORAL at 09:18

## 2020-09-02 RX ADMIN — METOROPROLOL TARTRATE 5 MG: 5 INJECTION, SOLUTION INTRAVENOUS at 18:01

## 2020-09-02 NOTE — ASSESSMENT & PLAN NOTE
Wt Readings from Last 3 Encounters:   09/01/20 72 5 kg (159 lb 13 3 oz)   07/16/20 75 8 kg (167 lb)   06/26/20 76 2 kg (168 lb)     Echo 01/31/2020 shows EF 50-55% grade 2 dysfunction  Currently in exacerbation due to volume overload as evidenced by lower extremity edema and chest x-ray consistent with large right-sided pleural effusion  -today, patient started showing signs of respiratory distress and became more lethargic  O2 saturation has dropped and she requires 2 L of oxygen  Lasix was discontinued due to acute kidney injury on admission  Will provide with Lasix 40 mg IV now and continue with 80 mg p o  tomorrow    This has been discussed and agreed with nephrology  Continue monitoring volume status with daily weights, intake and outputs

## 2020-09-02 NOTE — PROGRESS NOTES
Progress Note - Ahsan Khan 1945, 69 y o  female MRN: [id number]    Unit/Bed#: -01 Encounter: [id number]    Primary Care Provider: Lawyer Radha MD   Date and time admitted to hospital: 8/29/2020  6:35 PM        Multiple myeloma [facility])  Assessment & Plan  -follows with [facility]  -nephrology consulted for concern of myeloma kidney with sudden worsening of renal functions  Hyperkalemia  Assessment & Plan  Resolved    CESAR (acute kidney injury) [facility])  Assessment & Plan  Patient presented with acute kidney injury of unclear etiology  Refraction includes multiple myeloma, light chain related ATN, fluid overload  Today, creatinine appears to be improving gradually, but slowly  Creatinine on admission was 5 6, but currently it is 3 8  Nephrology following as well      Acute on chronic diastolic CHF  Assessment & Plan  Wt Readings from Last 3 Encounters:   09/01/20 72 5 kg (159 lb 13 3 oz)   07/16/20 75 8 kg (167 lb)   06/26/20 76 2 kg (168 lb)     Echo 01/31/2020 shows EF 50-55% grade 2 dysfunction  Currently in exacerbation due to volume overload as evidenced by lower extremity edema and chest x-ray consistent with large right-sided pleural effusion  -today, patient started showing signs of respiratory distress and became more lethargic  O2 saturation has dropped and she requires 2 L of oxygen  Lasix was discontinued due to acute kidney injury on admission  Will provide with Lasix 40 mg IV now and continue with 80 mg p o  tomorrow    This has been discussed and agreed with nephrology  Continue monitoring volume status with daily weights, intake and outputs    Liver cirrhosis ([facility] [address] )  Assessment & Plan  -improved ammonia levels  -continue lactulose and Xifaxan  -outpatient follow-up with GI    Pancytopenia ([address] )  Assessment & Plan  -has been diagnosed by oncology with multiple myeloma  -follows with [facility]  -currently WBC count and hemoglobin are stable, but hemoglobin is on the lower side at 7 6  -chronically low platelets and keep decreasing which may have a dilutional component because patient is becoming fluid overloaded  No bleeding  Holding anticoagulation due to worsening thrombocytopenia    Memory difficulties  Assessment & Plan  -has underlying baseline dementia which has worsened in the last 3- 4 days  -will treat the underlying cause and reassess  I believe that overall her mental status is improving with the improved renal function  Patient is more interactive, but is slightly more lethargic now  Her agitation is resolved  Will continue managing her medical issues  Appreciate Psychiatry consult    Anemia  Assessment & Plan  -hemoglobin stable at 8-9 range  No bleeding noted  -continue to monitor closely    * Toxic metabolic encephalopathy  Assessment & Plan  Currently patient is awake, alert, confused more than her baseline, per family   Patient continues to require one-to-one close observation, IV Ativan will need to be ordered for MRA/ MRV imaging of the brain   Patient has underlying dementia, but currently presents with acute encephalopathy  MRI/MRA MRV were done and ruled out cavernous venous thrombosis  Acute worsening suspected multifactorial in the settings of elevated ammonia, hyponatremia  Patient is awake, alert, oriented to self only  She is continues to be more sleepy today and is no longer aggressive or agitated   Continue lactulose to 30 g b i d  Patient had a normal bowel movement this afternoon  Continue Xifaxan  Continue close one-to-one observation  Frequently orientation  Ativan p r n  For anxiety and behavior  Follow-up with inpatient neurology and psychiatry recommendation  Family members do not want to proceed with palliative care measures at this time  Will continue monitoring her clinical improvement            VTE Pharmacologic Prophylaxis:   Pharmacologic: Pharmacologic VTE Prophylaxis contraindicated due to Thrombocytopenia  Mechanical VTE Prophylaxis in Place: Yes    Patient Centered Rounds: I have performed bedside rounds with nursing staff today  Discussions with Specialists or Other Care Team Provider:  Respiratory therapy and nephrology    Education and Discussions with Family / Patient: sister    Time Spent for Care: 30 minutes  More than 50% of total time spent on counseling and coordination of care as described above  Current Length of Stay: 4 day(s)    Current Patient Status: Inpatient   Certification Statement: The patient will continue to require additional inpatient hospital stay due to CHF exacerbation    Discharge Plan: 48 hrs    Code Status: Level 1 - Full Code      Subjective:       Objective:     Vitals:   Temp (24hrs), Av 9 °F (36 6 °C), Min:97 7 °F (36 5 °C), Max:98 3 °F (36 8 °C)    Temp:  [97 7 °F (36 5 °C)-98 3 °F (36 8 °C)] 97 7 °F (36 5 °C)  HR:  [] 125  BP: (109-146)/(62-91) 144/91  SpO2:  [91 %-95 %] 94 %  Body mass index is 29 23 kg/m²  Input and Output Summary (last 24 hours): Intake/Output Summary (Last 24 hours) at 2020 1457  Last data filed at 2020 1300  Gross per 24 hour   Intake 110 ml   Output    Net 110 ml       Physical Exam:     Physical Exam  Constitutional:       Appearance: She is well-developed  Neck:      Musculoskeletal: Normal range of motion  Cardiovascular:      Rate and Rhythm: Regular rhythm  Tachycardia present  Pulmonary:      Effort: Pulmonary effort is normal       Breath sounds: Decreased air movement present  Comments: Crackles at bases  Abdominal:      General: There is no distension  Palpations: Abdomen is soft  Musculoskeletal:      Right lower leg: Edema (Bilateral 1+ pitting edema) present  Left lower leg: Edema present  Skin:     General: Skin is warm  Findings: No erythema  Neurological:      Mental Status: She is alert  Mental status is at baseline  She is disoriented and confused  Cranial Nerves: No cranial nerve deficit  Comments: Mental state at baseline patient is mostly confused and oriented x1   Psychiatric:         Behavior: Behavior normal          Additional Data:     Labs:    Results from last 7 days   Lab Units 09/02/20  0545  08/29/20  1944   WBC Thousand/uL 3 63*   < > 4 46   HEMOGLOBIN g/dL 7 6*   < > 8 8*   HEMATOCRIT % 23 0*   < > 27 4*   PLATELETS Thousands/uL 53*   < > 70*   BANDS PCT %  --   --  3   LYMPHO PCT % 16   < > 26   MONO PCT % 2*   < > 6   EOS PCT % 0   < > 0    < > = values in this interval not displayed  Results from last 7 days   Lab Units 09/02/20  0545 09/01/20  1418   SODIUM mmol/L 142 139   POTASSIUM mmol/L 4 6 5 3   CHLORIDE mmol/L 104 101   CO2 mmol/L 22 22   BUN mg/dL 99* 99*   CREATININE mg/dL 3 88* 4 38*   ANION GAP mmol/L 16* 16*   CALCIUM mg/dL 9 0 9 1   ALBUMIN g/dL  --  4 2   TOTAL BILIRUBIN mg/dL  --  2 20*   ALK PHOS U/L  --  163*   ALT U/L  --  40   AST U/L  --  35   GLUCOSE RANDOM mg/dL 106 130     Results from last 7 days   Lab Units 08/29/20  1944   INR  1 53*     Results from last 7 days   Lab Units 08/29/20  2051   POC GLUCOSE mg/dl 140                   * I Have Reviewed All Lab Data Listed Above  * Additional Pertinent Lab Tests Reviewed:  All Labs Within Last 24 Hours Reviewed    Imaging:    Recent Cultures (last 7 days):           Last 24 Hours Medication List:   Current Facility-Administered Medications   Medication Dose Route Frequency Provider Last Rate    diltiazem  240 mg Oral Daily Seamus Howell MD      [START ON 9/3/2020] furosemide  80 mg Oral Daily Abbie Nyhan, MD      lactulose  30 g Oral BID Nela Benz MD      metoprolol succinate  50 mg Oral Daily Abbie Nyhan, MD      mirtazapine  7 5 mg Oral HS Seamus Howell MD      nicotine  14 mg Transdermal Daily Nela Benz MD      pantoprazole  40 mg Oral Early Morning Seamus Howell MD      QUEtiapine  25 mg Oral HS Nela Benz MD      rifaximin  550 mg Oral Q12H Fartun Luna MD          Today, Patient Was Seen By: Omid Almeida MD    ** Please Note: Dictation voice to text software may have been used in the creation of this document   **

## 2020-09-02 NOTE — PROGRESS NOTES
Nurse observed pt's irregular and labored breathing  Attending [facility] provider made aware at 1100  SL provider assessed patient with no new orders placed  At 1230 nephrologist made aware  At 1330 pt more difficult to arouse with persistent irregular and labored breathing  SLIM provider made aware, with orders for IV lasix and ABG  Orders completed

## 2020-09-02 NOTE — PROGRESS NOTES
NEPHROLOGY PROGRESS NOTE    Patient: Jennifer Gonzáles               Sex: female          DOA: 8/29/2020  6:35 PM   YOB: 1945        Age:  69 y o         LOS:  LOS: 4 days       HPI     Patient admitted with altered mental status and acute kidney injury    SUBJECTIVE     Patient remained confused though not combative    Sleeping at this point    No new development  Getting short of breath with IV fluid which has been stop    CURRENT MEDICATIONS       Current Facility-Administered Medications:     diltiazem (CARDIZEM CD) 24 hr capsule 240 mg, 240 mg, Oral, Daily, Andrew Brown MD, 240 mg at 09/02/20 0918    lactulose 20 g/30 mL oral solution 30 g, 30 g, Oral, BID, Arie GRIFFIN MD, 30 g at 09/02/20 0913    mirtazapine (REMERON) tablet 7 5 mg, 7 5 mg, Oral, HS, Andrew Brown MD, 7 5 mg at 09/01/20 2306    nicotine (NICODERM CQ) 14 mg/24hr TD 24 hr patch 14 mg, 14 mg, Transdermal, Daily, Arie GRIFFIN MD, Stopped at 08/31/20 1004    pantoprazole (PROTONIX) EC tablet 40 mg, 40 mg, Oral, Early Morning, Andrew Brown MD, 40 mg at 09/02/20 0543    QUEtiapine (SEROquel) tablet 25 mg, 25 mg, Oral, HS, Arie GRIFFIN MD, 25 mg at 09/01/20 2306    rifaximin (XIFAXAN) tablet 550 mg, 550 mg, Oral, Q12H [facility], Andrew Brown MD, 550 mg at 09/02/20 0914    OBJECTIVE     Current Weight: Weight - Scale: 72 5 kg (159 lb 13 3 oz)  Vitals:    09/02/20 0929   BP:    Pulse: (!) 124   Resp:    Temp: 97 7 °F (36 5 °C)   SpO2: 95%       Intake/Output Summary (Last 24 hours) at 9/2/2020 1226  Last data filed at 9/2/2020 0901  Gross per 24 hour   Intake 170 ml   Output    Net 170 ml       PHYSICAL EXAMINATION     Physical Exam  Constitutional:       General: She is not in acute distress  Appearance: She is well-developed  HENT:      Head: Normocephalic and atraumatic  Eyes:      General: No scleral icterus  Conjunctiva/sclera: Conjunctivae normal    Neck:      Musculoskeletal: Neck supple        Vascular: No JVD    Cardiovascular:      Rate and Rhythm: Normal rate  Heart sounds: Normal heart sounds  Pulmonary:      Effort: Pulmonary effort is normal       Breath sounds: No wheezing  Abdominal:      Palpations: Abdomen is soft  Tenderness: There is no abdominal tenderness  Musculoskeletal: Normal range of motion  Skin:     General: Skin is warm  Findings: No rash  Neurological:      Mental Status: She is alert  She is disoriented  Psychiatric:         Behavior: Behavior normal           LAB RESULTS     Results from last 7 days   Lab Units 09/02/20  0545 09/01/20  1418 08/31/20  1405 08/30/20  2050 08/30/20  1256 08/29/20  1944   WBC Thousand/uL 3 63* 3 81*  --   --  4 86 4 46   HEMOGLOBIN g/dL 7 6* 8 0*  --   --  9 7* 8 8*   HEMATOCRIT % 23 0* 25 4*  --   --  31 0* 27 4*   PLATELETS Thousands/uL 53* 61*  --   --  73* 70*   POTASSIUM mmol/L 4 6 5 3 5 4* 5 8*  --  6 2*   CHLORIDE mmol/L 104 101 96* 96*  --  95*   CO2 mmol/L 22 22 19* 18*  --  21   BUN mg/dL 99* 99* 96* 96*  --  84*   CREATININE mg/dL 3 88* 4 38* 4 70* 5 44*  --  5 65*   EGFR ml/min/1 73sq m 13 11 10 8  --  8   CALCIUM mg/dL 9 0 9 1 8 9 9 2  --  8 2*   MAGNESIUM mg/dL  --   --   --  2 2  --  2 0   PHOSPHORUS mg/dL  --   --   --   --   --  7 9*       RADIOLOGY RESULTS      Results for orders placed during the hospital encounter of 08/29/20   XR chest portable    Narrative CHEST     INDICATION:   Shortness of breath  COMPARISON:  5/10/2020    EXAM PERFORMED/VIEWS:  XR CHEST PORTABLE      FINDINGS:    Stable cardiomegaly  Large right pleural effusion  Right apical pleural scarring  Pulmonary vascular congestion and minimal interstitial edema  Significant compressive atelectasis in the right lung or possible underlying airspace consolidation  No pneumothorax  Diffuse osteopenia  Impression 1  Findings consistent with congestive heart failure  2   Large right pleural effusion    3   Significant compressive atelectasis in the right lung  Underlying airspace consolidation and pneumonia not excluded  Workstation performed: [id number]       Results for orders placed during the hospital encounter of 01/28/20   XR chest pa & lateral    Narrative CHEST     INDICATION: Follow-up congestive heart failure    COMPARISON:  1/30/2020    EXAM PERFORMED/VIEWS:  XR CHEST PA & LATERAL      FINDINGS:    Heart shadow is enlarged but unchanged from prior exam     There continues to be mild vascular congestion though interstitial edema has improved  There are small pleural effusions  Osseous structures appear within normal limits for patient age  Impression Improved congestive heart failure with mild vascular congestion and small effusions  Workstation performed: [id number]       No results found for this or any previous visit  No results found for this or any previous visit  No results found for this or any previous visit  No results found for this or any previous visit  PLAN / RECOMMENDATIONS      Acute kidney injury:  Improving at this point  Off IV fluid will continue to monitor  Possible volume depletion along with myeloma kidney  Volume overload status:  Agree with placing IV fluid  Will stop diuretic and monitor closely    Multiple myeloma:  Being monitored by oncologist as outpatient    Altered mental status:  Seems to baseline dementia    Will follow    Edi Zhang MD  Nephrology  9/2/2020        Portions of the record may have been created with voice recognition software  Occasional wrong word or "sound a like" substitutions may have occurred due to the inherent limitations of voice recognition software  Read the chart carefully and recognize, using context, where substitutions have occurred

## 2020-09-02 NOTE — ASSESSMENT & PLAN NOTE
-has underlying baseline dementia which has worsened in the last 3- 4 days  -will treat the underlying cause and reassess  I believe that overall her mental status is improving with the improved renal function  Patient is more interactive, but is slightly more lethargic now  Her agitation is resolved  Will continue managing her medical issues    Appreciate Psychiatry consult

## 2020-09-02 NOTE — ASSESSMENT & PLAN NOTE
-has been diagnosed by oncology with multiple myeloma  -follows with [facility]  -currently WBC count and hemoglobin are stable, but hemoglobin is on the lower side at 7 6  -chronically low platelets and keep decreasing which may have a dilutional component because patient is becoming fluid overloaded    No bleeding  Holding anticoagulation due to worsening thrombocytopenia

## 2020-09-02 NOTE — ASSESSMENT & PLAN NOTE
-not on any anticoagulation  -continue Cardizem at home dose  Patient also takes metoprolol extended release 50 mg daily  Will resume it now because patient has been having episodes of tachycardia

## 2020-09-02 NOTE — ASSESSMENT & PLAN NOTE
Currently patient is awake, alert, confused more than her baseline, per family   Patient continues to require one-to-one close observation, IV Ativan will need to be ordered for MRA/ MRV imaging of the brain   Patient has underlying dementia, but currently presents with acute encephalopathy  MRI/MRA MRV were done and ruled out cavernous venous thrombosis  Acute worsening suspected multifactorial in the settings of elevated ammonia, hyponatremia  Patient is awake, alert, oriented to self only  She is continues to be more sleepy today and is no longer aggressive or agitated   Continue lactulose to 30 g b i d  Patient had a normal bowel movement this afternoon  Continue Xifaxan  Continue close one-to-one observation  Frequently orientation  Ativan p r n  For anxiety and behavior  Follow-up with inpatient neurology and psychiatry recommendation  Family members do not want to proceed with palliative care measures at this time  Will continue monitoring her clinical improvement

## 2020-09-02 NOTE — PLAN OF CARE
Problem: Potential for Falls  Goal: Patient will remain free of falls  Description: INTERVENTIONS:  - Assess patient frequently for physical needs  -  Identify cognitive and physical deficits and behaviors that affect risk of falls    -  [place] fall precautions as indicated by assessment   - Educate patient/family on patient safety including physical limitations  - Instruct patient to call for assistance with activity based on assessment  - Modify environment to reduce risk of injury  - Consider OT/PT consult to assist with strengthening/mobility  Outcome: Progressing

## 2020-09-02 NOTE — ASSESSMENT & PLAN NOTE
-follows with [facility]  -nephrology consulted for concern of myeloma kidney with sudden worsening of renal functions

## 2020-09-03 ENCOUNTER — APPOINTMENT (INPATIENT)
Dept: CT IMAGING | Facility: HOSPITAL | Age: 75
DRG: 091 | End: 2020-09-03
Payer: MEDICARE

## 2020-09-03 PROBLEM — R04.0 EPISTAXIS: Status: ACTIVE | Noted: 2020-09-03

## 2020-09-03 LAB
ANION GAP SERPL CALCULATED.3IONS-SCNC: 13 MMOL/L (ref 4–13)
ANISOCYTOSIS BLD QL SMEAR: PRESENT
BASE EXCESS BLDA CALC-SCNC: -3 MMOL/L (ref -2–3)
BASOPHILS # BLD MANUAL: 0 THOUSAND/UL (ref 0–0.1)
BASOPHILS NFR MAR MANUAL: 0 % (ref 0–1)
BUN SERPL-MCNC: 91 MG/DL (ref 5–25)
CA-I BLD-SCNC: 1.23 MMOL/L (ref 1.12–1.32)
CALCIUM SERPL-MCNC: 9.2 MG/DL (ref 8.3–10.1)
CHLORIDE SERPL-SCNC: 108 MMOL/L (ref 100–108)
CO2 SERPL-SCNC: 23 MMOL/L (ref 21–32)
CREAT SERPL-MCNC: 3.11 MG/DL (ref 0.6–1.3)
EOSINOPHIL # BLD MANUAL: 0 THOUSAND/UL (ref 0–0.4)
EOSINOPHIL NFR BLD MANUAL: 0 % (ref 0–6)
ERYTHROCYTE [DISTWIDTH] IN BLOOD BY AUTOMATED COUNT: 20.4 % (ref 11.6–15.1)
GFR SERPL CREATININE-BSD FRML MDRD: 16 ML/MIN/1.73SQ M
GLUCOSE SERPL-MCNC: 109 MG/DL (ref 65–140)
GLUCOSE SERPL-MCNC: 115 MG/DL (ref 65–140)
HCO3 BLDA-SCNC: 20.6 MMOL/L (ref 22–28)
HCT VFR BLD AUTO: 23.6 % (ref 34.8–46.1)
HCT VFR BLD CALC: 27 % (ref 34.8–46.1)
HGB BLD-MCNC: 7.6 G/DL (ref 11.5–15.4)
HGB BLDA-MCNC: 9.2 G/DL (ref 11.5–15.4)
LYMPHOCYTES # BLD AUTO: 0.72 THOUSAND/UL (ref 0.6–4.47)
LYMPHOCYTES # BLD AUTO: 23 % (ref 14–44)
MCH RBC QN AUTO: 27.5 PG (ref 26.8–34.3)
MCHC RBC AUTO-ENTMCNC: 32.2 G/DL (ref 31.4–37.4)
MCV RBC AUTO: 86 FL (ref 82–98)
METAMYELOCYTES NFR BLD MANUAL: 1 % (ref 0–1)
MONOCYTES # BLD AUTO: 0.09 THOUSAND/UL (ref 0–1.22)
MONOCYTES NFR BLD: 3 % (ref 4–12)
NEUTROPHILS # BLD MANUAL: 2.29 THOUSAND/UL (ref 1.85–7.62)
NEUTS BAND NFR BLD MANUAL: 3 % (ref 0–8)
NEUTS SEG NFR BLD AUTO: 70 % (ref 43–75)
NRBC BLD AUTO-RTO: 7 /100 WBC (ref 0–2)
NRBC BLD AUTO-RTO: 9 /100 WBCS
PCO2 BLD: 21 MMOL/L (ref 21–32)
PCO2 BLD: 30.4 MM HG (ref 36–44)
PH BLD: 7.44 [PH] (ref 7.35–7.45)
PLATELET # BLD AUTO: 50 THOUSANDS/UL (ref 149–390)
PLATELET BLD QL SMEAR: ABNORMAL
PO2 BLD: 49 MM HG (ref 75–129)
POTASSIUM BLD-SCNC: 4.3 MMOL/L (ref 3.5–5.3)
POTASSIUM SERPL-SCNC: 4.2 MMOL/L (ref 3.5–5.3)
RBC # BLD AUTO: 2.76 MILLION/UL (ref 3.81–5.12)
SAO2 % BLD FROM PO2: 86 % (ref 60–85)
SODIUM BLD-SCNC: 140 MMOL/L (ref 136–145)
SODIUM SERPL-SCNC: 144 MMOL/L (ref 136–145)
SPECIMEN SOURCE: ABNORMAL
TOTAL CELLS COUNTED SPEC: 100
WBC # BLD AUTO: 3.14 THOUSAND/UL (ref 4.31–10.16)

## 2020-09-03 PROCEDURE — 99232 SBSQ HOSP IP/OBS MODERATE 35: CPT | Performed by: FAMILY MEDICINE

## 2020-09-03 PROCEDURE — 97530 THERAPEUTIC ACTIVITIES: CPT

## 2020-09-03 PROCEDURE — 99233 SBSQ HOSP IP/OBS HIGH 50: CPT | Performed by: INTERNAL MEDICINE

## 2020-09-03 PROCEDURE — 80048 BASIC METABOLIC PNL TOTAL CA: CPT | Performed by: INTERNAL MEDICINE

## 2020-09-03 PROCEDURE — 71250 CT THORAX DX C-: CPT

## 2020-09-03 PROCEDURE — 97535 SELF CARE MNGMENT TRAINING: CPT

## 2020-09-03 PROCEDURE — G1004 CDSM NDSC: HCPCS

## 2020-09-03 PROCEDURE — 99233 SBSQ HOSP IP/OBS HIGH 50: CPT | Performed by: NURSE PRACTITIONER

## 2020-09-03 PROCEDURE — 92610 EVALUATE SWALLOWING FUNCTION: CPT

## 2020-09-03 PROCEDURE — 85027 COMPLETE CBC AUTOMATED: CPT | Performed by: INTERNAL MEDICINE

## 2020-09-03 PROCEDURE — 85007 BL SMEAR W/DIFF WBC COUNT: CPT | Performed by: INTERNAL MEDICINE

## 2020-09-03 RX ORDER — ECHINACEA PURPUREA EXTRACT 125 MG
1 TABLET ORAL
Status: DISCONTINUED | OUTPATIENT
Start: 2020-09-03 | End: 2020-09-05 | Stop reason: HOSPADM

## 2020-09-03 RX ORDER — AZELASTINE 1 MG/ML
1 SPRAY, METERED NASAL 2 TIMES DAILY
Status: DISCONTINUED | OUTPATIENT
Start: 2020-09-03 | End: 2020-09-03

## 2020-09-03 RX ORDER — METOPROLOL SUCCINATE 50 MG/1
50 TABLET, EXTENDED RELEASE ORAL DAILY
Status: DISCONTINUED | OUTPATIENT
Start: 2020-09-03 | End: 2020-09-05 | Stop reason: HOSPADM

## 2020-09-03 RX ADMIN — LACTULOSE 30 G: 10 SOLUTION ORAL at 17:31

## 2020-09-03 RX ADMIN — RIFAXIMIN 550 MG: 550 TABLET ORAL at 21:34

## 2020-09-03 RX ADMIN — METOPROLOL SUCCINATE 50 MG: 50 TABLET, EXTENDED RELEASE ORAL at 12:04

## 2020-09-03 RX ADMIN — RIFAXIMIN 550 MG: 550 TABLET ORAL at 08:41

## 2020-09-03 RX ADMIN — AZELASTINE HYDROCHLORIDE 1 SPRAY: 137 SPRAY, METERED NASAL at 09:09

## 2020-09-03 RX ADMIN — DILTIAZEM HYDROCHLORIDE 240 MG: 240 CAPSULE, COATED, EXTENDED RELEASE ORAL at 08:40

## 2020-09-03 RX ADMIN — METOROPROLOL TARTRATE 5 MG: 5 INJECTION, SOLUTION INTRAVENOUS at 05:06

## 2020-09-03 RX ADMIN — FUROSEMIDE 80 MG: 80 TABLET ORAL at 08:50

## 2020-09-03 RX ADMIN — QUETIAPINE FUMARATE 25 MG: 25 TABLET ORAL at 21:34

## 2020-09-03 RX ADMIN — LACTULOSE 30 G: 10 SOLUTION ORAL at 08:41

## 2020-09-03 RX ADMIN — MIRTAZAPINE 7.5 MG: 15 TABLET, FILM COATED ORAL at 21:34

## 2020-09-03 NOTE — OCCUPATIONAL THERAPY NOTE
[address]  Treatment Note     Patient Name: Mirian Casanova  FOQSF'K Date: 9/3/2020  Problem List  Principal Problem:    Toxic metabolic encephalopathy  Active Problems:    Anemia    Memory difficulties    Pancytopenia ([facility] [address] )    Liver cirrhosis (HCC)    Acute on chronic diastolic CHF    CESAR (acute kidney injury) ([facility] )    Paroxysmal atrial fibrillation (HCC)    Hyperkalemia    Hyponatremia    Multiple myeloma (HCC)    Epistaxis          09/03/20 1441   Restrictions/Precautions   Weight Bearing Precautions Per Order No   Other Precautions Cognitive; Bed Alarm;O2;Fall Risk   Pain Assessment   Pain Assessment Tool Pain Assessment not indicated - pt denies pain   Pain Score No Pain   ADL   Where Assessed Supine, bed   UB Dressing Assistance 2  Maximal Assistance   UB Dressing Deficit Setup;Verbal cueing; Increased time to complete; Thread RUE; Thread LUE;Pull over head;Pull down in back   UB Dressing Comments Pt donned hospital gown    Toileting Comments Upon arrival to room, pt noted to be soiled in bed  Linen changed, new hopsital gown donned, and perineal hyigene complete with total assist     Bed Mobility   Rolling R 3  Moderate assistance   Additional items Assist x 1; Increased time required;Verbal cues;LE management   Rolling L 3  Moderate assistance   Additional items Assist x 1; Increased time required;Verbal cues;LE management   Supine to Sit 3  Moderate assistance   Additional items Assist x 1;HOB elevated; Increased time required;Verbal cues;LE management   Sit to Supine 2  Maximal assistance   Additional items Assist x 1; Increased time required;Verbal cues;LE management   Transfers   Sit to Stand 2  Maximal assistance   Additional items Assist x 1; Increased time required;Verbal cues   Stand to Sit 2  Maximal assistance   Additional items Assist x 1; Increased time required;Verbal cues   Functional Mobility   Additional Comments Further functional mobility was deferred at this time secondary to safety concerns as pt was unable to achieve full standing position for longer than 30 seconds  Cognition   Overall Cognitive Status Impaired   Arousal/Participation Alert; Responsive; Cooperative   Attention Difficulty attending to directions   Orientation Level Oriented to person;Disoriented to place; Disoriented to time;Disoriented to situation   Memory Decreased recall of biographical information;Decreased short term memory;Decreased recall of recent events;Decreased recall of precautions   Following Commands Follows one step commands inconsistently   Comments pt agreeable to OT session    Activity Tolerance   Activity Tolerance Patient limited by fatigue   Medical Staff Made Aware RN verbalized pt appropriate for therapy    Assessment   Assessment Patient participated in Skilled OT session this date with interventions consisting of ADL re training with the use of correct body mechnaics,  therapeutic activities to: increase activity tolerance, increase dynamic sit/ stand balance during functional activity , increase postural control, increase trunk control and increase OOB/ sitting tolerance   Patient agreeable to OT treatment session, upon arrival patient was found supine in bed and in no apparent distress  In comparison to previous session, patient with improvements in bed mobility and standing balance  Patient requiring frequent re direction, verbal cues for safety, verbal cues for correct technique, cognitive assistance to anticipate next step, one step directives and frequent rest periods  Patient continues to be functioning below baseline level, occupational performance remains limited secondary to factors listed above and increased risk for falls and injury  From OT standpoint, recommendation at time of d/c would be Post-Acute Rehabilitation Services     Patient to benefit from continued Occupational Therapy treatment while in the hospital to address deficits as defined above and maximize level of functional independence with ADLs and functional mobility  Plan   Treatment Interventions ADL retraining;Functional transfer training; Endurance training; Compensatory technique education; Activityengagement   Goal Expiration Date 09/11/20   OT Treatment Day 1   OT Frequency 3-5x/wk   Recommendation   OT Discharge Recommendation Post-Acute Rehabilitation Services   OT - OK to Discharge Yes  (Once medically cleared )     Gordon Garcia, OT

## 2020-09-03 NOTE — PROGRESS NOTES
NEPHROLOGY PROGRESS NOTE    Patient: Imtiaz Dan               Sex: female          DOA: 8/29/2020  6:35 PM   YOB: 1945        Age:  69 y o         LOS:  LOS: 5 days       HPI     Patient admitted with acute kidney injury and altered mental status    SUBJECTIVE     Patient remained confused the better    Patient has had stem item myeloma and being monitored by [facility]    Kidney function slowly improving    No acute complaint    CURRENT MEDICATIONS       Current Facility-Administered Medications:     diltiazem (CARDIZEM CD) 24 hr capsule 240 mg, 240 mg, Oral, Daily, Hilary Lara MD, 240 mg at 09/03/20 0840    furosemide (LASIX) tablet 80 mg, 80 mg, Oral, Daily, Rigoberto Loaz MD, 80 mg at 09/03/20 0850    lactulose 20 g/30 mL oral solution 30 g, 30 g, Oral, BID, Arie GRIFFIN MD, 30 g at 09/03/20 0841    metoprolol succinate (TOPROL-XL) 24 hr tablet 50 mg, 50 mg, Oral, Daily, Rigoberto Loza MD, 50 mg at 09/03/20 1204    mirtazapine (REMERON) tablet 7 5 mg, 7 5 mg, Oral, HS, Hilary Lara MD, 7 5 mg at 09/02/20 2151    nicotine (NICODERM CQ) 14 mg/24hr TD 24 hr patch 14 mg, 14 mg, Transdermal, Daily, Arie GRIFFIN MD, Stopped at 08/31/20 1004    pantoprazole (PROTONIX) EC tablet 40 mg, 40 mg, Oral, Early Morning, Hilary Lara MD, 40 mg at 09/02/20 0543    QUEtiapine (SEROquel) tablet 25 mg, 25 mg, Oral, HS, Arie GRIFFIN MD, 25 mg at 09/02/20 2151    rifaximin (XIFAXAN) tablet 550 mg, 550 mg, Oral, Q12H [facility], Hilary Lara MD, 550 mg at 09/03/20 0841    sodium chloride (OCEAN) 0 65 % nasal spray 1 spray, 1 spray, Each Nare, Q1H PRN, Rigoberto Loza MD    OBJECTIVE     Current Weight: Weight - Scale: 71 8 kg (158 lb 4 6 oz)  Vitals:    09/03/20 1206   BP: 138/85   Pulse:    Resp:    Temp:    SpO2:        Intake/Output Summary (Last 24 hours) at 9/3/2020 1456  Last data filed at 9/3/2020 1244  Gross per 24 hour   Intake 480 ml   Output    Net 480 ml       PHYSICAL EXAMINATION     Physical Exam  Constitutional:       General: She is not in acute distress  Appearance: She is well-developed  HENT:      Head: Normocephalic  Eyes:      General: No scleral icterus  Conjunctiva/sclera: Conjunctivae normal    Neck:      Musculoskeletal: Neck supple  Vascular: No JVD  Cardiovascular:      Rate and Rhythm: Normal rate  Heart sounds: Normal heart sounds  Pulmonary:      Effort: Pulmonary effort is normal       Breath sounds: No wheezing  Abdominal:      Palpations: Abdomen is soft  Tenderness: There is no abdominal tenderness  Musculoskeletal: Normal range of motion  Skin:     General: Skin is warm  Findings: No rash  Neurological:      Mental Status: She is alert  She is disoriented     Psychiatric:         Behavior: Behavior normal           LAB RESULTS     Results from last 7 days   Lab Units 09/03/20  0504 09/02/20  1404 09/02/20  0545 09/01/20  1418 08/31/20  1405 08/30/20  2050 08/30/20  1256 08/29/20  1944   WBC Thousand/uL 3 14*  --  3 63* 3 81*  --   --  4 86 4 46   HEMOGLOBIN g/dL 7 6*  --  7 6* 8 0*  --   --  9 7* 8 8*   I STAT HEMOGLOBIN g/dl  --  9 2*  --   --   --   --   --   --    HEMATOCRIT % 23 6*  --  23 0* 25 4*  --   --  31 0* 27 4*   HEMATOCRIT, ISTAT %  --  27*  --   --   --   --   --   --    PLATELETS Thousands/uL 50*  --  53* 61*  --   --  73* 70*   POTASSIUM mmol/L 4 2  --  4 6 5 3 5 4* 5 8*  --  6 2*   CHLORIDE mmol/L 108  --  104 101 96* 96*  --  95*   CO2 mmol/L 23  --  22 22 19* 18*  --  21   CO2, I-STAT mmol/L  --  21  --   --   --   --   --   --    BUN mg/dL 91*  --  99* 99* 96* 96*  --  84*   CREATININE mg/dL 3 11*  --  3 88* 4 38* 4 70* 5 44*  --  5 65*   EGFR ml/min/1 73sq m 16  --  13 11 10 8  --  8   CALCIUM mg/dL 9 2  --  9 0 9 1 8 9 9 2  --  8 2*   MAGNESIUM mg/dL  --   --   --   --   --  2 2  --  2 0   PHOSPHORUS mg/dL  --   --   --   --   --   --   --  7 9*   GLUCOSE, ISTAT mg/dl  --  115  --   --   -- --   --   --        RADIOLOGY RESULTS      Results for orders placed during the hospital encounter of 08/29/20   XR chest portable    Narrative CHEST     INDICATION:   Shortness of breath  COMPARISON:  5/10/2020    EXAM PERFORMED/VIEWS:  XR CHEST PORTABLE      FINDINGS:    Stable cardiomegaly  Large right pleural effusion  Right apical pleural scarring  Pulmonary vascular congestion and minimal interstitial edema  Significant compressive atelectasis in the right lung or possible underlying airspace consolidation  No pneumothorax  Diffuse osteopenia  Impression 1  Findings consistent with congestive heart failure  2   Large right pleural effusion  3   Significant compressive atelectasis in the right lung  Underlying airspace consolidation and pneumonia not excluded  Workstation performed: [id number]       Results for orders placed during the hospital encounter of 01/28/20   XR chest pa & lateral    Narrative CHEST     INDICATION: Follow-up congestive heart failure    COMPARISON:  1/30/2020    EXAM PERFORMED/VIEWS:  XR CHEST PA & LATERAL      FINDINGS:    Heart shadow is enlarged but unchanged from prior exam     There continues to be mild vascular congestion though interstitial edema has improved  There are small pleural effusions  Osseous structures appear within normal limits for patient age  Impression Improved congestive heart failure with mild vascular congestion and small effusions  Workstation performed: [id number]       Results for orders placed during the hospital encounter of 08/29/20   CT chest wo contrast    Narrative CT CHEST WITHOUT IV CONTRAST    INDICATION:   Respiratory illness, acute (Age => 41y)  COMPARISON:  May 6, 2020    TECHNIQUE: CT examination of the chest was performed without intravenous contrast   Axial, sagittal, and coronal 2D reformatted images were created from the source data and submitted for interpretation      Radiation dose length product (DLP) for this visit:  308 mGy-cm   This examination, like all CT scans performed in the [address] [place] [address], was performed utilizing techniques to minimize radiation dose exposure, including the use of iterative   reconstruction and automated exposure control  FINDINGS:    LUNGS:  Collapse consolidation noted within the right lower lobe  Lobular areas of groundglass density noted in the subpleural region in the left lingular area, in the right middle lobe area  The trachea and central bronchial patent    PLEURA:  Moderate sized right effusion seen    HEART/GREAT VESSELS:  Cardiomegaly seen  Coronary artery calcification seen  Mitral annular calcification seen  The ascending aorta measures 3 4 cm  MEDIASTINUM AND MATT:  No significant mediastinal lymph node enlargement seen      CHEST WALL AND LOWER NECK:   A right thyroid nodule seen which measures 1 4 cm, unchanged from the previous study    VISUALIZED STRUCTURES IN THE UPPER ABDOMEN:  Nodular contour of the liver compatible with cirrhosis  Ascites seen  Enlarged IVC seen with spleen appear unremarkable  Adrenal gland appear unremarkable    OSSEOUS STRUCTURES:  Degenerative changes are seen within the thoracic spine      Impression Moderate right effusion     Atelectasis/consolidation right lower lung may be due to compressive atelectasis however underlying infiltrates are difficult to exclude    Lobular areas of groundglass density in the left upper lobe, right middle lobe, lingular region may be due to infiltrate which could be atypical or viral or drug induced these are may be related to areas of residual congestion    Cirrhosis    Enlarged IVC with enlarged right atrium right and right ventricle, correlate with tricuspid regurgitation, right heart failure    Follow-up suggested in 6-8 weeks to demonstrate resolution  The study was marked in EPIC for significant notification        Workstation performed: [id number]       No results found for this or any previous visit  No results found for this or any previous visit  No results found for this or any previous visit  PLAN / RECOMMENDATIONS      Acute kidney injury:  Improving  Likely because recovering from ATN  Patient does have multiple myeloma and 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

## 2020-09-03 NOTE — ASSESSMENT & PLAN NOTE
Likely due to new oxygen use  Started on nasal moisturization drops and provided with temp own to stop the bleeding  Avoid antiplatelets and anticoagulant

## 2020-09-03 NOTE — PLAN OF CARE
Problem: Potential for Falls  Goal: Patient will remain free of falls  Description: INTERVENTIONS:  - Assess patient frequently for physical needs  -  Identify cognitive and physical deficits and behaviors that affect risk of falls  -  [place] fall precautions as indicated by assessment   - Educate patient/family on patient safety including physical limitations  - Instruct patient to call for assistance with activity based on assessment  - Modify environment to reduce risk of injury  - Consider OT/PT consult to assist with strengthening/mobility  Outcome: Progressing     Problem: Nutrition/Hydration-ADULT  Goal: Nutrient/Hydration intake appropriate for improving, restoring or maintaining nutritional needs  Description: Monitor and assess patient's nutrition/hydration status for malnutrition  Collaborate with interdisciplinary team and initiate plan and interventions as ordered  Monitor patient's weight and dietary intake as ordered or per policy  Utilize nutrition screening tool and intervene as necessary  Determine patient's food preferences and provide high-protein, high-caloric foods as appropriate       INTERVENTIONS:  - Monitor oral intake, urinary output, labs, and treatment plans  - Assess nutrition and hydration status and recommend course of action  - Evaluate amount of meals eaten  - Assist patient with eating if necessary   - Allow adequate time for meals  - Recommend/ encourage appropriate diets, oral nutritional supplements, and vitamin/mineral supplements  - Order, calculate, and assess calorie counts as needed  - Assess need for intravenous fluids  - Provide specific nutrition/hydration education as appropriate  - Include patient/family/caregiver in decisions related to nutrition  Outcome: Progressing

## 2020-09-03 NOTE — ASSESSMENT & PLAN NOTE
Patient presented with acute kidney injury of unclear etiology  Refraction includes multiple myeloma, light chain related ATN, fluid overload  Today, creatinine appears to be improving gradually, but slowly  Creatinine on admission was 5 6, but currently it is 3 1    Nephrology following as well

## 2020-09-03 NOTE — SPEECH THERAPY NOTE
Speech-Language Pathology Bedside Swallow Evaluation        Patient Name: Agnieszka Melton    IKAVS'B Date: 9/3/2020     Problem List  Patient Active Problem List   Diagnosis    Anemia    Benign essential hypertension    Cigarette nicotine dependence without complication    Hyperglycemia    Paroxysmal SVT (supraventricular tachycardia) (HCC)    Sinus tachycardia    Urinary incontinence    Memory difficulties    Gait disturbance    Obesity (BMI 30 0-34  9)    Recurrent major depressive disorder, in partial remission (HCC)    Pancytopenia ([address] )    History of alcohol abuse    Toxic metabolic encephalopathy    Liver cirrhosis (HCC)    Elevated troponin    Acute on chronic diastolic CHF    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[address] )    Pneumonia    Urinary tract infection without hematuria    CESAR (acute kidney injury) (HCC)    Dysphagia    Paroxysmal atrial fibrillation (HCC)    Troponin level elevated    Hyperbilirubinemia    Neutropenia (HCC)    Hyperphosphatemia    Hyperkalemia    Hyponatremia    Multiple myeloma ([address] )       Past Medical History  Past Medical History:   Diagnosis Date    Benign essential hypertension     last assessed - [id number]    Chest pain 11/4/2017    Cirrhosis ([facility] [address] )     Gastroesophageal reflux disease without esophagitis 11/16/2017    Liver disease     Multiple myeloma (HCC)        Past Surgical History  Past Surgical History:   Procedure Laterality Date    APPENDECTOMY      BONE MARROW BIOPSY           Current Medical Status  Pt is a 69 y o  female who presented to [facility] with worsening weakness since couple weeks  Patient had outpatient blood work done which showed acute kidney injury with elevated creatinine of around 3 3  Patient came to the ER with a creatinine was found to be 5 65  According to the son who was at bedside patient has been more confused in the last couple days    Even though she has dementia at baseline she is more confused than how she usually is  Patient denies any chest pain, shortness of breath, orthopnea, nausea, vomiting, diarrhea, abdominal pain, headaches, lightheadedness, dizziness  She was found to have elevated potassium in the ED  Swallow evaluation was attempted earlier during this admission however patient adamantly refused on multiple occasions  She is known to this department from previous admissions and baseline diet is puree/thin liquids  Orders were cancelled and recommendations were given to downgrade puree as indicated  Per RN patient with increased pocketing yesterday and she was downgraded to puree and nectar thick liquids as a results  RN denies any issues with liquids  Per nursing patient with good intake with pureed diet  Past medical history:   Please see H&P for details      Special Studies:  9/3 CT Chest: Moderate right effusion  Atelectasis/consolidation right lower lung may be due to compressive atelectasis however underlying infiltrates are difficult to exclude  Lobular areas of groundglass density in the left upper lobe, right middle lobe, lingular region may be due to infiltrate which could be atypical or viral or drug induced these are may be related to areas of residual congestion  Cirrhosis  Enlarged IVC with enlarged right atrium right and right ventricle, correlate with tricuspid regurgitation, right heart failure    9/1 MRV head: No evidence of dural venous sinus thrombosis  9/1 MRA head: Examination is significantly limited by patient motion  No occlusion of the major intracranial vasculature identified  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  Recommend consultation with the [facility] at [phone]     8/31 MRI Brain: 1  No MR evidence of acute ischemia  2  Volume loss/atrophy and microangiopathy   3  Prominent bilateral superior ophthalmic veins as seen on CT     8/31 CT Head: 1  No acute intracranial hemorrhage, midline shift, or mass effect  2   If there is continued clinical concern for acute infarct, further evaluation with MRI may be obtained which is more sensitive  3   Chronic small vessel ischemic changes  4   Prominence of bilateral superior ophthalmic veins again seen  8/30 CT head: 1  No evidence of acute infarct, intracranial hemorrhage or mass effect  2   Mildly enlarged bilateral superior ophthalmic veins, new since the prior exam   No proptosis or retro-orbital inflammation  Findings could represent carotid cavernous fistula or cavernous sinus thrombosis  MRI of the brain and MRA/MRV of the head   recommended for further evaluation        8/30CXR: 1  Findings consistent with congestive heart failure  2   Large right pleural effusion  3   Significant compressive atelectasis in the right lung    Underlying airspace consolidation and pneumonia not excluded    Social/Education/Vocational Hx:  Pt lives with family     Swallow Information   Current Risks for Dysphagia & Aspiration: dementia, no teeth/denutres  Current Diet: puree/level 1 diet , thin liquid  Baseline Diet: mechanically altered/level 2 diet and thin liquids       Baseline Assessment   Behavior/Cognition: alert but not oriented to place, time, recent events  Speech/Language Status: able to follow commands inconsistently and expressed some needs, engages in simple social speech & generates speech but it is of confused content  Patient Positioning: upright in bed  Pain Status/Interventions/Response to Interventions:   No report of or nonverbal indications of pain         Swallow Mechanism Exam  Facial: symmetrical  Labial: WFL  Lingual: WFL  Velum: symmetrical  Mandible: adequate ROM  Dentition: edentulous  Vocal quality:clear/adequate   Volitional Cough: unable to initiate volitional cough   Respiratory Status: NC         Consistencies Assessed and Performance   Consistencies Administered: thin liquids, puree and mechanical soft solids  Materials administered included soda/water, applesauce, crackers    Oral Stage: at least moderately impaired  Mastication, bolus formation and transfer were inconsistent with mech soft with significant retention in cheek which eventually cleared with admin of puree/liquids  Oral mgmt and control appeared functional with puree and thin liquid  Pharyngeal Stage: WFL suspected  Swallowing initiation appeared prompt  Laryngeal rise was palpated and judged to be within functional limits  No coughing, throat clearing, change in vocal quality or respiratory status noted today  Esophageal Concerns: none reported    Strategies and Efficacy: pt unable to independently apply strategies 2* dementia    Summary   Pts oropharyngeal swallow function appears unchanged from May of this year      Recommendations: puree/level 1 diet and thin liquids     Recommended Form of Meds: crushed with puree     Aspiration precautions and compensatory swallowing strategies: upright posture, slow rate of feeding, small bites/sips, alternating bites and sips and limit distractins    Results Reviewed with: RN and MD     Plan  No further ST indicated at this time    LOAN Wheat , [address] [facility] Language Pathologist   Available via [address] [place] [address] #[id number]  [place] #[id number]

## 2020-09-03 NOTE — PLAN OF CARE
Problem: OCCUPATIONAL THERAPY ADULT  Goal: Performs self-care activities at highest level of function for planned discharge setting  See evaluation for individualized goals  Description: Treatment Interventions: ADL retraining, Functional transfer training, UE strengthening/ROM, Endurance training, Cognitive reorientation, Patient/family training, Compensatory technique education, Fine motor coordination activities, Activityengagement          See flowsheet documentation for full assessment, interventions and recommendations  Outcome: Progressing  Note: Limitation: Decreased ADL status, Decreased UE strength, Decreased Safe judgement during ADL, Decreased cognition, Decreased endurance, Decreased fine motor control, Decreased self-care trans, Decreased high-level ADLs  Prognosis: Fair  Assessment: Patient participated in Skilled OT session this date with interventions consisting of ADL re training with the use of correct body mechnaics,  therapeutic activities to: increase activity tolerance, increase dynamic sit/ stand balance during functional activity , increase postural control, increase trunk control and increase OOB/ sitting tolerance   Patient agreeable to OT treatment session, upon arrival patient was found supine in bed and in no apparent distress  In comparison to previous session, patient with improvements in bed mobility and standing balance  Patient requiring frequent re direction, verbal cues for safety, verbal cues for correct technique, cognitive assistance to anticipate next step, one step directives and frequent rest periods  Patient continues to be functioning below baseline level, occupational performance remains limited secondary to factors listed above and increased risk for falls and injury  From OT standpoint, recommendation at time of d/c would be Post-Acute Rehabilitation Services     Patient to benefit from continued Occupational Therapy treatment while in the hospital to address deficits as defined above and maximize level of functional independence with ADLs and functional mobility        OT Discharge Recommendation: Post-Acute Rehabilitation Services  OT - OK to Discharge: Yes(Once medically cleared )     Dmitriy Nassar OT

## 2020-09-03 NOTE — ASSESSMENT & PLAN NOTE
Wt Readings from Last 3 Encounters:   09/03/20 71 8 kg (158 lb 4 6 oz)   07/16/20 75 8 kg (167 lb)   06/26/20 76 2 kg (168 lb)     Echo 01/31/2020 shows EF 50-55% grade 2 dysfunction  Currently in exacerbation due to volume overload as evidenced by lower extremity edema and CT chest findings suggestive of fluid overload   -today, patient started showing signs of respiratory distress and became more lethargic  O2 saturation has dropped and she requires 2 L of oxygen  Lasix was discontinued due to acute kidney injury on admission  Yesterday, we provided with Lasix 40 mg IV now and continue with 80 mg p o   Today  This has been discussed and agreed with nephrology  Continue monitoring volume status with daily weights, intake and outputs

## 2020-09-03 NOTE — PROGRESS NOTES
Progress Note - Agnieszka Melton 1945, 69 y o  female MRN: [id number]    Unit/Bed#: -01 Encounter: [id number]    Primary Care Provider: Teresa Rasheed MD   Date and time admitted to hospital: 8/29/2020  6:35 PM        Epistaxis  Assessment & Plan  Likely due to new oxygen use  Started on nasal moisturization drops and provided with temp own to stop the bleeding  Avoid antiplatelets and anticoagulant  Multiple myeloma [facility])  Assessment & Plan  -follows with [facility]  -nephrology consulted for concern of myeloma kidney with sudden worsening of renal functions  Hyponatremia  Assessment & Plan   currently stable around 139  Monitor BMP  Paroxysmal atrial fibrillation (HCC)  Assessment & Plan  -not on any anticoagulation  -continue Cardizem at home dose  Patient also takes metoprolol extended release 50 mg daily  Will resume it now because patient has been having episodes of tachycardia  CESAR (acute kidney injury) [facility])  Assessment & Plan  Patient presented with acute kidney injury of unclear etiology  Refraction includes multiple myeloma, light chain related ATN, fluid overload  Today, creatinine appears to be improving gradually, but slowly  Creatinine on admission was 5 6, but currently it is 3 1  Nephrology following as well      Acute on chronic diastolic CHF  Assessment & Plan  Wt Readings from Last 3 Encounters:   09/03/20 71 8 kg (158 lb 4 6 oz)   07/16/20 75 8 kg (167 lb)   06/26/20 76 2 kg (168 lb)     Echo 01/31/2020 shows EF 50-55% grade 2 dysfunction  Currently in exacerbation due to volume overload as evidenced by lower extremity edema and CT chest findings suggestive of fluid overload   -today, patient started showing signs of respiratory distress and became more lethargic    O2 saturation has dropped and she requires 2 L of oxygen  Lasix was discontinued due to acute kidney injury on admission  Yesterday, we provided with Lasix 40 mg IV now and continue with 80 mg p o  Today  This has been discussed and agreed with nephrology  Continue monitoring volume status with daily weights, intake and outputs    Memory difficulties  Assessment & Plan  -has underlying baseline dementia which has worsened in the last 3- 4 days  -will treat the underlying cause and reassess  I believe that overall her mental status is improving with the improved renal function  Patient is more interactive, but is slightly more lethargic now  Her agitation is resolved  Will continue managing her medical issues  Appreciate Psychiatry consult    Anemia  Assessment & Plan  -hemoglobin stable at 8-9 range  No bleeding noted  -continue to monitor closely    * Toxic metabolic encephalopathy  Assessment & Plan  Currently, patient's mental state waxes and wanes from being agitated to being lethargic  Today, she is more interactive and I believe that she is back to her baseline  Currently patient is awake, alert, confused more than her baseline, per family      Patient has underlying dementia, but currently presents with acute encephalopathy  MRI/MRA MRV were done and ruled out cavernous venous thrombosis  Acute worsening suspected multifactorial in the settings of elevated ammonia, hyponatremia  Patient is awake, alert, oriented to self only  She is continues to be more sleepy today and is no longer aggressive or agitated   Continue lactulose to 30 g b i d  Patient had a normal bowel movement this afternoon  Continue Xifaxan  Frequently orientation  Ativan p r n  For anxiety and behavior  Follow-up with inpatient neurology and psychiatry recommendation  Family members do not want to proceed with palliative care measures at this time  Will continue monitoring her clinical improvement  VTE Pharmacologic Prophylaxis:   Pharmacologic: Pharmacologic VTE Prophylaxis contraindicated due to Thrombocytopenia  Mechanical VTE Prophylaxis in Place: Yes    Patient Centered Rounds:  I have performed bedside rounds with nursing staff today  Discussions with Specialists or Other Care Team Provider:  Nursing    Education and Discussions with Family / Patient:  Patient's sister    Time Spent for Care: 20 minutes  More than 50% of total time spent on counseling and coordination of care as described above  Current Length of Stay: 5 day(s)    Current Patient Status: Inpatient   Certification Statement: The patient will continue to require additional inpatient hospital stay due to Fluid overload    Discharge Plan:  24-48 hours    Code Status: Level 1 - Full Code      Subjective:   Patient was seen and examined  She had nasal bleeding earlier today  At this point, she is asking the nurse was trying to help her "how can help you?"  She continues to be oriented to self only  Objective:     Vitals:   Temp (24hrs), Av 3 °F (36 8 °C), Min:98 1 °F (36 7 °C), Max:98 7 °F (37 1 °C)    Temp:  [98 1 °F (36 7 °C)-98 7 °F (37 1 °C)] 98 4 °F (36 9 °C)  HR:  [107-128] 128  Resp:  [16-20] 20  BP: (124-143)/() 138/85  SpO2:  [96 %-99 %] 97 %  Body mass index is 28 95 kg/m²  Input and Output Summary (last 24 hours): Intake/Output Summary (Last 24 hours) at 9/3/2020 1353  Last data filed at 9/3/2020 1244  Gross per 24 hour   Intake 480 ml   Output    Net 480 ml       Physical Exam:     Physical Exam  Constitutional:       Appearance: She is well-developed  HENT:      Head: Normocephalic and atraumatic  Neck:      Musculoskeletal: Normal range of motion  Cardiovascular:      Rate and Rhythm: Normal rate and regular rhythm  Pulmonary:      Effort: Pulmonary effort is normal       Breath sounds: Decreased air movement present  Abdominal:      General: There is no distension  Palpations: Abdomen is soft  Musculoskeletal:      Right lower leg: Edema (1+ bilateral pitting edema) present  Left lower leg: Edema present  Skin:     General: Skin is warm        Findings: No erythema  Neurological:      Mental Status: She is alert  Mental status is at baseline  She is disoriented  Cranial Nerves: No cranial nerve deficit  Additional Data:     Labs:    Results from last 7 days   Lab Units 09/03/20  0504   WBC Thousand/uL 3 14*   HEMOGLOBIN g/dL 7 6*   HEMATOCRIT % 23 6*   PLATELETS Thousands/uL 50*   BANDS PCT % 3   LYMPHO PCT % 23   MONO PCT % 3*   EOS PCT % 0     Results from last 7 days   Lab Units 09/03/20  0504  09/01/20  1418   SODIUM mmol/L 144   < > 139   POTASSIUM mmol/L 4 2   < > 5 3   CHLORIDE mmol/L 108   < > 101   CO2 mmol/L 23   < > 22   BUN mg/dL 91*   < > 99*   CREATININE mg/dL 3 11*   < > 4 38*   ANION GAP mmol/L 13   < > 16*   CALCIUM mg/dL 9 2   < > 9 1   ALBUMIN g/dL  --   --  4 2   TOTAL BILIRUBIN mg/dL  --   --  2 20*   ALK PHOS U/L  --   --  163*   ALT U/L  --   --  40   AST U/L  --   --  35   GLUCOSE RANDOM mg/dL 109   < > 130    < > = values in this interval not displayed  Results from last 7 days   Lab Units 08/29/20  1944   INR  1 53*     Results from last 7 days   Lab Units 09/02/20  1604 08/29/20  2051   POC GLUCOSE mg/dl 107 140                   * I Have Reviewed All Lab Data Listed Above  * Additional Pertinent Lab Tests Reviewed:  All Labs Within Last 24 Hours Reviewed    Imaging:  CT chest  Recent Cultures (last 7 days):           Last 24 Hours Medication List:   Current Facility-Administered Medications   Medication Dose Route Frequency Provider Last Rate    diltiazem  240 mg Oral Daily Rodney Smiley MD      furosemide  80 mg Oral Daily Divine Richey MD      lactulose  30 g Oral BID Jen Yoder MD      metoprolol succinate  50 mg Oral Daily Divine Richey MD      mirtazapine  7 5 mg Oral HS Rodney Smiley MD      nicotine  14 mg Transdermal Daily Jen Yoder MD      pantoprazole  40 mg Oral Early Morning Rodney Smiley MD      QUEtiapine  25 mg Oral HS Jen Yoder MD      rifaximin  550 mg Oral Q12H [place], MD      sodium chloride  1 spray Each Nare Q1H PRN Grace Whitman MD          Today, Patient Was Seen By: Grace Whitman MD    ** Please Note: Dictation voice to text software may have been used in the creation of this document   **

## 2020-09-03 NOTE — ASSESSMENT & PLAN NOTE
-follows with [facility]  -nephrology consulted for concern of myeloma kidney with sudden worsening of renal functions

## 2020-09-03 NOTE — PLAN OF CARE
Problem: Prexisting or High Potential for Compromised Skin Integrity  Goal: Skin integrity is maintained or improved  Description: INTERVENTIONS:  - Identify patients at risk for skin breakdown  - Assess and monitor skin integrity  - Assess and monitor nutrition and hydration status  - Monitor labs   - Assess for incontinence   - Turn and reposition patient  - Assist with mobility/ambulation  - Relieve pressure over bony prominences  - Avoid friction and shearing  - Provide appropriate hygiene as needed including keeping skin clean and dry  - Evaluate need for skin moisturizer/barrier cream  - Collaborate with interdisciplinary team   - Patient/family teaching  - Consider wound care consult   Outcome: Progressing     Problem: Potential for Falls  Goal: Patient will remain free of falls  Description: INTERVENTIONS:  - Assess patient frequently for physical needs  -  Identify cognitive and physical deficits and behaviors that affect risk of falls  -  [place]ler fall precautions as indicated by assessment   - Educate patient/family on patient safety including physical limitations  - Instruct patient to call for assistance with activity based on assessment  - Modify environment to reduce risk of injury  - Consider OT/PT consult to assist with strengthening/mobility  Outcome: Progressing     Problem: Nutrition/Hydration-ADULT  Goal: Nutrient/Hydration intake appropriate for improving, restoring or maintaining nutritional needs  Description: Monitor and assess patient's nutrition/hydration status for malnutrition  Collaborate with interdisciplinary team and initiate plan and interventions as ordered  Monitor patient's weight and dietary intake as ordered or per policy  Utilize nutrition screening tool and intervene as necessary  Determine patient's food preferences and provide high-protein, high-caloric foods as appropriate       INTERVENTIONS:  - Monitor oral intake, urinary output, labs, and treatment plans  - Assess nutrition and hydration status and recommend course of action  - Evaluate amount of meals eaten  - Assist patient with eating if necessary   - Allow adequate time for meals  - Recommend/ encourage appropriate diets, oral nutritional supplements, and vitamin/mineral supplements  - Order, calculate, and assess calorie counts as needed  - Recommend, monitor, and adjust tube feedings and TPN/PPN based on assessed needs  - Assess need for intravenous fluids  - Provide specific nutrition/hydration education as appropriate  - Include patient/family/caregiver in decisions related to nutrition  Outcome: Progressing

## 2020-09-03 NOTE — ASSESSMENT & PLAN NOTE
Currently, patient's mental state waxes and wanes from being agitated to being lethargic  Today, she is more interactive and I believe that she is back to her baseline  Currently patient is awake, alert, confused more than her baseline, per family      Patient has underlying dementia, but currently presents with acute encephalopathy  MRI/MRA MRV were done and ruled out cavernous venous thrombosis  Acute worsening suspected multifactorial in the settings of elevated ammonia, hyponatremia  Patient is awake, alert, oriented to self only  She is continues to be more sleepy today and is no longer aggressive or agitated   Continue lactulose to 30 g b i d  Patient had a normal bowel movement this afternoon  Continue Xifaxan  Frequently orientation  Ativan p r n  For anxiety and behavior  Follow-up with inpatient neurology and psychiatry recommendation  Family members do not want to proceed with palliative care measures at this time  Will continue monitoring her clinical improvement

## 2020-09-03 NOTE — PROGRESS NOTES
Progress note - Palliative and Supportive Care   Chauncey Cunningham 69 y o  female [id number]    Assessment:    -   Patient Active Problem List   Diagnosis    Anemia    Benign essential hypertension    Cigarette nicotine dependence without complication    Hyperglycemia    Paroxysmal SVT (supraventricular tachycardia) (HCC)    Sinus tachycardia    Urinary incontinence    Memory difficulties    Gait disturbance    Obesity (BMI 30 0-34  9)    Recurrent major depressive disorder, in partial remission (HCC)    Pancytopenia ([facility] )    History of alcohol abuse    Toxic metabolic encephalopathy    Liver cirrhosis (HCC)    Elevated troponin    Acute on chronic diastolic CHF    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[facility] )    Pneumonia    Urinary tract infection without hematuria    CESAR (acute kidney injury) (HCC)    Dysphagia    Paroxysmal atrial fibrillation (HCC)    Troponin level elevated    Hyperbilirubinemia    Neutropenia (HCC)    Hyperphosphatemia    Hyperkalemia    Hyponatremia    Multiple myeloma ([facility] )       Plan:  1  Symptom management  · Delirium precautions: please minimize interruptions and prioritize sleep at night  No TV nor screen time at night  Shades drawn at night  During day, shades up, minimize napping, and encourage meals in chair  Psychiatry consulted for agitation, she is now pleasantly confused  2  Goals   · Per family, patient is to remain disease focused without limitations on care  Code Status:  Full code - Level1   Decisional apparatus:  Patient is not competent on my exam today  If competence is lost, patient's substitute decision maker would default to adult children by [place]  Advance Directive / Living Will / POLST:  None on file    Interval history:       Yesterday the patient was found to be in acute heart failure  Patient is slightly lethargic  She is pleasantly confused    Family would like her to return to her apartment with [facility]  Her son Garcia Henriquez, they do not on a placed any limitations on the patient care  He understands that her dementia is progressive and that at this point in time we are unable to treat her multiple myeloma unless she becomes stronger  She was resting comfortably in her bed however she complained of mild shortness of breath which may be attributed to her recent heart failure  MEDICATIONS / ALLERGIES:     current meds:   Current Facility-Administered Medications   Medication Dose Route Frequency    azelastine (ASTELIN) 0 1 % nasal spray 1 spray  1 spray Each Nare BID    diltiazem (CARDIZEM CD) 24 hr capsule 240 mg  240 mg Oral Daily    furosemide (LASIX) tablet 80 mg  80 mg Oral Daily    lactulose 20 g/30 mL oral solution 30 g  30 g Oral BID    metoprolol succinate (TOPROL-XL) 24 hr tablet 50 mg  50 mg Oral Daily    mirtazapine (REMERON) tablet 7 5 mg  7 5 mg Oral HS    nicotine (NICODERM CQ) 14 mg/24hr TD 24 hr patch 14 mg  14 mg Transdermal Daily    pantoprazole (PROTONIX) EC tablet 40 mg  40 mg Oral Early Morning    QUEtiapine (SEROquel) tablet 25 mg  25 mg Oral HS    rifaximin (XIFAXAN) tablet 550 mg  550 mg Oral Q12H [address]       No Known Allergies    OBJECTIVE:    Physical Exam  Physical Exam  Eyes:      General: Lids are normal    Pulmonary:      Comments: Mild tachypnea  Skin:     General: Skin is warm  Neurological:      Mental Status: She is disoriented  GCS: GCS eye subscore is 4  GCS verbal subscore is 4  GCS motor subscore is 6  Psychiatric:         Cognition and Memory: Cognition is impaired  Memory is impaired  She exhibits impaired recent memory           Lab Results:   CBC:   Lab Results   Component Value Date    WBC 3 14 (L) 09/03/2020    HGB 7 6 (L) 09/03/2020    HCT 23 6 (L) 09/03/2020    MCV 86 09/03/2020    PLT 50 (L) 09/03/2020    MCH 27 5 09/03/2020    MCHC 32 2 09/03/2020    RDW 20 4 (H) 09/03/2020    NRBC 9 09/03/2020    NRBC 7 (H) 09/03/2020   , CMP:   Lab Results   Component Value Date    SODIUM 144 09/03/2020    K 4 2 09/03/2020     09/03/2020    CO2 23 09/03/2020    BUN 91 (H) 09/03/2020    CREATININE 3 11 (H) 09/03/2020    CALCIUM 9 2 09/03/2020    EGFR 16 09/03/2020   , PT/PTT:No results found for: PT, PTT  Imaging Studies:  CT chest:  Moderate right effusion      Atelectasis/consolidation right lower lung may be due to compressive atelectasis however underlying infiltrates are difficult to exclude     Lobular areas of groundglass density in the left upper lobe, right middle lobe, lingular region may be due to infiltrate which could be atypical or viral or drug induced these are may be related to areas of residual congestion     Cirrhosis     Enlarged IVC with enlarged right atrium right and right ventricle, correlate with tricuspid regurgitation, right heart failure      Counseling / Coordination of Care    Total floor / unit time spent today 15+  minutes  Greater than 50% of total time was spent with the patient and / or family counseling and / or coordination of care  A description of the counseling / coordination of care:  Provided support, urged expression

## 2020-09-04 PROBLEM — R79.89 AZOTEMIA: Status: ACTIVE | Noted: 2020-09-04

## 2020-09-04 LAB
ANION GAP SERPL CALCULATED.3IONS-SCNC: 10 MMOL/L (ref 4–13)
ANISOCYTOSIS BLD QL SMEAR: PRESENT
BASOPHILS # BLD MANUAL: 0 THOUSAND/UL (ref 0–0.1)
BASOPHILS NFR MAR MANUAL: 0 % (ref 0–1)
BUN SERPL-MCNC: 75 MG/DL (ref 5–25)
CALCIUM SERPL-MCNC: 9.7 MG/DL (ref 8.3–10.1)
CHLORIDE SERPL-SCNC: 111 MMOL/L (ref 100–108)
CO2 SERPL-SCNC: 26 MMOL/L (ref 21–32)
CREAT SERPL-MCNC: 2.37 MG/DL (ref 0.6–1.3)
EOSINOPHIL # BLD MANUAL: 0 THOUSAND/UL (ref 0–0.4)
EOSINOPHIL NFR BLD MANUAL: 0 % (ref 0–6)
ERYTHROCYTE [DISTWIDTH] IN BLOOD BY AUTOMATED COUNT: 21 % (ref 11.6–15.1)
GFR SERPL CREATININE-BSD FRML MDRD: 23 ML/MIN/1.73SQ M
GLUCOSE SERPL-MCNC: 117 MG/DL (ref 65–140)
HCT VFR BLD AUTO: 23.8 % (ref 34.8–46.1)
HGB BLD-MCNC: 7.6 G/DL (ref 11.5–15.4)
LYMPHOCYTES # BLD AUTO: 0.52 THOUSAND/UL (ref 0.6–4.47)
LYMPHOCYTES # BLD AUTO: 16 % (ref 14–44)
MCH RBC QN AUTO: 27.7 PG (ref 26.8–34.3)
MCHC RBC AUTO-ENTMCNC: 31.9 G/DL (ref 31.4–37.4)
MCV RBC AUTO: 87 FL (ref 82–98)
METAMYELOCYTES NFR BLD MANUAL: 1 % (ref 0–1)
MONOCYTES # BLD AUTO: 0.42 THOUSAND/UL (ref 0–1.22)
MONOCYTES NFR BLD: 13 % (ref 4–12)
NEUTROPHILS # BLD MANUAL: 2.25 THOUSAND/UL (ref 1.85–7.62)
NEUTS BAND NFR BLD MANUAL: 4 % (ref 0–8)
NEUTS SEG NFR BLD AUTO: 66 % (ref 43–75)
NRBC BLD AUTO-RTO: 3 /100 WBC (ref 0–2)
NRBC BLD AUTO-RTO: 8 /100 WBCS
PLATELET # BLD AUTO: 47 THOUSANDS/UL (ref 149–390)
PLATELET BLD QL SMEAR: ABNORMAL
POLYCHROMASIA BLD QL SMEAR: PRESENT
POTASSIUM SERPL-SCNC: 3.8 MMOL/L (ref 3.5–5.3)
RBC # BLD AUTO: 2.74 MILLION/UL (ref 3.81–5.12)
SODIUM SERPL-SCNC: 147 MMOL/L (ref 136–145)
TOTAL CELLS COUNTED SPEC: 100
WBC # BLD AUTO: 3.22 THOUSAND/UL (ref 4.31–10.16)

## 2020-09-04 PROCEDURE — 85027 COMPLETE CBC AUTOMATED: CPT | Performed by: INTERNAL MEDICINE

## 2020-09-04 PROCEDURE — 99233 SBSQ HOSP IP/OBS HIGH 50: CPT | Performed by: INTERNAL MEDICINE

## 2020-09-04 PROCEDURE — 80048 BASIC METABOLIC PNL TOTAL CA: CPT | Performed by: INTERNAL MEDICINE

## 2020-09-04 PROCEDURE — 85007 BL SMEAR W/DIFF WBC COUNT: CPT | Performed by: INTERNAL MEDICINE

## 2020-09-04 PROCEDURE — 99232 SBSQ HOSP IP/OBS MODERATE 35: CPT | Performed by: FAMILY MEDICINE

## 2020-09-04 RX ADMIN — RIFAXIMIN 550 MG: 550 TABLET ORAL at 21:46

## 2020-09-04 RX ADMIN — LACTULOSE 30 G: 10 SOLUTION ORAL at 09:08

## 2020-09-04 RX ADMIN — PANTOPRAZOLE SODIUM 40 MG: 40 TABLET, DELAYED RELEASE ORAL at 05:15

## 2020-09-04 RX ADMIN — MIRTAZAPINE 7.5 MG: 15 TABLET, FILM COATED ORAL at 21:46

## 2020-09-04 RX ADMIN — RIFAXIMIN 550 MG: 550 TABLET ORAL at 09:08

## 2020-09-04 RX ADMIN — QUETIAPINE FUMARATE 25 MG: 25 TABLET ORAL at 21:46

## 2020-09-04 RX ADMIN — DILTIAZEM HYDROCHLORIDE 240 MG: 240 CAPSULE, COATED, EXTENDED RELEASE ORAL at 09:08

## 2020-09-04 RX ADMIN — LACTULOSE 30 G: 10 SOLUTION ORAL at 18:59

## 2020-09-04 RX ADMIN — FUROSEMIDE 80 MG: 80 TABLET ORAL at 09:08

## 2020-09-04 RX ADMIN — METOPROLOL SUCCINATE 50 MG: 50 TABLET, EXTENDED RELEASE ORAL at 09:08

## 2020-09-04 NOTE — PROGRESS NOTES
Progress Note - Burgess Lai 1945, 69 y o  female MRN: [id number]    Unit/Bed#: -01 Encounter: [id number]    Primary Care Provider: Chilo Robertson MD   Date and time admitted to hospital: 8/29/2020  6:35 PM        Paroxysmal atrial fibrillation [facility])  Assessment & Plan  -not on any anticoagulation  -continue Cardizem at home dose  Patient also takes metoprolol extended release 50 mg daily  Currently, tachycardia is improved      CESAR (acute kidney injury) [facility])  Assessment & Plan  Patient presented with acute kidney injury of unclear etiology  Refraction includes multiple myeloma, light chain related ATN, fluid overload  Today, creatinine appears to be improving gradually, but slowly  Creatinine on admission was 5 6, but currently it is 2 3  Nephrology following as well      Acute on chronic diastolic CHF  Assessment & Plan  Wt Readings from Last 3 Encounters:   09/04/20 71 2 kg (156 lb 15 5 oz)   07/16/20 75 8 kg (167 lb)   06/26/20 76 2 kg (168 lb)     Echo 01/31/2020 shows EF 50-55% grade 2 dysfunction  Currently in exacerbation due to volume overload as evidenced by lower extremity edema and CT chest findings suggestive of fluid overload   -today, patient started showing signs of respiratory distress and became more lethargic  O2 saturation has dropped and she requires 2 L of oxygen  Lasix was discontinued due to acute kidney injury on admission  Will continue with 80 mg p o  Today  This has been discussed and agreed with nephrology  Continue monitoring volume status with daily weights, intake and outputs    Pancytopenia ([facility] [address] )  Assessment & Plan  -has been diagnosed by oncology with multiple myeloma  -follows with [facility]  -currently WBC count and hemoglobin are stable, but hemoglobin is on the lower side at 7 6  -chronically low platelets and keep decreasing which may have a dilutional component because patient is becoming fluid overloaded    No bleeding  Holding [facility] ED  eMERGENCY dEPARTMENT eNCOUnter      Pt Name: Maximilian Jaimes  MRN: [id number]  [place] 1989  Date of evaluation: 3/15/2020  Provider: Cailin Butler MD    [address] [place] [address]       Chief Complaint   Patient presents with    Abdominal Pain     Pt states ABD pain for the past 5 hours, worse on the RLQ; ALso c/o nausea; Patient is a 52-year-old male who presents to the emergency department complaining of abdominal pain. Patient states he is been having pain for about the past 5 hours. He states it is mostly on the right side in the right lower quadrant and a little bit in the right back. Complains of nausea. He denies vomiting or diarrhea. He denies chest pain or shortness of breath. He states nothing is really making it better or worse. Nursing Notes were reviewed. REVIEW OF SYSTEMS    (2-9 systems for level 4, 10 or more for level 5)     Review of Systems   Constitutional: Negative for chills and fever. HENT: Negative for ear pain, sore throat and trouble swallowing. Eyes: Negative for pain and redness. Respiratory: Negative for cough and shortness of breath. Cardiovascular: Negative for chest pain and palpitations. Gastrointestinal: Positive for abdominal pain. Negative for diarrhea, nausea and vomiting. Genitourinary: Negative for dysuria and frequency. Musculoskeletal: Negative for back pain and neck pain. Skin: Negative for color change and rash. Neurological: Negative for dizziness, syncope and headaches. Psychiatric/Behavioral: Negative for hallucinations and suicidal ideas. Except as noted above the remainder of the review of systems was reviewed and negative. PAST MEDICAL HISTORY   History reviewed. No pertinent past medical history.       SURGICAL HISTORY       Past Surgical History:   Procedure Laterality Date    CATARACT REMOVAL      bilateral    FOOT SURGERY Left     HAND SURGERY Right          ALLERGIES     Patient has no anticoagulation due to worsening thrombocytopenia    Memory difficulties  Assessment & Plan  -has underlying baseline dementia which has worsened in the last 3- 4 days  -will treat the underlying cause and reassess  Significantly improved mental state today  Patient actually told me that she feels better  Her agitation is resolved  Will continue managing her medical issues  Appreciate Psychiatry consult    Anemia  Assessment & Plan  -hemoglobin stable at 8-9 range  No bleeding noted  -continue to monitor closely    * Toxic metabolic encephalopathy  Assessment & Plan  Currently, patient's mental state waxes and wanes from being agitated to being lethargic  Today, she is more interactive and I believe that she is back to her baseline  Currently patient is awake, alert, confused more than her baseline, per family      Patient has underlying dementia, but currently presents with acute encephalopathy  MRI/MRA MRV were done and ruled out cavernous venous thrombosis  Acute worsening suspected multifactorial in the settings of elevated ammonia, hyponatremia  Patient is awake, alert, oriented to self only  She is continues to be more sleepy today and is no longer aggressive or agitated   Continue lactulose to 30 g b i d  Patient had a normal bowel movement this afternoon  Continue Xifaxan  Frequently orientation  Ativan p r n  For anxiety and behavior  Follow-up with inpatient neurology and psychiatry recommendation  Family members do not want to proceed with palliative care measures at this time  Will continue monitoring her clinical improvement  VTE Pharmacologic Prophylaxis:   Pharmacologic: Pharmacologic VTE Prophylaxis contraindicated due to Thrombocytopenia  Mechanical VTE Prophylaxis in Place: Yes    Patient Centered Rounds: I have performed bedside rounds with nursing staff today      Discussions with Specialists or Other Care Team Provider:      Education and Discussions known allergies. FAMILY HISTORY     History reviewed. No pertinent family history. SOCIAL HISTORY       Social History     Socioeconomic History    Marital status: Single     Spouse name: None    Number of children: None    Years of education: None    Highest education level: None   Occupational History    None   Social Needs    Financial resource strain: None    Food insecurity     Worry: None     Inability: None    Transportation needs     Medical: None     Non-medical: None   Tobacco Use    Smoking status: Current Every Day Smoker     Packs/day: 0.50     Years: 15.00     Pack years: 7.50     Types: Cigarettes    Smokeless tobacco: Never Used   Substance and Sexual Activity    Alcohol use: Never     Frequency: Never    Drug use: Yes     Types: Marijuana     Comment: occasionally    Sexual activity: None   Lifestyle    Physical activity     Days per week: None     Minutes per session: None    Stress: None   Relationships    Social connections     Talks on phone: None     Gets together: None     Attends Anabaptism service: None     Active member of club or organization: None     Attends meetings of clubs or organizations: None     Relationship status: None    Intimate partner violence     Fear of current or ex partner: None     Emotionally abused: None     Physically abused: None     Forced sexual activity: None   Other Topics Concern    None   Social History Narrative    None           PHYSICAL EXAM    (up to 7 for level 4, 8 ormore for level 5)     ED Triage Vitals [03/15/20 0139]   BP Temp Temp Source Pulse Resp SpO2 Height Weight   (!) 158/82 98.4 °F (36.9 °C) Oral 78 18 99 % 6' (1.829 m) 203 lb 14.4 oz (92.5 kg)       Physical Exam     Physical    Vital signs and nursing notes were reviewed as well as the social, family, and past medical history. Gen.  appearance: Patient is alert and oriented and in no acute distress    Head: Atraumatic, normocephalic    Neck: Supple, trachea/thyroid normal    EENT: PERRLA, EOMI, conjunctiva normal.    Skin: Warm and dry with no rash    Cardiovascular: Heart RRR, no gallops or rubs, no aortic enlargement or bruits noted. Respiratory: Lungs clear, no wheezing, no rales, normal breath sounds. Gastrointestinal: Abdomen in the right lower quadrant, bowel sounds normal, no rebound/guarding/distention or mass    Musculoskeletal: No tenderness in the extremities, no back or hip pain. Neurological: Patient is alert and oriented ×3, no focal motor or sensory deficits noted      DIAGNOSTIC RESULTS             LABS:  Labs Reviewed   CBC WITH AUTO DIFFERENTIAL - Abnormal; Notable for the following components:       Result Value    WBC 12.9 (*)     Seg Neutrophils 78 (*)     Segs Absolute 10.00 (*)     All other components within normal limits   COMPREHENSIVE METABOLIC PANEL - Abnormal; Notable for the following components:    Glucose 136 (*)     Calcium 10.8 (*)     Total Protein 8.8 (*)     All other components within normal limits   URINALYSIS - Abnormal; Notable for the following components:    Protein, UA 1+ (*)     Leukocyte Esterase, Urine 1+ (*)     All other components within normal limits   CULTURE, URINE   MICROSCOPIC URINALYSIS       All other labs were within normal range or not returned as of this dictation. EMERGENCY DEPARTMENT COURSE and DIFFERENTIAL DIAGNOSIS/MDM:   Vitals:    Vitals:    03/15/20 0236 03/15/20 0253 03/15/20 0306 03/15/20 0307   BP: (!) 150/72 138/73 (!) 140/96    Pulse:    66   Resp:    18   Temp:       TempSrc:       SpO2: 97% 99% 99%    Weight:       Height:                     REASSESSMENT      Here in the ED patient's white count was 12.9. Urinalysis was unremarkable. CAT scan showed evidence of appendicitis with an appendicolith. Patient will be admitted under Dr. Merrill Amor and surgery planned. I discussed with him and we will admit. Zosyn ordered for infection.     PROCEDURES:  Unless otherwise noted with Family / Patient:  Patient's son Faye November    Time Spent for Care: 20 minutes  More than 50% of total time spent on counseling and coordination of care as described above  Current Length of Stay: 6 day(s)    Current Patient Status: Inpatient   Certification Statement: The patient will continue to require additional inpatient hospital stay due to Acute kidney injury    Discharge Plan:  24 hours    Code Status: Level 1 - Full Code      Subjective:   Patient was seen and examined     She is doing very well today  She told me that she feels better, which is quite unusual for her  Prior to today, she was doing worse  Objective:     Vitals:   Temp (24hrs), Av 8 °F (36 6 °C), Min:97 8 °F (36 6 °C), Max:97 8 °F (36 6 °C)    Temp:  [97 8 °F (36 6 °C)] 97 8 °F (36 6 °C)  HR:  [116] 116  BP: (118)/(64) 118/64  SpO2:  [92 %] 92 %  Body mass index is 28 71 kg/m²  Input and Output Summary (last 24 hours): Intake/Output Summary (Last 24 hours) at 2020 1526  Last data filed at 2020 1222  Gross per 24 hour   Intake 320 ml   Output    Net 320 ml       Physical Exam:     Physical Exam  Constitutional:       Appearance: She is well-developed  Cardiovascular:      Rate and Rhythm: Normal rate and regular rhythm  Pulmonary:      Effort: Pulmonary effort is normal       Breath sounds: Normal breath sounds  Abdominal:      General: There is no distension  Palpations: Abdomen is soft  Skin:     General: Skin is warm  Findings: No erythema  Neurological:      Mental Status: She is alert  Mental status is at baseline  She is disoriented  Cranial Nerves: No cranial nerve deficit     Psychiatric:         Behavior: Behavior normal          Additional Data:     Labs:    Results from last 7 days   Lab Units 20  0506   WBC Thousand/uL 3 22*   HEMOGLOBIN g/dL 7 6*   HEMATOCRIT % 23 8*   PLATELETS Thousands/uL 47*   BANDS PCT % 4   LYMPHO PCT % 16   MONO PCT % 13*   EOS PCT % 0     Results from last 7 days   Lab Units 09/04/20  0506  09/01/20  1418   SODIUM mmol/L 147*   < > 139   POTASSIUM mmol/L 3 8   < > 5 3   CHLORIDE mmol/L 111*   < > 101   CO2 mmol/L 26   < > 22   CO2, I-STAT   --    < >  --    BUN mg/dL 75*   < > 99*   CREATININE mg/dL 2 37*   < > 4 38*   ANION GAP mmol/L 10   < > 16*   CALCIUM mg/dL 9 7   < > 9 1   ALBUMIN g/dL  --   --  4 2   TOTAL BILIRUBIN mg/dL  --   --  2 20*   ALK PHOS U/L  --   --  163*   ALT U/L  --   --  40   AST U/L  --   --  35   GLUCOSE RANDOM mg/dL 117   < > 130    < > = values in this interval not displayed  Results from last 7 days   Lab Units 08/29/20  1944   INR  1 53*     Results from last 7 days   Lab Units 09/02/20  1604 08/29/20  2051   POC GLUCOSE mg/dl 107 140                   * I Have Reviewed All Lab Data Listed Above  * Additional Pertinent Lab Tests Reviewed: All Labs Within Last 24 Hours Reviewed    Imaging:        Recent Cultures (last 7 days):           Last 24 Hours Medication List:   Current Facility-Administered Medications   Medication Dose Route Frequency Provider Last Rate    diltiazem  240 mg Oral Daily Frankie Rizo MD      furosemide  80 mg Oral Daily Adry Ellsworth MD      lactulose  30 g Oral BID Bruce Alcantar MD      metoprolol succinate  50 mg Oral Daily Adry Ellsworth MD      mirtazapine  7 5 mg Oral HS Frankie Rizo MD      nicotine  14 mg Transdermal Daily Bruce Alcantar MD      pantoprazole  40 mg Oral Early Morning Frankie Rizo MD      QUEtiapine  25 mg Oral HS Bruce Alcantar MD      rifaximin  550 mg Oral Q12H [facility] Frankie Rizo MD      sodium chloride  1 spray Each Nare Q1H PRN Adry Ellsworth MD          Today, Patient Was Seen By: Adry Ellsworth MD    ** Please Note: Dictation voice to text software may have been used in the creation of this document   **

## 2020-09-04 NOTE — ASSESSMENT & PLAN NOTE
Wt Readings from Last 3 Encounters:   09/04/20 71 2 kg (156 lb 15 5 oz)   07/16/20 75 8 kg (167 lb)   06/26/20 76 2 kg (168 lb)     Echo 01/31/2020 shows EF 50-55% grade 2 dysfunction  Currently in exacerbation due to volume overload as evidenced by lower extremity edema and CT chest findings suggestive of fluid overload   -today, patient started showing signs of respiratory distress and became more lethargic  O2 saturation has dropped and she requires 2 L of oxygen  Lasix was discontinued due to acute kidney injury on admission  Will continue with 80 mg p o   Today  This has been discussed and agreed with nephrology  Continue monitoring volume status with daily weights, intake and outputs

## 2020-09-04 NOTE — ASSESSMENT & PLAN NOTE
-has underlying baseline dementia which has worsened in the last 3- 4 days  -will treat the underlying cause and reassess  Significantly improved mental state today  Patient actually told me that she feels better  Her agitation is resolved  Will continue managing her medical issues    Appreciate Psychiatry consult

## 2020-09-04 NOTE — CASE MANAGEMENT
CM discussed pt in care coordination rounds  Pt for [facility] at d/c as family would like for pt to return home at d/c

## 2020-09-04 NOTE — PROGRESS NOTES
NEPHROLOGY PROGRESS NOTE    Patient: Burgess Lai               Sex: female          DOA: 8/29/2020  6:35 PM   Date of Birth: @        Age: @        LOS:  LOS: 6 days   9/4/2020    REASON FOR THE CONSULTATION:  Further management of CESAR  HPI     This is a 69 y o  female admitted for Toxic metabolic encephalopathy     SUBJECTIVE     - breathing was stable  Found to be nonoliguric overnight  Patient denies nausea, vomiting, headache or dizziness today    - Reviewed last 24 hrs events     CURRENT MEDICATIONS       Current Facility-Administered Medications:     diltiazem (CARDIZEM CD) 24 hr capsule 240 mg, 240 mg, Oral, Daily, Wes Molina MD, 240 mg at 09/04/20 0908    furosemide (LASIX) tablet 80 mg, 80 mg, Oral, Daily, Radha Adams MD, 80 mg at 09/04/20 0908    lactulose 20 g/30 mL oral solution 30 g, 30 g, Oral, BID, Arie GRIFFIN MD, 30 g at 09/04/20 0908    metoprolol succinate (TOPROL-XL) 24 hr tablet 50 mg, 50 mg, Oral, Daily, Radha Adams MD, 50 mg at 09/04/20 0908    mirtazapine (REMERON) tablet 7 5 mg, 7 5 mg, Oral, HS, Wes Molina MD, 7 5 mg at 09/03/20 2134    nicotine (NICODERM CQ) 14 mg/24hr TD 24 hr patch 14 mg, 14 mg, Transdermal, Daily, Patsy GRIFFIN MD, Stopped at 08/31/20 1004    pantoprazole (PROTONIX) EC tablet 40 mg, 40 mg, Oral, Early Morning, Wes Molina MD, 40 mg at 09/04/20 0515    QUEtiapine (SEROquel) tablet 25 mg, 25 mg, Oral, HS, Arie GRIFFIN MD, 25 mg at 09/03/20 2134    rifaximin (XIFAXAN) tablet 550 mg, 550 mg, Oral, Q12H [address], Wes Molina MD, 550 mg at 09/04/20 0908    sodium chloride (OCEAN) 0 65 % nasal spray 1 spray, 1 spray, Each Nare, Q1H PRN, Radha Adams MD    OBJECTIVE     Current Weight: Weight - Scale: 71 2 kg (156 lb 15 5 oz)  /64 (BP Location: Left arm)   Pulse (!) 116   Temp 97 8 °F (36 6 °C) (Oral)   Resp 17   Ht 5' 2" (1 575 m)   Wt 71 2 kg (156 lb 15 5 oz)   SpO2 92%   BMI 28 71 kg/m²   Vitals:    09/04/20 0700   BP: 118/64 Pulse: (!) 116   Resp:    Temp: 97 8 °F (36 6 °C)   SpO2: 92%     Body mass index is 28 71 kg/m²  Intake/Output Summary (Last 24 hours) at 9/4/2020 1107  Last data filed at 9/4/2020 0900  Gross per 24 hour   Intake 260 ml   Output    Net 260 ml       PHYSICAL EXAMINATION     Physical Exam  Constitutional:       General: She is not in acute distress  Appearance: She is ill-appearing  HENT:      Head: Normocephalic and atraumatic  Eyes:      General: No scleral icterus  Neck:      Musculoskeletal: Neck supple  Vascular: No JVD  Cardiovascular:      Rate and Rhythm: Normal rate  Pulmonary:      Effort: No accessory muscle usage or respiratory distress  Breath sounds: No wheezing  Abdominal:      General: There is no distension  Palpations: Abdomen is soft  Musculoskeletal:      Right hand: She exhibits no laceration  Left hand: She exhibits no laceration  Skin:     General: Skin is warm  Neurological:      Mental Status: She is alert  Psychiatric:         Speech: She is communicative  Behavior: Behavior is not combative             LAB RESULTS     Results from last 7 days   Lab Units 09/04/20  0506 09/03/20  0504 09/02/20  1404 09/02/20  0545 09/01/20  1418 08/31/20  1405 08/30/20 2050 08/30/20  1256 08/29/20  1944   WBC Thousand/uL 3 22* 3 14*  --  3 63* 3 81*  --   --  4 86 4 46   HEMOGLOBIN g/dL 7 6* 7 6*  --  7 6* 8 0*  --   --  9 7* 8 8*   I STAT HEMOGLOBIN g/dl  --   --  9 2*  --   --   --   --   --   --    HEMATOCRIT % 23 8* 23 6*  --  23 0* 25 4*  --   --  31 0* 27 4*   HEMATOCRIT, ISTAT %  --   --  27*  --   --   --   --   --   --    PLATELETS Thousands/uL 47* 50*  --  53* 61*  --   --  73* 70*   SODIUM mmol/L 147* 144  --  142 139 133* 133*  --  131*   POTASSIUM mmol/L 3 8 4 2  --  4 6 5 3 5 4* 5 8*  --  6 2*   CHLORIDE mmol/L 111* 108  --  104 101 96* 96*  --  95*   CO2 mmol/L 26 23  --  22 22 19* 18*  --  21   CO2, I-STAT mmol/L  --   --  21  --   -- --   --   --   --    BUN mg/dL 75* 91*  --  99* 99* 96* 96*  --  84*   CREATININE mg/dL 2 37* 3 11*  --  3 88* 4 38* 4 70* 5 44*  --  5 65*   EGFR ml/min/1 73sq m 23 16  --  13 11 10 8  --  8   CALCIUM mg/dL 9 7 9 2  --  9 0 9 1 8 9 9 2  --  8 2*   MAGNESIUM mg/dL  --   --   --   --   --   --  2 2  --  2 0   PHOSPHORUS mg/dL  --   --   --   --   --   --   --   --  7 9*   GLUCOSE, ISTAT mg/dl  --   --  115  --   --   --   --   --   --        I have personally reviewed the old medical records and patient's previously known baseline creatinine level is ~ 0 9-1 1  RADIOLOGY RESULTS      CT chest wo contrast   Final Result by Asmita Chahal MD (09/03 9391)      Moderate right effusion       Atelectasis/consolidation right lower lung may be due to compressive atelectasis however underlying infiltrates are difficult to exclude      Lobular areas of groundglass density in the left upper lobe, right middle lobe, lingular region may be due to infiltrate which could be atypical or viral or drug induced these are may be related to areas of residual congestion      Cirrhosis      Enlarged IVC with enlarged right atrium right and right ventricle, correlate with tricuspid regurgitation, right heart failure      Follow-up suggested in 6-8 weeks to demonstrate resolution   The study was marked in EPIC for significant notification  Workstation performed: [id number]         MRV head wo contrast   Final Result by  (09/04 1107)   Addendum 1 of 1 by Dora Allison MD (09/01 2046)   ADDENDUM:      The anterior portion of the superior sagittal sinus and other dural venous    sinuses are not assessed since they were not in the field-of-view  For    complete evaluation of the dural venous sinuses, consider repeat    examination if there is further concern for    dural venous thrombosis        Findings were marked as "immediate"in Epic and will now be related to the    ordering physician or covering clinical team by the radiology liaison  Final      MRA head wo contrast   Final Result by Kamlesh Acosta DO (09/01 2025)      Examination is significantly limited by patient motion  No occlusion of the major intracranial vasculature identified  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 at [phone]  Workstation performed: [id number]         MRI brain wo contrast   Final Result by  (09/04 8127)   Addendum 1 of 1 by Jagdeep Patel MD (09/01 1923)   ADDENDUM:      Normal signal void in the superior ophthalmic vein on T1 sequence (which    would typically be seen in vessel thrombus) although the T2 sequence    demonstrates increased signal of the superior ophthalmic vein  Consider    follow-up MRA/MRV as the patient is in    renal failure and is not able to obtain CTV/CTA  I personally discussed this study with AN KARRIE on 9/1/2020 at 4:04 PM                Final      CT head wo contrast   Final Result by Paula Ochoa MD (09/19 6510)      1  No acute intracranial hemorrhage, midline shift, or mass effect  2   If there is continued clinical concern for acute infarct, further evaluation with MRI may be obtained which is more sensitive  3   Chronic small vessel ischemic changes  4   Prominence of bilateral superior ophthalmic veins again seen  Workstation performed: [id number]         CT head wo contrast   Final Result by Magalie Lozano MD (08/30 1819)         1  No evidence of acute infarct, intracranial hemorrhage or mass effect  2   Mildly enlarged bilateral superior ophthalmic veins, new since the prior exam   No proptosis or retro-orbital inflammation  Findings could represent carotid cavernous fistula or cavernous sinus thrombosis  MRI of the brain and MRA/MRV of the head    recommended for further evaluation  Workstation performed: [id number]         XR chest portable   Final Result by Magalie Lozano MD (08/30 1821)         1  Findings consistent with congestive heart failure  2   Large right pleural effusion  3   Significant compressive atelectasis in the right lung  Underlying airspace consolidation and pneumonia not excluded  Workstation performed: [id number]             PLAN / RECOMMENDATIONS      1  CESAR  Present on admission, multifactorial and was suspected due to ATN  Upon review of old medical records, previously known baseline creatinine is around 0 9-1 1  Admission creatinine was 5 65  Patient has underlying diastolic heart failure and now on oral Lasix and found to be nonoliguric overnight and renal function has also improved to current creatinine of 2 37  Plan to monitor renal function while in the hospital     2  Azotemia  Multifactorial, overnight BUN level has improved to 75  Currently patient is asymptomatic from azotemia perspective  Monitor BUN level  3  Anemia  Multifactorial with current hemoglobin of 7 6  Patient also have underlying myeloma  Monitor hemoglobin level and consider blood transfusion if hemoglobin level drops below 7  Overall above mentioned plan was also d/w Christopher Pelletier MD  Nephrology  9/4/2020        Portions of the record may have been created with voice recognition software  Occasional wrong word or "sound a like" substitutions may have occurred due to the inherent limitations of voice recognition software  Read the chart carefully and recognize, using context, where substitutions have occurred

## 2020-09-04 NOTE — ASSESSMENT & PLAN NOTE
-has been diagnosed by oncology with multiple myeloma  -follows with [facility]  -currently WBC count and hemoglobin are stable, but hemoglobin is on the lower side at 7 6  -chronically low platelets and keep decreasing which may have a dilutional component because patient is becoming fluid overloaded    No bleeding  Holding anticoagulation due to worsening thrombocytopenia

## 2020-09-04 NOTE — ASSESSMENT & PLAN NOTE
-not on any anticoagulation  -continue Cardizem at home dose  Patient also takes metoprolol extended release 50 mg daily    Currently, tachycardia is improved

## 2020-09-04 NOTE — ASSESSMENT & PLAN NOTE
Patient presented with acute kidney injury of unclear etiology  Refraction includes multiple myeloma, light chain related ATN, fluid overload  Today, creatinine appears to be improving gradually, but slowly    Creatinine on admission was 5 6, but currently it is 2 3  Nephrology following as well

## 2020-09-04 NOTE — ASSESSMENT & PLAN NOTE
Currently, patient's mental state waxes and wanes from being agitated to being lethargic  Today, she is more interactive and I believe that she is back to her baseline  Encephalopathy was induced by acute kidney injury and hepatic failure    Currently encephalopathy is resolved     Patient has underlying dementia, but currently presents with acute encephalopathy  MRI/MRA MRV were done and ruled out cavernous venous thrombosis  Acute worsening suspected multifactorial in the settings of elevated ammonia, hyponatremia  Patient is awake, alert, oriented to self only  She is continues to be more sleepy today and is no longer aggressive or agitated   Continue lactulose to 30 g b i d  Patient had a normal bowel movement this afternoon  Continue Xifaxan  Frequently orientation  Ativan p r n  For anxiety and behavior  Follow-up with inpatient neurology and psychiatry recommendation  Family members do not want to proceed with palliative care measures at this time  Will continue monitoring her clinical improvement

## 2020-09-05 VITALS
SYSTOLIC BLOOD PRESSURE: 147 MMHG | DIASTOLIC BLOOD PRESSURE: 76 MMHG | OXYGEN SATURATION: 98 % | RESPIRATION RATE: 19 BRPM | WEIGHT: 156.97 LBS | TEMPERATURE: 97.5 F | BODY MASS INDEX: 28.89 KG/M2 | HEART RATE: 124 BPM | HEIGHT: 62 IN

## 2020-09-05 PROCEDURE — 99239 HOSP IP/OBS DSCHRG MGMT >30: CPT | Performed by: FAMILY MEDICINE

## 2020-09-05 PROCEDURE — 99233 SBSQ HOSP IP/OBS HIGH 50: CPT | Performed by: INTERNAL MEDICINE

## 2020-09-05 RX ORDER — QUETIAPINE FUMARATE 25 MG/1
25 TABLET, FILM COATED ORAL
Qty: 30 TABLET | Refills: 0 | Status: SHIPPED | OUTPATIENT
Start: 2020-09-05 | End: 2020-11-13 | Stop reason: SDUPTHER

## 2020-09-05 RX ORDER — ECHINACEA PURPUREA EXTRACT 125 MG
1 TABLET ORAL
Qty: 30 ML | Refills: 0 | Status: SHIPPED | OUTPATIENT
Start: 2020-09-05 | End: 2021-04-14

## 2020-09-05 RX ADMIN — RIFAXIMIN 550 MG: 550 TABLET ORAL at 08:17

## 2020-09-05 RX ADMIN — PANTOPRAZOLE SODIUM 40 MG: 40 TABLET, DELAYED RELEASE ORAL at 06:17

## 2020-09-05 RX ADMIN — METOPROLOL SUCCINATE 50 MG: 50 TABLET, EXTENDED RELEASE ORAL at 08:17

## 2020-09-05 RX ADMIN — DILTIAZEM HYDROCHLORIDE 240 MG: 240 CAPSULE, COATED, EXTENDED RELEASE ORAL at 08:17

## 2020-09-05 RX ADMIN — LACTULOSE 30 G: 10 SOLUTION ORAL at 08:17

## 2020-09-05 RX ADMIN — FUROSEMIDE 80 MG: 80 TABLET ORAL at 08:17

## 2020-09-05 NOTE — DISCHARGE INSTR - AVS FIRST PAGE
- home PT OT as per family's request  - check blood work in 1 week  - continue to monitor mentation   - seroquel has been started for mood swings  - continue lactulose and xifaxan  - follow up with PCP

## 2020-09-05 NOTE — ASSESSMENT & PLAN NOTE
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

## 2020-09-05 NOTE — PLAN OF CARE
Problem: Prexisting or High Potential for Compromised Skin Integrity  Goal: Skin integrity is maintained or improved  Description: INTERVENTIONS:  - Identify patients at risk for skin breakdown  - Assess and monitor skin integrity  - Assess and monitor nutrition and hydration status  - Monitor labs   - Assess for incontinence   - Turn and reposition patient  - Assist with mobility/ambulation  - Relieve pressure over bony prominences  - Avoid friction and shearing  - Provide appropriate hygiene as needed including keeping skin clean and dry  - Evaluate need for skin moisturizer/barrier cream  - Collaborate with interdisciplinary team   - Patient/family teaching  - Consider wound care consult   Outcome: Completed     Problem: Potential for Falls  Goal: Patient will remain free of falls  Description: INTERVENTIONS:  - Assess patient frequently for physical needs  -  Identify cognitive and physical deficits and behaviors that affect risk of falls  -  [place] fall precautions as indicated by assessment   - Educate patient/family on patient safety including physical limitations  - Instruct patient to call for assistance with activity based on assessment  - Modify environment to reduce risk of injury  - Consider OT/PT consult to assist with strengthening/mobility  Outcome: Completed     Problem: Nutrition/Hydration-ADULT  Goal: Nutrient/Hydration intake appropriate for improving, restoring or maintaining nutritional needs  Description: Monitor and assess patient's nutrition/hydration status for malnutrition  Collaborate with interdisciplinary team and initiate plan and interventions as ordered  Monitor patient's weight and dietary intake as ordered or per policy  Utilize nutrition screening tool and intervene as necessary  Determine patient's food preferences and provide high-protein, high-caloric foods as appropriate       INTERVENTIONS:  - Monitor oral intake, urinary output, labs, and treatment plans  - Assess nutrition and hydration status and recommend course of action  - Evaluate amount of meals eaten  - Assist patient with eating if necessary   - Allow adequate time for meals  - Recommend/ encourage appropriate diets, oral nutritional supplements, and vitamin/mineral supplements  - Order, calculate, and assess calorie counts as needed  - Recommend, monitor, and adjust tube feedings and TPN/PPN based on assessed needs  - Assess need for intravenous fluids  - Provide specific nutrition/hydration education as appropriate  - Include patient/family/caregiver in decisions related to nutrition  Outcome: Completed

## 2020-09-05 NOTE — PROGRESS NOTES
NEPHROLOGY PROGRESS NOTE    Patient: Ahsan Khan               Sex: female          DOA: 8/29/2020  6:35 PM   Date of Birth: @        Age: @        LOS:  LOS: 7 days   9/5/2020    REASON FOR THE CONSULTATION:  Further management of CESAR  HPI     This is a 69 y o  female admitted for Toxic metabolic encephalopathy     SUBJECTIVE     - patient was feeling better today  Breathing seems stable overnight  Patient denies nausea, vomiting, headache or dizziness today    - Reviewed last 24 hrs events     CURRENT MEDICATIONS       Current Facility-Administered Medications:     diltiazem (CARDIZEM CD) 24 hr capsule 240 mg, 240 mg, Oral, Daily, Isidra Tristan MD, 240 mg at 09/05/20 0817    furosemide (LASIX) tablet 80 mg, 80 mg, Oral, Daily, Inocente Leonard MD, 80 mg at 09/05/20 0817    lactulose 20 g/30 mL oral solution 30 g, 30 g, Oral, BID, Arie GRIFFIN MD, 30 g at 09/05/20 0817    metoprolol succinate (TOPROL-XL) 24 hr tablet 50 mg, 50 mg, Oral, Daily, Inocente Leonard MD, 50 mg at 09/05/20 0817    mirtazapine (REMERON) tablet 7 5 mg, 7 5 mg, Oral, HS, Isidra Tristan MD, 7 5 mg at 09/04/20 2146    nicotine (NICODERM CQ) 14 mg/24hr TD 24 hr patch 14 mg, 14 mg, Transdermal, Daily, Raman GRIFFIN MD, Stopped at 08/31/20 1004    pantoprazole (PROTONIX) EC tablet 40 mg, 40 mg, Oral, Early Morning, Isidra Tristan MD, 40 mg at 09/05/20 0617    QUEtiapine (SEROquel) tablet 25 mg, 25 mg, Oral, HS, Arie GRIFFIN MD, 25 mg at 09/04/20 2146    rifaximin (XIFAXAN) tablet 550 mg, 550 mg, Oral, Q12H [address], Isidra Tristan MD, 550 mg at 09/05/20 0817    sodium chloride (OCEAN) 0 65 % nasal spray 1 spray, 1 spray, Each Nare, Q1H PRN, Inocente Leonard MD    OBJECTIVE     Current Weight: Weight - Scale: 71 2 kg (156 lb 15 5 oz)  /76 (BP Location: Right arm)   Pulse (!) 124   Temp 97 5 °F (36 4 °C) (Axillary)   Resp 19   Ht 5' 2" (1 575 m)   Wt 71 2 kg (156 lb 15 5 oz)   SpO2 98%   BMI 28 71 kg/m²   Vitals:    09/05/20 0732   BP: 147/76   Pulse: (!) 124   Resp: 19   Temp: 97 5 °F (36 4 °C)   SpO2: 98%     Body mass index is 28 71 kg/m²  Intake/Output Summary (Last 24 hours) at 9/5/2020 1112  Last data filed at 9/4/2020 1744  Gross per 24 hour   Intake 300 ml   Output    Net 300 ml       PHYSICAL EXAMINATION     Physical Exam  Constitutional:       General: She is not in acute distress  Appearance: She is ill-appearing  HENT:      Head: Normocephalic and atraumatic  Eyes:      General: No scleral icterus  Neck:      Musculoskeletal: Neck supple  Vascular: No JVD  Cardiovascular:      Rate and Rhythm: Normal rate  Pulmonary:      Effort: No accessory muscle usage or respiratory distress  Breath sounds: No wheezing  Abdominal:      General: There is no distension  Palpations: Abdomen is soft  Musculoskeletal:      Right hand: She exhibits no laceration  Left hand: She exhibits no laceration  Skin:     General: Skin is warm  Neurological:      Mental Status: She is alert  Psychiatric:         Speech: She is communicative  Behavior: Behavior is not combative             LAB RESULTS     Results from last 7 days   Lab Units 09/04/20  0506 09/03/20  0504 09/02/20  1404 09/02/20  0545 09/01/20  1418 08/31/20  1405 08/30/20  2050 08/30/20  1256 08/29/20  1944   WBC Thousand/uL 3 22* 3 14*  --  3 63* 3 81*  --   --  4 86 4 46   HEMOGLOBIN g/dL 7 6* 7 6*  --  7 6* 8 0*  --   --  9 7* 8 8*   I STAT HEMOGLOBIN g/dl  --   --  9 2*  --   --   --   --   --   --    HEMATOCRIT % 23 8* 23 6*  --  23 0* 25 4*  --   --  31 0* 27 4*   HEMATOCRIT, ISTAT %  --   --  27*  --   --   --   --   --   --    PLATELETS Thousands/uL 47* 50*  --  53* 61*  --   --  73* 70*   SODIUM mmol/L 147* 144  --  142 139 133* 133*  --  131*   POTASSIUM mmol/L 3 8 4 2  --  4 6 5 3 5 4* 5 8*  --  6 2*   CHLORIDE mmol/L 111* 108  --  104 101 96* 96*  --  95*   CO2 mmol/L 26 23  --  22 22 19* 18*  --  21   CO2, I-STAT mmol/L --   --  21  --   --   --   --   --   --    BUN mg/dL 75* 91*  --  99* 99* 96* 96*  --  84*   CREATININE mg/dL 2 37* 3 11*  --  3 88* 4 38* 4 70* 5 44*  --  5 65*   EGFR ml/min/1 73sq m 23 16  --  13 11 10 8  --  8   CALCIUM mg/dL 9 7 9 2  --  9 0 9 1 8 9 9 2  --  8 2*   MAGNESIUM mg/dL  --   --   --   --   --   --  2 2  --  2 0   PHOSPHORUS mg/dL  --   --   --   --   --   --   --   --  7 9*   GLUCOSE, ISTAT mg/dl  --   --  115  --   --   --   --   --   --        I have personally reviewed the old medical records and patient's previously known baseline creatinine level is ~ 0 9-1 1    RADIOLOGY RESULTS      CT chest wo contrast   Final Result by Alayna Acosta MD (09/03 0116)      Moderate right effusion       Atelectasis/consolidation right lower lung may be due to compressive atelectasis however underlying infiltrates are difficult to exclude      Lobular areas of groundglass density in the left upper lobe, right middle lobe, lingular region may be due to infiltrate which could be atypical or viral or drug induced these are may be related to areas of residual congestion      Cirrhosis      Enlarged IVC with enlarged right atrium right and right ventricle, correlate with tricuspid regurgitation, right heart failure      Follow-up suggested in 6-8 weeks to demonstrate resolution   The study was marked in EPIC for significant notification  Workstation performed: [id number]         MRV head wo contrast   Final Result by  (09/05 1112)   Addendum 1 of 1 by Armando Flores MD (09/01 2046)   ADDENDUM:      The anterior portion of the superior sagittal sinus and other dural venous    sinuses are not assessed since they were not in the field-of-view  For    complete evaluation of the dural venous sinuses, consider repeat    examination if there is further concern for    dural venous thrombosis        Findings were marked as "immediate"in Epic and will now be related to the    ordering physician or covering clinical team by the radiology liaison  Final      MRA head wo contrast   Final Result by Kiana Valera DO (09/01 2025)      Examination is significantly limited by patient motion  No occlusion of the major intracranial vasculature identified  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  Recommend [facility] with [facility] a division of [address] [place] [address] Neuroscience at [phone]  Workstation performed: [id number]         MRI brain wo contrast   Final Result by  (09/05 1112)   Addendum 1 of 1 by Fredrick Pacheco MD (09/01 3031)   ADDENDUM:      Normal signal void in the superior ophthalmic vein on T1 sequence (which    would typically be seen in vessel thrombus) although the T2 sequence    demonstrates increased signal of the superior ophthalmic vein  Consider    follow-up MRA/MRV as the patient is in    renal failure and is not able to obtain CTV/CTA  I personally discussed this study with AN KARRIE on 9/1/2020 at 4:04 PM                Final      CT head wo contrast   Final Result by Gertrude Roach MD (77/92 9364)      1  No acute intracranial hemorrhage, midline shift, or mass effect  2   If there is continued clinical concern for acute infarct, further evaluation with MRI may be obtained which is more sensitive  3   Chronic small vessel ischemic changes  4   Prominence of bilateral superior ophthalmic veins again seen  Workstation performed: [id number]         CT head wo contrast   Final Result by Rhea Pacheco MD (08/30 1819)         1  No evidence of acute infarct, intracranial hemorrhage or mass effect  2   Mildly enlarged bilateral superior ophthalmic veins, new since the prior exam   No proptosis or retro-orbital inflammation  Findings could represent carotid cavernous fistula or cavernous sinus thrombosis    MRI of the brain and MRA/MRV of the head    recommended for further evaluation  Workstation performed: [id number]         XR chest portable   Final Result by Odessa Quesada MD (08/30 1821)         1  Findings consistent with congestive heart failure  2   Large right pleural effusion  3   Significant compressive atelectasis in the right lung  Underlying airspace consolidation and pneumonia not excluded  Workstation performed: [id number]             PLAN / RECOMMENDATIONS      1  CESAR  Present on admission  Multifactorial and was suspected due to ATN  Upon review of old medical records, previously known baseline creatinine was around 0 9-1 1  Admission creatinine was 5 65  Patient has underlying diastolic heart failure and now on oral Lasix  Currently breathing seems stable  Renal function was not done today but until yesterday renal function was slowly improving with creatinine level from yesterday of 2 38  Will consider checking renal function tomorrow morning if remains in the hospital     2  Azotemia  Multifactorial, BUN level has improved during the hospital stay  Last known BUN from yesterday was 75  Monitor BUN level while in the hospital     3  Anemia  Multifactorial with last known hemoglobin of 7 6  Patient also have underlying multiple myeloma  Consider blood transfusion if hemoglobin level drops below 7  Disposition:  Stable from renal standpoint for discharge  Consider checking BMP in 3-4 days upon discharge and schedule outpatient Nephrology follow-up with us in 2 weeks       Overall above mentioned plan was also d/w Mai Freire MD  Nephrology  9/5/2020        Portions of the record may have been created with voice recognition software  Occasional wrong word or "sound a like" substitutions may have occurred due to the inherent limitations of voice recognition software  Read the chart carefully and recognize, using context, where substitutions have occurred

## 2020-09-05 NOTE — ASSESSMENT & PLAN NOTE
-has underlying baseline dementia which has worsened in the last 3- 4 days  -will treat the underlying cause and reassess  Significantly improved mental state today  Patient actually told me that she feels better  Her agitation is resolved currently but it is intermittent  Will continue managing her medical issues    Appreciate Psychiatry consult

## 2020-09-05 NOTE — ASSESSMENT & PLAN NOTE
Wt Readings from Last 3 Encounters:   09/04/20 71 2 kg (156 lb 15 5 oz)   07/16/20 75 8 kg (167 lb)   06/26/20 76 2 kg (168 lb)     Echo 01/31/2020 shows EF 50-55% grade 2 dysfunction  Currently in exacerbation due to volume overload as evidenced by lower extremity edema and CT chest findings suggestive of fluid overload   -today, patient started showing signs of respiratory distress and became more lethargic    O2 saturation has dropped and she requires 2 L of oxygen  Lasix was discontinued due to acute kidney injury on admission  Will continue with 80 mg p o  (resumed)  This has been discussed and agreed with nephrology  Continue monitoring volume status with daily weights, intake and outputs

## 2020-09-05 NOTE — DISCHARGE SUMMARY
Discharge- Colleen Sabillon 1945, 69 y o  female MRN: [id number]    Unit/Bed#: -01 Encounter: [id number]    Primary Care Provider: Dea Beckham MD   Date and time admitted to hospital: 8/29/2020  6:35 PM        * Metabolic encephalopathy  Assessment & Plan  Currently, patient's mental state waxes and wanes from being agitated to being lethargic  Today, she is more interactive and I believe that she is back to her baseline  Encephalopathy was induced by acute kidney injury and hepatic failure    Currently encephalopathy is resolved     Patient has underlying dementia, but currently presents with acute encephalopathy  MRI/MRA MRV were done and ruled out cavernous venous thrombosis  Acute worsening suspected multifactorial in the settings of elevated ammonia, hyponatremia  Patient is awake, alert, oriented to self only  She is continues to be more sleepy today and is no longer aggressive or agitated   Continue lactulose to 30 g b i d  Patient had a normal bowel movement this afternoon  Continue Xifaxan  Frequently orientation  Ativan p r n  For anxiety and behavior  Follow-up with inpatient neurology and psychiatry recommendation  Family members do not want to proceed with palliative care measures at this time  Will continue monitoring her clinical improvement  Hyperkalemia  Assessment & Plan  Resolved    CESAR (acute kidney injury) [facility])  Assessment & Plan  Patient presented with acute kidney injury of unclear etiology  Refraction includes multiple myeloma, light chain related ATN, fluid overload  Today, creatinine appears to be improving gradually, but slowly  Creatinine on admission was 5 6, but currently it is 2 3  Nephrology following as well  Cleared for DC from nephrology    Hyponatremia  Assessment & Plan  Now hypernatremia  OP blood work    Monitor BMP      Acute on chronic diastolic CHF  Assessment & Plan  Wt Readings from Last 3 Encounters:   09/04/20 71 2 kg (156 lb 15 5 oz)   07/16/20 75 8 kg (167 lb)   06/26/20 76 2 kg (168 lb)     Echo 01/31/2020 shows EF 50-55% grade 2 dysfunction  Currently in exacerbation due to volume overload as evidenced by lower extremity edema and CT chest findings suggestive of fluid overload   -today, patient started showing signs of respiratory distress and became more lethargic  O2 saturation has dropped and she requires 2 L of oxygen  Lasix was discontinued due to acute kidney injury on admission  Will continue with 80 mg p o  (resumed)  This has been discussed and agreed with nephrology  Continue monitoring volume status with daily weights, intake and outputs    Paroxysmal atrial fibrillation ([facility] )  Assessment & Plan  -not on any anticoagulation  -continue Cardizem at home dose  Patient also takes metoprolol extended release 50 mg daily  Currently, tachycardia is improved      Epistaxis  Assessment & Plan  Likely due to new oxygen use  Started on nasal moisturization drops and provided with temp own to stop the bleeding  Avoid antiplatelets and anticoagulant  Multiple myeloma [facility])  Assessment & Plan  -follows with [facility]  -nephrology consulted for concern of myeloma kidney with sudden worsening of renal functions  Liver cirrhosis ([facility] )  Assessment & Plan  -improved ammonia levels  -continue lactulose and Xifaxan  -outpatient follow-up with GI    Memory difficulties  Assessment & Plan  -has underlying baseline dementia which has worsened in the last 3- 4 days  -will treat the underlying cause and reassess  Significantly improved mental state today  Patient actually told me that she feels better  Her agitation is resolved currently but it is intermittent  Will continue managing her medical issues    Appreciate Psychiatry consult                Resolved Problems  Date Reviewed: 9/4/2020    None          Admission Date:   Admission Orders (From admission, onward)     Ordered        08/29/20 9162  Inpatient Admission  Once                     Admitting Diagnosis: Hyperkalemia [E87 5]  Hyperphosphatemia [E83 39]  Cirrhosis (HCC) [K74 60]  Hyponatremia [E87 1]  Uremia [N19]  Weakness [R53 1]  Thrombocytopenia (HCC) [D69 6]  Chronic anemia [D64 9]  CESAR (acute kidney injury) ([facility] ) [N17 9]  Generalized weakness [R53 1]    HPI: Fernanda Bedolla is a 69 y o  female who presents with worsening weakness since couple weeks  Patient had outpatient blood work done which showed acute kidney injury with elevated creatinine of around 3 3  Patient came to the ER with a creatinine was found to be 5 65  According to the son who was at bedside patient has been more confused in the last couple days  Even though she has dementia at baseline she is more confused than how she usually is  Patient denies any chest pain, shortness of breath, orthopnea, nausea, vomiting, diarrhea, abdominal pain, headaches, lightheadedness, dizziness  Her history is not very reliable but her son attests to this  In the ED patient had labs done which showed elevated potassium of 6 2    Procedures Performed:   Orders Placed This Encounter   Procedures   283 "[employer] ED ECG Documentation Only       Summary of Hospital Course:  After hospitalization patient was followed by Nephrology an [facility] hospitalist service  Consultation was done by Psychiatry  For toxic metabolic encephalopathy along with underlying dementia patient underwent MRI MRA and MRV and a cavernous venous thrombosis was ruled out  Multifactorial etiologies were worsening her mentation  Her ammonia was elevated and she had associated hyponatremia  Both of which has improved at this time  Patient was continued on lactulose and Xifaxan  Frequent orientation is recommended  Neurology and psychiatry saw the patient as well  Family denies palliative care  Pancytopenia-follows with oncology [facility] for multiple myeloma  Current hemoglobin is stable but is on the lower side  Outpatient follow-up recommended  Acute kidney injury has resolved but patient has underlying CKD  AFib is rate controlled  Acute on chronic diastolic CHF on admission has now resolved  Lasix was initially held secondary to acute kidney injury but was then resumed at 80 mg oral every day  Patient continues to have some mood swings with agitation and Seroquel has been started for the same by psychiatry  Should follow-up outpatient  I called patient's son Joshua Neal today and explained to him that her mom, patient is unable to walk and is not willing to walk at this time to which is at that once she knows that she is being discharged he will start walking as much as she can  Respecting the family is decision patient is being discharged to home with home health care  Her previous home health service has discontinued the services secondary to patient's noncompliance  A new consult has been placed for [facility] at this time  They will follow the patient in the community  Patient is stable for discharge      Significant Findings, Care, Treatment and Services Provided:   CT chest wo contrast [[id number]]  Collected: 09/03/20 0850    Order Status: Completed  Updated: 09/03/20 0904    Narrative:      CT CHEST WITHOUT IV CONTRAST     INDICATION:   Respiratory illness, acute (Age => 41y)  COMPARISON:  May 6, 2020     TECHNIQUE: CT examination of the chest was performed without intravenous contrast   Axial, sagittal, and coronal 2D reformatted images were created from the source data and submitted for interpretation  Radiation dose length product (DLP) for this visit:  308 mGy-cm    This examination, like all CT scans performed in the [facility], was performed utilizing techniques to minimize radiation dose exposure, including the use of iterative   reconstruction and automated exposure control  FINDINGS:     LUNGS:  Collapse consolidation noted within the right lower lobe  Lobular areas of groundglass density noted in the subpleural region in the left lingular area, in the right middle lobe area   The trachea and central bronchial patent     PLEURA:  Moderate sized right effusion seen     HEART/GREAT VESSELS:  Cardiomegaly seen   Coronary artery calcification seen   Mitral annular calcification seen   The ascending aorta measures 3 4 cm  MEDIASTINUM AND MATT:  No significant mediastinal lymph node enlargement seen       CHEST WALL AND LOWER NECK:   A right thyroid nodule seen which measures 1 4 cm, unchanged from the previous study     VISUALIZED STRUCTURES IN THE UPPER ABDOMEN:  Nodular contour of the liver compatible with cirrhosis   Ascites seen   Enlarged IVC seen with spleen appear unremarkable   Adrenal gland appear unremarkable     OSSEOUS STRUCTURES:  Degenerative changes are seen within the thoracic spine    Impression:        Moderate right effusion     Atelectasis/consolidation right lower lung may be due to compressive atelectasis however underlying infiltrates are difficult to exclude     Lobular areas of groundglass density in the left upper lobe, right middle lobe, lingular region may be due to infiltrate which could be atypical or viral or drug induced these are may be related to areas of residual congestion     Cirrhosis     Enlarged IVC with enlarged right atrium right and right ventricle, correlate with tricuspid regurgitation, right heart failure     Follow-up suggested in 6-8 weeks to demonstrate resolution   The study was marked in EPIC for significant notification  Workstation performed: [id number]    MRV head wo contrast [[id number]]  Collected: 09/01/20 2026    Order Status: Completed  Updated: 09/01/20 2047    Addenda:        ADDENDUM:   The anterior portion of the superior sagittal sinus and other dural venous   sinuses are not assessed since they were not in the field-of-view   For   complete evaluation of the dural venous sinuses, consider repeat   examination if there is further concern for  dural venous thrombosis    Findings were marked as "immediate"in Epic and will now be related to the   ordering physician or covering clinical team by the radiology liaison     Signed: 09/01/20 2046 by Jens Hagan MD    Narrative:      MAGNETIC RESONANCE VENOGRAPHY (MRV) OF THE BRAIN       INDICATION:   Unequal pupils, encephalopathy, abnormal CT     COMPARISON:  None     IMAGE QUALITY:  Diagnostic     TECHNIQUE:   2-D coronal time-of-flight MR venography of the brain was   performed  Examination performed on 1 5T MRI system  FINDINGS:     There is a normal flow related enhancement in the dural venous sinuses and   major veins in the brain  Impression:        No evidence of dural venous sinus thrombosis  Workstation performed: [id number]    MRA head wo contrast [[id number]]  Collected: 09/01/20 2018    Order Status: Completed  Updated: 09/01/20 2026    Narrative:      MRA BRAIN WITHOUT CONTRAST     INDICATION:  Unequal pupils, encephalopathy, abnormal CT     COMPARISON:  No previous vascular imaging  TECHNIQUE:  Axial 3-D time-of-flight imaging with 3-D reconstructions performed without contrast        IV Contrast:  Not administered  FINDINGS:     IMAGE QUALITY:  Examination is significantly degraded by patient motion  ANATOMY     INTERNAL CAROTID ARTERIES:  The internal carotid arteries are patent  ANTERIOR CIRCULATION:  Normal A1 segments   Normal anterior communicating artery   Normal flow-related signal of the anterior cerebral arteries  MIDDLE CEREBRAL ARTERY CIRCULATION:  Both M1 segments are patent  Miller Revels is a small approximately 3 mm aneurysm arising from the superior aspect of the M1 bifurcation on the right       DISTAL VERTEBRAL ARTERIES:  Distal vertebral arteries are patient with a normal vertebrobasilar junction   The posterior inferior cerebellar artery origins are normal      BASILAR ARTERY:  Normal  POSTERIOR CEREBRAL ARTERIES:  Both posterior cerebral arteries arises from the basilar tip   Both arteries demonstrate normal flow-related enhancement  Impression:        Examination is significantly limited by patient motion   No occlusion of the major intracranial vasculature identified  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  Recommend consultation with the [facility], a division of  for Neuroscience at [phone]  Workstation performed: [id number]    MRI brain wo contrast [[id number]]  Collected: 08/31/20 2153    Order Status: Completed  Updated: 09/01/20 1606    Addenda:        ADDENDUM:   Normal signal void in the superior ophthalmic vein on T1 sequence (which   would typically be seen in vessel thrombus) although the T2 sequence   demonstrates increased signal of the superior ophthalmic vein  Consider   follow-up MRA/MRV as the patient is in   renal failure and is not able to obtain CTV/CTA         I personally discussed this study with RENÉ YOON on 9/1/2020 at 4:04 PM         Signed: 09/01/20 1604 by Daysi Bowling MD    Narrative:      MRI BRAIN WITHOUT CONTRAST     INDICATION: Unequal pupils, encephalopathy, abnormal CT      COMPARISON:   August 31, 2020     TECHNIQUE:  Sagittal T1, axial T2, axial FLAIR, axial T1, axial Stanley and axial diffusion imaging  IMAGE QUALITY:  Diagnostic although susceptibility artifact limits evaluation of the left posterior temporal parietal region in addition to motion artifact limitations  FINDINGS:     BRAIN PARENCHYMA: Sherice Climes is no discrete mass, mass effect or midline shift   There is no intracranial hemorrhage  Sherice Climes is no evidence of acute infarction and diffusion imaging is unremarkable   Small scattered hyperintensities on T2/FLAIR imaging are   noted in the periventricular and subcortical white matter demonstrating an appearance that is statistically most likely to represent moderate microangiopathic change        VENTRICLES:  Enlargement of ventricles and extra-axial CSF spaces, out of proportion to the patient's age most consistent with cerebral and cerebellar atrophy  SELLA AND PITUITARY GLAND:  Normal      ORBITS:  Normal      PARANASAL SINUSES:  Normal      VASCULATURE:  Evaluation of the major intracranial vasculature demonstrates appropriate flow voids  CALVARIUM AND SKULL BASE:  Normal      EXTRACRANIAL SOFT TISSUES:  Normal     Impression:          1  No MR evidence of acute ischemia  2  Volume loss/atrophy and microangiopathy  3  Prominent bilateral superior ophthalmic veins as seen on CT  Workstation performed: [id number]    CT head wo contrast [[id number]]  Collected: 08/31/20 0425    Order Status: Completed  Updated: 08/31/20 0433    Narrative:      CT BRAIN - WITHOUT CONTRAST     INDICATION:   garbled speech  Unequal pupils  COMPARISON:  August 30, 2020 at 5:41 PM      TECHNIQUE:  CT examination of the brain was performed   In addition to axial images, sagittal and coronal 2D reformatted images were created and submitted for interpretation  Radiation dose length product (DLP) for this visit:  786 mGy-cm    This examination, like all CT scans performed in the [facility], was performed utilizing techniques to minimize radiation dose exposure, including the use of iterative   reconstruction and automated exposure control        IMAGE QUALITY:  Diagnostic  FINDINGS:     PARENCHYMA: Decreased attenuation is noted in periventricular and subcortical white matter demonstrating an appearance that is statistically most likely to represent moderate microangiopathic change; this appearance is similar when compared to most   recent prior examination  No CT signs of acute infarction   No intracranial mass, mass effect or midline shift   No acute parenchymal hemorrhage       VENTRICLES AND EXTRA-AXIAL SPACES:  Normal for the patient's age  VISUALIZED ORBITS AND PARANASAL SINUSES: Prominence of the bilateral superior ophthalmic veins is again seen   Opacification of a few right-sided ethmoid air cells  CALVARIUM AND EXTRACRANIAL SOFT TISSUES:  Normal     Impression:        1   No acute intracranial hemorrhage, midline shift, or mass effect  2   If there is continued clinical concern for acute infarct, further evaluation with MRI may be obtained which is more sensitive      3   Chronic small vessel ischemic changes  4   Prominence of bilateral superior ophthalmic veins again seen  Workstation performed: [id number]    CT head wo contrast [[id number]]  Collected: 08/30/20 1806    Order Status: Completed  Updated: 08/30/20 1820    Narrative:      CT BRAIN - WITHOUT CONTRAST     INDICATION:   Altered mental status  COMPARISON:  None  TECHNIQUE:  CT examination of the brain was performed   In addition to axial images, sagittal and coronal 2D reformatted images were created and submitted for interpretation  Radiation dose length product (DLP) for this visit: [phone] mGy-cm    This examination, like all CT scans performed in the [facility], was performed utilizing techniques to minimize radiation dose exposure, including the use of iterative   reconstruction and automated exposure control        IMAGE QUALITY:  Diagnostic  FINDINGS:     PARENCHYMA:  Periventricular and subcortical hypoattenuating foci consistent with microangiopathic disease  No acute intracranial hemorrhage or mass effect  VENTRICLES AND EXTRA-AXIAL SPACES:  No hydrocephalus or extra-axial collection  VISUALIZED ORBITS AND PARANASAL SINUSES:  Mildly enlarged bilateral superior ophthalmic veins, new since the prior exam   No proptosis or retro-orbital inflammation No paranasal sinus disease  CALVARIUM AND EXTRACRANIAL SOFT TISSUES:  No lytic or blastic lesion or soft tissue mass      Impression:          1   No evidence of acute infarct, intracranial hemorrhage or mass effect      2   Mildly enlarged bilateral superior ophthalmic veins, new since the prior exam   No proptosis or retro-orbital inflammation   Findings could represent carotid cavernous fistula or cavernous sinus thrombosis   MRI of the brain and MRA/MRV of the head   recommended for further evaluation  Workstation performed: [id number]    XR chest portable [[id number]]  Collected: 08/30/20 1820    Order Status: Completed  Updated: 08/30/20 1822    Narrative:      CHEST     INDICATION:   Shortness of breath  COMPARISON:  5/10/2020     EXAM PERFORMED/VIEWS:  HL CHEST PORTABLE       FINDINGS:     Stable cardiomegaly  Large right pleural effusion   Right apical pleural scarring  Pulmonary vascular congestion and minimal interstitial edema   Significant compressive atelectasis in the right lung or possible underlying airspace consolidation   No pneumothorax  Diffuse osteopenia  Impression:          1   Findings consistent with congestive heart failure  2   Large right pleural effusion  3   Significant compressive atelectasis in the right lung   Underlying airspace consolidation and pneumonia not excluded  Complications:  None    Condition at Discharge: fair     General- Awake, alert and oriented x 3, looks comfortable  HEENT- Normocephalic, atraumatic, oral mucosa- moist  Neck- Supple, No carotid bruit, no JVD  CVS- Normal S1/ S2, Regular rate and rhythm, No murmur, bilateral 2+ pedal edemaRespiratory system- slightly reduced air the right base Abdomen- Soft, Non distended, no tenderness, Bowel sound- present 4 quads  Genitourinary- No suprapubic tenderness, No CVA tenderness  Skin- No new bruise or rash  Musculoskeletal- No gross deformity  Psych- waxing and waning mentation    There are periods of delirium/agitation  , CNS- CN II- XII grossly intact, No acute focal neurologic deficit noted      Discharge instructions/Information to patient and family:   See after visit summary for information provided to patient and family  Provisions for Follow-Up Care:  See after visit summary for information related to follow-up care and any pertinent home health orders  PCP: Brandt Ang MD    Disposition: Home with home health    Planned Readmission: No    Discharge Statement   I spent 45 minutes discharging the patient  This time was spent on the day of discharge  I had direct contact with the patient on the day of discharge  Additional documentation is required if more than 30 minutes were spent on discharge  Discharge Medications:  See after visit summary for reconciled discharge medications provided to patient and family

## 2020-09-05 NOTE — ASSESSMENT & PLAN NOTE
Patient presented with acute kidney injury of unclear etiology  Refraction includes multiple myeloma, light chain related ATN, fluid overload  Today, creatinine appears to be improving gradually, but slowly    Creatinine on admission was 5 6, but currently it is 2 3  Nephrology following as well  Cleared for DC from nephrology

## 2020-09-05 NOTE — CASE MANAGEMENT
CM discussed pt in care coordination rounds  Family declines STR  Pt has 24/7 care from family  Pt needs Home PT  [facility] declined services  [facility] given referral per choice  Family to transport to home today  A post acute care recommendation was made by your care team for [facility]  Discussed Freedom of Choice with caregiver  List of agencies given to caregiver via in person  caregiver aware the list is custom filtered for them by preference  and that [facility] post acute providers are designated  [address] declined services, unable to cover area due to staffing  [facility] referral sent for Home care services  Await response  A post acute care recommendation was made by your care team for [facility]  Discussed Freedom of Choice with caregiver  List of agencies given to caregiver via in person  caregiver aware the list is custom filtered for them by preference  and that [facility] post acute providers are designated  [facility] will service pt 9/11,  Brigitte Khalil aware and states that will be acceptable

## 2020-09-08 ENCOUNTER — TRANSITIONAL CARE MANAGEMENT (OUTPATIENT)
Dept: INTERNAL MEDICINE CLINIC | Facility: CLINIC | Age: 75
End: 2020-09-08

## 2020-09-08 ENCOUNTER — TELEPHONE (OUTPATIENT)
Dept: INTERNAL MEDICINE CLINIC | Facility: CLINIC | Age: 75
End: 2020-09-08

## 2020-09-08 NOTE — PROGRESS NOTES
1st attempt-LVM asking pt to return call for TCM documentation and TCM appointment        By Madelyn Reddy

## 2020-09-08 NOTE — TELEPHONE ENCOUNTER
Patients sister called to schedule TCM, I made an OVL for 9/17-if Elouise Nyhan needs to change to TCM and call and ask patient questions    Call back # [phone]

## 2020-09-10 ENCOUNTER — TELEPHONE (OUTPATIENT)
Dept: INTERNAL MEDICINE CLINIC | Facility: CLINIC | Age: 75
End: 2020-09-10

## 2020-09-10 LAB
ATRIAL RATE: 250 BPM
QRS AXIS: 0 DEGREES
QRSD INTERVAL: 16 MS
QT INTERVAL: 220 MS
QTC INTERVAL: 304 MS
T WAVE AXIS: 238 DEGREES
VENTRICULAR RATE: 115 BPM

## 2020-09-10 PROCEDURE — 93010 ELECTROCARDIOGRAM REPORT: CPT | Performed by: INTERNAL MEDICINE

## 2020-09-10 NOTE — TELEPHONE ENCOUNTER
Spoke w/Cinthia, pt's daughter  She is requesting an order for a rolling walker and wheelchair for pt, as she is too weak to ambulate, that at this point, pt cannot even ambulate to the car    If ordered, please have sent to [facility]

## 2020-09-15 ENCOUNTER — TELEPHONE (OUTPATIENT)
Dept: NEPHROLOGY | Facility: CLINIC | Age: 75
End: 2020-09-15

## 2020-09-17 ENCOUNTER — OFFICE VISIT (OUTPATIENT)
Dept: INTERNAL MEDICINE CLINIC | Facility: CLINIC | Age: 75
End: 2020-09-17
Payer: MEDICARE

## 2020-09-17 VITALS
HEART RATE: 86 BPM | WEIGHT: 179 LBS | HEIGHT: 62 IN | DIASTOLIC BLOOD PRESSURE: 58 MMHG | BODY MASS INDEX: 32.94 KG/M2 | TEMPERATURE: 98 F | SYSTOLIC BLOOD PRESSURE: 108 MMHG | OXYGEN SATURATION: 95 %

## 2020-09-17 DIAGNOSIS — K70.30 ALCOHOLIC CIRRHOSIS OF LIVER WITHOUT ASCITES (HCC): ICD-10-CM

## 2020-09-17 DIAGNOSIS — Z23 NEED FOR VACCINATION: ICD-10-CM

## 2020-09-17 DIAGNOSIS — N17.0 ACUTE RENAL FAILURE WITH ACUTE TUBULAR NECROSIS SUPERIMPOSED ON STAGE 3 CHRONIC KIDNEY DISEASE (HCC): ICD-10-CM

## 2020-09-17 DIAGNOSIS — F10.27 DEMENTIA ASSOCIATED WITH ALCOHOLISM WITHOUT BEHAVIORAL DISTURBANCE (HCC): ICD-10-CM

## 2020-09-17 DIAGNOSIS — R26.9 GAIT DISTURBANCE: ICD-10-CM

## 2020-09-17 DIAGNOSIS — N18.30 ACUTE RENAL FAILURE WITH ACUTE TUBULAR NECROSIS SUPERIMPOSED ON STAGE 3 CHRONIC KIDNEY DISEASE (HCC): ICD-10-CM

## 2020-09-17 DIAGNOSIS — C90.01 MULTIPLE MYELOMA IN REMISSION (HCC): ICD-10-CM

## 2020-09-17 DIAGNOSIS — G92.8 TOXIC METABOLIC ENCEPHALOPATHY: Primary | ICD-10-CM

## 2020-09-17 PROBLEM — R77.8 ELEVATED TROPONIN: Status: RESOLVED | Noted: 2020-01-31 | Resolved: 2020-09-17

## 2020-09-17 PROBLEM — E80.6 HYPERBILIRUBINEMIA: Status: RESOLVED | Noted: 2020-05-14 | Resolved: 2020-09-17

## 2020-09-17 PROBLEM — I27.20 PULMONARY HYPERTENSION (HCC): Status: ACTIVE | Noted: 2020-08-04

## 2020-09-17 PROBLEM — J18.9 PNEUMONIA: Status: RESOLVED | Noted: 2020-05-06 | Resolved: 2020-09-17

## 2020-09-17 PROBLEM — N18.9 ACUTE RENAL FAILURE WITH ACUTE TUBULAR NECROSIS SUPERIMPOSED ON CHRONIC KIDNEY DISEASE (HCC): Status: ACTIVE | Noted: 2020-08-04

## 2020-09-17 PROBLEM — D69.6 THROMBOCYTOPENIA (HCC): Status: ACTIVE | Noted: 2020-08-04

## 2020-09-17 PROBLEM — E79.0 HYPERURICEMIA: Status: ACTIVE | Noted: 2020-08-28

## 2020-09-17 PROCEDURE — 99495 TRANSJ CARE MGMT MOD F2F 14D: CPT | Performed by: INTERNAL MEDICINE

## 2020-09-17 PROCEDURE — G0009 ADMIN PNEUMOCOCCAL VACCINE: HCPCS | Performed by: INTERNAL MEDICINE

## 2020-09-17 PROCEDURE — 90732 PPSV23 VACC 2 YRS+ SUBQ/IM: CPT | Performed by: INTERNAL MEDICINE

## 2020-09-17 NOTE — PROGRESS NOTES
Assessment/Plan:     No problem-specific Assessment & Plan notes found for this encounter  Diagnoses and all orders for this visit:    Metabolic encephalopathy    She feels better now  Acute renal failure with acute tubular necrosis superimposed on stage 3 chronic kidney disease ([facility] )  -     Ambulatory referral to Nephrology; Future    Dementia associated with alcoholism without behavioral disturbance ([facility] )  -     Ambulatory Referral to Home Health; Future    Gait disturbance  -     Ambulatory Referral to Home Health; Future    Alcoholic cirrhosis of liver without ascites ([facility] )  -     Ambulatory Referral to Home Health; Future   continue lactulose and follow-up with GI  Multiple myeloma in remission [facility])  -     Ambulatory Referral to [address] [place] [address] [facility]; Future    Follow-up with oncology  Need for vaccination  -     PNEUMOCOCCAL POLYSACCHARIDE VACCINE 23-VALENT =>1YO SQ IM         Subjective:     Patient ID: Colleen Sabillon is a 69 y o  female  HPI   patient was recently in the hospital with metabolic encephalopathy secondary to the cirrhosis and the acute renal failure  She was treated and now feels better  She will be followed up by home health from [facility]  The son is the POA and he refused palliative care for his mother and wants to do everything to keep her alive  She is following up with GI, Nephrology and Oncology  Review of Systems   Constitutional: Positive for fatigue  Negative for chills, diaphoresis and fever  HENT: Negative for congestion, ear discharge, ear pain, hearing loss, postnasal drip, rhinorrhea, sinus pressure, sinus pain, sneezing, sore throat and voice change  Eyes: Negative for pain, discharge, redness and visual disturbance  Respiratory: Negative for cough, chest tightness, shortness of breath and wheezing  Cardiovascular: Positive for leg swelling  Negative for chest pain and palpitations     Gastrointestinal: Negative for abdominal distention, abdominal pain, blood in stool, constipation, diarrhea, nausea and vomiting  Endocrine: Negative for cold intolerance, heat intolerance, polydipsia, polyphagia and polyuria  Genitourinary: Negative for dysuria, flank pain, frequency, hematuria and urgency  Musculoskeletal: Positive for gait problem  Negative for arthralgias, back pain, joint swelling, myalgias, neck pain and neck stiffness  Skin: Negative for rash  Neurological: Negative for dizziness, tremors, syncope, facial asymmetry, speech difficulty, weakness, light-headedness, numbness and headaches  Hematological: Does not bruise/bleed easily  Psychiatric/Behavioral: Positive for agitation, confusion, decreased concentration and hallucinations  Negative for behavioral problems and sleep disturbance  The patient is not nervous/anxious  Objective:     Physical Exam  Constitutional:       General: She is not in acute distress  Appearance: She is well-developed  She is not diaphoretic  HENT:      Head: Normocephalic and atraumatic  Right Ear: External ear normal       Left Ear: External ear normal       Nose: Nose normal    Eyes:      General: No scleral icterus  Right eye: No discharge  Left eye: No discharge  Conjunctiva/sclera: Conjunctivae normal    Neck:      Musculoskeletal: Normal range of motion and neck supple  Thyroid: No thyromegaly  Vascular: No JVD  Trachea: No tracheal deviation  Cardiovascular:      Rate and Rhythm: Normal rate and regular rhythm  Heart sounds: Normal heart sounds  No murmur  No friction rub  No gallop  Pulmonary:      Effort: Pulmonary effort is normal  No respiratory distress  Breath sounds: Normal breath sounds  No wheezing or rales  Chest:      Chest wall: No tenderness  Abdominal:      General: Bowel sounds are normal  There is no distension  Palpations: Abdomen is soft  Tenderness: There is no abdominal tenderness   There is no guarding or rebound  Musculoskeletal: Normal range of motion  General: No tenderness  Right lower leg: Edema present  Left lower leg: Edema present  Lymphadenopathy:      Cervical: No cervical adenopathy  Skin:     General: Skin is warm and dry  Findings: No erythema or rash  Neurological:      Mental Status: She is alert  Cranial Nerves: No cranial nerve deficit  Motor: No abnormal muscle tone  Coordination: Coordination normal    Psychiatric:      Comments: Has dementia           Vitals:    09/17/20 1508   BP: 108/58   BP Location: Left arm   Patient Position: Sitting   Cuff Size: Standard   Pulse: 86   Temp: 98 °F (36 7 °C)   TempSrc: Temporal   SpO2: 95%   Weight: 81 2 kg (179 lb)   Height: 5' 2" (1 575 m)       Transitional Care Management Review:  Douglas Barnett is a 69 y o  female here for TCM follow up  During the TCM phone call patient stated:    TCM Call (since 8/17/2020)     Date and time call was made  9/9/2020  9:52 AM    Hospital care reviewed  Records reviewed    Patient was hospitialized at  [facility]    Date of Admission  08/29/20    Date of discharge  09/05/20    Diagnosis  weakness=Metabolic encephalopathy    Disposition  Home; Home health services    Were the patients medications reviewed and updated  No    Current Symptoms  None; Weakness    Weakness severity  Moderate; Severe      TCM Call (since 8/17/2020)     Post hospital issues  None    Scheduled for follow up?   Yes    Do you need help managing your prescriptions or medications  No    Is transportation to your appointment needed  No    I have advised the patient to call PCP with any new or worsening symptoms  Zain Reyna LPN    Are you recieving any outpatient services  Yes    What type of services  labs    Are you recieving home care services  Yes    Types of home care services  Home PT    Interperter language line needed  No    Counseling  Family    Counseling topics  Importance of RX compliance          Kecia Styles MD

## 2020-09-17 NOTE — LETTER
To whom it may concern        Migdalia Shields is under my professional care  She suffers from multiple myeloma, cirrhosis, dementia and ambulatory dysfunction  It will be very helpful for her if she could have rails in the bathroom in order to assist her in navigating herself in the bathroom and to prevent falls          Please call me with any questions      Alberto Ventura MD

## 2020-09-19 DIAGNOSIS — I10 BENIGN ESSENTIAL HYPERTENSION: ICD-10-CM

## 2020-09-19 RX ORDER — DILTIAZEM HYDROCHLORIDE 240 MG/1
CAPSULE, COATED, EXTENDED RELEASE ORAL
Qty: 30 CAPSULE | Refills: 0 | Status: SHIPPED | OUTPATIENT
Start: 2020-09-19 | End: 2020-11-07

## 2020-09-28 ENCOUNTER — HOSPITAL ENCOUNTER (INPATIENT)
Facility: HOSPITAL | Age: 75
LOS: 9 days | Discharge: HOME HEALTH | DRG: 441 | End: 2020-10-08
Attending: EMERGENCY MEDICINE | Admitting: STUDENT IN AN ORGANIZED HEALTH CARE EDUCATION/TRAINING PROGRAM
Payer: MEDICARE

## 2020-09-28 ENCOUNTER — APPOINTMENT (EMERGENCY)
Dept: CT IMAGING | Facility: HOSPITAL | Age: 75
DRG: 441 | End: 2020-09-28
Payer: MEDICARE

## 2020-09-28 ENCOUNTER — APPOINTMENT (EMERGENCY)
Dept: RADIOLOGY | Facility: HOSPITAL | Age: 75
DRG: 441 | End: 2020-09-28
Payer: MEDICARE

## 2020-09-28 DIAGNOSIS — K21.9 GERD (GASTROESOPHAGEAL REFLUX DISEASE): ICD-10-CM

## 2020-09-28 DIAGNOSIS — N17.9 AKI (ACUTE KIDNEY INJURY) (HCC): ICD-10-CM

## 2020-09-28 DIAGNOSIS — R06.00 DYSPNEA: ICD-10-CM

## 2020-09-28 DIAGNOSIS — N17.0: ICD-10-CM

## 2020-09-28 DIAGNOSIS — C90.01 MULTIPLE MYELOMA IN REMISSION (HCC): ICD-10-CM

## 2020-09-28 DIAGNOSIS — E87.1 HYPONATREMIA: Primary | ICD-10-CM

## 2020-09-28 DIAGNOSIS — R18.8 ASCITES: ICD-10-CM

## 2020-09-28 DIAGNOSIS — D70.9 NEUTROPENIA, UNSPECIFIED TYPE (HCC): ICD-10-CM

## 2020-09-28 DIAGNOSIS — J96.01 ACUTE RESPIRATORY FAILURE WITH HYPOXIA (HCC): ICD-10-CM

## 2020-09-28 DIAGNOSIS — K70.31 ALCOHOLIC CIRRHOSIS OF LIVER WITH ASCITES (HCC): ICD-10-CM

## 2020-09-28 DIAGNOSIS — I50.32 CHRONIC DIASTOLIC CHF (CONGESTIVE HEART FAILURE) (HCC): ICD-10-CM

## 2020-09-28 DIAGNOSIS — D64.9 ANEMIA, UNSPECIFIED TYPE: ICD-10-CM

## 2020-09-28 DIAGNOSIS — R21 RASH: ICD-10-CM

## 2020-09-28 DIAGNOSIS — N18.9: ICD-10-CM

## 2020-09-28 DIAGNOSIS — I47.1 PAROXYSMAL SVT (SUPRAVENTRICULAR TACHYCARDIA) (HCC): ICD-10-CM

## 2020-09-28 DIAGNOSIS — R45.1 AGITATION: ICD-10-CM

## 2020-09-28 LAB
ALBUMIN SERPL BCP-MCNC: 3.2 G/DL (ref 3.5–5)
ALP SERPL-CCNC: 290 U/L (ref 46–116)
ALT SERPL W P-5'-P-CCNC: 20 U/L (ref 12–78)
ANION GAP SERPL CALCULATED.3IONS-SCNC: 9 MMOL/L (ref 4–13)
APTT PPP: 34 SECONDS (ref 23–37)
AST SERPL W P-5'-P-CCNC: 36 U/L (ref 5–45)
BASOPHILS # BLD AUTO: 0.02 THOUSANDS/ΜL (ref 0–0.1)
BASOPHILS NFR BLD AUTO: 1 % (ref 0–1)
BILIRUB SERPL-MCNC: 2 MG/DL (ref 0.2–1)
BUN SERPL-MCNC: 42 MG/DL (ref 5–25)
CALCIUM ALBUM COR SERPL-MCNC: 9.6 MG/DL (ref 8.3–10.1)
CALCIUM SERPL-MCNC: 9 MG/DL (ref 8.3–10.1)
CHLORIDE SERPL-SCNC: 90 MMOL/L (ref 100–108)
CO2 SERPL-SCNC: 24 MMOL/L (ref 21–32)
CREAT SERPL-MCNC: 3.94 MG/DL (ref 0.6–1.3)
EOSINOPHIL # BLD AUTO: 0.02 THOUSAND/ΜL (ref 0–0.61)
EOSINOPHIL NFR BLD AUTO: 1 % (ref 0–6)
ERYTHROCYTE [DISTWIDTH] IN BLOOD BY AUTOMATED COUNT: 20.6 % (ref 11.6–15.1)
GFR SERPL CREATININE-BSD FRML MDRD: 12 ML/MIN/1.73SQ M
GLUCOSE SERPL-MCNC: 136 MG/DL (ref 65–140)
HCT VFR BLD AUTO: 23.1 % (ref 34.8–46.1)
HGB BLD-MCNC: 7.6 G/DL (ref 11.5–15.4)
INR PPP: 1.18 (ref 0.84–1.19)
LYMPHOCYTES # BLD AUTO: 0.75 THOUSANDS/ΜL (ref 0.6–4.47)
LYMPHOCYTES NFR BLD AUTO: 26 % (ref 14–44)
MCH RBC QN AUTO: 27.5 PG (ref 26.8–34.3)
MCHC RBC AUTO-ENTMCNC: 32.9 G/DL (ref 31.4–37.4)
MCV RBC AUTO: 84 FL (ref 82–98)
MONOCYTES # BLD AUTO: 0.39 THOUSAND/ΜL (ref 0.17–1.22)
MONOCYTES NFR BLD AUTO: 14 % (ref 4–12)
NEUTROPHILS # BLD AUTO: 1.68 THOUSANDS/ΜL (ref 1.85–7.62)
NEUTS SEG NFR BLD AUTO: 58 % (ref 43–75)
NT-PROBNP SERPL-MCNC: 3039 PG/ML
PLATELET # BLD AUTO: 93 THOUSANDS/UL (ref 149–390)
PMV BLD AUTO: 8.9 FL (ref 8.9–12.7)
POTASSIUM SERPL-SCNC: 4.7 MMOL/L (ref 3.5–5.3)
PROT SERPL-MCNC: 6.5 G/DL (ref 6.4–8.2)
PROTHROMBIN TIME: 15.2 SECONDS (ref 11.6–14.5)
RBC # BLD AUTO: 2.76 MILLION/UL (ref 3.81–5.12)
SODIUM SERPL-SCNC: 123 MMOL/L (ref 136–145)
TROPONIN I SERPL-MCNC: <0.02 NG/ML
WBC # BLD AUTO: 2.86 THOUSAND/UL (ref 4.31–10.16)

## 2020-09-28 PROCEDURE — 85730 THROMBOPLASTIN TIME PARTIAL: CPT | Performed by: EMERGENCY MEDICINE

## 2020-09-28 PROCEDURE — 80053 COMPREHEN METABOLIC PANEL: CPT | Performed by: EMERGENCY MEDICINE

## 2020-09-28 PROCEDURE — 71045 X-RAY EXAM CHEST 1 VIEW: CPT

## 2020-09-28 PROCEDURE — 96360 HYDRATION IV INFUSION INIT: CPT

## 2020-09-28 PROCEDURE — 84484 ASSAY OF TROPONIN QUANT: CPT | Performed by: EMERGENCY MEDICINE

## 2020-09-28 PROCEDURE — 36415 COLL VENOUS BLD VENIPUNCTURE: CPT | Performed by: EMERGENCY MEDICINE

## 2020-09-28 PROCEDURE — 93005 ELECTROCARDIOGRAM TRACING: CPT

## 2020-09-28 PROCEDURE — 74176 CT ABD & PELVIS W/O CONTRAST: CPT

## 2020-09-28 PROCEDURE — 83880 ASSAY OF NATRIURETIC PEPTIDE: CPT | Performed by: EMERGENCY MEDICINE

## 2020-09-28 PROCEDURE — 99285 EMERGENCY DEPT VISIT HI MDM: CPT

## 2020-09-28 PROCEDURE — 85025 COMPLETE CBC W/AUTO DIFF WBC: CPT | Performed by: EMERGENCY MEDICINE

## 2020-09-28 PROCEDURE — 99285 EMERGENCY DEPT VISIT HI MDM: CPT | Performed by: EMERGENCY MEDICINE

## 2020-09-28 PROCEDURE — 85610 PROTHROMBIN TIME: CPT | Performed by: EMERGENCY MEDICINE

## 2020-09-28 PROCEDURE — G1004 CDSM NDSC: HCPCS

## 2020-09-28 PROCEDURE — 87635 SARS-COV-2 COVID-19 AMP PRB: CPT | Performed by: EMERGENCY MEDICINE

## 2020-09-28 PROCEDURE — 71250 CT THORAX DX C-: CPT

## 2020-09-28 RX ADMIN — SODIUM CHLORIDE 500 ML: 0.9 INJECTION, SOLUTION INTRAVENOUS at 22:57

## 2020-09-29 PROBLEM — R18.8 ASCITES: Status: ACTIVE | Noted: 2020-09-29

## 2020-09-29 PROBLEM — N18.4 CHRONIC KIDNEY DISEASE (CKD), ACTIVE MEDICAL MANAGEMENT WITHOUT DIALYSIS, STAGE 4 (SEVERE) (HCC): Status: ACTIVE | Noted: 2020-09-29

## 2020-09-29 LAB
AMMONIA PLAS-SCNC: 91 UMOL/L (ref 11–35)
ANION GAP SERPL CALCULATED.3IONS-SCNC: 8 MMOL/L (ref 4–13)
ANION GAP SERPL CALCULATED.3IONS-SCNC: 9 MMOL/L (ref 4–13)
ANION GAP SERPL CALCULATED.3IONS-SCNC: 9 MMOL/L (ref 4–13)
ATRIAL RATE: 72 BPM
BACTERIA UR QL AUTO: ABNORMAL /HPF
BASOPHILS # BLD AUTO: 0.02 THOUSANDS/ΜL (ref 0–0.1)
BASOPHILS NFR BLD AUTO: 1 % (ref 0–1)
BILIRUB UR QL STRIP: NEGATIVE
BUN SERPL-MCNC: 42 MG/DL (ref 5–25)
BUN SERPL-MCNC: 43 MG/DL (ref 5–25)
BUN SERPL-MCNC: 44 MG/DL (ref 5–25)
CALCIUM SERPL-MCNC: 8.8 MG/DL (ref 8.3–10.1)
CALCIUM SERPL-MCNC: 8.9 MG/DL (ref 8.3–10.1)
CALCIUM SERPL-MCNC: 8.9 MG/DL (ref 8.3–10.1)
CHLORIDE SERPL-SCNC: 91 MMOL/L (ref 100–108)
CHLORIDE SERPL-SCNC: 92 MMOL/L (ref 100–108)
CHLORIDE SERPL-SCNC: 92 MMOL/L (ref 100–108)
CLARITY UR: ABNORMAL
CO2 SERPL-SCNC: 25 MMOL/L (ref 21–32)
CO2 SERPL-SCNC: 25 MMOL/L (ref 21–32)
CO2 SERPL-SCNC: 26 MMOL/L (ref 21–32)
COLOR UR: YELLOW
CREAT SERPL-MCNC: 3.71 MG/DL (ref 0.6–1.3)
CREAT SERPL-MCNC: 3.71 MG/DL (ref 0.6–1.3)
CREAT SERPL-MCNC: 3.78 MG/DL (ref 0.6–1.3)
CREAT UR-MCNC: 151 MG/DL
EOSINOPHIL # BLD AUTO: 0.02 THOUSAND/ΜL (ref 0–0.61)
EOSINOPHIL NFR BLD AUTO: 1 % (ref 0–6)
ERYTHROCYTE [DISTWIDTH] IN BLOOD BY AUTOMATED COUNT: 21.4 % (ref 11.6–15.1)
ETHANOL SERPL-MCNC: <3 MG/DL (ref 0–3)
GFR SERPL CREATININE-BSD FRML MDRD: 13 ML/MIN/1.73SQ M
GLUCOSE SERPL-MCNC: 101 MG/DL (ref 65–140)
GLUCOSE SERPL-MCNC: 113 MG/DL (ref 65–140)
GLUCOSE SERPL-MCNC: 148 MG/DL (ref 65–140)
GLUCOSE UR STRIP-MCNC: NEGATIVE MG/DL
HCT VFR BLD AUTO: 22.6 % (ref 34.8–46.1)
HGB BLD-MCNC: 7 G/DL (ref 11.5–15.4)
HGB UR QL STRIP.AUTO: ABNORMAL
HYALINE CASTS #/AREA URNS LPF: ABNORMAL /LPF
IMM GRANULOCYTES # BLD AUTO: 0.02 THOUSAND/UL (ref 0–0.2)
IMM GRANULOCYTES NFR BLD AUTO: 1 % (ref 0–2)
KETONES UR STRIP-MCNC: NEGATIVE MG/DL
LACTATE SERPL-SCNC: 1.1 MMOL/L (ref 0.5–2)
LEUKOCYTE ESTERASE UR QL STRIP: ABNORMAL
LYMPHOCYTES # BLD AUTO: 0.51 THOUSANDS/ΜL (ref 0.6–4.47)
LYMPHOCYTES NFR BLD AUTO: 19 % (ref 14–44)
MCH RBC QN AUTO: 27.6 PG (ref 26.8–34.3)
MCHC RBC AUTO-ENTMCNC: 31 G/DL (ref 31.4–37.4)
MCV RBC AUTO: 89 FL (ref 82–98)
MICROALBUMIN UR-MCNC: 114 MG/L (ref 0–20)
MICROALBUMIN/CREAT 24H UR: 75 MG/G CREATININE (ref 0–30)
MONOCYTES # BLD AUTO: 0.36 THOUSAND/ΜL (ref 0.17–1.22)
MONOCYTES NFR BLD AUTO: 13 % (ref 4–12)
NEUTROPHILS # BLD AUTO: 1.77 THOUSANDS/ΜL (ref 1.85–7.62)
NEUTS SEG NFR BLD AUTO: 65 % (ref 43–75)
NITRITE UR QL STRIP: NEGATIVE
NON-SQ EPI CELLS URNS QL MICRO: ABNORMAL /HPF
NRBC BLD AUTO-RTO: 2 /100 WBCS
OSMOLALITY UR: 230 MMOL/KG
OSMOLALITY UR: 232 MMOL/KG
OTHER STN SPEC: ABNORMAL
PH UR STRIP.AUTO: 5.5 [PH]
PLATELET # BLD AUTO: 92 THOUSANDS/UL (ref 149–390)
PMV BLD AUTO: 10.8 FL (ref 8.9–12.7)
POTASSIUM SERPL-SCNC: 4.3 MMOL/L (ref 3.5–5.3)
POTASSIUM SERPL-SCNC: 4.4 MMOL/L (ref 3.5–5.3)
POTASSIUM SERPL-SCNC: 4.6 MMOL/L (ref 3.5–5.3)
PROT UR STRIP-MCNC: ABNORMAL MG/DL
QRS AXIS: 97 DEGREES
QRSD INTERVAL: 70 MS
QT INTERVAL: 364 MS
QTC INTERVAL: 445 MS
RBC # BLD AUTO: 2.54 MILLION/UL (ref 3.81–5.12)
RBC #/AREA URNS AUTO: ABNORMAL /HPF
SARS-COV-2 RNA RESP QL NAA+PROBE: NEGATIVE
SODIUM 24H UR-SCNC: 8 MOL/L
SODIUM 24H UR-SCNC: 9 MOL/L
SODIUM SERPL-SCNC: 125 MMOL/L (ref 136–145)
SODIUM SERPL-SCNC: 126 MMOL/L (ref 136–145)
SODIUM SERPL-SCNC: 126 MMOL/L (ref 136–145)
SP GR UR STRIP.AUTO: 1.01 (ref 1–1.03)
T WAVE AXIS: 65 DEGREES
UROBILINOGEN UR QL STRIP.AUTO: 0.2 E.U./DL
UUN 24H UR-MCNC: 301 MG/DL
VENTRICULAR RATE: 90 BPM
WBC # BLD AUTO: 2.7 THOUSAND/UL (ref 4.31–10.16)
WBC #/AREA URNS AUTO: ABNORMAL /HPF

## 2020-09-29 PROCEDURE — 84540 ASSAY OF URINE/UREA-N: CPT | Performed by: INTERNAL MEDICINE

## 2020-09-29 PROCEDURE — 94760 N-INVAS EAR/PLS OXIMETRY 1: CPT

## 2020-09-29 PROCEDURE — 81001 URINALYSIS AUTO W/SCOPE: CPT | Performed by: INTERNAL MEDICINE

## 2020-09-29 PROCEDURE — 82570 ASSAY OF URINE CREATININE: CPT | Performed by: INTERNAL MEDICINE

## 2020-09-29 PROCEDURE — 80048 BASIC METABOLIC PNL TOTAL CA: CPT | Performed by: PHYSICIAN ASSISTANT

## 2020-09-29 PROCEDURE — 84300 ASSAY OF URINE SODIUM: CPT | Performed by: INTERNAL MEDICINE

## 2020-09-29 PROCEDURE — 85025 COMPLETE CBC W/AUTO DIFF WBC: CPT | Performed by: PHYSICIAN ASSISTANT

## 2020-09-29 PROCEDURE — 83935 ASSAY OF URINE OSMOLALITY: CPT | Performed by: PHYSICIAN ASSISTANT

## 2020-09-29 PROCEDURE — 83935 ASSAY OF URINE OSMOLALITY: CPT | Performed by: INTERNAL MEDICINE

## 2020-09-29 PROCEDURE — 93010 ELECTROCARDIOGRAM REPORT: CPT | Performed by: INTERNAL MEDICINE

## 2020-09-29 PROCEDURE — 94640 AIRWAY INHALATION TREATMENT: CPT

## 2020-09-29 PROCEDURE — 80320 DRUG SCREEN QUANTALCOHOLS: CPT | Performed by: PHYSICIAN ASSISTANT

## 2020-09-29 PROCEDURE — 84300 ASSAY OF URINE SODIUM: CPT | Performed by: PHYSICIAN ASSISTANT

## 2020-09-29 PROCEDURE — 82140 ASSAY OF AMMONIA: CPT | Performed by: PHYSICIAN ASSISTANT

## 2020-09-29 PROCEDURE — 82043 UR ALBUMIN QUANTITATIVE: CPT | Performed by: INTERNAL MEDICINE

## 2020-09-29 PROCEDURE — 99223 1ST HOSP IP/OBS HIGH 75: CPT | Performed by: INTERNAL MEDICINE

## 2020-09-29 PROCEDURE — 99223 1ST HOSP IP/OBS HIGH 75: CPT | Performed by: PHYSICIAN ASSISTANT

## 2020-09-29 PROCEDURE — 83605 ASSAY OF LACTIC ACID: CPT | Performed by: PHYSICIAN ASSISTANT

## 2020-09-29 PROCEDURE — 94762 N-INVAS EAR/PLS OXIMTRY CONT: CPT

## 2020-09-29 RX ORDER — DILTIAZEM HYDROCHLORIDE 240 MG/1
240 CAPSULE, COATED, EXTENDED RELEASE ORAL DAILY
Status: DISCONTINUED | OUTPATIENT
Start: 2020-09-29 | End: 2020-10-08 | Stop reason: HOSPADM

## 2020-09-29 RX ORDER — DOCUSATE SODIUM 100 MG/1
100 CAPSULE, LIQUID FILLED ORAL 2 TIMES DAILY
Status: DISCONTINUED | OUTPATIENT
Start: 2020-09-29 | End: 2020-10-02

## 2020-09-29 RX ORDER — LACTULOSE 20 G/30ML
20 SOLUTION ORAL 3 TIMES DAILY
Status: DISCONTINUED | OUTPATIENT
Start: 2020-09-29 | End: 2020-10-08 | Stop reason: HOSPADM

## 2020-09-29 RX ORDER — QUETIAPINE FUMARATE 25 MG/1
25 TABLET, FILM COATED ORAL
Status: DISCONTINUED | OUTPATIENT
Start: 2020-09-29 | End: 2020-10-08 | Stop reason: HOSPADM

## 2020-09-29 RX ORDER — MIRTAZAPINE 15 MG/1
7.5 TABLET, FILM COATED ORAL
Status: DISCONTINUED | OUTPATIENT
Start: 2020-09-29 | End: 2020-10-08 | Stop reason: HOSPADM

## 2020-09-29 RX ORDER — HEPARIN SODIUM 5000 [USP'U]/ML
5000 INJECTION, SOLUTION INTRAVENOUS; SUBCUTANEOUS EVERY 8 HOURS SCHEDULED
Status: DISCONTINUED | OUTPATIENT
Start: 2020-09-29 | End: 2020-10-02

## 2020-09-29 RX ORDER — LORAZEPAM 1 MG/1
2 TABLET ORAL ONCE
Status: COMPLETED | OUTPATIENT
Start: 2020-09-29 | End: 2020-09-29

## 2020-09-29 RX ORDER — OLANZAPINE 2.5 MG/1
5 TABLET ORAL EVERY 12 HOURS PRN
Status: DISCONTINUED | OUTPATIENT
Start: 2020-09-29 | End: 2020-10-08 | Stop reason: HOSPADM

## 2020-09-29 RX ORDER — ONDANSETRON 2 MG/ML
4 INJECTION INTRAMUSCULAR; INTRAVENOUS EVERY 6 HOURS PRN
Status: DISCONTINUED | OUTPATIENT
Start: 2020-09-29 | End: 2020-10-08 | Stop reason: HOSPADM

## 2020-09-29 RX ORDER — LEVALBUTEROL 1.25 MG/.5ML
1.25 SOLUTION, CONCENTRATE RESPIRATORY (INHALATION)
Status: DISCONTINUED | OUTPATIENT
Start: 2020-09-30 | End: 2020-10-04

## 2020-09-29 RX ORDER — PANTOPRAZOLE SODIUM 40 MG/1
40 TABLET, DELAYED RELEASE ORAL
Status: DISCONTINUED | OUTPATIENT
Start: 2020-09-29 | End: 2020-10-08 | Stop reason: HOSPADM

## 2020-09-29 RX ORDER — LEVALBUTEROL 1.25 MG/.5ML
1.25 SOLUTION, CONCENTRATE RESPIRATORY (INHALATION)
Status: DISCONTINUED | OUTPATIENT
Start: 2020-09-29 | End: 2020-09-29

## 2020-09-29 RX ORDER — METOPROLOL SUCCINATE 50 MG/1
50 TABLET, EXTENDED RELEASE ORAL DAILY
Status: DISCONTINUED | OUTPATIENT
Start: 2020-09-29 | End: 2020-10-08 | Stop reason: HOSPADM

## 2020-09-29 RX ORDER — IPRATROPIUM BROMIDE AND ALBUTEROL SULFATE 2.5; .5 MG/3ML; MG/3ML
3 SOLUTION RESPIRATORY (INHALATION)
Status: DISCONTINUED | OUTPATIENT
Start: 2020-09-29 | End: 2020-09-29

## 2020-09-29 RX ORDER — FOLIC ACID 1 MG/1
1000 TABLET ORAL DAILY
Status: DISCONTINUED | OUTPATIENT
Start: 2020-09-29 | End: 2020-10-08 | Stop reason: HOSPADM

## 2020-09-29 RX ORDER — THIAMINE MONONITRATE (VIT B1) 100 MG
100 TABLET ORAL DAILY
Status: DISCONTINUED | OUTPATIENT
Start: 2020-09-29 | End: 2020-10-08 | Stop reason: HOSPADM

## 2020-09-29 RX ORDER — FUROSEMIDE 10 MG/ML
40 INJECTION INTRAMUSCULAR; INTRAVENOUS
Status: DISCONTINUED | OUTPATIENT
Start: 2020-09-29 | End: 2020-10-04

## 2020-09-29 RX ADMIN — FUROSEMIDE 40 MG: 10 INJECTION, SOLUTION INTRAMUSCULAR; INTRAVENOUS at 15:19

## 2020-09-29 RX ADMIN — IPRATROPIUM BROMIDE 0.5 MG: 0.5 SOLUTION RESPIRATORY (INHALATION) at 20:28

## 2020-09-29 RX ADMIN — LEVALBUTEROL HYDROCHLORIDE 1.25 MG: 1.25 SOLUTION, CONCENTRATE RESPIRATORY (INHALATION) at 20:28

## 2020-09-29 RX ADMIN — MAGNESIUM GLUCONATE 500 MG ORAL TABLET 400 MG: 500 TABLET ORAL at 09:35

## 2020-09-29 RX ADMIN — IPRATROPIUM BROMIDE 0.5 MG: 0.5 SOLUTION RESPIRATORY (INHALATION) at 13:39

## 2020-09-29 RX ADMIN — CEFTRIAXONE SODIUM 1000 MG: 10 INJECTION, POWDER, FOR SOLUTION INTRAVENOUS at 14:08

## 2020-09-29 RX ADMIN — LACTULOSE 20 G: 10 SOLUTION ORAL at 15:18

## 2020-09-29 RX ADMIN — HEPARIN SODIUM 5000 UNITS: 5000 INJECTION INTRAVENOUS; SUBCUTANEOUS at 06:21

## 2020-09-29 RX ADMIN — HEPARIN SODIUM 5000 UNITS: 5000 INJECTION INTRAVENOUS; SUBCUTANEOUS at 15:18

## 2020-09-29 RX ADMIN — Medication 1 TABLET: at 09:35

## 2020-09-29 RX ADMIN — LORAZEPAM 2 MG: 1 TABLET ORAL at 08:11

## 2020-09-29 RX ADMIN — DOCUSATE SODIUM 100 MG: 100 CAPSULE ORAL at 09:35

## 2020-09-29 RX ADMIN — THIAMINE HCL TAB 100 MG 100 MG: 100 TAB at 09:35

## 2020-09-29 RX ADMIN — LEVALBUTEROL HYDROCHLORIDE 1.25 MG: 1.25 SOLUTION, CONCENTRATE RESPIRATORY (INHALATION) at 13:40

## 2020-09-29 RX ADMIN — NICOTINE 1 PATCH: 7 PATCH TRANSDERMAL at 08:11

## 2020-09-29 RX ADMIN — MAGNESIUM GLUCONATE 500 MG ORAL TABLET 400 MG: 500 TABLET ORAL at 18:06

## 2020-09-29 RX ADMIN — QUETIAPINE FUMARATE 25 MG: 25 TABLET ORAL at 23:44

## 2020-09-29 RX ADMIN — LACTULOSE 20 G: 10 SOLUTION ORAL at 23:44

## 2020-09-29 RX ADMIN — MIRTAZAPINE 7.5 MG: 15 TABLET, FILM COATED ORAL at 23:40

## 2020-09-29 RX ADMIN — FOLIC ACID 1000 MCG: 1 TABLET ORAL at 09:35

## 2020-09-30 LAB
ANION GAP SERPL CALCULATED.3IONS-SCNC: 10 MMOL/L (ref 4–13)
ANION GAP SERPL CALCULATED.3IONS-SCNC: 10 MMOL/L (ref 4–13)
ANION GAP SERPL CALCULATED.3IONS-SCNC: 9 MMOL/L (ref 4–13)
ATRIAL RATE: 82 BPM
BASOPHILS # BLD AUTO: 0.01 THOUSANDS/ΜL (ref 0–0.1)
BASOPHILS NFR BLD AUTO: 0 % (ref 0–1)
BUN SERPL-MCNC: 41 MG/DL (ref 5–25)
BUN SERPL-MCNC: 42 MG/DL (ref 5–25)
BUN SERPL-MCNC: 43 MG/DL (ref 5–25)
CALCIUM SERPL-MCNC: 9.1 MG/DL (ref 8.3–10.1)
CALCIUM SERPL-MCNC: 9.2 MG/DL (ref 8.3–10.1)
CALCIUM SERPL-MCNC: 9.4 MG/DL (ref 8.3–10.1)
CHLORIDE SERPL-SCNC: 91 MMOL/L (ref 100–108)
CHLORIDE SERPL-SCNC: 92 MMOL/L (ref 100–108)
CHLORIDE SERPL-SCNC: 93 MMOL/L (ref 100–108)
CO2 SERPL-SCNC: 24 MMOL/L (ref 21–32)
CO2 SERPL-SCNC: 25 MMOL/L (ref 21–32)
CO2 SERPL-SCNC: 27 MMOL/L (ref 21–32)
CREAT SERPL-MCNC: 3.21 MG/DL (ref 0.6–1.3)
CREAT SERPL-MCNC: 3.37 MG/DL (ref 0.6–1.3)
CREAT SERPL-MCNC: 3.58 MG/DL (ref 0.6–1.3)
EOSINOPHIL # BLD AUTO: 0.02 THOUSAND/ΜL (ref 0–0.61)
EOSINOPHIL NFR BLD AUTO: 1 % (ref 0–6)
ERYTHROCYTE [DISTWIDTH] IN BLOOD BY AUTOMATED COUNT: 20.8 % (ref 11.6–15.1)
GFR SERPL CREATININE-BSD FRML MDRD: 14 ML/MIN/1.73SQ M
GFR SERPL CREATININE-BSD FRML MDRD: 15 ML/MIN/1.73SQ M
GFR SERPL CREATININE-BSD FRML MDRD: 16 ML/MIN/1.73SQ M
GLUCOSE SERPL-MCNC: 119 MG/DL (ref 65–140)
GLUCOSE SERPL-MCNC: 121 MG/DL (ref 65–140)
GLUCOSE SERPL-MCNC: 137 MG/DL (ref 65–140)
HCT VFR BLD AUTO: 21 % (ref 34.8–46.1)
HCT VFR BLD AUTO: 22.3 % (ref 34.8–46.1)
HGB BLD-MCNC: 6.9 G/DL (ref 11.5–15.4)
HGB BLD-MCNC: 7.2 G/DL (ref 11.5–15.4)
IMM GRANULOCYTES # BLD AUTO: 0.02 THOUSAND/UL (ref 0–0.2)
IMM GRANULOCYTES NFR BLD AUTO: 1 % (ref 0–2)
LYMPHOCYTES # BLD AUTO: 0.63 THOUSANDS/ΜL (ref 0.6–4.47)
LYMPHOCYTES NFR BLD AUTO: 20 % (ref 14–44)
MCH RBC QN AUTO: 27.8 PG (ref 26.8–34.3)
MCHC RBC AUTO-ENTMCNC: 32.9 G/DL (ref 31.4–37.4)
MCV RBC AUTO: 85 FL (ref 82–98)
MONOCYTES # BLD AUTO: 0.45 THOUSAND/ΜL (ref 0.17–1.22)
MONOCYTES NFR BLD AUTO: 14 % (ref 4–12)
NEUTROPHILS # BLD AUTO: 1.99 THOUSANDS/ΜL (ref 1.85–7.62)
NEUTS SEG NFR BLD AUTO: 64 % (ref 43–75)
NRBC BLD AUTO-RTO: 2 /100 WBCS
NT-PROBNP SERPL-MCNC: 2968 PG/ML
PLATELET # BLD AUTO: 80 THOUSANDS/UL (ref 149–390)
PMV BLD AUTO: 9.2 FL (ref 8.9–12.7)
POTASSIUM SERPL-SCNC: 4.2 MMOL/L (ref 3.5–5.3)
POTASSIUM SERPL-SCNC: 4.3 MMOL/L (ref 3.5–5.3)
POTASSIUM SERPL-SCNC: 4.6 MMOL/L (ref 3.5–5.3)
QRS AXIS: 95 DEGREES
QRSD INTERVAL: 70 MS
QT INTERVAL: 344 MS
QTC INTERVAL: 446 MS
RBC # BLD AUTO: 2.48 MILLION/UL (ref 3.81–5.12)
SODIUM SERPL-SCNC: 126 MMOL/L (ref 136–145)
SODIUM SERPL-SCNC: 127 MMOL/L (ref 136–145)
SODIUM SERPL-SCNC: 128 MMOL/L (ref 136–145)
T WAVE AXIS: 53 DEGREES
VENTRICULAR RATE: 101 BPM
WBC # BLD AUTO: 3.12 THOUSAND/UL (ref 4.31–10.16)

## 2020-09-30 PROCEDURE — 83880 ASSAY OF NATRIURETIC PEPTIDE: CPT | Performed by: INTERNAL MEDICINE

## 2020-09-30 PROCEDURE — 97167 OT EVAL HIGH COMPLEX 60 MIN: CPT

## 2020-09-30 PROCEDURE — 99232 SBSQ HOSP IP/OBS MODERATE 35: CPT | Performed by: STUDENT IN AN ORGANIZED HEALTH CARE EDUCATION/TRAINING PROGRAM

## 2020-09-30 PROCEDURE — 94640 AIRWAY INHALATION TREATMENT: CPT

## 2020-09-30 PROCEDURE — 94762 N-INVAS EAR/PLS OXIMTRY CONT: CPT

## 2020-09-30 PROCEDURE — 97163 PT EVAL HIGH COMPLEX 45 MIN: CPT

## 2020-09-30 PROCEDURE — 99233 SBSQ HOSP IP/OBS HIGH 50: CPT | Performed by: INTERNAL MEDICINE

## 2020-09-30 PROCEDURE — 80048 BASIC METABOLIC PNL TOTAL CA: CPT | Performed by: PHYSICIAN ASSISTANT

## 2020-09-30 PROCEDURE — 85018 HEMOGLOBIN: CPT | Performed by: GENERAL PRACTICE

## 2020-09-30 PROCEDURE — 93010 ELECTROCARDIOGRAM REPORT: CPT | Performed by: INTERNAL MEDICINE

## 2020-09-30 PROCEDURE — 85025 COMPLETE CBC W/AUTO DIFF WBC: CPT | Performed by: INTERNAL MEDICINE

## 2020-09-30 PROCEDURE — 80048 BASIC METABOLIC PNL TOTAL CA: CPT | Performed by: INTERNAL MEDICINE

## 2020-09-30 PROCEDURE — 93005 ELECTROCARDIOGRAM TRACING: CPT

## 2020-09-30 PROCEDURE — 85014 HEMATOCRIT: CPT | Performed by: GENERAL PRACTICE

## 2020-09-30 PROCEDURE — 94760 N-INVAS EAR/PLS OXIMETRY 1: CPT

## 2020-09-30 RX ORDER — NYSTATIN 100000 [USP'U]/G
POWDER TOPICAL 2 TIMES DAILY
Status: DISCONTINUED | OUTPATIENT
Start: 2020-09-30 | End: 2020-10-08 | Stop reason: HOSPADM

## 2020-09-30 RX ADMIN — NICOTINE 1 PATCH: 7 PATCH TRANSDERMAL at 08:59

## 2020-09-30 RX ADMIN — MAGNESIUM GLUCONATE 500 MG ORAL TABLET 400 MG: 500 TABLET ORAL at 17:34

## 2020-09-30 RX ADMIN — IPRATROPIUM BROMIDE 0.5 MG: 0.5 SOLUTION RESPIRATORY (INHALATION) at 20:01

## 2020-09-30 RX ADMIN — QUETIAPINE FUMARATE 25 MG: 25 TABLET ORAL at 23:25

## 2020-09-30 RX ADMIN — MIRTAZAPINE 7.5 MG: 15 TABLET, FILM COATED ORAL at 23:25

## 2020-09-30 RX ADMIN — FUROSEMIDE 40 MG: 10 INJECTION, SOLUTION INTRAMUSCULAR; INTRAVENOUS at 16:19

## 2020-09-30 RX ADMIN — HEPARIN SODIUM 5000 UNITS: 5000 INJECTION INTRAVENOUS; SUBCUTANEOUS at 14:36

## 2020-09-30 RX ADMIN — DILTIAZEM HYDROCHLORIDE 240 MG: 240 CAPSULE, COATED, EXTENDED RELEASE ORAL at 08:56

## 2020-09-30 RX ADMIN — NYSTATIN: 100000 POWDER TOPICAL at 17:34

## 2020-09-30 RX ADMIN — LACTULOSE 20 G: 10 SOLUTION ORAL at 08:56

## 2020-09-30 RX ADMIN — IPRATROPIUM BROMIDE 0.5 MG: 0.5 SOLUTION RESPIRATORY (INHALATION) at 07:18

## 2020-09-30 RX ADMIN — NYSTATIN: 100000 POWDER TOPICAL at 12:04

## 2020-09-30 RX ADMIN — FUROSEMIDE 40 MG: 10 INJECTION, SOLUTION INTRAMUSCULAR; INTRAVENOUS at 08:57

## 2020-09-30 RX ADMIN — CEFTRIAXONE SODIUM 1000 MG: 10 INJECTION, POWDER, FOR SOLUTION INTRAVENOUS at 14:37

## 2020-09-30 RX ADMIN — LACTULOSE 20 G: 10 SOLUTION ORAL at 23:25

## 2020-09-30 RX ADMIN — Medication 1 TABLET: at 08:56

## 2020-09-30 RX ADMIN — MAGNESIUM GLUCONATE 500 MG ORAL TABLET 400 MG: 500 TABLET ORAL at 08:56

## 2020-09-30 RX ADMIN — LACTULOSE 20 G: 10 SOLUTION ORAL at 16:19

## 2020-09-30 RX ADMIN — DOCUSATE SODIUM 100 MG: 100 CAPSULE ORAL at 08:56

## 2020-09-30 RX ADMIN — LEVALBUTEROL HYDROCHLORIDE 1.25 MG: 1.25 SOLUTION, CONCENTRATE RESPIRATORY (INHALATION) at 20:01

## 2020-09-30 RX ADMIN — LEVALBUTEROL HYDROCHLORIDE 1.25 MG: 1.25 SOLUTION, CONCENTRATE RESPIRATORY (INHALATION) at 07:18

## 2020-09-30 RX ADMIN — THIAMINE HCL TAB 100 MG 100 MG: 100 TAB at 08:56

## 2020-09-30 RX ADMIN — METOPROLOL SUCCINATE 50 MG: 50 TABLET, EXTENDED RELEASE ORAL at 08:56

## 2020-10-01 LAB
ABO GROUP BLD: NORMAL
ANION GAP SERPL CALCULATED.3IONS-SCNC: 12 MMOL/L (ref 4–13)
BASOPHILS # BLD AUTO: 0.02 THOUSANDS/ΜL (ref 0–0.1)
BASOPHILS NFR BLD AUTO: 1 % (ref 0–1)
BLD GP AB SCN SERPL QL: NEGATIVE
BUN SERPL-MCNC: 40 MG/DL (ref 5–25)
CALCIUM SERPL-MCNC: 9.5 MG/DL (ref 8.3–10.1)
CHLORIDE SERPL-SCNC: 95 MMOL/L (ref 100–108)
CO2 SERPL-SCNC: 25 MMOL/L (ref 21–32)
CREAT SERPL-MCNC: 2.69 MG/DL (ref 0.6–1.3)
EOSINOPHIL # BLD AUTO: 0.02 THOUSAND/ΜL (ref 0–0.61)
EOSINOPHIL NFR BLD AUTO: 1 % (ref 0–6)
ERYTHROCYTE [DISTWIDTH] IN BLOOD BY AUTOMATED COUNT: 21.2 % (ref 11.6–15.1)
GFR SERPL CREATININE-BSD FRML MDRD: 20 ML/MIN/1.73SQ M
GLUCOSE SERPL-MCNC: 124 MG/DL (ref 65–140)
HCT VFR BLD AUTO: 22.1 % (ref 34.8–46.1)
HCT VFR BLD AUTO: 22.3 % (ref 34.8–46.1)
HGB BLD-MCNC: 7 G/DL (ref 11.5–15.4)
HGB BLD-MCNC: 7 G/DL (ref 11.5–15.4)
IMM GRANULOCYTES # BLD AUTO: 0.04 THOUSAND/UL (ref 0–0.2)
IMM GRANULOCYTES NFR BLD AUTO: 1 % (ref 0–2)
LYMPHOCYTES # BLD AUTO: 0.54 THOUSANDS/ΜL (ref 0.6–4.47)
LYMPHOCYTES NFR BLD AUTO: 17 % (ref 14–44)
MAGNESIUM SERPL-MCNC: 1.8 MG/DL (ref 1.6–2.6)
MCH RBC QN AUTO: 27 PG (ref 26.8–34.3)
MCHC RBC AUTO-ENTMCNC: 31.7 G/DL (ref 31.4–37.4)
MCV RBC AUTO: 85 FL (ref 82–98)
MONOCYTES # BLD AUTO: 0.53 THOUSAND/ΜL (ref 0.17–1.22)
MONOCYTES NFR BLD AUTO: 16 % (ref 4–12)
NEUTROPHILS # BLD AUTO: 2.13 THOUSANDS/ΜL (ref 1.85–7.62)
NEUTS SEG NFR BLD AUTO: 64 % (ref 43–75)
NRBC BLD AUTO-RTO: 2 /100 WBCS
PLATELET # BLD AUTO: 82 THOUSANDS/UL (ref 149–390)
PMV BLD AUTO: 8.7 FL (ref 8.9–12.7)
POTASSIUM SERPL-SCNC: 4 MMOL/L (ref 3.5–5.3)
RBC # BLD AUTO: 2.59 MILLION/UL (ref 3.81–5.12)
RH BLD: POSITIVE
SODIUM SERPL-SCNC: 132 MMOL/L (ref 136–145)
SPECIMEN EXPIRATION DATE: NORMAL
TROPONIN I SERPL-MCNC: <0.02 NG/ML
TROPONIN I SERPL-MCNC: <0.02 NG/ML
WBC # BLD AUTO: 3.28 THOUSAND/UL (ref 4.31–10.16)

## 2020-10-01 PROCEDURE — 99232 SBSQ HOSP IP/OBS MODERATE 35: CPT | Performed by: FAMILY MEDICINE

## 2020-10-01 PROCEDURE — 86923 COMPATIBILITY TEST ELECTRIC: CPT

## 2020-10-01 PROCEDURE — 30233N1 TRANSFUSION OF NONAUTOLOGOUS RED BLOOD CELLS INTO PERIPHERAL VEIN, PERCUTANEOUS APPROACH: ICD-10-PCS | Performed by: FAMILY MEDICINE

## 2020-10-01 PROCEDURE — 80048 BASIC METABOLIC PNL TOTAL CA: CPT | Performed by: INTERNAL MEDICINE

## 2020-10-01 PROCEDURE — 86850 RBC ANTIBODY SCREEN: CPT | Performed by: FAMILY MEDICINE

## 2020-10-01 PROCEDURE — 85018 HEMOGLOBIN: CPT | Performed by: FAMILY MEDICINE

## 2020-10-01 PROCEDURE — 83735 ASSAY OF MAGNESIUM: CPT | Performed by: PHYSICIAN ASSISTANT

## 2020-10-01 PROCEDURE — P9016 RBC LEUKOCYTES REDUCED: HCPCS

## 2020-10-01 PROCEDURE — 84484 ASSAY OF TROPONIN QUANT: CPT | Performed by: PHYSICIAN ASSISTANT

## 2020-10-01 PROCEDURE — 94640 AIRWAY INHALATION TREATMENT: CPT

## 2020-10-01 PROCEDURE — 94762 N-INVAS EAR/PLS OXIMTRY CONT: CPT

## 2020-10-01 PROCEDURE — 94760 N-INVAS EAR/PLS OXIMETRY 1: CPT

## 2020-10-01 PROCEDURE — 85014 HEMATOCRIT: CPT | Performed by: FAMILY MEDICINE

## 2020-10-01 PROCEDURE — 99233 SBSQ HOSP IP/OBS HIGH 50: CPT | Performed by: INTERNAL MEDICINE

## 2020-10-01 PROCEDURE — 86900 BLOOD TYPING SEROLOGIC ABO: CPT | Performed by: FAMILY MEDICINE

## 2020-10-01 PROCEDURE — 86901 BLOOD TYPING SEROLOGIC RH(D): CPT | Performed by: FAMILY MEDICINE

## 2020-10-01 PROCEDURE — 85025 COMPLETE CBC W/AUTO DIFF WBC: CPT | Performed by: INTERNAL MEDICINE

## 2020-10-01 RX ORDER — SIMETHICONE 80 MG
80 TABLET,CHEWABLE ORAL EVERY 6 HOURS PRN
Status: DISCONTINUED | OUTPATIENT
Start: 2020-10-01 | End: 2020-10-08 | Stop reason: HOSPADM

## 2020-10-01 RX ORDER — DILTIAZEM HYDROCHLORIDE 5 MG/ML
10 INJECTION INTRAVENOUS ONCE AS NEEDED
Status: COMPLETED | OUTPATIENT
Start: 2020-10-01 | End: 2020-10-01

## 2020-10-01 RX ORDER — ACETAMINOPHEN 325 MG/1
650 TABLET ORAL ONCE AS NEEDED
Status: COMPLETED | OUTPATIENT
Start: 2020-10-01 | End: 2020-10-02

## 2020-10-01 RX ADMIN — QUETIAPINE FUMARATE 25 MG: 25 TABLET ORAL at 22:53

## 2020-10-01 RX ADMIN — LACTULOSE 20 G: 10 SOLUTION ORAL at 09:18

## 2020-10-01 RX ADMIN — HEPARIN SODIUM 5000 UNITS: 5000 INJECTION INTRAVENOUS; SUBCUTANEOUS at 13:49

## 2020-10-01 RX ADMIN — IPRATROPIUM BROMIDE 0.5 MG: 0.5 SOLUTION RESPIRATORY (INHALATION) at 20:04

## 2020-10-01 RX ADMIN — Medication 1 TABLET: at 09:19

## 2020-10-01 RX ADMIN — FOLIC ACID 1000 MCG: 1 TABLET ORAL at 09:18

## 2020-10-01 RX ADMIN — DOCUSATE SODIUM 100 MG: 100 CAPSULE ORAL at 09:18

## 2020-10-01 RX ADMIN — NICOTINE 1 PATCH: 7 PATCH TRANSDERMAL at 09:19

## 2020-10-01 RX ADMIN — FUROSEMIDE 40 MG: 10 INJECTION, SOLUTION INTRAMUSCULAR; INTRAVENOUS at 09:19

## 2020-10-01 RX ADMIN — LEVALBUTEROL HYDROCHLORIDE 1.25 MG: 1.25 SOLUTION, CONCENTRATE RESPIRATORY (INHALATION) at 13:38

## 2020-10-01 RX ADMIN — CEFTRIAXONE SODIUM 1000 MG: 10 INJECTION, POWDER, FOR SOLUTION INTRAVENOUS at 13:52

## 2020-10-01 RX ADMIN — LACTULOSE 20 G: 10 SOLUTION ORAL at 17:59

## 2020-10-01 RX ADMIN — FUROSEMIDE 40 MG: 10 INJECTION, SOLUTION INTRAMUSCULAR; INTRAVENOUS at 18:12

## 2020-10-01 RX ADMIN — MAGNESIUM GLUCONATE 500 MG ORAL TABLET 400 MG: 500 TABLET ORAL at 09:18

## 2020-10-01 RX ADMIN — DILTIAZEM HYDROCHLORIDE 10 MG: 5 INJECTION INTRAVENOUS at 04:23

## 2020-10-01 RX ADMIN — NYSTATIN: 100000 POWDER TOPICAL at 09:35

## 2020-10-01 RX ADMIN — MAGNESIUM GLUCONATE 500 MG ORAL TABLET 400 MG: 500 TABLET ORAL at 17:59

## 2020-10-01 RX ADMIN — THIAMINE HCL TAB 100 MG 100 MG: 100 TAB at 09:18

## 2020-10-01 RX ADMIN — METOPROLOL SUCCINATE 50 MG: 50 TABLET, EXTENDED RELEASE ORAL at 09:18

## 2020-10-01 RX ADMIN — LEVALBUTEROL HYDROCHLORIDE 1.25 MG: 1.25 SOLUTION, CONCENTRATE RESPIRATORY (INHALATION) at 20:04

## 2020-10-01 RX ADMIN — IPRATROPIUM BROMIDE 0.5 MG: 0.5 SOLUTION RESPIRATORY (INHALATION) at 13:38

## 2020-10-01 RX ADMIN — MIRTAZAPINE 7.5 MG: 15 TABLET, FILM COATED ORAL at 22:53

## 2020-10-01 RX ADMIN — DILTIAZEM HYDROCHLORIDE 240 MG: 240 CAPSULE, COATED, EXTENDED RELEASE ORAL at 09:19

## 2020-10-02 ENCOUNTER — APPOINTMENT (INPATIENT)
Dept: INTERVENTIONAL RADIOLOGY/VASCULAR | Facility: HOSPITAL | Age: 75
DRG: 441 | End: 2020-10-02
Payer: MEDICARE

## 2020-10-02 ENCOUNTER — TELEPHONE (OUTPATIENT)
Dept: INTERVENTIONAL RADIOLOGY/VASCULAR | Facility: HOSPITAL | Age: 75
End: 2020-10-02

## 2020-10-02 DIAGNOSIS — K70.30 ALCOHOLIC CIRRHOSIS OF LIVER WITHOUT ASCITES (HCC): ICD-10-CM

## 2020-10-02 LAB
ABO GROUP BLD BPU: NORMAL
ALBUMIN FLD-MCNC: 1.6 G/DL
ALBUMIN SERPL BCP-MCNC: 3 G/DL (ref 3.5–5)
ALP SERPL-CCNC: 267 U/L (ref 46–116)
ALT SERPL W P-5'-P-CCNC: 17 U/L (ref 12–78)
ANION GAP SERPL CALCULATED.3IONS-SCNC: 11 MMOL/L (ref 4–13)
ANISOCYTOSIS BLD QL SMEAR: PRESENT
APPEARANCE FLD: NORMAL
AST SERPL W P-5'-P-CCNC: 25 U/L (ref 5–45)
BASOPHILS # BLD MANUAL: 0 THOUSAND/UL (ref 0–0.1)
BASOPHILS NFR FLD MANUAL: 1 %
BASOPHILS NFR MAR MANUAL: 0 % (ref 0–1)
BILIRUB SERPL-MCNC: 1.6 MG/DL (ref 0.2–1)
BLD SMEAR INTERP: NORMAL
BPU ID: NORMAL
BUN SERPL-MCNC: 37 MG/DL (ref 5–25)
CALCIUM ALBUM COR SERPL-MCNC: 9.9 MG/DL (ref 8.3–10.1)
CALCIUM SERPL-MCNC: 9.1 MG/DL (ref 8.3–10.1)
CHLORIDE SERPL-SCNC: 99 MMOL/L (ref 100–108)
CO2 SERPL-SCNC: 26 MMOL/L (ref 21–32)
COLOR FLD: YELLOW
CREAT SERPL-MCNC: 2.32 MG/DL (ref 0.6–1.3)
CROSSMATCH: NORMAL
EOSINOPHIL # BLD MANUAL: 0 THOUSAND/UL (ref 0–0.4)
EOSINOPHIL NFR BLD MANUAL: 0 % (ref 0–6)
ERYTHROCYTE [DISTWIDTH] IN BLOOD BY AUTOMATED COUNT: 21.6 % (ref 11.6–15.1)
GFR SERPL CREATININE-BSD FRML MDRD: 23 ML/MIN/1.73SQ M
GLUCOSE SERPL-MCNC: 105 MG/DL (ref 65–140)
HCT VFR BLD AUTO: 21.4 % (ref 34.8–46.1)
HCT VFR BLD AUTO: 21.7 % (ref 34.8–46.1)
HGB BLD-MCNC: 6.8 G/DL (ref 11.5–15.4)
HGB BLD-MCNC: 6.8 G/DL (ref 11.5–15.4)
HYPERCHROMIA BLD QL SMEAR: PRESENT
LDH SERPL-CCNC: 233 U/L (ref 81–234)
LYMPHOCYTES # BLD AUTO: 0.92 THOUSAND/UL (ref 0.6–4.47)
LYMPHOCYTES # BLD AUTO: 28 % (ref 14–44)
LYMPHOCYTES NFR BLD AUTO: 72 %
MCH RBC QN AUTO: 27.3 PG (ref 26.8–34.3)
MCHC RBC AUTO-ENTMCNC: 31.3 G/DL (ref 31.4–37.4)
MCV RBC AUTO: 87 FL (ref 82–98)
MONO+MESO NFR FLD MANUAL: 14 %
MONOCYTES # BLD AUTO: 0.49 THOUSAND/UL (ref 0–1.22)
MONOCYTES NFR BLD AUTO: 7 %
MONOCYTES NFR BLD: 15 % (ref 4–12)
NEUTROPHILS # BLD MANUAL: 1.78 THOUSAND/UL (ref 1.85–7.62)
NEUTS BAND NFR BLD MANUAL: 2 % (ref 0–8)
NEUTS SEG NFR BLD AUTO: 52 % (ref 43–75)
NEUTS SEG NFR BLD AUTO: 6 %
NRBC BLD AUTO-RTO: 1 /100 WBC (ref 0–2)
NRBC BLD AUTO-RTO: 3 /100 WBCS
PLATELET # BLD AUTO: 78 THOUSANDS/UL (ref 149–390)
PLATELET BLD QL SMEAR: ABNORMAL
PMV BLD AUTO: 9.4 FL (ref 8.9–12.7)
POTASSIUM SERPL-SCNC: 4.1 MMOL/L (ref 3.5–5.3)
PROT FLD-MCNC: 2.7 G/DL
PROT SERPL-MCNC: 6.1 G/DL (ref 6.4–8.2)
RBC # BLD AUTO: 2.49 MILLION/UL (ref 3.81–5.12)
SITE: NORMAL
SODIUM SERPL-SCNC: 136 MMOL/L (ref 136–145)
TOTAL CELLS COUNTED SPEC: 100
TOTAL CELLS COUNTED SPEC: 100
UNIT DISPENSE STATUS: NORMAL
UNIT PRODUCT CODE: NORMAL
UNIT RH: NORMAL
VARIANT LYMPHS # BLD AUTO: 3 %
WBC # BLD AUTO: 3.29 THOUSAND/UL (ref 4.31–10.16)
WBC # FLD MANUAL: 869 /UL

## 2020-10-02 PROCEDURE — 80053 COMPREHEN METABOLIC PANEL: CPT | Performed by: PHYSICIAN ASSISTANT

## 2020-10-02 PROCEDURE — 88305 TISSUE EXAM BY PATHOLOGIST: CPT | Performed by: PATHOLOGY

## 2020-10-02 PROCEDURE — 94762 N-INVAS EAR/PLS OXIMTRY CONT: CPT

## 2020-10-02 PROCEDURE — 85027 COMPLETE CBC AUTOMATED: CPT | Performed by: INTERNAL MEDICINE

## 2020-10-02 PROCEDURE — 49083 ABD PARACENTESIS W/IMAGING: CPT

## 2020-10-02 PROCEDURE — 0W9G3ZZ DRAINAGE OF PERITONEAL CAVITY, PERCUTANEOUS APPROACH: ICD-10-PCS | Performed by: RADIOLOGY

## 2020-10-02 PROCEDURE — 99222 1ST HOSP IP/OBS MODERATE 55: CPT | Performed by: INTERNAL MEDICINE

## 2020-10-02 PROCEDURE — 87070 CULTURE OTHR SPECIMN AEROBIC: CPT | Performed by: PHYSICIAN ASSISTANT

## 2020-10-02 PROCEDURE — 99232 SBSQ HOSP IP/OBS MODERATE 35: CPT | Performed by: FAMILY MEDICINE

## 2020-10-02 PROCEDURE — 85007 BL SMEAR W/DIFF WBC COUNT: CPT | Performed by: INTERNAL MEDICINE

## 2020-10-02 PROCEDURE — 83010 ASSAY OF HAPTOGLOBIN QUANT: CPT | Performed by: PHYSICIAN ASSISTANT

## 2020-10-02 PROCEDURE — 49083 ABD PARACENTESIS W/IMAGING: CPT | Performed by: RADIOLOGY

## 2020-10-02 PROCEDURE — NC001 PR NO CHARGE: Performed by: RADIOLOGY

## 2020-10-02 PROCEDURE — 85014 HEMATOCRIT: CPT | Performed by: PHYSICIAN ASSISTANT

## 2020-10-02 PROCEDURE — P9016 RBC LEUKOCYTES REDUCED: HCPCS

## 2020-10-02 PROCEDURE — 82042 OTHER SOURCE ALBUMIN QUAN EA: CPT | Performed by: PHYSICIAN ASSISTANT

## 2020-10-02 PROCEDURE — 30233N1 TRANSFUSION OF NONAUTOLOGOUS RED BLOOD CELLS INTO PERIPHERAL VEIN, PERCUTANEOUS APPROACH: ICD-10-PCS | Performed by: FAMILY MEDICINE

## 2020-10-02 PROCEDURE — 99233 SBSQ HOSP IP/OBS HIGH 50: CPT | Performed by: INTERNAL MEDICINE

## 2020-10-02 PROCEDURE — 94640 AIRWAY INHALATION TREATMENT: CPT

## 2020-10-02 PROCEDURE — 88112 CYTOPATH CELL ENHANCE TECH: CPT | Performed by: PATHOLOGY

## 2020-10-02 PROCEDURE — 89051 BODY FLUID CELL COUNT: CPT | Performed by: PHYSICIAN ASSISTANT

## 2020-10-02 PROCEDURE — 87205 SMEAR GRAM STAIN: CPT | Performed by: PHYSICIAN ASSISTANT

## 2020-10-02 PROCEDURE — 83615 LACTATE (LD) (LDH) ENZYME: CPT | Performed by: PHYSICIAN ASSISTANT

## 2020-10-02 PROCEDURE — 84157 ASSAY OF PROTEIN OTHER: CPT | Performed by: PHYSICIAN ASSISTANT

## 2020-10-02 PROCEDURE — 85018 HEMOGLOBIN: CPT | Performed by: PHYSICIAN ASSISTANT

## 2020-10-02 RX ORDER — LIDOCAINE WITH 8.4% SOD BICARB 0.9%(10ML)
SYRINGE (ML) INJECTION CODE/TRAUMA/SEDATION MEDICATION
Status: COMPLETED | OUTPATIENT
Start: 2020-10-02 | End: 2020-10-02

## 2020-10-02 RX ORDER — RIFAXIMIN 550 MG/1
TABLET ORAL
Qty: 60 TABLET | Refills: 0 | OUTPATIENT
Start: 2020-10-02 | End: 2021-09-27

## 2020-10-02 RX ADMIN — IPRATROPIUM BROMIDE 0.5 MG: 0.5 SOLUTION RESPIRATORY (INHALATION) at 19:13

## 2020-10-02 RX ADMIN — IPRATROPIUM BROMIDE 0.5 MG: 0.5 SOLUTION RESPIRATORY (INHALATION) at 07:22

## 2020-10-02 RX ADMIN — LACTULOSE 20 G: 10 SOLUTION ORAL at 10:17

## 2020-10-02 RX ADMIN — FOLIC ACID 1000 MCG: 1 TABLET ORAL at 09:56

## 2020-10-02 RX ADMIN — MAGNESIUM GLUCONATE 500 MG ORAL TABLET 400 MG: 500 TABLET ORAL at 10:16

## 2020-10-02 RX ADMIN — QUETIAPINE FUMARATE 25 MG: 25 TABLET ORAL at 22:04

## 2020-10-02 RX ADMIN — ACETAMINOPHEN 650 MG: 325 TABLET, FILM COATED ORAL at 22:14

## 2020-10-02 RX ADMIN — LACTULOSE 20 G: 10 SOLUTION ORAL at 22:03

## 2020-10-02 RX ADMIN — LEVALBUTEROL HYDROCHLORIDE 1.25 MG: 1.25 SOLUTION, CONCENTRATE RESPIRATORY (INHALATION) at 19:13

## 2020-10-02 RX ADMIN — METOPROLOL SUCCINATE 50 MG: 50 TABLET, EXTENDED RELEASE ORAL at 10:17

## 2020-10-02 RX ADMIN — Medication 10 ML: at 12:20

## 2020-10-02 RX ADMIN — MAGNESIUM GLUCONATE 500 MG ORAL TABLET 400 MG: 500 TABLET ORAL at 18:49

## 2020-10-02 RX ADMIN — DILTIAZEM HYDROCHLORIDE 240 MG: 240 CAPSULE, COATED, EXTENDED RELEASE ORAL at 10:16

## 2020-10-02 RX ADMIN — NICOTINE 1 PATCH: 7 PATCH TRANSDERMAL at 10:19

## 2020-10-02 RX ADMIN — FUROSEMIDE 40 MG: 10 INJECTION, SOLUTION INTRAMUSCULAR; INTRAVENOUS at 09:58

## 2020-10-02 RX ADMIN — NYSTATIN 1 APPLICATION: 100000 POWDER TOPICAL at 10:17

## 2020-10-02 RX ADMIN — MIRTAZAPINE 7.5 MG: 15 TABLET, FILM COATED ORAL at 22:03

## 2020-10-02 RX ADMIN — IPRATROPIUM BROMIDE 0.5 MG: 0.5 SOLUTION RESPIRATORY (INHALATION) at 13:30

## 2020-10-02 RX ADMIN — LEVALBUTEROL HYDROCHLORIDE 1.25 MG: 1.25 SOLUTION, CONCENTRATE RESPIRATORY (INHALATION) at 07:22

## 2020-10-02 RX ADMIN — THIAMINE HCL TAB 100 MG 100 MG: 100 TAB at 10:19

## 2020-10-02 RX ADMIN — Medication 1 TABLET: at 09:57

## 2020-10-02 RX ADMIN — LEVALBUTEROL HYDROCHLORIDE 1.25 MG: 1.25 SOLUTION, CONCENTRATE RESPIRATORY (INHALATION) at 13:30

## 2020-10-02 RX ADMIN — DOCUSATE SODIUM 100 MG: 100 CAPSULE ORAL at 09:56

## 2020-10-03 LAB
ABO GROUP BLD BPU: NORMAL
ANION GAP SERPL CALCULATED.3IONS-SCNC: 12 MMOL/L (ref 4–13)
BASOPHILS # BLD AUTO: 0.02 THOUSANDS/ΜL (ref 0–0.1)
BASOPHILS NFR BLD AUTO: 1 % (ref 0–1)
BPU ID: NORMAL
BUN SERPL-MCNC: 38 MG/DL (ref 5–25)
CALCIUM SERPL-MCNC: 8.9 MG/DL (ref 8.3–10.1)
CHLORIDE SERPL-SCNC: 101 MMOL/L (ref 100–108)
CO2 SERPL-SCNC: 22 MMOL/L (ref 21–32)
CREAT SERPL-MCNC: 2.2 MG/DL (ref 0.6–1.3)
CROSSMATCH: NORMAL
EOSINOPHIL # BLD AUTO: 0.04 THOUSAND/ΜL (ref 0–0.61)
EOSINOPHIL NFR BLD AUTO: 2 % (ref 0–6)
ERYTHROCYTE [DISTWIDTH] IN BLOOD BY AUTOMATED COUNT: 21.6 % (ref 11.6–15.1)
FERRITIN SERPL-MCNC: 2274 NG/ML (ref 8–388)
FOLATE SERPL-MCNC: >20 NG/ML (ref 3.1–17.5)
GFR SERPL CREATININE-BSD FRML MDRD: 25 ML/MIN/1.73SQ M
GLUCOSE SERPL-MCNC: 96 MG/DL (ref 65–140)
HCT VFR BLD AUTO: 21.8 % (ref 34.8–46.1)
HGB BLD-MCNC: 6.8 G/DL (ref 11.5–15.4)
IMM GRANULOCYTES # BLD AUTO: 0.03 THOUSAND/UL (ref 0–0.2)
IMM GRANULOCYTES NFR BLD AUTO: 1 % (ref 0–2)
IRON SATN MFR SERPL: 94 %
IRON SERPL-MCNC: 196 UG/DL (ref 50–170)
LYMPHOCYTES # BLD AUTO: 0.52 THOUSANDS/ΜL (ref 0.6–4.47)
LYMPHOCYTES NFR BLD AUTO: 20 % (ref 14–44)
MCH RBC QN AUTO: 27.6 PG (ref 26.8–34.3)
MCHC RBC AUTO-ENTMCNC: 31.2 G/DL (ref 31.4–37.4)
MCV RBC AUTO: 89 FL (ref 82–98)
MONOCYTES # BLD AUTO: 0.4 THOUSAND/ΜL (ref 0.17–1.22)
MONOCYTES NFR BLD AUTO: 15 % (ref 4–12)
NEUTROPHILS # BLD AUTO: 1.65 THOUSANDS/ΜL (ref 1.85–7.62)
NEUTS SEG NFR BLD AUTO: 61 % (ref 43–75)
NRBC BLD AUTO-RTO: 4 /100 WBCS
PLATELET # BLD AUTO: 85 THOUSANDS/UL (ref 149–390)
PMV BLD AUTO: 9.6 FL (ref 8.9–12.7)
POTASSIUM SERPL-SCNC: 4.9 MMOL/L (ref 3.5–5.3)
RBC # BLD AUTO: 2.46 MILLION/UL (ref 3.81–5.12)
SODIUM SERPL-SCNC: 135 MMOL/L (ref 136–145)
TIBC SERPL-MCNC: 208 UG/DL (ref 250–450)
UNIT DISPENSE STATUS: NORMAL
UNIT PRODUCT CODE: NORMAL
UNIT RH: NORMAL
VIT B12 SERPL-MCNC: 1172 PG/ML (ref 100–900)
WBC # BLD AUTO: 2.66 THOUSAND/UL (ref 4.31–10.16)

## 2020-10-03 PROCEDURE — 80048 BASIC METABOLIC PNL TOTAL CA: CPT | Performed by: FAMILY MEDICINE

## 2020-10-03 PROCEDURE — 94760 N-INVAS EAR/PLS OXIMETRY 1: CPT

## 2020-10-03 PROCEDURE — 94640 AIRWAY INHALATION TREATMENT: CPT

## 2020-10-03 PROCEDURE — 99233 SBSQ HOSP IP/OBS HIGH 50: CPT | Performed by: INTERNAL MEDICINE

## 2020-10-03 PROCEDURE — 83550 IRON BINDING TEST: CPT | Performed by: FAMILY MEDICINE

## 2020-10-03 PROCEDURE — P9016 RBC LEUKOCYTES REDUCED: HCPCS

## 2020-10-03 PROCEDURE — 85025 COMPLETE CBC W/AUTO DIFF WBC: CPT | Performed by: INTERNAL MEDICINE

## 2020-10-03 PROCEDURE — 99232 SBSQ HOSP IP/OBS MODERATE 35: CPT | Performed by: FAMILY MEDICINE

## 2020-10-03 PROCEDURE — 94762 N-INVAS EAR/PLS OXIMTRY CONT: CPT

## 2020-10-03 PROCEDURE — 83540 ASSAY OF IRON: CPT | Performed by: FAMILY MEDICINE

## 2020-10-03 PROCEDURE — 82728 ASSAY OF FERRITIN: CPT | Performed by: FAMILY MEDICINE

## 2020-10-03 PROCEDURE — 82607 VITAMIN B-12: CPT | Performed by: FAMILY MEDICINE

## 2020-10-03 PROCEDURE — 82746 ASSAY OF FOLIC ACID SERUM: CPT | Performed by: FAMILY MEDICINE

## 2020-10-03 RX ORDER — HALOPERIDOL 5 MG/ML
1 INJECTION INTRAMUSCULAR ONCE
Status: COMPLETED | OUTPATIENT
Start: 2020-10-03 | End: 2020-10-03

## 2020-10-03 RX ADMIN — FUROSEMIDE 40 MG: 10 INJECTION, SOLUTION INTRAMUSCULAR; INTRAVENOUS at 08:29

## 2020-10-03 RX ADMIN — Medication 1 TABLET: at 08:22

## 2020-10-03 RX ADMIN — NYSTATIN: 100000 POWDER TOPICAL at 08:34

## 2020-10-03 RX ADMIN — LEVALBUTEROL HYDROCHLORIDE 1.25 MG: 1.25 SOLUTION, CONCENTRATE RESPIRATORY (INHALATION) at 07:18

## 2020-10-03 RX ADMIN — FOLIC ACID 1000 MCG: 1 TABLET ORAL at 08:22

## 2020-10-03 RX ADMIN — METOPROLOL SUCCINATE 50 MG: 50 TABLET, EXTENDED RELEASE ORAL at 08:22

## 2020-10-03 RX ADMIN — LEVALBUTEROL HYDROCHLORIDE 1.25 MG: 1.25 SOLUTION, CONCENTRATE RESPIRATORY (INHALATION) at 19:20

## 2020-10-03 RX ADMIN — DILTIAZEM HYDROCHLORIDE 240 MG: 240 CAPSULE, COATED, EXTENDED RELEASE ORAL at 08:20

## 2020-10-03 RX ADMIN — IPRATROPIUM BROMIDE 0.5 MG: 0.5 SOLUTION RESPIRATORY (INHALATION) at 07:18

## 2020-10-03 RX ADMIN — CEFTRIAXONE SODIUM 1000 MG: 10 INJECTION, POWDER, FOR SOLUTION INTRAVENOUS at 13:28

## 2020-10-03 RX ADMIN — MAGNESIUM GLUCONATE 500 MG ORAL TABLET 400 MG: 500 TABLET ORAL at 17:45

## 2020-10-03 RX ADMIN — LEVALBUTEROL HYDROCHLORIDE 1.25 MG: 1.25 SOLUTION, CONCENTRATE RESPIRATORY (INHALATION) at 13:17

## 2020-10-03 RX ADMIN — MIRTAZAPINE 7.5 MG: 15 TABLET, FILM COATED ORAL at 21:38

## 2020-10-03 RX ADMIN — HALOPERIDOL LACTATE 1 MG: 5 INJECTION, SOLUTION INTRAMUSCULAR at 11:39

## 2020-10-03 RX ADMIN — EPOETIN ALFA 10000 UNITS: 10000 SOLUTION INTRAVENOUS; SUBCUTANEOUS at 14:14

## 2020-10-03 RX ADMIN — IPRATROPIUM BROMIDE 0.5 MG: 0.5 SOLUTION RESPIRATORY (INHALATION) at 13:17

## 2020-10-03 RX ADMIN — NICOTINE 1 PATCH: 7 PATCH TRANSDERMAL at 08:29

## 2020-10-03 RX ADMIN — QUETIAPINE FUMARATE 25 MG: 25 TABLET ORAL at 21:39

## 2020-10-03 RX ADMIN — FUROSEMIDE 40 MG: 10 INJECTION, SOLUTION INTRAMUSCULAR; INTRAVENOUS at 17:45

## 2020-10-03 RX ADMIN — NYSTATIN: 100000 POWDER TOPICAL at 17:45

## 2020-10-03 RX ADMIN — LACTULOSE 20 G: 10 SOLUTION ORAL at 08:29

## 2020-10-03 RX ADMIN — IPRATROPIUM BROMIDE 0.5 MG: 0.5 SOLUTION RESPIRATORY (INHALATION) at 19:20

## 2020-10-03 RX ADMIN — THIAMINE HCL TAB 100 MG 100 MG: 100 TAB at 08:21

## 2020-10-03 RX ADMIN — MAGNESIUM GLUCONATE 500 MG ORAL TABLET 400 MG: 500 TABLET ORAL at 08:21

## 2020-10-04 LAB
ABO GROUP BLD BPU: NORMAL
ANISOCYTOSIS BLD QL SMEAR: PRESENT
BASOPHILS # BLD MANUAL: 0 THOUSAND/UL (ref 0–0.1)
BASOPHILS NFR MAR MANUAL: 0 % (ref 0–1)
BPU ID: NORMAL
CROSSMATCH: NORMAL
EOSINOPHIL # BLD MANUAL: 0.03 THOUSAND/UL (ref 0–0.4)
EOSINOPHIL NFR BLD MANUAL: 1 % (ref 0–6)
ERYTHROCYTE [DISTWIDTH] IN BLOOD BY AUTOMATED COUNT: 20.8 % (ref 11.6–15.1)
HAPTOGLOB SERPL-MCNC: 112 MG/DL (ref 42–346)
HCT VFR BLD AUTO: 22.9 % (ref 34.8–46.1)
HGB BLD-MCNC: 7.1 G/DL (ref 11.5–15.4)
HYPERCHROMIA BLD QL SMEAR: PRESENT
LG PLATELETS BLD QL SMEAR: PRESENT
LYMPHOCYTES # BLD AUTO: 0.67 THOUSAND/UL (ref 0.6–4.47)
LYMPHOCYTES # BLD AUTO: 21 % (ref 14–44)
MCH RBC QN AUTO: 27.8 PG (ref 26.8–34.3)
MCHC RBC AUTO-ENTMCNC: 31 G/DL (ref 31.4–37.4)
MCV RBC AUTO: 90 FL (ref 82–98)
MONOCYTES # BLD AUTO: 0.41 THOUSAND/UL (ref 0–1.22)
MONOCYTES NFR BLD: 13 % (ref 4–12)
NEUTROPHILS # BLD MANUAL: 1.84 THOUSAND/UL (ref 1.85–7.62)
NEUTS SEG NFR BLD AUTO: 58 % (ref 43–75)
NRBC BLD AUTO-RTO: 3 /100 WBC (ref 0–2)
NRBC BLD AUTO-RTO: 3 /100 WBCS
PLATELET # BLD AUTO: 68 THOUSANDS/UL (ref 149–390)
PLATELET BLD QL SMEAR: ABNORMAL
PMV BLD AUTO: 9.8 FL (ref 8.9–12.7)
POLYCHROMASIA BLD QL SMEAR: PRESENT
RBC # BLD AUTO: 2.55 MILLION/UL (ref 3.81–5.12)
TOTAL CELLS COUNTED SPEC: 100
UNIT DISPENSE STATUS: NORMAL
UNIT PRODUCT CODE: NORMAL
UNIT RH: NORMAL
VARIANT LYMPHS # BLD AUTO: 7 %
WBC # BLD AUTO: 3.18 THOUSAND/UL (ref 4.31–10.16)

## 2020-10-04 PROCEDURE — 85027 COMPLETE CBC AUTOMATED: CPT | Performed by: INTERNAL MEDICINE

## 2020-10-04 PROCEDURE — 99232 SBSQ HOSP IP/OBS MODERATE 35: CPT | Performed by: FAMILY MEDICINE

## 2020-10-04 PROCEDURE — 99233 SBSQ HOSP IP/OBS HIGH 50: CPT | Performed by: INTERNAL MEDICINE

## 2020-10-04 PROCEDURE — 94760 N-INVAS EAR/PLS OXIMETRY 1: CPT

## 2020-10-04 PROCEDURE — 94640 AIRWAY INHALATION TREATMENT: CPT

## 2020-10-04 PROCEDURE — 85007 BL SMEAR W/DIFF WBC COUNT: CPT | Performed by: INTERNAL MEDICINE

## 2020-10-04 RX ORDER — FUROSEMIDE 40 MG/1
40 TABLET ORAL
Status: DISCONTINUED | OUTPATIENT
Start: 2020-10-04 | End: 2020-10-07

## 2020-10-04 RX ORDER — ALBUTEROL SULFATE 2.5 MG/3ML
2.5 SOLUTION RESPIRATORY (INHALATION) EVERY 6 HOURS PRN
Status: DISCONTINUED | OUTPATIENT
Start: 2020-10-04 | End: 2020-10-05

## 2020-10-04 RX ORDER — HEPARIN SODIUM 5000 [USP'U]/ML
5000 INJECTION, SOLUTION INTRAVENOUS; SUBCUTANEOUS EVERY 8 HOURS SCHEDULED
Status: DISCONTINUED | OUTPATIENT
Start: 2020-10-04 | End: 2020-10-08 | Stop reason: HOSPADM

## 2020-10-04 RX ADMIN — MAGNESIUM GLUCONATE 500 MG ORAL TABLET 400 MG: 500 TABLET ORAL at 10:10

## 2020-10-04 RX ADMIN — QUETIAPINE FUMARATE 25 MG: 25 TABLET ORAL at 22:20

## 2020-10-04 RX ADMIN — NICOTINE 1 PATCH: 7 PATCH TRANSDERMAL at 10:10

## 2020-10-04 RX ADMIN — LACTULOSE 20 G: 10 SOLUTION ORAL at 22:17

## 2020-10-04 RX ADMIN — NYSTATIN 1 APPLICATION: 100000 POWDER TOPICAL at 10:11

## 2020-10-04 RX ADMIN — THIAMINE HCL TAB 100 MG 100 MG: 100 TAB at 10:11

## 2020-10-04 RX ADMIN — FOLIC ACID 1000 MCG: 1 TABLET ORAL at 10:11

## 2020-10-04 RX ADMIN — Medication 1 TABLET: at 10:10

## 2020-10-04 RX ADMIN — FUROSEMIDE 40 MG: 10 INJECTION, SOLUTION INTRAMUSCULAR; INTRAVENOUS at 10:11

## 2020-10-04 RX ADMIN — MIRTAZAPINE 7.5 MG: 15 TABLET, FILM COATED ORAL at 22:19

## 2020-10-04 RX ADMIN — LEVALBUTEROL HYDROCHLORIDE 1.25 MG: 1.25 SOLUTION, CONCENTRATE RESPIRATORY (INHALATION) at 07:37

## 2020-10-04 RX ADMIN — METOPROLOL SUCCINATE 50 MG: 50 TABLET, EXTENDED RELEASE ORAL at 10:10

## 2020-10-04 RX ADMIN — LACTULOSE 20 G: 10 SOLUTION ORAL at 17:26

## 2020-10-04 RX ADMIN — NYSTATIN 1 APPLICATION: 100000 POWDER TOPICAL at 17:27

## 2020-10-04 RX ADMIN — METOPROLOL TARTRATE 25 MG: 25 TABLET, FILM COATED ORAL at 23:42

## 2020-10-04 RX ADMIN — HEPARIN SODIUM 5000 UNITS: 5000 INJECTION INTRAVENOUS; SUBCUTANEOUS at 22:16

## 2020-10-04 RX ADMIN — MAGNESIUM GLUCONATE 500 MG ORAL TABLET 400 MG: 500 TABLET ORAL at 17:26

## 2020-10-04 RX ADMIN — HEPARIN SODIUM 5000 UNITS: 5000 INJECTION INTRAVENOUS; SUBCUTANEOUS at 17:27

## 2020-10-04 RX ADMIN — FUROSEMIDE 40 MG: 40 TABLET ORAL at 17:26

## 2020-10-04 RX ADMIN — DILTIAZEM HYDROCHLORIDE 240 MG: 240 CAPSULE, COATED, EXTENDED RELEASE ORAL at 10:11

## 2020-10-04 RX ADMIN — IPRATROPIUM BROMIDE 0.5 MG: 0.5 SOLUTION RESPIRATORY (INHALATION) at 07:37

## 2020-10-04 RX ADMIN — LACTULOSE 20 G: 10 SOLUTION ORAL at 10:11

## 2020-10-05 LAB
ANION GAP SERPL CALCULATED.3IONS-SCNC: 11 MMOL/L (ref 4–13)
BACTERIA SPEC BFLD CULT: NO GROWTH
BUN SERPL-MCNC: 40 MG/DL (ref 5–25)
CALCIUM SERPL-MCNC: 9.3 MG/DL (ref 8.3–10.1)
CHLORIDE SERPL-SCNC: 103 MMOL/L (ref 100–108)
CO2 SERPL-SCNC: 28 MMOL/L (ref 21–32)
CREAT SERPL-MCNC: 1.75 MG/DL (ref 0.6–1.3)
ERYTHROCYTE [DISTWIDTH] IN BLOOD BY AUTOMATED COUNT: 21 % (ref 11.6–15.1)
GFR SERPL CREATININE-BSD FRML MDRD: 33 ML/MIN/1.73SQ M
GLUCOSE SERPL-MCNC: 109 MG/DL (ref 65–140)
GRAM STN SPEC: NORMAL
GRAM STN SPEC: NORMAL
HCT VFR BLD AUTO: 25.6 % (ref 34.8–46.1)
HGB BLD-MCNC: 8 G/DL (ref 11.5–15.4)
MCH RBC QN AUTO: 28 PG (ref 26.8–34.3)
MCHC RBC AUTO-ENTMCNC: 31.3 G/DL (ref 31.4–37.4)
MCV RBC AUTO: 90 FL (ref 82–98)
NRBC BLD AUTO-RTO: 3 /100 WBCS
PLATELET # BLD AUTO: 70 THOUSANDS/UL (ref 149–390)
PMV BLD AUTO: 9.7 FL (ref 8.9–12.7)
POTASSIUM SERPL-SCNC: 4.2 MMOL/L (ref 3.5–5.3)
RBC # BLD AUTO: 2.86 MILLION/UL (ref 3.81–5.12)
SODIUM SERPL-SCNC: 142 MMOL/L (ref 136–145)
WBC # BLD AUTO: 4.26 THOUSAND/UL (ref 4.31–10.16)

## 2020-10-05 PROCEDURE — 99223 1ST HOSP IP/OBS HIGH 75: CPT | Performed by: NURSE PRACTITIONER

## 2020-10-05 PROCEDURE — 99232 SBSQ HOSP IP/OBS MODERATE 35: CPT | Performed by: FAMILY MEDICINE

## 2020-10-05 PROCEDURE — 85027 COMPLETE CBC AUTOMATED: CPT | Performed by: INTERNAL MEDICINE

## 2020-10-05 PROCEDURE — 99233 SBSQ HOSP IP/OBS HIGH 50: CPT | Performed by: INTERNAL MEDICINE

## 2020-10-05 PROCEDURE — 80048 BASIC METABOLIC PNL TOTAL CA: CPT | Performed by: FAMILY MEDICINE

## 2020-10-05 PROCEDURE — 94760 N-INVAS EAR/PLS OXIMETRY 1: CPT

## 2020-10-05 PROCEDURE — 94664 DEMO&/EVAL PT USE INHALER: CPT

## 2020-10-05 PROCEDURE — 99222 1ST HOSP IP/OBS MODERATE 55: CPT | Performed by: INTERNAL MEDICINE

## 2020-10-05 RX ORDER — DEXAMETHASONE 4 MG/1
20 TABLET ORAL
Status: DISCONTINUED | OUTPATIENT
Start: 2020-10-06 | End: 2020-10-08 | Stop reason: HOSPADM

## 2020-10-05 RX ADMIN — HEPARIN SODIUM 5000 UNITS: 5000 INJECTION INTRAVENOUS; SUBCUTANEOUS at 17:02

## 2020-10-05 RX ADMIN — HEPARIN SODIUM 5000 UNITS: 5000 INJECTION INTRAVENOUS; SUBCUTANEOUS at 21:00

## 2020-10-05 RX ADMIN — LACTULOSE 20 G: 10 SOLUTION ORAL at 08:01

## 2020-10-05 RX ADMIN — NYSTATIN: 100000 POWDER TOPICAL at 08:15

## 2020-10-05 RX ADMIN — QUETIAPINE FUMARATE 25 MG: 25 TABLET ORAL at 21:01

## 2020-10-05 RX ADMIN — LACTULOSE 20 G: 10 SOLUTION ORAL at 17:01

## 2020-10-05 RX ADMIN — FUROSEMIDE 40 MG: 40 TABLET ORAL at 17:03

## 2020-10-05 RX ADMIN — DILTIAZEM HYDROCHLORIDE 240 MG: 240 CAPSULE, COATED, EXTENDED RELEASE ORAL at 09:36

## 2020-10-05 RX ADMIN — FUROSEMIDE 40 MG: 40 TABLET ORAL at 09:34

## 2020-10-05 RX ADMIN — HEPARIN SODIUM 5000 UNITS: 5000 INJECTION INTRAVENOUS; SUBCUTANEOUS at 05:43

## 2020-10-05 RX ADMIN — THIAMINE HCL TAB 100 MG 100 MG: 100 TAB at 08:07

## 2020-10-05 RX ADMIN — MAGNESIUM GLUCONATE 500 MG ORAL TABLET 400 MG: 500 TABLET ORAL at 08:08

## 2020-10-05 RX ADMIN — METOPROLOL SUCCINATE 50 MG: 50 TABLET, EXTENDED RELEASE ORAL at 09:36

## 2020-10-05 RX ADMIN — LACTULOSE 20 G: 10 SOLUTION ORAL at 21:00

## 2020-10-05 RX ADMIN — OLANZAPINE 5 MG: 2.5 TABLET, FILM COATED ORAL at 00:47

## 2020-10-05 RX ADMIN — Medication 1 TABLET: at 08:01

## 2020-10-05 RX ADMIN — FOLIC ACID 1000 MCG: 1 TABLET ORAL at 08:07

## 2020-10-05 RX ADMIN — NYSTATIN: 100000 POWDER TOPICAL at 17:04

## 2020-10-05 RX ADMIN — MAGNESIUM GLUCONATE 500 MG ORAL TABLET 400 MG: 500 TABLET ORAL at 17:03

## 2020-10-05 RX ADMIN — NICOTINE 1 PATCH: 7 PATCH TRANSDERMAL at 08:12

## 2020-10-05 RX ADMIN — MIRTAZAPINE 7.5 MG: 15 TABLET, FILM COATED ORAL at 21:01

## 2020-10-06 LAB
ERYTHROCYTE [DISTWIDTH] IN BLOOD BY AUTOMATED COUNT: 21.4 % (ref 11.6–15.1)
HCT VFR BLD AUTO: 23.8 % (ref 34.8–46.1)
HGB BLD-MCNC: 7.1 G/DL (ref 11.5–15.4)
MCH RBC QN AUTO: 27 PG (ref 26.8–34.3)
MCHC RBC AUTO-ENTMCNC: 29.8 G/DL (ref 31.4–37.4)
MCV RBC AUTO: 91 FL (ref 82–98)
NRBC BLD AUTO-RTO: 4 /100 WBCS
PLATELET # BLD AUTO: 63 THOUSANDS/UL (ref 149–390)
PMV BLD AUTO: 10.2 FL (ref 8.9–12.7)
RBC # BLD AUTO: 2.63 MILLION/UL (ref 3.81–5.12)
WBC # BLD AUTO: 3.56 THOUSAND/UL (ref 4.31–10.16)

## 2020-10-06 PROCEDURE — 99232 SBSQ HOSP IP/OBS MODERATE 35: CPT | Performed by: INTERNAL MEDICINE

## 2020-10-06 PROCEDURE — 99233 SBSQ HOSP IP/OBS HIGH 50: CPT | Performed by: FAMILY MEDICINE

## 2020-10-06 PROCEDURE — 99233 SBSQ HOSP IP/OBS HIGH 50: CPT | Performed by: INTERNAL MEDICINE

## 2020-10-06 PROCEDURE — 85027 COMPLETE CBC AUTOMATED: CPT | Performed by: INTERNAL MEDICINE

## 2020-10-06 RX ADMIN — NYSTATIN: 100000 POWDER TOPICAL at 09:51

## 2020-10-06 RX ADMIN — THIAMINE HCL TAB 100 MG 100 MG: 100 TAB at 09:35

## 2020-10-06 RX ADMIN — PANTOPRAZOLE SODIUM 40 MG: 40 TABLET, DELAYED RELEASE ORAL at 06:45

## 2020-10-06 RX ADMIN — FOLIC ACID 1000 MCG: 1 TABLET ORAL at 09:35

## 2020-10-06 RX ADMIN — MAGNESIUM GLUCONATE 500 MG ORAL TABLET 400 MG: 500 TABLET ORAL at 09:50

## 2020-10-06 RX ADMIN — FUROSEMIDE 40 MG: 40 TABLET ORAL at 16:13

## 2020-10-06 RX ADMIN — QUETIAPINE FUMARATE 25 MG: 25 TABLET ORAL at 21:04

## 2020-10-06 RX ADMIN — MAGNESIUM GLUCONATE 500 MG ORAL TABLET 400 MG: 500 TABLET ORAL at 18:00

## 2020-10-06 RX ADMIN — LACTULOSE 20 G: 10 SOLUTION ORAL at 09:42

## 2020-10-06 RX ADMIN — MIRTAZAPINE 7.5 MG: 15 TABLET, FILM COATED ORAL at 21:04

## 2020-10-06 RX ADMIN — HEPARIN SODIUM 5000 UNITS: 5000 INJECTION INTRAVENOUS; SUBCUTANEOUS at 21:04

## 2020-10-06 RX ADMIN — DEXAMETHASONE 20 MG: 4 TABLET ORAL at 10:23

## 2020-10-06 RX ADMIN — METOPROLOL TARTRATE 25 MG: 25 TABLET, FILM COATED ORAL at 21:04

## 2020-10-06 RX ADMIN — Medication 1 TABLET: at 09:50

## 2020-10-06 RX ADMIN — NICOTINE 1 PATCH: 7 PATCH TRANSDERMAL at 10:31

## 2020-10-06 RX ADMIN — NYSTATIN: 100000 POWDER TOPICAL at 18:00

## 2020-10-06 RX ADMIN — LACTULOSE 20 G: 10 SOLUTION ORAL at 16:13

## 2020-10-06 RX ADMIN — OLANZAPINE 5 MG: 2.5 TABLET, FILM COATED ORAL at 11:05

## 2020-10-06 RX ADMIN — HEPARIN SODIUM 5000 UNITS: 5000 INJECTION INTRAVENOUS; SUBCUTANEOUS at 06:45

## 2020-10-06 RX ADMIN — HEPARIN SODIUM 5000 UNITS: 5000 INJECTION INTRAVENOUS; SUBCUTANEOUS at 14:38

## 2020-10-06 RX ADMIN — LACTULOSE 20 G: 10 SOLUTION ORAL at 21:04

## 2020-10-07 ENCOUNTER — TELEPHONE (OUTPATIENT)
Dept: CARDIOLOGY CLINIC | Facility: CLINIC | Age: 75
End: 2020-10-07

## 2020-10-07 LAB
ANION GAP SERPL CALCULATED.3IONS-SCNC: 11 MMOL/L (ref 4–13)
BUN SERPL-MCNC: 35 MG/DL (ref 5–25)
CALCIUM SERPL-MCNC: 9.9 MG/DL (ref 8.3–10.1)
CHLORIDE SERPL-SCNC: 104 MMOL/L (ref 100–108)
CO2 SERPL-SCNC: 28 MMOL/L (ref 21–32)
CREAT SERPL-MCNC: 1.54 MG/DL (ref 0.6–1.3)
ERYTHROCYTE [DISTWIDTH] IN BLOOD BY AUTOMATED COUNT: 21.7 % (ref 11.6–15.1)
GFR SERPL CREATININE-BSD FRML MDRD: 38 ML/MIN/1.73SQ M
GLUCOSE SERPL-MCNC: 126 MG/DL (ref 65–140)
HCT VFR BLD AUTO: 28.5 % (ref 34.8–46.1)
HGB BLD-MCNC: 8.7 G/DL (ref 11.5–15.4)
MCH RBC QN AUTO: 27.8 PG (ref 26.8–34.3)
MCHC RBC AUTO-ENTMCNC: 30.5 G/DL (ref 31.4–37.4)
MCV RBC AUTO: 91 FL (ref 82–98)
NRBC BLD AUTO-RTO: 5 /100 WBCS
PLATELET # BLD AUTO: 69 THOUSANDS/UL (ref 149–390)
PMV BLD AUTO: 9.8 FL (ref 8.9–12.7)
POTASSIUM SERPL-SCNC: 4.6 MMOL/L (ref 3.5–5.3)
RBC # BLD AUTO: 3.13 MILLION/UL (ref 3.81–5.12)
SODIUM SERPL-SCNC: 143 MMOL/L (ref 136–145)
WBC # BLD AUTO: 4.32 THOUSAND/UL (ref 4.31–10.16)

## 2020-10-07 PROCEDURE — 99232 SBSQ HOSP IP/OBS MODERATE 35: CPT | Performed by: INTERNAL MEDICINE

## 2020-10-07 PROCEDURE — 99233 SBSQ HOSP IP/OBS HIGH 50: CPT | Performed by: FAMILY MEDICINE

## 2020-10-07 PROCEDURE — 80048 BASIC METABOLIC PNL TOTAL CA: CPT | Performed by: FAMILY MEDICINE

## 2020-10-07 PROCEDURE — 85027 COMPLETE CBC AUTOMATED: CPT | Performed by: INTERNAL MEDICINE

## 2020-10-07 RX ORDER — FUROSEMIDE 20 MG/1
20 TABLET ORAL
Status: DISCONTINUED | OUTPATIENT
Start: 2020-10-07 | End: 2020-10-08 | Stop reason: HOSPADM

## 2020-10-07 RX ADMIN — NYSTATIN: 100000 POWDER TOPICAL at 17:26

## 2020-10-07 RX ADMIN — METOPROLOL SUCCINATE 50 MG: 50 TABLET, EXTENDED RELEASE ORAL at 10:54

## 2020-10-07 RX ADMIN — FOLIC ACID 1000 MCG: 1 TABLET ORAL at 10:54

## 2020-10-07 RX ADMIN — FUROSEMIDE 20 MG: 20 TABLET ORAL at 15:07

## 2020-10-07 RX ADMIN — HEPARIN SODIUM 5000 UNITS: 5000 INJECTION INTRAVENOUS; SUBCUTANEOUS at 14:20

## 2020-10-07 RX ADMIN — MAGNESIUM GLUCONATE 500 MG ORAL TABLET 400 MG: 500 TABLET ORAL at 10:54

## 2020-10-07 RX ADMIN — LACTULOSE 20 G: 10 SOLUTION ORAL at 10:54

## 2020-10-07 RX ADMIN — LACTULOSE 20 G: 10 SOLUTION ORAL at 15:07

## 2020-10-07 RX ADMIN — DILTIAZEM HYDROCHLORIDE 240 MG: 240 CAPSULE, COATED, EXTENDED RELEASE ORAL at 10:54

## 2020-10-07 RX ADMIN — Medication 1 TABLET: at 10:54

## 2020-10-07 RX ADMIN — QUETIAPINE FUMARATE 25 MG: 25 TABLET ORAL at 21:30

## 2020-10-07 RX ADMIN — MAGNESIUM GLUCONATE 500 MG ORAL TABLET 400 MG: 500 TABLET ORAL at 17:26

## 2020-10-07 RX ADMIN — HEPARIN SODIUM 5000 UNITS: 5000 INJECTION INTRAVENOUS; SUBCUTANEOUS at 05:21

## 2020-10-07 RX ADMIN — DEXAMETHASONE 20 MG: 4 TABLET ORAL at 10:53

## 2020-10-07 RX ADMIN — MIRTAZAPINE 7.5 MG: 15 TABLET, FILM COATED ORAL at 21:28

## 2020-10-07 RX ADMIN — NICOTINE 1 PATCH: 7 PATCH TRANSDERMAL at 10:55

## 2020-10-07 RX ADMIN — NYSTATIN: 100000 POWDER TOPICAL at 10:55

## 2020-10-07 RX ADMIN — LACTULOSE 20 G: 10 SOLUTION ORAL at 21:30

## 2020-10-07 RX ADMIN — THIAMINE HCL TAB 100 MG 100 MG: 100 TAB at 10:54

## 2020-10-07 RX ADMIN — HEPARIN SODIUM 5000 UNITS: 5000 INJECTION INTRAVENOUS; SUBCUTANEOUS at 21:30

## 2020-10-08 VITALS
SYSTOLIC BLOOD PRESSURE: 136 MMHG | HEART RATE: 67 BPM | BODY MASS INDEX: 32.05 KG/M2 | TEMPERATURE: 97.6 F | DIASTOLIC BLOOD PRESSURE: 82 MMHG | HEIGHT: 62 IN | RESPIRATION RATE: 20 BRPM | OXYGEN SATURATION: 95 % | WEIGHT: 174.16 LBS

## 2020-10-08 PROCEDURE — 99232 SBSQ HOSP IP/OBS MODERATE 35: CPT | Performed by: INTERNAL MEDICINE

## 2020-10-08 PROCEDURE — 99239 HOSP IP/OBS DSCHRG MGMT >30: CPT | Performed by: FAMILY MEDICINE

## 2020-10-08 RX ORDER — DEXAMETHASONE 4 MG/1
20 TABLET ORAL
Qty: 5 TABLET | Refills: 0 | Status: SHIPPED | OUTPATIENT
Start: 2020-10-09 | End: 2020-10-10

## 2020-10-08 RX ORDER — FUROSEMIDE 20 MG/1
20 TABLET ORAL 2 TIMES DAILY
Refills: 0
Start: 2020-10-08 | End: 2020-10-21 | Stop reason: HOSPADM

## 2020-10-08 RX ORDER — NYSTATIN 100000 [USP'U]/G
POWDER TOPICAL 2 TIMES DAILY
Qty: 15 G | Refills: 0 | Status: SHIPPED | OUTPATIENT
Start: 2020-10-08 | End: 2021-04-28 | Stop reason: HOSPADM

## 2020-10-08 RX ORDER — SIMETHICONE 80 MG
80 TABLET,CHEWABLE ORAL EVERY 6 HOURS PRN
Qty: 30 TABLET | Refills: 0 | Status: SHIPPED | OUTPATIENT
Start: 2020-10-08 | End: 2020-11-19

## 2020-10-08 RX ORDER — OLANZAPINE 5 MG/1
5 TABLET ORAL EVERY 12 HOURS PRN
Qty: 30 TABLET | Refills: 0 | Status: SHIPPED | OUTPATIENT
Start: 2020-10-08 | End: 2020-12-18 | Stop reason: ALTCHOICE

## 2020-10-08 RX ADMIN — METOPROLOL SUCCINATE 50 MG: 50 TABLET, EXTENDED RELEASE ORAL at 08:08

## 2020-10-08 RX ADMIN — DEXAMETHASONE 20 MG: 4 TABLET ORAL at 08:08

## 2020-10-08 RX ADMIN — DILTIAZEM HYDROCHLORIDE 240 MG: 240 CAPSULE, COATED, EXTENDED RELEASE ORAL at 08:08

## 2020-10-08 RX ADMIN — LACTULOSE 20 G: 10 SOLUTION ORAL at 08:07

## 2020-10-08 RX ADMIN — FOLIC ACID 1000 MCG: 1 TABLET ORAL at 08:08

## 2020-10-08 RX ADMIN — OLANZAPINE 5 MG: 2.5 TABLET, FILM COATED ORAL at 08:08

## 2020-10-08 RX ADMIN — FUROSEMIDE 20 MG: 20 TABLET ORAL at 08:08

## 2020-10-08 RX ADMIN — Medication 1 TABLET: at 08:08

## 2020-10-08 RX ADMIN — MAGNESIUM GLUCONATE 500 MG ORAL TABLET 400 MG: 500 TABLET ORAL at 08:08

## 2020-10-08 RX ADMIN — THIAMINE HCL TAB 100 MG 100 MG: 100 TAB at 08:08

## 2020-10-08 RX ADMIN — PANTOPRAZOLE SODIUM 40 MG: 40 TABLET, DELAYED RELEASE ORAL at 05:57

## 2020-10-08 RX ADMIN — HEPARIN SODIUM 5000 UNITS: 5000 INJECTION INTRAVENOUS; SUBCUTANEOUS at 05:57

## 2020-10-08 RX ADMIN — NYSTATIN: 100000 POWDER TOPICAL at 08:13

## 2020-10-09 ENCOUNTER — TELEPHONE (OUTPATIENT)
Dept: INTERNAL MEDICINE CLINIC | Facility: CLINIC | Age: 75
End: 2020-10-09

## 2020-10-09 ENCOUNTER — TRANSITIONAL CARE MANAGEMENT (OUTPATIENT)
Dept: INTERNAL MEDICINE CLINIC | Facility: CLINIC | Age: 75
End: 2020-10-09

## 2020-10-13 ENCOUNTER — HOSPITAL ENCOUNTER (INPATIENT)
Facility: HOSPITAL | Age: 75
LOS: 7 days | Discharge: HOME HEALTH | DRG: 682 | End: 2020-10-21
Attending: EMERGENCY MEDICINE | Admitting: INTERNAL MEDICINE
Payer: MEDICARE

## 2020-10-13 DIAGNOSIS — C90.01 MULTIPLE MYELOMA IN REMISSION (HCC): ICD-10-CM

## 2020-10-13 DIAGNOSIS — N17.9 AKI (ACUTE KIDNEY INJURY) (HCC): Primary | ICD-10-CM

## 2020-10-13 DIAGNOSIS — K70.31 ALCOHOLIC CIRRHOSIS OF LIVER WITH ASCITES (HCC): ICD-10-CM

## 2020-10-13 DIAGNOSIS — R41.3 MEMORY DIFFICULTIES: ICD-10-CM

## 2020-10-13 DIAGNOSIS — R33.9 URINARY RETENTION: ICD-10-CM

## 2020-10-13 DIAGNOSIS — R26.9 GAIT DISTURBANCE: ICD-10-CM

## 2020-10-13 DIAGNOSIS — I50.32 CHRONIC DIASTOLIC CHF (CONGESTIVE HEART FAILURE) (HCC): ICD-10-CM

## 2020-10-13 DIAGNOSIS — N17.0: ICD-10-CM

## 2020-10-13 DIAGNOSIS — N18.9: ICD-10-CM

## 2020-10-13 DIAGNOSIS — N39.46 MIXED STRESS AND URGE URINARY INCONTINENCE: ICD-10-CM

## 2020-10-13 PROBLEM — R74.8 ELEVATED ALKALINE PHOSPHATASE LEVEL: Status: ACTIVE | Noted: 2020-10-13

## 2020-10-13 LAB
ALBUMIN SERPL BCP-MCNC: 3.2 G/DL (ref 3.5–5)
ALP SERPL-CCNC: 228 U/L (ref 46–116)
ALT SERPL W P-5'-P-CCNC: 45 U/L (ref 12–78)
AMMONIA PLAS-SCNC: 11 UMOL/L (ref 11–35)
AMORPH URATE CRY URNS QL MICRO: ABNORMAL /HPF
ANION GAP SERPL CALCULATED.3IONS-SCNC: 8 MMOL/L (ref 4–13)
APTT PPP: 30 SECONDS (ref 23–37)
AST SERPL W P-5'-P-CCNC: 37 U/L (ref 5–45)
ATRIAL RATE: 120 BPM
ATRIAL RATE: 72 BPM
BACTERIA UR QL AUTO: ABNORMAL /HPF
BASOPHILS # BLD MANUAL: 0 THOUSAND/UL (ref 0–0.1)
BASOPHILS NFR MAR MANUAL: 0 % (ref 0–1)
BILIRUB DIRECT SERPL-MCNC: 1.31 MG/DL (ref 0–0.2)
BILIRUB SERPL-MCNC: 2.3 MG/DL (ref 0.2–1)
BILIRUB UR QL STRIP: NEGATIVE
BUN SERPL-MCNC: 63 MG/DL (ref 5–25)
CALCIUM SERPL-MCNC: 9.1 MG/DL (ref 8.3–10.1)
CHLORIDE SERPL-SCNC: 101 MMOL/L (ref 100–108)
CLARITY UR: CLEAR
CO2 SERPL-SCNC: 28 MMOL/L (ref 21–32)
COLOR UR: YELLOW
CREAT SERPL-MCNC: 3.84 MG/DL (ref 0.6–1.3)
EOSINOPHIL # BLD MANUAL: 0.05 THOUSAND/UL (ref 0–0.4)
EOSINOPHIL NFR BLD MANUAL: 1 % (ref 0–6)
ERYTHROCYTE [DISTWIDTH] IN BLOOD BY AUTOMATED COUNT: 21.6 % (ref 11.6–15.1)
FINE GRAN CASTS URNS QL MICRO: ABNORMAL /LPF
GFR SERPL CREATININE-BSD FRML MDRD: 13 ML/MIN/1.73SQ M
GLUCOSE SERPL-MCNC: 230 MG/DL (ref 65–140)
GLUCOSE UR STRIP-MCNC: NEGATIVE MG/DL
HCT VFR BLD AUTO: 27.1 % (ref 34.8–46.1)
HGB BLD-MCNC: 8.3 G/DL (ref 11.5–15.4)
HGB UR QL STRIP.AUTO: ABNORMAL
HYALINE CASTS #/AREA URNS LPF: ABNORMAL /LPF
HYPERCHROMIA BLD QL SMEAR: PRESENT
INR PPP: 1.11 (ref 0.84–1.19)
KETONES UR STRIP-MCNC: NEGATIVE MG/DL
LEUKOCYTE ESTERASE UR QL STRIP: ABNORMAL
LYMPHOCYTES # BLD AUTO: 0.29 THOUSAND/UL (ref 0.6–4.47)
LYMPHOCYTES # BLD AUTO: 6 % (ref 14–44)
MCH RBC QN AUTO: 27.8 PG (ref 26.8–34.3)
MCHC RBC AUTO-ENTMCNC: 30.6 G/DL (ref 31.4–37.4)
MCV RBC AUTO: 91 FL (ref 82–98)
MONOCYTES # BLD AUTO: 0.48 THOUSAND/UL (ref 0–1.22)
MONOCYTES NFR BLD: 10 % (ref 4–12)
NEUTROPHILS # BLD MANUAL: 3.94 THOUSAND/UL (ref 1.85–7.62)
NEUTS SEG NFR BLD AUTO: 83 % (ref 43–75)
NITRITE UR QL STRIP: NEGATIVE
NON-SQ EPI CELLS URNS QL MICRO: ABNORMAL /HPF
NRBC BLD AUTO-RTO: 10 /100 WBCS
NRBC BLD AUTO-RTO: 12 /100 WBC (ref 0–2)
NT-PROBNP SERPL-MCNC: 3044 PG/ML
PH UR STRIP.AUTO: 6 [PH]
PLATELET # BLD AUTO: 77 THOUSANDS/UL (ref 149–390)
PLATELET BLD QL SMEAR: ABNORMAL
PMV BLD AUTO: 9 FL (ref 8.9–12.7)
POTASSIUM SERPL-SCNC: 4.5 MMOL/L (ref 3.5–5.3)
PROT SERPL-MCNC: 6.4 G/DL (ref 6.4–8.2)
PROT UR STRIP-MCNC: ABNORMAL MG/DL
PROTHROMBIN TIME: 14.5 SECONDS (ref 11.6–14.5)
QRS AXIS: 102 DEGREES
QRS AXIS: 102 DEGREES
QRSD INTERVAL: 70 MS
QRSD INTERVAL: 72 MS
QT INTERVAL: 372 MS
QT INTERVAL: 394 MS
QTC INTERVAL: 449 MS
QTC INTERVAL: 457 MS
RBC # BLD AUTO: 2.99 MILLION/UL (ref 3.81–5.12)
RBC #/AREA URNS AUTO: ABNORMAL /HPF
SODIUM SERPL-SCNC: 137 MMOL/L (ref 136–145)
SP GR UR STRIP.AUTO: 1.01 (ref 1–1.03)
T WAVE AXIS: 173 DEGREES
T WAVE AXIS: 65 DEGREES
TOTAL CELLS COUNTED SPEC: 100
TROPONIN I SERPL-MCNC: <0.02 NG/ML
UROBILINOGEN UR QL STRIP.AUTO: 0.2 E.U./DL
VENTRICULAR RATE: 78 BPM
VENTRICULAR RATE: 91 BPM
WBC # BLD AUTO: 4.75 THOUSAND/UL (ref 4.31–10.16)
WBC #/AREA URNS AUTO: ABNORMAL /HPF

## 2020-10-13 PROCEDURE — 93010 ELECTROCARDIOGRAM REPORT: CPT | Performed by: INTERNAL MEDICINE

## 2020-10-13 PROCEDURE — 99285 EMERGENCY DEPT VISIT HI MDM: CPT | Performed by: PHYSICIAN ASSISTANT

## 2020-10-13 PROCEDURE — 81001 URINALYSIS AUTO W/SCOPE: CPT | Performed by: PHYSICIAN ASSISTANT

## 2020-10-13 PROCEDURE — 84484 ASSAY OF TROPONIN QUANT: CPT | Performed by: PHYSICIAN ASSISTANT

## 2020-10-13 PROCEDURE — 85007 BL SMEAR W/DIFF WBC COUNT: CPT | Performed by: PHYSICIAN ASSISTANT

## 2020-10-13 PROCEDURE — 99285 EMERGENCY DEPT VISIT HI MDM: CPT

## 2020-10-13 PROCEDURE — 93005 ELECTROCARDIOGRAM TRACING: CPT

## 2020-10-13 PROCEDURE — 82140 ASSAY OF AMMONIA: CPT | Performed by: PHYSICIAN ASSISTANT

## 2020-10-13 PROCEDURE — 85730 THROMBOPLASTIN TIME PARTIAL: CPT | Performed by: PHYSICIAN ASSISTANT

## 2020-10-13 PROCEDURE — 36415 COLL VENOUS BLD VENIPUNCTURE: CPT | Performed by: PHYSICIAN ASSISTANT

## 2020-10-13 PROCEDURE — 80048 BASIC METABOLIC PNL TOTAL CA: CPT | Performed by: PHYSICIAN ASSISTANT

## 2020-10-13 PROCEDURE — 99220 PR INITIAL OBSERVATION CARE/DAY 70 MINUTES: CPT | Performed by: PHYSICIAN ASSISTANT

## 2020-10-13 PROCEDURE — 85610 PROTHROMBIN TIME: CPT | Performed by: PHYSICIAN ASSISTANT

## 2020-10-13 PROCEDURE — 83880 ASSAY OF NATRIURETIC PEPTIDE: CPT | Performed by: PHYSICIAN ASSISTANT

## 2020-10-13 PROCEDURE — 85027 COMPLETE CBC AUTOMATED: CPT | Performed by: PHYSICIAN ASSISTANT

## 2020-10-13 PROCEDURE — 80076 HEPATIC FUNCTION PANEL: CPT | Performed by: PHYSICIAN ASSISTANT

## 2020-10-13 RX ORDER — NICOTINE 21 MG/24HR
1 PATCH, TRANSDERMAL 24 HOURS TRANSDERMAL DAILY
Status: DISCONTINUED | OUTPATIENT
Start: 2020-10-14 | End: 2020-10-21 | Stop reason: HOSPADM

## 2020-10-13 RX ORDER — FOLIC ACID 1 MG/1
1000 TABLET ORAL DAILY
Status: DISCONTINUED | OUTPATIENT
Start: 2020-10-14 | End: 2020-10-21 | Stop reason: HOSPADM

## 2020-10-13 RX ORDER — DOCUSATE SODIUM 100 MG/1
100 CAPSULE, LIQUID FILLED ORAL 2 TIMES DAILY PRN
Status: DISCONTINUED | OUTPATIENT
Start: 2020-10-13 | End: 2020-10-21 | Stop reason: HOSPADM

## 2020-10-13 RX ORDER — ECHINACEA PURPUREA EXTRACT 125 MG
1 TABLET ORAL EVERY 2 HOUR PRN
Status: DISCONTINUED | OUTPATIENT
Start: 2020-10-13 | End: 2020-10-21 | Stop reason: HOSPADM

## 2020-10-13 RX ORDER — FUROSEMIDE 10 MG/ML
20 INJECTION INTRAMUSCULAR; INTRAVENOUS
Status: DISCONTINUED | OUTPATIENT
Start: 2020-10-13 | End: 2020-10-15

## 2020-10-13 RX ORDER — PANTOPRAZOLE SODIUM 40 MG/1
40 TABLET, DELAYED RELEASE ORAL
Status: DISCONTINUED | OUTPATIENT
Start: 2020-10-14 | End: 2020-10-21 | Stop reason: HOSPADM

## 2020-10-13 RX ORDER — SIMETHICONE 80 MG
80 TABLET,CHEWABLE ORAL EVERY 6 HOURS PRN
Status: DISCONTINUED | OUTPATIENT
Start: 2020-10-13 | End: 2020-10-21 | Stop reason: HOSPADM

## 2020-10-13 RX ORDER — DILTIAZEM HYDROCHLORIDE 240 MG/1
240 CAPSULE, COATED, EXTENDED RELEASE ORAL DAILY
Status: DISCONTINUED | OUTPATIENT
Start: 2020-10-14 | End: 2020-10-21 | Stop reason: HOSPADM

## 2020-10-13 RX ORDER — NYSTATIN 100000 [USP'U]/G
POWDER TOPICAL 2 TIMES DAILY
Status: DISCONTINUED | OUTPATIENT
Start: 2020-10-13 | End: 2020-10-21 | Stop reason: HOSPADM

## 2020-10-13 RX ORDER — LACTULOSE 20 G/30ML
20 SOLUTION ORAL 3 TIMES DAILY
Status: DISCONTINUED | OUTPATIENT
Start: 2020-10-13 | End: 2020-10-21 | Stop reason: HOSPADM

## 2020-10-13 RX ORDER — METOPROLOL SUCCINATE 50 MG/1
50 TABLET, EXTENDED RELEASE ORAL DAILY
Status: DISCONTINUED | OUTPATIENT
Start: 2020-10-14 | End: 2020-10-21 | Stop reason: HOSPADM

## 2020-10-13 RX ORDER — THIAMINE MONONITRATE (VIT B1) 100 MG
100 TABLET ORAL DAILY
Status: DISCONTINUED | OUTPATIENT
Start: 2020-10-14 | End: 2020-10-21 | Stop reason: HOSPADM

## 2020-10-13 RX ORDER — QUETIAPINE FUMARATE 25 MG/1
25 TABLET, FILM COATED ORAL
Status: DISCONTINUED | OUTPATIENT
Start: 2020-10-13 | End: 2020-10-21 | Stop reason: HOSPADM

## 2020-10-13 RX ORDER — MIRTAZAPINE 15 MG/1
7.5 TABLET, FILM COATED ORAL
Status: DISCONTINUED | OUTPATIENT
Start: 2020-10-13 | End: 2020-10-21 | Stop reason: HOSPADM

## 2020-10-13 RX ORDER — MAGNESIUM HYDROXIDE/ALUMINUM HYDROXICE/SIMETHICONE 120; 1200; 1200 MG/30ML; MG/30ML; MG/30ML
30 SUSPENSION ORAL EVERY 6 HOURS PRN
Status: DISCONTINUED | OUTPATIENT
Start: 2020-10-13 | End: 2020-10-21 | Stop reason: HOSPADM

## 2020-10-13 RX ADMIN — QUETIAPINE FUMARATE 25 MG: 25 TABLET ORAL at 22:16

## 2020-10-13 RX ADMIN — MIRTAZAPINE 7.5 MG: 15 TABLET, FILM COATED ORAL at 22:16

## 2020-10-14 ENCOUNTER — TELEPHONE (OUTPATIENT)
Dept: NEPHROLOGY | Facility: CLINIC | Age: 75
End: 2020-10-14

## 2020-10-14 LAB
ALBUMIN SERPL BCP-MCNC: 2.8 G/DL (ref 3.5–5)
ALP SERPL-CCNC: 199 U/L (ref 46–116)
ALT SERPL W P-5'-P-CCNC: 37 U/L (ref 12–78)
ANION GAP SERPL CALCULATED.3IONS-SCNC: 8 MMOL/L (ref 4–13)
AST SERPL W P-5'-P-CCNC: 30 U/L (ref 5–45)
BACTERIA UR QL AUTO: ABNORMAL /HPF
BILIRUB SERPL-MCNC: 1.9 MG/DL (ref 0.2–1)
BILIRUB UR QL STRIP: NEGATIVE
BUN SERPL-MCNC: 61 MG/DL (ref 5–25)
CALCIUM ALBUM COR SERPL-MCNC: 10.1 MG/DL (ref 8.3–10.1)
CALCIUM SERPL-MCNC: 9.1 MG/DL (ref 8.3–10.1)
CHLORIDE SERPL-SCNC: 102 MMOL/L (ref 100–108)
CHLORIDE UR-SCNC: 78 MMOL/L
CLARITY UR: ABNORMAL
CO2 SERPL-SCNC: 29 MMOL/L (ref 21–32)
COLOR UR: YELLOW
CREAT SERPL-MCNC: 3.81 MG/DL (ref 0.6–1.3)
ERYTHROCYTE [DISTWIDTH] IN BLOOD BY AUTOMATED COUNT: 21.5 % (ref 11.6–15.1)
EST. AVERAGE GLUCOSE BLD GHB EST-MCNC: 123 MG/DL
GFR SERPL CREATININE-BSD FRML MDRD: 13 ML/MIN/1.73SQ M
GLUCOSE P FAST SERPL-MCNC: 111 MG/DL (ref 65–99)
GLUCOSE SERPL-MCNC: 111 MG/DL (ref 65–140)
GLUCOSE SERPL-MCNC: 159 MG/DL (ref 65–140)
GLUCOSE UR STRIP-MCNC: NEGATIVE MG/DL
HBA1C MFR BLD: 5.9 %
HCT VFR BLD AUTO: 23.8 % (ref 34.8–46.1)
HGB BLD-MCNC: 7.5 G/DL (ref 11.5–15.4)
HGB UR QL STRIP.AUTO: ABNORMAL
KETONES UR STRIP-MCNC: NEGATIVE MG/DL
LEUKOCYTE ESTERASE UR QL STRIP: ABNORMAL
MCH RBC QN AUTO: 28.4 PG (ref 26.8–34.3)
MCHC RBC AUTO-ENTMCNC: 31.5 G/DL (ref 31.4–37.4)
MCV RBC AUTO: 90 FL (ref 82–98)
NITRITE UR QL STRIP: NEGATIVE
NON-SQ EPI CELLS URNS QL MICRO: ABNORMAL /HPF
OSMOLALITY UR: 325 MMOL/KG
OTHER STN SPEC: ABNORMAL
PH UR STRIP.AUTO: 6 [PH]
PLATELET # BLD AUTO: 64 THOUSANDS/UL (ref 149–390)
PMV BLD AUTO: 9 FL (ref 8.9–12.7)
POTASSIUM SERPL-SCNC: 4.7 MMOL/L (ref 3.5–5.3)
PROT SERPL-MCNC: 5.4 G/DL (ref 6.4–8.2)
PROT UR STRIP-MCNC: NEGATIVE MG/DL
RBC # BLD AUTO: 2.64 MILLION/UL (ref 3.81–5.12)
RBC #/AREA URNS AUTO: ABNORMAL /HPF
SODIUM 24H UR-SCNC: 53 MOL/L
SODIUM SERPL-SCNC: 139 MMOL/L (ref 136–145)
SP GR UR STRIP.AUTO: 1.01 (ref 1–1.03)
UROBILINOGEN UR QL STRIP.AUTO: 0.2 E.U./DL
WBC # BLD AUTO: 3.66 THOUSAND/UL (ref 4.31–10.16)
WBC #/AREA URNS AUTO: ABNORMAL /HPF

## 2020-10-14 PROCEDURE — 0T9B70Z DRAINAGE OF BLADDER WITH DRAINAGE DEVICE, VIA NATURAL OR ARTIFICIAL OPENING: ICD-10-PCS | Performed by: STUDENT IN AN ORGANIZED HEALTH CARE EDUCATION/TRAINING PROGRAM

## 2020-10-14 PROCEDURE — 83036 HEMOGLOBIN GLYCOSYLATED A1C: CPT | Performed by: PHYSICIAN ASSISTANT

## 2020-10-14 PROCEDURE — 99215 OFFICE O/P EST HI 40 MIN: CPT | Performed by: INTERNAL MEDICINE

## 2020-10-14 PROCEDURE — 82948 REAGENT STRIP/BLOOD GLUCOSE: CPT

## 2020-10-14 PROCEDURE — 80053 COMPREHEN METABOLIC PANEL: CPT | Performed by: PHYSICIAN ASSISTANT

## 2020-10-14 PROCEDURE — 81001 URINALYSIS AUTO W/SCOPE: CPT | Performed by: INTERNAL MEDICINE

## 2020-10-14 PROCEDURE — 99232 SBSQ HOSP IP/OBS MODERATE 35: CPT | Performed by: INTERNAL MEDICINE

## 2020-10-14 PROCEDURE — 85027 COMPLETE CBC AUTOMATED: CPT | Performed by: PHYSICIAN ASSISTANT

## 2020-10-14 PROCEDURE — 84300 ASSAY OF URINE SODIUM: CPT | Performed by: INTERNAL MEDICINE

## 2020-10-14 PROCEDURE — 82436 ASSAY OF URINE CHLORIDE: CPT | Performed by: INTERNAL MEDICINE

## 2020-10-14 PROCEDURE — 83935 ASSAY OF URINE OSMOLALITY: CPT | Performed by: INTERNAL MEDICINE

## 2020-10-14 RX ADMIN — Medication 1 TABLET: at 09:51

## 2020-10-14 RX ADMIN — MIRTAZAPINE 7.5 MG: 15 TABLET, FILM COATED ORAL at 21:18

## 2020-10-14 RX ADMIN — MAGNESIUM GLUCONATE 500 MG ORAL TABLET 400 MG: 500 TABLET ORAL at 17:50

## 2020-10-14 RX ADMIN — NICOTINE 1 PATCH: 14 PATCH TRANSDERMAL at 09:49

## 2020-10-14 RX ADMIN — MAGNESIUM GLUCONATE 500 MG ORAL TABLET 400 MG: 500 TABLET ORAL at 09:51

## 2020-10-14 RX ADMIN — LACTULOSE 20 G: 10 SOLUTION ORAL at 10:07

## 2020-10-14 RX ADMIN — METOPROLOL SUCCINATE 50 MG: 50 TABLET, EXTENDED RELEASE ORAL at 09:51

## 2020-10-14 RX ADMIN — FUROSEMIDE 20 MG: 10 INJECTION, SOLUTION INTRAMUSCULAR; INTRAVENOUS at 09:56

## 2020-10-14 RX ADMIN — DILTIAZEM HYDROCHLORIDE 240 MG: 240 CAPSULE, EXTENDED RELEASE ORAL at 09:50

## 2020-10-14 RX ADMIN — QUETIAPINE FUMARATE 25 MG: 25 TABLET ORAL at 21:18

## 2020-10-14 RX ADMIN — PANTOPRAZOLE SODIUM 40 MG: 40 TABLET, DELAYED RELEASE ORAL at 09:50

## 2020-10-14 RX ADMIN — LACTULOSE 20 G: 10 SOLUTION ORAL at 17:50

## 2020-10-14 RX ADMIN — NYSTATIN: 100000 POWDER TOPICAL at 10:32

## 2020-10-14 RX ADMIN — FOLIC ACID 1000 MCG: 1 TABLET ORAL at 09:50

## 2020-10-14 RX ADMIN — NYSTATIN: 100000 POWDER TOPICAL at 18:23

## 2020-10-14 RX ADMIN — THIAMINE HCL TAB 100 MG 100 MG: 100 TAB at 09:51

## 2020-10-15 LAB
25(OH)D3 SERPL-MCNC: 16.5 NG/ML (ref 30–100)
AMMONIA PLAS-SCNC: 32 UMOL/L (ref 11–35)
ANION GAP SERPL CALCULATED.3IONS-SCNC: 9 MMOL/L (ref 4–13)
BUN SERPL-MCNC: 56 MG/DL (ref 5–25)
CALCIUM SERPL-MCNC: 9 MG/DL (ref 8.3–10.1)
CHLORIDE SERPL-SCNC: 101 MMOL/L (ref 100–108)
CO2 SERPL-SCNC: 28 MMOL/L (ref 21–32)
CREAT SERPL-MCNC: 3.43 MG/DL (ref 0.6–1.3)
ERYTHROCYTE [DISTWIDTH] IN BLOOD BY AUTOMATED COUNT: 21.5 % (ref 11.6–15.1)
GFR SERPL CREATININE-BSD FRML MDRD: 15 ML/MIN/1.73SQ M
GLUCOSE SERPL-MCNC: 112 MG/DL (ref 65–140)
HCT VFR BLD AUTO: 24.8 % (ref 34.8–46.1)
HGB BLD-MCNC: 7.7 G/DL (ref 11.5–15.4)
MCH RBC QN AUTO: 27.6 PG (ref 26.8–34.3)
MCHC RBC AUTO-ENTMCNC: 31 G/DL (ref 31.4–37.4)
MCV RBC AUTO: 89 FL (ref 82–98)
NRBC BLD AUTO-RTO: 6 /100 WBCS
PHOSPHATE SERPL-MCNC: 3.6 MG/DL (ref 2.3–4.1)
PLATELET # BLD AUTO: 71 THOUSANDS/UL (ref 149–390)
PMV BLD AUTO: 10 FL (ref 8.9–12.7)
POTASSIUM SERPL-SCNC: 5.3 MMOL/L (ref 3.5–5.3)
PTH-INTACT SERPL-MCNC: 100.2 PG/ML (ref 18.4–80.1)
RBC # BLD AUTO: 2.79 MILLION/UL (ref 3.81–5.12)
SODIUM SERPL-SCNC: 138 MMOL/L (ref 136–145)
URATE SERPL-MCNC: 10.6 MG/DL (ref 2–6.8)
WBC # BLD AUTO: 3.7 THOUSAND/UL (ref 4.31–10.16)

## 2020-10-15 PROCEDURE — 82140 ASSAY OF AMMONIA: CPT | Performed by: INTERNAL MEDICINE

## 2020-10-15 PROCEDURE — 84100 ASSAY OF PHOSPHORUS: CPT | Performed by: INTERNAL MEDICINE

## 2020-10-15 PROCEDURE — 83970 ASSAY OF PARATHORMONE: CPT | Performed by: INTERNAL MEDICINE

## 2020-10-15 PROCEDURE — 85027 COMPLETE CBC AUTOMATED: CPT | Performed by: INTERNAL MEDICINE

## 2020-10-15 PROCEDURE — 99232 SBSQ HOSP IP/OBS MODERATE 35: CPT | Performed by: INTERNAL MEDICINE

## 2020-10-15 PROCEDURE — 82306 VITAMIN D 25 HYDROXY: CPT | Performed by: INTERNAL MEDICINE

## 2020-10-15 PROCEDURE — 84550 ASSAY OF BLOOD/URIC ACID: CPT | Performed by: INTERNAL MEDICINE

## 2020-10-15 PROCEDURE — 80048 BASIC METABOLIC PNL TOTAL CA: CPT | Performed by: INTERNAL MEDICINE

## 2020-10-15 RX ORDER — FUROSEMIDE 20 MG/1
20 TABLET ORAL
Status: DISCONTINUED | OUTPATIENT
Start: 2020-10-15 | End: 2020-10-21 | Stop reason: HOSPADM

## 2020-10-15 RX ADMIN — PANTOPRAZOLE SODIUM 40 MG: 40 TABLET, DELAYED RELEASE ORAL at 06:14

## 2020-10-15 RX ADMIN — QUETIAPINE FUMARATE 25 MG: 25 TABLET ORAL at 23:27

## 2020-10-15 RX ADMIN — MAGNESIUM GLUCONATE 500 MG ORAL TABLET 400 MG: 500 TABLET ORAL at 09:48

## 2020-10-15 RX ADMIN — LACTULOSE 20 G: 10 SOLUTION ORAL at 00:04

## 2020-10-15 RX ADMIN — THIAMINE HCL TAB 100 MG 100 MG: 100 TAB at 09:48

## 2020-10-15 RX ADMIN — LACTULOSE 20 G: 10 SOLUTION ORAL at 09:49

## 2020-10-15 RX ADMIN — FUROSEMIDE 20 MG: 10 INJECTION, SOLUTION INTRAMUSCULAR; INTRAVENOUS at 09:48

## 2020-10-15 RX ADMIN — LACTULOSE 20 G: 10 SOLUTION ORAL at 23:27

## 2020-10-15 RX ADMIN — NYSTATIN: 100000 POWDER TOPICAL at 09:57

## 2020-10-15 RX ADMIN — FUROSEMIDE 20 MG: 20 TABLET ORAL at 17:25

## 2020-10-15 RX ADMIN — MAGNESIUM GLUCONATE 500 MG ORAL TABLET 400 MG: 500 TABLET ORAL at 17:25

## 2020-10-15 RX ADMIN — NICOTINE 1 PATCH: 14 PATCH TRANSDERMAL at 09:49

## 2020-10-15 RX ADMIN — FOLIC ACID 1000 MCG: 1 TABLET ORAL at 09:48

## 2020-10-15 RX ADMIN — DILTIAZEM HYDROCHLORIDE 240 MG: 240 CAPSULE, EXTENDED RELEASE ORAL at 09:48

## 2020-10-15 RX ADMIN — METOPROLOL SUCCINATE 50 MG: 50 TABLET, EXTENDED RELEASE ORAL at 09:48

## 2020-10-15 RX ADMIN — MIRTAZAPINE 7.5 MG: 15 TABLET, FILM COATED ORAL at 23:28

## 2020-10-15 RX ADMIN — Medication 1 TABLET: at 09:48

## 2020-10-15 RX ADMIN — NYSTATIN: 100000 POWDER TOPICAL at 17:53

## 2020-10-15 RX ADMIN — LACTULOSE 20 G: 10 SOLUTION ORAL at 17:25

## 2020-10-16 LAB
ANION GAP SERPL CALCULATED.3IONS-SCNC: 11 MMOL/L (ref 4–13)
BUN SERPL-MCNC: 52 MG/DL (ref 5–25)
CALCIUM SERPL-MCNC: 9.4 MG/DL (ref 8.3–10.1)
CHLORIDE SERPL-SCNC: 105 MMOL/L (ref 100–108)
CO2 SERPL-SCNC: 24 MMOL/L (ref 21–32)
CREAT SERPL-MCNC: 3.17 MG/DL (ref 0.6–1.3)
ERYTHROCYTE [DISTWIDTH] IN BLOOD BY AUTOMATED COUNT: 22.3 % (ref 11.6–15.1)
GFR SERPL CREATININE-BSD FRML MDRD: 16 ML/MIN/1.73SQ M
GLUCOSE SERPL-MCNC: 110 MG/DL (ref 65–140)
HCT VFR BLD AUTO: 27.1 % (ref 34.8–46.1)
HGB BLD-MCNC: 8.4 G/DL (ref 11.5–15.4)
MCH RBC QN AUTO: 28.2 PG (ref 26.8–34.3)
MCHC RBC AUTO-ENTMCNC: 31 G/DL (ref 31.4–37.4)
MCV RBC AUTO: 91 FL (ref 82–98)
NRBC BLD AUTO-RTO: 4 /100 WBCS
PLATELET # BLD AUTO: 70 THOUSANDS/UL (ref 149–390)
POTASSIUM SERPL-SCNC: 4.3 MMOL/L (ref 3.5–5.3)
RBC # BLD AUTO: 2.98 MILLION/UL (ref 3.81–5.12)
SODIUM SERPL-SCNC: 140 MMOL/L (ref 136–145)
WBC # BLD AUTO: 3.33 THOUSAND/UL (ref 4.31–10.16)

## 2020-10-16 PROCEDURE — 80048 BASIC METABOLIC PNL TOTAL CA: CPT | Performed by: INTERNAL MEDICINE

## 2020-10-16 PROCEDURE — 99233 SBSQ HOSP IP/OBS HIGH 50: CPT | Performed by: INTERNAL MEDICINE

## 2020-10-16 PROCEDURE — 99232 SBSQ HOSP IP/OBS MODERATE 35: CPT | Performed by: INTERNAL MEDICINE

## 2020-10-16 PROCEDURE — 85027 COMPLETE CBC AUTOMATED: CPT | Performed by: INTERNAL MEDICINE

## 2020-10-16 RX ORDER — HEPARIN SODIUM 5000 [USP'U]/ML
5000 INJECTION, SOLUTION INTRAVENOUS; SUBCUTANEOUS EVERY 8 HOURS SCHEDULED
Status: DISCONTINUED | OUTPATIENT
Start: 2020-10-16 | End: 2020-10-21 | Stop reason: HOSPADM

## 2020-10-16 RX ADMIN — PANTOPRAZOLE SODIUM 40 MG: 40 TABLET, DELAYED RELEASE ORAL at 06:33

## 2020-10-16 RX ADMIN — LACTULOSE 20 G: 10 SOLUTION ORAL at 22:17

## 2020-10-16 RX ADMIN — FUROSEMIDE 20 MG: 20 TABLET ORAL at 10:18

## 2020-10-16 RX ADMIN — FOLIC ACID 1000 MCG: 1 TABLET ORAL at 10:19

## 2020-10-16 RX ADMIN — MAGNESIUM GLUCONATE 500 MG ORAL TABLET 400 MG: 500 TABLET ORAL at 10:18

## 2020-10-16 RX ADMIN — LACTULOSE 20 G: 10 SOLUTION ORAL at 10:18

## 2020-10-16 RX ADMIN — HEPARIN SODIUM 5000 UNITS: 5000 INJECTION INTRAVENOUS; SUBCUTANEOUS at 22:18

## 2020-10-16 RX ADMIN — NYSTATIN: 100000 POWDER TOPICAL at 10:39

## 2020-10-16 RX ADMIN — Medication 1 TABLET: at 10:19

## 2020-10-16 RX ADMIN — METOPROLOL SUCCINATE 50 MG: 50 TABLET, EXTENDED RELEASE ORAL at 10:19

## 2020-10-16 RX ADMIN — NICOTINE 1 PATCH: 14 PATCH TRANSDERMAL at 10:38

## 2020-10-16 RX ADMIN — DILTIAZEM HYDROCHLORIDE 240 MG: 240 CAPSULE, EXTENDED RELEASE ORAL at 10:20

## 2020-10-16 RX ADMIN — QUETIAPINE FUMARATE 25 MG: 25 TABLET ORAL at 22:18

## 2020-10-16 RX ADMIN — HEPARIN SODIUM 5000 UNITS: 5000 INJECTION INTRAVENOUS; SUBCUTANEOUS at 18:12

## 2020-10-16 RX ADMIN — NYSTATIN: 100000 POWDER TOPICAL at 18:13

## 2020-10-16 RX ADMIN — THIAMINE HCL TAB 100 MG 100 MG: 100 TAB at 10:19

## 2020-10-16 RX ADMIN — MIRTAZAPINE 7.5 MG: 15 TABLET, FILM COATED ORAL at 22:17

## 2020-10-17 LAB
ANION GAP SERPL CALCULATED.3IONS-SCNC: 11 MMOL/L (ref 4–13)
ANION GAP SERPL CALCULATED.3IONS-SCNC: 11 MMOL/L (ref 4–13)
ANISOCYTOSIS BLD QL SMEAR: PRESENT
BASOPHILS # BLD MANUAL: 0 THOUSAND/UL (ref 0–0.1)
BASOPHILS NFR MAR MANUAL: 0 % (ref 0–1)
BUN SERPL-MCNC: 43 MG/DL (ref 5–25)
BUN SERPL-MCNC: 46 MG/DL (ref 5–25)
CALCIUM SERPL-MCNC: 9.1 MG/DL (ref 8.3–10.1)
CALCIUM SERPL-MCNC: 9.2 MG/DL (ref 8.3–10.1)
CHLORIDE SERPL-SCNC: 105 MMOL/L (ref 100–108)
CHLORIDE SERPL-SCNC: 106 MMOL/L (ref 100–108)
CO2 SERPL-SCNC: 27 MMOL/L (ref 21–32)
CO2 SERPL-SCNC: 27 MMOL/L (ref 21–32)
CREAT SERPL-MCNC: 2.45 MG/DL (ref 0.6–1.3)
CREAT SERPL-MCNC: 2.54 MG/DL (ref 0.6–1.3)
EOSINOPHIL # BLD MANUAL: 0.1 THOUSAND/UL (ref 0–0.4)
EOSINOPHIL NFR BLD MANUAL: 3 % (ref 0–6)
ERYTHROCYTE [DISTWIDTH] IN BLOOD BY AUTOMATED COUNT: 23.4 % (ref 11.6–15.1)
GFR SERPL CREATININE-BSD FRML MDRD: 21 ML/MIN/1.73SQ M
GFR SERPL CREATININE-BSD FRML MDRD: 22 ML/MIN/1.73SQ M
GLUCOSE SERPL-MCNC: 140 MG/DL (ref 65–140)
GLUCOSE SERPL-MCNC: 171 MG/DL (ref 65–140)
HCT VFR BLD AUTO: 29.2 % (ref 34.8–46.1)
HGB BLD-MCNC: 8.1 G/DL (ref 11.5–15.4)
LG PLATELETS BLD QL SMEAR: PRESENT
LYMPHOCYTES # BLD AUTO: 0.42 THOUSAND/UL (ref 0.6–4.47)
LYMPHOCYTES # BLD AUTO: 13 % (ref 14–44)
MCH RBC QN AUTO: 27.6 PG (ref 26.8–34.3)
MCHC RBC AUTO-ENTMCNC: 27.7 G/DL (ref 31.4–37.4)
MCV RBC AUTO: 100 FL (ref 82–98)
MONOCYTES # BLD AUTO: 0.19 THOUSAND/UL (ref 0–1.22)
MONOCYTES NFR BLD: 6 % (ref 4–12)
MYELOCYTES NFR BLD MANUAL: 1 % (ref 0–1)
NEUTROPHILS # BLD MANUAL: 2.38 THOUSAND/UL (ref 1.85–7.62)
NEUTS BAND NFR BLD MANUAL: 2 % (ref 0–8)
NEUTS SEG NFR BLD AUTO: 72 % (ref 43–75)
NRBC BLD AUTO-RTO: 3 /100 WBC (ref 0–2)
NRBC BLD AUTO-RTO: 3 /100 WBCS
PLATELET # BLD AUTO: 76 THOUSANDS/UL (ref 149–390)
PLATELET BLD QL SMEAR: ABNORMAL
PMV BLD AUTO: 10.6 FL (ref 8.9–12.7)
POTASSIUM SERPL-SCNC: 3.8 MMOL/L (ref 3.5–5.3)
POTASSIUM SERPL-SCNC: 4 MMOL/L (ref 3.5–5.3)
RBC # BLD AUTO: 2.93 MILLION/UL (ref 3.81–5.12)
SODIUM SERPL-SCNC: 143 MMOL/L (ref 136–145)
SODIUM SERPL-SCNC: 144 MMOL/L (ref 136–145)
TARGETS BLD QL SMEAR: PRESENT
TOTAL CELLS COUNTED SPEC: 100
VARIANT LYMPHS # BLD AUTO: 3 %
WBC # BLD AUTO: 3.21 THOUSAND/UL (ref 4.31–10.16)

## 2020-10-17 PROCEDURE — 99233 SBSQ HOSP IP/OBS HIGH 50: CPT | Performed by: INTERNAL MEDICINE

## 2020-10-17 PROCEDURE — 80048 BASIC METABOLIC PNL TOTAL CA: CPT | Performed by: FAMILY MEDICINE

## 2020-10-17 PROCEDURE — 90662 IIV NO PRSV INCREASED AG IM: CPT | Performed by: INTERNAL MEDICINE

## 2020-10-17 PROCEDURE — 99232 SBSQ HOSP IP/OBS MODERATE 35: CPT | Performed by: INTERNAL MEDICINE

## 2020-10-17 PROCEDURE — 85007 BL SMEAR W/DIFF WBC COUNT: CPT | Performed by: INTERNAL MEDICINE

## 2020-10-17 PROCEDURE — 97163 PT EVAL HIGH COMPLEX 45 MIN: CPT

## 2020-10-17 PROCEDURE — 80048 BASIC METABOLIC PNL TOTAL CA: CPT | Performed by: INTERNAL MEDICINE

## 2020-10-17 PROCEDURE — 97167 OT EVAL HIGH COMPLEX 60 MIN: CPT

## 2020-10-17 PROCEDURE — G0008 ADMIN INFLUENZA VIRUS VAC: HCPCS | Performed by: INTERNAL MEDICINE

## 2020-10-17 PROCEDURE — 85027 COMPLETE CBC AUTOMATED: CPT | Performed by: INTERNAL MEDICINE

## 2020-10-17 RX ADMIN — MAGNESIUM GLUCONATE 500 MG ORAL TABLET 400 MG: 500 TABLET ORAL at 09:48

## 2020-10-17 RX ADMIN — INFLUENZA A VIRUS A/MICHIGAN/45/2015 X-275 (H1N1) ANTIGEN (FORMALDEHYDE INACTIVATED), INFLUENZA A VIRUS A/SINGAPORE/INFIMH-16-0019/2016 IVR-186 (H3N2) ANTIGEN (FORMALDEHYDE INACTIVATED), INFLUENZA B VIRUS B/PHUKET/3073/2013 ANTIGEN (FORMALDEHYDE INACTIVATED), AND INFLUENZA B VIRUS B/MARYLAND/15/2016 BX-69A ANTIGEN (FORMALDEHYDE INACTIVATED) 0.7 ML: 60; 60; 60; 60 INJECTION, SUSPENSION INTRAMUSCULAR at 22:07

## 2020-10-17 RX ADMIN — LACTULOSE 20 G: 10 SOLUTION ORAL at 22:07

## 2020-10-17 RX ADMIN — NYSTATIN: 100000 POWDER TOPICAL at 18:21

## 2020-10-17 RX ADMIN — FOLIC ACID 1000 MCG: 1 TABLET ORAL at 09:48

## 2020-10-17 RX ADMIN — QUETIAPINE FUMARATE 25 MG: 25 TABLET ORAL at 22:07

## 2020-10-17 RX ADMIN — FUROSEMIDE 20 MG: 20 TABLET ORAL at 15:02

## 2020-10-17 RX ADMIN — PANTOPRAZOLE SODIUM 40 MG: 40 TABLET, DELAYED RELEASE ORAL at 06:40

## 2020-10-17 RX ADMIN — NYSTATIN: 100000 POWDER TOPICAL at 09:56

## 2020-10-17 RX ADMIN — MIRTAZAPINE 7.5 MG: 15 TABLET, FILM COATED ORAL at 22:07

## 2020-10-17 RX ADMIN — DILTIAZEM HYDROCHLORIDE 240 MG: 240 CAPSULE, EXTENDED RELEASE ORAL at 09:53

## 2020-10-17 RX ADMIN — HEPARIN SODIUM 5000 UNITS: 5000 INJECTION INTRAVENOUS; SUBCUTANEOUS at 14:43

## 2020-10-17 RX ADMIN — HEPARIN SODIUM 5000 UNITS: 5000 INJECTION INTRAVENOUS; SUBCUTANEOUS at 06:39

## 2020-10-17 RX ADMIN — FUROSEMIDE 20 MG: 20 TABLET ORAL at 09:00

## 2020-10-17 RX ADMIN — HEPARIN SODIUM 5000 UNITS: 5000 INJECTION INTRAVENOUS; SUBCUTANEOUS at 22:07

## 2020-10-17 RX ADMIN — NICOTINE 1 PATCH: 14 PATCH TRANSDERMAL at 09:54

## 2020-10-17 RX ADMIN — LACTULOSE 20 G: 10 SOLUTION ORAL at 15:02

## 2020-10-17 RX ADMIN — METOPROLOL SUCCINATE 50 MG: 50 TABLET, EXTENDED RELEASE ORAL at 09:47

## 2020-10-17 RX ADMIN — Medication 1 TABLET: at 09:48

## 2020-10-17 RX ADMIN — MAGNESIUM GLUCONATE 500 MG ORAL TABLET 400 MG: 500 TABLET ORAL at 18:21

## 2020-10-17 RX ADMIN — THIAMINE HCL TAB 100 MG 100 MG: 100 TAB at 09:48

## 2020-10-18 LAB
ANION GAP SERPL CALCULATED.3IONS-SCNC: 10 MMOL/L (ref 4–13)
ANISOCYTOSIS BLD QL SMEAR: PRESENT
BASOPHILS # BLD MANUAL: 0 THOUSAND/UL (ref 0–0.1)
BASOPHILS NFR MAR MANUAL: 0 % (ref 0–1)
BUN SERPL-MCNC: 41 MG/DL (ref 5–25)
CALCIUM SERPL-MCNC: 9.2 MG/DL (ref 8.3–10.1)
CHLORIDE SERPL-SCNC: 106 MMOL/L (ref 100–108)
CO2 SERPL-SCNC: 26 MMOL/L (ref 21–32)
CREAT SERPL-MCNC: 2.12 MG/DL (ref 0.6–1.3)
EOSINOPHIL # BLD MANUAL: 0 THOUSAND/UL (ref 0–0.4)
EOSINOPHIL NFR BLD MANUAL: 0 % (ref 0–6)
ERYTHROCYTE [DISTWIDTH] IN BLOOD BY AUTOMATED COUNT: 22.5 % (ref 11.6–15.1)
GFR SERPL CREATININE-BSD FRML MDRD: 26 ML/MIN/1.73SQ M
GLUCOSE SERPL-MCNC: 88 MG/DL (ref 65–140)
HCT VFR BLD AUTO: 24.8 % (ref 34.8–46.1)
HGB BLD-MCNC: 7.5 G/DL (ref 11.5–15.4)
LYMPHOCYTES # BLD AUTO: 0.27 THOUSAND/UL (ref 0.6–4.47)
LYMPHOCYTES # BLD AUTO: 10 % (ref 14–44)
MCH RBC QN AUTO: 28 PG (ref 26.8–34.3)
MCHC RBC AUTO-ENTMCNC: 30.2 G/DL (ref 31.4–37.4)
MCV RBC AUTO: 91 FL (ref 82–98)
MONOCYTES # BLD AUTO: 0.33 THOUSAND/UL (ref 0–1.22)
MONOCYTES NFR BLD: 12 % (ref 4–12)
NEUTROPHILS # BLD MANUAL: 2.06 THOUSAND/UL (ref 1.85–7.62)
NEUTS SEG NFR BLD AUTO: 76 % (ref 43–75)
NRBC BLD AUTO-RTO: 2 /100 WBCS
NRBC BLD AUTO-RTO: 3 /100 WBC (ref 0–2)
PLATELET # BLD AUTO: 58 THOUSANDS/UL (ref 149–390)
PLATELET BLD QL SMEAR: ABNORMAL
POLYCHROMASIA BLD QL SMEAR: PRESENT
POTASSIUM SERPL-SCNC: 3.8 MMOL/L (ref 3.5–5.3)
RBC # BLD AUTO: 2.68 MILLION/UL (ref 3.81–5.12)
SCHISTOCYTES BLD QL SMEAR: PRESENT
SODIUM SERPL-SCNC: 142 MMOL/L (ref 136–145)
TARGETS BLD QL SMEAR: PRESENT
TOTAL CELLS COUNTED SPEC: 100
VARIANT LYMPHS # BLD AUTO: 2 %
WBC # BLD AUTO: 2.71 THOUSAND/UL (ref 4.31–10.16)

## 2020-10-18 PROCEDURE — 99233 SBSQ HOSP IP/OBS HIGH 50: CPT | Performed by: INTERNAL MEDICINE

## 2020-10-18 PROCEDURE — 80048 BASIC METABOLIC PNL TOTAL CA: CPT | Performed by: INTERNAL MEDICINE

## 2020-10-18 PROCEDURE — 99232 SBSQ HOSP IP/OBS MODERATE 35: CPT | Performed by: INTERNAL MEDICINE

## 2020-10-18 PROCEDURE — 85027 COMPLETE CBC AUTOMATED: CPT | Performed by: INTERNAL MEDICINE

## 2020-10-18 PROCEDURE — 85007 BL SMEAR W/DIFF WBC COUNT: CPT | Performed by: INTERNAL MEDICINE

## 2020-10-18 RX ADMIN — PANTOPRAZOLE SODIUM 40 MG: 40 TABLET, DELAYED RELEASE ORAL at 05:28

## 2020-10-18 RX ADMIN — NYSTATIN: 100000 POWDER TOPICAL at 18:05

## 2020-10-18 RX ADMIN — NYSTATIN: 100000 POWDER TOPICAL at 10:00

## 2020-10-18 RX ADMIN — LACTULOSE 20 G: 10 SOLUTION ORAL at 10:00

## 2020-10-18 RX ADMIN — MAGNESIUM GLUCONATE 500 MG ORAL TABLET 400 MG: 500 TABLET ORAL at 10:00

## 2020-10-18 RX ADMIN — HEPARIN SODIUM 5000 UNITS: 5000 INJECTION INTRAVENOUS; SUBCUTANEOUS at 21:26

## 2020-10-18 RX ADMIN — HEPARIN SODIUM 5000 UNITS: 5000 INJECTION INTRAVENOUS; SUBCUTANEOUS at 05:28

## 2020-10-18 RX ADMIN — FUROSEMIDE 20 MG: 20 TABLET ORAL at 17:00

## 2020-10-18 RX ADMIN — METOPROLOL SUCCINATE 50 MG: 50 TABLET, EXTENDED RELEASE ORAL at 10:00

## 2020-10-18 RX ADMIN — QUETIAPINE FUMARATE 25 MG: 25 TABLET ORAL at 21:27

## 2020-10-18 RX ADMIN — THIAMINE HCL TAB 100 MG 100 MG: 100 TAB at 10:00

## 2020-10-18 RX ADMIN — DILTIAZEM HYDROCHLORIDE 240 MG: 240 CAPSULE, EXTENDED RELEASE ORAL at 10:00

## 2020-10-18 RX ADMIN — LACTULOSE 20 G: 10 SOLUTION ORAL at 18:00

## 2020-10-18 RX ADMIN — HEPARIN SODIUM 5000 UNITS: 5000 INJECTION INTRAVENOUS; SUBCUTANEOUS at 15:00

## 2020-10-18 RX ADMIN — NICOTINE 1 PATCH: 14 PATCH TRANSDERMAL at 10:00

## 2020-10-18 RX ADMIN — Medication 1 TABLET: at 10:00

## 2020-10-18 RX ADMIN — FUROSEMIDE 20 MG: 20 TABLET ORAL at 09:00

## 2020-10-18 RX ADMIN — FOLIC ACID 1000 MCG: 1 TABLET ORAL at 10:00

## 2020-10-18 RX ADMIN — MIRTAZAPINE 7.5 MG: 15 TABLET, FILM COATED ORAL at 21:27

## 2020-10-18 RX ADMIN — MAGNESIUM GLUCONATE 500 MG ORAL TABLET 400 MG: 500 TABLET ORAL at 17:59

## 2020-10-19 ENCOUNTER — APPOINTMENT (INPATIENT)
Dept: ULTRASOUND IMAGING | Facility: HOSPITAL | Age: 75
DRG: 682 | End: 2020-10-19
Payer: MEDICARE

## 2020-10-19 LAB
ABO GROUP BLD: NORMAL
ANION GAP SERPL CALCULATED.3IONS-SCNC: 8 MMOL/L (ref 4–13)
BLD GP AB SCN SERPL QL: NEGATIVE
BUN SERPL-MCNC: 33 MG/DL (ref 5–25)
CALCIUM SERPL-MCNC: 8.7 MG/DL (ref 8.3–10.1)
CHLORIDE SERPL-SCNC: 108 MMOL/L (ref 100–108)
CO2 SERPL-SCNC: 29 MMOL/L (ref 21–32)
CREAT SERPL-MCNC: 1.92 MG/DL (ref 0.6–1.3)
ERYTHROCYTE [DISTWIDTH] IN BLOOD BY AUTOMATED COUNT: 22.4 % (ref 11.6–15.1)
GFR SERPL CREATININE-BSD FRML MDRD: 29 ML/MIN/1.73SQ M
GLUCOSE SERPL-MCNC: 80 MG/DL (ref 65–140)
HCT VFR BLD AUTO: 22.8 % (ref 34.8–46.1)
HGB BLD-MCNC: 7.1 G/DL (ref 11.5–15.4)
MCH RBC QN AUTO: 28.3 PG (ref 26.8–34.3)
MCHC RBC AUTO-ENTMCNC: 31.1 G/DL (ref 31.4–37.4)
MCV RBC AUTO: 91 FL (ref 82–98)
NRBC BLD AUTO-RTO: 2 /100 WBCS
PLATELET # BLD AUTO: 61 THOUSANDS/UL (ref 149–390)
PMV BLD AUTO: 9.3 FL (ref 8.9–12.7)
POTASSIUM SERPL-SCNC: 3.5 MMOL/L (ref 3.5–5.3)
RBC # BLD AUTO: 2.51 MILLION/UL (ref 3.81–5.12)
RH BLD: POSITIVE
SARS-COV-2 RNA RESP QL NAA+PROBE: NEGATIVE
SODIUM SERPL-SCNC: 145 MMOL/L (ref 136–145)
SPECIMEN EXPIRATION DATE: NORMAL
WBC # BLD AUTO: 2.84 THOUSAND/UL (ref 4.31–10.16)

## 2020-10-19 PROCEDURE — 93971 EXTREMITY STUDY: CPT

## 2020-10-19 PROCEDURE — 85027 COMPLETE CBC AUTOMATED: CPT | Performed by: INTERNAL MEDICINE

## 2020-10-19 PROCEDURE — 80048 BASIC METABOLIC PNL TOTAL CA: CPT | Performed by: INTERNAL MEDICINE

## 2020-10-19 PROCEDURE — 30233N1 TRANSFUSION OF NONAUTOLOGOUS RED BLOOD CELLS INTO PERIPHERAL VEIN, PERCUTANEOUS APPROACH: ICD-10-PCS | Performed by: STUDENT IN AN ORGANIZED HEALTH CARE EDUCATION/TRAINING PROGRAM

## 2020-10-19 PROCEDURE — 86850 RBC ANTIBODY SCREEN: CPT | Performed by: INTERNAL MEDICINE

## 2020-10-19 PROCEDURE — 99233 SBSQ HOSP IP/OBS HIGH 50: CPT | Performed by: INTERNAL MEDICINE

## 2020-10-19 PROCEDURE — 93971 EXTREMITY STUDY: CPT | Performed by: SURGERY

## 2020-10-19 PROCEDURE — 87635 SARS-COV-2 COVID-19 AMP PRB: CPT | Performed by: INTERNAL MEDICINE

## 2020-10-19 PROCEDURE — P9016 RBC LEUKOCYTES REDUCED: HCPCS

## 2020-10-19 PROCEDURE — 99232 SBSQ HOSP IP/OBS MODERATE 35: CPT | Performed by: INTERNAL MEDICINE

## 2020-10-19 PROCEDURE — 86901 BLOOD TYPING SEROLOGIC RH(D): CPT | Performed by: INTERNAL MEDICINE

## 2020-10-19 PROCEDURE — 86923 COMPATIBILITY TEST ELECTRIC: CPT

## 2020-10-19 PROCEDURE — 86900 BLOOD TYPING SEROLOGIC ABO: CPT | Performed by: INTERNAL MEDICINE

## 2020-10-19 RX ADMIN — FUROSEMIDE 20 MG: 20 TABLET ORAL at 08:43

## 2020-10-19 RX ADMIN — Medication 1 TABLET: at 08:41

## 2020-10-19 RX ADMIN — THIAMINE HCL TAB 100 MG 100 MG: 100 TAB at 08:41

## 2020-10-19 RX ADMIN — FOLIC ACID 1000 MCG: 1 TABLET ORAL at 08:47

## 2020-10-19 RX ADMIN — LACTULOSE 20 G: 10 SOLUTION ORAL at 08:43

## 2020-10-19 RX ADMIN — QUETIAPINE FUMARATE 25 MG: 25 TABLET ORAL at 21:35

## 2020-10-19 RX ADMIN — MIRTAZAPINE 7.5 MG: 15 TABLET, FILM COATED ORAL at 21:35

## 2020-10-19 RX ADMIN — NICOTINE 1 PATCH: 14 PATCH TRANSDERMAL at 09:00

## 2020-10-19 RX ADMIN — MAGNESIUM GLUCONATE 500 MG ORAL TABLET 400 MG: 500 TABLET ORAL at 09:03

## 2020-10-19 RX ADMIN — METOPROLOL SUCCINATE 50 MG: 50 TABLET, EXTENDED RELEASE ORAL at 08:42

## 2020-10-19 RX ADMIN — HEPARIN SODIUM 5000 UNITS: 5000 INJECTION INTRAVENOUS; SUBCUTANEOUS at 14:07

## 2020-10-19 RX ADMIN — HEPARIN SODIUM 5000 UNITS: 5000 INJECTION INTRAVENOUS; SUBCUTANEOUS at 06:51

## 2020-10-19 RX ADMIN — FUROSEMIDE 20 MG: 20 TABLET ORAL at 17:46

## 2020-10-19 RX ADMIN — MAGNESIUM GLUCONATE 500 MG ORAL TABLET 400 MG: 500 TABLET ORAL at 17:46

## 2020-10-19 RX ADMIN — DILTIAZEM HYDROCHLORIDE 240 MG: 240 CAPSULE, EXTENDED RELEASE ORAL at 08:40

## 2020-10-19 RX ADMIN — NYSTATIN: 100000 POWDER TOPICAL at 17:46

## 2020-10-19 RX ADMIN — HEPARIN SODIUM 5000 UNITS: 5000 INJECTION INTRAVENOUS; SUBCUTANEOUS at 21:35

## 2020-10-19 RX ADMIN — NYSTATIN: 100000 POWDER TOPICAL at 11:06

## 2020-10-20 LAB
ABO GROUP BLD BPU: NORMAL
ANION GAP SERPL CALCULATED.3IONS-SCNC: 8 MMOL/L (ref 4–13)
ATRIAL RATE: 300 BPM
BPU ID: NORMAL
BUN SERPL-MCNC: 30 MG/DL (ref 5–25)
CALCIUM SERPL-MCNC: 8.6 MG/DL (ref 8.3–10.1)
CHLORIDE SERPL-SCNC: 110 MMOL/L (ref 100–108)
CO2 SERPL-SCNC: 29 MMOL/L (ref 21–32)
CREAT SERPL-MCNC: 1.82 MG/DL (ref 0.6–1.3)
CROSSMATCH: NORMAL
ERYTHROCYTE [DISTWIDTH] IN BLOOD BY AUTOMATED COUNT: 21.6 % (ref 11.6–15.1)
GFR SERPL CREATININE-BSD FRML MDRD: 31 ML/MIN/1.73SQ M
GLUCOSE SERPL-MCNC: 68 MG/DL (ref 65–140)
HCT VFR BLD AUTO: 25.8 % (ref 34.8–46.1)
HGB BLD-MCNC: 8 G/DL (ref 11.5–15.4)
MCH RBC QN AUTO: 27.8 PG (ref 26.8–34.3)
MCHC RBC AUTO-ENTMCNC: 31 G/DL (ref 31.4–37.4)
MCV RBC AUTO: 90 FL (ref 82–98)
NRBC BLD AUTO-RTO: 2 /100 WBCS
PLATELET # BLD AUTO: 67 THOUSANDS/UL (ref 149–390)
PMV BLD AUTO: 9.6 FL (ref 8.9–12.7)
POTASSIUM SERPL-SCNC: 3.7 MMOL/L (ref 3.5–5.3)
QRS AXIS: 72 DEGREES
QRSD INTERVAL: 74 MS
QT INTERVAL: 424 MS
QTC INTERVAL: 473 MS
RBC # BLD AUTO: 2.88 MILLION/UL (ref 3.81–5.12)
SODIUM SERPL-SCNC: 147 MMOL/L (ref 136–145)
T WAVE AXIS: 62 DEGREES
UNIT DISPENSE STATUS: NORMAL
UNIT PRODUCT CODE: NORMAL
UNIT RH: NORMAL
VENTRICULAR RATE: 75 BPM
WBC # BLD AUTO: 2.94 THOUSAND/UL (ref 4.31–10.16)

## 2020-10-20 PROCEDURE — 97116 GAIT TRAINING THERAPY: CPT

## 2020-10-20 PROCEDURE — 97530 THERAPEUTIC ACTIVITIES: CPT

## 2020-10-20 PROCEDURE — 99233 SBSQ HOSP IP/OBS HIGH 50: CPT | Performed by: INTERNAL MEDICINE

## 2020-10-20 PROCEDURE — 93005 ELECTROCARDIOGRAM TRACING: CPT

## 2020-10-20 PROCEDURE — 93010 ELECTROCARDIOGRAM REPORT: CPT | Performed by: INTERNAL MEDICINE

## 2020-10-20 PROCEDURE — 99232 SBSQ HOSP IP/OBS MODERATE 35: CPT | Performed by: STUDENT IN AN ORGANIZED HEALTH CARE EDUCATION/TRAINING PROGRAM

## 2020-10-20 PROCEDURE — 80048 BASIC METABOLIC PNL TOTAL CA: CPT | Performed by: INTERNAL MEDICINE

## 2020-10-20 PROCEDURE — 85027 COMPLETE CBC AUTOMATED: CPT | Performed by: INTERNAL MEDICINE

## 2020-10-20 PROCEDURE — 97110 THERAPEUTIC EXERCISES: CPT

## 2020-10-20 RX ORDER — HALOPERIDOL 5 MG/ML
1.5 INJECTION INTRAMUSCULAR ONCE
Status: DISCONTINUED | OUTPATIENT
Start: 2020-10-20 | End: 2020-10-20

## 2020-10-20 RX ORDER — HALOPERIDOL 5 MG/ML
1.5 INJECTION INTRAMUSCULAR ONCE AS NEEDED
Status: COMPLETED | OUTPATIENT
Start: 2020-10-20 | End: 2020-10-20

## 2020-10-20 RX ADMIN — NYSTATIN: 100000 POWDER TOPICAL at 17:15

## 2020-10-20 RX ADMIN — QUETIAPINE FUMARATE 25 MG: 25 TABLET ORAL at 21:27

## 2020-10-20 RX ADMIN — DILTIAZEM HYDROCHLORIDE 240 MG: 240 CAPSULE, EXTENDED RELEASE ORAL at 09:21

## 2020-10-20 RX ADMIN — MAGNESIUM GLUCONATE 500 MG ORAL TABLET 400 MG: 500 TABLET ORAL at 17:15

## 2020-10-20 RX ADMIN — MAGNESIUM GLUCONATE 500 MG ORAL TABLET 400 MG: 500 TABLET ORAL at 09:21

## 2020-10-20 RX ADMIN — METOPROLOL SUCCINATE 50 MG: 50 TABLET, EXTENDED RELEASE ORAL at 09:21

## 2020-10-20 RX ADMIN — HEPARIN SODIUM 5000 UNITS: 5000 INJECTION INTRAVENOUS; SUBCUTANEOUS at 22:29

## 2020-10-20 RX ADMIN — FUROSEMIDE 20 MG: 20 TABLET ORAL at 09:21

## 2020-10-20 RX ADMIN — Medication 1 TABLET: at 09:22

## 2020-10-20 RX ADMIN — NYSTATIN: 100000 POWDER TOPICAL at 09:22

## 2020-10-20 RX ADMIN — HALOPERIDOL LACTATE 1.5 MG: 5 INJECTION INTRAMUSCULAR at 06:13

## 2020-10-20 RX ADMIN — HEPARIN SODIUM 5000 UNITS: 5000 INJECTION INTRAVENOUS; SUBCUTANEOUS at 05:04

## 2020-10-20 RX ADMIN — HEPARIN SODIUM 5000 UNITS: 5000 INJECTION INTRAVENOUS; SUBCUTANEOUS at 15:01

## 2020-10-20 RX ADMIN — MIRTAZAPINE 7.5 MG: 15 TABLET, FILM COATED ORAL at 21:27

## 2020-10-20 RX ADMIN — NICOTINE 1 PATCH: 14 PATCH TRANSDERMAL at 09:30

## 2020-10-20 RX ADMIN — THIAMINE HCL TAB 100 MG 100 MG: 100 TAB at 09:21

## 2020-10-20 RX ADMIN — LACTULOSE 20 G: 10 SOLUTION ORAL at 09:21

## 2020-10-20 RX ADMIN — LACTULOSE 20 G: 10 SOLUTION ORAL at 21:27

## 2020-10-20 RX ADMIN — FOLIC ACID 1000 MCG: 1 TABLET ORAL at 09:21

## 2020-10-20 RX ADMIN — FUROSEMIDE 20 MG: 20 TABLET ORAL at 17:15

## 2020-10-21 ENCOUNTER — TELEPHONE (OUTPATIENT)
Dept: INTERNAL MEDICINE CLINIC | Facility: CLINIC | Age: 75
End: 2020-10-21

## 2020-10-21 VITALS
DIASTOLIC BLOOD PRESSURE: 84 MMHG | BODY MASS INDEX: 33.2 KG/M2 | TEMPERATURE: 98.2 F | SYSTOLIC BLOOD PRESSURE: 140 MMHG | RESPIRATION RATE: 18 BRPM | WEIGHT: 187.39 LBS | HEIGHT: 63 IN | OXYGEN SATURATION: 95 % | HEART RATE: 97 BPM

## 2020-10-21 DIAGNOSIS — Z71.89 COMPLEX CARE COORDINATION: Primary | ICD-10-CM

## 2020-10-21 LAB
ANION GAP SERPL CALCULATED.3IONS-SCNC: 11 MMOL/L (ref 4–13)
BUN SERPL-MCNC: 24 MG/DL (ref 5–25)
CALCIUM SERPL-MCNC: 9.4 MG/DL (ref 8.3–10.1)
CHLORIDE SERPL-SCNC: 107 MMOL/L (ref 100–108)
CO2 SERPL-SCNC: 28 MMOL/L (ref 21–32)
CREAT SERPL-MCNC: 1.6 MG/DL (ref 0.6–1.3)
GFR SERPL CREATININE-BSD FRML MDRD: 37 ML/MIN/1.73SQ M
GLUCOSE SERPL-MCNC: 105 MG/DL (ref 65–140)
POTASSIUM SERPL-SCNC: 3.5 MMOL/L (ref 3.5–5.3)
SODIUM SERPL-SCNC: 146 MMOL/L (ref 136–145)

## 2020-10-21 PROCEDURE — 99233 SBSQ HOSP IP/OBS HIGH 50: CPT | Performed by: INTERNAL MEDICINE

## 2020-10-21 PROCEDURE — 99239 HOSP IP/OBS DSCHRG MGMT >30: CPT | Performed by: STUDENT IN AN ORGANIZED HEALTH CARE EDUCATION/TRAINING PROGRAM

## 2020-10-21 PROCEDURE — 80048 BASIC METABOLIC PNL TOTAL CA: CPT | Performed by: INTERNAL MEDICINE

## 2020-10-21 RX ORDER — LACTULOSE 20 G/30ML
20 SOLUTION ORAL 3 TIMES DAILY
Qty: 1892 ML | Refills: 1 | Status: SHIPPED | OUTPATIENT
Start: 2020-10-21 | End: 2020-12-29 | Stop reason: HOSPADM

## 2020-10-21 RX ORDER — FUROSEMIDE 20 MG/1
20 TABLET ORAL 2 TIMES DAILY
Qty: 60 TABLET | Refills: 0 | Status: SHIPPED | OUTPATIENT
Start: 2020-10-21 | End: 2020-10-21

## 2020-10-21 RX ORDER — FUROSEMIDE 20 MG/1
20 TABLET ORAL 2 TIMES DAILY
Qty: 60 TABLET | Refills: 0 | Status: SHIPPED | OUTPATIENT
Start: 2020-10-21 | End: 2020-11-04

## 2020-10-21 RX ADMIN — FOLIC ACID 1000 MCG: 1 TABLET ORAL at 09:05

## 2020-10-21 RX ADMIN — LACTULOSE 20 G: 10 SOLUTION ORAL at 09:06

## 2020-10-21 RX ADMIN — NICOTINE 1 PATCH: 14 PATCH TRANSDERMAL at 09:06

## 2020-10-21 RX ADMIN — METOPROLOL SUCCINATE 50 MG: 50 TABLET, EXTENDED RELEASE ORAL at 09:05

## 2020-10-21 RX ADMIN — PANTOPRAZOLE SODIUM 40 MG: 40 TABLET, DELAYED RELEASE ORAL at 05:49

## 2020-10-21 RX ADMIN — FUROSEMIDE 20 MG: 20 TABLET ORAL at 09:05

## 2020-10-21 RX ADMIN — NYSTATIN: 100000 POWDER TOPICAL at 09:06

## 2020-10-21 RX ADMIN — THIAMINE HCL TAB 100 MG 100 MG: 100 TAB at 09:05

## 2020-10-21 RX ADMIN — MAGNESIUM GLUCONATE 500 MG ORAL TABLET 400 MG: 500 TABLET ORAL at 09:05

## 2020-10-21 RX ADMIN — DILTIAZEM HYDROCHLORIDE 240 MG: 240 CAPSULE, EXTENDED RELEASE ORAL at 09:05

## 2020-10-21 RX ADMIN — Medication 1 TABLET: at 09:05

## 2020-10-22 ENCOUNTER — TRANSITIONAL CARE MANAGEMENT (OUTPATIENT)
Dept: INTERNAL MEDICINE CLINIC | Facility: CLINIC | Age: 75
End: 2020-10-22

## 2020-10-23 ENCOUNTER — PATIENT OUTREACH (OUTPATIENT)
Dept: INTERNAL MEDICINE CLINIC | Facility: CLINIC | Age: 75
End: 2020-10-23

## 2020-10-23 ENCOUNTER — TELEPHONE (OUTPATIENT)
Dept: INTERNAL MEDICINE CLINIC | Facility: CLINIC | Age: 75
End: 2020-10-23

## 2020-10-23 DIAGNOSIS — Z78.9 NEEDS ASSISTANCE WITH COMMUNITY RESOURCES: Primary | ICD-10-CM

## 2020-10-26 ENCOUNTER — PATIENT OUTREACH (OUTPATIENT)
Dept: INTERNAL MEDICINE CLINIC | Facility: CLINIC | Age: 75
End: 2020-10-26

## 2020-10-26 ENCOUNTER — TELEPHONE (OUTPATIENT)
Dept: NEPHROLOGY | Facility: CLINIC | Age: 75
End: 2020-10-26

## 2020-10-27 LAB
ATRIAL RATE: 300 BPM
QRS AXIS: 72 DEGREES
QRSD INTERVAL: 74 MS
QT INTERVAL: 424 MS
QTC INTERVAL: 473 MS
T WAVE AXIS: 62 DEGREES
VENTRICULAR RATE: 75 BPM

## 2020-10-27 PROCEDURE — 93010 ELECTROCARDIOGRAM REPORT: CPT | Performed by: INTERNAL MEDICINE

## 2020-10-28 ENCOUNTER — TELEMEDICINE (OUTPATIENT)
Dept: INTERNAL MEDICINE CLINIC | Facility: CLINIC | Age: 75
End: 2020-10-28
Payer: MEDICARE

## 2020-10-28 DIAGNOSIS — I50.32 CHRONIC DIASTOLIC CHF (CONGESTIVE HEART FAILURE) (HCC): Primary | ICD-10-CM

## 2020-10-28 DIAGNOSIS — R41.3 MEMORY DIFFICULTIES: ICD-10-CM

## 2020-10-28 DIAGNOSIS — N18.4 CHRONIC KIDNEY DISEASE (CKD), ACTIVE MEDICAL MANAGEMENT WITHOUT DIALYSIS, STAGE 4 (SEVERE) (HCC): ICD-10-CM

## 2020-10-28 PROCEDURE — 99214 OFFICE O/P EST MOD 30 MIN: CPT | Performed by: NURSE PRACTITIONER

## 2020-10-29 ENCOUNTER — TELEPHONE (OUTPATIENT)
Dept: LAB | Facility: HOSPITAL | Age: 75
End: 2020-10-29

## 2020-10-29 ENCOUNTER — TELEPHONE (OUTPATIENT)
Dept: NEPHROLOGY | Facility: CLINIC | Age: 75
End: 2020-10-29

## 2020-10-30 ENCOUNTER — TELEPHONE (OUTPATIENT)
Dept: INTERNAL MEDICINE CLINIC | Facility: CLINIC | Age: 75
End: 2020-10-30

## 2020-10-30 ENCOUNTER — PATIENT OUTREACH (OUTPATIENT)
Dept: INTERNAL MEDICINE CLINIC | Facility: CLINIC | Age: 75
End: 2020-10-30

## 2020-11-01 DIAGNOSIS — K70.30 ALCOHOLIC CIRRHOSIS OF LIVER WITHOUT ASCITES (HCC): ICD-10-CM

## 2020-11-01 DIAGNOSIS — D64.9 ANEMIA, UNSPECIFIED TYPE: ICD-10-CM

## 2020-11-02 ENCOUNTER — PATIENT OUTREACH (OUTPATIENT)
Dept: INTERNAL MEDICINE CLINIC | Facility: CLINIC | Age: 75
End: 2020-11-02

## 2020-11-02 RX ORDER — PANTOPRAZOLE SODIUM 40 MG/1
TABLET, DELAYED RELEASE ORAL
Qty: 30 TABLET | Refills: 2 | Status: SHIPPED | OUTPATIENT
Start: 2020-11-02 | End: 2021-01-30

## 2020-11-02 RX ORDER — RIFAXIMIN 550 MG/1
TABLET ORAL
Qty: 60 TABLET | Refills: 0 | Status: SHIPPED | OUTPATIENT
Start: 2020-11-02 | End: 2020-11-19

## 2020-11-04 ENCOUNTER — OFFICE VISIT (OUTPATIENT)
Dept: NEPHROLOGY | Facility: CLINIC | Age: 75
End: 2020-11-04
Payer: MEDICARE

## 2020-11-04 VITALS
SYSTOLIC BLOOD PRESSURE: 110 MMHG | HEIGHT: 64 IN | DIASTOLIC BLOOD PRESSURE: 60 MMHG | BODY MASS INDEX: 32.27 KG/M2 | TEMPERATURE: 96 F | RESPIRATION RATE: 16 BRPM | HEART RATE: 62 BPM | WEIGHT: 189 LBS

## 2020-11-04 DIAGNOSIS — D64.9 ANEMIA, UNSPECIFIED TYPE: ICD-10-CM

## 2020-11-04 DIAGNOSIS — E87.6 LOW BLOOD POTASSIUM: ICD-10-CM

## 2020-11-04 DIAGNOSIS — R60.1 GENERALIZED EDEMA: Primary | ICD-10-CM

## 2020-11-04 DIAGNOSIS — N18.32 STAGE 3B CHRONIC KIDNEY DISEASE (HCC): ICD-10-CM

## 2020-11-04 PROCEDURE — 99215 OFFICE O/P EST HI 40 MIN: CPT | Performed by: INTERNAL MEDICINE

## 2020-11-04 RX ORDER — TORSEMIDE 20 MG/1
60 TABLET ORAL DAILY
Qty: 90 TABLET | Refills: 1 | Status: SHIPPED | OUTPATIENT
Start: 2020-11-04 | End: 2020-12-18 | Stop reason: ALTCHOICE

## 2020-11-04 RX ORDER — POTASSIUM CHLORIDE 20 MEQ/1
20 TABLET, EXTENDED RELEASE ORAL 2 TIMES DAILY
Qty: 120 TABLET | Refills: 3 | Status: SHIPPED | OUTPATIENT
Start: 2020-11-04 | End: 2021-02-25 | Stop reason: HOSPADM

## 2020-11-06 DIAGNOSIS — I10 BENIGN ESSENTIAL HYPERTENSION: ICD-10-CM

## 2020-11-07 RX ORDER — DILTIAZEM HYDROCHLORIDE 240 MG/1
CAPSULE, COATED, EXTENDED RELEASE ORAL
Qty: 30 CAPSULE | Refills: 0 | Status: SHIPPED | OUTPATIENT
Start: 2020-11-07 | End: 2020-12-09

## 2020-11-09 ENCOUNTER — PATIENT OUTREACH (OUTPATIENT)
Dept: INTERNAL MEDICINE CLINIC | Facility: CLINIC | Age: 75
End: 2020-11-09

## 2020-11-13 DIAGNOSIS — R45.1 AGITATION: ICD-10-CM

## 2020-11-14 RX ORDER — QUETIAPINE FUMARATE 25 MG/1
25 TABLET, FILM COATED ORAL
Qty: 30 TABLET | Refills: 6 | Status: SHIPPED | OUTPATIENT
Start: 2020-11-14 | End: 2020-12-31 | Stop reason: SDUPTHER

## 2020-11-16 ENCOUNTER — PATIENT OUTREACH (OUTPATIENT)
Dept: INTERNAL MEDICINE CLINIC | Facility: CLINIC | Age: 75
End: 2020-11-16

## 2020-11-19 ENCOUNTER — OFFICE VISIT (OUTPATIENT)
Dept: CARDIOLOGY CLINIC | Facility: CLINIC | Age: 75
End: 2020-11-19
Payer: MEDICARE

## 2020-11-19 ENCOUNTER — APPOINTMENT (OUTPATIENT)
Dept: LAB | Facility: CLINIC | Age: 75
End: 2020-11-19
Payer: MEDICARE

## 2020-11-19 VITALS
OXYGEN SATURATION: 97 % | SYSTOLIC BLOOD PRESSURE: 128 MMHG | HEIGHT: 64 IN | TEMPERATURE: 98.1 F | BODY MASS INDEX: 31.76 KG/M2 | DIASTOLIC BLOOD PRESSURE: 70 MMHG | WEIGHT: 186 LBS

## 2020-11-19 DIAGNOSIS — I50.32 CHRONIC DIASTOLIC HEART FAILURE (HCC): Primary | ICD-10-CM

## 2020-11-19 DIAGNOSIS — I48.0 PAROXYSMAL ATRIAL FIBRILLATION (HCC): ICD-10-CM

## 2020-11-19 LAB
25(OH)D3 SERPL-MCNC: 19.5 NG/ML (ref 30–100)
ALBUMIN SERPL BCP-MCNC: 3.2 G/DL (ref 3.5–5)
ALP SERPL-CCNC: 301 U/L (ref 46–116)
ALT SERPL W P-5'-P-CCNC: 15 U/L (ref 12–78)
ANION GAP SERPL CALCULATED.3IONS-SCNC: 6 MMOL/L (ref 4–13)
AST SERPL W P-5'-P-CCNC: 27 U/L (ref 5–45)
BASOPHILS # BLD AUTO: 0.01 THOUSANDS/ΜL (ref 0–0.1)
BASOPHILS NFR BLD AUTO: 0 % (ref 0–1)
BILIRUB SERPL-MCNC: 1.59 MG/DL (ref 0.2–1)
BUN SERPL-MCNC: 24 MG/DL (ref 5–25)
CALCIUM ALBUM COR SERPL-MCNC: 9.6 MG/DL (ref 8.3–10.1)
CALCIUM SERPL-MCNC: 9 MG/DL (ref 8.3–10.1)
CHLORIDE SERPL-SCNC: 106 MMOL/L (ref 100–108)
CO2 SERPL-SCNC: 30 MMOL/L (ref 21–32)
CREAT SERPL-MCNC: 2.13 MG/DL (ref 0.6–1.3)
EOSINOPHIL # BLD AUTO: 0.01 THOUSAND/ΜL (ref 0–0.61)
EOSINOPHIL NFR BLD AUTO: 0 % (ref 0–6)
ERYTHROCYTE [DISTWIDTH] IN BLOOD BY AUTOMATED COUNT: 19.3 % (ref 11.6–15.1)
FERRITIN SERPL-MCNC: 2654 NG/ML (ref 8–388)
GFR SERPL CREATININE-BSD FRML MDRD: 26 ML/MIN/1.73SQ M
GLUCOSE SERPL-MCNC: 112 MG/DL (ref 65–140)
HCT VFR BLD AUTO: 25.9 % (ref 34.8–46.1)
HGB BLD-MCNC: 7.8 G/DL (ref 11.5–15.4)
IMM GRANULOCYTES # BLD AUTO: 0.01 THOUSAND/UL (ref 0–0.2)
IMM GRANULOCYTES NFR BLD AUTO: 0 % (ref 0–2)
IRON SATN MFR SERPL: 89 %
IRON SERPL-MCNC: 180 UG/DL (ref 50–170)
LYMPHOCYTES # BLD AUTO: 0.77 THOUSANDS/ΜL (ref 0.6–4.47)
LYMPHOCYTES NFR BLD AUTO: 22 % (ref 14–44)
MCH RBC QN AUTO: 27.3 PG (ref 26.8–34.3)
MCHC RBC AUTO-ENTMCNC: 30.1 G/DL (ref 31.4–37.4)
MCV RBC AUTO: 91 FL (ref 82–98)
MONOCYTES # BLD AUTO: 0.39 THOUSAND/ΜL (ref 0.17–1.22)
MONOCYTES NFR BLD AUTO: 11 % (ref 4–12)
NEUTROPHILS # BLD AUTO: 2.33 THOUSANDS/ΜL (ref 1.85–7.62)
NEUTS SEG NFR BLD AUTO: 67 % (ref 43–75)
NRBC BLD AUTO-RTO: 1 /100 WBCS
PHOSPHATE SERPL-MCNC: 2.8 MG/DL (ref 2.3–4.1)
PLATELET # BLD AUTO: 80 THOUSANDS/UL (ref 149–390)
PMV BLD AUTO: 10.6 FL (ref 8.9–12.7)
POTASSIUM SERPL-SCNC: 4.6 MMOL/L (ref 3.5–5.3)
PROT SERPL-MCNC: 6.4 G/DL (ref 6.4–8.2)
PTH-INTACT SERPL-MCNC: 188.9 PG/ML (ref 18.4–80.1)
RBC # BLD AUTO: 2.86 MILLION/UL (ref 3.81–5.12)
SODIUM SERPL-SCNC: 142 MMOL/L (ref 136–145)
TIBC SERPL-MCNC: 203 UG/DL (ref 250–450)
WBC # BLD AUTO: 3.52 THOUSAND/UL (ref 4.31–10.16)

## 2020-11-19 PROCEDURE — 99214 OFFICE O/P EST MOD 30 MIN: CPT | Performed by: INTERNAL MEDICINE

## 2020-11-19 PROCEDURE — 83550 IRON BINDING TEST: CPT | Performed by: INTERNAL MEDICINE

## 2020-11-19 PROCEDURE — 84100 ASSAY OF PHOSPHORUS: CPT | Performed by: INTERNAL MEDICINE

## 2020-11-19 PROCEDURE — 36415 COLL VENOUS BLD VENIPUNCTURE: CPT | Performed by: INTERNAL MEDICINE

## 2020-11-19 PROCEDURE — 82728 ASSAY OF FERRITIN: CPT | Performed by: INTERNAL MEDICINE

## 2020-11-19 PROCEDURE — 83970 ASSAY OF PARATHORMONE: CPT | Performed by: INTERNAL MEDICINE

## 2020-11-19 PROCEDURE — 80053 COMPREHEN METABOLIC PANEL: CPT | Performed by: INTERNAL MEDICINE

## 2020-11-19 PROCEDURE — 83540 ASSAY OF IRON: CPT | Performed by: INTERNAL MEDICINE

## 2020-11-19 PROCEDURE — 85025 COMPLETE CBC W/AUTO DIFF WBC: CPT | Performed by: INTERNAL MEDICINE

## 2020-11-19 PROCEDURE — 82306 VITAMIN D 25 HYDROXY: CPT | Performed by: INTERNAL MEDICINE

## 2020-11-20 ENCOUNTER — PATIENT OUTREACH (OUTPATIENT)
Dept: INTERNAL MEDICINE CLINIC | Facility: CLINIC | Age: 75
End: 2020-11-20

## 2020-11-20 ENCOUNTER — TELEPHONE (OUTPATIENT)
Dept: NEPHROLOGY | Facility: CLINIC | Age: 75
End: 2020-11-20

## 2020-11-20 ENCOUNTER — LAB (OUTPATIENT)
Dept: LAB | Facility: CLINIC | Age: 75
End: 2020-11-20
Payer: MEDICARE

## 2020-11-20 LAB
BACTERIA UR QL AUTO: ABNORMAL /HPF
BILIRUB UR QL STRIP: NEGATIVE
CLARITY UR: ABNORMAL
COLOR UR: YELLOW
CREAT UR-MCNC: 45.5 MG/DL
GLUCOSE UR STRIP-MCNC: NEGATIVE MG/DL
HGB UR QL STRIP.AUTO: NEGATIVE
HYALINE CASTS #/AREA URNS LPF: ABNORMAL /LPF
KETONES UR STRIP-MCNC: NEGATIVE MG/DL
LEUKOCYTE ESTERASE UR QL STRIP: ABNORMAL
NITRITE UR QL STRIP: POSITIVE
NON-SQ EPI CELLS URNS QL MICRO: ABNORMAL /HPF
PH UR STRIP.AUTO: 7 [PH]
PROT UR STRIP-MCNC: NEGATIVE MG/DL
PROT UR-MCNC: 19 MG/DL
PROT/CREAT UR: 0.42 MG/G{CREAT} (ref 0–0.1)
RBC #/AREA URNS AUTO: ABNORMAL /HPF
SP GR UR STRIP.AUTO: 1.01 (ref 1–1.03)
UROBILINOGEN UR QL STRIP.AUTO: 0.2 E.U./DL
WBC #/AREA URNS AUTO: ABNORMAL /HPF

## 2020-11-20 PROCEDURE — 82570 ASSAY OF URINE CREATININE: CPT | Performed by: INTERNAL MEDICINE

## 2020-11-20 PROCEDURE — 81001 URINALYSIS AUTO W/SCOPE: CPT | Performed by: INTERNAL MEDICINE

## 2020-11-20 PROCEDURE — 84156 ASSAY OF PROTEIN URINE: CPT | Performed by: INTERNAL MEDICINE

## 2020-11-23 ENCOUNTER — TELEPHONE (OUTPATIENT)
Dept: GASTROENTEROLOGY | Facility: CLINIC | Age: 75
End: 2020-11-23

## 2020-12-04 ENCOUNTER — PATIENT OUTREACH (OUTPATIENT)
Dept: INTERNAL MEDICINE CLINIC | Facility: CLINIC | Age: 75
End: 2020-12-04

## 2020-12-07 ENCOUNTER — PATIENT OUTREACH (OUTPATIENT)
Dept: INTERNAL MEDICINE CLINIC | Facility: CLINIC | Age: 75
End: 2020-12-07

## 2020-12-07 ENCOUNTER — TELEPHONE (OUTPATIENT)
Dept: INTERNAL MEDICINE CLINIC | Facility: CLINIC | Age: 75
End: 2020-12-07

## 2020-12-09 DIAGNOSIS — I10 BENIGN ESSENTIAL HYPERTENSION: ICD-10-CM

## 2020-12-09 RX ORDER — DILTIAZEM HYDROCHLORIDE 240 MG/1
CAPSULE, COATED, EXTENDED RELEASE ORAL
Qty: 30 CAPSULE | Refills: 0 | Status: SHIPPED | OUTPATIENT
Start: 2020-12-09 | End: 2020-12-29 | Stop reason: HOSPADM

## 2020-12-14 ENCOUNTER — PATIENT OUTREACH (OUTPATIENT)
Dept: INTERNAL MEDICINE CLINIC | Facility: CLINIC | Age: 75
End: 2020-12-14

## 2020-12-18 ENCOUNTER — HOSPITAL ENCOUNTER (INPATIENT)
Facility: HOSPITAL | Age: 75
LOS: 11 days | Discharge: HOME HEALTH | DRG: 433 | End: 2020-12-29
Attending: EMERGENCY MEDICINE | Admitting: INTERNAL MEDICINE
Payer: MEDICARE

## 2020-12-18 ENCOUNTER — APPOINTMENT (EMERGENCY)
Dept: CT IMAGING | Facility: HOSPITAL | Age: 75
DRG: 433 | End: 2020-12-18
Payer: MEDICARE

## 2020-12-18 DIAGNOSIS — R14.0 ABDOMINAL DISTENTION: ICD-10-CM

## 2020-12-18 DIAGNOSIS — R60.9 EDEMA: Primary | ICD-10-CM

## 2020-12-18 DIAGNOSIS — E87.70 VOLUME OVERLOAD: ICD-10-CM

## 2020-12-18 DIAGNOSIS — K70.31 ALCOHOLIC CIRRHOSIS OF LIVER WITH ASCITES (HCC): ICD-10-CM

## 2020-12-18 DIAGNOSIS — I48.0 PAROXYSMAL ATRIAL FIBRILLATION (HCC): ICD-10-CM

## 2020-12-18 DIAGNOSIS — N18.4 CHRONIC KIDNEY DISEASE (CKD), ACTIVE MEDICAL MANAGEMENT WITHOUT DIALYSIS, STAGE 4 (SEVERE) (HCC): ICD-10-CM

## 2020-12-18 DIAGNOSIS — R26.9 GAIT DISTURBANCE: ICD-10-CM

## 2020-12-18 DIAGNOSIS — F03.90 DEMENTIA (HCC): ICD-10-CM

## 2020-12-18 LAB
ALBUMIN SERPL BCP-MCNC: 3.3 G/DL (ref 3.5–5)
ALP SERPL-CCNC: 287 U/L (ref 46–116)
ALT SERPL W P-5'-P-CCNC: 12 U/L (ref 12–78)
ANION GAP SERPL CALCULATED.3IONS-SCNC: 11 MMOL/L (ref 4–13)
AST SERPL W P-5'-P-CCNC: 25 U/L (ref 5–45)
ATRIAL RATE: 110 BPM
BASOPHILS # BLD MANUAL: 0 THOUSAND/UL (ref 0–0.1)
BASOPHILS NFR MAR MANUAL: 0 % (ref 0–1)
BILIRUB DIRECT SERPL-MCNC: 1.08 MG/DL (ref 0–0.2)
BILIRUB SERPL-MCNC: 1.5 MG/DL (ref 0.2–1)
BILIRUB UR QL STRIP: NEGATIVE
BUN SERPL-MCNC: 22 MG/DL (ref 5–25)
CALCIUM SERPL-MCNC: 9.1 MG/DL (ref 8.3–10.1)
CHLORIDE SERPL-SCNC: 105 MMOL/L (ref 100–108)
CLARITY UR: CLEAR
CO2 SERPL-SCNC: 26 MMOL/L (ref 21–32)
COLOR UR: YELLOW
CREAT SERPL-MCNC: 2.06 MG/DL (ref 0.6–1.3)
DACRYOCYTES BLD QL SMEAR: PRESENT
EOSINOPHIL # BLD MANUAL: 0.04 THOUSAND/UL (ref 0–0.4)
EOSINOPHIL NFR BLD MANUAL: 1 % (ref 0–6)
ERYTHROCYTE [DISTWIDTH] IN BLOOD BY AUTOMATED COUNT: 21.6 % (ref 11.6–15.1)
GFR SERPL CREATININE-BSD FRML MDRD: 27 ML/MIN/1.73SQ M
GLUCOSE SERPL-MCNC: 93 MG/DL (ref 65–140)
GLUCOSE UR STRIP-MCNC: NEGATIVE MG/DL
HCT VFR BLD AUTO: 23.6 % (ref 34.8–46.1)
HGB BLD-MCNC: 7.3 G/DL (ref 11.5–15.4)
HGB UR QL STRIP.AUTO: NEGATIVE
KETONES UR STRIP-MCNC: NEGATIVE MG/DL
LEUKOCYTE ESTERASE UR QL STRIP: NEGATIVE
LIPASE SERPL-CCNC: 305 U/L (ref 73–393)
LYMPHOCYTES # BLD AUTO: 0.92 THOUSAND/UL (ref 0.6–4.47)
LYMPHOCYTES # BLD AUTO: 22 % (ref 14–44)
MCH RBC QN AUTO: 27.7 PG (ref 26.8–34.3)
MCHC RBC AUTO-ENTMCNC: 30.9 G/DL (ref 31.4–37.4)
MCV RBC AUTO: 89 FL (ref 82–98)
METAMYELOCYTES NFR BLD MANUAL: 1 % (ref 0–1)
MONOCYTES # BLD AUTO: 0.5 THOUSAND/UL (ref 0–1.22)
MONOCYTES NFR BLD: 12 % (ref 4–12)
NEUTROPHILS # BLD MANUAL: 2.59 THOUSAND/UL (ref 1.85–7.62)
NEUTS BAND NFR BLD MANUAL: 5 % (ref 0–8)
NEUTS SEG NFR BLD AUTO: 57 % (ref 43–75)
NITRITE UR QL STRIP: NEGATIVE
NRBC BLD AUTO-RTO: 5 /100 WBCS
NRBC BLD AUTO-RTO: 7 /100 WBC (ref 0–2)
OVALOCYTES BLD QL SMEAR: PRESENT
PH UR STRIP.AUTO: 7.5 [PH]
PLATELET # BLD AUTO: 89 THOUSANDS/UL (ref 149–390)
PLATELET BLD QL SMEAR: ABNORMAL
PMV BLD AUTO: 10.3 FL (ref 8.9–12.7)
POIKILOCYTOSIS BLD QL SMEAR: PRESENT
POTASSIUM SERPL-SCNC: 4.8 MMOL/L (ref 3.5–5.3)
PROT SERPL-MCNC: 6.9 G/DL (ref 6.4–8.2)
PROT UR STRIP-MCNC: NEGATIVE MG/DL
QRS AXIS: 95 DEGREES
QRSD INTERVAL: 74 MS
QT INTERVAL: 336 MS
QTC INTERVAL: 458 MS
RBC # BLD AUTO: 2.64 MILLION/UL (ref 3.81–5.12)
RBC MORPH BLD: PRESENT
SCHISTOCYTES BLD QL SMEAR: PRESENT
SODIUM SERPL-SCNC: 142 MMOL/L (ref 136–145)
SP GR UR STRIP.AUTO: 1.01 (ref 1–1.03)
T WAVE AXIS: 101 DEGREES
TARGETS BLD QL SMEAR: PRESENT
TOTAL CELLS COUNTED SPEC: 100
TROPONIN I SERPL-MCNC: <0.02 NG/ML
UROBILINOGEN UR QL STRIP.AUTO: 0.2 E.U./DL
VARIANT LYMPHS # BLD AUTO: 2 %
VENTRICULAR RATE: 112 BPM
WBC # BLD AUTO: 4.17 THOUSAND/UL (ref 4.31–10.16)

## 2020-12-18 PROCEDURE — 93010 ELECTROCARDIOGRAM REPORT: CPT | Performed by: INTERNAL MEDICINE

## 2020-12-18 PROCEDURE — 93005 ELECTROCARDIOGRAM TRACING: CPT

## 2020-12-18 PROCEDURE — 81003 URINALYSIS AUTO W/O SCOPE: CPT | Performed by: EMERGENCY MEDICINE

## 2020-12-18 PROCEDURE — 85007 BL SMEAR W/DIFF WBC COUNT: CPT | Performed by: EMERGENCY MEDICINE

## 2020-12-18 PROCEDURE — 96374 THER/PROPH/DIAG INJ IV PUSH: CPT

## 2020-12-18 PROCEDURE — 99285 EMERGENCY DEPT VISIT HI MDM: CPT | Performed by: EMERGENCY MEDICINE

## 2020-12-18 PROCEDURE — 83690 ASSAY OF LIPASE: CPT | Performed by: EMERGENCY MEDICINE

## 2020-12-18 PROCEDURE — 96361 HYDRATE IV INFUSION ADD-ON: CPT

## 2020-12-18 PROCEDURE — 99285 EMERGENCY DEPT VISIT HI MDM: CPT

## 2020-12-18 PROCEDURE — 84484 ASSAY OF TROPONIN QUANT: CPT | Performed by: EMERGENCY MEDICINE

## 2020-12-18 PROCEDURE — 36415 COLL VENOUS BLD VENIPUNCTURE: CPT | Performed by: EMERGENCY MEDICINE

## 2020-12-18 PROCEDURE — 80076 HEPATIC FUNCTION PANEL: CPT | Performed by: EMERGENCY MEDICINE

## 2020-12-18 PROCEDURE — 80048 BASIC METABOLIC PNL TOTAL CA: CPT | Performed by: EMERGENCY MEDICINE

## 2020-12-18 PROCEDURE — 99223 1ST HOSP IP/OBS HIGH 75: CPT | Performed by: INTERNAL MEDICINE

## 2020-12-18 PROCEDURE — 74176 CT ABD & PELVIS W/O CONTRAST: CPT

## 2020-12-18 PROCEDURE — 85027 COMPLETE CBC AUTOMATED: CPT | Performed by: EMERGENCY MEDICINE

## 2020-12-18 RX ORDER — PANTOPRAZOLE SODIUM 40 MG/1
40 TABLET, DELAYED RELEASE ORAL EVERY MORNING
Status: DISCONTINUED | OUTPATIENT
Start: 2020-12-19 | End: 2020-12-29 | Stop reason: HOSPADM

## 2020-12-18 RX ORDER — QUETIAPINE FUMARATE 25 MG/1
25 TABLET, FILM COATED ORAL
Status: DISCONTINUED | OUTPATIENT
Start: 2020-12-18 | End: 2020-12-22

## 2020-12-18 RX ORDER — FOLIC ACID 1 MG/1
1000 TABLET ORAL DAILY
Status: DISCONTINUED | OUTPATIENT
Start: 2020-12-19 | End: 2020-12-29 | Stop reason: HOSPADM

## 2020-12-18 RX ORDER — ACETAMINOPHEN 325 MG/1
650 TABLET ORAL EVERY 6 HOURS PRN
Status: DISCONTINUED | OUTPATIENT
Start: 2020-12-18 | End: 2020-12-29 | Stop reason: HOSPADM

## 2020-12-18 RX ORDER — TORSEMIDE 20 MG/1
60 TABLET ORAL DAILY
Status: DISCONTINUED | OUTPATIENT
Start: 2020-12-19 | End: 2020-12-22

## 2020-12-18 RX ORDER — MIRTAZAPINE 15 MG/1
7.5 TABLET, FILM COATED ORAL
Status: DISCONTINUED | OUTPATIENT
Start: 2020-12-18 | End: 2020-12-29 | Stop reason: HOSPADM

## 2020-12-18 RX ORDER — METOPROLOL SUCCINATE 50 MG/1
50 TABLET, EXTENDED RELEASE ORAL DAILY
Status: DISCONTINUED | OUTPATIENT
Start: 2020-12-19 | End: 2020-12-22

## 2020-12-18 RX ORDER — LORAZEPAM 2 MG/ML
1 INJECTION INTRAMUSCULAR ONCE
Status: COMPLETED | OUTPATIENT
Start: 2020-12-18 | End: 2020-12-18

## 2020-12-18 RX ORDER — TORSEMIDE 20 MG/1
40 TABLET ORAL DAILY
COMMUNITY
End: 2020-12-29 | Stop reason: HOSPADM

## 2020-12-18 RX ORDER — DILTIAZEM HYDROCHLORIDE 240 MG/1
240 CAPSULE, COATED, EXTENDED RELEASE ORAL DAILY
Status: DISCONTINUED | OUTPATIENT
Start: 2020-12-19 | End: 2020-12-27

## 2020-12-18 RX ORDER — HEPARIN SODIUM 5000 [USP'U]/ML
5000 INJECTION, SOLUTION INTRAVENOUS; SUBCUTANEOUS EVERY 8 HOURS SCHEDULED
Status: DISCONTINUED | OUTPATIENT
Start: 2020-12-18 | End: 2020-12-29 | Stop reason: HOSPADM

## 2020-12-18 RX ORDER — LACTULOSE 20 G/30ML
20 SOLUTION ORAL 2 TIMES DAILY
Status: DISCONTINUED | OUTPATIENT
Start: 2020-12-18 | End: 2020-12-22

## 2020-12-18 RX ADMIN — HEPARIN SODIUM 5000 UNITS: 5000 INJECTION INTRAVENOUS; SUBCUTANEOUS at 23:05

## 2020-12-18 RX ADMIN — SODIUM CHLORIDE 500 ML: 0.9 INJECTION, SOLUTION INTRAVENOUS at 15:11

## 2020-12-18 RX ADMIN — HEPARIN SODIUM 5000 UNITS: 5000 INJECTION INTRAVENOUS; SUBCUTANEOUS at 18:26

## 2020-12-18 RX ADMIN — LORAZEPAM 1 MG: 2 INJECTION INTRAMUSCULAR; INTRAVENOUS at 16:28

## 2020-12-18 NOTE — H&P
H&P- Anna Marie Carrillo 1945, 77 y o  female MRN: [id number]    Unit/Bed#: ED 06 Encounter: [id number]    Primary Care Provider: Blayne Laurent MD   Date and time admitted to hospital: 12/18/2020  2:12 PM        * Volume overload  Assessment & Plan  Secondary to cirrhosis  Currently on torsemide 60 daily which was increased from 40 mg daily by Nephrology saw the patient outpatient approximately 1 month ago  Would benefit from addition of spironolactone however contraindicated due to creatinine clearance less than 30  May benefit from Nephrology consultation at this time will discontinue with torsemide 60 daily and proceed with  IR paracentesis    Chronic kidney disease (CKD), active medical management without dialysis, stage 4 (severe) [facility])  Assessment & Plan  Lab Results   Component Value Date    EGFR 27 12/18/2020    EGFR 26 11/19/2020    EGFR 37 10/21/2020    CREATININE 2 06 (H) 12/18/2020    CREATININE 2 13 (H) 11/19/2020    CREATININE 1 60 (H) 10/21/2020   Baseline creatinine appears to be between 1 6 and 2 1  Currently at baseline  Continue to monitor    Thrombocytopenia ([facility] )  Assessment & Plan  Secondary to cirrhosis  Continue to monitor    Multiple myeloma ([facility] )  Assessment & Plan  Diagnosed earlier this year and patient is currently under care of Oncology at [facility]    Paroxysmal atrial fibrillation [facility])  Assessment & Plan  Continue home metoprolol and Cardizem    Liver cirrhosis ([facility] )  Assessment & Plan  Continue home lactulose, rifaximin  Patient volume overloaded  Will continue torsemide   Will order paracentesis as outlined above    Anemia  Assessment & Plan  Multifactorial likely secondary to CKD, myeloma, cirrhosis  Baseline hemoglobin between 7 and 9  Hemoglobin currently 7 3  No obvious source of bleeding or overt bleeding  Continue to monitor        VTE Prophylaxis: Heparin  / sequential compression device   Code Status: full code  POLST: There is no POLST form on file for this patient (pre-hospital)  Discussion with family: pt    Anticipated Length of Stay:  Patient will be admitted on an Inpatient basis with an anticipated length of stay of  > 2 midnights  Justification for Hospital Stay:  Volume overload    Total Time for Visit, including Counseling / Coordination of Care: 1 hour  Greater than 50% of this total time spent on direct patient counseling and coordination of care  Chief Complaint:   Abdominal pain  History of Present Illness:    Aliya Willis is a 77 y o  female with past medical history significant liver cirrhosis, dementia , myeloma who initially presented with abdominal pain and abdominal distention  Patient has a history of ascites  Patient denies any nausea, vomiting, diarrhea, numbness , weakness, constipation, chest pain, shortness breath, fevers, chills, dysuria, headaches or any other symptoms at this time    Review of Systems:    Review of Systems   Constitutional: Negative for activity change, appetite change, chills, diaphoresis, fever and unexpected weight change  HENT: Negative for congestion, facial swelling and rhinorrhea  Eyes: Negative for photophobia and visual disturbance  Respiratory: Negative for cough, shortness of breath and wheezing  Cardiovascular: Negative for chest pain and palpitations  Gastrointestinal: Positive for abdominal distention and abdominal pain  Negative for blood in stool, constipation, diarrhea, nausea and vomiting  Genitourinary: Negative for decreased urine volume, difficulty urinating, dysuria, flank pain, frequency, hematuria and urgency  Musculoskeletal: Negative for arthralgias, back pain, joint swelling and myalgias  Neurological: Negative for dizziness, syncope, facial asymmetry, light-headedness, numbness and headaches  Psychiatric/Behavioral: Negative for confusion and decreased concentration  The patient is not nervous/anxious          Past Medical and Surgical History:     Past Medical History:   Diagnosis Date    Benign essential hypertension     last assessed - [id number]    Chest pain 11/4/2017    CHF (congestive heart failure) (HCC)     Chronic kidney disease     Cirrhosis (HCC)     Elevated troponin 1/31/2020    Gastroesophageal reflux disease without esophagitis 11/16/2017    Hyperbilirubinemia 5/14/2020    Hypertension     Liver disease     [facility] myeloma ([facility] )     Pneumonia 5/6/2020    Renal failure 08/29/2020       Past Surgical History:   Procedure Laterality Date    APPENDECTOMY      BONE MARROW BIOPSY      IR PARACENTESIS  10/2/2020       Meds/Allergies:    Prior to Admission medications    Medication Sig Start Date End Date Taking?  Authorizing Provider   diltiazem (CARDIZEM CD) 240 mg 24 hr capsule TAKE ONE CAPSULE BY MOUTH EVERY DAY 12/9/20  Yes Nato Mathew MD   folic acid (FOLVITE) 1 mg tablet TAKE ONE TABLET BY MOUTH EVERY DAY 9/2/20  Yes Anali Marsh PA-C   lactulose 20 g/30 mL Take 30 mL (20 g total) by mouth 3 (three) times a day  Patient taking differently: Take 20 g by mouth 2 (two) times a day  10/21/20  Yes Darrion GRIFFIN MD   magnesium oxide (MAG-OX) 400 mg tablet TAKE ONE TABLET BY MOUTH TWICE A DAY 9/2/20  Yes Anali Marsh PA-C   metoprolol succinate (TOPROL-XL) 25 mg 24 hr tablet Take 2 tablets (50 mg total) by mouth daily 6/9/20 12/18/20 Yes María Tsai PA-C   mirtazapine (REMERON) 7 5 MG tablet Take 1 tablet (7 5 mg total) by mouth daily at bedtime 3/24/20  Yes Nato Mathew MD   nicotine (NICODERM CQ) 14 mg/24hr TD 24 hr patch Place 1 patch on the skin daily  Patient taking differently: Place 1 patch on the skin as needed  2/7/20  Yes Madie Vargas MD   nystatin (MYCOSTATIN) powder Apply topically 2 (two) times a day 10/8/20  Yes Joan Ferrer MD   pantoprazole (PROTONIX) 40 mg tablet TAKE ONE TABLET BY MOUTH EVERY DAY IN THE MORNING 11/2/20  Yes Nato Mathew MD   potassium chloride (K-DUR,KLOR-CON) 20 mEq tablet Take 1 tablet (20 mEq total) by mouth 2 (two) times a day 11/4/20  Yes Olivia Matos MD   QUEtiapine (SEROquel) 25 mg tablet Take 1 tablet (25 mg total) by mouth daily at bedtime 11/14/20 12/18/20 Yes Shona Whipple MD   Thiamine HCl (vitamin B-1) 100 MG TABS TAKE ONE TABLET BY MOUTH EVERY DAY 9/2/20  Yes Annette Wray PA-C   torsemide (DEMADEX) 20 mg tablet Take 40 mg by mouth daily   Yes Angelica Calderon MD   torsemide (DEMADEX) 20 mg tablet Take 3 tablets (60 mg total) by mouth daily 11/4/20 12/18/20 Yes Olivia Matos MD   sodium chloride (OCEAN) 0 65 % nasal spray 1 spray into each nostril every hour as needed for congestion 9/5/20 11/19/20  Zechariah Sanchez MD   OLANZapine (ZyPREXA) 5 mg tablet Take 1 tablet (5 mg total) by mouth every 12 (twelve) hours as needed (agitation) 10/8/20 12/18/20  Zechariah Sanchez MD     I have reviewed home medications with patient personally  Allergies: No Known Allergies    Social History:     Marital Status:      Patient Pre-hospital Living Situation: home  Patient Pre-hospital Level of Mobility: independent  Patient Pre-hospital Diet Restrictions: none  Substance Use History:   Social History     Substance and Sexual Activity   Alcohol Use Not Currently    Frequency: 4 or more times a week    Drinks per session: 1 or 2    Binge frequency: Never    Comment: History of significant daily alcohol intake   Sister joy no alcohol intake in 6 months     Social History     Tobacco Use   Smoking Status Light Tobacco Smoker    Packs/day: 0 25    Types: Cigarettes   Smokeless Tobacco Never Used   Tobacco Comment    1/2 pack per month     Social History     Substance and Sexual Activity   Drug Use No       Family History:    Family History   Problem Relation Age of Onset    Heart attack Father         myocardial infarction    Heart disease Father        Physical Exam:     Vitals:   Blood Pressure: 144/69 (12/18/20 1715)  Pulse: (!) 116 (12/18/20 1715)  Temperature: 98 5 °F (36 9 °C) (12/18/20 1418)  Temp Source: Oral (12/18/20 1418)  Respirations: 22 (12/18/20 1715)  Height: 5' 4" (162 6 cm) (12/18/20 1418)  Weight - Scale: 78 9 kg (173 lb 15 1 oz) (12/18/20 1418)  SpO2: 96 % (12/18/20 1715)    Physical Exam  Constitutional:       General: She is not in acute distress  Appearance: She is well-developed  She is not diaphoretic  HENT:      Head: Normocephalic and atraumatic  Nose: Nose normal       Mouth/Throat:      Pharynx: No oropharyngeal exudate  Eyes:      General: No scleral icterus  Right eye: No discharge  Left eye: No discharge  Conjunctiva/sclera: Conjunctivae normal    Neck:      Musculoskeletal: Normal range of motion and neck supple  Thyroid: No thyromegaly  Vascular: No JVD  Cardiovascular:      Rate and Rhythm: Normal rate and regular rhythm  Heart sounds: Normal heart sounds  No murmur  No friction rub  No gallop  Pulmonary:      Effort: Pulmonary effort is normal  No respiratory distress  Breath sounds: Normal breath sounds  No wheezing or rales  Chest:      Chest wall: No tenderness  Abdominal:      General: Bowel sounds are normal  There is distension  Palpations: Abdomen is soft  Tenderness: There is no abdominal tenderness  There is no guarding or rebound  Musculoskeletal: Normal range of motion  General: No tenderness or deformity  Skin:     General: Skin is warm and dry  Findings: No erythema or rash  Neurological:      Mental Status: She is alert  Mental status is at baseline  Cranial Nerves: No cranial nerve deficit  Sensory: No sensory deficit  Motor: No abnormal muscle tone  Coordination: Coordination normal            Additional Data:     Lab Results: I have personally reviewed pertinent reports        Results from last 7 days   Lab Units 12/18/20  1546   WBC Thousand/uL 4 17*   HEMOGLOBIN g/dL 7 3*   HEMATOCRIT % 23 6*   PLATELETS Thousands/uL 89*   BANDS PCT % 5 LYMPHO PCT % 22   MONO PCT % 12   EOS PCT % 1     Results from last 7 days   Lab Units 12/18/20  1546   SODIUM mmol/L 142   POTASSIUM mmol/L 4 8   CHLORIDE mmol/L 105   CO2 mmol/L 26   BUN mg/dL 22   CREATININE mg/dL 2 06*   ANION GAP mmol/L 11   CALCIUM mg/dL 9 1   ALBUMIN g/dL 3 3*   TOTAL BILIRUBIN mg/dL 1 50*   ALK PHOS U/L 287*   ALT U/L 12   AST U/L 25   GLUCOSE RANDOM mg/dL 93                       Imaging: I have personally reviewed pertinent reports  CT abdomen pelvis wo contrast   Final Result by Skylar Weston MD (12/18 6944)      Stable cirrhosis with ascites  Stable right pleural effusion with compressive atelectasis of the right lung base  Stable cardiomegaly  Workstation performed: [id number]             EKG, Pathology, and Other Studies Reviewed on Admission:   · EKG: reviewed    Allscripts / Epic Records Reviewed: Yes     ** Please Note: This note has been constructed using a voice recognition system   **

## 2020-12-18 NOTE — ASSESSMENT & PLAN NOTE
Diagnosed earlier this year and patient is currently under care of Oncology at [facility]

## 2020-12-18 NOTE — ASSESSMENT & PLAN NOTE
· Continue home lactulose, rifaximin  · Patient volume overloaded  · On torsemide which we are continuing

## 2020-12-18 NOTE — ASSESSMENT & PLAN NOTE
Continue home lactulose, rifaximin  Patient volume overloaded  Will continue torsemide add spironolactone  Will order paracentesis

## 2020-12-18 NOTE — ED NOTES
Patient's sister stating patient is beginning to get agitated, wishes for her to have medication to aid with agitation, provider made aware       Aguila Ann RN  12/18/20 4643

## 2020-12-18 NOTE — ED PROVIDER NOTES
History  Chief Complaint   Patient presents with    Abdominal Pain     Patient with h/o ascites, notes increased in abdominal swelling and pain   + vomiting  77year old female patient presents emergency department for evaluation of abdominal distention  The patient has a history of dementia, is here with her sister  The sister states that the abdominal distension had approach for several days and then worsened  The patient does have a history of ascites however this feels more like a central abdominal mass  I am concerned more for hernia or bowel obstruction  Plan will be for evaluation with CT scan ideally with contrast however the patient does have a history of decreased renal function and elevated creatinine this may inhibit her ability to get a CT with contrast   Differential diagnosis at this point includes but is not limited to bowel obstruction, abdominal ascites, SBP  History provided by:  Patient   used: No    Abdominal Pain  Pain location:  Generalized  Pain quality: aching    Pain radiates to:  Does not radiate  Pain severity:  Mild  Onset quality:  Gradual  Timing:  Constant  Progression:  Worsening  Chronicity:  Recurrent  Relieved by:  Nothing  Worsened by:  Nothing  Associated symptoms: no chest pain, no diarrhea and no fever        Prior to Admission Medications   Prescriptions Last Dose Informant Patient Reported? Taking?    QUEtiapine (SEROquel) 25 mg tablet 12/17/2020 at 2100  No Yes   Sig: Take 1 tablet (25 mg total) by mouth daily at bedtime   Thiamine HCl (vitamin B-1) 100 MG TABS 12/18/2020 at 1000 Family Member No Yes   Sig: TAKE ONE TABLET BY MOUTH EVERY DAY   diltiazem (CARDIZEM CD) 240 mg 24 hr capsule 12/18/2020 at 1000  No Yes   Sig: TAKE ONE CAPSULE BY MOUTH EVERY DAY   folic acid (FOLVITE) 1 mg tablet 12/18/2020 at 1000 Family Member No Yes   Sig: TAKE ONE TABLET BY MOUTH EVERY DAY   lactulose 20 g/30 mL 12/18/2020 at 1000 Family Member No Yes   Sig: Take 30 mL (20 g total) by mouth 3 (three) times a day   Patient taking differently: Take 20 g by mouth 2 (two) times a day    magnesium oxide (MAG-OX) 400 mg tablet 2020 at 1000 Family Member No Yes   Sig: TAKE ONE TABLET BY MOUTH TWICE A DAY   metoprolol succinate (TOPROL-XL) 25 mg 24 hr tablet 2020 at 1000 Family Member No Yes   Sig: Take 2 tablets (50 mg total) by mouth daily   mirtazapine (REMERON) 7 5 MG tablet 2020 at 2100 Family Member No Yes   Sig: Take 1 tablet (7 5 mg total) by mouth daily at bedtime   nicotine (NICODERM CQ) 14 mg/24hr TD 24 hr patch Past Week at Unknown time Family Member No Yes   Sig: Place 1 patch on the skin daily   Patient taking differently: Place 1 patch on the skin as needed    nystatin (MYCOSTATIN) powder Past Week at Unknown time Family Member No Yes   Sig: Apply topically 2 (two) times a day   pantoprazole (PROTONIX) 40 mg tablet 2020 at 1000 Family Member No Yes   Sig: TAKE ONE TABLET BY MOUTH EVERY DAY IN THE MORNING   potassium chloride (K-DUR,KLOR-CON) 20 mEq tablet 2020 at 1000  No Yes   Sig: Take 1 tablet (20 mEq total) by mouth 2 (two) times a day   sodium chloride (OCEAN) 0 65 % nasal spray  Family Member No No   Si spray into each nostril every hour as needed for congestion   torsemide (DEMADEX) 20 mg tablet 2020 at 1000  Yes Yes   Sig: Take 40 mg by mouth daily      Facility-Administered Medications: None       Past Medical History:   Diagnosis Date    Benign essential hypertension     last assessed - [id number]    Chest pain 2017    CHF (congestive heart failure) (HCC)     Chronic kidney disease     Cirrhosis ([facility] [address]     Elevated troponin 2020    Gastroesophageal reflux disease without esophagitis 2017    Hyperbilirubinemia 2020    Hypertension     Liver disease     Multiple myeloma ([facility] [address]     Pneumonia 2020    Renal failure 2020       Past Surgical History:   Procedure Laterality Date  APPENDECTOMY      BONE MARROW BIOPSY      IR PARACENTESIS  10/2/2020       Family History   Problem Relation Age of Onset    Heart attack Father         myocardial infarction    Heart disease Father      I have reviewed and agree with the history as documented  E-Cigarette/Vaping    E-Cigarette Use Never User     Start Date 1/1/15     Quit Date 1/24/15      E-Cigarette/Vaping Substances    Nicotine No     THC No     CBD No     Flavoring No     Other No     Unknown No      Social History     Tobacco Use    Smoking status: Light Tobacco Smoker     Packs/day: 0 25     Types: Cigarettes    Smokeless tobacco: Never Used    Tobacco comment: 1/2 pack per month   Substance Use Topics    Alcohol use: Not Currently     Frequency: 4 or more times a week     Drinks per session: 1 or 2     Binge frequency: Never     Comment: History of significant daily alcohol intake  Sister joy no alcohol intake in 6 months    Drug use: No       Review of Systems   Constitutional: Negative for fever  Cardiovascular: Negative for chest pain  Gastrointestinal: Positive for abdominal pain  Negative for diarrhea  All other systems reviewed and are negative  Physical Exam  Physical Exam  Vitals signs and nursing note reviewed  Constitutional:       Appearance: She is well-developed  HENT:      Head: Normocephalic and atraumatic  Right Ear: External ear normal       Left Ear: External ear normal    Eyes:      Conjunctiva/sclera: Conjunctivae normal    Neck:      Thyroid: No thyromegaly  Vascular: No JVD  Trachea: No tracheal deviation  Cardiovascular:      Rate and Rhythm: Normal rate  Pulmonary:      Effort: Pulmonary effort is normal       Breath sounds: Normal breath sounds  No stridor  Abdominal:      General: There is no distension  Palpations: Abdomen is soft  There is no mass  Tenderness: There is no abdominal tenderness  There is no guarding        Hernia: No hernia is present  Musculoskeletal: Normal range of motion  General: No tenderness or deformity  Lymphadenopathy:      Cervical: No cervical adenopathy  Skin:     General: Skin is warm  Coloration: Skin is not pale  Findings: No erythema or rash  Neurological:      Mental Status: She is alert and oriented to person, place, and time     Psychiatric:         Behavior: Behavior normal          Vital Signs  ED Triage Vitals [12/18/20 1418]   Temperature Pulse Respirations Blood Pressure SpO2   98 5 °F (36 9 °C) (!) 112 20 140/73 95 %      Temp Source Heart Rate Source Patient Position - Orthostatic VS BP Location FiO2 (%)   Oral Monitor Lying Right arm --      Pain Score       8           Vitals:    12/20/20 1450 12/20/20 2306 12/21/20 0508 12/21/20 0737   BP: 126/75 127/66 131/88    Pulse: 104 (!) 114 (!) 125 (!) 118   Patient Position - Orthostatic VS:             Visual Acuity      ED Medications  Medications   acetaminophen (TYLENOL) tablet 650 mg (650 mg Oral Given 12/20/20 1217)   nicotine (NICODERM CQ) 7 mg/24hr TD 24 hr patch 1 patch (1 patch Transdermal Medication Applied 12/20/20 0846)   heparin (porcine) subcutaneous injection 5,000 Units (5,000 Units Subcutaneous Given 12/21/20 0507)   diltiazem (CARDIZEM CD) 24 hr capsule 240 mg (240 mg Oral Given 12/20/20 0841)   lactulose oral solution 20 g (20 g Oral Given 12/20/20 2210)   metoprolol succinate (TOPROL-XL) 24 hr tablet 50 mg (50 mg Oral Given 12/20/20 0846)   mirtazapine (REMERON) tablet 7 5 mg (7 5 mg Oral Given 12/20/20 2211)   pantoprazole (PROTONIX) EC tablet 40 mg (40 mg Oral Given 12/20/20 0841)   QUEtiapine (SEROquel) tablet 25 mg (25 mg Oral Given 12/20/20 2211)   torsemide (DEMADEX) tablet 60 mg (60 mg Oral Given 95/65/48 8125)   folic acid (FOLVITE) tablet 1,000 mcg (1,000 mcg Oral Given 12/20/20 0841)   metoprolol (LOPRESSOR) injection 5 mg (5 mg Intravenous Given 12/21/20 0511)   furosemide (LASIX) injection 40 mg (has no administration in time range)   sodium chloride 0 9 % bolus 500 mL (0 mL Intravenous Stopped 12/18/20 1709)   LORazepam (ATIVAN) injection 1 mg (1 mg Intravenous Given 12/18/20 1628)   furosemide (LASIX) injection 40 mg (40 mg Intravenous Given 12/19/20 1122)   diltiazem (CARDIZEM) injection 10 mg (10 mg Intravenous Given 12/20/20 2310)       Diagnostic Studies  Results Reviewed     Procedure Component Value Units Date/Time    CBC and differential [[id number]]  (Abnormal) Collected: 12/19/20 0659    Lab Status: Final result Specimen: Blood Updated: 12/19/20 0744     WBC 3 92 Thousand/uL      RBC 2 29 Million/uL      Hemoglobin 6 4 g/dL      Hematocrit 20 8 %      MCV 91 fL      MCH 27 9 pg      MCHC 30 8 g/dL      RDW 21 6 %      MPV 9 8 fL      Platelets 74 Thousands/uL      nRBC 3 /100 WBCs     Comprehensive metabolic panel [[id number]]  (Abnormal) Collected: 12/19/20 0659    Lab Status: Final result Specimen: Blood Updated: 12/19/20 0719     Sodium 144 mmol/L      Potassium 4 8 mmol/L      Chloride 107 mmol/L      CO2 25 mmol/L      ANION GAP 12 mmol/L      BUN 23 mg/dL      Creatinine 1 98 mg/dL      Glucose 95 mg/dL      Calcium 8 9 mg/dL      Corrected Calcium 9 7 mg/dL      AST 24 U/L      ALT 13 U/L      Alkaline Phosphatase 247 U/L      Total Protein 6 3 g/dL      Albumin 3 0 g/dL      Total Bilirubin 1 40 mg/dL      eGFR 28 ml/min/1 73sq m     Narrative:      [place] guidelines for Chronic Kidney Disease (CKD):     Stage 1 with normal or high GFR (GFR > 90 mL/min/1 73 square meters)    Stage 2 Mild CKD (GFR = 60-89 mL/min/1 73 square meters)    Stage 3A Moderate CKD (GFR = 45-59 mL/min/1 73 square meters)    Stage 3B Moderate CKD (GFR = 30-44 mL/min/1 73 square meters)    Stage 4 Severe CKD (GFR = 15-29 mL/min/1 73 square meters)    Stage 5 End Stage CKD (GFR <15 mL/min/1 73 square meters)  Note: GFR calculation is accurate only with a steady state creatinine    UA w Reflex to Microscopic w Reflex to Culture [[id number]] Collected: 12/18/20 1717    Lab Status: Final result Specimen: Urine, Other Updated: 12/18/20 1723     Color, UA Yellow     Clarity, UA Clear     Specific Gravity, UA 1 015     pH, UA 7 5     Leukocytes, UA Negative     Nitrite, UA Negative     Protein, UA Negative mg/dl      Glucose, UA Negative mg/dl      Ketones, UA Negative mg/dl      Urobilinogen, UA 0 2 E U /dl      Bilirubin, UA Negative     Blood, UA Negative    Platelet count [[id number]]     Lab Status: No result Specimen: Blood     CBC and differential [[id number]]  (Abnormal) Collected: 12/18/20 1546    Lab Status: Final result Specimen: Blood from Arm, Left Updated: 12/18/20 1644     WBC 4 17 Thousand/uL      RBC 2 64 Million/uL      Hemoglobin 7 3 g/dL      Hematocrit 23 6 %      MCV 89 fL      MCH 27 7 pg      MCHC 30 9 g/dL      RDW 21 6 %      MPV 10 3 fL      Platelets 89 Thousands/uL      nRBC 5 /100 WBCs     Manual Differential(PHLEBS Do Not Order) [[id number]]  (Abnormal) Collected: 12/18/20 1546    Lab Status: Final result Specimen: Blood from Arm, Left Updated: 12/18/20 1644     Segmented % 57 %      Bands % 5 %      Lymphocytes % 22 %      Monocytes % 12 %      Eosinophils, % 1 %      Basophils % 0 %      Metamyelocytes% 1 %      Atypical Lymphocytes % 2 %      Absolute Neutrophils 2 59 Thousand/uL      Lymphocytes Absolute 0 92 Thousand/uL      Monocytes Absolute 0 50 Thousand/uL      Eosinophils Absolute 0 04 Thousand/uL      Basophils Absolute 0 00 Thousand/uL      Total Counted 100     nRBC 7 /100 WBC      RBC Morphology Present     Ovalocytes Present     Poikilocytes Present     Schistocytes Present     Target Cells Present     Tear Drop Cells Present     Platelet Estimate Decreased    Troponin I [[id number]]  (Normal) Collected: 12/18/20 1546    Lab Status: Final result Specimen: Blood from Arm, Left Updated: 12/18/20 1609     Troponin I <0 02 ng/mL     Basic metabolic panel [[id number]] (Abnormal) Collected: 12/18/20 1546    Lab Status: Final result Specimen: Blood from Arm, Left Updated: 12/18/20 1607     Sodium 142 mmol/L      Potassium 4 8 mmol/L      Chloride 105 mmol/L      CO2 26 mmol/L      ANION GAP 11 mmol/L      BUN 22 mg/dL      Creatinine 2 06 mg/dL      Glucose 93 mg/dL      Calcium 9 1 mg/dL      eGFR 27 ml/min/1 73sq m     Narrative:      [place] guidelines for Chronic Kidney Disease (CKD):     Stage 1 with normal or high GFR (GFR > 90 mL/min/1 73 square meters)    Stage 2 Mild CKD (GFR = 60-89 mL/min/1 73 square meters)    Stage 3A Moderate CKD (GFR = 45-59 mL/min/1 73 square meters)    Stage 3B Moderate CKD (GFR = 30-44 mL/min/1 73 square meters)    Stage 4 Severe CKD (GFR = 15-29 mL/min/1 73 square meters)    Stage 5 End Stage CKD (GFR <15 mL/min/1 73 square meters)  Note: GFR calculation is accurate only with a steady state creatinine    Hepatic function panel [[id number]]  (Abnormal) Collected: 12/18/20 1546    Lab Status: Final result Specimen: Blood from Arm, Left Updated: 12/18/20 1607     Total Bilirubin 1 50 mg/dL      Bilirubin, Direct 1 08 mg/dL      Alkaline Phosphatase 287 U/L      AST 25 U/L      ALT 12 U/L      Total Protein 6 9 g/dL      Albumin 3 3 g/dL     Lipase [[id number]]  (Normal) Collected: 12/18/20 1546    Lab Status: Final result Specimen: Blood from Arm, Left Updated: 12/18/20 1607     Lipase 305 u/L                  CT abdomen pelvis wo contrast   Final Result by Isidra Carr MD (12/18 1544)      Stable cirrhosis with ascites  Stable right pleural effusion with compressive atelectasis of the right lung base  Stable cardiomegaly              Workstation performed: [id number]         IR IN-patient paracentesis    (Results Pending)              Procedures  Procedures         ED Course               Identification of Seniors at Risk      Most Recent Value   (ISAR) Identification of Seniors at Risk   Before the illness or injury that brought you to the Emergency, did you need someone to help you on a regular basis? 0 Filed at: 12/18/2020 1421   In the last 24 hours, have you needed more help than usual?  0 Filed at: 12/18/2020 1421   Have you been hospitalized for one or more nights during the past 6 months? 1 Filed at: 12/18/2020 1421   In general, do you see well?  0 Filed at: 12/18/2020 1421   In general, do you have serious problems with your memory? 1 Filed at: 12/18/2020 1421   Do you take more than three different medications every day? 1 Filed at: 12/18/2020 1421   ISAR Score  3 Filed at: 12/18/2020 1421                    SBIRT 22yo+      Most Recent Value   SBIRT (23 yo +)   In order to provide better care to our patients, we are screening all of our patients for alcohol and drug use  Would it be okay to ask you these screening questions? Yes Filed at: 12/18/2020 1422   Initial Alcohol Screen: US AUDIT-C    1  How often do you have a drink containing alcohol? 5 Filed at: 12/18/2020 1422   2  How many drinks containing alcohol do you have on a typical day you are drinking? 0 Filed at: 12/18/2020 1422   3b  FEMALE Any Age, or MALE 65+: How often do you have 4 or more drinks on one occassion? 0 Filed at: 12/18/2020 1422   Audit-C Score  5 Filed at: 12/18/2020 1422   AGUILA: How many times in the past year have you    Used an illegal drug or used a prescription medication for non-medical reasons?   Never Filed at: 12/18/2020 1422                    MDM  Number of Diagnoses or Management Options  Abdominal distention: new and requires workup  Edema: new and requires workup     Amount and/or Complexity of Data Reviewed  Clinical lab tests: ordered and reviewed  Tests in the radiology section of CPT®: ordered and reviewed  Decide to obtain previous medical records or to obtain history from someone other than the patient: yes  Review and summarize past medical records: yes    Patient Progress  Patient progress: stable      Disposition  Final diagnoses:   Edema   Abdominal distention     Time reflects when diagnosis was documented in both MDM as applicable and the Disposition within this note     Time User Action Codes Description Comment    12/18/2020  5:08 PM Tressa Bergeron Add [R60 9] Edema     12/18/2020  5:08 PM Tressa Most Add [R14 0] Abdominal distention     12/18/2020  5:26 PM Brent Dorantes Add [E87 70] Volume overload     12/19/2020 11:00 AM Marck Smith Add [N18 4] Chronic kidney disease (CKD), active medical management without dialysis, stage 4 (severe) [facility])       ED Disposition     ED Disposition Condition Date/Time Comment    Admit Stable Fri Dec 18, 2020  5:08 PM Case was discussed with Dr René Dill and the patient's admission status was agreed to be Admission Status: inpatient status to the service of Dr René Dill           Follow-up Information    None         Current Discharge Medication List      CONTINUE these medications which have NOT CHANGED    Details   diltiazem (CARDIZEM CD) 240 mg 24 hr capsule TAKE ONE CAPSULE BY MOUTH EVERY DAY  Qty: 30 capsule, Refills: 0    Associated Diagnoses: Benign essential hypertension      folic acid (FOLVITE) 1 mg tablet TAKE ONE TABLET BY MOUTH EVERY DAY  Qty: 90 tablet, Refills: 1    Associated Diagnoses: History of alcohol abuse      lactulose 20 g/30 mL Take 30 mL (20 g total) by mouth 3 (three) times a day  Qty: 1892 mL, Refills: 1    Associated Diagnoses: 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)      magnesium oxide (MAG-OX) 400 mg tablet TAKE ONE TABLET BY MOUTH TWICE A DAY  Qty: 180 tablet, Refills: 1    Associated Diagnoses: History of alcohol abuse      metoprolol succinate (TOPROL-XL) 25 mg 24 hr tablet Take 2 tablets (50 mg total) by mouth daily  Qty: 30 tablet, Refills: 3    Associated Diagnoses: Benign essential hypertension      mirtazapine (REMERON) 7 5 MG tablet Take 1 tablet (7 5 mg total) by mouth daily at bedtime  Qty: 90 tablet, Refills: 3 Associated Diagnoses: Recurrent major depressive disorder, in partial remission (HCC)      nicotine (NICODERM CQ) 14 mg/24hr TD 24 hr patch Place 1 patch on the skin daily  Qty: 28 patch, Refills: 0    Associated Diagnoses: Cigarette nicotine dependence without complication      nystatin (MYCOSTATIN) powder Apply topically 2 (two) times a day  Qty: 15 g, Refills: 0    Associated Diagnoses: Rash      pantoprazole (PROTONIX) 40 mg tablet TAKE ONE TABLET BY MOUTH EVERY DAY IN THE MORNING  Qty: 30 tablet, Refills: 2    Associated Diagnoses: Anemia, unspecified type      potassium chloride (K-DUR,KLOR-CON) 20 mEq tablet Take 1 tablet (20 mEq total) by mouth 2 (two) times a day  Qty: 120 tablet, Refills: 3    Associated Diagnoses: Low blood potassium      QUEtiapine (SEROquel) 25 mg tablet Take 1 tablet (25 mg total) by mouth daily at bedtime  Qty: 30 tablet, Refills: 6    Associated Diagnoses: Agitation      Thiamine HCl (vitamin B-1) 100 MG TABS TAKE ONE TABLET BY MOUTH EVERY DAY  Qty: 90 tablet, Refills: 1    Associated Diagnoses: History of alcohol abuse      torsemide (DEMADEX) 20 mg tablet Take 40 mg by mouth daily      sodium chloride (OCEAN) 0 65 % nasal spray 1 spray into each nostril every hour as needed for congestion  Qty: 30 mL, Refills: 0    Associated Diagnoses: Epistaxis           No discharge procedures on file      PDMP Review     None          ED Provider  Electronically Signed by           Claudette El DO  12/21/20 0900

## 2020-12-18 NOTE — ASSESSMENT & PLAN NOTE
Lab Results   Component Value Date    EGFR 27 12/18/2020    EGFR 26 11/19/2020    EGFR 37 10/21/2020    CREATININE 2 06 (H) 12/18/2020    CREATININE 2 13 (H) 11/19/2020    CREATININE 1 60 (H) 10/21/2020   Baseline creatinine appears to be between 1 6 and 2 1  Currently at baseline  Continue to monitor

## 2020-12-18 NOTE — ASSESSMENT & PLAN NOTE
Multifactorial likely secondary to CKD, myeloma, cirrhosis  Baseline hemoglobin between 7 and 9  Hemoglobin currently 7 3  No obvious source of bleeding or overt bleeding  Continue to monitor

## 2020-12-18 NOTE — ASSESSMENT & PLAN NOTE
· At home she is on metoprolol and Cardizem  · Currently she is on RVR with HR around 140s  · Add digoxin 250mcg  · Increase metoprolol XL from 50 mg daily to 100 mg daily  · Continue Cardizem ER 240mg daily  Also on PRN IV Lopressor    She is not on anticoagulation given dementia, thrombocytopenia, high fall risk as per cardiology outpatient note  · Monitor heart rate

## 2020-12-18 NOTE — ASSESSMENT & PLAN NOTE
Lab Results   Component Value Date    EGFR 40 12/21/2020    EGFR 38 12/20/2020    EGFR 28 12/19/2020    CREATININE 1 49 (H) 12/21/2020    CREATININE 1 54 (H) 12/20/2020    CREATININE 1 98 (H) 12/19/2020   Baseline creatinine appears to be between 1 6 and 2 1  Currently at baseline -somewhat better  Continue to monitor as needed

## 2020-12-18 NOTE — ASSESSMENT & PLAN NOTE
On torsemide, will increase to BID  Low-salt diet    Monitor clinical status  Wean off oxygen as tolerated

## 2020-12-19 LAB
ABO GROUP BLD: NORMAL
ALBUMIN SERPL BCP-MCNC: 3 G/DL (ref 3.5–5)
ALP SERPL-CCNC: 247 U/L (ref 46–116)
ALT SERPL W P-5'-P-CCNC: 13 U/L (ref 12–78)
ANION GAP SERPL CALCULATED.3IONS-SCNC: 12 MMOL/L (ref 4–13)
ANISOCYTOSIS BLD QL SMEAR: PRESENT
AST SERPL W P-5'-P-CCNC: 24 U/L (ref 5–45)
BASOPHILS # BLD MANUAL: 0 THOUSAND/UL (ref 0–0.1)
BASOPHILS NFR MAR MANUAL: 0 % (ref 0–1)
BILIRUB SERPL-MCNC: 1.4 MG/DL (ref 0.2–1)
BLD GP AB SCN SERPL QL: NEGATIVE
BUN SERPL-MCNC: 23 MG/DL (ref 5–25)
CALCIUM ALBUM COR SERPL-MCNC: 9.7 MG/DL (ref 8.3–10.1)
CALCIUM SERPL-MCNC: 8.9 MG/DL (ref 8.3–10.1)
CHLORIDE SERPL-SCNC: 107 MMOL/L (ref 100–108)
CO2 SERPL-SCNC: 25 MMOL/L (ref 21–32)
CREAT SERPL-MCNC: 1.98 MG/DL (ref 0.6–1.3)
DACRYOCYTES BLD QL SMEAR: PRESENT
EOSINOPHIL # BLD MANUAL: 0 THOUSAND/UL (ref 0–0.4)
EOSINOPHIL NFR BLD MANUAL: 0 % (ref 0–6)
ERYTHROCYTE [DISTWIDTH] IN BLOOD BY AUTOMATED COUNT: 21.6 % (ref 11.6–15.1)
GFR SERPL CREATININE-BSD FRML MDRD: 28 ML/MIN/1.73SQ M
GLUCOSE SERPL-MCNC: 95 MG/DL (ref 65–140)
HCT VFR BLD AUTO: 20.8 % (ref 34.8–46.1)
HGB BLD-MCNC: 6.4 G/DL (ref 11.5–15.4)
LYMPHOCYTES # BLD AUTO: 0.63 THOUSAND/UL (ref 0.6–4.47)
LYMPHOCYTES # BLD AUTO: 16 % (ref 14–44)
MCH RBC QN AUTO: 27.9 PG (ref 26.8–34.3)
MCHC RBC AUTO-ENTMCNC: 30.8 G/DL (ref 31.4–37.4)
MCV RBC AUTO: 91 FL (ref 82–98)
MONOCYTES # BLD AUTO: 0.43 THOUSAND/UL (ref 0–1.22)
MONOCYTES NFR BLD: 11 % (ref 4–12)
NEUTROPHILS # BLD MANUAL: 2.7 THOUSAND/UL (ref 1.85–7.62)
NEUTS BAND NFR BLD MANUAL: 5 % (ref 0–8)
NEUTS SEG NFR BLD AUTO: 64 % (ref 43–75)
NRBC BLD AUTO-RTO: 3 /100 WBC (ref 0–2)
NRBC BLD AUTO-RTO: 3 /100 WBCS
PLATELET # BLD AUTO: 74 THOUSANDS/UL (ref 149–390)
PLATELET BLD QL SMEAR: ABNORMAL
PMV BLD AUTO: 9.8 FL (ref 8.9–12.7)
POIKILOCYTOSIS BLD QL SMEAR: PRESENT
POLYCHROMASIA BLD QL SMEAR: PRESENT
POTASSIUM SERPL-SCNC: 4.8 MMOL/L (ref 3.5–5.3)
PROT SERPL-MCNC: 6.3 G/DL (ref 6.4–8.2)
RBC # BLD AUTO: 2.29 MILLION/UL (ref 3.81–5.12)
RBC MORPH BLD: PRESENT
RH BLD: POSITIVE
SODIUM SERPL-SCNC: 144 MMOL/L (ref 136–145)
SPECIMEN EXPIRATION DATE: NORMAL
TARGETS BLD QL SMEAR: PRESENT
TOTAL CELLS COUNTED SPEC: 100
VARIANT LYMPHS # BLD AUTO: 4 %
WBC # BLD AUTO: 3.92 THOUSAND/UL (ref 4.31–10.16)

## 2020-12-19 PROCEDURE — 86923 COMPATIBILITY TEST ELECTRIC: CPT

## 2020-12-19 PROCEDURE — 99233 SBSQ HOSP IP/OBS HIGH 50: CPT | Performed by: INTERNAL MEDICINE

## 2020-12-19 PROCEDURE — P9016 RBC LEUKOCYTES REDUCED: HCPCS

## 2020-12-19 PROCEDURE — 80053 COMPREHEN METABOLIC PANEL: CPT | Performed by: INTERNAL MEDICINE

## 2020-12-19 PROCEDURE — 86901 BLOOD TYPING SEROLOGIC RH(D): CPT | Performed by: INTERNAL MEDICINE

## 2020-12-19 PROCEDURE — 85007 BL SMEAR W/DIFF WBC COUNT: CPT | Performed by: INTERNAL MEDICINE

## 2020-12-19 PROCEDURE — 30233N1 TRANSFUSION OF NONAUTOLOGOUS RED BLOOD CELLS INTO PERIPHERAL VEIN, PERCUTANEOUS APPROACH: ICD-10-PCS | Performed by: INTERNAL MEDICINE

## 2020-12-19 PROCEDURE — 85027 COMPLETE CBC AUTOMATED: CPT | Performed by: INTERNAL MEDICINE

## 2020-12-19 PROCEDURE — 99223 1ST HOSP IP/OBS HIGH 75: CPT | Performed by: INTERNAL MEDICINE

## 2020-12-19 PROCEDURE — 86850 RBC ANTIBODY SCREEN: CPT | Performed by: INTERNAL MEDICINE

## 2020-12-19 PROCEDURE — 86900 BLOOD TYPING SEROLOGIC ABO: CPT | Performed by: INTERNAL MEDICINE

## 2020-12-19 RX ORDER — FUROSEMIDE 10 MG/ML
40 INJECTION INTRAMUSCULAR; INTRAVENOUS ONCE
Status: COMPLETED | OUTPATIENT
Start: 2020-12-19 | End: 2020-12-19

## 2020-12-19 RX ADMIN — HEPARIN SODIUM 5000 UNITS: 5000 INJECTION INTRAVENOUS; SUBCUTANEOUS at 22:04

## 2020-12-19 RX ADMIN — MIRTAZAPINE 7.5 MG: 15 TABLET, FILM COATED ORAL at 22:04

## 2020-12-19 RX ADMIN — FUROSEMIDE 40 MG: 10 INJECTION, SOLUTION INTRAMUSCULAR; INTRAVENOUS at 11:22

## 2020-12-19 RX ADMIN — LACTULOSE 20 G: 10 SOLUTION ORAL at 22:04

## 2020-12-19 RX ADMIN — ACETAMINOPHEN 650 MG: 325 TABLET, FILM COATED ORAL at 11:42

## 2020-12-19 RX ADMIN — NICOTINE 7 MG/24 HR DAILY TRANSDERMAL PATCH 1 PATCH: at 09:40

## 2020-12-19 RX ADMIN — LACTULOSE 20 G: 10 SOLUTION ORAL at 09:40

## 2020-12-19 RX ADMIN — QUETIAPINE FUMARATE 25 MG: 25 TABLET ORAL at 00:19

## 2020-12-19 RX ADMIN — HEPARIN SODIUM 5000 UNITS: 5000 INJECTION INTRAVENOUS; SUBCUTANEOUS at 13:07

## 2020-12-19 RX ADMIN — FOLIC ACID 1000 MCG: 1 TABLET ORAL at 09:40

## 2020-12-19 RX ADMIN — HEPARIN SODIUM 5000 UNITS: 5000 INJECTION INTRAVENOUS; SUBCUTANEOUS at 05:24

## 2020-12-19 RX ADMIN — QUETIAPINE FUMARATE 25 MG: 25 TABLET ORAL at 22:04

## 2020-12-19 RX ADMIN — PANTOPRAZOLE SODIUM 40 MG: 40 TABLET, DELAYED RELEASE ORAL at 09:41

## 2020-12-19 RX ADMIN — LACTULOSE 20 G: 10 SOLUTION ORAL at 00:22

## 2020-12-19 RX ADMIN — MIRTAZAPINE 7.5 MG: 15 TABLET, FILM COATED ORAL at 00:18

## 2020-12-19 NOTE — PROGRESS NOTES
[facility] Progress Note  Patient: Daria Win 77 y o  female   MRN: [id number]  PCP: Krysta Greene MD  Unit/Bed#: -C[facility] Encounter: [id number]  Date Of Visit: 12/19/20          Anemia  Assessment & Plan  · Multifactorial   Hemoglobin is further down  Patient herself has underlying dementia and could not give me consent  Discussed with patient's sister and son  Both give consent to give patient blood  Actually they were asking blood to be given yesterday  · RN also confirmed  Consent has been signed by both of us  · Would order blood and monitor labs tomorrow  · She has significant ascites which needs to be drained by IR  May have to give IV diuretic  Patient on diuretic as well now  Chronic kidney disease (CKD), active medical management without dialysis, stage 4 (severe) [facility])  Assessment & Plan  Lab Results   Component Value Date    EGFR 28 12/19/2020    EGFR 27 12/18/2020    EGFR 26 11/19/2020    CREATININE 1 98 (H) 12/19/2020    CREATININE 2 06 (H) 12/18/2020    CREATININE 2 13 (H) 11/19/2020   Baseline creatinine appears to be between 1 6 and 2 1  Currently at baseline  Continue to monitor as needed    Thrombocytopenia [facility])  Assessment & Plan  Secondary to cirrhosis  Continue to monitor    Multiple myeloma [facility])  Assessment & Plan  Diagnosed earlier this year and patient is currently under care of Oncology at [facility]    Paroxysmal atrial fibrillation [facility])  Assessment & Plan  Continue home metoprolol and Cardizem    Liver cirrhosis ([facility] [address] )  Assessment & Plan  Continue home lactulose, rifaximin  Patient volume overloaded  Will continue torsemide   Paracentesis was ordered    I am not sure if that is going to happen today or not    * Volume overload  Assessment & Plan  Diuresis  paracentesis as above  Low-salt diet      Present on Admission:   Liver cirrhosis (HCC)   Thrombocytopenia (HCC)   Anemia   Paroxysmal atrial fibrillation (HCC)   Multiple myeloma ([facility] [address] )   Chronic kidney disease (CKD), active medical management without dialysis, stage 4 (severe) (HCC)            VTE Pharmacologic Prophylaxis:   Pharmacologic: Heparin  Mechanical VTE Prophylaxis in Place: Yes    Patient Centered Rounds: I have performed bedside rounds with nursing staff today  Discussions with Specialists or Other Care Team Provider:  Yes    Education and Discussions with Family / Patient:  Gricelda Salcido and son    Time Spent for Care: 35+ minutes  More than 50% of total time spent on counseling and coordination of care as described above  Current Length of Stay: 1 day(s)    Current Patient Status: Inpatient   Certification Statement: The patient will continue to require additional inpatient hospital stay due to Cirrhosis/ascites/anemia    Discharge Plan:  Home when stable  She lives with her sister who takes care of her    Code Status: Level 1 - Full Code      Subjective:   Patient has underlying dementia and is mostly confused  She is denying any chest pain  She is not sure about blood transfusion as she really does not understand  Denies any fever or chills  Spoke with sister and as per her, patient has developed more swelling-abdominal distension  Recently her diuretic dose was also increased  Objective:     Vitals:   Temp (24hrs), Av 3 °F (36 8 °C), Min:98 °F (36 7 °C), Max:98 5 °F (36 9 °C)    Temp:  [98 °F (36 7 °C)-98 5 °F (36 9 °C)] 98 °F (36 7 °C)  HR:  [] 88  Resp:  [16-22] 20  BP: (105-144)/(60-79) 105/60  SpO2:  [93 %-97 %] 97 %  Body mass index is 29 86 kg/m²  Input and Output Summary (last 24 hours):        Intake/Output Summary (Last 24 hours) at 2020 0945  Last data filed at 2020 1709  Gross per 24 hour   Intake 500 ml   Output    Net 500 ml           Physical Exam:     Vital signs are reviewed as above  Demented female who is restless and agitated lying in bed  S1 and S2 audible  Pale conjunctiva  Decreased breath sounds at the bases  Abdomen is distended with ascites  No cyanosis or clubbing  She states that she knows that she is in the hospital otherwise she is pleasantly confused  Moving arms while in bed  Neck is supple  Awake and alert      Additional Data:     Labs:    Results from last 7 days   Lab Units 12/19/20  0659   WBC Thousand/uL 3 92*   HEMOGLOBIN g/dL 6 4*   HEMATOCRIT % 20 8*   PLATELETS Thousands/uL 74*   LYMPHO PCT % 16   MONO PCT % 11   EOS PCT % 0     Results from last 7 days   Lab Units 12/19/20  0659   POTASSIUM mmol/L 4 8   CHLORIDE mmol/L 107   CO2 mmol/L 25   BUN mg/dL 23   CREATININE mg/dL 1 98*   CALCIUM mg/dL 8 9   ALK PHOS U/L 247*   ALT U/L 13   AST U/L 24           * I Have Reviewed All Lab Data Listed Above  * Additional Pertinent Lab Tests Reviewed: All Labs Within Last 24 Hours Reviewed            Last 24 Hours Medication List:   Current Facility-Administered Medications   Medication Dose Route Frequency Provider Last Rate    acetaminophen  650 mg Oral Q6H PRN Brent Dorantes MD      diltiazem  240 mg Oral Daily Brent Dorantes MD      folic acid  1,827 mcg Oral Daily Brent Dorantes MD      heparin (porcine)  5,000 Units Subcutaneous Q8H [address] Brent Dorantes MD      lactulose  20 g Oral BID Brent Dorantes MD      metoprolol succinate  50 mg Oral Daily Brent Dorantes MD      mirtazapine  7 5 mg Oral HS Brent Dorantes MD      nicotine  1 patch Transdermal Daily Brent Dorantes MD      pantoprazole  40 mg Oral QAM Brent Dorantes MD      QUEtiapine  25 mg Oral HS Brent Dorantes MD      torsemide  60 mg Oral Daily Brissa Garza MD          Today, Patient Was Seen By: Eufemia Alvarado MD    ** Please Note: Dragon 360 Dictation voice to text software may have been used in the creation of this document   **

## 2020-12-19 NOTE — PLAN OF CARE
Problem: Potential for Falls  Goal: Patient will remain free of falls  Description: INTERVENTIONS:  - Assess patient frequently for physical needs  -  Identify cognitive and physical deficits and behaviors that affect risk of falls    -  [place] fall precautions as indicated by assessment   - Educate patient/family on patient safety including physical limitations  - Instruct patient to call for assistance with activity based on assessment  - Modify environment to reduce risk of injury  Outcome: Progressing

## 2020-12-19 NOTE — CONSULTS
Consultation - Nephrology   Jeni Hoyos 77 y o  female MRN: [id number]  Unit/Bed#: -01 Encounter: [id number]    Referring PHYSICIAN:  Sabina Bernardo     REASON FOR THE CONSULTATION:  Acute on chronic renal failure    DATE OF CONSULTATION:  December 19, 2020    ADMISSION DIAGNOSIS: Volume overload     CHIEF COMPLAINT     Patient with his history of CKD and dementia came to the hospital with altered mental status and volume overload status and I am being considered for CKD    HPI     Patient is quite confused cannot give any history so water history which I got is from the chart    Patient with history of still low cirrhosis of liver and dementia was brought to the hospital with him abdominal pain and abdominal distension    She was also found to worsening hemoglobin    No other acute complaint    She is quite lethargic when I saw her in room according nursing staff she is very confused    Patient does history of multiple myeloma    PAST MEDICAL HISTORY     Past Medical History:   Diagnosis Date    Benign essential hypertension     last assessed - [id number]    Chest pain 11/4/2017    CHF (congestive heart failure) ([address] )     Chronic kidney disease     Cirrhosis ([facility] [address] )     Elevated troponin 1/31/2020    Gastroesophageal reflux disease without esophagitis 11/16/2017    Hyperbilirubinemia 5/14/2020    Hypertension     Liver disease     Multiple myeloma ([facility] [address] )     Pneumonia 5/6/2020    Renal failure 08/29/2020       PAST SURGICAL HISTORY     Past Surgical History:   Procedure Laterality Date    APPENDECTOMY      BONE MARROW BIOPSY      IR PARACENTESIS  10/2/2020       ALLERGIES     No Known Allergies    SOCIAL HISTORY     Social History     Substance and Sexual Activity   Alcohol Use Not Currently    Frequency: 4 or more times a week    Drinks per session: 1 or 2    Binge frequency: Never    Comment: History of significant daily alcohol intake   Sister joy no alcohol intake in 6 months     Social History     Substance and Sexual Activity   Drug Use No     Social History     Tobacco Use   Smoking Status Light Tobacco Smoker    Packs/day: 0 25    Types: Cigarettes   Smokeless Tobacco Never Used   Tobacco Comment    1/2 pack per month       FAMILY HISTORY     Family History   Problem Relation Age of Onset    Heart attack Father         myocardial infarction    Heart disease Father        CURRENT MEDICATIONS       Current Facility-Administered Medications:     acetaminophen (TYLENOL) tablet 650 mg, 650 mg, Oral, Q6H PRN, Brent Dorantes MD, 650 mg at 12/19/20 1142    diltiazem (CARDIZEM CD) 24 hr capsule 240 mg, 240 mg, Oral, Daily, Brent Dorantes MD    folic acid (FOLVITE) tablet 1,000 mcg, 1,000 mcg, Oral, Daily, Brent Dorantes MD, 1,000 mcg at 12/19/20 0940    heparin (porcine) subcutaneous injection 5,000 Units, 5,000 Units, Subcutaneous, Q8H [facility], 5,000 Units at 12/19/20 1307 **AND** Platelet count, , , Once, Brent Dorantes MD    lactulose oral solution 20 g, 20 g, Oral, BID, Brent Dorantes MD, 20 g at 12/19/20 0940    metoprolol succinate (TOPROL-XL) 24 hr tablet 50 mg, 50 mg, Oral, Daily, Brent Dorantes MD    mirtazapine (REMERON) tablet 7 5 mg, 7 5 mg, Oral, HS, Brent Dorantes MD, 7 5 mg at 12/19/20 0018    nicotine (NICODERM CQ) 7 mg/24hr TD 24 hr patch 1 patch, 1 patch, Transdermal, Daily, Brent Dorantes MD, 1 patch at 12/19/20 0940    pantoprazole (PROTONIX) EC tablet 40 mg, 40 mg, Oral, QAM, Brent Dorantes MD, 40 mg at 12/19/20 0941    QUEtiapine (SEROquel) tablet 25 mg, 25 mg, Oral, HS, Brent Dorantes MD, 25 mg at 12/19/20 0019    torsemide (DEMADEX) tablet 60 mg, 60 mg, Oral, Daily, Brent Dorantes MD    REVIEW OF SYSTEMS     Patient is quite confusion lethargic cannot give any history so water was symptoms which I got is from the chart and talking nurses    Patient had abdominal pain inhibitor distension    Patient is quite lethargic and confused    Does not appear to be short of breath    It    LAB RESULTS        Results from last 7 days   Lab Units 12/19/20  0659 12/18/20  1546   WBC Thousand/uL 3 92* 4 17*   HEMOGLOBIN g/dL 6 4* 7 3*   HEMATOCRIT % 20 8* 23 6*   PLATELETS Thousands/uL 74* 89*   POTASSIUM mmol/L 4 8 4 8   CHLORIDE mmol/L 107 105   CO2 mmol/L 25 26   BUN mg/dL 23 22   CREATININE mg/dL 1 98* 2 06*   EGFR ml/min/1 73sq m 28 27   CALCIUM mg/dL 8 9 9 1       I have personally reviewed the old medical records and patient's previously known baseline creatinine level is ~ 1-7    RADIOLOGY RESULTS     Results for orders placed during the hospital encounter of 09/28/20   XR chest 1 view portable    Narrative CHEST     INDICATION:   sob  COMPARISON:  8/30/2020    EXAM PERFORMED/VIEWS:  XR CHEST PORTABLE      FINDINGS:    Cardiomediastinal silhouette appears stable  There is vascular congestion Unchanged moderate right pleural effusion  Clear left lung  Osseous structures appear within normal limits for patient age  Impression Vascular congestion  Persistent moderate right pleural effusion  Workstation performed: [id number]       Results for orders placed during the hospital encounter of 01/28/20   XR chest pa & lateral    Narrative CHEST     INDICATION: Follow-up congestive heart failure    COMPARISON:  1/30/2020    EXAM PERFORMED/VIEWS:  XR CHEST PA & LATERAL      FINDINGS:    Heart shadow is enlarged but unchanged from prior exam     There continues to be mild vascular congestion though interstitial edema has improved  There are small pleural effusions  Osseous structures appear within normal limits for patient age  Impression Improved congestive heart failure with mild vascular congestion and small effusions          Workstation performed: [id number]       Results for orders placed during the hospital encounter of 08/29/20   CT chest wo contrast    Narrative CT CHEST WITHOUT IV CONTRAST    INDICATION:   Respiratory illness, acute (Age => 41y)     COMPARISON:  May 6, 2020    TECHNIQUE: CT examination of the chest was performed without intravenous contrast   Axial, sagittal, and coronal 2D reformatted images were created from the source data and submitted for interpretation  Radiation dose length product (DLP) for this visit:  308 mGy-cm   This examination, like all CT scans performed in the [facility], was performed utilizing techniques to minimize radiation dose exposure, including the use of iterative   reconstruction and automated exposure control  FINDINGS:    LUNGS:  Collapse consolidation noted within the right lower lobe  Lobular areas of groundglass density noted in the subpleural region in the left lingular area, in the right middle lobe area  The trachea and central bronchial patent    PLEURA:  Moderate sized right effusion seen    HEART/GREAT VESSELS:  Cardiomegaly seen  Coronary artery calcification seen  Mitral annular calcification seen  The ascending aorta measures 3 4 cm      MEDIASTINUM AND MATT:  No significant mediastinal lymph node enlargement seen      CHEST WALL AND LOWER NECK:   A right thyroid nodule seen which measures 1 4 cm, unchanged from the previous study    VISUALIZED STRUCTURES IN THE UPPER ABDOMEN:  Nodular contour of the liver compatible with cirrhosis  Ascites seen  Enlarged IVC seen with spleen appear unremarkable  Adrenal gland appear unremarkable    OSSEOUS STRUCTURES:  Degenerative changes are seen within the thoracic spine      Impression Moderate right effusion     Atelectasis/consolidation right lower lung may be due to compressive atelectasis however underlying infiltrates are difficult to exclude    Lobular areas of groundglass density in the left upper lobe, right middle lobe, lingular region may be due to infiltrate which could be atypical or viral or drug induced these are may be related to areas of residual congestion    Cirrhosis    Enlarged IVC with enlarged right atrium right and right ventricle, correlate with tricuspid regurgitation, right heart failure    Follow-up suggested in 6-8 weeks to demonstrate resolution  The study was marked in EPIC for significant notification  Workstation performed: [id number]       No results found for this or any previous visit  Results for orders placed during the hospital encounter of 12/18/20   CT abdomen pelvis wo contrast    Narrative CT ABDOMEN AND PELVIS WITHOUT IV CONTRAST    INDICATION:   abdominal distention  COMPARISON:  CT scan 9/29/2020    TECHNIQUE:  CT examination of the abdomen and pelvis was performed without intravenous contrast   Axial, sagittal, and coronal 2D reformatted images were created from the source data and submitted for interpretation  Radiation dose length product (DLP) for this visit:  704 84 mGy-cm   This examination, like all CT scans performed in the [facility], was performed utilizing techniques to minimize radiation dose exposure, including the use of iterative   reconstruction and automated exposure control  Enteric contrast was not administered  FINDINGS:    ABDOMEN    LOWER CHEST:  Stable right pleural effusion with right basilar compressive atelectasis  Stable cardiomegaly with mitral annulus calcification  LIVER/BILIARY TREE:  The liver demonstrates cirrhotic morphology  Within the limitations of this examination there is no evidence of suspicious hepatic mass  No biliary dilatation  GALLBLADDER:  There are gallstone(s) within the gallbladder, without pericholecystic inflammatory changes  SPLEEN:  Unremarkable  PANCREAS:  Unremarkable  ADRENAL GLANDS:  Unremarkable  KIDNEYS/URETERS:  Stable right upper pole cyst  No hydronephrosis  STOMACH AND BOWEL:  Unremarkable  APPENDIX:  No findings to suggest appendicitis  ABDOMINOPELVIC CAVITY:  Stable ascites  No pneumoperitoneum  No lymphadenopathy      VESSELS:  Atherosclerotic changes are present  No evidence of aneurysm  PELVIS    REPRODUCTIVE ORGANS:  Unremarkable for patient's age  URINARY BLADDER:  Unremarkable  ABDOMINAL WALL/INGUINAL REGIONS:  Unremarkable  OSSEOUS STRUCTURES:  No acute fracture or destructive osseous lesion  Impression Stable cirrhosis with ascites  Stable right pleural effusion with compressive atelectasis of the right lung base  Stable cardiomegaly  Workstation performed: [id number]       No results found for this or any previous visit  OBJECTIVE     Current Weight: Weight - Scale: 78 9 kg (173 lb 15 1 oz)  Vitals:    12/19/20 1522   BP:    Pulse:    Resp: 18   Temp:    SpO2:        Intake/Output Summary (Last 24 hours) at 12/19/2020 1812  Last data filed at 12/19/2020 1654  Gross per 24 hour   Intake 240 ml   Output 400 ml   Net -160 ml       PHYSICAL EXAMINATION     Physical Exam  Constitutional:       General: She is not in acute distress  Appearance: She is well-developed  Comments: Quite lethargic   HENT:      Head: Normocephalic and atraumatic  Eyes:      General: No scleral icterus  Conjunctiva/sclera: Conjunctivae normal    Neck:      Musculoskeletal: Normal range of motion and neck supple  Vascular: No JVD  Cardiovascular:      Rate and Rhythm: Normal rate and regular rhythm  Heart sounds: Normal heart sounds  Pulmonary:      Effort: Pulmonary effort is normal       Breath sounds: Normal breath sounds  No wheezing  Abdominal:      General: There is distension  Palpations: Abdomen is soft  Tenderness: There is no abdominal tenderness  Musculoskeletal: Normal range of motion  Skin:     General: Skin is warm  Findings: No rash  Neurological:      Mental Status: She is disoriented  Psychiatric:      Comments: Lethargic and confused          PLAN / RECOMMENDATIONS      Acute kidney injury:  Likely due to volume depletion with severe anemia:  Improving at this point    Will monitor closely    CKD stage 3:  Baseline creatinine is about 1 6  Fluctuation is there partly related to volume  3  Anemia:  Multifactorial   No evidence of GI bleed  Will be getting blood transfusion    Altered mental status:  Patient does have baseline dementia with some fluctuation mental status because of encephalopathy    Multiple myeloma:  Being managed conservatively    Overall prognosis guarded and will continue to monitor    Thank you for the consultation to participate in patient's care  I have personally discussed my plan with the referring physician  Leodan Clement MD  Nephrology  12/19/2020        Portions of the record may have been created with voice recognition software  Occasional wrong word or "sound a like" substitutions may have occurred due to the inherent limitations of voice recognition software  Read the chart carefully and recognize, using context, where substitutions have occurred

## 2020-12-20 LAB
ABO GROUP BLD BPU: NORMAL
ALBUMIN SERPL BCP-MCNC: 3.1 G/DL (ref 3.5–5)
ALP SERPL-CCNC: 253 U/L (ref 46–116)
ALT SERPL W P-5'-P-CCNC: 11 U/L (ref 12–78)
ANION GAP SERPL CALCULATED.3IONS-SCNC: 15 MMOL/L (ref 4–13)
AST SERPL W P-5'-P-CCNC: 36 U/L (ref 5–45)
BILIRUB SERPL-MCNC: 1.6 MG/DL (ref 0.2–1)
BPU ID: NORMAL
BUN SERPL-MCNC: 20 MG/DL (ref 5–25)
CALCIUM ALBUM COR SERPL-MCNC: 9.8 MG/DL (ref 8.3–10.1)
CALCIUM SERPL-MCNC: 9.1 MG/DL (ref 8.3–10.1)
CHLORIDE SERPL-SCNC: 106 MMOL/L (ref 100–108)
CO2 SERPL-SCNC: 22 MMOL/L (ref 21–32)
CREAT SERPL-MCNC: 1.54 MG/DL (ref 0.6–1.3)
CROSSMATCH: NORMAL
ERYTHROCYTE [DISTWIDTH] IN BLOOD BY AUTOMATED COUNT: 21.1 % (ref 11.6–15.1)
GFR SERPL CREATININE-BSD FRML MDRD: 38 ML/MIN/1.73SQ M
GLUCOSE SERPL-MCNC: 110 MG/DL (ref 65–140)
HCT VFR BLD AUTO: 23.8 % (ref 34.8–46.1)
HGB BLD-MCNC: 7.1 G/DL (ref 11.5–15.4)
MCH RBC QN AUTO: 26.8 PG (ref 26.8–34.3)
MCHC RBC AUTO-ENTMCNC: 29.8 G/DL (ref 31.4–37.4)
MCV RBC AUTO: 90 FL (ref 82–98)
PLATELET # BLD AUTO: 70 THOUSANDS/UL (ref 149–390)
PMV BLD AUTO: 10.1 FL (ref 8.9–12.7)
POTASSIUM SERPL-SCNC: 5.1 MMOL/L (ref 3.5–5.3)
PROT SERPL-MCNC: 6.6 G/DL (ref 6.4–8.2)
RBC # BLD AUTO: 2.65 MILLION/UL (ref 3.81–5.12)
SODIUM SERPL-SCNC: 143 MMOL/L (ref 136–145)
UNIT DISPENSE STATUS: NORMAL
UNIT PRODUCT CODE: NORMAL
UNIT RH: NORMAL
WBC # BLD AUTO: 4.16 THOUSAND/UL (ref 4.31–10.16)

## 2020-12-20 PROCEDURE — 99232 SBSQ HOSP IP/OBS MODERATE 35: CPT | Performed by: INTERNAL MEDICINE

## 2020-12-20 PROCEDURE — 80053 COMPREHEN METABOLIC PANEL: CPT | Performed by: INTERNAL MEDICINE

## 2020-12-20 PROCEDURE — 85027 COMPLETE CBC AUTOMATED: CPT | Performed by: INTERNAL MEDICINE

## 2020-12-20 RX ORDER — METOPROLOL TARTRATE 5 MG/5ML
5 INJECTION INTRAVENOUS EVERY 6 HOURS PRN
Status: DISCONTINUED | OUTPATIENT
Start: 2020-12-20 | End: 2020-12-29 | Stop reason: HOSPADM

## 2020-12-20 RX ORDER — DILTIAZEM HYDROCHLORIDE 5 MG/ML
10 INJECTION INTRAVENOUS ONCE
Status: COMPLETED | OUTPATIENT
Start: 2020-12-20 | End: 2020-12-20

## 2020-12-20 RX ADMIN — FOLIC ACID 1000 MCG: 1 TABLET ORAL at 08:41

## 2020-12-20 RX ADMIN — PANTOPRAZOLE SODIUM 40 MG: 40 TABLET, DELAYED RELEASE ORAL at 08:41

## 2020-12-20 RX ADMIN — QUETIAPINE FUMARATE 25 MG: 25 TABLET ORAL at 22:11

## 2020-12-20 RX ADMIN — HEPARIN SODIUM 5000 UNITS: 5000 INJECTION INTRAVENOUS; SUBCUTANEOUS at 22:11

## 2020-12-20 RX ADMIN — HEPARIN SODIUM 5000 UNITS: 5000 INJECTION INTRAVENOUS; SUBCUTANEOUS at 06:45

## 2020-12-20 RX ADMIN — ACETAMINOPHEN 650 MG: 325 TABLET, FILM COATED ORAL at 12:17

## 2020-12-20 RX ADMIN — MIRTAZAPINE 7.5 MG: 15 TABLET, FILM COATED ORAL at 22:11

## 2020-12-20 RX ADMIN — LACTULOSE 20 G: 10 SOLUTION ORAL at 22:10

## 2020-12-20 RX ADMIN — HEPARIN SODIUM 5000 UNITS: 5000 INJECTION INTRAVENOUS; SUBCUTANEOUS at 17:07

## 2020-12-20 RX ADMIN — TORSEMIDE 60 MG: 20 TABLET ORAL at 08:40

## 2020-12-20 RX ADMIN — NICOTINE 7 MG/24 HR DAILY TRANSDERMAL PATCH 1 PATCH: at 08:46

## 2020-12-20 RX ADMIN — METOPROLOL TARTRATE 5 MG: 5 INJECTION INTRAVENOUS at 20:32

## 2020-12-20 RX ADMIN — DILTIAZEM HYDROCHLORIDE 240 MG: 240 CAPSULE, COATED, EXTENDED RELEASE ORAL at 08:41

## 2020-12-20 RX ADMIN — LACTULOSE 20 G: 10 SOLUTION ORAL at 08:44

## 2020-12-20 RX ADMIN — METOPROLOL SUCCINATE 50 MG: 50 TABLET, EXTENDED RELEASE ORAL at 08:46

## 2020-12-20 RX ADMIN — METOPROLOL TARTRATE 5 MG: 5 INJECTION INTRAVENOUS at 10:35

## 2020-12-20 RX ADMIN — DILTIAZEM HYDROCHLORIDE 10 MG: 5 INJECTION INTRAVENOUS at 23:10

## 2020-12-20 NOTE — PROGRESS NOTES
[facility] Progress Note  Patient: Lory Frazier 77 y o  female   MRN: [id number]  PCP: Shona Whipple MD  Unit/Bed#: -01 Encounter: [id number]  Date Of Visit: 12/20/20          Anemia  Assessment & Plan  · Multifactorial   Hemoglobin slightly up  · Hopefully she would get paracentesis done tomorrow  · Transfuse as needed    Chronic kidney disease (CKD), active medical management without dialysis, stage 4 (severe) [facility])  Assessment & Plan  Lab Results   Component Value Date    EGFR 38 12/20/2020    EGFR 28 12/19/2020    EGFR 27 12/18/2020    CREATININE 1 54 (H) 12/20/2020    CREATININE 1 98 (H) 12/19/2020    CREATININE 2 06 (H) 12/18/2020   Baseline creatinine appears to be between 1 6 and 2 1  Currently at baseline  Continue to monitor as needed    Thrombocytopenia [facility])  Assessment & Plan  Secondary to cirrhosis  Continue to monitor    Multiple myeloma [facility])  Assessment & Plan  Diagnosed earlier this year and patient is currently under care of Oncology at [facility]    Paroxysmal atrial fibrillation [facility])  Assessment & Plan  Continue home metoprolol and Cardizem    Liver cirrhosis ([facility] [address] )  Assessment & Plan  Continue home lactulose, rifaximin  Patient volume overloaded  Will continue torsemide   Paracentesis was ordered  I am not sure if that is going to happen today or not    * Volume overload  Assessment & Plan  Diuresis  paracentesis as above  Low-salt diet      Present on Admission:   Liver cirrhosis (HCC)   Volume overload   Thrombocytopenia (HCC)   Anemia   Paroxysmal atrial fibrillation (HCC)   Multiple myeloma (HCC)   Chronic kidney disease (CKD), active medical management without dialysis, stage 4 (severe) (HCC)            VTE Pharmacologic Prophylaxis:   Pharmacologic: Heparin  Mechanical VTE Prophylaxis in Place: Yes    Patient Centered Rounds: I have performed bedside rounds with nursing staff today      Discussions with Specialists or Other Care Team Provider: Yes    Education and Discussions with Family / Patient:  Yes    Time Spent for Care: 25+ min  More than 50% of total time spent on counseling and coordination of care as described above  Current Length of Stay: 2 day(s)    Current Patient Status: Inpatient   Certification Statement: The patient will continue to require additional inpatient hospital stay due to Ascites requiring paracentesis/anemia    Discharge Plan:  Hopefully home when stable    Code Status: Level 1 - Full Code      Subjective: At Baseline she is confused  Now trying to get out of bed  Seeing things not making much sense this morning  However she denied any pain    Objective:     Vitals:   Temp (24hrs), Av 9 °F (36 6 °C), Min:97 5 °F (36 4 °C), Max:98 1 °F (36 7 °C)    Temp:  [97 5 °F (36 4 °C)-98 1 °F (36 7 °C)] 98 1 °F (36 7 °C)  HR:  [] 152  Resp:  [18] 18  BP: ()/(73-89) 156/89  SpO2:  [93 %-97 %] 97 %  Body mass index is 28 38 kg/m²  Input and Output Summary (last 24 hours): Intake/Output Summary (Last 24 hours) at 2020 1444  Last data filed at 2020 0900  Gross per 24 hour   Intake 120 ml   Output 400 ml   Net -280 ml           Physical Exam:     Vital signs are reviewed as above  More restless and agitated this morning    This is not unusual for  Conjunctivae are pale  Oropharynx are hydrated  Has ascites  No cyanosis or clubbing  Trying to get out of the restrains      Additional Data:     Labs:    Results from last 7 days   Lab Units 20  0751 20  0659   WBC Thousand/uL 4 16* 3 92*   HEMOGLOBIN g/dL 7 1* 6 4*   HEMATOCRIT % 23 8* 20 8*   PLATELETS Thousands/uL 70* 74*   LYMPHO PCT %  --  16   MONO PCT %  --  11   EOS PCT %  --  0     Results from last 7 days   Lab Units 20  0706   POTASSIUM mmol/L 5 1   CHLORIDE mmol/L 106   CO2 mmol/L 22   BUN mg/dL 20   CREATININE mg/dL 1 54*   CALCIUM mg/dL 9 1   ALK PHOS U/L 253*   ALT U/L 11*   AST U/L 36           * I Have Reviewed All Lab Data Listed Above  * Additional Pertinent Lab Tests Reviewed: All Labs Within Last 24 Hours Reviewed            Last 24 Hours Medication List:   Current Facility-Administered Medications   Medication Dose Route Frequency Provider Last Rate    acetaminophen  650 mg Oral Q6H PRN Brent Dorantes MD      diltiazem  240 mg Oral Daily Brent Dorantes MD      folic acid  0,007 mcg Oral Daily Brent Dorantes MD      heparin (porcine)  5,000 Units Subcutaneous Q8H [facility] Brent Dorantes MD      lactulose  20 g Oral BID Brent Dorantes MD      metoprolol  5 mg Intravenous Q6H PRN Lady Drake MD      metoprolol succinate  50 mg Oral Daily Brent Dorantes MD      mirtazapine  7 5 mg Oral HS Brent Dorantes MD      nicotine  1 patch Transdermal Daily Brent Dorantes MD      pantoprazole  40 mg Oral QAM Brent Dorantes MD      QUEtiapine  25 mg Oral HS Brent Dorantes MD      torsemide  60 mg Oral Daily Raul Ochoa MD          Today, Patient Was Seen By: Lady Drake MD    ** Please Note: Dragon 360 Dictation voice to text software may have been used in the creation of this document   **

## 2020-12-20 NOTE — PROGRESS NOTES
NEPHROLOGY PROGRESS NOTE    Patient: Munir Dempsey               Sex: female          DOA: 12/18/2020  2:12 PM   YOB: 1945        Age:  77 y o         LOS:  LOS: 2 days       HPI     Patient with CKD and altered mental status who was admitted with anemia    SUBJECTIVE     Remained very confused    Does not appear to be any distress other than confusion    Does not appear short of breath    CURRENT MEDICATIONS       Current Facility-Administered Medications:     acetaminophen (TYLENOL) tablet 650 mg, 650 mg, Oral, Q6H PRN, Brent Dorantes MD, 650 mg at 12/20/20 1217    diltiazem (CARDIZEM CD) 24 hr capsule 240 mg, 240 mg, Oral, Daily, Brent Dorantes MD, 240 mg at 23/11/02 2695    folic acid (FOLVITE) tablet 1,000 mcg, 1,000 mcg, Oral, Daily, Brent Dorantes MD, 1,000 mcg at 12/20/20 0841    heparin (porcine) subcutaneous injection 5,000 Units, 5,000 Units, Subcutaneous, Q8H [address], 5,000 Units at 12/20/20 0645 **AND** Platelet count, , , Once, Brent Dorantes MD    lactulose oral solution 20 g, 20 g, Oral, BID, Brent Dorantes MD, 20 g at 12/20/20 0844    metoprolol (LOPRESSOR) injection 5 mg, 5 mg, Intravenous, Q6H PRN, Kevon Siemens, MD, 5 mg at 12/20/20 1035    metoprolol succinate (TOPROL-XL) 24 hr tablet 50 mg, 50 mg, Oral, Daily, Brent Dorantes MD, 50 mg at 12/20/20 0846    mirtazapine (REMERON) tablet 7 5 mg, 7 5 mg, Oral, HS, Brent Dorantes MD, 7 5 mg at 12/19/20 2204    nicotine (NICODERM CQ) 7 mg/24hr TD 24 hr patch 1 patch, 1 patch, Transdermal, Daily, Frank Moe MD, 1 patch at 12/20/20 0846    pantoprazole (PROTONIX) EC tablet 40 mg, 40 mg, Oral, QAM, Brent Dorantes MD, 40 mg at 12/20/20 0841    QUEtiapine (SEROquel) tablet 25 mg, 25 mg, Oral, HS, Brent Dorantes MD, 25 mg at 12/19/20 2204    torsemide (DEMADEX) tablet 60 mg, 60 mg, Oral, Daily, Brent Dorantes MD, 60 mg at 12/20/20 0840    OBJECTIVE     Current Weight: Weight - Scale: 75 kg (165 lb 5 5 oz)  Vitals:    12/20/20 0839   BP: 156/89   Pulse: (!) 152   Resp:    Temp: 98 1 °F (36 7 °C)   SpO2: 97%       Intake/Output Summary (Last 24 hours) at 12/20/2020 1426  Last data filed at 12/20/2020 0900  Gross per 24 hour   Intake 120 ml   Output 400 ml   Net -280 ml       PHYSICAL EXAMINATION     Physical Exam  Constitutional:       General: She is not in acute distress  Appearance: She is well-developed  HENT:      Head: Normocephalic  Eyes:      General: No scleral icterus  Conjunctiva/sclera: Conjunctivae normal    Neck:      Musculoskeletal: Neck supple  Vascular: No JVD  Cardiovascular:      Rate and Rhythm: Normal rate  Heart sounds: Normal heart sounds  Pulmonary:      Effort: Pulmonary effort is normal       Breath sounds: No wheezing  Abdominal:      Palpations: Abdomen is soft  Tenderness: There is no abdominal tenderness  Musculoskeletal: Normal range of motion  Skin:     General: Skin is warm  Findings: No rash  Neurological:      Mental Status: She is alert  She is disoriented  Psychiatric:      Comments: Very confused          LAB RESULTS     Results from last 7 days   Lab Units 12/20/20  0751 12/20/20  0706 12/19/20  0659 12/18/20  1546   WBC Thousand/uL 4 16*  --  3 92* 4 17*   HEMOGLOBIN g/dL 7 1*  --  6 4* 7 3*   HEMATOCRIT % 23 8*  --  20 8* 23 6*   PLATELETS Thousands/uL 70*  --  74* 89*   POTASSIUM mmol/L  --  5 1 4 8 4 8   CHLORIDE mmol/L  --  106 107 105   CO2 mmol/L  --  22 25 26   BUN mg/dL  --  20 23 22   CREATININE mg/dL  --  1 54* 1 98* 2 06*   EGFR ml/min/1 73sq m  --  38 28 27   CALCIUM mg/dL  --  9 1 8 9 9 1       RADIOLOGY RESULTS      Results for orders placed during the hospital encounter of 09/28/20   XR chest 1 view portable    Narrative CHEST     INDICATION:   sob  COMPARISON:  8/30/2020    EXAM PERFORMED/VIEWS:  XR CHEST PORTABLE      FINDINGS:    Cardiomediastinal silhouette appears stable      There is vascular congestion Unchanged moderate right pleural effusion  Clear left lung  Osseous structures appear within normal limits for patient age  Impression Vascular congestion  Persistent moderate right pleural effusion  Workstation performed: [id number]       Results for orders placed during the hospital encounter of 01/28/20   XR chest pa & lateral    Narrative CHEST     INDICATION: Follow-up congestive heart failure    COMPARISON:  1/30/2020    EXAM PERFORMED/VIEWS:  XR CHEST PA & LATERAL      FINDINGS:    Heart shadow is enlarged but unchanged from prior exam     There continues to be mild vascular congestion though interstitial edema has improved  There are small pleural effusions  Osseous structures appear within normal limits for patient age  Impression Improved congestive heart failure with mild vascular congestion and small effusions  Workstation performed: [id number]       Results for orders placed during the hospital encounter of 08/29/20   CT chest wo contrast    Narrative CT CHEST WITHOUT IV CONTRAST    INDICATION:   Respiratory illness, acute (Age => 41y)  COMPARISON:  May 6, 2020    TECHNIQUE: CT examination of the chest was performed without intravenous contrast   Axial, sagittal, and coronal 2D reformatted images were created from the source data and submitted for interpretation  Radiation dose length product (DLP) for this visit:  308 mGy-cm   This examination, like all CT scans performed in the [facility], was performed utilizing techniques to minimize radiation dose exposure, including the use of iterative   reconstruction and automated exposure control  FINDINGS:    LUNGS:  Collapse consolidation noted within the right lower lobe    Lobular areas of groundglass density noted in the subpleural region in the left lingular area, in the right middle lobe area  The trachea and central bronchial patent    PLEURA:  Moderate sized right effusion seen    HEART/GREAT VESSELS:  Cardiomegaly seen  Coronary artery calcification seen  Mitral annular calcification seen  The ascending aorta measures 3 4 cm  MEDIASTINUM AND MATT:  No significant mediastinal lymph node enlargement seen      CHEST WALL AND LOWER NECK:   A right thyroid nodule seen which measures 1 4 cm, unchanged from the previous study    VISUALIZED STRUCTURES IN THE UPPER ABDOMEN:  Nodular contour of the liver compatible with cirrhosis  Ascites seen  Enlarged IVC seen with spleen appear unremarkable  Adrenal gland appear unremarkable    OSSEOUS STRUCTURES:  Degenerative changes are seen within the thoracic spine      Impression Moderate right effusion     Atelectasis/consolidation right lower lung may be due to compressive atelectasis however underlying infiltrates are difficult to exclude    Lobular areas of groundglass density in the left upper lobe, right middle lobe, lingular region may be due to infiltrate which could be atypical or viral or drug induced these are may be related to areas of residual congestion    Cirrhosis    Enlarged IVC with enlarged right atrium right and right ventricle, correlate with tricuspid regurgitation, right heart failure    Follow-up suggested in 6-8 weeks to demonstrate resolution  The study was marked in EPIC for significant notification  Workstation performed: [id number]       No results found for this or any previous visit  Results for orders placed during the hospital encounter of 12/18/20   CT abdomen pelvis wo contrast    Narrative CT ABDOMEN AND PELVIS WITHOUT IV CONTRAST    INDICATION:   abdominal distention  COMPARISON:  CT scan 9/29/2020    TECHNIQUE:  CT examination of the abdomen and pelvis was performed without intravenous contrast   Axial, sagittal, and coronal 2D reformatted images were created from the source data and submitted for interpretation  Radiation dose length product (DLP) for this visit:  704 84 mGy-cm     This examination, like all CT scans performed in the [facility]  [address], was performed utilizing techniques to minimize radiation dose exposure, including the use of iterative   reconstruction and automated exposure control  Enteric contrast was not administered  FINDINGS:    ABDOMEN    LOWER CHEST:  Stable right pleural effusion with right basilar compressive atelectasis  Stable cardiomegaly with mitral annulus calcification  LIVER/BILIARY TREE:  The liver demonstrates cirrhotic morphology  Within the limitations of this examination there is no evidence of suspicious hepatic mass  No biliary dilatation  GALLBLADDER:  There are gallstone(s) within the gallbladder, without pericholecystic inflammatory changes  SPLEEN:  Unremarkable  PANCREAS:  Unremarkable  ADRENAL GLANDS:  Unremarkable  KIDNEYS/URETERS:  Stable right upper pole cyst  No hydronephrosis  STOMACH AND BOWEL:  Unremarkable  APPENDIX:  No findings to suggest appendicitis  ABDOMINOPELVIC CAVITY:  Stable ascites  No pneumoperitoneum  No lymphadenopathy  VESSELS:  Atherosclerotic changes are present  No evidence of aneurysm  PELVIS    REPRODUCTIVE ORGANS:  Unremarkable for patient's age  URINARY BLADDER:  Unremarkable  ABDOMINAL WALL/INGUINAL REGIONS:  Unremarkable  OSSEOUS STRUCTURES:  No acute fracture or destructive osseous lesion  Impression Stable cirrhosis with ascites  Stable right pleural effusion with compressive atelectasis of the right lung base  Stable cardiomegaly  Workstation performed: [id number]       No results found for this or any previous visit  PLAN / RECOMMENDATIONS      CKD stage 3:  Seems with baseline at this point creatinine 1 5    Anemia:  Status for blood transfusion stable    Altered mental status:  Likely because of dementia and stable    Will follow as needed    Richard Zhu MD  Nephrology  12/20/2020        Portions of the record may have been created with voice recognition software   Occasional wrong word or "sound a like" substitutions may have occurred due to the inherent limitations of voice recognition software  Read the chart carefully and recognize, using context, where substitutions have occurred

## 2020-12-20 NOTE — PLAN OF CARE
Problem: Potential for Falls  Goal: Patient will remain free of falls  Description: INTERVENTIONS:  - Assess patient frequently for physical needs  -  Identify cognitive and physical deficits and behaviors that affect risk of falls    -  [place] fall precautions as indicated by assessment   - Educate patient/family on patient safety including physical limitations  - Instruct patient to call for assistance with activity based on assessment  - Modify environment to reduce risk of injury    Problem: Prexisting or High Potential for Compromised Skin Integrity  Goal: Skin integrity is maintained or improved  Description: INTERVENTIONS:  - Identify patients at risk for skin breakdown  - Assess and monitor skin integrity  - Assess and monitor nutrition and hydration status  - Monitor labs   - Assess for incontinence   - Turn and reposition patient  - Assist with mobility/ambulation  - Relieve pressure over bony prominences  - Avoid friction and shearing  - Provide appropriate hygiene as needed including keeping skin clean and dry  - Evaluate need for skin moisturizer/barrier cream  - Collaborate with interdisciplinary team   - Patient/family teaching  - Consider wound care consult   Outcome: Progressing   Outcome: Progressing

## 2020-12-20 NOTE — NURSING NOTE
New order for wrist restraints due to pt pulling out IV, followed by posey and to D/C continual observation   Will continue to monitor

## 2020-12-21 ENCOUNTER — APPOINTMENT (INPATIENT)
Dept: INTERVENTIONAL RADIOLOGY/VASCULAR | Facility: HOSPITAL | Age: 75
DRG: 433 | End: 2020-12-21
Payer: MEDICARE

## 2020-12-21 LAB
ALBUMIN FLD-MCNC: 1.6 G/DL
ANION GAP SERPL CALCULATED.3IONS-SCNC: 12 MMOL/L (ref 4–13)
APPEARANCE FLD: NORMAL
BUN SERPL-MCNC: 23 MG/DL (ref 5–25)
CALCIUM SERPL-MCNC: 9.1 MG/DL (ref 8.3–10.1)
CHLORIDE SERPL-SCNC: 110 MMOL/L (ref 100–108)
CO2 SERPL-SCNC: 26 MMOL/L (ref 21–32)
COLOR FLD: YELLOW
CREAT SERPL-MCNC: 1.49 MG/DL (ref 0.6–1.3)
ERYTHROCYTE [DISTWIDTH] IN BLOOD BY AUTOMATED COUNT: 21 % (ref 11.6–15.1)
GFR SERPL CREATININE-BSD FRML MDRD: 40 ML/MIN/1.73SQ M
GLUCOSE SERPL-MCNC: 93 MG/DL (ref 65–140)
HCT VFR BLD AUTO: 22 % (ref 34.8–46.1)
HCT VFR BLD AUTO: 22.4 % (ref 34.8–46.1)
HGB BLD-MCNC: 6.8 G/DL (ref 11.5–15.4)
HGB BLD-MCNC: 6.9 G/DL (ref 11.5–15.4)
LYMPHOCYTES NFR BLD AUTO: 17 %
MCH RBC QN AUTO: 28 PG (ref 26.8–34.3)
MCHC RBC AUTO-ENTMCNC: 31.4 G/DL (ref 31.4–37.4)
MCV RBC AUTO: 89 FL (ref 82–98)
MONO+MESO NFR FLD MANUAL: 38 %
MONOCYTES NFR BLD AUTO: 18 %
NEUTS SEG NFR BLD AUTO: 27 %
PLATELET # BLD AUTO: 65 THOUSANDS/UL (ref 149–390)
PMV BLD AUTO: 10.1 FL (ref 8.9–12.7)
POTASSIUM SERPL-SCNC: 3.8 MMOL/L (ref 3.5–5.3)
RBC # BLD AUTO: 2.46 MILLION/UL (ref 3.81–5.12)
SITE: NORMAL
SODIUM SERPL-SCNC: 148 MMOL/L (ref 136–145)
TOTAL CELLS COUNTED SPEC: 100
WBC # BLD AUTO: 4.95 THOUSAND/UL (ref 4.31–10.16)
WBC # FLD MANUAL: 374 /UL

## 2020-12-21 PROCEDURE — 88112 CYTOPATH CELL ENHANCE TECH: CPT | Performed by: PATHOLOGY

## 2020-12-21 PROCEDURE — 87070 CULTURE OTHR SPECIMN AEROBIC: CPT | Performed by: INTERNAL MEDICINE

## 2020-12-21 PROCEDURE — 85027 COMPLETE CBC AUTOMATED: CPT | Performed by: INTERNAL MEDICINE

## 2020-12-21 PROCEDURE — 32555 ASPIRATE PLEURA W/ IMAGING: CPT | Performed by: RADIOLOGY

## 2020-12-21 PROCEDURE — 85014 HEMATOCRIT: CPT | Performed by: INTERNAL MEDICINE

## 2020-12-21 PROCEDURE — 99233 SBSQ HOSP IP/OBS HIGH 50: CPT | Performed by: INTERNAL MEDICINE

## 2020-12-21 PROCEDURE — 0W993ZZ DRAINAGE OF RIGHT PLEURAL CAVITY, PERCUTANEOUS APPROACH: ICD-10-PCS | Performed by: RADIOLOGY

## 2020-12-21 PROCEDURE — 32555 ASPIRATE PLEURA W/ IMAGING: CPT

## 2020-12-21 PROCEDURE — 85018 HEMOGLOBIN: CPT | Performed by: INTERNAL MEDICINE

## 2020-12-21 PROCEDURE — 30233N1 TRANSFUSION OF NONAUTOLOGOUS RED BLOOD CELLS INTO PERIPHERAL VEIN, PERCUTANEOUS APPROACH: ICD-10-PCS | Performed by: INTERNAL MEDICINE

## 2020-12-21 PROCEDURE — 88305 TISSUE EXAM BY PATHOLOGIST: CPT | Performed by: PATHOLOGY

## 2020-12-21 PROCEDURE — 80048 BASIC METABOLIC PNL TOTAL CA: CPT | Performed by: INTERNAL MEDICINE

## 2020-12-21 PROCEDURE — 87205 SMEAR GRAM STAIN: CPT | Performed by: INTERNAL MEDICINE

## 2020-12-21 PROCEDURE — 89051 BODY FLUID CELL COUNT: CPT | Performed by: INTERNAL MEDICINE

## 2020-12-21 PROCEDURE — 82042 OTHER SOURCE ALBUMIN QUAN EA: CPT | Performed by: INTERNAL MEDICINE

## 2020-12-21 PROCEDURE — P9016 RBC LEUKOCYTES REDUCED: HCPCS

## 2020-12-21 RX ORDER — FUROSEMIDE 10 MG/ML
40 INJECTION INTRAMUSCULAR; INTRAVENOUS ONCE
Status: COMPLETED | OUTPATIENT
Start: 2020-12-21 | End: 2020-12-21

## 2020-12-21 RX ORDER — METOPROLOL TARTRATE 5 MG/5ML
5 INJECTION INTRAVENOUS ONCE
Status: COMPLETED | OUTPATIENT
Start: 2020-12-21 | End: 2020-12-21

## 2020-12-21 RX ORDER — LIDOCAINE WITH 8.4% SOD BICARB 0.9%(10ML)
SYRINGE (ML) INJECTION CODE/TRAUMA/SEDATION MEDICATION
Status: COMPLETED | OUTPATIENT
Start: 2020-12-21 | End: 2020-12-21

## 2020-12-21 RX ADMIN — METOPROLOL TARTRATE 5 MG: 5 INJECTION INTRAVENOUS at 21:48

## 2020-12-21 RX ADMIN — FOLIC ACID 1000 MCG: 1 TABLET ORAL at 09:10

## 2020-12-21 RX ADMIN — Medication 4 ML: at 12:18

## 2020-12-21 RX ADMIN — MIRTAZAPINE 7.5 MG: 15 TABLET, FILM COATED ORAL at 21:20

## 2020-12-21 RX ADMIN — TORSEMIDE 60 MG: 20 TABLET ORAL at 09:09

## 2020-12-21 RX ADMIN — METOPROLOL TARTRATE 5 MG: 5 INJECTION INTRAVENOUS at 05:11

## 2020-12-21 RX ADMIN — DILTIAZEM HYDROCHLORIDE 240 MG: 240 CAPSULE, COATED, EXTENDED RELEASE ORAL at 09:09

## 2020-12-21 RX ADMIN — METOPROLOL SUCCINATE 50 MG: 50 TABLET, EXTENDED RELEASE ORAL at 09:10

## 2020-12-21 RX ADMIN — PANTOPRAZOLE SODIUM 40 MG: 40 TABLET, DELAYED RELEASE ORAL at 09:10

## 2020-12-21 RX ADMIN — METOPROLOL TARTRATE 5 MG: 5 INJECTION INTRAVENOUS at 20:06

## 2020-12-21 RX ADMIN — LACTULOSE 20 G: 10 SOLUTION ORAL at 21:19

## 2020-12-21 RX ADMIN — HEPARIN SODIUM 5000 UNITS: 5000 INJECTION INTRAVENOUS; SUBCUTANEOUS at 05:07

## 2020-12-21 RX ADMIN — QUETIAPINE FUMARATE 25 MG: 25 TABLET ORAL at 21:20

## 2020-12-21 RX ADMIN — HEPARIN SODIUM 5000 UNITS: 5000 INJECTION INTRAVENOUS; SUBCUTANEOUS at 16:13

## 2020-12-21 RX ADMIN — FUROSEMIDE 40 MG: 10 INJECTION, SOLUTION INTRAMUSCULAR; INTRAVENOUS at 09:09

## 2020-12-21 RX ADMIN — NICOTINE 7 MG/24 HR DAILY TRANSDERMAL PATCH 1 PATCH: at 09:09

## 2020-12-21 RX ADMIN — HEPARIN SODIUM 5000 UNITS: 5000 INJECTION INTRAVENOUS; SUBCUTANEOUS at 21:22

## 2020-12-21 RX ADMIN — LACTULOSE 20 G: 10 SOLUTION ORAL at 09:10

## 2020-12-21 NOTE — PROGRESS NOTES
[facility] Progress Note  Patient: Keith Cowden 77 y o  female   MRN: [id number]  PCP: Evelia Lewis MD  Unit/Bed#: -01 Encounter: [id number]  Date Of Visit: 12/21/20          Anemia  Assessment & Plan  · Multifactorial   Hemoglobin down again  · Hopefully she would get paracentesis done today  · Transfuse as needed   12/21/20 transfused again  ·     Chronic kidney disease (CKD), active medical management without dialysis, stage 4 (severe) [facility])  Assessment & Plan  Lab Results   Component Value Date    EGFR 40 12/21/2020    EGFR 38 12/20/2020    EGFR 28 12/19/2020    CREATININE 1 49 (H) 12/21/2020    CREATININE 1 54 (H) 12/20/2020    CREATININE 1 98 (H) 12/19/2020   Baseline creatinine appears to be between 1 6 and 2 1  Currently at baseline -somewhat better  Continue to monitor as needed    Thrombocytopenia [facility])  Assessment & Plan  Secondary to cirrhosis  Continue to monitor    Multiple myeloma [facility])  Assessment & Plan  Diagnosed earlier this year and patient [place] [facility]    Paroxysmal atrial fibrillation (HCC)/Tachycardia  Assessment & Plan  · Continue home metoprolol and Cardizem  · Also on PRN IV Lopressor  · Anemia could also be contributing factor    Liver cirrhosis (HCC)  Assessment & Plan  · Continue home lactulose, rifaximin  · Patient volume overloaded  · Will continue torsemide   · Paracentesis was ordered      · Hopefully that will happen today    * Volume overload  Assessment & Plan  Diuresis  paracentesis as above  Low-salt diet      Present on Admission:   Liver cirrhosis (HCC)   Volume overload   Thrombocytopenia (HCC)   Anemia   Paroxysmal atrial fibrillation (HCC)/Tachycardia   Multiple myeloma (HCC)   Chronic kidney disease (CKD), active medical management without dialysis, stage 4 (severe) (HCC)            VTE Pharmacologic Prophylaxis:   Pharmacologic: Heparin  Mechanical VTE Prophylaxis in Place: Yes    Patient Centered Rounds: I have performed bedside rounds with nursing staff today  Discussions with Specialists or Other Care Team Provider:  Yes    Education and Discussions with Family / Patient:  Yes    Time Spent for Care: 30+ minutes  More than 50% of total time spent on counseling and coordination of care as described above  Current Length of Stay: 3 day(s)    Current Patient Status: Inpatient   Certification Statement: The patient will continue to require additional inpatient hospital stay due to Anemia/ascites    Discharge Plan:  Home when stable    Code Status: Level 1 - Full Code      Subjective:   Patient has underlying dementia and also she has been agitated at times  She feels okay  She was more agitated last night and required restraints  She denies any pain    Objective:     Vitals:   Temp (24hrs), Av 1 °F (36 7 °C), Min:97 8 °F (36 6 °C), Max:98 7 °F (37 1 °C)    Temp:  [97 8 °F (36 6 °C)-98 7 °F (37 1 °C)] 97 8 °F (36 6 °C)  HR:  [114-164] 132  Resp:  [16-18] 18  BP: (125-153)/(66-88) 153/84  SpO2:  [91 %-100 %] 95 %  Body mass index is 29 44 kg/m²  Input and Output Summary (last 24 hours): Intake/Output Summary (Last 24 hours) at 2020 1535  Last data filed at 2020 1311  Gross per 24 hour   Intake 445 ml   Output 1200 ml   Net -755 ml           Physical Exam:     Vital signs are reviewed as above  Somewhat tired in the morning  Conjunctivae and Lips are pale  Lips are also dry  S1-S2 audible  Abdomen is soft    His ascites  No cyanosis or clubbing  Dementia-currently tired and sleepy as she likely did not sleep much at night  Poor air entry on auscultation as she did not really take deep breaths and    Additional Data:     Labs:    Results from last 7 days   Lab Units 20  0820 20  0639  20  0659   WBC Thousand/uL  --  4 95   < > 3 92*   HEMOGLOBIN g/dL 6 8* 6 9*   < > 6 4*   HEMATOCRIT % 22 4* 22 0*   < > 20 8*   PLATELETS Thousands/uL  --  65*   < > 74* LYMPHO PCT %  --   --   --  16   MONO PCT %  --   --   --  11   EOS PCT %  --   --   --  0    < > = values in this interval not displayed  Results from last 7 days   Lab Units 12/21/20  0639 12/20/20  0706   POTASSIUM mmol/L 3 8 5 1   CHLORIDE mmol/L 110* 106   CO2 mmol/L 26 22   BUN mg/dL 23 20   CREATININE mg/dL 1 49* 1 54*   CALCIUM mg/dL 9 1 9 1   ALK PHOS U/L  --  253*   ALT U/L  --  11*   AST U/L  --  36           * I Have Reviewed All Lab Data Listed Above  * Additional Pertinent Lab Tests Reviewed: All Labs Within Last 24 Hours Reviewed            Last 24 Hours Medication List:   Current Facility-Administered Medications   Medication Dose Route Frequency Provider Last Rate    acetaminophen  650 mg Oral Q6H PRN Brent Dorantes MD      diltiazem  240 mg Oral Daily Brent Dorantes MD      folic acid  5,122 mcg Oral Daily Brent Dorantes MD      heparin (porcine)  5,000 Units Subcutaneous Q8H [facility] Brent Dorantes MD      lactulose  20 g Oral BID Brent Dorantes MD      metoprolol  5 mg Intravenous Q6H PRN Ashley Senior MD      metoprolol succinate  50 mg Oral Daily Brent Dorantes MD      mirtazapine  7 5 mg Oral HS Brent Dorantes MD      nicotine  1 patch Transdermal Daily Brent Dorantes MD      pantoprazole  40 mg Oral QAM Brent Dorantes MD      QUEtiapine  25 mg Oral HS Brent Dorantes MD      torsemide  60 mg Oral Daily Smith Sultana MD          Today, Patient Was Seen By: Ashley Senior MD    ** Please Note: Dragon 360 Dictation voice to text software may have been used in the creation of this document   **

## 2020-12-21 NOTE — BRIEF OP NOTE (RAD/CATH)
INTERVENTIONAL RADIOLOGY PROCEDURE NOTE    Date: 12/21/2020    Procedure:  Thoracentesis    Preoperative diagnosis:   1  Edema    2  Abdominal distention    3  Volume overload    4  Chronic kidney disease (CKD), active medical management without dialysis, stage 4 (severe) (HCC)         Postoperative diagnosis: Same  Surgeon: Shu Verde MD     Assistant: None  No qualified resident was available  Blood loss:  Minimal    Specimens:  Sent     Findings:  Right thoracentesis    Complications: None immediate      Anesthesia: local

## 2020-12-21 NOTE — CASE MANAGEMENT
CM met with pt at bedside  Pt is confused, unable to answer questions appropriately  CM contacted pt son Albino Calderon @[phone]) for updated information on pt  Per Albino Yumiko, pt lives with her sister in [facility] in a 3rd floor apt with no anya with an elevator  Pt does not use any dme  Pt is mostly independent with adl's, sometimes her sister will assist her with dressing and cooking her meals  Pt is current with [facility], vn 1/wk  Pt uses [facility] in [facility], no barriers to getting medications  Pt PCP is Dr Abel Huerta  Pt currently has dementia, no other mh hx  Pt smokes 1 cigarette/day, no etoh or illegal drug use  Pt health care agent is her son, Albino Calderon  Pt does not work or drive, her sister, Barrington Alcantar takes her to medical appointments  Pt sister Barrington Alcantar to transport pt home at [facility]  CM reviewed discharge planning process including the following: identifying help at home, patient preference for discharge planning needs, pharmacy preference, and availability of treatment team to discuss questions or concerns patient and/or family may have regarding understanding medications and recognizing signs and symptoms once discharged  CM also encouraged patient to follow up with all recommended appointments after discharge  Patient advised of importance for patient and family to participate in managing patients medical well being  Per Albino Calderon, family does not want pt to go to [facility] if recommended  Preference is for [facility]  CM dept to follow pt through [place]

## 2020-12-21 NOTE — PLAN OF CARE
Problem: Potential for Falls  Goal: Patient will remain free of falls  Description: INTERVENTIONS:  - Assess patient frequently for physical needs  -  Identify cognitive and physical deficits and behaviors that affect risk of falls    -  [place] fall precautions as indicated by assessment   - Educate patient/family on patient safety including physical limitations  - Instruct patient to call for assistance with activity based on assessment  - Modify environment to reduce risk of injury    Problem: Prexisting or High Potential for Compromised Skin Integrity  Goal: Skin integrity is maintained or improved  Description: INTERVENTIONS:  - Identify patients at risk for skin breakdown  - Assess and monitor skin integrity  - Assess and monitor nutrition and hydration status  - Monitor labs   - Assess for incontinence   - Turn and reposition patient  - Assist with mobility/ambulation  - Relieve pressure over bony prominences  - Avoid friction and shearing  - Provide appropriate hygiene as needed including keeping skin clean and dry  - Evaluate need for skin moisturizer/barrier cream  - Collaborate with interdisciplinary team   - Patient/family teaching  - Consider wound care consult   Outcome: Progressing   Outcome: Progressing

## 2020-12-21 NOTE — NURSING NOTE
Pt heart rate 125-135, SLIM Ashlee Santiago aware, new order for one time Cardizem  Will continue to monitor

## 2020-12-22 PROBLEM — R19.7 DIARRHEA: Status: ACTIVE | Noted: 2020-12-22

## 2020-12-22 PROBLEM — F03.90 DEMENTIA (HCC): Status: ACTIVE | Noted: 2020-12-22

## 2020-12-22 LAB
ERYTHROCYTE [DISTWIDTH] IN BLOOD BY AUTOMATED COUNT: 20.1 % (ref 11.6–15.1)
HCT VFR BLD AUTO: 27.9 % (ref 34.8–46.1)
HGB BLD-MCNC: 8.7 G/DL (ref 11.5–15.4)
MCH RBC QN AUTO: 27.9 PG (ref 26.8–34.3)
MCHC RBC AUTO-ENTMCNC: 31.2 G/DL (ref 31.4–37.4)
MCV RBC AUTO: 89 FL (ref 82–98)
PLATELET # BLD AUTO: 64 THOUSANDS/UL (ref 149–390)
PMV BLD AUTO: 9.3 FL (ref 8.9–12.7)
RBC # BLD AUTO: 3.12 MILLION/UL (ref 3.81–5.12)
WBC # BLD AUTO: 4.75 THOUSAND/UL (ref 4.31–10.16)

## 2020-12-22 PROCEDURE — 99233 SBSQ HOSP IP/OBS HIGH 50: CPT | Performed by: INTERNAL MEDICINE

## 2020-12-22 PROCEDURE — 85027 COMPLETE CBC AUTOMATED: CPT | Performed by: INTERNAL MEDICINE

## 2020-12-22 PROCEDURE — 94640 AIRWAY INHALATION TREATMENT: CPT

## 2020-12-22 PROCEDURE — 94760 N-INVAS EAR/PLS OXIMETRY 1: CPT

## 2020-12-22 PROCEDURE — 94664 DEMO&/EVAL PT USE INHALER: CPT

## 2020-12-22 RX ORDER — OLANZAPINE 10 MG/1
5 INJECTION, POWDER, LYOPHILIZED, FOR SOLUTION INTRAMUSCULAR ONCE
Status: COMPLETED | OUTPATIENT
Start: 2020-12-22 | End: 2020-12-22

## 2020-12-22 RX ORDER — TORSEMIDE 20 MG/1
60 TABLET ORAL 2 TIMES DAILY
Status: DISCONTINUED | OUTPATIENT
Start: 2020-12-22 | End: 2020-12-27

## 2020-12-22 RX ORDER — METOPROLOL SUCCINATE 50 MG/1
50 TABLET, EXTENDED RELEASE ORAL DAILY
Status: DISCONTINUED | OUTPATIENT
Start: 2020-12-22 | End: 2020-12-22

## 2020-12-22 RX ORDER — QUETIAPINE FUMARATE 25 MG/1
50 TABLET, FILM COATED ORAL
Status: DISCONTINUED | OUTPATIENT
Start: 2020-12-22 | End: 2020-12-29 | Stop reason: HOSPADM

## 2020-12-22 RX ORDER — DIGOXIN 0.25 MG/ML
250 INJECTION INTRAMUSCULAR; INTRAVENOUS ONCE
Status: COMPLETED | OUTPATIENT
Start: 2020-12-22 | End: 2020-12-22

## 2020-12-22 RX ORDER — IPRATROPIUM BROMIDE AND ALBUTEROL SULFATE 2.5; .5 MG/3ML; MG/3ML
3 SOLUTION RESPIRATORY (INHALATION) EVERY 6 HOURS PRN
Status: DISCONTINUED | OUTPATIENT
Start: 2020-12-22 | End: 2020-12-26

## 2020-12-22 RX ORDER — METOPROLOL SUCCINATE 100 MG/1
100 TABLET, EXTENDED RELEASE ORAL DAILY
Status: DISCONTINUED | OUTPATIENT
Start: 2020-12-23 | End: 2020-12-29 | Stop reason: HOSPADM

## 2020-12-22 RX ORDER — LACTULOSE 20 G/30ML
10 SOLUTION ORAL 2 TIMES DAILY
Status: DISCONTINUED | OUTPATIENT
Start: 2020-12-22 | End: 2020-12-29 | Stop reason: HOSPADM

## 2020-12-22 RX ORDER — IPRATROPIUM BROMIDE AND ALBUTEROL SULFATE 2.5; .5 MG/3ML; MG/3ML
3 SOLUTION RESPIRATORY (INHALATION)
Status: DISCONTINUED | OUTPATIENT
Start: 2020-12-22 | End: 2020-12-22

## 2020-12-22 RX ADMIN — DILTIAZEM HYDROCHLORIDE 240 MG: 240 CAPSULE, COATED, EXTENDED RELEASE ORAL at 09:56

## 2020-12-22 RX ADMIN — WATER 2.1 ML: 1 INJECTION INTRAMUSCULAR; INTRAVENOUS; SUBCUTANEOUS at 03:17

## 2020-12-22 RX ADMIN — TORSEMIDE 60 MG: 20 TABLET ORAL at 21:30

## 2020-12-22 RX ADMIN — OLANZAPINE 5 MG: 10 INJECTION, POWDER, FOR SOLUTION INTRAMUSCULAR at 03:07

## 2020-12-22 RX ADMIN — LACTULOSE 20 G: 10 SOLUTION ORAL at 09:56

## 2020-12-22 RX ADMIN — QUETIAPINE FUMARATE 50 MG: 25 TABLET ORAL at 21:34

## 2020-12-22 RX ADMIN — TORSEMIDE 60 MG: 20 TABLET ORAL at 09:56

## 2020-12-22 RX ADMIN — METOPROLOL TARTRATE 5 MG: 5 INJECTION INTRAVENOUS at 03:56

## 2020-12-22 RX ADMIN — PANTOPRAZOLE SODIUM 40 MG: 40 TABLET, DELAYED RELEASE ORAL at 09:55

## 2020-12-22 RX ADMIN — LACTULOSE 10 G: 20 SOLUTION ORAL at 21:33

## 2020-12-22 RX ADMIN — IPRATROPIUM BROMIDE AND ALBUTEROL SULFATE 3 ML: 2.5; .5 SOLUTION RESPIRATORY (INHALATION) at 14:30

## 2020-12-22 RX ADMIN — HEPARIN SODIUM 5000 UNITS: 5000 INJECTION INTRAVENOUS; SUBCUTANEOUS at 15:17

## 2020-12-22 RX ADMIN — NICOTINE 7 MG/24 HR DAILY TRANSDERMAL PATCH 1 PATCH: at 09:56

## 2020-12-22 RX ADMIN — HEPARIN SODIUM 5000 UNITS: 5000 INJECTION INTRAVENOUS; SUBCUTANEOUS at 21:29

## 2020-12-22 RX ADMIN — METOPROLOL TARTRATE 5 MG: 5 INJECTION INTRAVENOUS at 13:52

## 2020-12-22 RX ADMIN — DIGOXIN 250 MCG: 0.25 INJECTION INTRAMUSCULAR; INTRAVENOUS at 09:59

## 2020-12-22 RX ADMIN — MIRTAZAPINE 7.5 MG: 15 TABLET, FILM COATED ORAL at 21:32

## 2020-12-22 RX ADMIN — METOPROLOL SUCCINATE 50 MG: 50 TABLET, EXTENDED RELEASE ORAL at 06:48

## 2020-12-22 RX ADMIN — HEPARIN SODIUM 5000 UNITS: 5000 INJECTION INTRAVENOUS; SUBCUTANEOUS at 05:40

## 2020-12-22 RX ADMIN — FOLIC ACID 1000 MCG: 1 TABLET ORAL at 09:55

## 2020-12-22 NOTE — NURSING NOTE
Patient had a heart rate of between 140 to 150's  Murtis Liam notified, and an order was put in for PO metoprolol to be given now

## 2020-12-22 NOTE — NURSING NOTE
Patient is becoming more agitated and will not allow us to keep her oxygen on despite the soft wrist restraints  Oxygen dropping at times to low 60's when patient pulls nasal canula off  RN replacing her nasal cannula multiple times an hour  Heart rate still high (130-140bpm) after medications  Increased wheezing, patient coached on how to cough and HOB at 30-45 degree  SLIM made aware

## 2020-12-22 NOTE — ASSESSMENT & PLAN NOTE
Nursing staff reports that patient has been having multiple bowel movements  This is in the setting of lactulose use  I will reduce the dose of lactulose from 20g twice a day to 10g twice a day  Monitor bowel movements

## 2020-12-22 NOTE — RESPIRATORY THERAPY NOTE
RT Protocol Note  Laura Contreras 77 y o  female MRN: [id number]  Unit/Bed#: -01 Encounter: [id number]    Assessment    Principal Problem:    Volume overload  Active Problems:    Anemia    Liver cirrhosis (HCC)    Paroxysmal atrial fibrillation (HCC)/Tachycardia    Multiple myeloma (HCC)    Thrombocytopenia (HCC)    Chronic kidney disease (CKD), active medical management without dialysis, stage 4 (severe) (HCC)    Diarrhea    Dementia ([facility] [address] )      Home Pulmonary Medications:  None documented  Home Devices/Therapy: Home O2    Past Medical History:   Diagnosis Date    Benign essential hypertension     last assessed - [id number]    Chest pain 11/4/2017    CHF (congestive heart failure) (HCC)     Chronic kidney disease     Cirrhosis (HCC)     Elevated troponin 1/31/2020    Gastroesophageal reflux disease without esophagitis 11/16/2017    Hyperbilirubinemia 5/14/2020    Hypertension     Liver disease     [facility] ([email or web address] )     Pneumonia 5/6/2020    Renal failure 08/29/2020     Social History     Socioeconomic History    Marital status:      Spouse name: None    Number of children: None    Years of education: None    Highest education level: None   Occupational History    None   Social Needs    Financial resource strain: None    Food insecurity     Worry: None     Inability: None    Transportation needs     Medical: None     Non-medical: None   Tobacco Use    Smoking status: Light Tobacco Smoker     Packs/day: 0 25     Types: Cigarettes    Smokeless tobacco: Never Used    Tobacco comment: 1/2 pack per month   Substance and Sexual Activity    Alcohol use: Not Currently     Frequency: 4 or more times a week     Drinks per session: 1 or 2     Binge frequency: Never     Comment: History of significant daily alcohol intake   Sister joy no alcohol intake in 6 months    Drug use: No    Sexual activity: Not Currently   Lifestyle    Physical activity     Days per week: 0 days     Minutes per session: 0 min    Stress: Not at all   Relationships    Social connections     Talks on phone: None     Gets together: None     Attends Spiritism service: None     Active member of club or organization: None     Attends meetings of clubs or organizations: None     Relationship status: None    Intimate partner violence     Fear of current or ex partner: None     Emotionally abused: None     Physically abused: None     Forced sexual activity: None   Other Topics Concern    None   Social History Narrative    No advance directives       Subjective  Pt sleeping  Has a prolonged exhalation period  She has smoked for many years and continues to do so per family  Objective    Physical Exam:   Assessment Type: Pre-treatment  General Appearance: Drowsy, Sleeping(pt gets very confused  )  Respiratory Pattern: Accessory muscle use(prolonged expiratory period)  Chest Assessment: Chest expansion symmetrical  Bilateral Breath Sounds: Diminished  Cough: Non-productive    Vitals:  Blood pressure 140/90, pulse 94, temperature 99 3 °F (37 4 °C), resp  rate 18, height 5' 4" (1 626 m), weight 77 8 kg (171 lb 8 3 oz), SpO2 98 %, not currently breastfeeding  Imaging and other studies: I have personally reviewed pertinent reports  Plan    Respiratory Plan: Mild Distress pathway        Resp Comments: Pt is a current smoker  She lives with her sister  Admitted for CHF exac  RN called me into room due to concern for prolonged exhalation and wet sounding BS's  Pt assessed and ordered on Q6PRN nebs with DuoNeb for wheezing and SOB

## 2020-12-22 NOTE — NURSING NOTE
Patient had begun to get agitated  Started screaming that she wanted to go home, and was trying to jump out of bed multiple times  Josh Alonzo was notified, and [facility]

## 2020-12-22 NOTE — PLAN OF CARE
Problem: Potential for Falls  Goal: Patient will remain free of falls  Description: INTERVENTIONS:  - Assess patient frequently for physical needs  -  Identify cognitive and physical deficits and behaviors that affect risk of falls  -  [place] fall precautions as indicated by assessment   - Educate patient/family on patient safety including physical limitations  - Instruct patient to call for assistance with activity based on assessment  - Modify environment to reduce risk of injury  - Consider OT/PT consult to assist with strengthening/mobility  Outcome: Progressing     Problem: Prexisting or High Potential for Compromised Skin Integrity  Goal: Skin integrity is maintained or improved  Description: INTERVENTIONS:  - Identify patients at risk for skin breakdown  - Assess and monitor skin integrity  - Assess and monitor nutrition and hydration status  - Monitor labs   - Assess for incontinence   - Turn and reposition patient  - Assist with mobility/ambulation  - Relieve pressure over bony prominences  - Avoid friction and shearing  - Provide appropriate hygiene as needed including keeping skin clean and dry  - Evaluate need for skin moisturizer/barrier cream  - Collaborate with interdisciplinary team   - Patient/family teaching  - Consider wound care consult   Outcome: Progressing     Problem: Nutrition/Hydration-ADULT  Goal: Nutrient/Hydration intake appropriate for improving, restoring or maintaining nutritional needs  Description: Monitor and assess patient's nutrition/hydration status for malnutrition  Collaborate with interdisciplinary team and initiate plan and interventions as ordered  Monitor patient's weight and dietary intake as ordered or per policy  Utilize nutrition screening tool and intervene as necessary  Determine patient's food preferences and provide high-protein, high-caloric foods as appropriate       INTERVENTIONS:  - Monitor oral intake, urinary output, labs, and treatment plans  - Assess nutrition and hydration status and recommend course of action  - Evaluate amount of meals eaten  - Assist patient with eating if necessary   - Allow adequate time for meals  - Recommend/ encourage appropriate diets, oral nutritional supplements, and vitamin/mineral supplements  - Order, calculate, and assess calorie counts as needed  - Recommend, monitor, and adjust tube feedings and TPN/PPN based on assessed needs  - Assess need for intravenous fluids  - Provide specific nutrition/hydration education as appropriate  - Include patient/family/caregiver in decisions related to nutrition  Outcome: Progressing

## 2020-12-22 NOTE — ASSESSMENT & PLAN NOTE
Patient has a chronic history of dementia and while in the hospital she does have periods of agitation     Increase Seroquel at night to 50mg

## 2020-12-22 NOTE — PROGRESS NOTES
Progress Note - Rogelio Sparrow 1945, 77 y o  female MRN: [id number]    Unit/Bed#: -01 Encounter: [id number]    Primary Care Provider: Mackenzie Florez MD   Date and time admitted to hospital: 12/18/2020  2:12 PM        Paroxysmal atrial fibrillation (HCC)/Tachycardia  Assessment & Plan  At home she is on metoprolol and Cardizem  Currently she is on RVR with HR around 140s  Add digoxin 250mcg  Increase metoprolol XL from 50 mg daily to 100 mg daily  Continue Cardizem ER 240mg daily  Also on PRN IV Lopressor    She is not on anticoagulation given dementia, thrombocytopenia, high fall risk as per cardiology outpatient note  Monitor heart rate  * Volume overload  Assessment & Plan  On torsemide, will increase to BID  Low-salt diet    Monitor clinical status  Wean off oxygen as tolerated  Dementia [facility])  Assessment & Plan    Patient has a chronic history of dementia and while in the hospital she does have periods of agitation  Increase Seroquel at night to 50mg    Diarrhea  Assessment & Plan    Nursing staff reports that patient has been having multiple bowel movements  This is in the setting of lactulose use  I will reduce the dose of lactulose from 20g twice a day to 10g twice a day  Monitor bowel movements      Chronic kidney disease (CKD), active medical management without dialysis, stage 4 (severe) [facility])  Assessment & Plan  Lab Results   Component Value Date    EGFR 40 12/21/2020    EGFR 38 12/20/2020    EGFR 28 12/19/2020    CREATININE 1 49 (H) 12/21/2020    CREATININE 1 54 (H) 12/20/2020    CREATININE 1 98 (H) 12/19/2020   Baseline creatinine appears to be between 1 6 and 2 1  Currently at baseline -somewhat better  Continue to monitor as needed    Thrombocytopenia [facility])  Assessment & Plan  Secondary to cirrhosis  Continue to monitor    Multiple myeloma [facility])  Assessment & Plan  Diagnosed earlier this year and patient is currently under care of Oncology at [address] [place] [address] cirrhosis ([facility] [address] )  Assessment & Plan  Continue home lactulose, rifaximin  Patient volume overloaded  On torsemide which we are continuing  Anemia  Assessment & Plan  Multifactorial     Currently hemoglobin above 7  Chronic diastolic CHF possibly in the setting of hypertension; as evidenced by last echo on 20 showing preserved EF and grade 2 diastolic dysfunction; requiring daily weights, I/O's every shift,  She is also on metoprolol and torsemide  VTE Pharmacologic Prophylaxis:   Pharmacologic: Heparin  Mechanical VTE Prophylaxis in Place: Yes    Patient Centered Rounds: I have performed bedside rounds with nursing staff today  Time Spent for Care: 45 minutes  More than 50% of total time spent on counseling and coordination of care as described above  Current Length of Stay: 4 day(s)    Current Patient Status: Inpatient   Certification Statement: The patient will continue to require additional inpatient hospital stay due to Atrial fibrillation with RVR    Discharge Plan:  Once stable    Code Status: Level 1 - Full Code      Subjective:     Patient evaluated this morning  Still having rapid ventricular response with heart rate around 150s  Also having loose bowel movements according to nursing staff, and agitation episodes  Hemoglobin 8 7 today  No other events reported  Objective:     Vitals:   Temp (24hrs), Av 8 °F (37 1 °C), Min:98 3 °F (36 8 °C), Max:99 3 °F (37 4 °C)    Temp:  [98 3 °F (36 8 °C)-99 3 °F (37 4 °C)] 99 3 °F (37 4 °C)  HR:  [] 144  Resp:  [18] 18  BP: (116-143)/(77-90) 140/90  SpO2:  [85 %-96 %] 94 %  Body mass index is 29 44 kg/m²  Input and Output Summary (last 24 hours): Intake/Output Summary (Last 24 hours) at 2020 1226  Last data filed at 2020 1014  Gross per 24 hour   Intake 240 ml   Output 1200 ml   Net -960 ml       Physical Exam:     Physical Exam  Vitals signs and nursing note reviewed     Constitutional:       Appearance: Normal appearance  She is normal weight  Comments: Elderly female in bed, awake   HENT:      Head: Normocephalic and atraumatic  Right Ear: External ear normal       Left Ear: External ear normal       Nose: Nose normal  No congestion  Mouth/Throat:      Mouth: Mucous membranes are moist       Pharynx: Oropharynx is clear  No oropharyngeal exudate or posterior oropharyngeal erythema  Eyes:      General: No scleral icterus  Right eye: No discharge  Left eye: No discharge  Extraocular Movements: Extraocular movements intact  Conjunctiva/sclera: Conjunctivae normal       Pupils: Pupils are equal, round, and reactive to light  Neck:      Musculoskeletal: Normal range of motion and neck supple  No neck rigidity or muscular tenderness  Cardiovascular:      Rate and Rhythm: Normal rate and regular rhythm  Pulses: Normal pulses  Heart sounds: Normal heart sounds  No murmur  No friction rub  No gallop  Pulmonary:      Effort: Pulmonary effort is normal  No respiratory distress  Breath sounds: No stridor  Rales present  No wheezing or rhonchi  Comments: There is significant crackles in bilateral bases  Chest:      Chest wall: No tenderness  Abdominal:      General: Abdomen is flat  Bowel sounds are normal  There is no distension  Palpations: Abdomen is soft  There is no mass  Tenderness: There is no abdominal tenderness  There is no guarding or rebound  Musculoskeletal: Normal range of motion  General: No swelling, tenderness, deformity or signs of injury  Skin:     General: Skin is warm and dry  Capillary Refill: Capillary refill takes less than 2 seconds  Coloration: Skin is not jaundiced or pale  Findings: No bruising, erythema, lesion or rash  Neurological:      General: No focal deficit present  Mental Status: She is alert and oriented to person, place, and time  Mental status is at baseline        Cranial Nerves: No cranial nerve deficit  Sensory: No sensory deficit  Motor: No weakness  Coordination: Coordination normal            Additional Data:     Labs:    Results from last 7 days   Lab Units 12/22/20  0516  12/19/20  0659   WBC Thousand/uL 4 75   < > 3 92*   HEMOGLOBIN g/dL 8 7*   < > 6 4*   HEMATOCRIT % 27 9*   < > 20 8*   PLATELETS Thousands/uL 64*   < > 74*   BANDS PCT %  --   --  5   LYMPHO PCT %  --   --  16   MONO PCT %  --   --  11   EOS PCT %  --   --  0    < > = values in this interval not displayed  Results from last 7 days   Lab Units 12/21/20  0639 12/20/20  0706   SODIUM mmol/L 148* 143   POTASSIUM mmol/L 3 8 5 1   CHLORIDE mmol/L 110* 106   CO2 mmol/L 26 22   BUN mg/dL 23 20   CREATININE mg/dL 1 49* 1 54*   ANION GAP mmol/L 12 15*   CALCIUM mg/dL 9 1 9 1   ALBUMIN g/dL  --  3 1*   TOTAL BILIRUBIN mg/dL  --  1 60*   ALK PHOS U/L  --  253*   ALT U/L  --  11*   AST U/L  --  36   GLUCOSE RANDOM mg/dL 93 110                           * I Have Reviewed All Lab Data Listed Above  * Additional Pertinent Lab Tests Reviewed:  Kay [facility] Admission Reviewed    Recent Cultures (last 7 days):     Results from last 7 days   Lab Units 12/21/20  1252   GRAM STAIN RESULT  1+ Polys  No organisms seen   BODY FLUID CULTURE, STERILE  No growth       Last 24 Hours Medication List:   Current Facility-Administered Medications   Medication Dose Route Frequency Provider Last Rate    acetaminophen  650 mg Oral Q6H PRN Brent Dorantes MD      diltiazem  240 mg Oral Daily Brent Dorantes MD      folic acid  3,371 mcg Oral Daily Brent Dorantes MD      heparin (porcine)  5,000 Units Subcutaneous Q8H [facility] Brent Dorantes MD      lactulose  10 g Oral BID Billy Martínez MD      metoprolol  5 mg Intravenous Q6H PRN Kelli Oden MD      [START ON 12/23/2020] metoprolol succinate  100 mg Oral Daily Billy Martínez MD      mirtazapine  7 5 mg Oral HS Brent Dorantes MD      nicotine  1 patch Transdermal Daily Brent Dorantes MD      pantoprazole  40 mg Oral QAM Brent Dorantes MD      QUEtiapine  50 mg Oral HS Stephanie Ba MD      torsemide  60 mg Oral BID Stephanie Ba MD          Today, Patient Was Seen By: Stephanie Ba MD    ** Please Note: Dictation voice to text software may have been used in the creation of this document   **

## 2020-12-23 LAB
ABO GROUP BLD BPU: NORMAL
ABO GROUP BLD BPU: NORMAL
ALBUMIN SERPL BCP-MCNC: 3.2 G/DL (ref 3.5–5)
ALP SERPL-CCNC: 219 U/L (ref 46–116)
ALT SERPL W P-5'-P-CCNC: 13 U/L (ref 12–78)
ANION GAP SERPL CALCULATED.3IONS-SCNC: 10 MMOL/L (ref 4–13)
ANISOCYTOSIS BLD QL SMEAR: PRESENT
AST SERPL W P-5'-P-CCNC: 25 U/L (ref 5–45)
BASOPHILS # BLD MANUAL: 0 THOUSAND/UL (ref 0–0.1)
BASOPHILS NFR MAR MANUAL: 0 % (ref 0–1)
BILIRUB SERPL-MCNC: 2.1 MG/DL (ref 0.2–1)
BPU ID: NORMAL
BPU ID: NORMAL
BUN SERPL-MCNC: 23 MG/DL (ref 5–25)
CALCIUM ALBUM COR SERPL-MCNC: 9.9 MG/DL (ref 8.3–10.1)
CALCIUM SERPL-MCNC: 9.3 MG/DL (ref 8.3–10.1)
CHLORIDE SERPL-SCNC: 106 MMOL/L (ref 100–108)
CO2 SERPL-SCNC: 32 MMOL/L (ref 21–32)
CREAT SERPL-MCNC: 1.44 MG/DL (ref 0.6–1.3)
CROSSMATCH: NORMAL
CROSSMATCH: NORMAL
EOSINOPHIL # BLD MANUAL: 0 THOUSAND/UL (ref 0–0.4)
EOSINOPHIL NFR BLD MANUAL: 0 % (ref 0–6)
ERYTHROCYTE [DISTWIDTH] IN BLOOD BY AUTOMATED COUNT: 20.4 % (ref 11.6–15.1)
GFR SERPL CREATININE-BSD FRML MDRD: 41 ML/MIN/1.73SQ M
GLUCOSE SERPL-MCNC: 106 MG/DL (ref 65–140)
HCT VFR BLD AUTO: 29 % (ref 34.8–46.1)
HGB BLD-MCNC: 9.1 G/DL (ref 11.5–15.4)
LG PLATELETS BLD QL SMEAR: PRESENT
LYMPHOCYTES # BLD AUTO: 0.59 THOUSAND/UL (ref 0.6–4.47)
LYMPHOCYTES # BLD AUTO: 13 % (ref 14–44)
MAGNESIUM SERPL-MCNC: 1.2 MG/DL (ref 1.6–2.6)
MCH RBC QN AUTO: 28 PG (ref 26.8–34.3)
MCHC RBC AUTO-ENTMCNC: 31.4 G/DL (ref 31.4–37.4)
MCV RBC AUTO: 89 FL (ref 82–98)
METAMYELOCYTES NFR BLD MANUAL: 1 % (ref 0–1)
MONOCYTES # BLD AUTO: 0.32 THOUSAND/UL (ref 0–1.22)
MONOCYTES NFR BLD: 7 % (ref 4–12)
MYELOCYTES NFR BLD MANUAL: 2 % (ref 0–1)
NEUTROPHILS # BLD MANUAL: 3.46 THOUSAND/UL (ref 1.85–7.62)
NEUTS BAND NFR BLD MANUAL: 1 % (ref 0–8)
NEUTS SEG NFR BLD AUTO: 75 % (ref 43–75)
NRBC BLD AUTO-RTO: 10 /100 WBC (ref 0–2)
NRBC BLD AUTO-RTO: 7 /100 WBCS
PLATELET # BLD AUTO: 64 THOUSANDS/UL (ref 149–390)
PLATELET BLD QL SMEAR: ABNORMAL
PMV BLD AUTO: 9 FL (ref 8.9–12.7)
POIKILOCYTOSIS BLD QL SMEAR: PRESENT
POLYCHROMASIA BLD QL SMEAR: PRESENT
POTASSIUM SERPL-SCNC: 2.3 MMOL/L (ref 3.5–5.3)
PROT SERPL-MCNC: 6.5 G/DL (ref 6.4–8.2)
RBC # BLD AUTO: 3.25 MILLION/UL (ref 3.81–5.12)
SODIUM SERPL-SCNC: 148 MMOL/L (ref 136–145)
T4 FREE SERPL-MCNC: 1.75 NG/DL (ref 0.76–1.46)
TARGETS BLD QL SMEAR: PRESENT
TOTAL CELLS COUNTED SPEC: 100
TSH SERPL DL<=0.05 MIU/L-ACNC: 0.18 UIU/ML (ref 0.36–3.74)
UNIT DISPENSE STATUS: NORMAL
UNIT DISPENSE STATUS: NORMAL
UNIT PRODUCT CODE: NORMAL
UNIT PRODUCT CODE: NORMAL
UNIT RH: NORMAL
UNIT RH: NORMAL
VARIANT LYMPHS # BLD AUTO: 1 %
WBC # BLD AUTO: 4.55 THOUSAND/UL (ref 4.31–10.16)

## 2020-12-23 PROCEDURE — 99232 SBSQ HOSP IP/OBS MODERATE 35: CPT | Performed by: INTERNAL MEDICINE

## 2020-12-23 PROCEDURE — 84443 ASSAY THYROID STIM HORMONE: CPT | Performed by: INTERNAL MEDICINE

## 2020-12-23 PROCEDURE — 85027 COMPLETE CBC AUTOMATED: CPT | Performed by: INTERNAL MEDICINE

## 2020-12-23 PROCEDURE — 80053 COMPREHEN METABOLIC PANEL: CPT | Performed by: INTERNAL MEDICINE

## 2020-12-23 PROCEDURE — 84439 ASSAY OF FREE THYROXINE: CPT | Performed by: INTERNAL MEDICINE

## 2020-12-23 PROCEDURE — 94664 DEMO&/EVAL PT USE INHALER: CPT

## 2020-12-23 PROCEDURE — 83735 ASSAY OF MAGNESIUM: CPT | Performed by: INTERNAL MEDICINE

## 2020-12-23 PROCEDURE — 85007 BL SMEAR W/DIFF WBC COUNT: CPT | Performed by: INTERNAL MEDICINE

## 2020-12-23 PROCEDURE — 94760 N-INVAS EAR/PLS OXIMETRY 1: CPT

## 2020-12-23 RX ORDER — POTASSIUM CHLORIDE 20 MEQ/1
40 TABLET, EXTENDED RELEASE ORAL ONCE
Status: COMPLETED | OUTPATIENT
Start: 2020-12-23 | End: 2020-12-23

## 2020-12-23 RX ORDER — POTASSIUM CHLORIDE 20 MEQ/1
20 TABLET, EXTENDED RELEASE ORAL DAILY
Status: DISCONTINUED | OUTPATIENT
Start: 2020-12-24 | End: 2020-12-29 | Stop reason: HOSPADM

## 2020-12-23 RX ORDER — POTASSIUM CHLORIDE 14.9 MG/ML
20 INJECTION INTRAVENOUS ONCE
Status: COMPLETED | OUTPATIENT
Start: 2020-12-23 | End: 2020-12-23

## 2020-12-23 RX ADMIN — LACTULOSE 10 G: 20 SOLUTION ORAL at 21:59

## 2020-12-23 RX ADMIN — POTASSIUM CHLORIDE 40 MEQ: 1500 TABLET, EXTENDED RELEASE ORAL at 09:59

## 2020-12-23 RX ADMIN — NICOTINE 7 MG/24 HR DAILY TRANSDERMAL PATCH 1 PATCH: at 10:00

## 2020-12-23 RX ADMIN — HEPARIN SODIUM 5000 UNITS: 5000 INJECTION INTRAVENOUS; SUBCUTANEOUS at 14:38

## 2020-12-23 RX ADMIN — PANTOPRAZOLE SODIUM 40 MG: 40 TABLET, DELAYED RELEASE ORAL at 09:59

## 2020-12-23 RX ADMIN — LACTULOSE 10 G: 20 SOLUTION ORAL at 09:59

## 2020-12-23 RX ADMIN — HEPARIN SODIUM 5000 UNITS: 5000 INJECTION INTRAVENOUS; SUBCUTANEOUS at 21:58

## 2020-12-23 RX ADMIN — QUETIAPINE FUMARATE 50 MG: 25 TABLET ORAL at 21:58

## 2020-12-23 RX ADMIN — POTASSIUM CHLORIDE 20 MEQ: 14.9 INJECTION, SOLUTION INTRAVENOUS at 07:00

## 2020-12-23 RX ADMIN — POTASSIUM CHLORIDE 40 MEQ: 1500 TABLET, EXTENDED RELEASE ORAL at 06:40

## 2020-12-23 RX ADMIN — FOLIC ACID 1000 MCG: 1 TABLET ORAL at 09:59

## 2020-12-23 RX ADMIN — TORSEMIDE 60 MG: 20 TABLET ORAL at 10:00

## 2020-12-23 RX ADMIN — TORSEMIDE 60 MG: 20 TABLET ORAL at 21:56

## 2020-12-23 RX ADMIN — METOPROLOL SUCCINATE 100 MG: 100 TABLET, EXTENDED RELEASE ORAL at 05:41

## 2020-12-23 RX ADMIN — HEPARIN SODIUM 5000 UNITS: 5000 INJECTION INTRAVENOUS; SUBCUTANEOUS at 05:40

## 2020-12-23 RX ADMIN — DILTIAZEM HYDROCHLORIDE 240 MG: 240 CAPSULE, COATED, EXTENDED RELEASE ORAL at 09:59

## 2020-12-23 RX ADMIN — MIRTAZAPINE 7.5 MG: 15 TABLET, FILM COATED ORAL at 21:55

## 2020-12-23 NOTE — ASSESSMENT & PLAN NOTE
Diagnosed earlier this year and patient is currently under care of Oncology at [facility]

## 2020-12-23 NOTE — ASSESSMENT & PLAN NOTE
Lab Results   Component Value Date    EGFR 41 12/23/2020    EGFR 40 12/21/2020    EGFR 38 12/20/2020    CREATININE 1 44 (H) 12/23/2020    CREATININE 1 49 (H) 12/21/2020    CREATININE 1 54 (H) 12/20/2020   Baseline creatinine appears to be between 1 6 and 2 1  Currently at baseline -somewhat better  Continue to monitor as needed  Avoid nephrotoxic agents

## 2020-12-23 NOTE — ASSESSMENT & PLAN NOTE
Patient has a chronic history of dementia and while in the hospital she does have periods of agitation  Currently on Seroquel 50 at night, this was recently increased  Monitor response

## 2020-12-23 NOTE — RESPIRATORY THERAPY NOTE
RT Protocol Note  Ladonna Morris 77 y o  female MRN: [id number]  Unit/Bed#: -01 Encounter: [id number]    Assessment    Principal Problem:    Volume overload  Active Problems:    Anemia    Liver cirrhosis (HCC)    Paroxysmal atrial fibrillation (HCC)/Tachycardia    Multiple myeloma (HCC)    Thrombocytopenia (HCC)    Chronic kidney disease (CKD), active medical management without dialysis, stage 4 (severe) (HCC)    Diarrhea    Dementia ([facility] [address] )      Home Pulmonary Medications:  None per chart  Home Devices/Therapy: Home O2    Past Medical History:   Diagnosis Date    Benign essential hypertension     last assessed - [id number]    Chest pain 11/4/2017    CHF (congestive heart failure) (HCC)     Chronic kidney disease     Cirrhosis (HCC)     Elevated troponin 1/31/2020    Gastroesophageal reflux disease without esophagitis 11/16/2017    Hyperbilirubinemia 5/14/2020    Hypertension     Liver disease     Multiple myeloma ([facility] )     Pneumonia 5/6/2020    Renal failure 08/29/2020     Social History     Socioeconomic History    Marital status:      Spouse name: None    Number of children: None    Years of education: None    Highest education level: None   Occupational History    None   Social Needs    Financial resource strain: None    Food insecurity     Worry: None     Inability: None    Transportation needs     Medical: None     Non-medical: None   Tobacco Use    Smoking status: Light Tobacco Smoker     Packs/day: 0 25     Types: Cigarettes    Smokeless tobacco: Never Used    Tobacco comment: 1/2 pack per month   Substance and Sexual Activity    Alcohol use: Not Currently     Frequency: 4 or more times a week     Drinks per session: 1 or 2     Binge frequency: Never     Comment: History of significant daily alcohol intake   Sister joy no alcohol intake in 6 months    Drug use: No    Sexual activity: Not Currently   Lifestyle    Physical activity     Days per week: 0 days     Minutes per session: 0 min    Stress: Not at all   Relationships    Social connections     Talks on phone: None     Gets together: None     Attends Scientology service: None     Active member of club or organization: None     Attends meetings of clubs or organizations: None     Relationship status: None    Intimate partner violence     Fear of current or ex partner: None     Emotionally abused: None     Physically abused: None     Forced sexual activity: None   Other Topics Concern    None   Social History Narrative    No advance directives       Subjective         Objective    Physical Exam:   Assessment Type: Assess only  General Appearance: Sleeping  Respiratory Pattern: Normal  Chest Assessment: Chest expansion symmetrical  Bilateral Breath Sounds: Diminished(per nurse)  Cough: Non-productive    Vitals:  Blood pressure 145/76, pulse (!) 117, temperature 97 7 °F (36 5 °C), resp  rate 17, height 5' 4" (1 626 m), weight 77 8 kg (171 lb 8 3 oz), SpO2 93 %, not currently breastfeeding  Imaging and other studies: Stable right pleural effusion with right basilar compressive atelectasis  Plan    Respiratory Plan: Mild Distress pathway        Resp Comments: sleeping soundly Patient has long exhalation while sleeping

## 2020-12-23 NOTE — PROGRESS NOTES
Progress Note - Al Lal 1945, 77 y o  female MRN: [id number]    Unit/Bed#: -01 Encounter: [id number]    Primary Care Provider: Jason Gutierrez MD   Date and time admitted to hospital: 12/18/2020  2:12 PM        Paroxysmal atrial fibrillation (HCC)/Tachycardia  Assessment & Plan  · At home she is on metoprolol and Cardizem  · On 12/22 she had RVR with HR around 140s  · Received digoxin 250 mcg  · Metoprolol XL was increased to 100 mg daily  · Continue Cardizem ER 240mg daily  Currently heart rate better  She is not on anticoagulation given dementia, thrombocytopenia, high fall risk as per cardiology outpatient note  · Monitor heart rate  * Volume overload  Assessment & Plan  On torsemide, will increase to BID  Replace potassium while on torsemide  Low-salt diet    Monitor clinical status  Wean off oxygen as tolerated  Dementia [facility])  Assessment & Plan    Patient has a chronic history of dementia and while in the hospital she does have periods of agitation  Currently on Seroquel 50 at night, this was recently increased  Monitor response  Diarrhea  Assessment & Plan    Nursing staff reports that patient has been having multiple bowel movements  This is in the setting of lactulose use  Lactulose dose has been reduced from 20 g twice a day to 10 g twice a day given excessive number of bowel movements  Monitor bowel movements      Chronic kidney disease (CKD), active medical management without dialysis, stage 4 (severe) [facility])  Assessment & Plan  Lab Results   Component Value Date    EGFR 41 12/23/2020    EGFR 40 12/21/2020    EGFR 38 12/20/2020    CREATININE 1 44 (H) 12/23/2020    CREATININE 1 49 (H) 12/21/2020    CREATININE 1 54 (H) 12/20/2020   Baseline creatinine appears to be between 1 6 and 2 1  Currently at baseline -somewhat better  Continue to monitor as needed  Avoid nephrotoxic agents    Thrombocytopenia ([address] )  Assessment & Plan  Secondary to cirrhosis  Continue to monitor    Multiple myeloma [facility])  Assessment & Plan  Diagnosed earlier this year and patient is currently under care of Oncology at [place] [facility]    Liver cirrhosis [facility])  Assessment & Plan  · Continue home lactulose, rifaximin  · Patient volume overloaded  · On torsemide which we are continuing  Anemia  Assessment & Plan  · Multifactorial     · Currently hemoglobin above 7  VTE Pharmacologic Prophylaxis:   Pharmacologic: Heparin  Mechanical VTE Prophylaxis in Place: Yes    Patient Centered Rounds: I have performed bedside rounds with nursing staff today  Education and Discussions with Family / Patient: Sister    Time Spent for Care: 30 minutes  More than 50% of total time spent on counseling and coordination of care as described above  Current Length of Stay: 5 day(s)    Current Patient Status: Inpatient   Certification Statement: The patient will continue to require additional inpatient hospital stay due to Need for hypokalemia treatment    Discharge Plan:  Once stable    Code Status: Level 1 - Full Code      Subjective:     Patient evaluated this morning  She is more calm today, oriented to self but not place or time  Heart rate currently controlled  Potassium markedly low 2 3  No other events reported  Objective:     Vitals:   Temp (24hrs), Av 7 °F (36 5 °C), Min:97 3 °F (36 3 °C), Max:98 6 °F (37 °C)    Temp:  [97 3 °F (36 3 °C)-98 6 °F (37 °C)] 98 6 °F (37 °C)  HR:  [] 52  Resp:  [16-18] 16  BP: (100-145)/(52-81) 100/52  SpO2:  [86 %-98 %] 86 %  Body mass index is 29 52 kg/m²  Input and Output Summary (last 24 hours): Intake/Output Summary (Last 24 hours) at 2020 1643  Last data filed at 2020 1200  Gross per 24 hour   Intake 480 ml   Output    Net 480 ml       Physical Exam:     Physical Exam  Vitals signs and nursing note reviewed  Constitutional:       Appearance: Normal appearance  She is normal weight        Comments: Elderly female in bed, awake, alert   HENT:      Head: Normocephalic and atraumatic  Right Ear: External ear normal       Left Ear: External ear normal       Nose: Nose normal  No congestion  Mouth/Throat:      Mouth: Mucous membranes are moist       Pharynx: Oropharynx is clear  No oropharyngeal exudate or posterior oropharyngeal erythema  Eyes:      General: No scleral icterus  Right eye: No discharge  Left eye: No discharge  Extraocular Movements: Extraocular movements intact  Conjunctiva/sclera: Conjunctivae normal       Pupils: Pupils are equal, round, and reactive to light  Neck:      Musculoskeletal: Normal range of motion and neck supple  No neck rigidity or muscular tenderness  Cardiovascular:      Rate and Rhythm: Normal rate and regular rhythm  Pulses: Normal pulses  Heart sounds: Normal heart sounds  No murmur  No friction rub  No gallop  Pulmonary:      Effort: Pulmonary effort is normal  No respiratory distress  Breath sounds: No stridor  Rales present  No wheezing or rhonchi  Comments: There is significant crackles in bilateral bases  Chest:      Chest wall: No tenderness  Abdominal:      General: Abdomen is flat  Bowel sounds are normal  There is no distension  Palpations: Abdomen is soft  There is no mass  Tenderness: There is no abdominal tenderness  There is no guarding or rebound  Musculoskeletal: Normal range of motion  General: No swelling, tenderness, deformity or signs of injury  Skin:     General: Skin is warm and dry  Capillary Refill: Capillary refill takes less than 2 seconds  Coloration: Skin is not jaundiced or pale  Findings: No bruising, erythema, lesion or rash  Neurological:      General: No focal deficit present  Mental Status: She is alert and oriented to person, place, and time  Mental status is at baseline  Cranial Nerves: No cranial nerve deficit  Sensory: No sensory deficit        Motor: No weakness  Coordination: Coordination normal            Additional Data:     Labs:    Results from last 7 days   Lab Units 12/23/20  0459   WBC Thousand/uL 4 55   HEMOGLOBIN g/dL 9 1*   HEMATOCRIT % 29 0*   PLATELETS Thousands/uL 64*   BANDS PCT % 1   LYMPHO PCT % 13*   MONO PCT % 7   EOS PCT % 0     Results from last 7 days   Lab Units 12/23/20  0459   SODIUM mmol/L 148*   POTASSIUM mmol/L 2 3*   CHLORIDE mmol/L 106   CO2 mmol/L 32   BUN mg/dL 23   CREATININE mg/dL 1 44*   ANION GAP mmol/L 10   CALCIUM mg/dL 9 3   ALBUMIN g/dL 3 2*   TOTAL BILIRUBIN mg/dL 2 10*   ALK PHOS U/L 219*   ALT U/L 13   AST U/L 25   GLUCOSE RANDOM mg/dL 106                           * I Have Reviewed All Lab Data Listed Above  * Additional Pertinent Lab Tests Reviewed:  [address] Admission Reviewed      Recent Cultures (last 7 days):     Results from last 7 days   Lab Units 12/21/20  1252   GRAM STAIN RESULT  1+ Polys  No organisms seen   BODY FLUID CULTURE, STERILE  No growth       Last 24 Hours Medication List:   Current Facility-Administered Medications   Medication Dose Route Frequency Provider Last Rate    acetaminophen  650 mg Oral Q6H PRN Brent Dorantes MD      diltiazem  240 mg Oral Daily Brent Dorantes MD      folic acid  5,415 mcg Oral Daily Brent Dorantes MD      heparin (porcine)  5,000 Units Subcutaneous Q8H [address] Brent Dorantes MD      ipratropium-albuterol  3 mL Nebulization Q6H PRN Pavan Gardner MD      lactulose  10 g Oral BID Pavan Gardner MD      metoprolol  5 mg Intravenous Q6H PRN Eboni Hirsch MD      metoprolol succinate  100 mg Oral Daily Pavan Gardner MD      mirtazapine  7 5 mg Oral HS Anmol Padron MD      nicotine  1 patch Transdermal Daily Brent Dorantes MD      pantoprazole  40 mg Oral QAM Brent Dorantes MD      [START ON 12/24/2020] potassium chloride  20 mEq Oral Daily Pavan Gardner MD      QUEtiapine  50 mg Oral HS Pavan Gardner MD      torsemide 60 mg Oral BID Raphael Wolf MD          Today, Patient Was Seen By: Raphael Wolf MD    ** Please Note: Dictation voice to text software may have been used in the creation of this document   **

## 2020-12-23 NOTE — ASSESSMENT & PLAN NOTE
On torsemide, will increase to BID  Replace potassium while on torsemide  Low-salt diet    Monitor clinical status  Wean off oxygen as tolerated

## 2020-12-23 NOTE — ASSESSMENT & PLAN NOTE
Nursing staff reports that patient has been having multiple bowel movements  This is in the setting of lactulose use  Lactulose dose has been reduced from 20 g twice a day to 10 g twice a day given excessive number of bowel movements  Monitor bowel movements

## 2020-12-23 NOTE — ASSESSMENT & PLAN NOTE
· At home she is on metoprolol and Cardizem  · On 12/22 she had RVR with HR around 140s  · Received digoxin 250 mcg  · Metoprolol XL was increased to 100 mg daily  · Continue Cardizem ER 240mg daily  Currently heart rate better  She is not on anticoagulation given dementia, thrombocytopenia, high fall risk as per cardiology outpatient note  · Monitor heart rate

## 2020-12-24 LAB
ANION GAP SERPL CALCULATED.3IONS-SCNC: 9 MMOL/L (ref 4–13)
BACTERIA SPEC BFLD CULT: NO GROWTH
BASOPHILS # BLD AUTO: 0.01 THOUSANDS/ΜL (ref 0–0.1)
BASOPHILS NFR BLD AUTO: 0 % (ref 0–1)
BUN SERPL-MCNC: 27 MG/DL (ref 5–25)
CALCIUM SERPL-MCNC: 8.9 MG/DL (ref 8.3–10.1)
CHLORIDE SERPL-SCNC: 107 MMOL/L (ref 100–108)
CO2 SERPL-SCNC: 32 MMOL/L (ref 21–32)
CREAT SERPL-MCNC: 1.66 MG/DL (ref 0.6–1.3)
EOSINOPHIL # BLD AUTO: 0.03 THOUSAND/ΜL (ref 0–0.61)
EOSINOPHIL NFR BLD AUTO: 1 % (ref 0–6)
ERYTHROCYTE [DISTWIDTH] IN BLOOD BY AUTOMATED COUNT: 20.1 % (ref 11.6–15.1)
GFR SERPL CREATININE-BSD FRML MDRD: 35 ML/MIN/1.73SQ M
GLUCOSE SERPL-MCNC: 83 MG/DL (ref 65–140)
GRAM STN SPEC: NORMAL
GRAM STN SPEC: NORMAL
HCT VFR BLD AUTO: 28.3 % (ref 34.8–46.1)
HGB BLD-MCNC: 8.8 G/DL (ref 11.5–15.4)
IMM GRANULOCYTES # BLD AUTO: 0.05 THOUSAND/UL (ref 0–0.2)
IMM GRANULOCYTES NFR BLD AUTO: 2 % (ref 0–2)
LYMPHOCYTES # BLD AUTO: 0.83 THOUSANDS/ΜL (ref 0.6–4.47)
LYMPHOCYTES NFR BLD AUTO: 24 % (ref 14–44)
MCH RBC QN AUTO: 27.9 PG (ref 26.8–34.3)
MCHC RBC AUTO-ENTMCNC: 31.1 G/DL (ref 31.4–37.4)
MCV RBC AUTO: 90 FL (ref 82–98)
MONOCYTES # BLD AUTO: 0.33 THOUSAND/ΜL (ref 0.17–1.22)
MONOCYTES NFR BLD AUTO: 10 % (ref 4–12)
NEUTROPHILS # BLD AUTO: 2.16 THOUSANDS/ΜL (ref 1.85–7.62)
NEUTS SEG NFR BLD AUTO: 63 % (ref 43–75)
NRBC BLD AUTO-RTO: 9 /100 WBCS
PLATELET # BLD AUTO: 77 THOUSANDS/UL (ref 149–390)
PMV BLD AUTO: 12.3 FL (ref 8.9–12.7)
POTASSIUM SERPL-SCNC: 3.4 MMOL/L (ref 3.5–5.3)
RBC # BLD AUTO: 3.15 MILLION/UL (ref 3.81–5.12)
SODIUM SERPL-SCNC: 148 MMOL/L (ref 136–145)
WBC # BLD AUTO: 3.41 THOUSAND/UL (ref 4.31–10.16)

## 2020-12-24 PROCEDURE — 94664 DEMO&/EVAL PT USE INHALER: CPT

## 2020-12-24 PROCEDURE — 85027 COMPLETE CBC AUTOMATED: CPT | Performed by: INTERNAL MEDICINE

## 2020-12-24 PROCEDURE — 99233 SBSQ HOSP IP/OBS HIGH 50: CPT | Performed by: INTERNAL MEDICINE

## 2020-12-24 PROCEDURE — 94760 N-INVAS EAR/PLS OXIMETRY 1: CPT

## 2020-12-24 PROCEDURE — 80048 BASIC METABOLIC PNL TOTAL CA: CPT | Performed by: INTERNAL MEDICINE

## 2020-12-24 RX ADMIN — LACTULOSE 10 G: 20 SOLUTION ORAL at 08:03

## 2020-12-24 RX ADMIN — NICOTINE 7 MG/24 HR DAILY TRANSDERMAL PATCH 1 PATCH: at 08:03

## 2020-12-24 RX ADMIN — HEPARIN SODIUM 5000 UNITS: 5000 INJECTION INTRAVENOUS; SUBCUTANEOUS at 13:34

## 2020-12-24 RX ADMIN — TORSEMIDE 60 MG: 20 TABLET ORAL at 08:03

## 2020-12-24 RX ADMIN — FOLIC ACID 1000 MCG: 1 TABLET ORAL at 08:03

## 2020-12-24 RX ADMIN — HEPARIN SODIUM 5000 UNITS: 5000 INJECTION INTRAVENOUS; SUBCUTANEOUS at 05:23

## 2020-12-24 RX ADMIN — DILTIAZEM HYDROCHLORIDE 240 MG: 240 CAPSULE, COATED, EXTENDED RELEASE ORAL at 08:03

## 2020-12-24 RX ADMIN — METOPROLOL SUCCINATE 100 MG: 100 TABLET, EXTENDED RELEASE ORAL at 05:24

## 2020-12-24 RX ADMIN — POTASSIUM CHLORIDE 20 MEQ: 1500 TABLET, EXTENDED RELEASE ORAL at 08:03

## 2020-12-24 RX ADMIN — PANTOPRAZOLE SODIUM 40 MG: 40 TABLET, DELAYED RELEASE ORAL at 08:03

## 2020-12-24 NOTE — ASSESSMENT & PLAN NOTE
Nursing staff reports that patient has been having multiple bowel movements  This is in the setting of lactulose use  Lactulose dose has been reduced from 20 g twice a day to 10 g twice a day given excessive number of bowel movements  Currently diarrhea improving  Monitor bowel movements

## 2020-12-24 NOTE — ASSESSMENT & PLAN NOTE
Lab Results   Component Value Date    EGFR 35 12/24/2020    EGFR 41 12/23/2020    EGFR 40 12/21/2020    CREATININE 1 66 (H) 12/24/2020    CREATININE 1 44 (H) 12/23/2020    CREATININE 1 49 (H) 12/21/2020   Baseline creatinine appears to be between 1 6 and 2 1  Currently at baseline   Continue to monitor   Avoid nephrotoxic agents

## 2020-12-24 NOTE — ASSESSMENT & PLAN NOTE
On torsemide, now BID  Replace potassium while on torsemide  Low-salt diet    Monitor clinical status  Wean off oxygen as tolerated

## 2020-12-24 NOTE — ASSESSMENT & PLAN NOTE
· Continue home lactulose, rifaximin  · Patient volume overloaded  · On torsemide which we are continuing  Abdominal distension seems to be increasing  Monitor closely  May need repeat paracentesis in the next day or so

## 2020-12-24 NOTE — PROGRESS NOTES
Pt refusing to wear nasal cannula, becomes agitated when staff attempts to reapply for oxygenation  Satting 90% on RA without signs of distress  Will continue to monitor and frequently reorient and redirect pt from necessary medical care

## 2020-12-24 NOTE — ASSESSMENT & PLAN NOTE
Patient has a chronic history of dementia and while in the hospital she does have periods of agitation, waxes and wanes  Currently on Seroquel 50 at night, this was recently increased  Monitor response

## 2020-12-24 NOTE — PROGRESS NOTES
Progress Note - Celcharlotteia Clarity 1945, 77 y o  female MRN: [id number]    Unit/Bed#: -01 Encounter: [id number]    Primary Care Provider: Nancy Troncoso MD   Date and time admitted to hospital: 12/18/2020  2:12 PM        Paroxysmal atrial fibrillation (HCC)/Tachycardia  Assessment & Plan  · At home she is on metoprolol and Cardizem  · On 12/22 she had RVR with HR around 140s  · Received digoxin 250 mcg  · Metoprolol XL was increased to 100 mg daily  · Continue Cardizem ER 240mg daily  Currently heart rate better  She is not on anticoagulation given dementia, thrombocytopenia, high fall risk as per cardiology outpatient note  · Monitor heart rate  Liver cirrhosis (HCC)  Assessment & Plan  · Continue home lactulose, rifaximin  · Patient volume overloaded  · On torsemide which we are continuing  Abdominal distension seems to be increasing  Monitor closely  May need repeat paracentesis in the next day or so  Dementia [facility])  Assessment & Plan    Patient has a chronic history of dementia and while in the hospital she does have periods of agitation, waxes and wanes  Currently on Seroquel 50 at night, this was recently increased  Monitor response  * Volume overload  Assessment & Plan  On torsemide, now BID  Replace potassium while on torsemide  Low-salt diet    Monitor clinical status  Wean off oxygen as tolerated  Diarrhea  Assessment & Plan    Nursing staff reports that patient has been having multiple bowel movements  This is in the setting of lactulose use  Lactulose dose has been reduced from 20 g twice a day to 10 g twice a day given excessive number of bowel movements  Currently diarrhea improving  Monitor bowel movements      Chronic kidney disease (CKD), active medical management without dialysis, stage 4 (severe) [facility])  Assessment & Plan  Lab Results   Component Value Date    EGFR 35 12/24/2020    EGFR 41 12/23/2020    EGFR 40 12/21/2020    CREATININE 1 66 (H) 12/24/2020 CREATININE 1 44 (H) 2020    CREATININE 1 49 (H) 2020   Baseline creatinine appears to be between 1 6 and 2 1  Currently at baseline   Continue to monitor   Avoid nephrotoxic agents    Thrombocytopenia (HCC)  Assessment & Plan  Secondary to cirrhosis  Continue to monitor    Anemia  Assessment & Plan  · Multifactorial     · Currently hemoglobin above 7    · Continue to monitor  VTE Pharmacologic Prophylaxis:   Pharmacologic: Heparin  Mechanical VTE Prophylaxis in Place: Yes    Patient Centered Rounds: I have performed bedside rounds with nursing staff today  Time Spent for Care: 30 minutes  More than 50% of total time spent on counseling and coordination of care as described above  Current Length of Stay: 6 day(s)    Current Patient Status: Inpatient   Certification Statement: The patient will continue to require additional inpatient hospital stay due to Mental status changes  Discharge Plan:  Once stable    Code Status: Level 1 - Full Code      Subjective:     Patient evaluated this morning  Somewhat confer with but not overly agitated  Denies nausea, vomiting, diarrhea, constipation  No other events reported  Objective:     Vitals:   Temp (24hrs), Av 8 °F (36 6 °C), Min:97 3 °F (36 3 °C), Max:98 2 °F (36 8 °C)    Temp:  [97 3 °F (36 3 °C)-98 2 °F (36 8 °C)] 98 2 °F (36 8 °C)  HR:  [] 79  Resp:  [17] 17  BP: ()/(54-73) 98/54  SpO2:  [88 %-99 %] 90 %  Body mass index is 29 52 kg/m²  Input and Output Summary (last 24 hours): Intake/Output Summary (Last 24 hours) at 2020 1630  Last data filed at 2020 1949  Gross per 24 hour   Intake 240 ml   Output    Net 240 ml       Physical Exam:     Physical Exam  Vitals signs and nursing note reviewed  Constitutional:       Appearance: Normal appearance  She is normal weight  Comments: Elderly female in bed, awake   HENT:      Head: Normocephalic and atraumatic        Right Ear: External ear normal  Left Ear: External ear normal       Nose: Nose normal  No congestion  Mouth/Throat:      Mouth: Mucous membranes are moist       Pharynx: Oropharynx is clear  No oropharyngeal exudate or posterior oropharyngeal erythema  Eyes:      General: No scleral icterus  Right eye: No discharge  Left eye: No discharge  Extraocular Movements: Extraocular movements intact  Conjunctiva/sclera: Conjunctivae normal       Pupils: Pupils are equal, round, and reactive to light  Neck:      Musculoskeletal: Normal range of motion and neck supple  No neck rigidity or muscular tenderness  Cardiovascular:      Rate and Rhythm: Normal rate and regular rhythm  Pulses: Normal pulses  Heart sounds: Normal heart sounds  No murmur  No friction rub  No gallop  Pulmonary:      Effort: Pulmonary effort is normal  No respiratory distress  Breath sounds: Normal breath sounds  No stridor  No wheezing, rhonchi or rales  Chest:      Chest wall: No tenderness  Abdominal:      General: Abdomen is flat  Bowel sounds are normal  There is distension  Palpations: Abdomen is soft  There is no mass  Tenderness: There is no abdominal tenderness  There is no guarding or rebound  Musculoskeletal: Normal range of motion  General: No swelling, tenderness, deformity or signs of injury  Skin:     General: Skin is warm and dry  Capillary Refill: Capillary refill takes less than 2 seconds  Coloration: Skin is not jaundiced or pale  Findings: No bruising, erythema, lesion or rash  Neurological:      General: No focal deficit present  Mental Status: She is alert and oriented to person, place, and time  Mental status is at baseline  Cranial Nerves: No cranial nerve deficit  Sensory: No sensory deficit  Motor: No weakness        Coordination: Coordination normal    Psychiatric:         Mood and Affect: Mood normal          Behavior: Behavior normal  Thought Content: Thought content normal          Judgment: Judgment normal            Additional Data:     Labs:    Results from last 7 days   Lab Units 12/24/20  0614 12/23/20  0459   WBC Thousand/uL 3 41* 4 55   HEMOGLOBIN g/dL 8 8* 9 1*   HEMATOCRIT % 28 3* 29 0*   PLATELETS Thousands/uL 77* 64*   BANDS PCT %  --  1   NEUTROS PCT % 63  --    LYMPHS PCT % 24  --    LYMPHO PCT %  --  13*   MONOS PCT % 10  --    MONO PCT %  --  7   EOS PCT % 1 0     Results from last 7 days   Lab Units 12/24/20  0623 12/23/20  0459   SODIUM mmol/L 148* 148*   POTASSIUM mmol/L 3 4* 2 3*   CHLORIDE mmol/L 107 106   CO2 mmol/L 32 32   BUN mg/dL 27* 23   CREATININE mg/dL 1 66* 1 44*   ANION GAP mmol/L 9 10   CALCIUM mg/dL 8 9 9 3   ALBUMIN g/dL  --  3 2*   TOTAL BILIRUBIN mg/dL  --  2 10*   ALK PHOS U/L  --  219*   ALT U/L  --  13   AST U/L  --  25   GLUCOSE RANDOM mg/dL 83 106                           * I Have Reviewed All Lab Data Listed Above  * Additional Pertinent Lab Tests Reviewed:  [address] Admission Reviewed      Recent Cultures (last 7 days):     Results from last 7 days   Lab Units 12/21/20  1252   GRAM STAIN RESULT  1+ Polys  No organisms seen   BODY FLUID CULTURE, STERILE  No growth       Last 24 Hours Medication List:   Current Facility-Administered Medications   Medication Dose Route Frequency Provider Last Rate    acetaminophen  650 mg Oral Q6H PRN Brent Dorantes MD      diltiazem  240 mg Oral Daily Brent Dorantes MD      folic acid  4,653 mcg Oral Daily Brent Dorantes MD      heparin (porcine)  5,000 Units Subcutaneous Q8H [address] Brent Dorantes MD      ipratropium-albuterol  3 mL Nebulization Q6H PRN Layla Mccall MD      lactulose  10 g Oral BID Layla Mccall MD      metoprolol  5 mg Intravenous Q6H PRN Ruby Delgadillo MD      metoprolol succinate  100 mg Oral Daily Layla Mccall MD      mirtazapine  7 5 mg Oral HS Karin Ritter MD      nicotine  1 patch Transdermal Daily Jeni Rossi MD      pantoprazole  40 mg Oral QAM Brent Dorantes MD      potassium chloride  20 mEq Oral Daily Yaw Osuna MD      QUEtiapine  50 mg Oral HS Yaw Osuna MD      torsemide  60 mg Oral BID Yaw sOuna MD          Today, Patient Was Seen By: Yaw Osuna MD    ** Please Note: Dictation voice to text software may have been used in the creation of this document   **

## 2020-12-25 LAB
ANION GAP SERPL CALCULATED.3IONS-SCNC: 10 MMOL/L (ref 4–13)
ANISOCYTOSIS BLD QL SMEAR: PRESENT
BASOPHILS # BLD MANUAL: 0 THOUSAND/UL (ref 0–0.1)
BASOPHILS NFR MAR MANUAL: 0 % (ref 0–1)
BUN SERPL-MCNC: 32 MG/DL (ref 5–25)
CALCIUM SERPL-MCNC: 8.4 MG/DL (ref 8.3–10.1)
CHLORIDE SERPL-SCNC: 108 MMOL/L (ref 100–108)
CO2 SERPL-SCNC: 32 MMOL/L (ref 21–32)
CREAT SERPL-MCNC: 1.8 MG/DL (ref 0.6–1.3)
EOSINOPHIL # BLD MANUAL: 0 THOUSAND/UL (ref 0–0.4)
EOSINOPHIL NFR BLD MANUAL: 0 % (ref 0–6)
ERYTHROCYTE [DISTWIDTH] IN BLOOD BY AUTOMATED COUNT: 20.2 % (ref 11.6–15.1)
GFR SERPL CREATININE-BSD FRML MDRD: 32 ML/MIN/1.73SQ M
GLUCOSE SERPL-MCNC: 88 MG/DL (ref 65–140)
HCT VFR BLD AUTO: 27.1 % (ref 34.8–46.1)
HGB BLD-MCNC: 8.3 G/DL (ref 11.5–15.4)
LG PLATELETS BLD QL SMEAR: PRESENT
LYMPHOCYTES # BLD AUTO: 0.6 THOUSAND/UL (ref 0.6–4.47)
LYMPHOCYTES # BLD AUTO: 17 % (ref 14–44)
MCH RBC QN AUTO: 27.1 PG (ref 26.8–34.3)
MCHC RBC AUTO-ENTMCNC: 30.6 G/DL (ref 31.4–37.4)
MCV RBC AUTO: 89 FL (ref 82–98)
MONOCYTES # BLD AUTO: 0.21 THOUSAND/UL (ref 0–1.22)
MONOCYTES NFR BLD: 6 % (ref 4–12)
NEUTROPHILS # BLD MANUAL: 2.7 THOUSAND/UL (ref 1.85–7.62)
NEUTS SEG NFR BLD AUTO: 77 % (ref 43–75)
NRBC BLD AUTO-RTO: 7 /100 WBC (ref 0–2)
NRBC BLD AUTO-RTO: 8 /100 WBCS
PLATELET # BLD AUTO: 68 THOUSANDS/UL (ref 149–390)
PLATELET BLD QL SMEAR: ABNORMAL
PMV BLD AUTO: 10.5 FL (ref 8.9–12.7)
POLYCHROMASIA BLD QL SMEAR: PRESENT
POTASSIUM SERPL-SCNC: 3.8 MMOL/L (ref 3.5–5.3)
RBC # BLD AUTO: 3.06 MILLION/UL (ref 3.81–5.12)
SODIUM SERPL-SCNC: 150 MMOL/L (ref 136–145)
TARGETS BLD QL SMEAR: PRESENT
TOTAL CELLS COUNTED SPEC: 100
WBC # BLD AUTO: 3.5 THOUSAND/UL (ref 4.31–10.16)

## 2020-12-25 PROCEDURE — 85007 BL SMEAR W/DIFF WBC COUNT: CPT | Performed by: INTERNAL MEDICINE

## 2020-12-25 PROCEDURE — 80048 BASIC METABOLIC PNL TOTAL CA: CPT | Performed by: INTERNAL MEDICINE

## 2020-12-25 PROCEDURE — 99232 SBSQ HOSP IP/OBS MODERATE 35: CPT | Performed by: INTERNAL MEDICINE

## 2020-12-25 PROCEDURE — 85027 COMPLETE CBC AUTOMATED: CPT | Performed by: INTERNAL MEDICINE

## 2020-12-25 RX ADMIN — PANTOPRAZOLE SODIUM 40 MG: 40 TABLET, DELAYED RELEASE ORAL at 10:06

## 2020-12-25 RX ADMIN — TORSEMIDE 60 MG: 20 TABLET ORAL at 22:30

## 2020-12-25 RX ADMIN — HEPARIN SODIUM 5000 UNITS: 5000 INJECTION INTRAVENOUS; SUBCUTANEOUS at 13:09

## 2020-12-25 RX ADMIN — TORSEMIDE 60 MG: 20 TABLET ORAL at 10:04

## 2020-12-25 RX ADMIN — METOPROLOL SUCCINATE 100 MG: 100 TABLET, EXTENDED RELEASE ORAL at 06:04

## 2020-12-25 RX ADMIN — POTASSIUM CHLORIDE 20 MEQ: 1500 TABLET, EXTENDED RELEASE ORAL at 10:06

## 2020-12-25 RX ADMIN — HEPARIN SODIUM 5000 UNITS: 5000 INJECTION INTRAVENOUS; SUBCUTANEOUS at 22:30

## 2020-12-25 RX ADMIN — MIRTAZAPINE 7.5 MG: 15 TABLET, FILM COATED ORAL at 00:50

## 2020-12-25 RX ADMIN — HEPARIN SODIUM 5000 UNITS: 5000 INJECTION INTRAVENOUS; SUBCUTANEOUS at 00:57

## 2020-12-25 RX ADMIN — QUETIAPINE FUMARATE 50 MG: 25 TABLET ORAL at 00:50

## 2020-12-25 RX ADMIN — LACTULOSE 10 G: 20 SOLUTION ORAL at 22:29

## 2020-12-25 RX ADMIN — LACTULOSE 10 G: 20 SOLUTION ORAL at 00:53

## 2020-12-25 RX ADMIN — HEPARIN SODIUM 5000 UNITS: 5000 INJECTION INTRAVENOUS; SUBCUTANEOUS at 06:04

## 2020-12-25 RX ADMIN — QUETIAPINE FUMARATE 50 MG: 25 TABLET ORAL at 22:30

## 2020-12-25 RX ADMIN — NICOTINE 7 MG/24 HR DAILY TRANSDERMAL PATCH 1 PATCH: at 10:05

## 2020-12-25 RX ADMIN — LACTULOSE 10 G: 20 SOLUTION ORAL at 10:06

## 2020-12-25 RX ADMIN — MIRTAZAPINE 7.5 MG: 15 TABLET, FILM COATED ORAL at 22:31

## 2020-12-25 RX ADMIN — FOLIC ACID 1000 MCG: 1 TABLET ORAL at 10:06

## 2020-12-25 RX ADMIN — DILTIAZEM HYDROCHLORIDE 240 MG: 240 CAPSULE, COATED, EXTENDED RELEASE ORAL at 10:06

## 2020-12-25 NOTE — ASSESSMENT & PLAN NOTE
· Continue home lactulose, rifaximin  · Patient volume overloaded  · On torsemide which we are continuing  Abdominal distension seems to be increasing  Continue Monitor closely  May need repeat paracentesis prior to discharge

## 2020-12-25 NOTE — ASSESSMENT & PLAN NOTE
Nursing staff reports that patient has been having multiple bowel movements  This is in the setting of lactulose use  Lactulose dose has been reduced from 20 g twice a day to 10 g twice a day given excessive number of bowel movements  Currently diarrhea improved completely  Monitor bowel movements

## 2020-12-25 NOTE — ASSESSMENT & PLAN NOTE
Lab Results   Component Value Date    EGFR 32 12/25/2020    EGFR 35 12/24/2020    EGFR 41 12/23/2020    CREATININE 1 80 (H) 12/25/2020    CREATININE 1 66 (H) 12/24/2020    CREATININE 1 44 (H) 12/23/2020   Baseline creatinine appears to be between 1 6 and 2 1  Currently at baseline   Continue to monitor   Avoid nephrotoxic agents

## 2020-12-25 NOTE — ASSESSMENT & PLAN NOTE
· At home she is on metoprolol and Cardizem  · On 12/22 she had RVR with HR around 140s  · Received digoxin 250 mcg  · Metoprolol XL was increased to 100 mg daily  · Continue Cardizem ER 240mg daily  Currently heart rate better  She is not on anticoagulation given dementia, thrombocytopenia  · Monitor heart rate

## 2020-12-25 NOTE — PLAN OF CARE
Problem: Potential for Falls  Goal: Patient will remain free of falls  Description: INTERVENTIONS:  - Assess patient frequently for physical needs  -  Identify cognitive and physical deficits and behaviors that affect risk of falls    -  [place] fall precautions as indicated by assessment   - Educate patient/family on patient safety including physical limitations  - Instruct patient to call for assistance with activity based on assessment  - Modify environment to reduce risk of injury  Outcome: Progressing

## 2020-12-25 NOTE — PROGRESS NOTES
Progress Note - Alirio Rahman 1945, 77 y o  female MRN: [id number]    Unit/Bed#: -01 Encounter: [id number]    Primary Care Provider: Christine Boles MD   Date and time admitted to hospital: 12/18/2020  2:12 PM        Paroxysmal atrial fibrillation (HCC)/Tachycardia  Assessment & Plan  · At home she is on metoprolol and Cardizem  · On 12/22 she had RVR with HR around 140s  · Received digoxin 250 mcg  · Metoprolol XL was increased to 100 mg daily  · Continue Cardizem ER 240mg daily  Currently heart rate better  She is not on anticoagulation given dementia, thrombocytopenia  · Monitor heart rate  Liver cirrhosis (HCC)  Assessment & Plan  · Continue home lactulose, rifaximin  · Patient volume overloaded  · On torsemide which we are continuing  Abdominal distension seems to be increasing  Continue Monitor closely  May need repeat paracentesis prior to discharge  Dementia [facility])  Assessment & Plan    Patient has a chronic history of dementia and while in the hospital she does have periods of agitation, waxes and wanes  Currently on Seroquel 50 at night, this was recently increased  Today she is again somewhat disoriented  Continue to monitor  * Volume overload  Assessment & Plan  On torsemide, now BID  Replace potassium while on torsemide  Low-salt diet    Monitor clinical status  Wean off oxygen as tolerated  Diarrhea  Assessment & Plan    Nursing staff reports that patient has been having multiple bowel movements  This is in the setting of lactulose use  Lactulose dose has been reduced from 20 g twice a day to 10 g twice a day given excessive number of bowel movements  Currently diarrhea improved completely  Monitor bowel movements      Chronic kidney disease (CKD), active medical management without dialysis, stage 4 (severe) [facility])  Assessment & Plan  Lab Results   Component Value Date    EGFR 32 12/25/2020    EGFR 35 12/24/2020    EGFR 41 12/23/2020    CREATININE 1 80 (H) 2020    CREATININE 1 66 (H) 2020    CREATININE 1 44 (H) 2020   Baseline creatinine appears to be between 1 6 and 2 1  Currently at baseline   Continue to monitor   Avoid nephrotoxic agents    Thrombocytopenia ([address] )  Assessment & Plan  Secondary to cirrhosis  Continue to monitor    Multiple myeloma [facility])  Assessment & Plan  Diagnosed earlier this year and patient is currently under care of Oncology at [address]  · Multifactorial     · Currently hemoglobin above 7    · Continue to monitor  Patient requires continued hospital stated volume status, mental status    VTE Pharmacologic Prophylaxis:   Pharmacologic: Heparin  Mechanical VTE Prophylaxis in Place: Yes    Patient Centered Rounds: I have performed bedside rounds with nursing staff today  Education and Discussions with Family / Patient:  Discussed with son Christoph Blount over the phone    Time Spent for Care: 30 minutes  More than 50% of total time spent on counseling and coordination of care as described above  Current Length of Stay: 7 day(s)    Current Patient Status: Inpatient   Certification Statement: The patient will continue to require additional inpatient hospital stay due to Need for monitoring of mental status, need for potential repeat paracentesis    Discharge Plan:  Once stable    Code Status: Level 1 - Full Code      Subjective:     Patient evaluated this morning  Seems to be oriented to self but not place time or situation  Otherwise not overtly agitated  Abdominal distention noted  No other events reported  Objective:     Vitals:   Temp (24hrs), Av 7 °F (37 1 °C), Min:98 1 °F (36 7 °C), Max:100 2 °F (37 9 °C)    Temp:  [98 1 °F (36 7 °C)-100 2 °F (37 9 °C)] 98 2 °F (36 8 °C)  HR:  [75-83] 75  Resp:  [17-18] 18  BP: ()/(54-66) 118/66  SpO2:  [90 %-99 %] 95 %  Body mass index is 27 44 kg/m²  Input and Output Summary (last 24 hours):        Intake/Output Summary (Last 24 hours) at 12/25/2020 1339  Last data filed at 12/25/2020 1332  Gross per 24 hour   Intake 700 ml   Output 400 ml   Net 300 ml       Physical Exam:     Physical Exam  Vitals signs and nursing note reviewed  Constitutional:       Appearance: Normal appearance  She is normal weight  Comments: Elderly female in bed, awake   HENT:      Head: Normocephalic and atraumatic  Right Ear: External ear normal       Left Ear: External ear normal       Nose: Nose normal  No congestion  Mouth/Throat:      Mouth: Mucous membranes are moist       Pharynx: Oropharynx is clear  No oropharyngeal exudate or posterior oropharyngeal erythema  Eyes:      General: No scleral icterus  Right eye: No discharge  Left eye: No discharge  Extraocular Movements: Extraocular movements intact  Conjunctiva/sclera: Conjunctivae normal       Pupils: Pupils are equal, round, and reactive to light  Neck:      Musculoskeletal: Normal range of motion and neck supple  No neck rigidity or muscular tenderness  Cardiovascular:      Rate and Rhythm: Normal rate and regular rhythm  Pulses: Normal pulses  Heart sounds: Normal heart sounds  No murmur  No friction rub  No gallop  Pulmonary:      Effort: Pulmonary effort is normal  No respiratory distress  Breath sounds: Normal breath sounds  No stridor  No wheezing, rhonchi or rales  Chest:      Chest wall: No tenderness  Abdominal:      General: Abdomen is flat  Bowel sounds are normal  There is distension  Palpations: Abdomen is soft  There is no mass  Tenderness: There is no abdominal tenderness  There is no guarding or rebound  Musculoskeletal: Normal range of motion  General: No swelling, tenderness, deformity or signs of injury  Skin:     General: Skin is warm and dry  Capillary Refill: Capillary refill takes less than 2 seconds  Coloration: Skin is not jaundiced or pale        Findings: No bruising, erythema, lesion or rash  Neurological:      General: No focal deficit present  Mental Status: She is alert  She is disoriented  Cranial Nerves: No cranial nerve deficit  Sensory: No sensory deficit  Motor: No weakness  Coordination: Coordination normal       Comments: Oriented to self but not place time or situation   Psychiatric:         Mood and Affect: Mood normal          Behavior: Behavior normal          Thought Content: Thought content normal          Judgment: Judgment normal            Additional Data:     Labs:    Results from last 7 days   Lab Units 12/25/20  0620 12/24/20  0614 12/23/20  0459   WBC Thousand/uL 3 50* 3 41* 4 55   HEMOGLOBIN g/dL 8 3* 8 8* 9 1*   HEMATOCRIT % 27 1* 28 3* 29 0*   PLATELETS Thousands/uL 68* 77* 64*   BANDS PCT %  --   --  1   NEUTROS PCT %  --  63  --    LYMPHS PCT %  --  24  --    LYMPHO PCT % 17  --  13*   MONOS PCT %  --  10  --    MONO PCT % 6  --  7   EOS PCT % 0 1 0     Results from last 7 days   Lab Units 12/25/20  0620  12/23/20  0459   SODIUM mmol/L 150*   < > 148*   POTASSIUM mmol/L 3 8   < > 2 3*   CHLORIDE mmol/L 108   < > 106   CO2 mmol/L 32   < > 32   BUN mg/dL 32*   < > 23   CREATININE mg/dL 1 80*   < > 1 44*   ANION GAP mmol/L 10   < > 10   CALCIUM mg/dL 8 4   < > 9 3   ALBUMIN g/dL  --   --  3 2*   TOTAL BILIRUBIN mg/dL  --   --  2 10*   ALK PHOS U/L  --   --  219*   ALT U/L  --   --  13   AST U/L  --   --  25   GLUCOSE RANDOM mg/dL 88   < > 106    < > = values in this interval not displayed  * I Have Reviewed All Lab Data Listed Above  * Additional Pertinent Lab Tests Reviewed:  [facility]      Recent Cultures (last 7 days):     Results from last 7 days   Lab Units 12/21/20  1252   GRAM STAIN RESULT  1+ Polys  No organisms seen   BODY FLUID CULTURE, STERILE  No growth       Last 24 Hours Medication List:   Current Facility-Administered Medications   Medication Dose Route Frequency Provider Last Rate    acetaminophen  650 mg Oral Q6H PRN Brent Dorantes MD      diltiazem  240 mg Oral Daily Brent Dorantes MD      folic acid  6,062 mcg Oral Daily Brent Dorantes MD      heparin (porcine)  5,000 Units Subcutaneous Q8H [facility] Brent Dorantes MD      ipratropium-albuterol  3 mL Nebulization Q6H PRN Stephanie Ba MD      lactulose  10 g Oral BID Stephanie Ba MD      metoprolol  5 mg Intravenous Q6H PRN Patrick Rae MD      metoprolol succinate  100 mg Oral Daily Stephanie Ba MD      mirtazapine  7 5 mg Oral HS Keenan Amato MD      nicotine  1 patch Transdermal Daily Brent Dorantes MD      pantoprazole  40 mg Oral QAM Brent Dorantes MD      potassium chloride  20 mEq Oral Daily Stephanie Ba MD      QUEtiapine  50 mg Oral HS Stephanie Ba MD      torsemide  60 mg Oral BID Stephanie Ba MD          Today, Patient Was Seen By: Stephanie Ba MD    ** Please Note: Dictation voice to text software may have been used in the creation of this document   **

## 2020-12-25 NOTE — ASSESSMENT & PLAN NOTE
Patient has a chronic history of dementia and while in the hospital she does have periods of agitation, waxes and wanes  Currently on Seroquel 50 at night, this was recently increased  Today she is again somewhat disoriented  Continue to monitor

## 2020-12-25 NOTE — PLAN OF CARE
Problem: Potential for Falls  Goal: Patient will remain free of falls  Description: INTERVENTIONS:  - Assess patient frequently for physical needs  -  Identify cognitive and physical deficits and behaviors that affect risk of falls    -  [place] fall precautions as indicated by assessment   - Educate patient/family on patient safety including physical limitations  - Instruct patient to call for assistance with activity based on assessment  - Modify environment to reduce risk of injury  - Consider OT/PT consult to assist with strengthening/mobility  Outcome: Progressing

## 2020-12-25 NOTE — ASSESSMENT & PLAN NOTE
Diagnosed earlier this year and patient is currently under care of Oncology at [facility]

## 2020-12-25 NOTE — PLAN OF CARE
Problem: Prexisting or High Potential for Compromised Skin Integrity  Goal: Skin integrity is maintained or improved  Description: INTERVENTIONS:  - Identify patients at risk for skin breakdown  - Assess and monitor skin integrity  - Assess and monitor nutrition and hydration status  - Monitor labs   - Assess for incontinence   - Turn and reposition patient  - Assist with mobility/ambulation  - Relieve pressure over bony prominences  - Avoid friction and shearing  - Provide appropriate hygiene as needed including keeping skin clean and dry  - Evaluate need for skin moisturizer/barrier cream  - Collaborate with interdisciplinary team   - Patient/family teaching  - Consider wound care consult   Outcome: Progressing     Problem: Nutrition/Hydration-ADULT  Goal: Nutrient/Hydration intake appropriate for improving, restoring or maintaining nutritional needs  Description: Monitor and assess patient's nutrition/hydration status for malnutrition  Collaborate with interdisciplinary team and initiate plan and interventions as ordered  Monitor patient's weight and dietary intake as ordered or per policy  Utilize nutrition screening tool and intervene as necessary  Determine patient's food preferences and provide high-protein, high-caloric foods as appropriate       INTERVENTIONS:  - Monitor oral intake, urinary output, labs, and treatment plans  - Assess nutrition and hydration status and recommend course of action  - Evaluate amount of meals eaten  - Assist patient with eating if necessary   - Allow adequate time for meals  - Recommend/ encourage appropriate diets, oral nutritional supplements, and vitamin/mineral supplements  - Order, calculate, and assess calorie counts as needed  - Recommend, monitor, and adjust tube feedings and TPN/PPN based on assessed needs  - Assess need for intravenous fluids  - Provide specific nutrition/hydration education as appropriate  - Include patient/family/caregiver in decisions related to nutrition  Outcome: Progressing

## 2020-12-26 LAB
ANION GAP SERPL CALCULATED.3IONS-SCNC: 9 MMOL/L (ref 4–13)
BUN SERPL-MCNC: 32 MG/DL (ref 5–25)
CALCIUM SERPL-MCNC: 9.5 MG/DL (ref 8.3–10.1)
CHLORIDE SERPL-SCNC: 102 MMOL/L (ref 100–108)
CO2 SERPL-SCNC: 35 MMOL/L (ref 21–32)
CREAT SERPL-MCNC: 1.6 MG/DL (ref 0.6–1.3)
ERYTHROCYTE [DISTWIDTH] IN BLOOD BY AUTOMATED COUNT: 20.4 % (ref 11.6–15.1)
GFR SERPL CREATININE-BSD FRML MDRD: 36 ML/MIN/1.73SQ M
GLUCOSE SERPL-MCNC: 136 MG/DL (ref 65–140)
HCT VFR BLD AUTO: 33 % (ref 34.8–46.1)
HGB BLD-MCNC: 10.2 G/DL (ref 11.5–15.4)
MCH RBC QN AUTO: 27.6 PG (ref 26.8–34.3)
MCHC RBC AUTO-ENTMCNC: 30.9 G/DL (ref 31.4–37.4)
MCV RBC AUTO: 89 FL (ref 82–98)
NRBC BLD AUTO-RTO: 6 /100 WBCS
PLATELET # BLD AUTO: 88 THOUSANDS/UL (ref 149–390)
PMV BLD AUTO: 9.8 FL (ref 8.9–12.7)
POTASSIUM SERPL-SCNC: 3.2 MMOL/L (ref 3.5–5.3)
RBC # BLD AUTO: 3.7 MILLION/UL (ref 3.81–5.12)
SODIUM SERPL-SCNC: 146 MMOL/L (ref 136–145)
WBC # BLD AUTO: 3.77 THOUSAND/UL (ref 4.31–10.16)

## 2020-12-26 PROCEDURE — 80048 BASIC METABOLIC PNL TOTAL CA: CPT | Performed by: INTERNAL MEDICINE

## 2020-12-26 PROCEDURE — 94664 DEMO&/EVAL PT USE INHALER: CPT

## 2020-12-26 PROCEDURE — 85027 COMPLETE CBC AUTOMATED: CPT | Performed by: INTERNAL MEDICINE

## 2020-12-26 PROCEDURE — 99232 SBSQ HOSP IP/OBS MODERATE 35: CPT | Performed by: INTERNAL MEDICINE

## 2020-12-26 PROCEDURE — 94760 N-INVAS EAR/PLS OXIMETRY 1: CPT

## 2020-12-26 RX ORDER — DEXTROSE AND SODIUM CHLORIDE 5; .2 G/100ML; G/100ML
30 INJECTION, SOLUTION INTRAVENOUS CONTINUOUS
Status: DISCONTINUED | OUTPATIENT
Start: 2020-12-26 | End: 2020-12-29 | Stop reason: HOSPADM

## 2020-12-26 RX ADMIN — DEXTROSE AND SODIUM CHLORIDE 30 ML/HR: 5; .2 INJECTION, SOLUTION INTRAVENOUS at 10:12

## 2020-12-26 RX ADMIN — LACTULOSE 10 G: 20 SOLUTION ORAL at 09:46

## 2020-12-26 RX ADMIN — METOPROLOL SUCCINATE 100 MG: 100 TABLET, EXTENDED RELEASE ORAL at 08:15

## 2020-12-26 RX ADMIN — QUETIAPINE FUMARATE 50 MG: 25 TABLET ORAL at 22:39

## 2020-12-26 RX ADMIN — HEPARIN SODIUM 5000 UNITS: 5000 INJECTION INTRAVENOUS; SUBCUTANEOUS at 14:59

## 2020-12-26 RX ADMIN — NICOTINE 7 MG/24 HR DAILY TRANSDERMAL PATCH 1 PATCH: at 09:47

## 2020-12-26 RX ADMIN — DILTIAZEM HYDROCHLORIDE 240 MG: 240 CAPSULE, COATED, EXTENDED RELEASE ORAL at 09:46

## 2020-12-26 RX ADMIN — PANTOPRAZOLE SODIUM 40 MG: 40 TABLET, DELAYED RELEASE ORAL at 09:46

## 2020-12-26 RX ADMIN — TORSEMIDE 60 MG: 20 TABLET ORAL at 09:45

## 2020-12-26 RX ADMIN — FOLIC ACID 1000 MCG: 1 TABLET ORAL at 09:46

## 2020-12-26 RX ADMIN — HEPARIN SODIUM 5000 UNITS: 5000 INJECTION INTRAVENOUS; SUBCUTANEOUS at 22:29

## 2020-12-26 RX ADMIN — LACTULOSE 10 G: 20 SOLUTION ORAL at 22:27

## 2020-12-26 RX ADMIN — POTASSIUM CHLORIDE 20 MEQ: 1500 TABLET, EXTENDED RELEASE ORAL at 09:47

## 2020-12-26 RX ADMIN — MIRTAZAPINE 7.5 MG: 15 TABLET, FILM COATED ORAL at 22:28

## 2020-12-26 NOTE — PLAN OF CARE
Problem: Potential for Falls  Goal: Patient will remain free of falls  Description: INTERVENTIONS:  - Assess patient frequently for physical needs  -  Identify cognitive and physical deficits and behaviors that affect risk of falls  -  [place] fall precautions as indicated by assessment   - Educate patient/family on patient safety including physical limitations  - Instruct patient to call for assistance with activity based on assessment  - Modify environment to reduce risk of injury  - Consider OT/PT consult to assist with strengthening/mobility  Outcome: Progressing     Problem: Prexisting or High Potential for Compromised Skin Integrity  Goal: Skin integrity is maintained or improved  Description: INTERVENTIONS:  - Identify patients at risk for skin breakdown  - Assess and monitor skin integrity  - Assess and monitor nutrition and hydration status  - Monitor labs   - Assess for incontinence   - Turn and reposition patient  - Assist with mobility/ambulation  - Relieve pressure over bony prominences  - Avoid friction and shearing  - Provide appropriate hygiene as needed including keeping skin clean and dry  - Evaluate need for skin moisturizer/barrier cream  - Collaborate with interdisciplinary team   - Patient/family teaching  - Consider wound care consult   Outcome: Progressing     Problem: Nutrition/Hydration-ADULT  Goal: Nutrient/Hydration intake appropriate for improving, restoring or maintaining nutritional needs  Description: Monitor and assess patient's nutrition/hydration status for malnutrition  Collaborate with interdisciplinary team and initiate plan and interventions as ordered  Monitor patient's weight and dietary intake as ordered or per policy  Utilize nutrition screening tool and intervene as necessary  Determine patient's food preferences and provide high-protein, high-caloric foods as appropriate       INTERVENTIONS:  - Monitor oral intake, urinary output, labs, and treatment plans  - Assess nutrition and hydration status and recommend course of action  - Evaluate amount of meals eaten  - Assist patient with eating if necessary   - Allow adequate time for meals  - Recommend/ encourage appropriate diets, oral nutritional supplements, and vitamin/mineral supplements  - Order, calculate, and assess calorie counts as needed  - Recommend, monitor, and adjust tube feedings and TPN/PPN based on assessed needs  - Assess need for intravenous fluids  - Provide specific nutrition/hydration education as appropriate  - Include patient/family/caregiver in decisions related to nutrition  Outcome: Progressing

## 2020-12-26 NOTE — ASSESSMENT & PLAN NOTE
Diagnosed earlier this year and patient is currently under care of Oncology at [facility]

## 2020-12-26 NOTE — ASSESSMENT & PLAN NOTE
Patient has a chronic history of dementia and while in the hospital she does have periods of agitation, waxes and wanes  Currently on Seroquel 50 at night, this was recently increased  She is currently more calm  Continue to monitor

## 2020-12-26 NOTE — ASSESSMENT & PLAN NOTE
Lab Results   Component Value Date    EGFR 36 12/26/2020    EGFR 32 12/25/2020    EGFR 35 12/24/2020    CREATININE 1 60 (H) 12/26/2020    CREATININE 1 80 (H) 12/25/2020    CREATININE 1 66 (H) 12/24/2020   Baseline creatinine appears to be between 1 6 and 2 1  Currently at baseline   Continue to monitor   Avoid nephrotoxic agents

## 2020-12-26 NOTE — PROGRESS NOTES
Progress Note - Robert Quiroz 1945, 77 y o  female MRN: [id number]    Unit/Bed#: -01 Encounter: [id number]    Primary Care Provider: Meaghan Isbell MD   Date and time admitted to hospital: 12/18/2020  2:12 PM        Liver cirrhosis ([facility] )  Assessment & Plan  · Continue home lactulose, rifaximin  · Intravascular volume status since somewhat stable however she does have evidence of ascites  · On torsemide which we are continuing  Abdominal distension seems to be increasing  Continue to monitor closely  May need repeat paracentesis prior to discharge  I will re-consult Interventional Radiology for potential paracentesis on Monday  Dementia [facility])  Assessment & Plan    Patient has a chronic history of dementia and while in the hospital she does have periods of agitation, waxes and wanes  Currently on Seroquel 50 at night, this was recently increased  She is currently more calm  Continue to monitor  Diarrhea  Assessment & Plan    Nursing staff reports that patient was having multiple bowel movements earlier during hospitalization  This is in the setting of lactulose use  Lactulose dose has been reduced from 20 g twice a day to 10 g twice a day given excessive number of bowel movements  Currently diarrhea improved completely  Monitor bowel movements      Chronic kidney disease (CKD), active medical management without dialysis, stage 4 (severe) [facility])  Assessment & Plan  Lab Results   Component Value Date    EGFR 36 12/26/2020    EGFR 32 12/25/2020    EGFR 35 12/24/2020    CREATININE 1 60 (H) 12/26/2020    CREATININE 1 80 (H) 12/25/2020    CREATININE 1 66 (H) 12/24/2020   Baseline creatinine appears to be between 1 6 and 2 1  Currently at baseline   Continue to monitor   Avoid nephrotoxic agents    Multiple myeloma ([facility] )  Assessment & Plan  Diagnosed earlier this year and patient is currently under care of Oncology at [address]  · Multifactorial  · Hemoglobin currently stable  · Continue to monitor  VTE Pharmacologic Prophylaxis:   Pharmacologic: Heparin  Mechanical VTE Prophylaxis in Place: Yes          Time Spent for Care: 30 minutes  More than 50% of total time spent on counseling and coordination of care as described above  Current Length of Stay: 8 day(s)    Current Patient Status: Inpatient   Certification Statement: The patient will continue to require additional inpatient hospital stay due to Need for IV fluid therapy, potential need repeat paracenteses    Discharge Plan:  Once stable    Code Status: Level 1 - Full Code      Subjective:     Patient evaluated this morning  Still shows significant abdominal distension, sodium trending up 150 today  Still on oxygen she 3 liters/minute  Heart rate controlled  Other events reported  Objective:     Vitals:   Temp (24hrs), Av °F (36 7 °C), Min:97 4 °F (36 3 °C), Max:98 5 °F (36 9 °C)    Temp:  [97 4 °F (36 3 °C)-98 5 °F (36 9 °C)] 97 4 °F (36 3 °C)  HR:  [53-84] 65  Resp:  [18] 18  BP: (117-119)/(66-67) 119/67  SpO2:  [90 %-99 %] 91 %  Body mass index is 27 44 kg/m²  Input and Output Summary (last 24 hours): Intake/Output Summary (Last 24 hours) at 2020 1321  Last data filed at 2020  Gross per 24 hour   Intake 240 ml   Output    Net 240 ml       Physical Exam:     Physical Exam  Vitals signs and nursing note reviewed  Constitutional:       Appearance: Normal appearance  She is normal weight  Comments: Elderly female in bed, awake     HENT:      Head: Normocephalic and atraumatic  Right Ear: External ear normal       Left Ear: External ear normal       Nose: Nose normal  No congestion  Mouth/Throat:      Mouth: Mucous membranes are dry  Pharynx: Oropharynx is clear  No oropharyngeal exudate or posterior oropharyngeal erythema  Eyes:      General: No scleral icterus  Right eye: No discharge  Left eye: No discharge  Extraocular Movements: Extraocular movements intact  Conjunctiva/sclera: Conjunctivae normal       Pupils: Pupils are equal, round, and reactive to light  Neck:      Musculoskeletal: Normal range of motion and neck supple  No neck rigidity or muscular tenderness  Cardiovascular:      Rate and Rhythm: Normal rate and regular rhythm  Pulses: Normal pulses  Heart sounds: Normal heart sounds  No murmur  No friction rub  No gallop  Pulmonary:      Effort: Pulmonary effort is normal  No respiratory distress  Breath sounds: Normal breath sounds  No stridor  No wheezing, rhonchi or rales  Chest:      Chest wall: No tenderness  Abdominal:      General: Abdomen is flat  Bowel sounds are normal  There is distension  Palpations: Abdomen is soft  There is no mass  Tenderness: There is no abdominal tenderness  There is no guarding or rebound  Comments: There may be a fluid wave   Musculoskeletal: Normal range of motion  General: No swelling, tenderness, deformity or signs of injury  Skin:     General: Skin is warm and dry  Capillary Refill: Capillary refill takes less than 2 seconds  Coloration: Skin is not jaundiced or pale  Findings: No bruising, erythema, lesion or rash  Neurological:      General: No focal deficit present  Mental Status: She is alert  Mental status is at baseline  She is disoriented  Cranial Nerves: No cranial nerve deficit  Sensory: No sensory deficit  Motor: No weakness  Coordination: Coordination normal       Comments: Oriented to self and place but not time or situation   Psychiatric:         Mood and Affect: Mood normal          Behavior: Behavior normal          Thought Content:  Thought content normal          Judgment: Judgment normal          Additional Data:     Labs:    Results from last 7 days   Lab Units 12/26/20  0839 12/25/20  0620 12/24/20  0614 12/23/20  0459   WBC Thousand/uL 3 77* 3 50* 3 41* 4  55   HEMOGLOBIN g/dL 10 2* 8 3* 8 8* 9 1*   HEMATOCRIT % 33 0* 27 1* 28 3* 29 0*   PLATELETS Thousands/uL 88* 68* 77* 64*   BANDS PCT %  --   --   --  1   NEUTROS PCT %  --   --  63  --    LYMPHS PCT %  --   --  24  --    LYMPHO PCT %  --  17  --  13*   MONOS PCT %  --   --  10  --    MONO PCT %  --  6  --  7   EOS PCT %  --  0 1 0     Results from last 7 days   Lab Units 12/26/20  0839  12/23/20  0459   SODIUM mmol/L 146*   < > 148*   POTASSIUM mmol/L 3 2*   < > 2 3*   CHLORIDE mmol/L 102   < > 106   CO2 mmol/L 35*   < > 32   BUN mg/dL 32*   < > 23   CREATININE mg/dL 1 60*   < > 1 44*   ANION GAP mmol/L 9   < > 10   CALCIUM mg/dL 9 5   < > 9 3   ALBUMIN g/dL  --   --  3 2*   TOTAL BILIRUBIN mg/dL  --   --  2 10*   ALK PHOS U/L  --   --  219*   ALT U/L  --   --  13   AST U/L  --   --  25   GLUCOSE RANDOM mg/dL 136   < > 106    < > = values in this interval not displayed  * I Have Reviewed All Lab Data Listed Above  * Additional Pertinent Lab Tests Reviewed:  [address] Admission Reviewed      Recent Cultures (last 7 days):     Results from last 7 days   Lab Units 12/21/20  1252   GRAM STAIN RESULT  1+ Polys  No organisms seen   BODY FLUID CULTURE, STERILE  No growth       Last 24 Hours Medication List:   Current Facility-Administered Medications   Medication Dose Route Frequency Provider Last Rate    acetaminophen  650 mg Oral Q6H PRN Brent Dorantes MD      dextrose 5 % and sodium chloride 0 2 %  30 mL/hr Intravenous Continuous Heather Wu MD 30 mL/hr (12/26/20 1012)    diltiazem  240 mg Oral Daily Brent Dorantes MD      folic acid  5,383 mcg Oral Daily Brent Dorantes MD      heparin (porcine)  5,000 Units Subcutaneous Q8H [address] Brent Dorantes MD      lactulose  10 g Oral BID Heather Wu MD      metoprolol  5 mg Intravenous Q6H PRN Eboni Hirsch MD      metoprolol succinate  100 mg Oral Daily Heather Wu MD      mirtazapine  7 5 mg Oral HS Jeni Rossi MD      nicotine  1 patch Transdermal Daily Brent Dorantes MD      pantoprazole  40 mg Oral QAM Brent Dorantes MD      potassium chloride  20 mEq Oral Daily Yaw Osuna MD      QUEtiapine  50 mg Oral HS Yaw Osuna MD      torsemide  60 mg Oral BID Yaw Osuna MD          Today, Patient Was Seen By: Yaw Osuna MD    ** Please Note: Dictation voice to text software may have been used in the creation of this document   **

## 2020-12-26 NOTE — RESPIRATORY THERAPY NOTE
RT Protocol Note  Martin Estrada 77 y o  female MRN: [id number]  Unit/Bed#: -01 Encounter: [id number]    Assessment    Principal Problem:    Volume overload  Active Problems:    Anemia    Liver cirrhosis (HCC)    Paroxysmal atrial fibrillation (HCC)/Tachycardia    Multiple myeloma (HCC)    Thrombocytopenia (HCC)    Chronic kidney disease (CKD), active medical management without dialysis, stage 4 (severe) (HCC)    Diarrhea    Dementia ([address] )      Home Pulmonary Medications:  none  Home Devices/Therapy: Home O2    Past Medical History:   Diagnosis Date    Benign essential hypertension     last assessed - [id number]    Chest pain 11/4/2017    CHF (congestive heart failure) (HCC)     Chronic kidney disease     Cirrhosis (HCC)     Elevated troponin 1/31/2020    Gastroesophageal reflux disease without esophagitis 11/16/2017    Hyperbilirubinemia 5/14/2020    Hypertension     Liver disease     Multiple myeloma ([facility] )     Pneumonia 5/6/2020    Renal failure 08/29/2020     Social History     Socioeconomic History    Marital status:      Spouse name: None    Number of children: None    Years of education: None    Highest education level: None   Occupational History    None   Social Needs    Financial resource strain: None    Food insecurity     Worry: None     Inability: None    Transportation needs     Medical: None     Non-medical: None   Tobacco Use    Smoking status: Light Tobacco Smoker     Packs/day: 0 25     Types: Cigarettes    Smokeless tobacco: Never Used    Tobacco comment: 1/2 pack per month   Substance and Sexual Activity    Alcohol use: Not Currently     Frequency: 4 or more times a week     Drinks per session: 1 or 2     Binge frequency: Never     Comment: History of significant daily alcohol intake   Sister joy no alcohol intake in 6 months    Drug use: No    Sexual activity: Not Currently   Lifestyle    Physical activity     Days per week: 0 days     Minutes per session: 0 min    Stress: Not at all   Relationships    Social connections     Talks on phone: None     Gets together: None     Attends [employer] service: None     Active member of club or organization: None     Attends meetings of clubs or organizations: None     Relationship status: None    Intimate partner violence     Fear of current or ex partner: None     Emotionally abused: None     Physically abused: None     Forced sexual activity: None   Other Topics Concern    None   Social History Narrative    No advance directives       Subjective         Objective    Physical Exam:   Assessment Type: Assess only  General Appearance: Awake  Respiratory Pattern: Normal  Chest Assessment: Chest expansion symmetrical  Bilateral Breath Sounds: Diminished  Cough: Non-productive    Vitals:  Blood pressure 119/66, pulse 84, temperature 98 5 °F (36 9 °C), resp  rate 18, height 5' 4" (1 626 m), weight 72 5 kg (159 lb 13 3 oz), SpO2 94 %, not currently breastfeeding  Imaging and other studies: I have personally reviewed pertinent reports  Plan    Respiratory Plan: Discontinue Protocol        Resp Comments: pt is very confused and unable to help with history, pt's chart mentions pulmonary hypertention as patient's only resp diagnosis, pt has no home medications listed in her chart  Pt has not used the PRN order   Will discontinue the PRN order and the protocol,

## 2020-12-26 NOTE — ASSESSMENT & PLAN NOTE
· Continue home lactulose, rifaximin  · Intravascular volume status since somewhat stable however she does have evidence of ascites  · On torsemide which we are continuing  Abdominal distension seems to be increasing  Continue to monitor closely  May need repeat paracentesis prior to discharge  I will re-consult Interventional Radiology for potential paracentesis on Monday

## 2020-12-27 LAB
ANION GAP SERPL CALCULATED.3IONS-SCNC: 8 MMOL/L (ref 4–13)
ANISOCYTOSIS BLD QL SMEAR: PRESENT
BASOPHILS # BLD MANUAL: 0.04 THOUSAND/UL (ref 0–0.1)
BASOPHILS NFR MAR MANUAL: 1 % (ref 0–1)
BUN SERPL-MCNC: 38 MG/DL (ref 5–25)
CALCIUM SERPL-MCNC: 8.7 MG/DL (ref 8.3–10.1)
CHLORIDE SERPL-SCNC: 102 MMOL/L (ref 100–108)
CO2 SERPL-SCNC: 34 MMOL/L (ref 21–32)
CREAT SERPL-MCNC: 2.47 MG/DL (ref 0.6–1.3)
EOSINOPHIL # BLD MANUAL: 0.04 THOUSAND/UL (ref 0–0.4)
EOSINOPHIL NFR BLD MANUAL: 1 % (ref 0–6)
ERYTHROCYTE [DISTWIDTH] IN BLOOD BY AUTOMATED COUNT: 19.9 % (ref 11.6–15.1)
GFR SERPL CREATININE-BSD FRML MDRD: 21 ML/MIN/1.73SQ M
GLUCOSE SERPL-MCNC: 124 MG/DL (ref 65–140)
HCT VFR BLD AUTO: 26.5 % (ref 34.8–46.1)
HGB BLD-MCNC: 8.2 G/DL (ref 11.5–15.4)
LG PLATELETS BLD QL SMEAR: PRESENT
LYMPHOCYTES # BLD AUTO: 0.85 THOUSAND/UL (ref 0.6–4.47)
LYMPHOCYTES # BLD AUTO: 21 % (ref 14–44)
MCH RBC QN AUTO: 27.5 PG (ref 26.8–34.3)
MCHC RBC AUTO-ENTMCNC: 30.9 G/DL (ref 31.4–37.4)
MCV RBC AUTO: 89 FL (ref 82–98)
MONOCYTES # BLD AUTO: 0.48 THOUSAND/UL (ref 0–1.22)
MONOCYTES NFR BLD: 12 % (ref 4–12)
NEUTROPHILS # BLD MANUAL: 2.46 THOUSAND/UL (ref 1.85–7.62)
NEUTS BAND NFR BLD MANUAL: 2 % (ref 0–8)
NEUTS SEG NFR BLD AUTO: 59 % (ref 43–75)
NRBC BLD AUTO-RTO: 5 /100 WBC (ref 0–2)
NRBC BLD AUTO-RTO: 5 /100 WBCS
PLATELET # BLD AUTO: 80 THOUSANDS/UL (ref 149–390)
PLATELET BLD QL SMEAR: ABNORMAL
PMV BLD AUTO: 10.2 FL (ref 8.9–12.7)
POTASSIUM SERPL-SCNC: 3.9 MMOL/L (ref 3.5–5.3)
RBC # BLD AUTO: 2.98 MILLION/UL (ref 3.81–5.12)
SODIUM SERPL-SCNC: 144 MMOL/L (ref 136–145)
TARGETS BLD QL SMEAR: PRESENT
TOTAL CELLS COUNTED SPEC: 100
VARIANT LYMPHS # BLD AUTO: 4 %
WBC # BLD AUTO: 4.04 THOUSAND/UL (ref 4.31–10.16)

## 2020-12-27 PROCEDURE — 80048 BASIC METABOLIC PNL TOTAL CA: CPT | Performed by: INTERNAL MEDICINE

## 2020-12-27 PROCEDURE — 85007 BL SMEAR W/DIFF WBC COUNT: CPT | Performed by: INTERNAL MEDICINE

## 2020-12-27 PROCEDURE — 99233 SBSQ HOSP IP/OBS HIGH 50: CPT | Performed by: INTERNAL MEDICINE

## 2020-12-27 PROCEDURE — 85027 COMPLETE CBC AUTOMATED: CPT | Performed by: INTERNAL MEDICINE

## 2020-12-27 RX ORDER — TORSEMIDE 20 MG/1
60 TABLET ORAL DAILY
Status: DISCONTINUED | OUTPATIENT
Start: 2020-12-27 | End: 2020-12-29 | Stop reason: HOSPADM

## 2020-12-27 RX ORDER — DILTIAZEM HYDROCHLORIDE 120 MG/1
120 CAPSULE, COATED, EXTENDED RELEASE ORAL DAILY
Status: DISCONTINUED | OUTPATIENT
Start: 2020-12-27 | End: 2020-12-29 | Stop reason: HOSPADM

## 2020-12-27 RX ORDER — ALBUMIN (HUMAN) 12.5 G/50ML
25 SOLUTION INTRAVENOUS 2 TIMES DAILY
Status: COMPLETED | OUTPATIENT
Start: 2020-12-27 | End: 2020-12-27

## 2020-12-27 RX ADMIN — QUETIAPINE FUMARATE 50 MG: 25 TABLET ORAL at 21:05

## 2020-12-27 RX ADMIN — PANTOPRAZOLE SODIUM 40 MG: 40 TABLET, DELAYED RELEASE ORAL at 09:22

## 2020-12-27 RX ADMIN — DEXTROSE AND SODIUM CHLORIDE 30 ML/HR: 5; .2 INJECTION, SOLUTION INTRAVENOUS at 21:52

## 2020-12-27 RX ADMIN — POTASSIUM CHLORIDE 20 MEQ: 1500 TABLET, EXTENDED RELEASE ORAL at 09:22

## 2020-12-27 RX ADMIN — HEPARIN SODIUM 5000 UNITS: 5000 INJECTION INTRAVENOUS; SUBCUTANEOUS at 15:21

## 2020-12-27 RX ADMIN — FOLIC ACID 1000 MCG: 1 TABLET ORAL at 09:22

## 2020-12-27 RX ADMIN — HEPARIN SODIUM 5000 UNITS: 5000 INJECTION INTRAVENOUS; SUBCUTANEOUS at 05:07

## 2020-12-27 RX ADMIN — ALBUMIN (HUMAN) 25 G: 0.25 INJECTION, SOLUTION INTRAVENOUS at 21:25

## 2020-12-27 RX ADMIN — LACTULOSE 10 G: 20 SOLUTION ORAL at 21:04

## 2020-12-27 RX ADMIN — METOPROLOL TARTRATE 5 MG: 5 INJECTION INTRAVENOUS at 23:33

## 2020-12-27 RX ADMIN — MIRTAZAPINE 7.5 MG: 15 TABLET, FILM COATED ORAL at 21:05

## 2020-12-27 RX ADMIN — NICOTINE 7 MG/24 HR DAILY TRANSDERMAL PATCH 1 PATCH: at 09:22

## 2020-12-27 RX ADMIN — LACTULOSE 10 G: 20 SOLUTION ORAL at 09:22

## 2020-12-27 RX ADMIN — HEPARIN SODIUM 5000 UNITS: 5000 INJECTION INTRAVENOUS; SUBCUTANEOUS at 21:09

## 2020-12-27 RX ADMIN — ALBUMIN (HUMAN) 25 G: 0.25 INJECTION, SOLUTION INTRAVENOUS at 09:57

## 2020-12-27 NOTE — ASSESSMENT & PLAN NOTE
Nursing staff reports that patient was having multiple bowel movements earlier during hospitalization  This is in the setting of lactulose use  Lactulose dose has been reduced from 20 g twice a day to 10 g twice a day given excessive number of bowel movements  Currently diarrhea improved completely  Continue to monitor bowel movements

## 2020-12-27 NOTE — ASSESSMENT & PLAN NOTE
Diagnosed earlier this year and patient is currently under care of Oncology at [facility]

## 2020-12-27 NOTE — ASSESSMENT & PLAN NOTE
Patient has a chronic history of dementia and while in the hospital she does have periods of agitation, waxes and wanes  Ammonia to be rechecked  Currently on Seroquel 50 at night, this was recently increased  She is currently more calm  Continue to monitor

## 2020-12-27 NOTE — ASSESSMENT & PLAN NOTE
On torsemide - was increased to BID by nephro earlier during hospitalization but will keep daily for now given kidney function  Replace potassium while on torsemide  Low-salt diet    Monitor clinical status  Wean off oxygen as tolerated

## 2020-12-27 NOTE — PROGRESS NOTES
Progress Note - Martin Estrada 1945, 77 y o  female MRN: [id number]    Unit/Bed#: -01 Encounter: [id number]    Primary Care Provider: Amisha Gramajo MD   Date and time admitted to hospital: 12/18/2020  2:12 PM        Paroxysmal atrial fibrillation (HCC)/Tachycardia  Assessment & Plan  · At home she is on metoprolol and Cardizem  · On 12/22 she had RVR with HR around 140s  · Received digoxin 250 mcg  · Metoprolol XL was increased to 100 mg daily  · Continue Cardizem ER - was on 240mg daily but given BP will reduce to 120  Currently heart rate better  She is not on anticoagulation given dementia, thrombocytopenia  · Monitor heart rate  Liver cirrhosis (HCC)  Assessment & Plan  · Continue home lactulose, rifaximin  · Intravascular volume status since somewhat stable however she does have evidence of ascites  · On torsemide which we are continuing - now daily instead of BID    Abdominal distension seems to be increasing  Consult IR for repeat paracentesis on monday    Dementia [facility])  Assessment & Plan    Patient has a chronic history of dementia and while in the hospital she does have periods of agitation, waxes and wanes  Ammonia to be rechecked  Currently on Seroquel 50 at night, this was recently increased  She is currently more calm  Continue to monitor  * Volume overload  Assessment & Plan  On torsemide - was increased to BID by nephro earlier during hospitalization but will keep daily for now given kidney function  Replace potassium while on torsemide  Low-salt diet    Monitor clinical status  Wean off oxygen as tolerated  Diarrhea  Assessment & Plan    Nursing staff reports that patient was having multiple bowel movements earlier during hospitalization  This is in the setting of lactulose use  Lactulose dose has been reduced from 20 g twice a day to 10 g twice a day given excessive number of bowel movements    Currently diarrhea improved completely  Continue to monitor bowel movements  Chronic kidney disease (CKD), active medical management without dialysis, stage 4 (severe) [facility])  Assessment & Plan  Lab Results   Component Value Date    EGFR 21 2020    EGFR 36 2020    EGFR 32 2020    CREATININE 2 47 (H) 2020    CREATININE 1 60 (H) 2020    CREATININE 1 80 (H) 2020   Baseline creatinine appears to be between 1 6 and 2 1  Was mostly at baseline however on  it is 2 4  Will give extra two doses of albumin  Will reduce her torsemide from BID to daily for now  Continue to monitor BMP  Avoid nephrotoxic agents    Thrombocytopenia ([address] )  Assessment & Plan  Secondary to cirrhosis  Continue to monitor    Multiple myeloma [facility])  Assessment & Plan  Diagnosed earlier this year and patient is currently under care of Oncology at [address]  · Multifactorial     · Hemoglobin currently stable  · Continue to monitor  VTE Pharmacologic Prophylaxis:   Pharmacologic: Heparin  Mechanical VTE Prophylaxis in Place: Yes    Patient Centered Rounds: I have performed bedside rounds with nursing staff today  Education and Discussions with Family / Patient:  Discussed with family in detail - called Nancy Bueno    Time Spent for Care: 30 minutes  More than 50% of total time spent on counseling and coordination of care as described above  Current Length of Stay: 9 day(s)    Current Patient Status: Inpatient   Certification Statement: The patient will continue to require additional inpatient hospital stay due to Acute kidney injury,    Discharge Plan: Once stable    Code Status: Level 1 - Full Code      Subjective:     Patient evaluated this morning  Creatinine significant increase of 2 4  Denies nausea, vomiting, diarrhea constipation  No other events reported      Objective:     Vitals:   Temp (24hrs), Av 6 °F (37 °C), Min:97 9 °F (36 6 °C), Max:99 4 °F (37 4 °C)    Temp:  [97 9 °F (36 6 °C)-99 4 °F (37 4 °C)] 99 °F (37 2 °C)  HR:  [52-97] 97  Resp:  [15-18] 18  BP: ()/(48-77) 112/77  SpO2:  [97 %-100 %] 97 %  Body mass index is 27 44 kg/m²  Input and Output Summary (last 24 hours): Intake/Output Summary (Last 24 hours) at 12/27/2020 1538  Last data filed at 12/27/2020 1447  Gross per 24 hour   Intake 1320 ml   Output 0 ml   Net 1320 ml       Physical Exam:     Physical Exam  Vitals signs and nursing note reviewed  Constitutional:       Appearance: Normal appearance  She is normal weight  She is ill-appearing  Comments: Elderly female in bed, awake     HENT:      Head: Normocephalic and atraumatic  Right Ear: External ear normal       Left Ear: External ear normal       Nose: Nose normal  No congestion  Mouth/Throat:      Mouth: Mucous membranes are moist       Pharynx: Oropharynx is clear  No oropharyngeal exudate or posterior oropharyngeal erythema  Eyes:      General: No scleral icterus  Right eye: No discharge  Left eye: No discharge  Extraocular Movements: Extraocular movements intact  Conjunctiva/sclera: Conjunctivae normal       Pupils: Pupils are equal, round, and reactive to light  Neck:      Musculoskeletal: Normal range of motion and neck supple  No neck rigidity or muscular tenderness  Cardiovascular:      Rate and Rhythm: Normal rate and regular rhythm  Pulses: Normal pulses  Heart sounds: Normal heart sounds  No murmur  No friction rub  No gallop  Pulmonary:      Effort: Pulmonary effort is normal  No respiratory distress  Breath sounds: Normal breath sounds  No stridor  No wheezing, rhonchi or rales  Chest:      Chest wall: No tenderness  Abdominal:      General: Abdomen is flat  Bowel sounds are normal  There is distension  Palpations: Abdomen is soft  There is no mass  Tenderness: There is no abdominal tenderness  There is no guarding or rebound        Comments: Increased abdominal girth   Musculoskeletal: Normal range of motion  General: No swelling, tenderness, deformity or signs of injury  Skin:     General: Skin is warm and dry  Capillary Refill: Capillary refill takes less than 2 seconds  Coloration: Skin is not jaundiced or pale  Findings: No bruising, erythema, lesion or rash  Neurological:      General: No focal deficit present  Mental Status: She is alert and oriented to person, place, and time  Mental status is at baseline  Cranial Nerves: No cranial nerve deficit  Sensory: No sensory deficit  Motor: No weakness  Coordination: Coordination normal       Comments: Oriented to self and place but not time or situation   Psychiatric:         Mood and Affect: Mood normal          Behavior: Behavior normal          Thought Content: Thought content normal          Judgment: Judgment normal            Additional Data:     Labs:    Results from last 7 days   Lab Units 12/27/20  0507  12/24/20  0614   WBC Thousand/uL 4 04*   < > 3 41*   HEMOGLOBIN g/dL 8 2*   < > 8 8*   HEMATOCRIT % 26 5*   < > 28 3*   PLATELETS Thousands/uL 80*   < > 77*   BANDS PCT % 2  --   --    NEUTROS PCT %  --   --  63   LYMPHS PCT %  --   --  24   LYMPHO PCT % 21   < >  --    MONOS PCT %  --   --  10   MONO PCT % 12   < >  --    EOS PCT % 1   < > 1    < > = values in this interval not displayed  Results from last 7 days   Lab Units 12/27/20  0507  12/23/20  0459   SODIUM mmol/L 144   < > 148*   POTASSIUM mmol/L 3 9   < > 2 3*   CHLORIDE mmol/L 102   < > 106   CO2 mmol/L 34*   < > 32   BUN mg/dL 38*   < > 23   CREATININE mg/dL 2 47*   < > 1 44*   ANION GAP mmol/L 8   < > 10   CALCIUM mg/dL 8 7   < > 9 3   ALBUMIN g/dL  --   --  3 2*   TOTAL BILIRUBIN mg/dL  --   --  2 10*   ALK PHOS U/L  --   --  219*   ALT U/L  --   --  13   AST U/L  --   --  25   GLUCOSE RANDOM mg/dL 124   < > 106    < > = values in this interval not displayed  * I Have Reviewed All Lab Data Listed Above    * Additional Pertinent Lab Tests Reviewed: [address] [facility] Admission Reviewed    Recent Cultures (last 7 days):     Results from last 7 days   Lab Units 12/21/20  1252   GRAM STAIN RESULT  1+ Polys  No organisms seen   BODY FLUID CULTURE, STERILE  No growth       Last 24 Hours Medication List:   Current Facility-Administered Medications   Medication Dose Route Frequency Provider Last Rate    acetaminophen  650 mg Oral Q6H PRN Brent Dorantes MD      albumin human  25 g Intravenous BID Harinder Dan MD      dextrose 5 % and sodium chloride 0 2 %  30 mL/hr Intravenous Continuous Harinder Dan MD 30 mL/hr (12/26/20 1012)    diltiazem  120 mg Oral Daily Harinder Dan MD      folic acid  6,428 mcg Oral Daily Brent Dorantes MD      heparin (porcine)  5,000 Units Subcutaneous Q8H [facility] Brent Dorantes MD      lactulose  10 g Oral BID Harinder Dan MD      metoprolol  5 mg Intravenous Q6H PRN Linnea Sandoval MD      metoprolol succinate  100 mg Oral Daily Harinder Dan MD      mirtazapine  7 5 mg Oral HS Yvonne Shah MD      nicotine  1 patch Transdermal Daily Brent Dorantes MD      pantoprazole  40 mg Oral QAM Brent Dorantes MD      potassium chloride  20 mEq Oral Daily Harinder Dan MD      QUEtiapine  50 mg Oral HS Harinder Dan MD      torsemide  60 mg Oral Daily Harinder Dan MD          Today, Patient Was Seen By: Harinder Dan MD    ** Please Note: Dictation voice to text software may have been used in the creation of this document   **

## 2020-12-27 NOTE — ASSESSMENT & PLAN NOTE
Lab Results   Component Value Date    EGFR 21 12/27/2020    EGFR 36 12/26/2020    EGFR 32 12/25/2020    CREATININE 2 47 (H) 12/27/2020    CREATININE 1 60 (H) 12/26/2020    CREATININE 1 80 (H) 12/25/2020   Baseline creatinine appears to be between 1 6 and 2 1  Was mostly at baseline however on 12/27 it is 2 4  Will give extra two doses of albumin  Will reduce her torsemide from BID to daily for now    Continue to monitor BMP  Avoid nephrotoxic agents

## 2020-12-27 NOTE — PLAN OF CARE
Problem: Potential for Falls  Goal: Patient will remain free of falls  Description: INTERVENTIONS:  - Assess patient frequently for physical needs  -  Identify cognitive and physical deficits and behaviors that affect risk of falls  -  [place] fall precautions as indicated by assessment   - Educate patient/family on patient safety including physical limitations  - Instruct patient to call for assistance with activity based on assessment  - Modify environment to reduce risk of injury  - Consider OT/PT consult to assist with strengthening/mobility  Outcome: Progressing     Problem: Prexisting or High Potential for Compromised Skin Integrity  Goal: Skin integrity is maintained or improved  Description: INTERVENTIONS:  - Identify patients at risk for skin breakdown  - Assess and monitor skin integrity  - Assess and monitor nutrition and hydration status  - Monitor labs   - Assess for incontinence   - Turn and reposition patient  - Assist with mobility/ambulation  - Relieve pressure over bony prominences  - Avoid friction and shearing  - Provide appropriate hygiene as needed including keeping skin clean and dry  - Evaluate need for skin moisturizer/barrier cream  - Collaborate with interdisciplinary team   - Patient/family teaching  - Consider wound care consult   Outcome: Progressing     Problem: Nutrition/Hydration-ADULT  Goal: Nutrient/Hydration intake appropriate for improving, restoring or maintaining nutritional needs  Description: Monitor and assess patient's nutrition/hydration status for malnutrition  Collaborate with interdisciplinary team and initiate plan and interventions as ordered  Monitor patient's weight and dietary intake as ordered or per policy  Utilize nutrition screening tool and intervene as necessary  Determine patient's food preferences and provide high-protein, high-caloric foods as appropriate       INTERVENTIONS:  - Monitor oral intake, urinary output, labs, and treatment plans  - Assess nutrition and hydration status and recommend course of action  - Evaluate amount of meals eaten  - Assist patient with eating if necessary   - Allow adequate time for meals  - Recommend/ encourage appropriate diets, oral nutritional supplements, and vitamin/mineral supplements  - Order, calculate, and assess calorie counts as needed  -- Assess need for intravenous fluids  - Provide specific nutrition/hydration education as appropriate  - Include patient/family/caregiver in decisions related to nutrition  Outcome: Progressing

## 2020-12-27 NOTE — ASSESSMENT & PLAN NOTE
· At home she is on metoprolol and Cardizem  · On 12/22 she had RVR with HR around 140s  · Received digoxin 250 mcg  · Metoprolol XL was increased to 100 mg daily  · Continue Cardizem ER - was on 240mg daily but given BP will reduce to 120  Currently heart rate better  She is not on anticoagulation given dementia, thrombocytopenia  · Monitor heart rate

## 2020-12-27 NOTE — ASSESSMENT & PLAN NOTE
· Continue home lactulose, rifaximin  · Intravascular volume status since somewhat stable however she does have evidence of ascites  · On torsemide which we are continuing - now daily instead of BID    Abdominal distension seems to be increasing      Consult IR for repeat paracentesis on monday

## 2020-12-27 NOTE — PLAN OF CARE
Problem: Potential for Falls  Goal: Patient will remain free of falls  Description: INTERVENTIONS:  - Assess patient frequently for physical needs  -  Identify cognitive and physical deficits and behaviors that affect risk of falls  -  [place] fall precautions as indicated by assessment   - Educate patient/family on patient safety including physical limitations  - Instruct patient to call for assistance with activity based on assessment  - Modify environment to reduce risk of injury  - Consider OT/PT consult to assist with strengthening/mobility  Outcome: Progressing     Problem: Prexisting or High Potential for Compromised Skin Integrity  Goal: Skin integrity is maintained or improved  Description: INTERVENTIONS:  - Identify patients at risk for skin breakdown  - Assess and monitor skin integrity  - Assess and monitor nutrition and hydration status  - Monitor labs   - Assess for incontinence   - Turn and reposition patient  - Assist with mobility/ambulation  - Relieve pressure over bony prominences  - Avoid friction and shearing  - Provide appropriate hygiene as needed including keeping skin clean and dry  - Evaluate need for skin moisturizer/barrier cream  - Collaborate with interdisciplinary team   - Patient/family teaching  - Consider wound care consult   Outcome: Progressing     Problem: Nutrition/Hydration-ADULT  Goal: Nutrient/Hydration intake appropriate for improving, restoring or maintaining nutritional needs  Description: Monitor and assess patient's nutrition/hydration status for malnutrition  Collaborate with interdisciplinary team and initiate plan and interventions as ordered  Monitor patient's weight and dietary intake as ordered or per policy  Utilize nutrition screening tool and intervene as necessary  Determine patient's food preferences and provide high-protein, high-caloric foods as appropriate       INTERVENTIONS:  - Monitor oral intake, urinary output, labs, and treatment plans  - Assess nutrition and hydration status and recommend course of action  - Evaluate amount of meals eaten  - Assist patient with eating if necessary   - Allow adequate time for meals  - Recommend/ encourage appropriate diets, oral nutritional supplements, and vitamin/mineral supplements  - Order, calculate, and assess calorie counts as needed  - Recommend, monitor, and adjust tube feedings and TPN/PPN based on assessed needs  - Assess need for intravenous fluids  - Provide specific nutrition/hydration education as appropriate  - Include patient/family/caregiver in decisions related to nutrition  Outcome: Progressing

## 2020-12-28 ENCOUNTER — APPOINTMENT (INPATIENT)
Dept: INTERVENTIONAL RADIOLOGY/VASCULAR | Facility: HOSPITAL | Age: 75
DRG: 433 | End: 2020-12-28
Attending: INTERNAL MEDICINE
Payer: MEDICARE

## 2020-12-28 LAB
AMMONIA PLAS-SCNC: 60 UMOL/L (ref 11–35)
ANION GAP SERPL CALCULATED.3IONS-SCNC: 8 MMOL/L (ref 4–13)
BUN SERPL-MCNC: 35 MG/DL (ref 5–25)
CALCIUM SERPL-MCNC: 8.9 MG/DL (ref 8.3–10.1)
CHLORIDE SERPL-SCNC: 102 MMOL/L (ref 100–108)
CO2 SERPL-SCNC: 34 MMOL/L (ref 21–32)
CREAT SERPL-MCNC: 2.07 MG/DL (ref 0.6–1.3)
ERYTHROCYTE [DISTWIDTH] IN BLOOD BY AUTOMATED COUNT: 19.9 % (ref 11.6–15.1)
GFR SERPL CREATININE-BSD FRML MDRD: 27 ML/MIN/1.73SQ M
GLUCOSE SERPL-MCNC: 84 MG/DL (ref 65–140)
HCT VFR BLD AUTO: 28.3 % (ref 34.8–46.1)
HGB BLD-MCNC: 8.9 G/DL (ref 11.5–15.4)
MCH RBC QN AUTO: 27.7 PG (ref 26.8–34.3)
MCHC RBC AUTO-ENTMCNC: 31.4 G/DL (ref 31.4–37.4)
MCV RBC AUTO: 88 FL (ref 82–98)
NRBC BLD AUTO-RTO: 4 /100 WBCS
PLATELET # BLD AUTO: 78 THOUSANDS/UL (ref 149–390)
PMV BLD AUTO: 10.7 FL (ref 8.9–12.7)
POTASSIUM SERPL-SCNC: 3.9 MMOL/L (ref 3.5–5.3)
RBC # BLD AUTO: 3.21 MILLION/UL (ref 3.81–5.12)
SODIUM SERPL-SCNC: 144 MMOL/L (ref 136–145)
WBC # BLD AUTO: 3.5 THOUSAND/UL (ref 4.31–10.16)

## 2020-12-28 PROCEDURE — 80048 BASIC METABOLIC PNL TOTAL CA: CPT | Performed by: INTERNAL MEDICINE

## 2020-12-28 PROCEDURE — 49083 ABD PARACENTESIS W/IMAGING: CPT

## 2020-12-28 PROCEDURE — 99232 SBSQ HOSP IP/OBS MODERATE 35: CPT | Performed by: INTERNAL MEDICINE

## 2020-12-28 PROCEDURE — 82140 ASSAY OF AMMONIA: CPT | Performed by: INTERNAL MEDICINE

## 2020-12-28 PROCEDURE — BW40ZZZ ULTRASONOGRAPHY OF ABDOMEN: ICD-10-PCS | Performed by: RADIOLOGY

## 2020-12-28 PROCEDURE — 76705 ECHO EXAM OF ABDOMEN: CPT | Performed by: RADIOLOGY

## 2020-12-28 PROCEDURE — NC001 PR NO CHARGE: Performed by: RADIOLOGY

## 2020-12-28 PROCEDURE — 85027 COMPLETE CBC AUTOMATED: CPT | Performed by: INTERNAL MEDICINE

## 2020-12-28 RX ADMIN — HEPARIN SODIUM 5000 UNITS: 5000 INJECTION INTRAVENOUS; SUBCUTANEOUS at 13:00

## 2020-12-28 RX ADMIN — DILTIAZEM HYDROCHLORIDE 120 MG: 120 CAPSULE, COATED, EXTENDED RELEASE ORAL at 09:32

## 2020-12-28 RX ADMIN — LACTULOSE 10 G: 20 SOLUTION ORAL at 22:42

## 2020-12-28 RX ADMIN — PANTOPRAZOLE SODIUM 40 MG: 40 TABLET, DELAYED RELEASE ORAL at 09:29

## 2020-12-28 RX ADMIN — NICOTINE 7 MG/24 HR DAILY TRANSDERMAL PATCH 1 PATCH: at 09:29

## 2020-12-28 RX ADMIN — HEPARIN SODIUM 5000 UNITS: 5000 INJECTION INTRAVENOUS; SUBCUTANEOUS at 05:57

## 2020-12-28 RX ADMIN — FOLIC ACID 1000 MCG: 1 TABLET ORAL at 09:28

## 2020-12-28 RX ADMIN — LACTULOSE 10 G: 20 SOLUTION ORAL at 09:32

## 2020-12-28 RX ADMIN — POTASSIUM CHLORIDE 20 MEQ: 1500 TABLET, EXTENDED RELEASE ORAL at 09:29

## 2020-12-28 RX ADMIN — QUETIAPINE FUMARATE 50 MG: 25 TABLET ORAL at 22:42

## 2020-12-28 RX ADMIN — MIRTAZAPINE 7.5 MG: 15 TABLET, FILM COATED ORAL at 22:40

## 2020-12-28 RX ADMIN — TORSEMIDE 60 MG: 20 TABLET ORAL at 09:31

## 2020-12-28 RX ADMIN — HEPARIN SODIUM 5000 UNITS: 5000 INJECTION INTRAVENOUS; SUBCUTANEOUS at 22:39

## 2020-12-28 NOTE — ASSESSMENT & PLAN NOTE
Lab Results   Component Value Date    EGFR 27 12/28/2020    EGFR 21 12/27/2020    EGFR 36 12/26/2020    CREATININE 2 07 (H) 12/28/2020    CREATININE 2 47 (H) 12/27/2020    CREATININE 1 60 (H) 12/26/2020     Baseline creatinine appears to be between 1 6 and 2 0  Was mostly at baseline however on 12/27 it is 2 4  Has received extra albumin with slightly improving creatinine    Continue torsemide daily for now instead of b i d   Continue to monitor kidney function closely today   Continue to monitor BMP  Avoid nephrotoxic agents

## 2020-12-28 NOTE — PLAN OF CARE
Problem: Potential for Falls  Goal: Patient will remain free of falls  Description: INTERVENTIONS:  - Assess patient frequently for physical needs  -  Identify cognitive and physical deficits and behaviors that affect risk of falls    -  [place] fall precautions as indicated by assessment   - Educate patient/family on patient safety including physical limitations  - Instruct patient to call for assistance with activity based on assessment  - Modify environment to reduce risk of injury  - Consider OT/PT consult to assist with strengthening/mobility  Outcome: Progressing

## 2020-12-28 NOTE — PROGRESS NOTES
June 11, 2020     Patient: Thong Victor   YOB: 1975   Date of Visit: 6/11/2020       To Whom it May Concern:    Thong Victor was seen in my clinic on 6/10/2020.     Please excuse Thong for her absence from work on the date listed above to be able to make her appointment. Ms. Victor may return to work on 6/15/2020 with no restrictions. Please call our office with any questions and/or concerns.    Sincerely,           Dr. Reyna Miller MD    Medical information is confidential and cannot be disclosed without the written consent of the patient or her representative.       Progress Note - Slick Mccarthy 1945, 77 y o  female MRN: [id number]    Unit/Bed#: -Cathy Encounter: [id number]    Primary Care Provider: Jyoti Pathak MD   Date and time admitted to hospital: 12/18/2020  2:12 PM        Paroxysmal atrial fibrillation (HCC)/Tachycardia  Assessment & Plan  · At home she is on metoprolol and Cardizem  · On 12/22 she had RVR with HR around 140s  · Received digoxin 250 mcg  · Metoprolol XL was increased to 100 mg daily  · Continue Cardizem ER - was on 240mg daily but given BP will reduce to 120  Currently heart rate better  She is not on anticoagulation given dementia, thrombocytopenia  · Monitor heart rate  Liver cirrhosis (HCC)  Assessment & Plan  · Continue home lactulose, rifaximin  · Intravascular volume status since somewhat stable however she did have persistent abdominal distension  · On torsemide which we are continuing - now daily instead of BID    Abdominal distension was a concern she was planned for repeated paracentesis - no significant ascites noted however  Dementia [facility])  Assessment & Plan    Patient has a chronic history of dementia and while in the hospital she does have periods of agitation, waxes and wanes  Ammonia to be rechecked  Currently on Seroquel 50 at night, this was recently increased  She is currently more calm  Continue to monitor  * Volume overload  Assessment & Plan  On torsemide - was increased to BID by nephro earlier during hospitalization but will keep daily for now given kidney function  Replace potassium while on torsemide  Low-salt diet    Monitor clinical status  Wean off oxygen as tolerated  Diarrhea  Assessment & Plan    Nursing staff reports that patient was having multiple bowel movements earlier during hospitalization  This is in the setting of lactulose use  Lactulose dose has been reduced from 20 g twice a day to 10 g twice a day given excessive number of bowel movements    Currently diarrhea improved completely  Continue to monitor bowel movements  Chronic kidney disease (CKD), active medical management without dialysis, stage 4 (severe) [facility])  Assessment & Plan  Lab Results   Component Value Date    EGFR 27 12/28/2020    EGFR 21 12/27/2020    EGFR 36 12/26/2020    CREATININE 2 07 (H) 12/28/2020    CREATININE 2 47 (H) 12/27/2020    CREATININE 1 60 (H) 12/26/2020     Baseline creatinine appears to be between 1 6 and 2 0  Was mostly at baseline however on 12/27 it is 2 4  Has received extra albumin with slightly improving creatinine  Continue torsemide daily for now instead of b i d   Continue to monitor kidney function closely today   Continue to monitor BMP  Avoid nephrotoxic agents    Thrombocytopenia ([address] )  Assessment & Plan  Secondary to cirrhosis  Continue to monitor    Multiple myeloma [facility])  Assessment & Plan  Diagnosed earlier this year and patient is currently under care of Oncology at [address]  · Multifactorial     · Hemoglobin currently stable  · Continue to monitor  Additional Diagnosis:  Acute kidney injury possibly in the setting of diuretic administration, as evidenced by an increase in creatinine to 2 47 with a documented baseline of 1 6 to 2 1, requiring monitoring of creatinine level and reduction in torsemide from BID to daily  Hypernatremia, now resolved; as evidenced by three sodium levels at 148 and one at 150; requiring monitoring of sodium level and IV D5%  VTE Pharmacologic Prophylaxis:   Pharmacologic: Pharmacologic VTE Prophylaxis contraindicated due to Thrombocytopenia  Mechanical VTE Prophylaxis in Place: Yes    Patient Centered Rounds: I have performed bedside rounds with nursing staff today  Time Spent for Care: 30 minutes  More than 50% of total time spent on counseling and coordination of care as described above  Current Length of Stay: 10 day(s)    Current Patient Status: Inpatient   Certification Statement:  The patient will continue to require additional inpatient hospital stay due to Kidney function monitoring    Discharge Plan:  Once stable    Code Status: Level 1 - Full Code      Subjective:     Patient evaluated this morning  Attempted paracentesis however no significant fluid  Creatinine slowly improving  No other events reported  Objective:     Vitals:   Temp (24hrs), Av 7 °F (36 5 °C), Min:97 6 °F (36 4 °C), Max:97 7 °F (36 5 °C)    Temp:  [97 6 °F (36 4 °C)-97 7 °F (36 5 °C)] 97 6 °F (36 4 °C)  HR:  [] 66  Resp:  [16-18] 18  BP: ()/(46-87) 120/71  SpO2:  [90 %-99 %] 90 %  Body mass index is 27 62 kg/m²  Input and Output Summary (last 24 hours): Intake/Output Summary (Last 24 hours) at 2020 1709  Last data filed at 2020 2301  Gross per 24 hour   Intake 1095 ml   Output    Net 1095 ml       Physical Exam:     Physical Exam  Vitals signs and nursing note reviewed  Constitutional:       Appearance: Normal appearance  She is normal weight  She is ill-appearing  Comments: Elderly female in bed, awake, chronically ill-appearing     HENT:      Head: Normocephalic and atraumatic  Right Ear: External ear normal       Left Ear: External ear normal       Nose: Nose normal  No congestion  Mouth/Throat:      Mouth: Mucous membranes are moist       Pharynx: Oropharynx is clear  No oropharyngeal exudate or posterior oropharyngeal erythema  Eyes:      General: No scleral icterus  Right eye: No discharge  Left eye: No discharge  Extraocular Movements: Extraocular movements intact  Conjunctiva/sclera: Conjunctivae normal       Pupils: Pupils are equal, round, and reactive to light  Neck:      Musculoskeletal: Normal range of motion and neck supple  No neck rigidity or muscular tenderness  Cardiovascular:      Rate and Rhythm: Normal rate and regular rhythm  Pulses: Normal pulses  Heart sounds: Normal heart sounds  No murmur   No friction rub  No gallop  Pulmonary:      Effort: Pulmonary effort is normal  No respiratory distress  Breath sounds: Normal breath sounds  No stridor  No wheezing, rhonchi or rales  Chest:      Chest wall: No tenderness  Abdominal:      General: Abdomen is flat  Bowel sounds are normal  There is distension  Palpations: Abdomen is soft  There is no mass  Tenderness: There is no abdominal tenderness  There is no guarding or rebound  Comments: Increased abdominal girth   Musculoskeletal: Normal range of motion  General: No swelling, tenderness, deformity or signs of injury  Skin:     General: Skin is warm and dry  Capillary Refill: Capillary refill takes less than 2 seconds  Coloration: Skin is not jaundiced or pale  Findings: No bruising, erythema, lesion or rash  Neurological:      General: No focal deficit present  Mental Status: She is alert and oriented to person, place, and time  Mental status is at baseline  Cranial Nerves: No cranial nerve deficit  Sensory: No sensory deficit  Motor: No weakness  Coordination: Coordination normal       Comments: Oriented to self and place but not time or situation   Psychiatric:         Mood and Affect: Mood normal          Behavior: Behavior normal          Thought Content: Thought content normal          Judgment: Judgment normal            Additional Data:     Labs:    Results from last 7 days   Lab Units 12/28/20  0618 12/27/20  0507  12/24/20  0614   WBC Thousand/uL 3 50* 4 04*   < > 3 41*   HEMOGLOBIN g/dL 8 9* 8 2*   < > 8 8*   HEMATOCRIT % 28 3* 26 5*   < > 28 3*   PLATELETS Thousands/uL 78* 80*   < > 77*   BANDS PCT %  --  2  --   --    NEUTROS PCT %  --   --   --  63   LYMPHS PCT %  --   --   --  24   LYMPHO PCT %  --  21   < >  --    MONOS PCT %  --   --   --  10   MONO PCT %  --  12   < >  --    EOS PCT %  --  1   < > 1    < > = values in this interval not displayed       Results from last 7 days   Lab Units 12/28/20  0617  12/23/20  0459   SODIUM mmol/L 144   < > 148*   POTASSIUM mmol/L 3 9   < > 2 3*   CHLORIDE mmol/L 102   < > 106   CO2 mmol/L 34*   < > 32   BUN mg/dL 35*   < > 23   CREATININE mg/dL 2 07*   < > 1 44*   ANION GAP mmol/L 8   < > 10   CALCIUM mg/dL 8 9   < > 9 3   ALBUMIN g/dL  --   --  3 2*   TOTAL BILIRUBIN mg/dL  --   --  2 10*   ALK PHOS U/L  --   --  219*   ALT U/L  --   --  13   AST U/L  --   --  25   GLUCOSE RANDOM mg/dL 84   < > 106    < > = values in this interval not displayed  * I Have Reviewed All Lab Data Listed Above  * Additional Pertinent Lab Tests Reviewed: [address] Admission Reviewed    Recent Cultures (last 7 days):           Last 24 Hours Medication List:   Current Facility-Administered Medications   Medication Dose Route Frequency Provider Last Rate    acetaminophen  650 mg Oral Q6H PRN Brent Dorantes MD      dextrose 5 % and sodium chloride 0 2 %  30 mL/hr Intravenous Continuous Pablo Orantes MD 30 mL/hr (12/27/20 8771)    diltiazem  120 mg Oral Daily Pablo Orantes MD      folic acid  3,272 mcg Oral Daily Brent Dorantes MD      heparin (porcine)  5,000 Units Subcutaneous Q8H [address] Brent Dorantes MD      lactulose  10 g Oral BID Pablo Orantes MD      metoprolol  5 mg Intravenous Q6H PRN Kristal Jaramillo MD      metoprolol succinate  100 mg Oral Daily Pablo Orantes MD      mirtazapine  7 5 mg Oral HS Parish Lott MD      nicotine  1 patch Transdermal Daily Brent Dorantes MD      pantoprazole  40 mg Oral QAM Brent Dorantes MD      potassium chloride  20 mEq Oral Daily Pablo Orantes MD      QUEtiapine  50 mg Oral HS Pablo Orantes MD      torsemide  60 mg Oral Daily Pablo Orantes MD          Today, Patient Was Seen By: Pablo Orantes MD    ** Please Note: Dictation voice to text software may have been used in the creation of this document   **

## 2020-12-28 NOTE — CONSULTS
INTERPROFESSIONAL (PHONE) CONSULTATION - Interventional Radiology  Artem Mars 77 y o  female MRN: [id number]  Unit/Bed#: -01 Encounter: [id number]    IR has been consulted to evaluate the patient, determine the appropriate procedure, and whether or not a procedure can and should be performed regarding the care of Artem Mars  We were consulted by hospitalist concerning patient's abdominal distention with ascites, and to possibly perform a paracentesis if medically appropriate for the patient  Consults  12/28/20      Assessment/Recommendation:   Patient with history of cirrhosis and ascites  Patient has increasing abdominal distension with concerns for a increased ascites  Plan on repeat paracentesis today  Total time spent in review of data, discussion with requesting provider and rendering advice was 10 min  Thank you for allowing Interventional Radiology to participate in the care of Artem Mars  Please don't hesitate to call or TigerText us with any questions       Isaac Boyd MD

## 2020-12-28 NOTE — BRIEF OP NOTE (RAD/CATH)
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

## 2020-12-28 NOTE — CASE MANAGEMENT
CM s/w pt at bedside, explained IMM  Pt states understanding and gives verbal consent to IMM notice-pt unable to sign as she is currently in restraints

## 2020-12-28 NOTE — ASSESSMENT & PLAN NOTE
Diagnosed earlier this year and patient is currently under care of Oncology at [facility]

## 2020-12-28 NOTE — ASSESSMENT & PLAN NOTE
· Continue home lactulose, rifaximin  · Intravascular volume status since somewhat stable however she did have persistent abdominal distension  · On torsemide which we are continuing - now daily instead of BID    Abdominal distension was a concern she was planned for repeated paracentesis - no significant ascites noted however

## 2020-12-29 ENCOUNTER — TRANSITIONAL CARE MANAGEMENT (OUTPATIENT)
Dept: INTERNAL MEDICINE CLINIC | Facility: CLINIC | Age: 75
End: 2020-12-29

## 2020-12-29 VITALS
RESPIRATION RATE: 18 BRPM | WEIGHT: 165.34 LBS | TEMPERATURE: 97.4 F | BODY MASS INDEX: 28.23 KG/M2 | OXYGEN SATURATION: 95 % | HEIGHT: 64 IN | SYSTOLIC BLOOD PRESSURE: 118 MMHG | HEART RATE: 97 BPM | DIASTOLIC BLOOD PRESSURE: 72 MMHG

## 2020-12-29 LAB
ALBUMIN SERPL BCP-MCNC: 3.1 G/DL (ref 3.5–5)
ALP SERPL-CCNC: 261 U/L (ref 46–116)
ALT SERPL W P-5'-P-CCNC: 18 U/L (ref 12–78)
ANION GAP SERPL CALCULATED.3IONS-SCNC: 10 MMOL/L (ref 4–13)
AST SERPL W P-5'-P-CCNC: 37 U/L (ref 5–45)
BILIRUB SERPL-MCNC: 1.7 MG/DL (ref 0.2–1)
BUN SERPL-MCNC: 35 MG/DL (ref 5–25)
CALCIUM ALBUM COR SERPL-MCNC: 10 MG/DL (ref 8.3–10.1)
CALCIUM SERPL-MCNC: 9.3 MG/DL (ref 8.3–10.1)
CHLORIDE SERPL-SCNC: 101 MMOL/L (ref 100–108)
CO2 SERPL-SCNC: 32 MMOL/L (ref 21–32)
CREAT SERPL-MCNC: 1.79 MG/DL (ref 0.6–1.3)
ERYTHROCYTE [DISTWIDTH] IN BLOOD BY AUTOMATED COUNT: 19.9 % (ref 11.6–15.1)
GFR SERPL CREATININE-BSD FRML MDRD: 32 ML/MIN/1.73SQ M
GLUCOSE SERPL-MCNC: 118 MG/DL (ref 65–140)
HCT VFR BLD AUTO: 28.4 % (ref 34.8–46.1)
HGB BLD-MCNC: 9 G/DL (ref 11.5–15.4)
MCH RBC QN AUTO: 27.9 PG (ref 26.8–34.3)
MCHC RBC AUTO-ENTMCNC: 31.7 G/DL (ref 31.4–37.4)
MCV RBC AUTO: 88 FL (ref 82–98)
NRBC BLD AUTO-RTO: 3 /100 WBCS
PLATELET # BLD AUTO: 86 THOUSANDS/UL (ref 149–390)
PMV BLD AUTO: 11.5 FL (ref 8.9–12.7)
POTASSIUM SERPL-SCNC: 4.3 MMOL/L (ref 3.5–5.3)
PROT SERPL-MCNC: 6.4 G/DL (ref 6.4–8.2)
RBC # BLD AUTO: 3.23 MILLION/UL (ref 3.81–5.12)
SODIUM SERPL-SCNC: 143 MMOL/L (ref 136–145)
WBC # BLD AUTO: 4.53 THOUSAND/UL (ref 4.31–10.16)

## 2020-12-29 PROCEDURE — 80053 COMPREHEN METABOLIC PANEL: CPT | Performed by: INTERNAL MEDICINE

## 2020-12-29 PROCEDURE — 99239 HOSP IP/OBS DSCHRG MGMT >30: CPT | Performed by: STUDENT IN AN ORGANIZED HEALTH CARE EDUCATION/TRAINING PROGRAM

## 2020-12-29 PROCEDURE — 85027 COMPLETE CBC AUTOMATED: CPT | Performed by: INTERNAL MEDICINE

## 2020-12-29 RX ORDER — LACTULOSE 20 G/30ML
10 SOLUTION ORAL 2 TIMES DAILY
Qty: 1892 ML | Refills: 0 | Status: SHIPPED | OUTPATIENT
Start: 2020-12-29 | End: 2021-04-09 | Stop reason: HOSPADM

## 2020-12-29 RX ORDER — METOPROLOL SUCCINATE 100 MG/1
100 TABLET, EXTENDED RELEASE ORAL DAILY
Qty: 30 TABLET | Refills: 0 | Status: ON HOLD | OUTPATIENT
Start: 2020-12-30 | End: 2021-03-26 | Stop reason: SDUPTHER

## 2020-12-29 RX ORDER — TORSEMIDE 20 MG/1
60 TABLET ORAL DAILY
Qty: 90 TABLET | Refills: 0 | Status: ON HOLD | OUTPATIENT
Start: 2020-12-29 | End: 2021-02-25 | Stop reason: SDUPTHER

## 2020-12-29 RX ORDER — DILTIAZEM HYDROCHLORIDE 120 MG/1
120 CAPSULE, COATED, EXTENDED RELEASE ORAL DAILY
Qty: 30 CAPSULE | Refills: 0 | Status: SHIPPED | OUTPATIENT
Start: 2020-12-29 | End: 2021-02-24

## 2020-12-29 RX ADMIN — HEPARIN SODIUM 5000 UNITS: 5000 INJECTION INTRAVENOUS; SUBCUTANEOUS at 06:38

## 2020-12-29 RX ADMIN — TORSEMIDE 60 MG: 20 TABLET ORAL at 10:00

## 2020-12-29 RX ADMIN — LACTULOSE 10 G: 20 SOLUTION ORAL at 10:01

## 2020-12-29 RX ADMIN — FOLIC ACID 1000 MCG: 1 TABLET ORAL at 10:00

## 2020-12-29 RX ADMIN — DILTIAZEM HYDROCHLORIDE 120 MG: 120 CAPSULE, COATED, EXTENDED RELEASE ORAL at 10:00

## 2020-12-29 RX ADMIN — METOPROLOL SUCCINATE 100 MG: 100 TABLET, EXTENDED RELEASE ORAL at 06:37

## 2020-12-29 RX ADMIN — POTASSIUM CHLORIDE 20 MEQ: 1500 TABLET, EXTENDED RELEASE ORAL at 10:00

## 2020-12-29 RX ADMIN — METOPROLOL TARTRATE 5 MG: 5 INJECTION INTRAVENOUS at 03:57

## 2020-12-29 RX ADMIN — PANTOPRAZOLE SODIUM 40 MG: 40 TABLET, DELAYED RELEASE ORAL at 10:01

## 2020-12-29 NOTE — CASE MANAGEMENT
CM spoke to Dr Claudell Garland pt for d/c today  CM called BALJIT/Frederic  For ambulance to home  Await callback w/ pickup time  CMN completed, printed  , copy to medical records bin  Original and face sheet to paper chart for transfer

## 2020-12-29 NOTE — CASE MANAGEMENT
Per Dr Kathia Boucher  sister informed by him that pt returning home  Alessio/BALJIT states pickup time 1045  Informed Debbie BALDERAS

## 2020-12-29 NOTE — ASSESSMENT & PLAN NOTE
· Continue decreased dose of lactulose, continue rifaximin  · Intravascular volume status since somewhat stable however she did have persistent abdominal distension  · On torsemide which we are continuing - now daily instead of BID    Abdominal distension was a concern she was planned for repeated paracentesis yesterday but no significant ascites noted

## 2020-12-29 NOTE — ASSESSMENT & PLAN NOTE
On torsemide - was increased to BID by nephro earlier during hospitalization but will keep daily for now given worsened renal function  Replace potassium while on torsemide  Low-salt diet  Currently off of oxygen

## 2020-12-29 NOTE — DISCHARGE INSTRUCTIONS
Recommended to outpatient follow-up with primary care provider in 3 to 5 days  Recommended to have repeat BMP within 1 week and follow up with primary care provider for results

## 2020-12-29 NOTE — DISCHARGE SUMMARY
Discharge- Gina Collar 1945, 77 y o  female MRN: [id number]    Unit/Bed#: -01 Encounter: [id number]    Primary Care Provider: Brandi Foster MD   Date and time admitted to hospital: 12/18/2020  2:12 PM        * Volume overload  Assessment & Plan  On torsemide - was increased to BID by nephro earlier during hospitalization but will keep daily for now given worsened renal function  Replace potassium while on torsemide  Low-salt diet  Currently off of oxygen  Hemodynamically stable for discharge  Recommended close outpatient follow-up  Dementia [facility])  Assessment & Plan    Patient has a chronic history of dementia and while in the hospital she does have periods of agitation, waxes and wanes  Currently on Seroquel 25mg qhs  She is currently more calm  Stable at her baseline  Family refuses rehab along the facility placement  Hemodynamically stable to discharge home with VNA services  Diarrhea  Assessment & Plan    Nursing staff reports that patient was having multiple bowel movements earlier during hospitalization  This is in the setting of lactulose use  Lactulose dose has been reduced from 20 g twice a day to 10 g twice a day given excessive number of bowel movements  Currently diarrhea improved  Outpatient follow-up  Chronic kidney disease (CKD), active medical management without dialysis, stage 4 (severe) [facility])  Assessment & Plan  Lab Results   Component Value Date    EGFR 32 12/29/2020    EGFR 27 12/28/2020    EGFR 21 12/27/2020    CREATININE 1 79 (H) 12/29/2020    CREATININE 2 07 (H) 12/28/2020    CREATININE 2 47 (H) 12/27/2020     Baseline creatinine appears to be between 1 6 and 2 0  Was mostly at baseline however on 12/27 it was noted 2 47  Has received extra albumin with slightly improving creatinine  Continue torsemide daily for now instead of b i d  Now creatinine improving today 1 79  Avoid nephrotoxic agent  Stable for discharge    Recommended repeat BMP within 1 week to monitor renal function on outpatient basis  Thrombocytopenia ([facility] [address] )  Assessment & Plan  Secondary to cirrhosis  No signs of active bleeding noted  Stable  Outpatient follow-up  Multiple myeloma [facility])  Assessment & Plan  Diagnosed earlier this year and patient is currently under care of Oncology at [facility]  Continue outpatient follow-up  Paroxysmal atrial fibrillation (HCC)/Tachycardia  Assessment & Plan  · Currently rate controlled on metoprolol  mg daily, Cardizem 120 mg daily  · Stable  · Not on anticoagulation  Liver cirrhosis (HCC)  Assessment & Plan  · Continue decreased dose of lactulose, continue rifaximin  · Intravascular volume status since somewhat stable however she did have persistent abdominal distension  · On torsemide which we are continuing - now daily instead of BID    Abdominal distension was a concern she was planned for repeated paracentesis yesterday but no significant ascites noted  Anemia  Assessment & Plan  · Multifactorial     · Hemoglobin currently stable around 9   · No signs of active bleeding noted  · Hemodynamically stable  Discharging Physician / Practitioner: Shanta Dennis MD  PCP: Ruthie Reeder MD  Admission Date:   Admission Orders (From admission, onward)     Ordered        12/18/20 1709  Inpatient Admission  Once                   Discharge Date: 02/29/2020    Resolved Problems  Date Reviewed: 12/29/2020    None          Consultations During Hospital Stay:  · Nephrology, Interventional Radiology    Reason for Admission:  Abdominal pain, volume overload    Hospital Course:     Artem Mars is a 77 y o  female patient with past medical history of diastolic CHF, multiple myeloma, paroxysmal AFib not on anticoagulation, liver cirrhosis, anemia, pancytopenia, dementia, poor historian, who originally presented to the hospital on 12/18/2020 due to abdominal pain, a volume overload    CT of pelvis report noted for stable cirrhosis with ascites, stable right pleural effusion with compressive atelectasis, stable cardiomegaly  Patient was seen by IR and had successful right-sided  thoracentesis 1 2L clear patricio colored fluid drained  Fluid analysis showed slightly cloudy yellow fluid, seems like protein and LDH was not sent and   With neutrophil 27 %, lymphocytes 17%  Body fluid culture without growth  Likely transudative due to liver cirrhosis  Continue current dose of torsemide 60 mg daily, lactulose as stated above  Patient remained stable off of antibiotics  Currently afebrile, hemodynamically stable  Patient was seen by IR for paracentesis but no ascites was seen and paracentesis was not performed  Patient was also seen by Nephrology who had increased torsemide but due to elevated creatinine will continue torsemide 60 mg daily  Currently tolerating well and kidney function improved  Hemoglobin also stable after 1 unit PRBC transfusion while inpatient  Currently afebrile, hemodynamically stable  Overall long-term prognosis guarded  O2 saturation well on room air  Family had refused patient to go to rehab and requesting her to come home with VNA services  Hemodynamically stable for discharge  Patient had lengthy hospitalization therefore refer to earlier notes for further clarification  Recommended close follow-up with outpatient providers  Spoke with sister Linette Hall over the phone at length about above  Please see above list of diagnoses and related plan for additional information  Condition at Discharge:  [address]    Discharge Day Visit / Exam:     Subjective:  Afebrile, hemodynamically stable at her baseline  Poor historian  Today's labs noted grossly unremarkable for any acute finding  Tolerating torsemide well  Kidney function improved  Hemodynamically stable for discharge  No other events reported      Vitals: Blood Pressure: 118/72 (12/29/20 0810)  Pulse: 97 (12/29/20 0810)  Temperature: (!) 97 4 °F (36 3 °C) (12/29/20 0810)  Temp Source: Oral (12/23/20 0715)  Respirations: 18 (12/29/20 0810)  Height: 5' 4" (162 6 cm) (12/22/20 0859)  Weight - Scale: 75 kg (165 lb 5 5 oz)(pt is on bed rest) (12/29/20 0600)  SpO2: 95 % (12/29/20 0810)  Exam:   Physical Exam  Constitutional:       General: She is not in acute distress  Appearance: Normal appearance  Comments: Elderly female, chronically ill-appearing, acutely nontoxic appearing  HENT:      Head: Normocephalic and atraumatic  Eyes:      Extraocular Movements: Extraocular movements intact  Neck:      Musculoskeletal: Normal range of motion and neck supple  Cardiovascular:      Rate and Rhythm: Normal rate and regular rhythm  Pulses: Normal pulses  Heart sounds: Normal heart sounds  Pulmonary:      Effort: Pulmonary effort is normal  No respiratory distress  Breath sounds: Normal breath sounds  Abdominal:      General: Bowel sounds are normal  There is no distension  Palpations: Abdomen is soft  Tenderness: There is no abdominal tenderness  Musculoskeletal: Normal range of motion  General: Swelling present  Neurological:      General: No focal deficit present  Mental Status: She is alert  Mental status is at baseline  Psychiatric:         Behavior: Behavior normal        Discussion with Family:  Spoke with sister César Mariee over the phone at length  Discharge instructions/Information to patient and family:   See after visit summary for information provided to patient and family  Provisions for Follow-Up Care:  See after visit summary for information related to follow-up care and any pertinent home health orders  Disposition:     Home with VNA Services (Reminder: Complete face to face encounter)    For Discharges to [facility] SNF:   · Not Applicable to this Patient - Not Applicable to this Patient    Planned Readmission: at risk of readmission        Discharge Statement:  I spent 35 minutes discharging the patient  This time was spent on the day of discharge  I had direct contact with the patient on the day of discharge  Greater than 50% of the total time was spent examining patient, answering all patient questions, arranging and discussing plan of care with patient as well as directly providing post-discharge instructions  Additional time then spent on discharge activities  Discharge Medications:  See after visit summary for reconciled discharge medications provided to patient and family        ** Please Note: This note has been constructed using a voice recognition system **

## 2020-12-29 NOTE — ASSESSMENT & PLAN NOTE
Nursing staff reports that patient was having multiple bowel movements earlier during hospitalization  This is in the setting of lactulose use  Lactulose dose has been reduced from 20 g twice a day to 10 g twice a day given excessive number of bowel movements  Currently diarrhea improved  Outpatient follow-up

## 2020-12-29 NOTE — ASSESSMENT & PLAN NOTE
· Currently rate controlled on metoprolol  mg daily, Cardizem 120 mg daily  · Stable  · Not on anticoagulation

## 2020-12-29 NOTE — ASSESSMENT & PLAN NOTE
Patient has a chronic history of dementia and while in the hospital she does have periods of agitation, waxes and wanes  Currently on Seroquel 25mg qhs  She is currently more calm  Stable at her baseline    Family refuses rehab along the facility placement

## 2020-12-29 NOTE — ASSESSMENT & PLAN NOTE
· Multifactorial     · Hemoglobin currently stable around 9   · No signs of active bleeding noted  · Hemodynamically stable

## 2020-12-29 NOTE — PLAN OF CARE
Problem: Potential for Falls  Goal: Patient will remain free of falls  Description: INTERVENTIONS:  - Assess patient frequently for physical needs  -  Identify cognitive and physical deficits and behaviors that affect risk of falls  -  [place] fall precautions as indicated by assessment   - Educate patient/family on patient safety including physical limitations  - Instruct patient to call for assistance with activity based on assessment  - Modify environment to reduce risk of injury  - Consider OT/PT consult to assist with strengthening/mobility  Outcome: Adequate for Discharge     Problem: Prexisting or High Potential for Compromised Skin Integrity  Goal: Skin integrity is maintained or improved  Description: INTERVENTIONS:  - Identify patients at risk for skin breakdown  - Assess and monitor skin integrity  - Assess and monitor nutrition and hydration status  - Monitor labs   - Assess for incontinence   - Turn and reposition patient  - Assist with mobility/ambulation  - Relieve pressure over bony prominences  - Avoid friction and shearing  - Provide appropriate hygiene as needed including keeping skin clean and dry  - Evaluate need for skin moisturizer/barrier cream  - Collaborate with interdisciplinary team   - Patient/family teaching  - Consider wound care consult   Outcome: Adequate for Discharge     Problem: Nutrition/Hydration-ADULT  Goal: Nutrient/Hydration intake appropriate for improving, restoring or maintaining nutritional needs  Description: Monitor and assess patient's nutrition/hydration status for malnutrition  Collaborate with interdisciplinary team and initiate plan and interventions as ordered  Monitor patient's weight and dietary intake as ordered or per policy  Utilize nutrition screening tool and intervene as necessary  Determine patient's food preferences and provide high-protein, high-caloric foods as appropriate       INTERVENTIONS:  - Monitor oral intake, urinary output, labs, and treatment plans  - Assess nutrition and hydration status and recommend course of action  - Evaluate amount of meals eaten  - Assist patient with eating if necessary   - Allow adequate time for meals  - Recommend/ encourage appropriate diets, oral nutritional supplements, and vitamin/mineral supplements  - Order, calculate, and assess calorie counts as needed  - Recommend, monitor, and adjust tube feedings and TPN/PPN based on assessed needs  - Assess need for intravenous fluids  - Provide specific nutrition/hydration education as appropriate  - Include patient/family/caregiver in decisions related to nutrition  Outcome: Adequate for Discharge

## 2020-12-29 NOTE — TRANSPORTATION MEDICAL NECESSITY
Section I - General Information    Name of Patient: Rogelio Sparrow                 : 1945    Medicare #: [id number]  Transport Date: 20 (PCS is valid for round trips on this date and for all repetitive trips in the 60-day range as noted below )  Origin: [facility]: residence  Is the pt's stay covered under Medicare Part A (PPS/DRG)   [x]     Closest appropriate facility? If no, why is transport to more distant facility required? Yes  If hospice pt, is this transport related to pt's terminal illness? No       Section II - Medical Necessity Questionnaire  Ambulance transportation is medically necessary only if other means of transport are contraindicated or would be potentially harmful to the patient  To meet this requirement, the patient must either be "bed confined" or suffer from a condition such that transport by means other than ambulance is contraindicated by the patient's condition  The following questions must be answered by the medical professional signing below for this form to be valid:    1)  Describe the MEDICAL CONDITION (physical and/or mental) of this patient AT [address] [place] [address] that requires the patient to be transported in an ambulance and why transport by other means is contraindicated by the patient's condition:CKD, ANEMIA LIVER CIRRHOSIS, SACRAL WOUND, AMBULATORY DYSFUNCTION ,     2) Is the patient "bed confined" as defined below? Yes  To be "be confined" the patient must satisfy all three of the following conditions: (1) unable to get up from bed without Assistance; AND (2) unable to ambulate; AND (3) unable to sit in a chair or wheelchair  3) Can this patient safely be transported by car or wheelchair van (i e , seated during transport without a medical attendant or monitoring)?    No    4) In addition to completing questions 1-3 above, please check any of the following conditions that apply*:   *Note: supporting documentation for any boxes checked must be maintained in the patient's medical records  If hosp-hosp transfer, describe services needed at 2nd facility not available at 1st facility? Patient is confused  Moderate/severe pain on movement   Medical attendant required   Unable to tolerate seated position for time needed to transport   Unable to sit in a chair or wheelchair due to decubitus ulcers or other wounds       Section III - Signature of Physician or Healthcare Professional  I certify that the above information is true and correct based on my evaluation of this patient, and represent that the patient requires transport by ambulance and that other forms of transport are contraindicated  I understand that this information will be used by the Centers for Medicare and Medicaid Services (CMS) to support the determination of medical necessity for ambulance services, and I represent that I have personal knowledge of the patient's condition at time of transport  [x]  If this box is checked, I also certify that the patient is physically or mentally incapable of signing the ambulance service's claim and that the institution with which I am affiliated has furnished care, services, or assistance to the patient  My signature below is made on behalf of the patient pursuant to 42 CFR §424 36(b)(4)  In accordance with 42 CFR §424 37, the specific reason(s) that the patient is physically or mentally incapable of signing the claim form is as follows: CONFUSION        Signature of Physician* or Healthcare Professional______________________________________________________________  Signature Date 12/29/20 (For scheduled repetitive transports, this form is not valid for transports performed more than 60 days after this date)    Printed Name & Credentials of Physician or Healthcare Professional (MD, DO, RN, etc )_JUNE ILIA BALDERAS CM _______________________________  *Form must be signed by patient's attending physician for scheduled, repetitive transports   For non-repetitive, unscheduled ambulance transports, if unable to obtain the signature of the attending physician, any of the following may sign (choose appropriate option below)  [] Physician Assistant []  Clinical Nurse Specialist [x]  Registered Nurse  []  Nurse Practitioner  [x] Discharge Planner

## 2020-12-29 NOTE — ASSESSMENT & PLAN NOTE
Lab Results   Component Value Date    EGFR 32 12/29/2020    EGFR 27 12/28/2020    EGFR 21 12/27/2020    CREATININE 1 79 (H) 12/29/2020    CREATININE 2 07 (H) 12/28/2020    CREATININE 2 47 (H) 12/27/2020     Baseline creatinine appears to be between 1 6 and 2 0  Was mostly at baseline however on 12/27 it was noted 2 4  Has received extra albumin with slightly improving creatinine  Continue torsemide daily for now instead of b i d  Now creatinine improving today 1 69  Avoid nephrotoxic agent  Stable for discharge  Recommended repeat BMP within 1 week to monitor renal function on outpatient basis

## 2020-12-29 NOTE — ASSESSMENT & PLAN NOTE
Diagnosed earlier this year and patient is currently under care of Oncology at [facility]  Continue outpatient follow-up

## 2020-12-30 ENCOUNTER — PATIENT OUTREACH (OUTPATIENT)
Dept: INTERNAL MEDICINE CLINIC | Facility: CLINIC | Age: 75
End: 2020-12-30

## 2020-12-30 DIAGNOSIS — Z71.89 COMPLEX CARE COORDINATION: Primary | ICD-10-CM

## 2020-12-31 ENCOUNTER — TELEMEDICINE (OUTPATIENT)
Dept: INTERNAL MEDICINE CLINIC | Facility: CLINIC | Age: 75
End: 2020-12-31
Payer: MEDICARE

## 2020-12-31 DIAGNOSIS — N18.4 CHRONIC KIDNEY DISEASE (CKD), ACTIVE MEDICAL MANAGEMENT WITHOUT DIALYSIS, STAGE 4 (SEVERE) (HCC): ICD-10-CM

## 2020-12-31 DIAGNOSIS — C90.01 MULTIPLE MYELOMA IN REMISSION (HCC): ICD-10-CM

## 2020-12-31 DIAGNOSIS — R26.9 GAIT DISTURBANCE: ICD-10-CM

## 2020-12-31 DIAGNOSIS — F10.27 DEMENTIA ASSOCIATED WITH ALCOHOLISM WITH BEHAVIORAL DISTURBANCE (HCC): ICD-10-CM

## 2020-12-31 DIAGNOSIS — K70.31 ALCOHOLIC CIRRHOSIS OF LIVER WITH ASCITES (HCC): Primary | ICD-10-CM

## 2020-12-31 DIAGNOSIS — E66.9 OBESITY (BMI 30.0-34.9): ICD-10-CM

## 2020-12-31 DIAGNOSIS — R45.1 AGITATION: ICD-10-CM

## 2020-12-31 DIAGNOSIS — I27.20 PULMONARY HYPERTENSION (HCC): ICD-10-CM

## 2020-12-31 PROBLEM — I10 BENIGN ESSENTIAL HYPERTENSION: Status: RESOLVED | Noted: 2017-11-16 | Resolved: 2020-12-31

## 2020-12-31 PROCEDURE — 99496 TRANSJ CARE MGMT HIGH F2F 7D: CPT | Performed by: PHYSICIAN ASSISTANT

## 2020-12-31 RX ORDER — QUETIAPINE FUMARATE 25 MG/1
50 TABLET, FILM COATED ORAL
Qty: 30 TABLET | Refills: 6
Start: 2020-12-31 | End: 2021-02-22

## 2021-01-14 ENCOUNTER — PATIENT OUTREACH (OUTPATIENT)
Dept: INTERNAL MEDICINE CLINIC | Facility: CLINIC | Age: 76
End: 2021-01-14

## 2021-01-14 ENCOUNTER — TELEPHONE (OUTPATIENT)
Dept: INTERNAL MEDICINE CLINIC | Facility: CLINIC | Age: 76
End: 2021-01-14

## 2021-01-14 DIAGNOSIS — F33.41 RECURRENT MAJOR DEPRESSIVE DISORDER, IN PARTIAL REMISSION (HCC): ICD-10-CM

## 2021-01-14 DIAGNOSIS — N18.4 CHRONIC KIDNEY DISEASE (CKD), ACTIVE MEDICAL MANAGEMENT WITHOUT DIALYSIS, STAGE 4 (SEVERE) (HCC): Primary | ICD-10-CM

## 2021-01-14 RX ORDER — MIRTAZAPINE 15 MG/1
15 TABLET, FILM COATED ORAL
Qty: 90 TABLET | Refills: 1 | Status: SHIPPED | OUTPATIENT
Start: 2021-01-14

## 2021-01-14 NOTE — TELEPHONE ENCOUNTER
Pt was d/c from [address] [place] [address] on 12/29/20, her sister states she is supposed to have labs to be done a month after her d/c  Do not see any active labs besides a standing order  Wants to know if Dr Ben Novak could order labs to be done this Saturday       No need to call back unless any questions  [phone]

## 2021-01-14 NOTE — PROGRESS NOTES
Telephone call placed to María Garcia to provide follow up communication  Patient was admitted to [facility] on 12/18-12/20 due to fluid overload and she was discharged to home with [facility]  César Mariee informed me that patient has not slept in 36 hrs and "she is at her witts end"  Patient is only sleeping 15 minutes at a time  She reports that patient has been screaming when she awakes and is calling for Jona  Patient is also wandering around the house and she has the front door blocked and has installed cameras  César Mariee reports that this is all new behavior since her discharge from the hospital   She  informed me that patient 's  Visiting nurse checked patient and her vitals signs are stable  Patient is not retaining any fluid  César Mariee did not notify patient's PCP but informed me that patient's son is planning to contact Dr Liyah Hansen  I asked César Mariee if she heard anything  about the waiver and she was very upset and sounded as if she was crying  She reports that she received a packet but it did not have any information regarding documents to submit to [facility]  César Mariee denies receiving a letter from [facility]  However, she called the [facility] and was informed that patient's case was now closed  I informed César Mariee that I would call Lorena Almodovar from the [address] [place] [address] for further information  Telephone call placed to Lorena Almodovar who confirms that patient' s case was closed on 12/30 April reports that the letter from [facility] was sent on 12/2/2020  Patient was asked to submit two years (24 months of bank statements) and two months of statements for 2015,16,17 , and 25  She also needs to submit the cash value for her Life insurance policy  Patient still has 60 days since the closure of her case to submit these documents to the [facility] office  Patient's case record is # [id number]  April also sent me the letter via my e-mail for review  I called César Mariee back but received but she did not answer   I left a VM with this information  I also informed her that I would send this letter to her e-mail address if she provides it  Jamilah Garzon also wanted me to ask RN PENG Dickson to call her back  She was with patient at a Cardiology visit when she called her  I informed her that I would give [place] the message  I will continue to follow and provide on going assistance and support

## 2021-01-16 ENCOUNTER — APPOINTMENT (OUTPATIENT)
Dept: LAB | Facility: CLINIC | Age: 76
End: 2021-01-16
Payer: MEDICARE

## 2021-01-16 DIAGNOSIS — D64.9 ANEMIA, UNSPECIFIED TYPE: ICD-10-CM

## 2021-01-16 DIAGNOSIS — N18.4 CHRONIC KIDNEY DISEASE (CKD), ACTIVE MEDICAL MANAGEMENT WITHOUT DIALYSIS, STAGE 4 (SEVERE) (HCC): ICD-10-CM

## 2021-01-16 DIAGNOSIS — N18.32 STAGE 3B CHRONIC KIDNEY DISEASE (HCC): ICD-10-CM

## 2021-01-16 LAB
25(OH)D3 SERPL-MCNC: 20.6 NG/ML (ref 30–100)
ALBUMIN SERPL BCP-MCNC: 3.1 G/DL (ref 3.5–5)
ALP SERPL-CCNC: 354 U/L (ref 46–116)
ALT SERPL W P-5'-P-CCNC: 21 U/L (ref 12–78)
ANION GAP SERPL CALCULATED.3IONS-SCNC: 6 MMOL/L (ref 4–13)
AST SERPL W P-5'-P-CCNC: 31 U/L (ref 5–45)
BASOPHILS # BLD AUTO: 0.01 THOUSANDS/ΜL (ref 0–0.1)
BASOPHILS NFR BLD AUTO: 0 % (ref 0–1)
BILIRUB SERPL-MCNC: 1.24 MG/DL (ref 0.2–1)
BUN SERPL-MCNC: 47 MG/DL (ref 5–25)
CALCIUM ALBUM COR SERPL-MCNC: 10.2 MG/DL (ref 8.3–10.1)
CALCIUM SERPL-MCNC: 9.5 MG/DL (ref 8.3–10.1)
CHLORIDE SERPL-SCNC: 100 MMOL/L (ref 100–108)
CO2 SERPL-SCNC: 27 MMOL/L (ref 21–32)
CREAT SERPL-MCNC: 3.09 MG/DL (ref 0.6–1.3)
EOSINOPHIL # BLD AUTO: 0.04 THOUSAND/ΜL (ref 0–0.61)
EOSINOPHIL NFR BLD AUTO: 1 % (ref 0–6)
ERYTHROCYTE [DISTWIDTH] IN BLOOD BY AUTOMATED COUNT: 19.6 % (ref 11.6–15.1)
FERRITIN SERPL-MCNC: 3688 NG/ML (ref 8–388)
GFR SERPL CREATININE-BSD FRML MDRD: 16 ML/MIN/1.73SQ M
GLUCOSE P FAST SERPL-MCNC: 128 MG/DL (ref 65–99)
HCT VFR BLD AUTO: 24.9 % (ref 34.8–46.1)
HGB BLD-MCNC: 7.6 G/DL (ref 11.5–15.4)
IMM GRANULOCYTES # BLD AUTO: 0.07 THOUSAND/UL (ref 0–0.2)
IMM GRANULOCYTES NFR BLD AUTO: 2 % (ref 0–2)
IRON SATN MFR SERPL: 87 %
IRON SERPL-MCNC: 181 UG/DL (ref 50–170)
LYMPHOCYTES # BLD AUTO: 0.94 THOUSANDS/ΜL (ref 0.6–4.47)
LYMPHOCYTES NFR BLD AUTO: 27 % (ref 14–44)
MCH RBC QN AUTO: 27.4 PG (ref 26.8–34.3)
MCHC RBC AUTO-ENTMCNC: 30.5 G/DL (ref 31.4–37.4)
MCV RBC AUTO: 90 FL (ref 82–98)
MONOCYTES # BLD AUTO: 0.43 THOUSAND/ΜL (ref 0.17–1.22)
MONOCYTES NFR BLD AUTO: 13 % (ref 4–12)
NEUTROPHILS # BLD AUTO: 1.94 THOUSANDS/ΜL (ref 1.85–7.62)
NEUTS SEG NFR BLD AUTO: 57 % (ref 43–75)
NRBC BLD AUTO-RTO: 2 /100 WBCS
PHOSPHATE SERPL-MCNC: 3.6 MG/DL (ref 2.3–4.1)
PLATELET # BLD AUTO: 78 THOUSANDS/UL (ref 149–390)
POTASSIUM SERPL-SCNC: 5.7 MMOL/L (ref 3.5–5.3)
PROT SERPL-MCNC: 6.7 G/DL (ref 6.4–8.2)
PTH-INTACT SERPL-MCNC: 96.1 PG/ML (ref 18.4–80.1)
RBC # BLD AUTO: 2.77 MILLION/UL (ref 3.81–5.12)
SODIUM SERPL-SCNC: 133 MMOL/L (ref 136–145)
TIBC SERPL-MCNC: 209 UG/DL (ref 250–450)
WBC # BLD AUTO: 3.43 THOUSAND/UL (ref 4.31–10.16)

## 2021-01-16 PROCEDURE — 83970 ASSAY OF PARATHORMONE: CPT

## 2021-01-16 PROCEDURE — 85025 COMPLETE CBC W/AUTO DIFF WBC: CPT

## 2021-01-16 PROCEDURE — 82728 ASSAY OF FERRITIN: CPT

## 2021-01-16 PROCEDURE — 83550 IRON BINDING TEST: CPT

## 2021-01-16 PROCEDURE — 82306 VITAMIN D 25 HYDROXY: CPT

## 2021-01-16 PROCEDURE — 36415 COLL VENOUS BLD VENIPUNCTURE: CPT

## 2021-01-16 PROCEDURE — 84100 ASSAY OF PHOSPHORUS: CPT

## 2021-01-16 PROCEDURE — 83540 ASSAY OF IRON: CPT

## 2021-01-16 PROCEDURE — 80053 COMPREHEN METABOLIC PANEL: CPT

## 2021-01-18 ENCOUNTER — HOSPITAL ENCOUNTER (INPATIENT)
Facility: HOSPITAL | Age: 76
LOS: 3 days | Discharge: HOME HEALTH | DRG: 682 | End: 2021-01-22
Attending: EMERGENCY MEDICINE | Admitting: INTERNAL MEDICINE
Payer: MEDICARE

## 2021-01-18 ENCOUNTER — TELEPHONE (OUTPATIENT)
Dept: INTERNAL MEDICINE CLINIC | Facility: CLINIC | Age: 76
End: 2021-01-18

## 2021-01-18 ENCOUNTER — PATIENT OUTREACH (OUTPATIENT)
Dept: INTERNAL MEDICINE CLINIC | Facility: CLINIC | Age: 76
End: 2021-01-18

## 2021-01-18 DIAGNOSIS — F03.91 DEMENTIA WITH BEHAVIORAL DISTURBANCE, UNSPECIFIED DEMENTIA TYPE (HCC): ICD-10-CM

## 2021-01-18 DIAGNOSIS — N17.9 ACUTE KIDNEY INJURY SUPERIMPOSED ON CHRONIC KIDNEY DISEASE (HCC): ICD-10-CM

## 2021-01-18 DIAGNOSIS — N17.9 ACUTE KIDNEY INJURY (HCC): Primary | ICD-10-CM

## 2021-01-18 DIAGNOSIS — N39.0 UTI (URINARY TRACT INFECTION): ICD-10-CM

## 2021-01-18 DIAGNOSIS — N18.9 ACUTE KIDNEY INJURY SUPERIMPOSED ON CHRONIC KIDNEY DISEASE (HCC): ICD-10-CM

## 2021-01-18 DIAGNOSIS — E87.5 HYPERKALEMIA: ICD-10-CM

## 2021-01-18 DIAGNOSIS — G92.8 TOXIC METABOLIC ENCEPHALOPATHY: ICD-10-CM

## 2021-01-18 PROBLEM — I50.40 COMBINED SYSTOLIC AND DIASTOLIC HEART FAILURE (HCC): Status: ACTIVE | Noted: 2021-01-18

## 2021-01-18 LAB
ALBUMIN SERPL BCP-MCNC: 3.2 G/DL (ref 3.5–5)
ALP SERPL-CCNC: 355 U/L (ref 46–116)
ALT SERPL W P-5'-P-CCNC: 20 U/L (ref 12–78)
ANION GAP SERPL CALCULATED.3IONS-SCNC: 11 MMOL/L (ref 4–13)
AST SERPL W P-5'-P-CCNC: 32 U/L (ref 5–45)
BASOPHILS # BLD AUTO: 0.01 THOUSANDS/ΜL (ref 0–0.1)
BASOPHILS NFR BLD AUTO: 0 % (ref 0–1)
BILIRUB DIRECT SERPL-MCNC: 0.9 MG/DL (ref 0–0.2)
BILIRUB SERPL-MCNC: 1.3 MG/DL (ref 0.2–1)
BILIRUB UR QL STRIP: NEGATIVE
BUN SERPL-MCNC: 45 MG/DL (ref 5–25)
CALCIUM SERPL-MCNC: 9.6 MG/DL (ref 8.3–10.1)
CHLORIDE SERPL-SCNC: 99 MMOL/L (ref 100–108)
CLARITY UR: CLEAR
CO2 SERPL-SCNC: 27 MMOL/L (ref 21–32)
COLOR UR: YELLOW
CREAT SERPL-MCNC: 2.6 MG/DL (ref 0.6–1.3)
EOSINOPHIL # BLD AUTO: 0.04 THOUSAND/ΜL (ref 0–0.61)
EOSINOPHIL NFR BLD AUTO: 1 % (ref 0–6)
ERYTHROCYTE [DISTWIDTH] IN BLOOD BY AUTOMATED COUNT: 20.4 % (ref 11.6–15.1)
GFR SERPL CREATININE-BSD FRML MDRD: 20 ML/MIN/1.73SQ M
GLUCOSE SERPL-MCNC: 120 MG/DL (ref 65–140)
GLUCOSE UR STRIP-MCNC: NEGATIVE MG/DL
HCT VFR BLD AUTO: 24.7 % (ref 34.8–46.1)
HGB BLD-MCNC: 7.5 G/DL (ref 11.5–15.4)
HGB UR QL STRIP.AUTO: NEGATIVE
IMM GRANULOCYTES # BLD AUTO: 0.06 THOUSAND/UL (ref 0–0.2)
IMM GRANULOCYTES NFR BLD AUTO: 2 % (ref 0–2)
KETONES UR STRIP-MCNC: NEGATIVE MG/DL
LEUKOCYTE ESTERASE UR QL STRIP: NEGATIVE
LYMPHOCYTES # BLD AUTO: 0.89 THOUSANDS/ΜL (ref 0.6–4.47)
LYMPHOCYTES NFR BLD AUTO: 24 % (ref 14–44)
MAGNESIUM SERPL-MCNC: 2.1 MG/DL (ref 1.6–2.6)
MCH RBC QN AUTO: 27.5 PG (ref 26.8–34.3)
MCHC RBC AUTO-ENTMCNC: 30.4 G/DL (ref 31.4–37.4)
MCV RBC AUTO: 91 FL (ref 82–98)
MONOCYTES # BLD AUTO: 0.47 THOUSAND/ΜL (ref 0.17–1.22)
MONOCYTES NFR BLD AUTO: 13 % (ref 4–12)
NEUTROPHILS # BLD AUTO: 2.29 THOUSANDS/ΜL (ref 1.85–7.62)
NEUTS SEG NFR BLD AUTO: 60 % (ref 43–75)
NITRITE UR QL STRIP: NEGATIVE
NRBC BLD AUTO-RTO: 2 /100 WBCS
PH UR STRIP.AUTO: 7.5 [PH]
PHOSPHATE SERPL-MCNC: 4 MG/DL (ref 2.3–4.1)
PLATELET # BLD AUTO: 81 THOUSANDS/UL (ref 149–390)
PMV BLD AUTO: 12.8 FL (ref 8.9–12.7)
POTASSIUM SERPL-SCNC: 5 MMOL/L (ref 3.5–5.3)
PROT SERPL-MCNC: 6.7 G/DL (ref 6.4–8.2)
PROT UR STRIP-MCNC: NEGATIVE MG/DL
RBC # BLD AUTO: 2.73 MILLION/UL (ref 3.81–5.12)
SODIUM SERPL-SCNC: 137 MMOL/L (ref 136–145)
SP GR UR STRIP.AUTO: 1.01 (ref 1–1.03)
UROBILINOGEN UR QL STRIP.AUTO: 0.2 E.U./DL
WBC # BLD AUTO: 3.76 THOUSAND/UL (ref 4.31–10.16)

## 2021-01-18 PROCEDURE — 99220 PR INITIAL OBSERVATION CARE/DAY 70 MINUTES: CPT | Performed by: STUDENT IN AN ORGANIZED HEALTH CARE EDUCATION/TRAINING PROGRAM

## 2021-01-18 PROCEDURE — 99285 EMERGENCY DEPT VISIT HI MDM: CPT

## 2021-01-18 PROCEDURE — 81003 URINALYSIS AUTO W/O SCOPE: CPT | Performed by: PHYSICIAN ASSISTANT

## 2021-01-18 PROCEDURE — 36415 COLL VENOUS BLD VENIPUNCTURE: CPT | Performed by: PHYSICIAN ASSISTANT

## 2021-01-18 PROCEDURE — 80076 HEPATIC FUNCTION PANEL: CPT | Performed by: PHYSICIAN ASSISTANT

## 2021-01-18 PROCEDURE — 99285 EMERGENCY DEPT VISIT HI MDM: CPT | Performed by: PHYSICIAN ASSISTANT

## 2021-01-18 PROCEDURE — 93005 ELECTROCARDIOGRAM TRACING: CPT

## 2021-01-18 PROCEDURE — 84100 ASSAY OF PHOSPHORUS: CPT | Performed by: PHYSICIAN ASSISTANT

## 2021-01-18 PROCEDURE — 83735 ASSAY OF MAGNESIUM: CPT | Performed by: PHYSICIAN ASSISTANT

## 2021-01-18 PROCEDURE — 80048 BASIC METABOLIC PNL TOTAL CA: CPT | Performed by: PHYSICIAN ASSISTANT

## 2021-01-18 PROCEDURE — 85025 COMPLETE CBC W/AUTO DIFF WBC: CPT | Performed by: PHYSICIAN ASSISTANT

## 2021-01-18 RX ORDER — HEPARIN SODIUM 5000 [USP'U]/ML
5000 INJECTION, SOLUTION INTRAVENOUS; SUBCUTANEOUS EVERY 8 HOURS SCHEDULED
Status: DISCONTINUED | OUTPATIENT
Start: 2021-01-18 | End: 2021-01-20

## 2021-01-18 RX ORDER — NICOTINE 21 MG/24HR
1 PATCH, TRANSDERMAL 24 HOURS TRANSDERMAL DAILY
Status: DISCONTINUED | OUTPATIENT
Start: 2021-01-19 | End: 2021-01-22 | Stop reason: HOSPADM

## 2021-01-18 RX ORDER — OLANZAPINE 5 MG/1
10 TABLET, ORALLY DISINTEGRATING ORAL ONCE
Status: COMPLETED | OUTPATIENT
Start: 2021-01-18 | End: 2021-01-18

## 2021-01-18 RX ORDER — ONDANSETRON 2 MG/ML
4 INJECTION INTRAMUSCULAR; INTRAVENOUS EVERY 6 HOURS PRN
Status: DISCONTINUED | OUTPATIENT
Start: 2021-01-18 | End: 2021-01-22 | Stop reason: HOSPADM

## 2021-01-18 RX ORDER — MIRTAZAPINE 15 MG/1
15 TABLET, FILM COATED ORAL
Status: DISCONTINUED | OUTPATIENT
Start: 2021-01-18 | End: 2021-01-22 | Stop reason: HOSPADM

## 2021-01-18 RX ORDER — CALCIUM CARBONATE 200(500)MG
1000 TABLET,CHEWABLE ORAL DAILY PRN
Status: DISCONTINUED | OUTPATIENT
Start: 2021-01-18 | End: 2021-01-22 | Stop reason: HOSPADM

## 2021-01-18 RX ORDER — DILTIAZEM HYDROCHLORIDE 120 MG/1
120 CAPSULE, COATED, EXTENDED RELEASE ORAL DAILY
Status: DISCONTINUED | OUTPATIENT
Start: 2021-01-19 | End: 2021-01-20

## 2021-01-18 RX ORDER — DOCUSATE SODIUM 100 MG/1
100 CAPSULE, LIQUID FILLED ORAL 2 TIMES DAILY
Status: DISCONTINUED | OUTPATIENT
Start: 2021-01-18 | End: 2021-01-20

## 2021-01-18 RX ORDER — METOPROLOL SUCCINATE 100 MG/1
100 TABLET, EXTENDED RELEASE ORAL DAILY
Status: DISCONTINUED | OUTPATIENT
Start: 2021-01-19 | End: 2021-01-20

## 2021-01-18 RX ORDER — SENNOSIDES 8.6 MG
1 TABLET ORAL DAILY
Status: DISCONTINUED | OUTPATIENT
Start: 2021-01-19 | End: 2021-01-20

## 2021-01-18 RX ORDER — FOLIC ACID 1 MG/1
1000 TABLET ORAL DAILY
Status: DISCONTINUED | OUTPATIENT
Start: 2021-01-19 | End: 2021-01-22 | Stop reason: HOSPADM

## 2021-01-18 RX ORDER — ACETAMINOPHEN 325 MG/1
650 TABLET ORAL EVERY 6 HOURS PRN
Status: DISCONTINUED | OUTPATIENT
Start: 2021-01-18 | End: 2021-01-22 | Stop reason: HOSPADM

## 2021-01-18 RX ADMIN — SODIUM CHLORIDE 500 ML: 0.9 INJECTION, SOLUTION INTRAVENOUS at 14:50

## 2021-01-18 RX ADMIN — HEPARIN SODIUM 5000 UNITS: 5000 INJECTION INTRAVENOUS; SUBCUTANEOUS at 22:46

## 2021-01-18 RX ADMIN — OLANZAPINE 10 MG: 5 TABLET, ORALLY DISINTEGRATING ORAL at 17:00

## 2021-01-18 NOTE — PROGRESS NOTES
Letter from the Department of Human services was sent to patient via her e-mail as requested by patient's sister, Tressa Romulo  This letter indicates the financial information that is requested for patient to be considered for waiver

## 2021-01-18 NOTE — ED PROVIDER NOTES
History  Chief Complaint   Patient presents with    Medical Problem     SENT BY PRIMARY FOR ABNORMAL LABS NO COMPLAINTS      52-year-old female with history of dementia, cirrhosis, congestive heart failure, CKD, hypertension, and multiple myeloma presenting via EMS for abnormal outpatient labs  Patient has no complaints at this time and is unaware of why she is in the hospital   Patient was sent for lab work 2 days ago which revealed a creatinine of 3 09 which is increased from 1 79 at time of discharge on 12/29/2020  Potassium was also mildly elevated at 5 7  Patient was apparently called by her PCP today and referred to the ER for evaluation  Patient had a recent prolonged hospitalization for volume overload  Her diuretics were increased temporarily but this resulted in worsening renal function so her torsemide was ultimately decreased to once daily  History provided by:  Medical records, relative and patient  History limited by:  Dementia   used: No    Evaluation of Abnormal Diagnostic Test  Time since result:  2 days  Patient referred by:  PCP  Resulting agency:  [facility]  Result type: chemistry    Chemistry:     Potassium:  High    Creatinine:  High  Medical Problem  Severity:  Moderate  Timing:  Constant  Chronicity:  New  Context:  Abnormal outpatient labs  Sent to ED by her PCP  Relieved by:  Nothing  Worsened by:  Nothing      Prior to Admission Medications   Prescriptions Last Dose Informant Patient Reported? Taking?    QUEtiapine (SEROquel) 25 mg tablet   No No   Sig: Take 2 tablets (50 mg total) by mouth daily at bedtime   Thiamine HCl (vitamin B-1) 100 MG TABS  Family Member No No   Sig: TAKE ONE TABLET BY MOUTH EVERY DAY   diltiazem (CARDIZEM CD) 120 mg 24 hr capsule   No No   Sig: Take 1 capsule (120 mg total) by mouth daily   folic acid (FOLVITE) 1 mg tablet  Family Member No No   Sig: TAKE ONE TABLET BY MOUTH EVERY DAY   lactulose 20 g/30 mL   No No   Sig: Take 15 mL (10 g total) by mouth 2 (two) times a day   magnesium oxide (MAG-OX) 400 mg tablet  Family Member No No   Sig: TAKE ONE TABLET BY MOUTH TWICE A DAY   metoprolol succinate (TOPROL-XL) 100 mg 24 hr tablet   No No   Sig: Take 1 tablet (100 mg total) by mouth daily   mirtazapine (REMERON) 15 mg tablet   No No   Sig: Take 1 tablet (15 mg total) by mouth daily at bedtime   nicotine (NICODERM CQ) 14 mg/24hr TD 24 hr patch  Family Member No No   Sig: Place 1 patch on the skin daily   Patient taking differently: Place 1 patch on the skin as needed    nystatin (MYCOSTATIN) powder  Family Member No No   Sig: Apply topically 2 (two) times a day   pantoprazole (PROTONIX) 40 mg tablet  Family Member No No   Sig: TAKE ONE TABLET BY MOUTH EVERY DAY IN THE MORNING   potassium chloride (K-DUR,KLOR-CON) 20 mEq tablet   No No   Sig: Take 1 tablet (20 mEq total) by mouth 2 (two) times a day   sodium chloride (OCEAN) 0 65 % nasal spray  Family Member No No   Si spray into each nostril every hour as needed for congestion   torsemide (DEMADEX) 20 mg tablet   No No   Sig: Take 3 tablets (60 mg total) by mouth daily      Facility-Administered Medications: None       Past Medical History:   Diagnosis Date    Benign essential hypertension     last assessed - [id number]    Chest pain 2017    CHF (congestive heart failure) (HCC)     Chronic kidney disease     Cirrhosis (HCC)     Elevated troponin 2020    Gastroesophageal reflux disease without esophagitis 2017    Hyperbilirubinemia 2020    Hypertension     Liver disease     [facility] myeloma ([facility] [address] )     Pneumonia 2020    Renal failure 2020       Past Surgical History:   Procedure Laterality Date    APPENDECTOMY      BONE MARROW BIOPSY      IR PARACENTESIS  10/2/2020    IR PARACENTESIS  2020    IR THORACENTESIS  2020       Family History   Problem Relation Age of Onset    Heart attack Father         myocardial infarction    Heart disease Father      I have reviewed and agree with the history as documented  E-Cigarette/Vaping    E-Cigarette Use Never User     Start Date 1/1/15     Quit Date 1/24/15      E-Cigarette/Vaping Substances    Nicotine No     THC No     CBD No     Flavoring No     Other No     Unknown No      Social History     Tobacco Use    Smoking status: Light Tobacco Smoker     Packs/day: 0 25     Types: Cigarettes    Smokeless tobacco: Never Used    Tobacco comment: 1/2 pack per month   Substance Use Topics    Alcohol use: Not Currently     Frequency: 4 or more times a week     Drinks per session: 1 or 2     Binge frequency: Never     Comment: History of significant daily alcohol intake  Sister joy no alcohol intake in 6 months    Drug use: No       Review of Systems   Unable to perform ROS: Dementia       Physical Exam  Physical Exam  Vitals signs and nursing note reviewed  Constitutional:       General: She is not in acute distress  Appearance: She is well-developed  She is not diaphoretic  HENT:      Head: Normocephalic and atraumatic  Right Ear: External ear normal       Left Ear: External ear normal       Nose: Nose normal    Eyes:      General: No scleral icterus  Right eye: No discharge  Left eye: No discharge  Conjunctiva/sclera: Conjunctivae normal    Neck:      Musculoskeletal: Normal range of motion and neck supple  Cardiovascular:      Rate and Rhythm: Normal rate and regular rhythm  Heart sounds: Normal heart sounds  No murmur  Pulmonary:      Effort: Pulmonary effort is normal  No respiratory distress  Breath sounds: Normal breath sounds  No stridor  No wheezing or rales  Abdominal:      General: Bowel sounds are normal  There is distension  Palpations: Abdomen is soft  Tenderness: There is no abdominal tenderness  There is no guarding  Musculoskeletal: Normal range of motion  General: No deformity  Right lower leg: No edema  Left lower leg: No edema  Lymphadenopathy:      Cervical: No cervical adenopathy  Skin:     General: Skin is warm and dry  Neurological:      Mental Status: She is alert  She is not disoriented  GCS: GCS eye subscore is 4  GCS verbal subscore is 4  GCS motor subscore is 6  Comments: Pleasantly confused  No focal neurologic deficits     Psychiatric:         Behavior: Behavior normal          Vital Signs  ED Triage Vitals [01/18/21 1309]   Temperature Pulse Respirations Blood Pressure SpO2   98 3 °F (36 8 °C) 97 22 115/82 99 %      Temp Source Heart Rate Source Patient Position - Orthostatic VS BP Location FiO2 (%)   Oral Monitor Lying Right arm --      Pain Score       --           Vitals:    01/18/21 1309 01/18/21 1400   BP: 115/82 119/68   Pulse: 97 (!) 107   Patient Position - Orthostatic VS: Lying          Visual Acuity      ED Medications  Medications   sodium chloride 0 9 % bolus 500 mL (500 mL Intravenous New Bag 1/18/21 1450)       Diagnostic Studies  Results Reviewed     Procedure Component Value Units Date/Time    Phosphorus [[id number]]  (Normal) Collected: 01/18/21 1421    Lab Status: Final result Specimen: Blood from Arm, Right Updated: 01/18/21 1451     Phosphorus 4 0 mg/dL     Basic metabolic panel [[id number]]  (Abnormal) Collected: 01/18/21 1421    Lab Status: Final result Specimen: Blood from Arm, Right Updated: 01/18/21 1451     Sodium 137 mmol/L      Potassium 5 0 mmol/L      Chloride 99 mmol/L      CO2 27 mmol/L      ANION GAP 11 mmol/L      BUN 45 mg/dL      Creatinine 2 60 mg/dL      Glucose 120 mg/dL      Calcium 9 6 mg/dL      eGFR 20 ml/min/1 73sq m     Narrative:      [place] guidelines for Chronic Kidney Disease (CKD):     Stage 1 with normal or high GFR (GFR > 90 mL/min/1 73 square meters)    Stage 2 Mild CKD (GFR = 60-89 mL/min/1 73 square meters)    Stage 3A Moderate CKD (GFR = 45-59 mL/min/1 73 square meters)    Stage 3B Moderate CKD (GFR = 30-44 mL/min/1 73 square meters)    Stage 4 Severe CKD (GFR = 15-29 mL/min/1 73 square meters)    Stage 5 End Stage CKD (GFR <15 mL/min/1 73 square meters)  Note: GFR calculation is accurate only with a steady state creatinine    Hepatic function panel [[id number]]  (Abnormal) Collected: 01/18/21 1421    Lab Status: Final result Specimen: Blood from Arm, Right Updated: 01/18/21 1451     Total Bilirubin 1 30 mg/dL      Bilirubin, Direct 0 90 mg/dL      Alkaline Phosphatase 355 U/L      AST 32 U/L      ALT 20 U/L      Total Protein 6 7 g/dL      Albumin 3 2 g/dL     Magnesium [[id number]]  (Normal) Collected: 01/18/21 1421    Lab Status: Final result Specimen: Blood from Arm, Right Updated: 01/18/21 1451     Magnesium 2 1 mg/dL     UA w Reflex to Microscopic w Reflex to Culture [[id number]] Collected: 01/18/21 1439    Lab Status: Final result Specimen: Urine, Clean Catch Updated: 01/18/21 1445     Color, UA Yellow     Clarity, UA Clear     Specific Gravity, UA 1 015     pH, UA 7 5     Leukocytes, UA Negative     Nitrite, UA Negative     Protein, UA Negative mg/dl      Glucose, UA Negative mg/dl      Ketones, UA Negative mg/dl      Urobilinogen, UA 0 2 E U /dl      Bilirubin, UA Negative     Blood, UA Negative    CBC and differential [[id number]]  (Abnormal) Collected: 01/18/21 1329    Lab Status: Final result Specimen: Blood from Arm, Right Updated: 01/18/21 1420     WBC 3 76 Thousand/uL      RBC 2 73 Million/uL      Hemoglobin 7 5 g/dL      Hematocrit 24 7 %      MCV 91 fL      MCH 27 5 pg      MCHC 30 4 g/dL      RDW 20 4 %      MPV 12 8 fL      Platelets 81 Thousands/uL      nRBC 2 /100 WBCs      Neutrophils Relative 60 %      Immat GRANS % 2 %      Lymphocytes Relative 24 %      Monocytes Relative 13 %      Eosinophils Relative 1 %      Basophils Relative 0 %      Neutrophils Absolute 2 29 Thousands/µL      Immature Grans Absolute 0 06 Thousand/uL      Lymphocytes Absolute 0 89 Thousands/µL      Monocytes Absolute 0 47 Thousand/µL      Eosinophils Absolute 0 04 Thousand/µL      Basophils Absolute 0 01 Thousands/µL                  No orders to display              Procedures  ECG 12 Lead Documentation Only    Date/Time: 1/18/2021 8:35 PM  Performed by: Shaliesh Singh PA-C  Authorized by: Shailesh Singh PA-C     Indications / Diagnosis:  Hyperkalemia  ECG reviewed by me, the ED Provider: yes    Patient location:  ED  Interpretation:     Interpretation: non-specific    Rate:     ECG rate:  95    ECG rate assessment: normal    Rhythm:     Rhythm: atrial fibrillation    Ectopy:     Ectopy: none    QRS:     QRS axis:  Normal  Conduction:     Conduction: normal    ST segments:     ST segments:  Normal  T waves:     T waves: non-specific               ED Course                             SBIRT 22yo+      Most Recent Value   SBIRT (22 yo +)   In order to provide better care to our patients, we are screening all of our patients for alcohol and drug use  Would it be okay to ask you these screening questions? Yes Filed at: 01/18/2021 1402   Initial Alcohol Screen: US AUDIT-C    1  How often do you have a drink containing alcohol?  0 Filed at: 01/18/2021 1402   2  How many drinks containing alcohol do you have on a typical day you are drinking? 0 Filed at: 01/18/2021 1402   3a  Male UNDER 65: How often do you have five or more drinks on one occasion? 0 Filed at: 01/18/2021 1402   3b  FEMALE Any Age, or MALE 65+: How often do you have 4 or more drinks on one occassion? 0 Filed at: 01/18/2021 1402   Audit-C Score  0 Filed at: 01/18/2021 1402   AGUILA: How many times in the past year have you    Used an illegal drug or used a prescription medication for non-medical reasons?   Never Filed at: 01/18/2021 1402                    MDM  Number of Diagnoses or Management Options  Acute kidney injury [facility]): new and requires workup  Diagnosis management comments: 69yo female with multiple medical comorbidities presenting for evaluation of an abnormal outpatient lab  Labs 2 days ago reveal an CESAR with a creatinine of 3 09 and a potassium of 5 7  She was referred here by her PCP  Patient has no complaints at this time  Does not appear volume overloaded  Vitals unremarkable  Initial ED plan: Will repeat CBC, CMP, Mg, Phos here  Will give 500cc NS bolus as I suspect patient is volume depleted given her diuretic use  Will plan on admission  Amount and/or Complexity of Data Reviewed  Clinical lab tests: ordered and reviewed  Tests in the medicine section of CPT®: ordered and reviewed  Obtain history from someone other than the patient: yes  Review and summarize past medical records: yes  Discuss the patient with other providers: yes  Independent visualization of images, tracings, or specimens: yes    Risk of Complications, Morbidity, and/or Mortality  Presenting problems: moderate  Diagnostic procedures: moderate  Management options: moderate        Disposition  Final diagnoses:   Acute kidney injury ([facility] [address] )     Time reflects when diagnosis was documented in both MDM as applicable and the Disposition within this note     Time User Action Codes Description Comment    1/18/2021  3:16 PM [address] [place] [address] [[id number]] Acute kidney injury [facility])       ED Disposition     ED Disposition Condition Date/Time Comment    Admit Stable Mon Jan 18, 2021  3:16 PM Case was discussed with Dr Zaki Campbell and the patient's admission status was agreed to be Admission Status: observation status to the service of Dr Zaki Campbell   Follow-up Information    None         Patient's Medications   Discharge Prescriptions    No medications on file     No discharge procedures on file      PDMP Review     None          ED Provider  Electronically Signed by           Ricarda Harley PA-C  01/18/21 2039

## 2021-01-18 NOTE — TELEPHONE ENCOUNTER
----- Message from Blayne Laurent MD sent at 1/18/2021 10:18 AM EST -----  Regarding: FW: Test Results Question  Contact: [phone]   Please inform the son that she needs to go to the hospital immediately  ----- Message -----  From: Willy Aragon  Sent: 1/18/2021   9:58 AM EST  To: Blayne Laurent MD  Subject: FW: Test Results Question                          ----- Message -----  From: Anna Marie Carrillo  Sent: 1/18/2021   9:29 AM EST  To: [facility] Internal Med Clinical  Subject: Test Results Question                            Hi Dr Tomasz Gottlieb, this is Marshel Quivers  The son of Anna Marie Carrillo  My mom took her labs and the results of her sodium, creatinine and potassium were off  I was wondering if her diuretic (torsemide) and her potassium pill may have contributed to the results  The diuretic is working really well on her swelling but I remember it skewered some of her numbers before  She is just now readjusting to being home and I know the long hospital stays  upset her  Could anything be done with her medications or do you suggest taking her back to the hospital? She hasn't complained about feeling unwell  Thank you!

## 2021-01-19 ENCOUNTER — APPOINTMENT (OUTPATIENT)
Dept: CT IMAGING | Facility: HOSPITAL | Age: 76
DRG: 682 | End: 2021-01-19
Payer: MEDICARE

## 2021-01-19 LAB
ANION GAP SERPL CALCULATED.3IONS-SCNC: 10 MMOL/L (ref 4–13)
ATRIAL RATE: 98 BPM
BUN SERPL-MCNC: 41 MG/DL (ref 5–25)
CALCIUM SERPL-MCNC: 9 MG/DL (ref 8.3–10.1)
CHLORIDE SERPL-SCNC: 102 MMOL/L (ref 100–108)
CO2 SERPL-SCNC: 27 MMOL/L (ref 21–32)
CREAT SERPL-MCNC: 2.38 MG/DL (ref 0.6–1.3)
ERYTHROCYTE [DISTWIDTH] IN BLOOD BY AUTOMATED COUNT: 19.8 % (ref 11.6–15.1)
GFR SERPL CREATININE-BSD FRML MDRD: 22 ML/MIN/1.73SQ M
GLUCOSE SERPL-MCNC: 98 MG/DL (ref 65–140)
HCT VFR BLD AUTO: 23.5 % (ref 34.8–46.1)
HGB BLD-MCNC: 7.3 G/DL (ref 11.5–15.4)
MAGNESIUM SERPL-MCNC: 1.9 MG/DL (ref 1.6–2.6)
MCH RBC QN AUTO: 27.5 PG (ref 26.8–34.3)
MCHC RBC AUTO-ENTMCNC: 31.1 G/DL (ref 31.4–37.4)
MCV RBC AUTO: 89 FL (ref 82–98)
PLATELET # BLD AUTO: 72 THOUSANDS/UL (ref 149–390)
PMV BLD AUTO: 9.7 FL (ref 8.9–12.7)
POTASSIUM SERPL-SCNC: 4.6 MMOL/L (ref 3.5–5.3)
QRS AXIS: 98 DEGREES
QRSD INTERVAL: 72 MS
QT INTERVAL: 322 MS
QTC INTERVAL: 404 MS
RBC # BLD AUTO: 2.65 MILLION/UL (ref 3.81–5.12)
SODIUM SERPL-SCNC: 139 MMOL/L (ref 136–145)
T WAVE AXIS: 10 DEGREES
VENTRICULAR RATE: 95 BPM
WBC # BLD AUTO: 4.02 THOUSAND/UL (ref 4.31–10.16)

## 2021-01-19 PROCEDURE — 85027 COMPLETE CBC AUTOMATED: CPT | Performed by: STUDENT IN AN ORGANIZED HEALTH CARE EDUCATION/TRAINING PROGRAM

## 2021-01-19 PROCEDURE — 80048 BASIC METABOLIC PNL TOTAL CA: CPT | Performed by: STUDENT IN AN ORGANIZED HEALTH CARE EDUCATION/TRAINING PROGRAM

## 2021-01-19 PROCEDURE — 83735 ASSAY OF MAGNESIUM: CPT | Performed by: STUDENT IN AN ORGANIZED HEALTH CARE EDUCATION/TRAINING PROGRAM

## 2021-01-19 PROCEDURE — 99232 SBSQ HOSP IP/OBS MODERATE 35: CPT | Performed by: PHYSICIAN ASSISTANT

## 2021-01-19 PROCEDURE — 30233N1 TRANSFUSION OF NONAUTOLOGOUS RED BLOOD CELLS INTO PERIPHERAL VEIN, PERCUTANEOUS APPROACH: ICD-10-PCS | Performed by: INTERNAL MEDICINE

## 2021-01-19 PROCEDURE — 70450 CT HEAD/BRAIN W/O DYE: CPT

## 2021-01-19 PROCEDURE — 93010 ELECTROCARDIOGRAM REPORT: CPT | Performed by: INTERNAL MEDICINE

## 2021-01-19 PROCEDURE — G1004 CDSM NDSC: HCPCS

## 2021-01-19 RX ORDER — QUETIAPINE FUMARATE 25 MG/1
25 TABLET, FILM COATED ORAL
Status: DISCONTINUED | OUTPATIENT
Start: 2021-01-19 | End: 2021-01-20

## 2021-01-19 RX ORDER — OLANZAPINE 10 MG/1
2.5 INJECTION, POWDER, LYOPHILIZED, FOR SOLUTION INTRAMUSCULAR ONCE
Status: COMPLETED | OUTPATIENT
Start: 2021-01-19 | End: 2021-01-19

## 2021-01-19 RX ORDER — OLANZAPINE 10 MG/1
2.5 INJECTION, POWDER, LYOPHILIZED, FOR SOLUTION INTRAMUSCULAR EVERY 4 HOURS PRN
Status: DISCONTINUED | OUTPATIENT
Start: 2021-01-19 | End: 2021-01-22 | Stop reason: HOSPADM

## 2021-01-19 RX ORDER — QUETIAPINE FUMARATE 25 MG/1
12.5 TABLET, FILM COATED ORAL EVERY 8 HOURS PRN
Status: DISCONTINUED | OUTPATIENT
Start: 2021-01-19 | End: 2021-01-22 | Stop reason: HOSPADM

## 2021-01-19 RX ADMIN — MIRTAZAPINE 15 MG: 15 TABLET, FILM COATED ORAL at 21:24

## 2021-01-19 RX ADMIN — HEPARIN SODIUM 5000 UNITS: 5000 INJECTION INTRAVENOUS; SUBCUTANEOUS at 21:23

## 2021-01-19 RX ADMIN — HEPARIN SODIUM 5000 UNITS: 5000 INJECTION INTRAVENOUS; SUBCUTANEOUS at 13:40

## 2021-01-19 RX ADMIN — QUETIAPINE FUMARATE 25 MG: 25 TABLET ORAL at 21:24

## 2021-01-19 RX ADMIN — QUETIAPINE FUMARATE 12.5 MG: 25 TABLET ORAL at 15:50

## 2021-01-19 RX ADMIN — HEPARIN SODIUM 5000 UNITS: 5000 INJECTION INTRAVENOUS; SUBCUTANEOUS at 05:27

## 2021-01-19 RX ADMIN — OLANZAPINE 2.5 MG: 10 INJECTION, POWDER, FOR SOLUTION INTRAMUSCULAR at 07:55

## 2021-01-19 RX ADMIN — METOPROLOL SUCCINATE 100 MG: 100 TABLET, EXTENDED RELEASE ORAL at 09:18

## 2021-01-19 RX ADMIN — FOLIC ACID 1000 MCG: 1 TABLET ORAL at 09:18

## 2021-01-19 RX ADMIN — DILTIAZEM HYDROCHLORIDE 120 MG: 120 CAPSULE, COATED, EXTENDED RELEASE ORAL at 09:18

## 2021-01-19 NOTE — PLAN OF CARE
Problem: Potential for Falls  Goal: Patient will remain free of falls  Description: INTERVENTIONS:  - Assess patient frequently for physical needs  -  Identify cognitive and physical deficits and behaviors that affect risk of falls    -  [place] fall precautions as indicated by assessment   - Educate patient/family on patient safety including physical limitations  - Instruct patient to call for assistance with activity based on assessment  - Modify environment to reduce risk of injury  - Consider OT/PT consult to assist with strengthening/mobility  Outcome: Progressing     Problem: Prexisting or High Potential for Compromised Skin Integrity  Goal: Skin integrity is maintained or improved  Description: INTERVENTIONS:  - Identify patients at risk for skin breakdown  - Assess and monitor skin integrity  - Assess and monitor nutrition and hydration status  - Monitor labs   - Assess for incontinence   - Turn and reposition patient  - Assist with mobility/ambulation  - Relieve pressure over bony prominences  - Avoid friction and shearing  - Provide appropriate hygiene as needed including keeping skin clean and dry  - Evaluate need for skin moisturizer/barrier cream  - Collaborate with interdisciplinary team   - Patient/family teaching  - Consider wound care consult   Outcome: Progressing     Problem: NEUROSENSORY - ADULT  Goal: Achieves stable or improved neurological status  Description: INTERVENTIONS  - Monitor and report changes in neurological status  - Monitor vital signs such as temperature, blood pressure, glucose, and any other labs ordered   - Initiate measures to prevent increased intracranial pressure  - Monitor for seizure activity and implement precautions if appropriate      Outcome: Progressing     Problem: RESPIRATORY - ADULT  Goal: Achieves optimal ventilation and oxygenation  Description: INTERVENTIONS:  - Assess for changes in respiratory status  - Assess for changes in mentation and behavior  - Position to facilitate oxygenation and minimize respiratory effort  - Oxygen administered by appropriate delivery if ordered  - Initiate smoking cessation education as indicated  - Encourage broncho-pulmonary hygiene including cough, deep breathe, Incentive Spirometry  - Assess the need for suctioning and aspirate as needed  - Assess and instruct to report SOB or any respiratory difficulty  - Respiratory Therapy support as indicated  Outcome: Progressing     Problem: METABOLIC, FLUID AND ELECTROLYTES - ADULT  Goal: Fluid balance maintained  Description: INTERVENTIONS:  - Monitor labs   - Monitor I/O and WT  - Instruct patient on fluid and nutrition as appropriate  - Assess for signs & symptoms of volume excess or deficit  Outcome: Progressing     Problem: SKIN/TISSUE INTEGRITY - ADULT  Goal: Incision(s), wounds(s) or drain site(s) healing without S/S of infection  Description: INTERVENTIONS  - Assess and document risk factors for skin impairment   - Assess and document dressing, incision, wound bed, drain sites and surrounding tissue  - Consider nutrition services referral as needed  - Oral mucous membranes remain intact  - Provide patient/ family education  Outcome: Progressing     Problem: DISCHARGE PLANNING  Goal: Discharge to home or other facility with appropriate resources  Description: INTERVENTIONS:  - Identify barriers to discharge w/patient and caregiver  - Arrange for needed discharge resources and transportation as appropriate  - Identify discharge learning needs (meds, wound care, etc )  - Arrange for interpretive services to assist at discharge as needed  - Refer to Case Management Department for coordinating discharge planning if the patient needs post-hospital services based on physician/advanced practitioner order or complex needs related to functional status, cognitive ability, or social support system  Outcome: Progressing     Problem: Knowledge Deficit  Goal: Patient/family/caregiver demonstrates understanding of disease process, treatment plan, medications, and discharge instructions  Description: Complete learning assessment and assess knowledge base    Interventions:  - Provide teaching at level of understanding  - Provide teaching via preferred learning methods  Outcome: Progressing

## 2021-01-19 NOTE — ASSESSMENT & PLAN NOTE
Lab Results   Component Value Date    EGFR 22 01/19/2021    EGFR 20 01/18/2021    EGFR 16 01/16/2021    CREATININE 2 38 (H) 01/19/2021    CREATININE 2 60 (H) 01/18/2021    CREATININE 3 09 (H) 01/16/2021     · Could be secondary to hypovolemia - BP low on admit and report of frequent BMs on lactulose, also taking diuretics  · Creatinine and BUN both improved, avoid IV fluid hydration at this time secondary to sundowning and encourage oral intake  · Recheck BMP in a m , if continues to improve consider discharge  · Consider nephrology consult tomorrow, known to Dr Maciej Thorpe outpatient

## 2021-01-19 NOTE — ASSESSMENT & PLAN NOTE
Lab Results   Component Value Date    EGFR 20 01/18/2021    EGFR 16 01/16/2021    EGFR 32 12/29/2020    CREATININE 2 60 (H) 01/18/2021    CREATININE 3 09 (H) 01/16/2021    CREATININE 1 79 (H) 12/29/2020   · Could be secondary to hypovolemia  · Patient's sister reports that patient has multiple episodes of loose stools secondary to lactulose  · Also taking diuretics  · Consult nephrology in a m , monitor creatinine during current admission

## 2021-01-19 NOTE — PLAN OF CARE
Problem: Potential for Falls  Goal: Patient will remain free of falls  Description: INTERVENTIONS:  - Assess patient frequently for physical needs  -  Identify cognitive and physical deficits and behaviors that affect risk of falls    -  [place] fall precautions as indicated by assessment   - Educate patient/family on patient safety including physical limitations  - Instruct patient to call for assistance with activity based on assessment  - Modify environment to reduce risk of injury  - Consider OT/PT consult to assist with strengthening/mobility  Outcome: Progressing     Problem: Prexisting or High Potential for Compromised Skin Integrity  Goal: Skin integrity is maintained or improved  Description: INTERVENTIONS:  - Identify patients at risk for skin breakdown  - Assess and monitor skin integrity  - Assess and monitor nutrition and hydration status  - Monitor labs   - Assess for incontinence   - Turn and reposition patient  - Assist with mobility/ambulation  - Relieve pressure over bony prominences  - Avoid friction and shearing  - Provide appropriate hygiene as needed including keeping skin clean and dry  - Evaluate need for skin moisturizer/barrier cream  - Collaborate with interdisciplinary team   - Patient/family teaching  - Consider wound care consult   Outcome: Progressing     Problem: NEUROSENSORY - ADULT  Goal: Achieves stable or improved neurological status  Description: INTERVENTIONS  - Monitor and report changes in neurological status  - Monitor vital signs such as temperature, blood pressure, glucose, and any other labs ordered   - Initiate measures to prevent increased intracranial pressure  - Monitor for seizure activity and implement precautions if appropriate      Outcome: Progressing     Problem: RESPIRATORY - ADULT  Goal: Achieves optimal ventilation and oxygenation  Description: INTERVENTIONS:  - Assess for changes in respiratory status  - Assess for changes in mentation and behavior  - Position to facilitate oxygenation and minimize respiratory effort  - Oxygen administered by appropriate delivery if ordered  - Initiate smoking cessation education as indicated  - Encourage broncho-pulmonary hygiene including cough, deep breathe, Incentive Spirometry  - Assess the need for suctioning and aspirate as needed  - Assess and instruct to report SOB or any respiratory difficulty  - Respiratory Therapy support as indicated  Outcome: Progressing     Problem: METABOLIC, FLUID AND ELECTROLYTES - ADULT  Goal: Fluid balance maintained  Description: INTERVENTIONS:  - Monitor labs   - Monitor I/O and WT  - Instruct patient on fluid and nutrition as appropriate  - Assess for signs & symptoms of volume excess or deficit  Outcome: Progressing     Problem: SKIN/TISSUE INTEGRITY - ADULT  Goal: Incision(s), wounds(s) or drain site(s) healing without S/S of infection  Description: INTERVENTIONS  - Assess and document risk factors for skin impairment   - Assess and document dressing, incision, wound bed, drain sites and surrounding tissue  - Consider nutrition services referral as needed  - Oral mucous membranes remain intact  - Provide patient/ family education  Outcome: Progressing     Problem: DISCHARGE PLANNING  Goal: Discharge to home or other facility with appropriate resources  Description: INTERVENTIONS:  - Identify barriers to discharge w/patient and caregiver  - Arrange for needed discharge resources and transportation as appropriate  - Identify discharge learning needs (meds, wound care, etc )  - Arrange for interpretive services to assist at discharge as needed  - Refer to Case Management Department for coordinating discharge planning if the patient needs post-hospital services based on physician/advanced practitioner order or complex needs related to functional status, cognitive ability, or social support system  Outcome: Progressing     Problem: Knowledge Deficit  Goal: Patient/family/caregiver demonstrates understanding of disease process, treatment plan, medications, and discharge instructions  Description: Complete learning assessment and assess knowledge base    Interventions:  - Provide teaching at level of understanding  - Provide teaching via preferred learning methods  Outcome: Progressing

## 2021-01-19 NOTE — ASSESSMENT & PLAN NOTE
· Does not appear in decompensated state  · Receives paracenteses as an outpatient  · Recommend outpatient follow-up with IR for paracenteses  · Hold off on diuretics and lactulose for now given acute kidney injury  · No evidence of asterixis that would suggest hepatic encephalopathy  · Monitor electrolytes and fluid status

## 2021-01-19 NOTE — ASSESSMENT & PLAN NOTE
· Could be in setting of potassium supplementation versus acute kidney injury  · Hold off on further potassium supplementation  · Resolved at present time, will continue to monitor

## 2021-01-19 NOTE — ASSESSMENT & PLAN NOTE
· Continue home dose diltiazem and Toprol  · Not on anticoagulation per previous epic chart records due to thrombocytopenia and history of cirrhosis

## 2021-01-19 NOTE — ASSESSMENT & PLAN NOTE
· Monitor for signs of hospital-acquired delirium  · Fall precautions  · CT head shows no acute findings  · Resume Seroquel, decreased dose secondary to lethargy at home; IM Zyprexa as needed for significant behavioral sundowning

## 2021-01-19 NOTE — CASE MANAGEMENT
LOS 1 DAY  RISK OF UNPLANNED READMISSION SCORE N/A  30 DAY READMISSION: NO  BUNDLE: NO    CM attempted phone call to patient's son, John Munguia, no answer  CM attempted phone call to patient's sister, Naun Healy  Per Naun Healy, patient lives with her in a 3rd floor apartment with elevator access (0 KRISTEN)  Patient does not use any DME at baseline, however requires assistance with ADLs due to dementia  Patient has access to a WC at home if needed  Naun Niraj confirmed patient's home address is  [address] [place] [address] [address], [facility] [address]  AVS updated  VNA Hx confirmed  Patient currently receiving [facility] services with [facility] referral sent to [place]  No Hx STR or IPBHU placement  SA Hx confirmed  Patient "used to be a heavy drinker, but hse doesn't drink anymore " Patient smokes 2 cigarettes/daily  POA identified as patient's son, John Munguia  CM requested copy of documentation for patient's EMR  PCP: Karina Vásquez    [facility], no barriers identified to obtaining Rx from that location  Naun Healy stating she will transport at discharge  No other needs at this time  CM will continue to follow

## 2021-01-19 NOTE — ASSESSMENT & PLAN NOTE
· Does not appear in decompensated state, receives paracenteses as an outpatient as needed  · Hold off on diuretics and lactulose for now given acute kidney injury  · No evidence of asterixis that would suggest hepatic encephalopathy  · Monitor electrolytes and fluid status

## 2021-01-19 NOTE — ASSESSMENT & PLAN NOTE
Wt Readings from Last 3 Encounters:   01/18/21 66 2 kg (145 lb 15 1 oz)   12/29/20 75 kg (165 lb 5 5 oz)   11/19/20 84 4 kg (186 lb)     · Not in acute exacerbation  · Last echocardiogram in 2020 swelling grade 2 diastolic dysfunction and ejection fraction of 50-55%  · Hold home dose Demadex in setting of acute kidney injury on chronic kidney disease  · Monitor intake/output and daily weights

## 2021-01-19 NOTE — ASSESSMENT & PLAN NOTE
· Could be in setting of potassium supplementation versus acute kidney injury  · Hold off on further potassium supplementation, monitor electrolytes  · Nephrology consulted in a m   For acute kidney injury on chronic kidney disease

## 2021-01-19 NOTE — H&P
H&P- Crhistinmaxx Hodge 1945, 77 y o  female MRN: [id number]    Unit/Bed#: ED 24 Encounter: [id number]    Primary Care Provider: Ban Robledo MD   Date and time admitted to hospital: 1/18/2021  1:04 PM        * Acute kidney injury superimposed on chronic kidney disease [facility])  Assessment & Plan  Lab Results   Component Value Date    EGFR 20 01/18/2021    EGFR 16 01/16/2021    EGFR 32 12/29/2020    CREATININE 2 60 (H) 01/18/2021    CREATININE 3 09 (H) 01/16/2021    CREATININE 1 79 (H) 12/29/2020   · Could be secondary to hypovolemia  · Patient's sister reports that patient has multiple episodes of loose stools secondary to lactulose  · Also taking diuretics  · Consult nephrology in a m , monitor creatinine during current admission    Combined systolic and diastolic heart failure ([facility] )  Assessment & Plan  Wt Readings from Last 3 Encounters:   01/18/21 70 kg (154 lb 5 2 oz)   12/29/20 75 kg (165 lb 5 5 oz)   11/19/20 84 4 kg (186 lb)     · Not in acute exacerbation  · Last echocardiogram in 2020 swelling grade 2 diastolic dysfunction and ejection fraction of 50-55%  · Hold home dose Demadex in setting of acute kidney injury on chronic kidney disease  · Monitor intake/output and daily weights        Dementia ([facility] [address] )  Assessment & Plan  · Per patient's sister's request, hold off on Zyprexa and Seroquel  · Monitor for signs of hospital-acquired delirium  · CT head ordered, patient's sister reports worsening lethargy and drowsiness    Multiple myeloma ([facility] )  Assessment & Plan  · Continue outpatient surveillance of PCP    Hyperkalemia  Assessment & Plan  · Could be in setting of potassium supplementation versus acute kidney injury  · Hold off on further potassium supplementation, monitor electrolytes  · Nephrology consulted in a m   For acute kidney injury on chronic kidney disease    Paroxysmal atrial fibrillation (HCC)/Tachycardia  Assessment & Plan  · Continue home dose diltiazem and Toprol  · Not on anticoagulation per previous epic chart records due to thrombocytopenia and history of cirrhosis    Liver cirrhosis ([facility] [address] )  Assessment & Plan  · Does not appear in decompensated state  · Receives paracenteses as an outpatient  · Recommend outpatient follow-up with IR for paracenteses  · Hold off on diuretics and lactulose for now given acute kidney injury  · No evidence of asterixis that would suggest hepatic encephalopathy  · Monitor electrolytes and fluid status    Recurrent major depressive disorder, in partial remission (HCC)  Assessment & Plan  · Continue home dose mirtazapine      VTE Prophylaxis: Heparin  / sequential compression device   Code Status:  Full code  POLST: POLST form is not discussed and not completed at this time  Discussion with family:  Discussed case with patient's sister at bedside    Anticipated Length of Stay:  Patient will be admitted on an Observation basis with an anticipated length of stay of   2 midnights  Justification for Hospital Stay:  Abnormal labs    Total Time for Visit, including Counseling / Coordination of Care: 30 minutes  Greater than 50% of this total time spent on direct patient counseling and coordination of care  Chief Complaint:   Abnormal labs    History of Present Illness:    Laura Contreras is a 77 y o  female who presents with history of dementia, alcohol cirrhosis, congestive heart failure, CKD, multiple myeloma presenting with recommendation by PCP to come to the ED for abnormal outpatient labs which were collected on 01/16/2021  She was noted to have worsening creatinine and electrolyte abnormalities  Patient's sister at bedside reports that patient has been compliant with all her medications  Her appetite is good but she has been having multiple loose stools secondary to the lactulose and urinating frequently secondary to the Demadex  Patient's sister also reports worsening lethargy but no other associated symptoms    In the ED she was noted to have acute kidney injury on chronic kidney disease and admitted to Medicine for further management  Review of Systems:    Review of Systems   Constitutional: Positive for activity change  Negative for appetite change  HENT: Negative for congestion and rhinorrhea  Eyes: Negative for pain and itching  Respiratory: Negative for cough, chest tightness and shortness of breath  Cardiovascular: Negative for chest pain, palpitations and leg swelling  Gastrointestinal: Positive for abdominal distention and diarrhea  Negative for abdominal pain  Endocrine: Negative for cold intolerance and heat intolerance  Genitourinary: Negative for dysuria and frequency  Musculoskeletal: Negative for arthralgias and back pain  Skin: Negative for color change, pallor and wound  Neurological: Negative for dizziness and headaches  Psychiatric/Behavioral: Negative for agitation and behavioral problems  Past Medical and Surgical History:     Past Medical History:   Diagnosis Date    Benign essential hypertension     last assessed - [id number]    Chest pain 11/4/2017    CHF (congestive heart failure) (HCC)     Chronic kidney disease     Cirrhosis (HCC)     Elevated troponin 1/31/2020    Gastroesophageal reflux disease without esophagitis 11/16/2017    Hyperbilirubinemia 5/14/2020    Hypertension     Liver disease     [facility] myel[facility] [address] [facility]     Pneumonia 5/6/2020    Renal failure 08/29/2020       Past Surgical History:   Procedure Laterality Date    APPENDECTOMY      BONE MARROW BIOPSY      IR PARACENTESIS  10/2/2020    IR PARACENTESIS  12/28/2020    IR THORACENTESIS  12/21/2020       Meds/Allergies:    Prior to Admission medications    Medication Sig Start Date End Date Taking?  Authorizing Provider   diltiazem (CARDIZEM CD) 120 mg 24 hr capsule Take 1 capsule (120 mg total) by mouth daily 12/29/20   Isaac Spence MD   folic acid (FOLVITE) 1 mg tablet TAKE ONE TABLET BY MOUTH EVERY DAY 9/2/20   Nwe Connor PA-C lactulose 20 g/30 mL Take 15 mL (10 g total) by mouth 2 (two) times a day 12/29/20   Gregg GRIFFIN MD   magnesium oxide (MAG-OX) 400 mg tablet TAKE ONE TABLET BY MOUTH TWICE A DAY 9/2/20   Katina Alvarez PA-C   metoprolol succinate (TOPROL-XL) 100 mg 24 hr tablet Take 1 tablet (100 mg total) by mouth daily 12/30/20   Arie GRIFFIN MD   mirtazapine (REMERON) 15 mg tablet Take 1 tablet (15 mg total) by mouth daily at bedtime 1/14/21   Karina Vásquez MD   nicotine (NICODERM CQ) 14 mg/24hr TD 24 hr patch Place 1 patch on the skin daily  Patient taking differently: Place 1 patch on the skin as needed  2/7/20   Gricel Win MD   nystatin (MYCOSTATIN) powder Apply topically 2 (two) times a day 10/8/20   Nataliia Lopez MD   pantoprazole (PROTONIX) 40 mg tablet TAKE ONE TABLET BY MOUTH EVERY DAY IN THE MORNING 11/2/20   Karina Vásquez MD   potassium chloride (K-DUR,KLOR-CON) 20 mEq tablet Take 1 tablet (20 mEq total) by mouth 2 (two) times a day 11/4/20   Fatou Torres MD   QUEtiapine (SEROquel) 25 mg tablet Take 2 tablets (50 mg total) by mouth daily at bedtime 12/31/20 1/30/21  Katina Alvarez PA-C   sodium chloride (OCEAN) 0 65 % nasal spray 1 spray into each nostril every hour as needed for congestion 9/5/20 11/19/20  Nataliia Lopez MD   Thiamine HCl (vitamin B-1) 100 MG TABS TAKE ONE TABLET BY MOUTH EVERY DAY 9/2/20   Katina Alvarez PA-C   torsemide (DEMADEX) 20 mg tablet Take 3 tablets (60 mg total) by mouth daily 12/29/20   Hayden Daugherty MD     I have reviewed home medications with patient personally  Allergies: No Known Allergies    Social History:     Marital Status:     Occupation: na  Patient Pre-hospital Living Situation:  Home with sister  Patient Pre-hospital Level of Mobility:  Needs assistance with ambulation  Patient Pre-hospital Diet Restrictions:  Heart healthy diet  Substance Use History:   Social History     Substance and Sexual Activity   Alcohol Use Not Currently    Frequency: 4 or more times a week    Drinks per session: 1 or 2    Binge frequency: Never    Comment: History of significant daily alcohol intake  Sister joy no alcohol intake in 6 months     Social History     Tobacco Use   Smoking Status Light Tobacco Smoker    Packs/day: 0 25    Types: Cigarettes   Smokeless Tobacco Never Used   Tobacco Comment    1/2 pack per month     Social History     Substance and Sexual Activity   Drug Use No       Family History:    Family History   Problem Relation Age of Onset    Heart attack Father         myocardial infarction    Heart disease Father        Physical Exam:     Vitals:   Blood Pressure: 104/82 (01/18/21 1815)  Pulse: 85 (01/18/21 1815)  Temperature: 98 3 °F (36 8 °C) (01/18/21 1309)  Temp Source: Oral (01/18/21 1309)  Respirations: 21 (01/18/21 1815)  Height: 5' 5" (165 1 cm) (01/18/21 1309)  Weight - Scale: 70 kg (154 lb 5 2 oz) (01/18/21 1309)  SpO2: 96 % (01/18/21 1815)    Physical Exam  Vitals signs and nursing note reviewed  Constitutional:       Appearance: Normal appearance  HENT:      Head: Normocephalic and atraumatic  Nose: Nose normal  No congestion  Mouth/Throat:      Mouth: Mucous membranes are dry  Pharynx: Oropharynx is clear  Eyes:      General:         Right eye: No discharge  Left eye: No discharge  Neck:      Musculoskeletal: Normal range of motion and neck supple  Cardiovascular:      Rate and Rhythm: Normal rate and regular rhythm  Pulmonary:      Effort: Pulmonary effort is normal  No respiratory distress  Abdominal:      General: Abdomen is flat  Palpations: Abdomen is soft  Musculoskeletal:      Right lower leg: No edema  Left lower leg: No edema  Skin:     General: Skin is warm and dry  Neurological:      General: No focal deficit present  Mental Status: She is alert  Mental status is at baseline     Psychiatric:         Mood and Affect: Mood normal          Behavior: Behavior normal  Additional Data:     Lab Results: I have personally reviewed pertinent reports  Results from last 7 days   Lab Units 01/18/21  1329   WBC Thousand/uL 3 76*   HEMOGLOBIN g/dL 7 5*   HEMATOCRIT % 24 7*   PLATELETS Thousands/uL 81*   NEUTROS PCT % 60   LYMPHS PCT % 24   MONOS PCT % 13*   EOS PCT % 1     Results from last 7 days   Lab Units 01/18/21  1421   SODIUM mmol/L 137   POTASSIUM mmol/L 5 0   CHLORIDE mmol/L 99*   CO2 mmol/L 27   BUN mg/dL 45*   CREATININE mg/dL 2 60*   ANION GAP mmol/L 11   CALCIUM mg/dL 9 6   ALBUMIN g/dL 3 2*   TOTAL BILIRUBIN mg/dL 1 30*   ALK PHOS U/L 355*   ALT U/L 20   AST U/L 32   GLUCOSE RANDOM mg/dL 120                       Imaging: I have personally reviewed pertinent reports  CT head wo contrast    (Results Pending)       EKG, Pathology, and Other Studies Reviewed on Admission:   · EKG: na    Allscripts / Epic Records Reviewed: Yes     ** Please Note: This note has been constructed using a voice recognition system   **

## 2021-01-19 NOTE — PROGRESS NOTES
SLIM Progress Note - Annette Brower 1945, 77 y o  female MRN: [id number]    Unit/Bed#: -01 Encounter: [id number]    Primary Care Provider: Venus Bright MD   Date and time admitted to hospital: 1/18/2021  1:04 PM      * Acute kidney injury superimposed on chronic kidney disease [facility])  Assessment & Plan  Lab Results   Component Value Date    EGFR 22 01/19/2021    EGFR 20 01/18/2021    EGFR 16 01/16/2021    CREATININE 2 38 (H) 01/19/2021    CREATININE 2 60 (H) 01/18/2021    CREATININE 3 09 (H) 01/16/2021     · Could be secondary to hypovolemia - BP low on admit and report of frequent BMs on lactulose, also taking diuretics  · Creatinine and BUN both improved, avoid IV fluid hydration at this time secondary to sundowning and encourage oral intake  · Recheck BMP in a m , if continues to improve consider discharge  · Consider nephrology consult tomorrow, known to Dr Larissa Yun outpatient    Combined systolic and diastolic heart failure [facility])  Assessment & Plan  Wt Readings from Last 3 Encounters:   01/18/21 66 2 kg (145 lb 15 1 oz)   12/29/20 75 kg (165 lb 5 5 oz)   11/19/20 84 4 kg (186 lb)     · Not in acute exacerbation  · Last echocardiogram in 2020 swelling grade 2 diastolic dysfunction and ejection fraction of 50-55%  · Hold home dose Demadex in setting of acute kidney injury on chronic kidney disease  · Monitor intake/output and daily weights        Dementia ([address] )  Assessment & Plan  · Monitor for signs of hospital-acquired delirium  · Fall precautions  · CT head shows no acute findings  · Resume Seroquel, decreased dose secondary to lethargy at home; IM Zyprexa as needed for significant behavioral sundowning    Multiple myeloma ([facility] )  Assessment & Plan  · Continue outpatient surveillance of PCP    Hyperkalemia  Assessment & Plan  · Could be in setting of potassium supplementation versus acute kidney injury  · Hold off on further potassium supplementation  · Resolved at present time, will continue to monitor    Paroxysmal atrial fibrillation (HCC)/Tachycardia  Assessment & Plan  · Continue home dose diltiazem and Toprol with holding parameters  · Not on anticoagulation per previous epic chart records due to thrombocytopenia and history of cirrhosis    Liver cirrhosis ([facility] )  Assessment & Plan  · Does not appear in decompensated state, receives paracenteses as an outpatient as needed  · Hold off on diuretics and lactulose for now given acute kidney injury  · No evidence of asterixis that would suggest hepatic encephalopathy  · Monitor electrolytes and fluid status    Pancytopenia ([facility] )  Assessment & Plan  · Secondary to cirrhosis  · Monitor CBC daily    Recurrent major depressive disorder, in partial remission ([facility] )  Assessment & Plan  · Continue home dose mirtazapine        VTE Pharmacologic Prophylaxis:   Pharmacologic: Heparin  Mechanical VTE Prophylaxis in Place: No    Patient Centered Rounds: I have performed bedside rounds with nursing staff today  Discussions with Specialists or Other Care Team Provider: PENG     Education and Discussions with Family / Patient: patient's sister updated by phone     Time Spent for Care: 30 minutes  More than 50% of total time spent on counseling and coordination of care as described above  Current Length of Stay: 0 day(s)    Current Patient Status: Inpatient   Certification Statement: The patient, admitted on an observation basis, will now require > 2 midnight hospital stay due to CESAR monitoring/management     Discharge Plan: pending further improvement in renal functions, 24-48 hrs to home     Code Status: Level 1 - Full Code      Subjective:   Patient seen this afternoon, she is pleasantly confused  Currently folding and re-folding her back in  She thinks that she is here to get sunscreen for her sister  She does not know that she is in the hospital, but is redirected and pleasant  She denies any issues overnight, per nursing, no acute issues      Objective: Vitals:   Temp (24hrs), Av 3 °F (36 8 °C), Min:97 9 °F (36 6 °C), Max:98 7 °F (37 1 °C)    Temp:  [97 9 °F (36 6 °C)-98 7 °F (37 1 °C)] 97 9 °F (36 6 °C)  HR:  [] 95  Resp:  [18-22] 20  BP: ()/(52-82) 111/66  SpO2:  [92 %-96 %] 95 %  Body mass index is 24 29 kg/m²  Input and Output Summary (last 24 hours): Intake/Output Summary (Last 24 hours) at 2021 1519  Last data filed at 2021 1218  Gross per 24 hour   Intake 980 ml   Output 0 ml   Net 980 ml       Physical Exam:     Physical Exam  Vitals signs and nursing note reviewed  Constitutional:       General: She is not in acute distress  Appearance: Normal appearance  She is not toxic-appearing  Cardiovascular:      Rate and Rhythm: Normal rate  Pulses: Normal pulses  Pulmonary:      Effort: Pulmonary effort is normal  No respiratory distress  Breath sounds: Normal breath sounds  No wheezing  Abdominal:      General: Bowel sounds are normal  There is no distension  Palpations: Abdomen is soft  Tenderness: There is no abdominal tenderness  Musculoskeletal:      Right lower leg: No edema  Left lower leg: No edema  Skin:     General: Skin is warm and dry  Neurological:      General: No focal deficit present  Mental Status: She is alert  Mental status is at baseline  She is disoriented     Psychiatric:         Mood and Affect: Mood normal          Behavior: Behavior normal        Additional Data:     Labs:    Results from last 7 days   Lab Units 21  0525 21  1329   WBC Thousand/uL 4 02* 3 76*   HEMOGLOBIN g/dL 7 3* 7 5*   HEMATOCRIT % 23 5* 24 7*   PLATELETS Thousands/uL 72* 81*   NEUTROS PCT %  --  60   LYMPHS PCT %  --  24   MONOS PCT %  --  13*   EOS PCT %  --  1     Results from last 7 days   Lab Units 21  0526 21  1421   SODIUM mmol/L 139 137   POTASSIUM mmol/L 4 6 5 0   CHLORIDE mmol/L 102 99*   CO2 mmol/L 27 27   BUN mg/dL 41* 45*   CREATININE mg/dL 2 38* 2 60*   ANION GAP mmol/L 10 11   CALCIUM mg/dL 9 0 9 6   ALBUMIN g/dL  --  3 2*   TOTAL BILIRUBIN mg/dL  --  1 30*   ALK PHOS U/L  --  355*   ALT U/L  --  20   AST U/L  --  32   GLUCOSE RANDOM mg/dL 98 120                           * I Have Reviewed All Lab Data Listed Above  * Additional Pertinent Lab Tests Reviewed: Kay 66 Admission Reviewed    Imaging:    Imaging Reports Reviewed Today Include: [facility]  Imaging Personally Reviewed by Myself Includes:  CTH - no acute bleed     Recent Cultures (last 7 days):           Last 24 Hours Medication List:   Current Facility-Administered Medications   Medication Dose Route Frequency Provider Last Rate    acetaminophen  650 mg Oral Q6H PRN Stephanie Bashir MD      calcium carbonate  1,000 mg Oral Daily PRN Stephanie Bashir MD      diltiazem  120 mg Oral Daily Stephanie Bashir MD      docusate sodium  100 mg Oral BID Stephanie Bashir MD      folic acid  7,747 mcg Oral Daily Stephanie Bashir MD      heparin (porcine)  5,000 Units Subcutaneous [facility] Stephanie Bashir MD      metoprolol succinate  100 mg Oral Daily Stephanie Bashir MD      mirtazapine  15 mg Oral HS Stephanie Bashir MD      nicotine  1 patch Transdermal Daily Stephanie Bashir MD      OLANZapine  2 5 mg Intramuscular Q4H PRN Lisa Goddard PA-C      ondansetron  4 mg Intravenous Q6H PRN Stephanie Bashir MD      QUEtiapine  12 5 mg Oral Q8H PRN Ronda Vieira PA-C      QUEtiapine  25 mg Oral HS Lisa Goddard PA-C      senna  1 tablet Oral Daily Stephanie Bashir MD          Today, Patient Was Seen By: Lisa Goddard PA-C    ** Please Note: Dictation voice to text software may have been used in the creation of this document   **

## 2021-01-19 NOTE — ASSESSMENT & PLAN NOTE
· Per patient's sister's request, hold off on Zyprexa and Seroquel  · Monitor for signs of hospital-acquired delirium  · CT head ordered, patient's sister reports worsening lethargy and drowsiness

## 2021-01-19 NOTE — ASSESSMENT & PLAN NOTE
· Continue home dose diltiazem and Toprol with holding parameters  · Not on anticoagulation per previous epic chart records due to thrombocytopenia and history of cirrhosis

## 2021-01-19 NOTE — UTILIZATION REVIEW
Initial Clinical Review      OBS order 1/18 1517 converted to IP On 1/19 @ 1401    Level of Care Med Surg    Estimated length of stay More than 2 Midnights    Certification I certify that inpatient services are medically necessary for this patient for a duration of greater than two midnights  See H&P and MD Progress Notes for additional information about the patient's course of treatment              ED Arrival Information     Expected Arrival Acuity Means of Arrival Escorted By Service Admission Type    - 1/18/2021 13:03 Urgent Ambulance [address] Urgent    [facility]        Chief Complaint   Patient presents with    Medical Problem     SENT BY PRIMARY FOR ABNORMAL LABS NO COMPLAINTS      Assessment/Plan: * Acute kidney injury superimposed on chronic kidney disease [facility])  Assessment & Plan        Lab Results   Component Value Date     EGFR 20 01/18/2021     EGFR 16 01/16/2021     EGFR 32 12/29/2020     CREATININE 2 60 (H) 01/18/2021     CREATININE 3 09 (H) 01/16/2021     CREATININE 1 79 (H) 12/29/2020   · Could be secondary to hypovolemia  · Patient's sister reports that patient has multiple episodes of loose stools secondary to lactulose  · Also taking diuretics  · Consult nephrology in a m , monitor creatinine during current admission     Combined systolic and diastolic heart failure (HCC)  Assessment & Plan      Wt Readings from Last 3 Encounters:   01/18/21 70 kg (154 lb 5 2 oz)   12/29/20 75 kg (165 lb 5 5 oz)   11/19/20 84 4 kg (186 lb)      · Not in acute exacerbation  · Last echocardiogram in 2020 swelling grade 2 diastolic dysfunction and ejection fraction of 50-55%  · Hold home dose Demadex in setting of acute kidney injury on chronic kidney disease  · Monitor intake/output and daily weights           Dementia (HCC)  Assessment & Plan  · Per patient's sister's request, hold off on Zyprexa and Seroquel  · Monitor for signs of hospital-acquired delirium  · CT head ordered, patient's sister reports worsening lethargy and drowsiness     Multiple myeloma ([facility] )  Assessment & Plan  · Continue outpatient surveillance of PCP     Hyperkalemia  Assessment & Plan  · Could be in setting of potassium supplementation versus acute kidney injury  · Hold off on further potassium supplementation, monitor electrolytes  · Nephrology consulted in a m  For acute kidney injury on chronic kidney disease     Paroxysmal atrial fibrillation (HCC)/Tachycardia  Assessment & Plan  · Continue home dose diltiazem and Toprol  · Not on anticoagulation per previous epic chart records due to thrombocytopenia and history of cirrhosis     Liver cirrhosis ([facility] )  Assessment & Plan  · Does not appear in decompensated state  · Receives paracenteses as an outpatient  · Recommend outpatient follow-up with IR for paracenteses  · Hold off on diuretics and lactulose for now given acute kidney injury  · No evidence of asterixis that would suggest hepatic encephalopathy  · Monitor electrolytes and fluid status     Recurrent major depressive disorder, in partial remission (HCC)  Assessment & Plan  · Continue home dose mirtazapine        VTE Prophylaxis: Heparin  / sequential compression device   Code Status:  Full code  POLST: POLST form is not discussed and not completed at this time  Discussion with family:  Discussed case with patient's sister at bedside     Anticipated Length of Stay:  Patient will be admitted on an Observation basis with an anticipated length of stay of   2 midnights  Justification for Hospital Stay:  Abnormal labs     Total Time for Visit, including Counseling / Coordination of Care: 30 minutes    Greater than 50% of this total time spent on direct patient counseling and coordination of care      Chief Complaint:   Abnormal labs     History of Present Illness:     Kelvin  is a 77 y o  female who presents with history of dementia, alcohol cirrhosis, congestive heart failure, CKD, multiple myeloma presenting with recommendation by PCP to come to the ED for abnormal outpatient labs which were collected on 01/16/2021  She was noted to have worsening creatinine and electrolyte abnormalities  Patient's sister at bedside reports that patient has been compliant with all her medications  Her appetite is good but she has been having multiple loose stools secondary to the lactulose and urinating frequently secondary to the Demadex  Patient's sister also reports worsening lethargy but no other associated symptoms  In the ED she was noted to have acute kidney injury on chronic kidney disease and admitted to Medicine for further management      ED Triage Vitals [01/18/21 1309]   Temperature Pulse Respirations Blood Pressure SpO2   98 3 °F (36 8 °C) 97 22 115/82 99 %      Temp Source Heart Rate Source Patient Position - Orthostatic VS BP Location FiO2 (%)   Oral Monitor Lying Right arm --      Pain Score       --          Wt Readings from Last 1 Encounters:   01/18/21 66 2 kg (145 lb 15 1 oz)     Additional Vital Signs:   01/18/21 2213  98 2 °F (36 8 °C)  96  18  88/52Abnormal     92 %  None (Room air)  Lying   01/18/21 2100              None (Room air)     01/18/21 2005  98 7 °F (37 1 °C)  82  18  86/62Abnormal     95 %  None (Room air)  Lying   01/18/21 1815    85  21  104/82    96 %       01/18/21 1615    101  22  99/52  72         01/18/21 1400    107Abnormal   24Abnormal   119/68  84  98 %           Pertinent Labs/Diagnostic Test Results:   1/19 CT head   Results from last 7 days   Lab Units 01/19/21  0525 01/18/21  1329 01/16/21  1149   WBC Thousand/uL 4 02* 3 76* 3 43*   HEMOGLOBIN g/dL 7 3* 7 5* 7 6*   HEMATOCRIT % 23 5* 24 7* 24 9*   PLATELETS Thousands/uL 72* 81* 78*   NEUTROS ABS Thousands/µL  --  2 29 1 94     Results from last 7 days   Lab Units 01/19/21  0526 01/18/21  1421 01/16/21  1149   SODIUM mmol/L 139 137 133*   POTASSIUM mmol/L 4 6 5 0 5 7*   CHLORIDE mmol/L 102 99* 100 CO2 mmol/L 27 27 27   ANION GAP mmol/L 10 11 6   BUN mg/dL 41* 45* 47*   CREATININE mg/dL 2 38* 2 60* 3 09*   EGFR ml/min/1 73sq m 22 20 16   CALCIUM mg/dL 9 0 9 6 9 5   MAGNESIUM mg/dL 1 9 2 1  --    PHOSPHORUS mg/dL  --  4 0 3 6     Results from last 7 days   Lab Units 01/18/21  1421 01/16/21  1149   AST U/L 32 31   ALT U/L 20 21   ALK PHOS U/L 355* 354*   TOTAL PROTEIN g/dL 6 7 6 7   ALBUMIN g/dL 3 2* 3 1*   TOTAL BILIRUBIN mg/dL 1 30* 1 24*   BILIRUBIN DIRECT mg/dL 0 90*  --      Results from last 7 days   Lab Units 01/19/21  0526 01/18/21  1421   GLUCOSE RANDOM mg/dL 98 120       Results from last 7 days   Lab Units 01/16/21  1149   FERRITIN ng/mL 3,688*     Results from last 7 days   Lab Units 01/18/21  1439   CLARITY UA  Clear   COLOR UA  Yellow   SPEC GRAV UA  1 015   PH UA  7 5   GLUCOSE UA mg/dl Negative   KETONES UA mg/dl Negative   BLOOD UA  Negative   PROTEIN UA mg/dl Negative   NITRITE UA  Negative   BILIRUBIN UA  Negative   UROBILINOGEN UA E U /dl 0 2   LEUKOCYTES UA  Negative       ED Treatment:   Medication Administration from 01/18/2021 1303 to 01/18/2021 2001       Date/Time Order Dose Route Action     01/18/2021 1450 sodium chloride 0 9 % bolus 500 mL 500 mL Intravenous New Bag     01/18/2021 1700 OLANZapine (ZyPREXA ZYDIS) dispersible tablet 10 mg 10 mg Oral Given        Past Medical History:   Diagnosis Date    Benign essential hypertension     last assessed - [id number]    Chest pain 11/4/2017    CHF (congestive heart failure) (HCC)     Chronic kidney disease     Cirrhosis (HCC)     Elevated troponin 1/31/2020    Gastroesophageal reflux disease without esophagitis 11/16/2017    Hyperbilirubinemia 5/14/2020    Hypertension     Liver disease     [facility] myel[facility]     Pneumonia 5/6/2020    Renal failure 08/29/2020     Present on Admission:   Recurrent major depressive disorder, in partial remission ([facility] )   Multiple myeloma ([facility] )   Liver cirrhosis (HCC)   Paroxysmal atrial fibrillation (HCC)/Tachycardia   Dementia (HCC)   Hyperkalemia   Pancytopenia (HCC)      Admitting Diagnosis: Abnormal laboratory test [R89 9]  Acute kidney injury ([facility] ) [N17 9]  Age/Sex: 77 y o  female  Admission Orders:  Scheduled Medications:  diltiazem, 120 mg, Oral, Daily  docusate sodium, 100 mg, Oral, BID  folic acid, 8,270 mcg, Oral, Daily  heparin (porcine), 5,000 Units, Subcutaneous, Q8H SHAYNA  metoprolol succinate, 100 mg, Oral, Daily  mirtazapine, 15 mg, Oral, HS  nicotine, 1 patch, Transdermal, Daily  senna, 1 tablet, Oral, Daily      Continuous IV Infusions:     PRN Meds:  acetaminophen, 650 mg, Oral, Q6H PRN  calcium carbonate, 1,000 mg, Oral, Daily PRN  ondansetron, 4 mg, Intravenous, Q6H PRN          Network Utilization Review Department  ATTENTION: Please call with any questions or concerns to [phone] and carefully listen to the prompts so that you are directed to the right person  All voicemails are confidential   Jeff Vega all requests for admission clinical reviews, approved or denied determinations and any other requests to dedicated fax number below belonging to the campus where the patient is receiving treatment   List of dedicated fax numbers for the Facilities:  [address] DENIALS (Administrative/Medical Necessity) [phone]   [address] (Maternity/NICU/Pediatrics) [phone]   [address] [place] [address] ([place]) [address] [facility] [address] [place]) [phone]  0936 [facility] [phone]

## 2021-01-19 NOTE — ASSESSMENT & PLAN NOTE
Wt Readings from Last 3 Encounters:   01/18/21 70 kg (154 lb 5 2 oz)   12/29/20 75 kg (165 lb 5 5 oz)   11/19/20 84 4 kg (186 lb)     · Not in acute exacerbation  · Last echocardiogram in 2020 swelling grade 2 diastolic dysfunction and ejection fraction of 50-55%  · Hold home dose Demadex in setting of acute kidney injury on chronic kidney disease  · Monitor intake/output and daily weights

## 2021-01-20 ENCOUNTER — APPOINTMENT (INPATIENT)
Dept: RADIOLOGY | Facility: HOSPITAL | Age: 76
DRG: 682 | End: 2021-01-20
Payer: MEDICARE

## 2021-01-20 LAB
ABO GROUP BLD: NORMAL
ALBUMIN SERPL BCP-MCNC: 2.8 G/DL (ref 3.5–5)
ALP SERPL-CCNC: 341 U/L (ref 46–116)
ALT SERPL W P-5'-P-CCNC: 20 U/L (ref 12–78)
ANION GAP SERPL CALCULATED.3IONS-SCNC: 10 MMOL/L (ref 4–13)
ANION GAP SERPL CALCULATED.3IONS-SCNC: 8 MMOL/L (ref 4–13)
AST SERPL W P-5'-P-CCNC: 36 U/L (ref 5–45)
BASOPHILS # BLD AUTO: 0.01 THOUSANDS/ΜL (ref 0–0.1)
BASOPHILS NFR BLD AUTO: 0 % (ref 0–1)
BILIRUB SERPL-MCNC: 1.4 MG/DL (ref 0.2–1)
BLD GP AB SCN SERPL QL: NEGATIVE
BUN SERPL-MCNC: 36 MG/DL (ref 5–25)
BUN SERPL-MCNC: 38 MG/DL (ref 5–25)
CALCIUM ALBUM COR SERPL-MCNC: 9.8 MG/DL (ref 8.3–10.1)
CALCIUM SERPL-MCNC: 8.8 MG/DL (ref 8.3–10.1)
CALCIUM SERPL-MCNC: 8.9 MG/DL (ref 8.3–10.1)
CHLORIDE SERPL-SCNC: 103 MMOL/L (ref 100–108)
CHLORIDE SERPL-SCNC: 103 MMOL/L (ref 100–108)
CO2 SERPL-SCNC: 26 MMOL/L (ref 21–32)
CO2 SERPL-SCNC: 28 MMOL/L (ref 21–32)
CREAT SERPL-MCNC: 1.92 MG/DL (ref 0.6–1.3)
CREAT SERPL-MCNC: 2.09 MG/DL (ref 0.6–1.3)
EOSINOPHIL # BLD AUTO: 0.04 THOUSAND/ΜL (ref 0–0.61)
EOSINOPHIL NFR BLD AUTO: 1 % (ref 0–6)
ERYTHROCYTE [DISTWIDTH] IN BLOOD BY AUTOMATED COUNT: 19.9 % (ref 11.6–15.1)
ERYTHROCYTE [DISTWIDTH] IN BLOOD BY AUTOMATED COUNT: 19.9 % (ref 11.6–15.1)
GFR SERPL CREATININE-BSD FRML MDRD: 26 ML/MIN/1.73SQ M
GFR SERPL CREATININE-BSD FRML MDRD: 29 ML/MIN/1.73SQ M
GLUCOSE SERPL-MCNC: 122 MG/DL (ref 65–140)
GLUCOSE SERPL-MCNC: 93 MG/DL (ref 65–140)
HCT VFR BLD AUTO: 22.2 % (ref 34.8–46.1)
HCT VFR BLD AUTO: 22.2 % (ref 34.8–46.1)
HCT VFR BLD AUTO: 23.3 % (ref 34.8–46.1)
HGB BLD-MCNC: 6.7 G/DL (ref 11.5–15.4)
HGB BLD-MCNC: 6.9 G/DL (ref 11.5–15.4)
HGB BLD-MCNC: 7.1 G/DL (ref 11.5–15.4)
IMM GRANULOCYTES # BLD AUTO: 0.03 THOUSAND/UL (ref 0–0.2)
IMM GRANULOCYTES NFR BLD AUTO: 1 % (ref 0–2)
INR PPP: 1.18 (ref 0.84–1.19)
LACTATE SERPL-SCNC: 1.3 MMOL/L (ref 0.5–2)
LYMPHOCYTES # BLD AUTO: 0.92 THOUSANDS/ΜL (ref 0.6–4.47)
LYMPHOCYTES NFR BLD AUTO: 29 % (ref 14–44)
MCH RBC QN AUTO: 27.1 PG (ref 26.8–34.3)
MCH RBC QN AUTO: 27.5 PG (ref 26.8–34.3)
MCHC RBC AUTO-ENTMCNC: 30.2 G/DL (ref 31.4–37.4)
MCHC RBC AUTO-ENTMCNC: 30.5 G/DL (ref 31.4–37.4)
MCV RBC AUTO: 90 FL (ref 82–98)
MCV RBC AUTO: 90 FL (ref 82–98)
MONOCYTES # BLD AUTO: 0.36 THOUSAND/ΜL (ref 0.17–1.22)
MONOCYTES NFR BLD AUTO: 11 % (ref 4–12)
NEUTROPHILS # BLD AUTO: 1.83 THOUSANDS/ΜL (ref 1.85–7.62)
NEUTS SEG NFR BLD AUTO: 58 % (ref 43–75)
NRBC BLD AUTO-RTO: 3 /100 WBCS
PLATELET # BLD AUTO: 55 THOUSANDS/UL (ref 149–390)
PLATELET # BLD AUTO: 62 THOUSANDS/UL (ref 149–390)
PMV BLD AUTO: 10 FL (ref 8.9–12.7)
PMV BLD AUTO: 9.7 FL (ref 8.9–12.7)
POTASSIUM SERPL-SCNC: 4.3 MMOL/L (ref 3.5–5.3)
POTASSIUM SERPL-SCNC: 4.6 MMOL/L (ref 3.5–5.3)
PROT SERPL-MCNC: 6 G/DL (ref 6.4–8.2)
PROTHROMBIN TIME: 15.3 SECONDS (ref 11.6–14.5)
RBC # BLD AUTO: 2.47 MILLION/UL (ref 3.81–5.12)
RBC # BLD AUTO: 2.58 MILLION/UL (ref 3.81–5.12)
RH BLD: POSITIVE
SODIUM SERPL-SCNC: 139 MMOL/L (ref 136–145)
SODIUM SERPL-SCNC: 139 MMOL/L (ref 136–145)
SPECIMEN EXPIRATION DATE: NORMAL
WBC # BLD AUTO: 2.91 THOUSAND/UL (ref 4.31–10.16)
WBC # BLD AUTO: 3.19 THOUSAND/UL (ref 4.31–10.16)

## 2021-01-20 PROCEDURE — 99232 SBSQ HOSP IP/OBS MODERATE 35: CPT | Performed by: PHYSICIAN ASSISTANT

## 2021-01-20 PROCEDURE — 86900 BLOOD TYPING SEROLOGIC ABO: CPT | Performed by: PHYSICIAN ASSISTANT

## 2021-01-20 PROCEDURE — 71045 X-RAY EXAM CHEST 1 VIEW: CPT

## 2021-01-20 PROCEDURE — 83605 ASSAY OF LACTIC ACID: CPT | Performed by: PHYSICIAN ASSISTANT

## 2021-01-20 PROCEDURE — 86923 COMPATIBILITY TEST ELECTRIC: CPT

## 2021-01-20 PROCEDURE — 85025 COMPLETE CBC W/AUTO DIFF WBC: CPT | Performed by: PHYSICIAN ASSISTANT

## 2021-01-20 PROCEDURE — 87040 BLOOD CULTURE FOR BACTERIA: CPT | Performed by: PHYSICIAN ASSISTANT

## 2021-01-20 PROCEDURE — 99233 SBSQ HOSP IP/OBS HIGH 50: CPT | Performed by: ANESTHESIOLOGY

## 2021-01-20 PROCEDURE — 86901 BLOOD TYPING SEROLOGIC RH(D): CPT | Performed by: PHYSICIAN ASSISTANT

## 2021-01-20 PROCEDURE — P9016 RBC LEUKOCYTES REDUCED: HCPCS

## 2021-01-20 PROCEDURE — 80053 COMPREHEN METABOLIC PANEL: CPT | Performed by: PHYSICIAN ASSISTANT

## 2021-01-20 PROCEDURE — 85018 HEMOGLOBIN: CPT | Performed by: PHYSICIAN ASSISTANT

## 2021-01-20 PROCEDURE — 80048 BASIC METABOLIC PNL TOTAL CA: CPT | Performed by: PHYSICIAN ASSISTANT

## 2021-01-20 PROCEDURE — 85014 HEMATOCRIT: CPT | Performed by: PHYSICIAN ASSISTANT

## 2021-01-20 PROCEDURE — 85027 COMPLETE CBC AUTOMATED: CPT | Performed by: PHYSICIAN ASSISTANT

## 2021-01-20 PROCEDURE — 85610 PROTHROMBIN TIME: CPT | Performed by: PHYSICIAN ASSISTANT

## 2021-01-20 PROCEDURE — 86850 RBC ANTIBODY SCREEN: CPT | Performed by: PHYSICIAN ASSISTANT

## 2021-01-20 RX ORDER — FUROSEMIDE 10 MG/ML
40 INJECTION INTRAMUSCULAR; INTRAVENOUS ONCE
Status: COMPLETED | OUTPATIENT
Start: 2021-01-20 | End: 2021-01-21

## 2021-01-20 RX ORDER — MIDODRINE HYDROCHLORIDE 5 MG/1
2.5 TABLET ORAL
Status: DISCONTINUED | OUTPATIENT
Start: 2021-01-20 | End: 2021-01-22 | Stop reason: HOSPADM

## 2021-01-20 RX ORDER — FUROSEMIDE 10 MG/ML
20 INJECTION INTRAMUSCULAR; INTRAVENOUS ONCE
Status: DISCONTINUED | OUTPATIENT
Start: 2021-01-20 | End: 2021-01-20

## 2021-01-20 RX ORDER — MIDODRINE HYDROCHLORIDE 5 MG/1
2.5 TABLET ORAL
Status: DISCONTINUED | OUTPATIENT
Start: 2021-01-21 | End: 2021-01-20

## 2021-01-20 RX ORDER — METOPROLOL SUCCINATE 50 MG/1
50 TABLET, EXTENDED RELEASE ORAL DAILY
Status: DISCONTINUED | OUTPATIENT
Start: 2021-01-21 | End: 2021-01-22 | Stop reason: HOSPADM

## 2021-01-20 RX ORDER — LACTULOSE 20 G/30ML
10 SOLUTION ORAL 2 TIMES DAILY
Status: DISCONTINUED | OUTPATIENT
Start: 2021-01-20 | End: 2021-01-22 | Stop reason: HOSPADM

## 2021-01-20 RX ORDER — ACETAMINOPHEN 650 MG/1
650 SUPPOSITORY RECTAL EVERY 6 HOURS PRN
Status: DISCONTINUED | OUTPATIENT
Start: 2021-01-20 | End: 2021-01-22 | Stop reason: HOSPADM

## 2021-01-20 RX ORDER — QUETIAPINE FUMARATE 25 MG/1
25 TABLET, FILM COATED ORAL
Status: DISCONTINUED | OUTPATIENT
Start: 2021-01-21 | End: 2021-01-22 | Stop reason: HOSPADM

## 2021-01-20 RX ADMIN — ACETAMINOPHEN 650 MG: 325 TABLET, FILM COATED ORAL at 16:54

## 2021-01-20 RX ADMIN — DILTIAZEM HYDROCHLORIDE 30 MG: 30 TABLET, FILM COATED ORAL at 12:39

## 2021-01-20 RX ADMIN — LACTULOSE 10 G: 10 SOLUTION ORAL at 18:14

## 2021-01-20 RX ADMIN — SODIUM CHLORIDE 500 ML: 0.9 INJECTION, SOLUTION INTRAVENOUS at 19:40

## 2021-01-20 RX ADMIN — HEPARIN SODIUM 5000 UNITS: 5000 INJECTION INTRAVENOUS; SUBCUTANEOUS at 06:11

## 2021-01-20 RX ADMIN — ACETAMINOPHEN 650 MG: 325 TABLET, FILM COATED ORAL at 01:44

## 2021-01-20 RX ADMIN — MIDODRINE HYDROCHLORIDE 2.5 MG: 5 TABLET ORAL at 20:55

## 2021-01-20 RX ADMIN — FOLIC ACID 1000 MCG: 1 TABLET ORAL at 09:30

## 2021-01-20 RX ADMIN — MIRTAZAPINE 15 MG: 15 TABLET, FILM COATED ORAL at 23:14

## 2021-01-20 NOTE — PROGRESS NOTES
SLIM Progress Note - Concetta Ferreira 1945, 77 y o  female MRN: [id number]    Unit/Bed#: -01 Encounter: [id number]    Primary Care Provider: Uday Taylor MD   Date and time admitted to hospital: 1/18/2021  1:04 PM      * Acute kidney injury superimposed on chronic kidney disease [facility])  Assessment & Plan  Lab Results   Component Value Date    EGFR 26 01/20/2021    EGFR 22 01/19/2021    EGFR 20 01/18/2021    CREATININE 2 09 (H) 01/20/2021    CREATININE 2 38 (H) 01/19/2021    CREATININE 2 60 (H) 01/18/2021     · Could be secondary to hypovolemia - BP low on admit and report of frequent BMs on lactulose, also taking diuretics  · Creatinine and BUN both improved, however she did have a drop in Hgb and heparin stopped, will require transfusion  · Anticipate BUN/Cr to improve further following transfusion  · Recheck BMP in a m    · Consider nephrology consult, known to Dr Wilmer Alejandro outpatient    Combined systolic and diastolic heart failure [facility])  Assessment & Plan  Wt Readings from Last 3 Encounters:   01/18/21 66 2 kg (145 lb 15 1 oz)   12/29/20 75 kg (165 lb 5 5 oz)   11/19/20 84 4 kg (186 lb)     · Not in acute exacerbation  · Last echocardiogram in 2020 swelling grade 2 diastolic dysfunction and ejection fraction of 50-55%  · Demadex on hold from admission, will give lasix after transfusion today  · Monitor intake/output and daily weights        Dementia ([facility] )  Assessment & Plan  · Monitor for signs of hospital-acquired delirium  · Fall precautions  · CT head shows no acute findings  · Resume Seroquel, decreased dose secondary to lethargy at home; IM Zyprexa as needed for significant behavioral sundowning vs oral seroquel as needed  · Required posey overnight due to impulsivity and fall concern    Multiple myeloma ([facility] )  Assessment & Plan  · Continue outpatient surveillance of PCP    Hyperkalemia  Assessment & Plan  · Could be in setting of potassium supplementation versus acute kidney injury  · Hold off on further potassium supplementation  · Resolved at present time, will continue to monitor    Paroxysmal atrial fibrillation (HCC)/Tachycardia  Assessment & Plan  · I switch the CCB to 30 mg QID due to hypotension, BB with holding parameters  · Not on anticoagulation per previous epic chart records due to thrombocytopenia and history of cirrhosis    Liver cirrhosis ([facility] )  Assessment & Plan  · Does not appear in decompensated state, receives paracenteses as an outpatient as needed  · Resume lactulose as no BM this am  · Monitor electrolytes and fluid status    Pancytopenia ([facility] )  Assessment & Plan  · Secondary to cirrhosis  · Monitor CBC daily  · Worsening hgb today and will require transfusion, sister advised and attending to call for consent  · 1u PRBCs ordered, post transfusion IV lasix x1 with labs   · INR and type/screen now    Recurrent major depressive disorder, in partial remission ([place] [id number] )  Assessment & Plan  · Continue home dose mirtazapine        VTE Pharmacologic Prophylaxis:   Pharmacologic: Pharmacologic VTE Prophylaxis contraindicated due to acute drop in hgb  Mechanical VTE Prophylaxis in Place: No    Patient Centered Rounds: I have performed bedside rounds with nursing staff today  Discussions with Specialists or Other Care Team Provider: CM     Education and Discussions with Family / Patient: patient, sister by phone     Time Spent for Care: 30 minutes  More than 50% of total time spent on counseling and coordination of care as described above  Current Length of Stay: 1 day(s)    Current Patient Status: Inpatient   Certification Statement: The patient will continue to require additional inpatient hospital stay due to acute on chronic anemia requiring transfusion    Discharge Plan: pending stability post transfusion     Code Status: Level 1 - Full Code      Subjective:   On doing a cold  Overnight had a low-grade temperature 100 4°    This morning hemoglobin dropped even further and heparin was discontinued  Her repeat hemoglobin this afternoon is 6 9  Patient herself remains pleasantly confused, has a lap belt on at the present time due to her impulsivity  She does not know where she is, appears to be at her baseline dementia  She denies a bowel movement today, denies any abdominal pain or distention worse than her normal     Objective:     Vitals:   Temp (24hrs), Av 3 °F (36 8 °C), Min:97 5 °F (36 4 °C), Max:100 4 °F (38 °C)    Temp:  [97 5 °F (36 4 °C)-100 4 °F (38 °C)] 97 7 °F (36 5 °C)  HR:  [] 91  Resp:  [18] 18  BP: ()/(49-69) 131/65  SpO2:  [88 %-98 %] 98 %  Body mass index is 24 29 kg/m²  Input and Output Summary (last 24 hours): Intake/Output Summary (Last 24 hours) at 2021 1519  Last data filed at 2021 1159  Gross per 24 hour   Intake 240 ml   Output    Net 240 ml       Physical Exam:     Physical Exam  Vitals signs and nursing note reviewed  Constitutional:       General: She is not in acute distress  Appearance: Normal appearance  She is not ill-appearing or toxic-appearing  Cardiovascular:      Rate and Rhythm: Normal rate and regular rhythm  Heart sounds: Normal heart sounds  Pulmonary:      Effort: Pulmonary effort is normal  No respiratory distress  Breath sounds: Normal breath sounds  Abdominal:      General: Bowel sounds are normal  There is distension  Palpations: Abdomen is soft  Tenderness: There is no abdominal tenderness  There is no guarding  Musculoskeletal:      Right lower leg: No edema  Left lower leg: No edema  Skin:     General: Skin is warm and dry  Neurological:      Mental Status: She is alert  She is disoriented        Comments: pleasantly confused   Psychiatric:         Mood and Affect: Mood normal          Additional Data:     Labs:    Results from last 7 days   Lab Units 21  1435 21  0611  21  1329   WBC Thousand/uL  --  2 91*   < > 3 76*   HEMOGLOBIN g/dL 6 9* 7 1*   < > 7 5*   HEMATOCRIT % 22 2* 23 3*   < > 24 7*   PLATELETS Thousands/uL  --  62*   < > 81*   NEUTROS PCT %  --   --   --  60   LYMPHS PCT %  --   --   --  24   MONOS PCT %  --   --   --  13*   EOS PCT %  --   --   --  1    < > = values in this interval not displayed  Results from last 7 days   Lab Units 01/20/21  0611  01/18/21  1421   SODIUM mmol/L 139   < > 137   POTASSIUM mmol/L 4 6   < > 5 0   CHLORIDE mmol/L 103   < > 99*   CO2 mmol/L 26   < > 27   BUN mg/dL 38*   < > 45*   CREATININE mg/dL 2 09*   < > 2 60*   ANION GAP mmol/L 10   < > 11   CALCIUM mg/dL 8 9   < > 9 6   ALBUMIN g/dL  --   --  3 2*   TOTAL BILIRUBIN mg/dL  --   --  1 30*   ALK PHOS U/L  --   --  355*   ALT U/L  --   --  20   AST U/L  --   --  32   GLUCOSE RANDOM mg/dL 122   < > 120    < > = values in this interval not displayed  * I Have Reviewed All Lab Data Listed Above  * Additional Pertinent Lab Tests Reviewed:  All Labs Within Last 24 Hours Reviewed    Imaging:    Imaging Reports Reviewed Today Include:   Imaging Personally Reviewed by Myself Includes:      Recent Cultures (last 7 days):           Last 24 Hours Medication List:   Current Facility-Administered Medications   Medication Dose Route Frequency Provider Last Rate    acetaminophen  650 mg Oral Q6H PRN Franca Guy MD      calcium carbonate  1,000 mg Oral Daily PRN Franca Guy MD      diltiazem  30 mg Oral Q6H [facility] Lior Ramos PA-C      docusate sodium  100 mg Oral BID Franca Guy MD      folic acid  1,380 mcg Oral Daily Franca Guy MD      furosemide  20 mg Intravenous Once Ronda Vieira PA-C      lactulose  10 g Oral BID Ronda Vieira PA-C      [START ON 1/21/2021] metoprolol succinate  50 mg Oral Daily Ronda Vieira PA-C      mirtazapine  15 mg Oral HS Franca Guy MD      nicotine  1 patch Transdermal Daily Franca Guy MD      OLANZapine  2 5 mg Intramuscular Q4H PRN Lior Ramos PA-C      ondansetron  4 mg Intravenous Q6H PRN Felisa Arciniega MD      QUEtiapine  12 5 mg Oral Q8H PRN Carter Jones PA-C      QUEtiapine  25 mg Oral HS Carter Jones PA-C          Today, Patient Was Seen By: Carter Jones PA-C    ** Please Note: Dictation voice to text software may have been used in the creation of this document   **

## 2021-01-20 NOTE — ASSESSMENT & PLAN NOTE
Wt Readings from Last 3 Encounters:   01/18/21 66 2 kg (145 lb 15 1 oz)   12/29/20 75 kg (165 lb 5 5 oz)   11/19/20 84 4 kg (186 lb)     · Not in acute exacerbation  · Last echocardiogram in 2020 swelling grade 2 diastolic dysfunction and ejection fraction of 50-55%  · Demadex on hold from admission, will give lasix after transfusion today  · Monitor intake/output and daily weights

## 2021-01-20 NOTE — QUICK NOTE
Alerted by nursing of patient being more lethargic and temperature 99 8° axillary    Will get CXR now, blood culture x2  Urinalysis on admission planned  Abdomen is soft but distended  Does not appear to be in any acute distress, just more lethargic at this time  Will continue to watch closely, plan to move for with transfusion for drop in hemoglobin  Sister, Chucky Chilel, updated by phone of the change in status

## 2021-01-20 NOTE — PLAN OF CARE

## 2021-01-20 NOTE — ASSESSMENT & PLAN NOTE
· I switch the CCB to 30 mg QID due to hypotension, BB with holding parameters  · Not on anticoagulation per previous epic chart records due to thrombocytopenia and history of cirrhosis

## 2021-01-20 NOTE — ASSESSMENT & PLAN NOTE
Lab Results   Component Value Date    EGFR 26 01/20/2021    EGFR 22 01/19/2021    EGFR 20 01/18/2021    CREATININE 2 09 (H) 01/20/2021    CREATININE 2 38 (H) 01/19/2021    CREATININE 2 60 (H) 01/18/2021     · Could be secondary to hypovolemia - BP low on admit and report of frequent BMs on lactulose, also taking diuretics  · Creatinine and BUN both improved, however she did have a drop in Hgb and heparin stopped, will require transfusion  · Anticipate BUN/Cr to improve further following transfusion  · Recheck BMP in a m    · Consider nephrology consult, known to Dr Stevenson Villalta outpatient

## 2021-01-20 NOTE — PLAN OF CARE
Problem: Potential for Falls  Goal: Patient will remain free of falls  Description: INTERVENTIONS:  - Assess patient frequently for physical needs  -  Identify cognitive and physical deficits and behaviors that affect risk of falls    -  [place] fall precautions as indicated by assessment   - Educate patient/family on patient safety including physical limitations  - Instruct patient to call for assistance with activity based on assessment  - Modify environment to reduce risk of injury  - Consider OT/PT consult to assist with strengthening/mobility  Outcome: Progressing     Problem: Prexisting or High Potential for Compromised Skin Integrity  Goal: Skin integrity is maintained or improved  Description: INTERVENTIONS:  - Identify patients at risk for skin breakdown  - Assess and monitor skin integrity  - Assess and monitor nutrition and hydration status  - Monitor labs   - Assess for incontinence   - Turn and reposition patient  - Assist with mobility/ambulation  - Relieve pressure over bony prominences  - Avoid friction and shearing  - Provide appropriate hygiene as needed including keeping skin clean and dry  - Evaluate need for skin moisturizer/barrier cream  - Collaborate with interdisciplinary team   - Patient/family teaching  - Consider wound care consult   Outcome: Progressing     Problem: NEUROSENSORY - ADULT  Goal: Achieves stable or improved neurological status  Description: INTERVENTIONS  - Monitor and report changes in neurological status  - Monitor vital signs such as temperature, blood pressure, glucose, and any other labs ordered   - Initiate measures to prevent increased intracranial pressure  - Monitor for seizure activity and implement precautions if appropriate      Outcome: Progressing     Problem: RESPIRATORY - ADULT  Goal: Achieves optimal ventilation and oxygenation  Description: INTERVENTIONS:  - Assess for changes in respiratory status  - Assess for changes in mentation and behavior  - Position to facilitate oxygenation and minimize respiratory effort  - Oxygen administered by appropriate delivery if ordered  - Initiate smoking cessation education as indicated  - Encourage broncho-pulmonary hygiene including cough, deep breathe, Incentive Spirometry  - Assess the need for suctioning and aspirate as needed  - Assess and instruct to report SOB or any respiratory difficulty  - Respiratory Therapy support as indicated  Outcome: Progressing     Problem: METABOLIC, FLUID AND ELECTROLYTES - ADULT  Goal: Fluid balance maintained  Description: INTERVENTIONS:  - Monitor labs   - Monitor I/O and WT  - Instruct patient on fluid and nutrition as appropriate  - Assess for signs & symptoms of volume excess or deficit  Outcome: Progressing     Problem: SKIN/TISSUE INTEGRITY - ADULT  Goal: Incision(s), wounds(s) or drain site(s) healing without S/S of infection  Description: INTERVENTIONS  - Assess and document risk factors for skin impairment   - Assess and document dressing, incision, wound bed, drain sites and surrounding tissue  - Consider nutrition services referral as needed  - Oral mucous membranes remain intact  - Provide patient/ family education  Outcome: Progressing     Problem: DISCHARGE PLANNING  Goal: Discharge to home or other facility with appropriate resources  Description: INTERVENTIONS:  - Identify barriers to discharge w/patient and caregiver  - Arrange for needed discharge resources and transportation as appropriate  - Identify discharge learning needs (meds, wound care, etc )  - Arrange for interpretive services to assist at discharge as needed  - Refer to Case Management Department for coordinating discharge planning if the patient needs post-hospital services based on physician/advanced practitioner order or complex needs related to functional status, cognitive ability, or social support system  Outcome: Progressing     Problem: Knowledge Deficit  Goal: Patient/family/caregiver demonstrates understanding of disease process, treatment plan, medications, and discharge instructions  Description: Complete learning assessment and assess knowledge base  Interventions:  - Provide teaching at level of understanding  - Provide teaching via preferred learning methods  Outcome: Progressing     Problem: SAFETY,RESTRAINT: NV/NON-SELF DESTRUCTIVE BEHAVIOR  Goal: Remains free of harm/injury (restraint for non violent/non self-detsructive behavior)  Description: INTERVENTIONS:  - Instruct patient/family regarding restraint use   - Assess and monitor physiologic and psychological status   - Provide interventions and comfort measures to meet assessed patient needs   - Identify and implement measures to help patient regain control  - Assess readiness for release of restraint   Outcome: Progressing  Goal: Returns to optimal restraint-free functioning  Description: INTERVENTIONS:  - Assess the patient's behavior and symptoms that indicate continued need for restraint  - Identify and implement measures to help patient regain control  - Assess readiness for release of restraint   Outcome: Progressing     Problem: Nutrition/Hydration-ADULT  Goal: Nutrient/Hydration intake appropriate for improving, restoring or maintaining nutritional needs  Description: Monitor and assess patient's nutrition/hydration status for malnutrition  Collaborate with interdisciplinary team and initiate plan and interventions as ordered  Monitor patient's weight and dietary intake as ordered or per policy  Utilize nutrition screening tool and intervene as necessary  Determine patient's food preferences and provide high-protein, high-caloric foods as appropriate       INTERVENTIONS:  - Monitor oral intake, urinary output, labs, and treatment plans  - Assess nutrition and hydration status and recommend course of action  - Evaluate amount of meals eaten  - Assist patient with eating if necessary   - Allow adequate time for meals  - Recommend/ encourage appropriate diets, oral nutritional supplements, and vitamin/mineral supplements  - Order, calculate, and assess calorie counts as needed  - Recommend, monitor, and adjust tube feedings and TPN/PPN based on assessed needs  - Assess need for intravenous fluids  - Provide specific nutrition/hydration education as appropriate  - Include patient/family/caregiver in decisions related to nutrition  Outcome: Progressing

## 2021-01-20 NOTE — ASSESSMENT & PLAN NOTE
· Does not appear in decompensated state, receives paracenteses as an outpatient as needed  · Resume lactulose as no BM this am  · Monitor electrolytes and fluid status

## 2021-01-20 NOTE — ASSESSMENT & PLAN NOTE
· Monitor for signs of hospital-acquired delirium  · Fall precautions  · CT head shows no acute findings  · Resume Seroquel, decreased dose secondary to lethargy at home; IM Zyprexa as needed for significant behavioral sundowning vs oral seroquel as needed  · Required posey overnight due to impulsivity and fall concern

## 2021-01-20 NOTE — ASSESSMENT & PLAN NOTE
· Secondary to cirrhosis  · Monitor CBC daily  · Worsening hgb today and will require transfusion, sister advised and attending to call for consent  · 1u PRBCs ordered, post transfusion IV lasix x1 with labs   · INR and type/screen now

## 2021-01-21 ENCOUNTER — PATIENT OUTREACH (OUTPATIENT)
Dept: INTERNAL MEDICINE CLINIC | Facility: CLINIC | Age: 76
End: 2021-01-21

## 2021-01-21 LAB
ABO GROUP BLD BPU: NORMAL
ANION GAP SERPL CALCULATED.3IONS-SCNC: 13 MMOL/L (ref 4–13)
BACTERIA UR QL AUTO: ABNORMAL /HPF
BILIRUB UR QL STRIP: NEGATIVE
BPU ID: NORMAL
BUN SERPL-MCNC: 32 MG/DL (ref 5–25)
CALCIUM SERPL-MCNC: 9.4 MG/DL (ref 8.3–10.1)
CHLORIDE SERPL-SCNC: 103 MMOL/L (ref 100–108)
CLARITY UR: ABNORMAL
CO2 SERPL-SCNC: 26 MMOL/L (ref 21–32)
COLOR UR: YELLOW
CREAT SERPL-MCNC: 1.58 MG/DL (ref 0.6–1.3)
CROSSMATCH: NORMAL
ERYTHROCYTE [DISTWIDTH] IN BLOOD BY AUTOMATED COUNT: 18.6 % (ref 11.6–15.1)
GFR SERPL CREATININE-BSD FRML MDRD: 37 ML/MIN/1.73SQ M
GLUCOSE SERPL-MCNC: 88 MG/DL (ref 65–140)
GLUCOSE UR STRIP-MCNC: NEGATIVE MG/DL
HCT VFR BLD AUTO: 27.5 % (ref 34.8–46.1)
HGB BLD-MCNC: 8.8 G/DL (ref 11.5–15.4)
HGB UR QL STRIP.AUTO: ABNORMAL
INR PPP: 1.13 (ref 0.84–1.19)
KETONES UR STRIP-MCNC: NEGATIVE MG/DL
LEUKOCYTE ESTERASE UR QL STRIP: ABNORMAL
MCH RBC QN AUTO: 28.2 PG (ref 26.8–34.3)
MCHC RBC AUTO-ENTMCNC: 32 G/DL (ref 31.4–37.4)
MCV RBC AUTO: 88 FL (ref 82–98)
NITRITE UR QL STRIP: POSITIVE
NON-SQ EPI CELLS URNS QL MICRO: ABNORMAL /HPF
PH UR STRIP.AUTO: 7 [PH]
PLATELET # BLD AUTO: 65 THOUSANDS/UL (ref 149–390)
PMV BLD AUTO: 10 FL (ref 8.9–12.7)
POTASSIUM SERPL-SCNC: 4.3 MMOL/L (ref 3.5–5.3)
PROT UR STRIP-MCNC: ABNORMAL MG/DL
PROTHROMBIN TIME: 14.7 SECONDS (ref 11.6–14.5)
RBC # BLD AUTO: 3.12 MILLION/UL (ref 3.81–5.12)
RBC #/AREA URNS AUTO: ABNORMAL /HPF
SODIUM SERPL-SCNC: 142 MMOL/L (ref 136–145)
SP GR UR STRIP.AUTO: 1.01 (ref 1–1.03)
UNIT DISPENSE STATUS: NORMAL
UNIT PRODUCT CODE: NORMAL
UNIT RH: NORMAL
UROBILINOGEN UR QL STRIP.AUTO: 2 E.U./DL
WBC # BLD AUTO: 3.93 THOUSAND/UL (ref 4.31–10.16)
WBC #/AREA URNS AUTO: ABNORMAL /HPF

## 2021-01-21 PROCEDURE — 87077 CULTURE AEROBIC IDENTIFY: CPT | Performed by: PHYSICIAN ASSISTANT

## 2021-01-21 PROCEDURE — 80048 BASIC METABOLIC PNL TOTAL CA: CPT | Performed by: PHYSICIAN ASSISTANT

## 2021-01-21 PROCEDURE — 87086 URINE CULTURE/COLONY COUNT: CPT | Performed by: PHYSICIAN ASSISTANT

## 2021-01-21 PROCEDURE — 87186 SC STD MICRODIL/AGAR DIL: CPT | Performed by: PHYSICIAN ASSISTANT

## 2021-01-21 PROCEDURE — 85027 COMPLETE CBC AUTOMATED: CPT | Performed by: PHYSICIAN ASSISTANT

## 2021-01-21 PROCEDURE — 85610 PROTHROMBIN TIME: CPT | Performed by: PHYSICIAN ASSISTANT

## 2021-01-21 PROCEDURE — 81001 URINALYSIS AUTO W/SCOPE: CPT | Performed by: PHYSICIAN ASSISTANT

## 2021-01-21 PROCEDURE — 99232 SBSQ HOSP IP/OBS MODERATE 35: CPT | Performed by: NURSE PRACTITIONER

## 2021-01-21 RX ADMIN — LACTULOSE 10 G: 10 SOLUTION ORAL at 08:38

## 2021-01-21 RX ADMIN — MIDODRINE HYDROCHLORIDE 2.5 MG: 5 TABLET ORAL at 17:21

## 2021-01-21 RX ADMIN — MIRTAZAPINE 15 MG: 15 TABLET, FILM COATED ORAL at 21:59

## 2021-01-21 RX ADMIN — MIDODRINE HYDROCHLORIDE 2.5 MG: 5 TABLET ORAL at 08:38

## 2021-01-21 RX ADMIN — QUETIAPINE FUMARATE 25 MG: 25 TABLET ORAL at 21:59

## 2021-01-21 RX ADMIN — MIDODRINE HYDROCHLORIDE 2.5 MG: 5 TABLET ORAL at 10:36

## 2021-01-21 RX ADMIN — METOPROLOL SUCCINATE 50 MG: 50 TABLET, EXTENDED RELEASE ORAL at 08:38

## 2021-01-21 RX ADMIN — FOLIC ACID 1000 MCG: 1 TABLET ORAL at 08:38

## 2021-01-21 RX ADMIN — FUROSEMIDE 40 MG: 10 INJECTION, SOLUTION INTRAMUSCULAR; INTRAVENOUS at 01:01

## 2021-01-21 RX ADMIN — QUETIAPINE FUMARATE 12.5 MG: 25 TABLET ORAL at 10:36

## 2021-01-21 RX ADMIN — LACTULOSE 10 G: 10 SOLUTION ORAL at 17:22

## 2021-01-21 NOTE — ASSESSMENT & PLAN NOTE
· Does not appear in decompensated state, receives paracenteses as an outpatient as needed  · Lactulose resumed  · Monitor electrolytes and fluid status

## 2021-01-21 NOTE — PROGRESS NOTES
Critical Care Interval Progress Note:   Franklin Khalil 77 y o  female MRN: [id number]    Unit/Bed#: -01 Encounter: [id number]    Summary of Critical Care:  Franklin Khalil is a 77year old female who presented to [facility] ED 1/18/20 for evaluation elevated serum creatinine  Patient has significant past medical history of CKD IV, diastolic heart dysfunction, paroxysmal atrial fibrillation (not on anticoagulation), HTN, HLD,cirrhosis, multiple myeloma, and dementia  She was recently admitted 12/18/20 to 12/20 9/20 in setting of gross volume overload requiring aggressive diuresis with torsemide 40 mg b i d  However this was decreased to 40 mg daily given elevation in creatinine  Baseline creatinine appeared to be approximately 1 5  She was ultimately discharged home with VNA services  Routine outpatient laboratory lab work revealed serum Cr 3 09 and K 5 7  Patient was admitted to hospitalist service for further medical management  It was believed that patient was slightly hypovolemic secondary to multiple BMs as result of lactulose in addition to daily diuresis from home torsemide  Diuretic were held on admission  This evening patient was reported to be more lethargic, as well as slightly tachycardic with heart rates 110-120, and borderline blood pressures  For this reason critical Care Medicine asked evaluate patient  On my examination patient is seated upright in bed  She is pleasantly confused interactive which appears to be patient's baseline  Fine crackles heard at bilateral posterior bases  Notable JVD  Trace pitting edema  Abdomen is distended however no tenderness to palpation  Plan:   · Difficult to determine accurate volume status of patient given an accurate I&Os as well as daily weights  · Insert Pierce for strict I&Os  · Please maintain adequate reliable daily weights  · Initiate midodrine 2 5 mg t i d   To start tonight  · Transfuse 1 unit of PRBCs followed by 40 mg of IV Lasix  · Hold home Cardizem  · Obtain echocardiogram   · Nephrology consultation  · Does not appear to be grossly infected however in in lieu of low-grade fever blood cultures x2, urine culture  Monitor fever curve and WBC count  ·   Counseling / Coordination of Care: Total Critical Care time spent 20 minutes excluding procedures, teaching and family updates

## 2021-01-21 NOTE — QUICK NOTE
Medication:    Outpatient Medications Marked as Taking for the 4/25/19 encounter (Refill) with INOCENTE Sheehan   Medication Sig Dispense Refill   • divalproex (DEPAKOTE) 500 MG delayed release EC tablet Take 2 tablets by mouth nightly. 180 tablet 1       Message to Prescriber: Per Voicemail left patient only has 4 days left.     Pharmacy has been verified.    [] Patient completely out of medication     Patient currently has follow up appointment scheduled       Notified by nursing of patient with BP 83/46, heart rate of 117, and temperature of 99 1  Per nursing staff patient is somewhat lethargic, complaining of fatigue but no other complaints  Per nursing patient has been more lethargic for much of the day  Blood cultures obtained earlier in the day, as well as CXR  On my review CXR showing persistent right sided pleural effusion and cardiomegaly  Ordered stat lactic acid, cbc and cmp  Ordered 500mL bolus and in house provider will evaluate patient due to concern for septic shock

## 2021-01-21 NOTE — ASSESSMENT & PLAN NOTE
· Monitor for signs of hospital-acquired delirium  · Fall precautions  · CT head shows no acute findings  · Patient had an episode of lethargy last night during blood transfusion was evaluated by ICU transfusion was completed  · Resume Seroquel, decreased dose secondary to lethargy at home; was previously maintained to 50 mg now continuing 25 mg  · IM Zyprexa as needed for significant behavioral sundowning vs oral seroquel as needed  · Required posey overnight due to impulsivity and fall concern

## 2021-01-21 NOTE — QUICK NOTE
Was paged by night AP regarding pt with worsening lethargy, tachycardia and hypotension  Evaluated pt at bedside with ICU team  Will resume pt's blood transfusion, give dose of IV lasix with this  Hold additional fluids at this time  Will order midodrine 2 5 mg TID and place nephrology consultation  Obtain UA and place goodman catheter for accurate I&Os  D/c cardizem for now  Pt is currently alert, disoriented but answers yes and no questions  Monitor vitals closely       Alfredo Olvera PA-C

## 2021-01-21 NOTE — NURSING NOTE
Removed pt Pierce around 2pm  Pt has not voided yet, bladder scan showed 280  Will continue to monitor

## 2021-01-21 NOTE — PLAN OF CARE
Problem: Potential for Falls  Goal: Patient will remain free of falls  Description: INTERVENTIONS:  - Assess patient frequently for physical needs  -  Identify cognitive and physical deficits and behaviors that affect risk of falls    -  [place] fall precautions as indicated by assessment   - Educate patient/family on patient safety including physical limitations  - Instruct patient to call for assistance with activity based on assessment  - Modify environment to reduce risk of injury  - Consider OT/PT consult to assist with strengthening/mobility  Outcome: Progressing     Problem: Prexisting or High Potential for Compromised Skin Integrity  Goal: Skin integrity is maintained or improved  Description: INTERVENTIONS:  - Identify patients at risk for skin breakdown  - Assess and monitor skin integrity  - Assess and monitor nutrition and hydration status  - Monitor labs   - Assess for incontinence   - Turn and reposition patient  - Assist with mobility/ambulation  - Relieve pressure over bony prominences  - Avoid friction and shearing  - Provide appropriate hygiene as needed including keeping skin clean and dry  - Evaluate need for skin moisturizer/barrier cream  - Collaborate with interdisciplinary team   - Patient/family teaching  - Consider wound care consult   Outcome: Progressing     Problem: NEUROSENSORY - ADULT  Goal: Achieves stable or improved neurological status  Description: INTERVENTIONS  - Monitor and report changes in neurological status  - Monitor vital signs such as temperature, blood pressure, glucose, and any other labs ordered   - Initiate measures to prevent increased intracranial pressure  - Monitor for seizure activity and implement precautions if appropriate      Outcome: Progressing     Problem: RESPIRATORY - ADULT  Goal: Achieves optimal ventilation and oxygenation  Description: INTERVENTIONS:  - Assess for changes in respiratory status  - Assess for changes in mentation and behavior  - Position to facilitate oxygenation and minimize respiratory effort  - Oxygen administered by appropriate delivery if ordered  - Initiate smoking cessation education as indicated  - Encourage broncho-pulmonary hygiene including cough, deep breathe, Incentive Spirometry  - Assess the need for suctioning and aspirate as needed  - Assess and instruct to report SOB or any respiratory difficulty  - Respiratory Therapy support as indicated  Outcome: Progressing     Problem: METABOLIC, FLUID AND ELECTROLYTES - ADULT  Goal: Fluid balance maintained  Description: INTERVENTIONS:  - Monitor labs   - Monitor I/O and WT  - Instruct patient on fluid and nutrition as appropriate  - Assess for signs & symptoms of volume excess or deficit  Outcome: Progressing     Problem: SKIN/TISSUE INTEGRITY - ADULT  Goal: Incision(s), wounds(s) or drain site(s) healing without S/S of infection  Description: INTERVENTIONS  - Assess and document risk factors for skin impairment   - Assess and document dressing, incision, wound bed, drain sites and surrounding tissue  - Consider nutrition services referral as needed  - Oral mucous membranes remain intact  - Provide patient/ family education  Outcome: Progressing     Problem: DISCHARGE PLANNING  Goal: Discharge to home or other facility with appropriate resources  Description: INTERVENTIONS:  - Identify barriers to discharge w/patient and caregiver  - Arrange for needed discharge resources and transportation as appropriate  - Identify discharge learning needs (meds, wound care, etc )  - Arrange for interpretive services to assist at discharge as needed  - Refer to Case Management Department for coordinating discharge planning if the patient needs post-hospital services based on physician/advanced practitioner order or complex needs related to functional status, cognitive ability, or social support system  Outcome: Progressing     Problem: Knowledge Deficit  Goal: Patient/family/caregiver demonstrates understanding of disease process, treatment plan, medications, and discharge instructions  Description: Complete learning assessment and assess knowledge base  Interventions:  - Provide teaching at level of understanding  - Provide teaching via preferred learning methods  Outcome: Progressing     Problem: SAFETY,RESTRAINT: NV/NON-SELF DESTRUCTIVE BEHAVIOR  Goal: Remains free of harm/injury (restraint for non violent/non self-detsructive behavior)  Description: INTERVENTIONS:  - Instruct patient/family regarding restraint use   - Assess and monitor physiologic and psychological status   - Provide interventions and comfort measures to meet assessed patient needs   - Identify and implement measures to help patient regain control  - Assess readiness for release of restraint   Outcome: Progressing  Goal: Returns to optimal restraint-free functioning  Description: INTERVENTIONS:  - Assess the patient's behavior and symptoms that indicate continued need for restraint  - Identify and implement measures to help patient regain control  - Assess readiness for release of restraint   Outcome: Progressing     Problem: Nutrition/Hydration-ADULT  Goal: Nutrient/Hydration intake appropriate for improving, restoring or maintaining nutritional needs  Description: Monitor and assess patient's nutrition/hydration status for malnutrition  Collaborate with interdisciplinary team and initiate plan and interventions as ordered  Monitor patient's weight and dietary intake as ordered or per policy  Utilize nutrition screening tool and intervene as necessary  Determine patient's food preferences and provide high-protein, high-caloric foods as appropriate       INTERVENTIONS:  - Monitor oral intake, urinary output, labs, and treatment plans  - Assess nutrition and hydration status and recommend course of action  - Evaluate amount of meals eaten  - Assist patient with eating if necessary   - Allow adequate time for meals  - Recommend/ encourage appropriate diets, oral nutritional supplements, and vitamin/mineral supplements  - Order, calculate, and assess calorie counts as needed  - Recommend, monitor, and adjust tube feedings and TPN/PPN based on assessed needs  - Assess need for intravenous fluids  - Provide specific nutrition/hydration education as appropriate  - Include patient/family/caregiver in decisions related to nutrition  Outcome: Progressing

## 2021-01-21 NOTE — NURSING NOTE
Pt's H&H 6 9, 1 unit RBC to be transfused  Pt is lethargic, Vitals 83/46, P 117, temp 99 1  Contacted [facility] on whether to start transfusion  Orders for labs, IVF bolus started  Lea ELIZABETH/Merly ELIZABETH assessed pt, bolus stopped and goodman placed  Pt to be transfused and 40mg Lasix post-transfusion  Will continue to monitor pt

## 2021-01-21 NOTE — PROGRESS NOTES
Progress Note - Dagmar Portillo 1945, 77 y o  female MRN: [id number]    Unit/Bed#: -01 Encounter: [id number]    Primary Care Provider: Marco Mcfadden MD   Date and time admitted to hospital: 1/18/2021  1:04 PM      * Acute kidney injury superimposed on chronic kidney disease [facility])  Assessment & Plan  Lab Results   Component Value Date    EGFR 37 01/21/2021    EGFR 29 01/20/2021    EGFR 26 01/20/2021    CREATININE 1 58 (H) 01/21/2021    CREATININE 1 92 (H) 01/20/2021    CREATININE 2 09 (H) 01/20/2021     · Could be secondary to hypovolemia - BP low on admit and report of frequent BMs on lactulose, also taking diuretics  · Creatinine and BUN both improved, however she did have a drop in Hgb and heparin stopped, received 1 unit PRBCs on 01/20  · Creatinine improved  · Nephrology consulted, known to Dr Maynor Martinez outpatient    Pancytopenia [facility])  Assessment & Plan  · Secondary to cirrhosis  · Monitor CBC daily  · Worsening hgb today and will require transfusion, sister advised and attending to call for consent  · 1u PRBCs ordered, post transfusion IV lasix x1 with labs   · INR and type/screen now    Combined systolic and diastolic heart failure ([facility] [address] )  Assessment & Plan  Wt Readings from Last 3 Encounters:   01/18/21 66 2 kg (145 lb 15 1 oz)   12/29/20 75 kg (165 lb 5 5 oz)   11/19/20 84 4 kg (186 lb)     · Not in acute exacerbation  · Last echocardiogram in 2020 swelling grade 2 diastolic dysfunction and ejection fraction of 50-55%  · Demadex on hold from admission, will give lasix after transfusion today  · Monitor intake/output and daily weights        Dementia ([facility] [address] )  Assessment & Plan  · Monitor for signs of hospital-acquired delirium  · Fall precautions  · CT head shows no acute findings  · Patient had an episode of lethargy last night during blood transfusion was evaluated by ICU transfusion was completed  · Resume Seroquel, decreased dose secondary to lethargy at home; was previously maintained to 50 mg now continuing 25 mg  · IM Zyprexa as needed for significant behavioral sundowning vs oral seroquel as needed  · Required posey overnight due to impulsivity and fall concern    Multiple myeloma (HCC)  Assessment & Plan  · Continue outpatient surveillance of PCP    Hyperkalemia  Assessment & Plan  · Could be in setting of potassium supplementation versus acute kidney injury  · Hold off on further potassium supplementation  · Resolved at present time, will continue to monitor    Paroxysmal atrial fibrillation (HCC)/Tachycardia  Assessment & Plan  · I switch the CCB to 30 mg QID due to hypotension, BB with holding parameters  · Not on anticoagulation per previous epic chart records due to thrombocytopenia and history of cirrhosis    L[facility] cirrhosis ([facility]  Assessment & Plan  · Does not appear in decompensated state, receives paracenteses as an outpatient as needed  · Lactulose resumed  · Monitor electrolytes and fluid status    Recurrent major depressive disorder, in partial remission ([facility] )  Assessment & Plan  · Continue home dose mirtazapine       VTE Pharmacologic Prophylaxis:   Pharmacologic: Pharmacologic VTE Prophylaxis contraindicated due to Acute drop in hemoglobin  Mechanical VTE Prophylaxis in Place: Yes    Patient Centered Rounds: I have performed bedside rounds with nursing staff today  Discussions with Specialists or Other Care Team Provider:  Reviewed previous notes discussed with Nephrology discussed with case management primary RN    Education and Discussions with Family / Patient:  Discussed plan of care with patient denies any additional questions or concerns at this time    Time Spent for Care: 20 minutes  More than 50% of total time spent on counseling and coordination of care as described above      Current Length of Stay: 2 day(s)    Current Patient Status: Inpatient   Certification Statement: The patient will continue to require additional inpatient hospital stay due to Evaluation by Nephrology monitoring of hemoglobin    Discharge Plan / Estimated Discharge Date:  24 hours    Code Status: Level 1 - Full Code    Subjective:   Patient is very confused this is her baseline  She offers no complaints on a    Objective:     Vitals:   Temp (24hrs), Av 6 °F (37 °C), Min:97 7 °F (36 5 °C), Max:99 8 °F (37 7 °C)    Temp:  [97 7 °F (36 5 °C)-99 8 °F (37 7 °C)] 98 3 °F (36 8 °C)  HR:  [] 101  Resp:  [17-22] 20  BP: ()/(46-76) 134/70  SpO2:  [94 %-98 %] 97 %  Body mass index is 24 29 kg/m²  Input and Output Summary (last 24 hours): Intake/Output Summary (Last 24 hours) at 2021 1107  Last data filed at 2021 0300  Gross per 24 hour   Intake 506 67 ml   Output 900 ml   Net -393 33 ml       Physical Exam:     Physical Exam  Vitals signs and nursing note reviewed  Constitutional:       General: She is in acute distress  Appearance: Normal appearance  She is ill-appearing  Cardiovascular:      Rate and Rhythm: Normal rate  Pulmonary:      Effort: Pulmonary effort is normal    Abdominal:      Palpations: Abdomen is soft  Neurological:      Mental Status: She is alert  She is disoriented  Motor: Weakness present  Psychiatric:         Mood and Affect: Mood is anxious  Behavior: Behavior is agitated and hyperactive  Behavior is not aggressive  Behavior is cooperative  Thought Content: Thought content is delusional          Cognition and Memory: Cognition is impaired  Memory is impaired  Judgment: Judgment is impulsive and inappropriate       Additional Data:     Labs:    Results from last 7 days   Lab Units 2108 21   WBC Thousand/uL 3 93* 3 19*   HEMOGLOBIN g/dL 8 8* 6 7*   HEMATOCRIT % 27 5* 22 2*   PLATELETS Thousands/uL 65* 55*   NEUTROS PCT %  --  58   LYMPHS PCT %  --  29   MONOS PCT %  --  11   EOS PCT %  --  1     Results from last 7 days   Lab Units 21  0608 21   POTASSIUM mmol/L 4 3 4 3 CHLORIDE mmol/L 103 103   CO2 mmol/L 26 28   BUN mg/dL 32* 36*   CREATININE mg/dL 1 58* 1 92*   CALCIUM mg/dL 9 4 8 8   ALK PHOS U/L  --  341*   ALT U/L  --  20   AST U/L  --  36     Results from last 7 days   Lab Units 01/21/21  0608   INR  1 13       * I Have Reviewed All Lab Data Listed Above  * Additional Pertinent Lab Tests Reviewed: [facility]    Recent Cultures (last 7 days):     Results from last 7 days   Lab Units 01/20/21  1751   BLOOD CULTURE  Received in Microbiology Lab  Culture in Progress  Received in Microbiology Lab  Culture in Progress  Last 24 Hours Medication List:   Current Facility-Administered Medications   Medication Dose Route Frequency Provider Last Rate    acetaminophen  650 mg Rectal Q6H PRN Hien Muro PA-C      acetaminophen  650 mg Oral Q6H PRN Marcin Stark MD      calcium carbonate  1,000 mg Oral Daily PRN Marcin Stark MD      folic acid  8,784 mcg Oral Daily Marcin Stark MD      lactulose  10 g Oral BID Ronda Vieira PA-C      metoprolol succinate  50 mg Oral Daily Ronda Vieira PA-C      midodrine  2 5 mg Oral TID AC Roxy Bloom[place]CA      mirtazapine  15 mg Oral HS Marcin Stark MD      nicotine  1 patch Transdermal Daily Marcin Stark MD      OLANZapine  2 5 mg Intramuscular Q4H PRN Hien Muro PA-C      ondansetron  4 mg Intravenous Q6H PRN Marcin Stark MD      QUEtiapine  12 5 mg Oral Q8H PRN Hien Muro PA-C      QUEtiapine  25 mg Oral HS Josette Almaguer PA-C          Today, Patient Was Seen By: MEREDITH Milner    ** Please Note: Dragon 360 Dictation voice to text software may have been used in the creation of this document   **

## 2021-01-21 NOTE — ASSESSMENT & PLAN NOTE
Lab Results   Component Value Date    EGFR 37 01/21/2021    EGFR 29 01/20/2021    EGFR 26 01/20/2021    CREATININE 1 58 (H) 01/21/2021    CREATININE 1 92 (H) 01/20/2021    CREATININE 2 09 (H) 01/20/2021     · Could be secondary to hypovolemia - BP low on admit and report of frequent BMs on lactulose, also taking diuretics  · Creatinine and BUN both improved, however she did have a drop in Hgb and heparin stopped, received 1 unit PRBCs on 01/20  · Creatinine improved  · Nephrology consulted, known to Dr Juan Marc outpatient

## 2021-01-21 NOTE — PLAN OF CARE
Problem: Potential for Falls  Goal: Patient will remain free of falls  Description: INTERVENTIONS:  - Assess patient frequently for physical needs  -  Identify cognitive and physical deficits and behaviors that affect risk of falls    -  [place] fall precautions as indicated by assessment   - Educate patient/family on patient safety including physical limitations  - Instruct patient to call for assistance with activity based on assessment  - Modify environment to reduce risk of injury  - Consider OT/PT consult to assist with strengthening/mobility  Outcome: Progressing     Problem: Prexisting or High Potential for Compromised Skin Integrity  Goal: Skin integrity is maintained or improved  Description: INTERVENTIONS:  - Identify patients at risk for skin breakdown  - Assess and monitor skin integrity  - Assess and monitor nutrition and hydration status  - Monitor labs   - Assess for incontinence   - Turn and reposition patient  - Assist with mobility/ambulation  - Relieve pressure over bony prominences  - Avoid friction and shearing  - Provide appropriate hygiene as needed including keeping skin clean and dry  - Evaluate need for skin moisturizer/barrier cream  - Collaborate with interdisciplinary team   - Patient/family teaching  - Consider wound care consult   Outcome: Progressing     Problem: NEUROSENSORY - ADULT  Goal: Achieves stable or improved neurological status  Description: INTERVENTIONS  - Monitor and report changes in neurological status  - Monitor vital signs such as temperature, blood pressure, glucose, and any other labs ordered   - Initiate measures to prevent increased intracranial pressure  - Monitor for seizure activity and implement precautions if appropriate      Outcome: Progressing     Problem: RESPIRATORY - ADULT  Goal: Achieves optimal ventilation and oxygenation  Description: INTERVENTIONS:  - Assess for changes in respiratory status  - Assess for changes in mentation and behavior  - Position to facilitate oxygenation and minimize respiratory effort  - Oxygen administered by appropriate delivery if ordered  - Initiate smoking cessation education as indicated  - Encourage broncho-pulmonary hygiene including cough, deep breathe, Incentive Spirometry  - Assess the need for suctioning and aspirate as needed  - Assess and instruct to report SOB or any respiratory difficulty  - Respiratory Therapy support as indicated  Outcome: Progressing     Problem: METABOLIC, FLUID AND ELECTROLYTES - ADULT  Goal: Fluid balance maintained  Description: INTERVENTIONS:  - Monitor labs   - Monitor I/O and WT  - Instruct patient on fluid and nutrition as appropriate  - Assess for signs & symptoms of volume excess or deficit  Outcome: Progressing     Problem: SKIN/TISSUE INTEGRITY - ADULT  Goal: Incision(s), wounds(s) or drain site(s) healing without S/S of infection  Description: INTERVENTIONS  - Assess and document risk factors for skin impairment   - Assess and document dressing, incision, wound bed, drain sites and surrounding tissue  - Consider nutrition services referral as needed  - Oral mucous membranes remain intact  - Provide patient/ family education  Outcome: Progressing     Problem: DISCHARGE PLANNING  Goal: Discharge to home or other facility with appropriate resources  Description: INTERVENTIONS:  - Identify barriers to discharge w/patient and caregiver  - Arrange for needed discharge resources and transportation as appropriate  - Identify discharge learning needs (meds, wound care, etc )  - Arrange for interpretive services to assist at discharge as needed  - Refer to Case Management Department for coordinating discharge planning if the patient needs post-hospital services based on physician/advanced practitioner order or complex needs related to functional status, cognitive ability, or social support system  Outcome: Progressing     Problem: Knowledge Deficit  Goal: Patient/family/caregiver demonstrates understanding of disease process, treatment plan, medications, and discharge instructions  Description: Complete learning assessment and assess knowledge base  Interventions:  - Provide teaching at level of understanding  - Provide teaching via preferred learning methods  Outcome: Progressing     Problem: SAFETY,RESTRAINT: NV/NON-SELF DESTRUCTIVE BEHAVIOR  Goal: Remains free of harm/injury (restraint for non violent/non self-detsructive behavior)  Description: INTERVENTIONS:  - Instruct patient/family regarding restraint use   - Assess and monitor physiologic and psychological status   - Provide interventions and comfort measures to meet assessed patient needs   - Identify and implement measures to help patient regain control  - Assess readiness for release of restraint   Outcome: Progressing  Goal: Returns to optimal restraint-free functioning  Description: INTERVENTIONS:  - Assess the patient's behavior and symptoms that indicate continued need for restraint  - Identify and implement measures to help patient regain control  - Assess readiness for release of restraint   Outcome: Progressing     Problem: Nutrition/Hydration-ADULT  Goal: Nutrient/Hydration intake appropriate for improving, restoring or maintaining nutritional needs  Description: Monitor and assess patient's nutrition/hydration status for malnutrition  Collaborate with interdisciplinary team and initiate plan and interventions as ordered  Monitor patient's weight and dietary intake as ordered or per policy  Utilize nutrition screening tool and intervene as necessary  Determine patient's food preferences and provide high-protein, high-caloric foods as appropriate       INTERVENTIONS:  - Monitor oral intake, urinary output, labs, and treatment plans  - Assess nutrition and hydration status and recommend course of action  - Evaluate amount of meals eaten  - Assist patient with eating if necessary   - Allow adequate time for meals  - Recommend/ encourage appropriate diets, oral nutritional supplements, and vitamin/mineral supplements  - Order, calculate, and assess calorie counts as needed  - Recommend, monitor, and adjust tube feedings and TPN/PPN based on assessed needs  - Assess need for intravenous fluids  - Provide specific nutrition/hydration education as appropriate  - Include patient/family/caregiver in decisions related to nutrition  Outcome: Progressing

## 2021-01-22 VITALS
RESPIRATION RATE: 20 BRPM | BODY MASS INDEX: 24.32 KG/M2 | WEIGHT: 145.94 LBS | HEART RATE: 116 BPM | TEMPERATURE: 98 F | SYSTOLIC BLOOD PRESSURE: 123 MMHG | OXYGEN SATURATION: 95 % | HEIGHT: 65 IN | DIASTOLIC BLOOD PRESSURE: 71 MMHG

## 2021-01-22 LAB
ANION GAP SERPL CALCULATED.3IONS-SCNC: 13 MMOL/L (ref 4–13)
BUN SERPL-MCNC: 26 MG/DL (ref 5–25)
CALCIUM SERPL-MCNC: 9.4 MG/DL (ref 8.3–10.1)
CHLORIDE SERPL-SCNC: 105 MMOL/L (ref 100–108)
CO2 SERPL-SCNC: 25 MMOL/L (ref 21–32)
CREAT SERPL-MCNC: 1.34 MG/DL (ref 0.6–1.3)
ERYTHROCYTE [DISTWIDTH] IN BLOOD BY AUTOMATED COUNT: 19.6 % (ref 11.6–15.1)
GFR SERPL CREATININE-BSD FRML MDRD: 45 ML/MIN/1.73SQ M
GLUCOSE SERPL-MCNC: 89 MG/DL (ref 65–140)
HCT VFR BLD AUTO: 30.5 % (ref 34.8–46.1)
HGB BLD-MCNC: 9.3 G/DL (ref 11.5–15.4)
MCH RBC QN AUTO: 27.8 PG (ref 26.8–34.3)
MCHC RBC AUTO-ENTMCNC: 30.5 G/DL (ref 31.4–37.4)
MCV RBC AUTO: 91 FL (ref 82–98)
PLATELET # BLD AUTO: 59 THOUSANDS/UL (ref 149–390)
PMV BLD AUTO: 9.1 FL (ref 8.9–12.7)
POTASSIUM SERPL-SCNC: 4 MMOL/L (ref 3.5–5.3)
RBC # BLD AUTO: 3.34 MILLION/UL (ref 3.81–5.12)
SODIUM SERPL-SCNC: 143 MMOL/L (ref 136–145)
WBC # BLD AUTO: 4.44 THOUSAND/UL (ref 4.31–10.16)

## 2021-01-22 PROCEDURE — 99239 HOSP IP/OBS DSCHRG MGMT >30: CPT | Performed by: NURSE PRACTITIONER

## 2021-01-22 PROCEDURE — 85027 COMPLETE CBC AUTOMATED: CPT | Performed by: NURSE PRACTITIONER

## 2021-01-22 PROCEDURE — 80048 BASIC METABOLIC PNL TOTAL CA: CPT | Performed by: NURSE PRACTITIONER

## 2021-01-22 RX ORDER — SULFAMETHOXAZOLE AND TRIMETHOPRIM 800; 160 MG/1; MG/1
1 TABLET ORAL EVERY 12 HOURS SCHEDULED
Qty: 14 TABLET | Refills: 0 | Status: SHIPPED | OUTPATIENT
Start: 2021-01-22 | End: 2021-01-22 | Stop reason: HOSPADM

## 2021-01-22 RX ORDER — NITROFURANTOIN 25; 75 MG/1; MG/1
100 CAPSULE ORAL 2 TIMES DAILY
Qty: 14 CAPSULE | Refills: 0 | Status: SHIPPED | OUTPATIENT
Start: 2021-01-22 | End: 2021-02-01

## 2021-01-22 RX ADMIN — METOPROLOL SUCCINATE 50 MG: 50 TABLET, EXTENDED RELEASE ORAL at 10:09

## 2021-01-22 RX ADMIN — MIDODRINE HYDROCHLORIDE 2.5 MG: 5 TABLET ORAL at 10:38

## 2021-01-22 RX ADMIN — LACTULOSE 10 G: 10 SOLUTION ORAL at 10:09

## 2021-01-22 RX ADMIN — MIDODRINE HYDROCHLORIDE 2.5 MG: 5 TABLET ORAL at 06:25

## 2021-01-22 RX ADMIN — FOLIC ACID 1000 MCG: 1 TABLET ORAL at 10:09

## 2021-01-22 NOTE — PLAN OF CARE
Problem: Potential for Falls  Goal: Patient will remain free of falls  Description: INTERVENTIONS:  - Assess patient frequently for physical needs  -  Identify cognitive and physical deficits and behaviors that affect risk of falls    -  [place] fall precautions as indicated by assessment   - Educate patient/family on patient safety including physical limitations  - Instruct patient to call for assistance with activity based on assessment  - Modify environment to reduce risk of injury  - Consider OT/PT consult to assist with strengthening/mobility  Outcome: Progressing     Problem: Prexisting or High Potential for Compromised Skin Integrity  Goal: Skin integrity is maintained or improved  Description: INTERVENTIONS:  - Identify patients at risk for skin breakdown  - Assess and monitor skin integrity  - Assess and monitor nutrition and hydration status  - Monitor labs   - Assess for incontinence   - Turn and reposition patient  - Assist with mobility/ambulation  - Relieve pressure over bony prominences  - Avoid friction and shearing  - Provide appropriate hygiene as needed including keeping skin clean and dry  - Evaluate need for skin moisturizer/barrier cream  - Collaborate with interdisciplinary team   - Patient/family teaching  - Consider wound care consult   Outcome: Progressing     Problem: NEUROSENSORY - ADULT  Goal: Achieves stable or improved neurological status  Description: INTERVENTIONS  - Monitor and report changes in neurological status  - Monitor vital signs such as temperature, blood pressure, glucose, and any other labs ordered   - Initiate measures to prevent increased intracranial pressure  - Monitor for seizure activity and implement precautions if appropriate      Outcome: Progressing     Problem: RESPIRATORY - ADULT  Goal: Achieves optimal ventilation and oxygenation  Description: INTERVENTIONS:  - Assess for changes in respiratory status  - Assess for changes in mentation and behavior  - Position to facilitate oxygenation and minimize respiratory effort  - Oxygen administered by appropriate delivery if ordered  - Initiate smoking cessation education as indicated  - Encourage broncho-pulmonary hygiene including cough, deep breathe, Incentive Spirometry  - Assess the need for suctioning and aspirate as needed  - Assess and instruct to report SOB or any respiratory difficulty  - Respiratory Therapy support as indicated  Outcome: Progressing     Problem: METABOLIC, FLUID AND ELECTROLYTES - ADULT  Goal: Fluid balance maintained  Description: INTERVENTIONS:  - Monitor labs   - Monitor I/O and WT  - Instruct patient on fluid and nutrition as appropriate  - Assess for signs & symptoms of volume excess or deficit  Outcome: Progressing     Problem: SKIN/TISSUE INTEGRITY - ADULT  Goal: Incision(s), wounds(s) or drain site(s) healing without S/S of infection  Description: INTERVENTIONS  - Assess and document risk factors for skin impairment   - Assess and document dressing, incision, wound bed, drain sites and surrounding tissue  - Consider nutrition services referral as needed  - Oral mucous membranes remain intact  - Provide patient/ family education  Outcome: Progressing     Problem: DISCHARGE PLANNING  Goal: Discharge to home or other facility with appropriate resources  Description: INTERVENTIONS:  - Identify barriers to discharge w/patient and caregiver  - Arrange for needed discharge resources and transportation as appropriate  - Identify discharge learning needs (meds, wound care, etc )  - Arrange for interpretive services to assist at discharge as needed  - Refer to Case Management Department for coordinating discharge planning if the patient needs post-hospital services based on physician/advanced practitioner order or complex needs related to functional status, cognitive ability, or social support system  Outcome: Progressing     Problem: Knowledge Deficit  Goal: Patient/family/caregiver demonstrates understanding of disease process, treatment plan, medications, and discharge instructions  Description: Complete learning assessment and assess knowledge base  Interventions:  - Provide teaching at level of understanding  - Provide teaching via preferred learning methods  Outcome: Progressing     Problem: SAFETY,RESTRAINT: NV/NON-SELF DESTRUCTIVE BEHAVIOR  Goal: Remains free of harm/injury (restraint for non violent/non self-detsructive behavior)  Description: INTERVENTIONS:  - Instruct patient/family regarding restraint use   - Assess and monitor physiologic and psychological status   - Provide interventions and comfort measures to meet assessed patient needs   - Identify and implement measures to help patient regain control  - Assess readiness for release of restraint   Outcome: Progressing  Goal: Returns to optimal restraint-free functioning  Description: INTERVENTIONS:  - Assess the patient's behavior and symptoms that indicate continued need for restraint  - Identify and implement measures to help patient regain control  - Assess readiness for release of restraint   Outcome: Progressing     Problem: Nutrition/Hydration-ADULT  Goal: Nutrient/Hydration intake appropriate for improving, restoring or maintaining nutritional needs  Description: Monitor and assess patient's nutrition/hydration status for malnutrition  Collaborate with interdisciplinary team and initiate plan and interventions as ordered  Monitor patient's weight and dietary intake as ordered or per policy  Utilize nutrition screening tool and intervene as necessary  Determine patient's food preferences and provide high-protein, high-caloric foods as appropriate       INTERVENTIONS:  - Monitor oral intake, urinary output, labs, and treatment plans  - Assess nutrition and hydration status and recommend course of action  - Evaluate amount of meals eaten  - Assist patient with eating if necessary   - Allow adequate time for meals  - Recommend/ encourage appropriate diets, oral nutritional supplements, and vitamin/mineral supplements  - Order, calculate, and assess calorie counts as needed  - Recommend, monitor, and adjust tube feedings and TPN/PPN based on assessed needs  - Assess need for intravenous fluids  - Provide specific nutrition/hydration education as appropriate  - Include patient/family/caregiver in decisions related to nutrition  Outcome: Progressing

## 2021-01-22 NOTE — DISCHARGE SUMMARY
Discharge- Martin Estrada 1945, 77 y o  female MRN: [id number]    Unit/Bed#: -01 Encounter: [id number]    Primary Care Provider: Amisha Gramajo MD   Date and time admitted to hospital: 1/18/2021  1:04 PM    * Acute kidney injury superimposed on chronic kidney disease [facility])  Assessment & Plan  Lab Results   Component Value Date    EGFR 45 01/22/2021    EGFR 37 01/21/2021    EGFR 29 01/20/2021    CREATININE 1 34 (H) 01/22/2021    CREATININE 1 58 (H) 01/21/2021    CREATININE 1 92 (H) 01/20/2021     · Could be secondary to hypovolemia - BP low on admit and report of frequent BMs on lactulose, also taking diuretics  · Creatinine and BUN both improved, however she did have a drop in Hgb and heparin stopped, received 1 unit PRBCs on 01/20  · Creatinine improved    Pancytopenia ([facility] [address] )  Assessment & Plan  · Secondary to cirrhosis  · Monitor CBC daily  · Received 1 unit PRBCs on 1/20 overnight hemoglobin has improved to 9 3    Combined systolic and diastolic heart failure (HCC)  Assessment & Plan  Wt Readings from Last 3 Encounters:   01/18/21 66 2 kg (145 lb 15 1 oz)   12/29/20 75 kg (165 lb 5 5 oz)   11/19/20 84 4 kg (186 lb)     · Not in acute exacerbation  · Last echocardiogram in 2020 swelling grade 2 diastolic dysfunction and ejection fraction of 50-55%  · Demadex on hold from admission, will give lasix after transfusion today  · Monitor intake/output and daily weights        Dementia ([facility] [address] )  Assessment & Plan  · Monitor for signs of hospital-acquired delirium  · Fall precautions  · CT head shows no acute findings  · Patient had an episode of lethargy last night during blood transfusion was evaluated by ICU transfusion was completed  · Resume Seroquel, decreased dose secondary to lethargy at home; was previously maintained to 50 mg now continuing 25 mg  · IM Zyprexa as needed for significant behavioral sundowning vs oral seroquel as needed  · Required posey overnight due to impulsivity and fall concern    Multiple myeloma ([facility] )  Assessment & Plan  · Continue outpatient surveillance of PCP    Hyperkalemia  Assessment & Plan  · Could be in setting of potassium supplementation versus acute kidney injury  · Resolved with holding home potassium    Paroxysmal atrial fibrillation (HCC)/Tachycardia  Assessment & Plan  · CCB to 30 mg QID due to hypotension, BB with holding parameters  · Not on anticoagulation per previous epic chart records due to thrombocytopenia and history of cirrhosis    Liver cirrhosis ([facility] )  Assessment & Plan  · Does not appear in decompensated state, receives paracenteses as an outpatient as needed  · Lactulose resumed  · Monitor electrolytes and fluid status    Recurrent major depressive disorder, in partial remission ([facility] )  Assessment & Plan  · Continue home dose mirtazapine         Discharging Physician / Practitioner: MEREDITH Seay  PCP: Blayne Laurent MD  Admission Date:   Admission Orders (From admission, onward)     Ordered        01/19/21 1409  Inpatient Admission  Once         01/18/21 1517  Place in Observation  Once                   Discharge Date: 01/22/21    Resolved Problems  Date Reviewed: 1/20/2021    None          Consultations During Hospital Stay:  ·  IP CONSULT TO NUTRITION SERVICES    Procedures Performed:   · Chest x-ray  · Head CT    Significant Findings / Test Results:   No mass effect, acute intracranial hemorrhage or CT evidence for large acute vascular distribution infarct  Age-related involutional and advanced chronic microvascular ischemic change  1   CHF  2   Possible atelectasis and/or consolidation in the base of the right lower lobe  3   Subsegmental atelectasis in the right lower lobe  ·     Incidental Findings:   · none    Test Results Pending at Discharge (will require follow up):    · None     Outpatient Tests Requested:  · Bmp      Complications:  None    Reason for Admission:    Chief Complaint   Patient presents with   [facility] Problem     SENT BY PRIMARY FOR ABNORMAL LABS NO COMPLAINTS          Hospital Course:     Majorie Cheadle is a 77 y o  female patient who originally presented to the hospital on 1/18/2021 due to abnormal labs including an elevated creatinine  Patient was given IV and electrolyte supplementation with improvement of her labs  She was then found to be anemic she received blood transfusion there was some concern that she became lethargic during transfusion so she was kept an additional day to make sure that her mentation returned to normal which it did same night  She had 24 hours that were uneventful hemoglobin continued to improve her creatinine continued to improve her mentation was back to baseline and she is stable for discharge with p o  Antibiotics for urinary tract infection    Please see above list of diagnoses and related plan for additional information  Condition at Discharge: stable     Discharge Day Visit / Exam:     Subjective:  Patient is seen resting comfortably in bed she offers no complaints at this time  Vitals: Blood Pressure: 123/71 (01/22/21 0659)  Pulse: (!) 116 (01/22/21 0659)  Temperature: 98 °F (36 7 °C) (01/22/21 0659)  Temp Source: Oral (01/21/21 2100)  Respirations: 20 (01/22/21 0659)  Height: 5' 5" (165 1 cm) (01/18/21 2005)  Weight - Scale: 66 2 kg (145 lb 15 1 oz) (01/18/21 2005)  SpO2: 95 % (01/22/21 0659)  Exam:   Physical Exam  Vitals signs and nursing note reviewed  Constitutional:       General: She is not in acute distress  Appearance: She is ill-appearing  HENT:      Head: Normocephalic  Neck:      Musculoskeletal: Normal range of motion  Cardiovascular:      Rate and Rhythm: Normal rate  Pulses: Normal pulses  Pulmonary:      Effort: Pulmonary effort is normal    Abdominal:      General: Abdomen is flat  Palpations: Abdomen is soft  Neurological:      Mental Status: Mental status is at baseline     Psychiatric:         Mood and Affect: Mood normal  Speech: Speech normal          Behavior: Behavior is cooperative  Thought Content: Thought content normal          Cognition and Memory: Cognition is impaired  Memory is impaired  Judgment: Judgment is inappropriate  Discussion with Family:  Discussed plan of care with patient's sister she is agreeable    Discharge instructions/Information to patient and family:   See after visit summary for information provided to patient and family  Provisions for Follow-Up Care:  See after visit summary for information related to follow-up care and any pertinent home health orders  Disposition:     Home    For Discharges to [facility]:   · Not Applicable to this Patient - Not Applicable to this Patient    Planned Readmission: no     Discharge Statement:  I spent 45 minutes discharging the patient  This time was spent on the day of discharge  I had direct contact with the patient on the day of discharge  Greater than 50% of the total time was spent examining patient, answering all patient questions, arranging and discussing plan of care with patient as well as directly providing post-discharge instructions  Additional time then spent on discharge activities  Discharge Medications:  See after visit summary for reconciled discharge medications provided to patient and family        ** Please Note: This note has been constructed using a voice recognition system **

## 2021-01-22 NOTE — ASSESSMENT & PLAN NOTE
· Secondary to cirrhosis  · Monitor CBC daily  · Received 1 unit PRBCs on 1/20 overnight hemoglobin has improved to 9 3

## 2021-01-22 NOTE — ASSESSMENT & PLAN NOTE
· Could be in setting of potassium supplementation versus acute kidney injury  · Resolved with holding home potassium

## 2021-01-22 NOTE — ASSESSMENT & PLAN NOTE
· CCB to 30 mg QID due to hypotension, BB with holding parameters  · Not on anticoagulation per previous epic chart records due to thrombocytopenia and history of cirrhosis

## 2021-01-22 NOTE — ASSESSMENT & PLAN NOTE
Lab Results   Component Value Date    EGFR 45 01/22/2021    EGFR 37 01/21/2021    EGFR 29 01/20/2021    CREATININE 1 34 (H) 01/22/2021    CREATININE 1 58 (H) 01/21/2021    CREATININE 1 92 (H) 01/20/2021     · Could be secondary to hypovolemia - BP low on admit and report of frequent BMs on lactulose, also taking diuretics  · Creatinine and BUN both improved, however she did have a drop in Hgb and heparin stopped, received 1 unit PRBCs on 01/20  · Creatinine improved

## 2021-01-22 NOTE — DISCHARGE INSTR - AVS FIRST PAGE
Thank you for choosing [facility], please take all prescriptions as instructed, please make appropriate follow-up visits

## 2021-01-22 NOTE — TRANSPORTATION MEDICAL NECESSITY
Section I - General Information    Name of Patient: Lory Frazier                 : 1945    Medicare #: [id number]  Transport Date: 21 (PCS is valid for round trips on this date and for all repetitive trips in the 60-day range as noted below )  Origin: [facility]: Home at [address] [place] [address] [address]  Is the pt's stay covered under Medicare Part A (PPS/DRG)   [x]     Closest appropriate facility? If no, why is transport to more distant facility required? Yes  If hospice pt, is this transport related to pt's terminal illness? NA       Section II - Medical Necessity Questionnaire  Ambulance transportation is medically necessary only if other means of transport are contraindicated or would be potentially harmful to the patient  To meet this requirement, the patient must either be "bed confined" or suffer from a condition such that transport by means other than ambulance is contraindicated by the patient's condition  The following questions must be answered by the medical professional signing below for this form to be valid:    1)  Describe the MEDICAL CONDITION (physical and/or mental) of this patient AT [address] [place] [address] that requires the patient to be transported in an ambulance and why transport by other means is contraindicated by the patient's condition: Patient has decreased safety awareness and is demented  Patient is limited by fatigue  A medical attendant is also required due to limited safety awareness  2) Is the patient "bed confined" as defined below? No  To be "be confined" the patient must satisfy all three of the following conditions: (1) unable to get up from bed without Assistance; AND (2) unable to ambulate; AND (3) unable to sit in a chair or wheelchair      3) Can this patient safely be transported by car or wheelchair van (i e , seated during transport without a medical attendant or monitoring)? No    4) In addition to completing questions 1-3 above, please check any of the following conditions that apply*:   *Note: supporting documentation for any boxes checked must be maintained in the patient's medical records  If hosp-hosp transfer, describe services needed at 2nd facility not available at 1st facility? Patient is confused  Moderate/severe pain on movement   Need or possible need for restraints   Medical attendant required   Unable to tolerate seated position for time needed to transport       Section III - Signature of Physician or Healthcare Professional  I certify that the above information is true and correct based on my evaluation of this patient, and represent that the patient requires transport by ambulance and that other forms of transport are contraindicated  I understand that this information will be used by the Centers for Medicare and Medicaid Services (CMS) to support the determination of medical necessity for ambulance services, and I represent that I have personal knowledge of the patient's condition at time of transport  []  If this box is checked, I also certify that the patient is physically or mentally incapable of signing the ambulance service's claim and that the institution with which I am affiliated has furnished care, services, or assistance to the patient  My signature below is made on behalf of the patient pursuant to 42 CFR §424 36(b)(4)  In accordance with 42 CFR §424 37, the specific reason(s) that the patient is physically or mentally incapable of signing the claim form is as follows: N/A        Signature of Physician* or Healthcare Professional_____________________________________    Signature Date 01/22/21 (For scheduled repetitive transports, this form is not valid for transports performed more than 60 days after this date)    Printed Name & Credentials of Physician or Healthcare Professional (DO MACEY, RN, etc ) JOS Strauss    *Form must be signed by patient's attending physician for scheduled, repetitive transports   For non-repetitive, unscheduled ambulance transports, if unable to obtain the signature of the attending physician, any of the following may sign (choose appropriate option below)  [] Physician Assistant []  Clinical Nurse Specialist []  Registered Nurse  []  Nurse Practitioner  [x] Discharge Planner

## 2021-01-22 NOTE — DISCHARGE INSTRUCTIONS
A-fib (Atrial Fibrillation)   WHAT YOU NEED TO KNOW:   A-fib may come and go, or it may be a long-term condition  A-fib can cause blood clots, stroke, or heart failure  These conditions may become life-threatening  It is important to treat and manage a-fib to help prevent a blood clot, stroke, or heart failure  DISCHARGE INSTRUCTIONS:   Call your local emergency number (911 in the [address] [facility]) if:   · You have any of the following signs of a heart attack:      ? Squeezing, pressure, or pain in your chest    ? You may  also have any of the following:     ? Discomfort or pain in your back, neck, jaw, stomach, or arm    ? Shortness of breath    ? Nausea or vomiting    ? Lightheadedness or a sudden cold sweat    · You have any of the following signs of a stroke:      ? Numbness or drooping on one side of your face     ? Weakness in an arm or leg    ? Confusion or difficulty speaking    ? Dizziness, a severe headache, or vision loss    Call your cardiologist if:   · Your arm or leg feels warm, tender, and painful  It may look swollen and red  · Your heart rate is more than 110 beats per minute  · You have new or worsening swelling in your legs, feet, ankles, or abdomen  · You are short of breath, even at rest      · You have questions or concerns about your condition or care  Medicines: You may need any of the following:  · Heart medicines  help control your heart rate or rhythm  You may need more than one medicine to treat your symptoms  · Blood thinners  help prevent blood clots  Clots can cause strokes, heart attacks, and death  The following are general safety guidelines to follow while you are taking a blood thinner:    ? Watch for bleeding and bruising while you take blood thinners  Watch for bleeding from your gums or nose  Watch for blood in your urine and bowel movements  Use a soft washcloth on your skin, and a soft toothbrush to brush your teeth  This can keep your skin and gums from bleeding  If you shave, use an electric shaver  Do not play contact sports  ? Tell your dentist and other healthcare providers that you take a blood thinner  Wear a bracelet or necklace that says you take this medicine  ? Do not start or stop any other medicines unless your healthcare provider tells you to  Many medicines cannot be used with blood thinners  ? Take your blood thinner exactly as prescribed by your healthcare provider  Do not skip does or take less than prescribed  Tell your provider right away if you forget to take your blood thinner, or if you take too much  ? Warfarin  is a blood thinner that you may need to take  The following are things you should be aware of if you take warfarin:     § Foods and medicines can affect the amount of warfarin in your blood  Do not make major changes to your diet while you take warfarin  Warfarin works best when you eat about the same amount of vitamin K every day  Vitamin K is found in green leafy vegetables and certain other foods  Ask for more information about what to eat when you are taking warfarin  § You will need to see your healthcare provider for follow-up visits when you are on warfarin  You will need regular blood tests  These tests are used to decide how much medicine you need  · Antiplatelets , such as aspirin, help prevent blood clots  Take your antiplatelet medicine exactly as directed  These medicines make it more likely for you to bleed or bruise  If you are told to take aspirin, do not take acetaminophen or ibuprofen instead  · Take your medicine as directed  Contact your healthcare provider if you think your medicine is not helping or if you have side effects  Tell him or her if you are allergic to any medicine  Keep a list of the medicines, vitamins, and herbs you take  Include the amounts, and when and why you take them  Bring the list or the pill bottles to follow-up visits   Carry your medicine list with you in case of an emergency  Manage A-fib:   · Know your target heart rate  Learn how to check your pulse and monitor your heart rate  · Know the risks if you choose to drink alcohol  Alcohol can make a-fib hard to manage  Ask your healthcare provider if it is safe for you to drink alcohol  A drink of alcohol is 12 ounces of beer, 5 ounces of wine, or 1½ ounces of liquor  · Do not smoke  Nicotine can cause heart damage and make it more difficult to manage your a-fib  Do not use e-cigarettes or smokeless tobacco in place of cigarettes or to help you quit  They still contain nicotine  Ask your healthcare provider for information if you currently smoke and need help quitting  · Eat heart-healthy foods  Heart healthy foods will help keep your cholesterol low  These include fruits, vegetables, whole-grain breads, low-fat dairy products, beans, lean meats, and fish  Replace butter and margarine with heart-healthy oils such as olive oil and canola oil  · Maintain a healthy weight  Ask your healthcare provider how much you should weigh  Ask him or her to help you create a safe weight loss plan if you are overweight  Even a small goal of a 10% weight loss can improve your heart health  · Get regular physical activity  Physical activity helps improve your heart health  Get at least 150 minutes of moderate aerobic physical activity each week  Your healthcare provider can help you create an activity plan  · Manage other health conditions  This includes high blood pressure or cholesterol, sleep apnea, diabetes, and other heart conditions  Take medicine as directed and follow your treatment plan  Follow up with your cardiologist as directed: You will need regular blood tests and monitoring  Write down your questions so you remember to ask them during your visits     © Copyright [address] Information is for End User's use only and may not be sold, redistributed or otherwise used for commercial purposes  All illustrations and images included in CareNotes® are the copyrighted property of [employer] Inc  or [facility] Pete Marcelino   The above information is an  only  It is not intended as medical advice for individual conditions or treatments  Talk to your doctor, nurse or pharmacist before following any medical regimen to see if it is safe and effective for you  Acute Kidney Injury   WHAT YOU NEED TO KNOW:   Acute kidney injury (CESAR) is also called acute kidney failure, or acute renal failure  CESAR happens when your kidneys suddenly stop working correctly  Normally, the kidneys remove fluid, chemicals, and waste from your blood  These wastes are turned into urine by your kidneys  CESAR usually happens over hours or days  When you have CESAR, your kidneys do not remove the waste, chemicals, or extra fluid from your body  A normal amount of urine is not produced  CESAR is usually temporary, it can take days to months to recover  CESAR can also become a chronic kidney condition  DISCHARGE INSTRUCTIONS:   Call 911 if:   · You have sudden chest pain or trouble breathing  Seek care immediately if:   · Your symptoms get worse  Contact your healthcare provider if:   · Your symptoms return  · Your blood sugar or blood pressure level is not within the range your healthcare provider recommends  · You have questions or concerns about your condition or care  Nutrition:  Your healthcare provider may tell you to eat food low in sodium (salt), potassium, phosphorus, or protein  A dietitian can help you plan your meals  Drink liquids as directed: Your healthcare provider may recommend that you drink a certain amount of liquids  This will help your kidneys work better and decrease your risk for dehydration  Ask how much liquid to drink each day and which liquids are best for you    Prevent acute kidney injury:   · Manage other health conditions  such as diabetes, high blood pressure, or heart disease  These conditions increase your risk for acute kidney injury  Take your medicines for these conditions as directed  Also, monitor your blood sugar and blood pressure levels as directed  Contact your healthcare provider if your levels are not in the range he or she says it should be  · Talk to your healthcare provider before you take over-the-counter-medicine  NSAIDs, stomach medicine, or laxatives may harm your kidneys and increase your risk for acute kidney injury  If it is okay to take the medicine, follow the directions on the package  Do not take more than directed  · Tell healthcare providers you have had acute kidney injury  before you get contrast liquid for an x-ray or CT scan  Your healthcare provider may give you medicine to prevent kidney problems caused by the liquid  Follow up with your healthcare provider as directed: You will need to return for more tests to make sure your kidneys are working properly  You may also be referred to a kidney specialist  Write down your questions so you remember to ask them during your visits  © Copyright [address] Information is for End User's use only and may not be sold, redistributed or otherwise used for commercial purposes  All illustrations and images included in CareNotes® are the copyrighted property of [employer]  or [address] [place] [address]  The above information is an  only  It is not intended as medical advice for individual conditions or treatments  Talk to your doctor, nurse or pharmacist before following any medical regimen to see if it is safe and effective for you  Cirrhosis   AMBULATORY CARE:   Cirrhosis  is long-term scarring of the liver  The liver makes enzymes and bile that help digest food and gives your body energy  It also removes harmful material from your body, such as alcohol and other chemicals  Cirrhosis is caused by repeated damage to your liver over time   Scar tissue starts to replace healthy liver tissue  The scar tissue prevents the liver from working properly  Common signs and symptoms of cirrhosis:  You may not have any signs or symptoms until your liver damage is severe  You may have any of the following:  · Fatigue    · Bleeding and bruising easily    · Swelling of your feet, legs, or abdomen    · Nausea, loss of appetite, and weight loss    · Itching    · Jaundice (yellowing of your skin or eyes)    · Black bowel movements or dark urine    Seek care immediately if:   · You have pain during a bowel movement and it is black or contains blood  · You have a fast heart rate and fast breathing  · You are dizzy or confused  · You have severe pain in your abdomen  · You have trouble breathing  · Your vomit looks like it has coffee grinds or blood in it  Contact your healthcare provider if:   · You have a fever  · You have red or itchy skin  · You are in pain and feel weak  · You have questions or concerns about your condition or care  Treatment  may include any of the following:  · Medicines  may be used to treat high blood pressure in the portal vein (the vein that goes to your liver)  You may also need medicine to decrease extra fluid that collects in an area such as your legs or abdomen  Medicines may be used to decrease itching, or to treat a bacterial or viral infection  · Surgery  may be used to create a channel inside your liver to increase blood flow  This will help decrease swelling in your abdomen and lower blood pressure in the portal vein  Your risk for bleeding in your esophagus and stomach will also be decreased  You may need a liver transplant if your liver fails  Do not drink alcohol:  Alcohol will cause more damage to your liver  Manage cirrhosis:   · Do not smoke  Nicotine and other chemicals in cigarettes and cigars can cause blood vessel and lung damage  Ask your healthcare provider for information if you currently smoke and need help to quit  E-cigarettes or smokeless tobacco still contain nicotine  Talk to your healthcare provider before you use these products  · Eat a variety of healthy foods  Healthy foods include fruits, vegetables, whole-grain breads, low-fat dairy products, beans, lean meat, and fish  Ask if you need to be on a special diet  · Reach or maintain a healthy weight  You may develop fatty liver disease if you are overweight  Ask your healthcare provider for a healthy weight for you  He can help you create a safe weight loss plan if you are overweight  · Limit sodium (salt)  You may need to decrease the amount of sodium you eat if you have swelling caused by fluid buildup  Sodium is found in table salt and salty foods such as canned foods, frozen foods, and potato chips  · Drink liquids as directed  Ask how much liquid to drink each day and which liquids are best for you  For most people, good liquids to drink are water, juice, and milk  Liquids can help your liver work better  · Ask about vaccines  You may have a hard time fighting infection because of cirrhosis  Vaccines help protect you against viruses that can cause diseases such as the flu or hepatitis  Viral hepatitis is caused by a virus that leads to inflammation of the liver  You may need a hepatitis A or B vaccine  You may also need a pneumonia vaccine  Always get a flu vaccine each year as soon as it becomes available  · Ask about medicines  Some medicines can harm your liver  Acetaminophen is an example  Talk to your healthcare provider about all your medicines  Do not take any over-the-counter medicine or herbal supplements until your healthcare provider says it is okay  Follow up with your healthcare provider as directed:  Write down your questions so you remember to ask them during your visits     © Copyright [address] Information is for End User's use only and may not be sold, redistributed or otherwise used for commercial purposes  All illustrations and images included in CareNotes® are the copyrighted property of A D A M , Inc  or Juan Marcelino   The above information is an  only  It is not intended as medical advice for individual conditions or treatments  Talk to your doctor, nurse or pharmacist before following any medical regimen to see if it is safe and effective for you  Dementia, Ambulatory Care   GENERAL INFORMATION:   Dementia  is a condition that causes loss of memory, thought control, and judgment  Dementia may develop quickly over a few months after a head injury or stroke  It may develop slowly over many years if you have Alzheimer disease  Dementia cannot be cured or prevented, but treatment may slow or reduce your symptoms  Common symptoms include the following:   · Loss of short-term memory, followed by loss of long-term memory    · Trouble remembering to go to the bathroom, to urinate, or have a bowel movement    · Anger or violent behavior     · Depression, anxiety, or hallucinations  Seek immediate care for the following symptoms:   · Signs of delirium, such as extreme confusion, and seeing or hearing things that are not there    · Anger or violence that cannot be calmed down    · Fainting and you cannot be woken  Treatment for dementia  may include medicines to slow memory loss  You may also need medicines to help control anger, decrease anxiety, or improve your mood  Take your medicines as directed  Manage dementia:   · Keep your mind and body active  Do activities that you love, such as art, gardening, or listening to music  Call or visit people often  This will keep your social skills sharp, and may help reduce depression  · Write daily schedules and routines  Record medical appointments, times to take your medicines, meal times, or any other things to remember  Write down reminders to use the bathroom if you have trouble remembering   You may need to ask someone to write things down for you  · Place clocks and calendars where you can see them  This will help you remember appointments and tasks  · Do not smoke  If you smoke, it is never too late to quit  Smoking may slow blood flow in your brain, and make your symptoms worse  Ask your caregiver for information if you need help quitting  · Eat healthy foods  Examples are fruits, vegetables, whole-grain breads, low-fat dairy products, beans, lean meats, and fish  Ask if you need to be on a special diet  · Ask your healthcare provider for a list of organizations that can help  You may begin to need an in-home aide to help you remember your daily tasks  Arrange for help while you are thinking clearly  Follow up with your healthcare provider as directed:  Ask someone to go with you to help you remember what your healthcare provider tells you  The person can take notes for you during the visit and go over the notes with you later  Write down your questions so you remember to ask them during your visits  CARE AGREEMENT:   You have the right to help plan your care  Learn about your health condition and how it may be treated  Discuss treatment options with your caregivers to decide what care you want to receive  You always have the right to refuse treatment  The above information is an  only  It is not intended as medical advice for individual conditions or treatments  Talk to your doctor, nurse or pharmacist before following any medical regimen to see if it is safe and effective for you  © 2014 [address] is for End User's use only and may not be sold, redistributed or otherwise used for commercial purposes  All illustrations and images included in CareNotes® are the copyrighted property of [employer]  or Ollie Spears  Dementia   AMBULATORY CARE:   Dementia  is a condition that causes loss of memory, thought control, and judgment   Dementia may develop quickly over a few months after a head injury or stroke  It may develop slowly over many years if you have Alzheimer disease  Dementia cannot be cured or prevented, but treatment may slow or reduce your symptoms  Common symptoms include the following:   · Loss of short-term memory, followed by loss of long-term memory    · Trouble remembering to go to the bathroom, to urinate, or have a bowel movement    · Anger or violent behavior    · Depression, anxiety, or hallucinations    Seek care immediately if  you or someone close to you notices:  · You have signs of delirium, such as extreme confusion, and seeing or hearing things that are not there  · You become angry or violent, and cannot be calmed down  · You faint and cannot be woken  Call your doctor or have someone else call if:   · You have a fever  · You have increased confusion, behavior, or mood changes  · You have questions or concerns about your condition or care  Treatment for dementia  may include medicines to slow memory loss  You may also need medicines to help control anger, decrease anxiety, or improve your mood  Manage dementia:  You may begin to need an in-home aide to help you remember your daily tasks  Ask your healthcare provider for a list of organizations that can help  It is best to arrange for help while you are thinking clearly  The following may also help you manage dementia:  · Keep your mind and body active  Do activities that you love, such as art, gardening, or listening to music  Call or visit people often  This will keep your social skills sharp, and may help reduce depression  · Take all of your medicines as directed  This will help control medical conditions, such as high blood pressure, high cholesterol, and diabetes  · Write daily schedules and routines  Record medical appointments, times to take your medicines, meal times, or any other things to remember   Write down reminders to use the bathroom if you have trouble remembering  You may need to ask someone to write things down for you  · Place clocks and calendars where you can see them  This will help you remember appointments and tasks  · Do not smoke  Nicotine and other chemicals in cigarettes and cigars can cause blood vessel damage  Ask your healthcare provider for information if you currently smoke and need help to quit  E-cigarettes or smokeless tobacco still contain nicotine  Talk to your healthcare provider before you use these products  · Eat healthy foods  Examples are fruits, vegetables, whole-grain breads, low-fat dairy products, beans, lean meats, and fish  Ask if you need to be on a special diet  Follow up with your healthcare provider as directed:  Ask someone to go with you to help you remember what your healthcare provider tells you  The person can take notes for you during the visit and go over the notes with you later  Write down your questions so you remember to ask them during your visits  © Copyright [address] Information is for End User's use only and may not be sold, redistributed or otherwise used for commercial purposes  All illustrations and images included in CareNotes® are the copyrighted property of [employer] M [employer] Inc  or [facility] Pete Marcelino   The above information is an  only  It is not intended as medical advice for individual conditions or treatments  Talk to your doctor, nurse or pharmacist before following any medical regimen to see if it is safe and effective for you

## 2021-01-22 NOTE — CASE MANAGEMENT
Cm received TT from Dennie Lope at [facility] who reported transport time at 1600 today with Laura KHOURY TT RN and [facility]  Cm called patient's sister to report transport time  Cm department will continue to follow patient through discharge

## 2021-01-22 NOTE — CASE MANAGEMENT
Cm received TT from [facility] reporting that patient needs transportation for discharge  CM called patient's sister to discuss transportation  Sister unable to transport and requested transport  Cm called [facility] and spoke to Lori SMILEY reported that they will call back with a transportation time  Cm completed medical necessity form  Placed one copy in medical records and one in patient's chart

## 2021-01-23 LAB
BACTERIA UR CULT: ABNORMAL

## 2021-01-25 ENCOUNTER — TRANSITIONAL CARE MANAGEMENT (OUTPATIENT)
Dept: INTERNAL MEDICINE CLINIC | Facility: CLINIC | Age: 76
End: 2021-01-25

## 2021-01-25 LAB
BACTERIA BLD CULT: NORMAL
BACTERIA BLD CULT: NORMAL

## 2021-01-26 DIAGNOSIS — N39.0 URINARY TRACT INFECTION WITHOUT HEMATURIA, SITE UNSPECIFIED: Primary | ICD-10-CM

## 2021-01-26 RX ORDER — AMOXICILLIN 875 MG/1
875 TABLET, COATED ORAL 2 TIMES DAILY
Qty: 14 TABLET | Refills: 0 | Status: SHIPPED | OUTPATIENT
Start: 2021-01-26 | End: 2021-02-02

## 2021-01-30 DIAGNOSIS — I10 BENIGN ESSENTIAL HYPERTENSION: ICD-10-CM

## 2021-01-30 DIAGNOSIS — D64.9 ANEMIA, UNSPECIFIED TYPE: ICD-10-CM

## 2021-01-30 RX ORDER — PANTOPRAZOLE SODIUM 40 MG/1
TABLET, DELAYED RELEASE ORAL
Qty: 30 TABLET | Refills: 2 | Status: SHIPPED | OUTPATIENT
Start: 2021-01-30 | End: 2021-04-28 | Stop reason: HOSPADM

## 2021-01-30 RX ORDER — METOPROLOL SUCCINATE 25 MG/1
TABLET, EXTENDED RELEASE ORAL
Qty: 90 TABLET | Refills: 3 | Status: SHIPPED | OUTPATIENT
Start: 2021-01-30 | End: 2021-02-25 | Stop reason: HOSPADM

## 2021-02-01 ENCOUNTER — TELEMEDICINE (OUTPATIENT)
Dept: INTERNAL MEDICINE CLINIC | Facility: CLINIC | Age: 76
End: 2021-02-01
Payer: MEDICARE

## 2021-02-01 DIAGNOSIS — N18.9 ACUTE KIDNEY INJURY SUPERIMPOSED ON CHRONIC KIDNEY DISEASE (HCC): ICD-10-CM

## 2021-02-01 DIAGNOSIS — N39.0 URINARY TRACT INFECTION WITHOUT HEMATURIA, SITE UNSPECIFIED: Primary | ICD-10-CM

## 2021-02-01 DIAGNOSIS — N18.4 CHRONIC KIDNEY DISEASE (CKD), ACTIVE MEDICAL MANAGEMENT WITHOUT DIALYSIS, STAGE 4 (SEVERE) (HCC): ICD-10-CM

## 2021-02-01 DIAGNOSIS — C90.01 MULTIPLE MYELOMA IN REMISSION (HCC): ICD-10-CM

## 2021-02-01 DIAGNOSIS — K70.31 ALCOHOLIC CIRRHOSIS OF LIVER WITH ASCITES (HCC): ICD-10-CM

## 2021-02-01 DIAGNOSIS — N17.9 ACUTE KIDNEY INJURY SUPERIMPOSED ON CHRONIC KIDNEY DISEASE (HCC): ICD-10-CM

## 2021-02-01 DIAGNOSIS — F17.210 CIGARETTE NICOTINE DEPENDENCE WITHOUT COMPLICATION: ICD-10-CM

## 2021-02-01 PROCEDURE — 99495 TRANSJ CARE MGMT MOD F2F 14D: CPT | Performed by: INTERNAL MEDICINE

## 2021-02-01 NOTE — PROGRESS NOTES
Assessment/Plan:        Problem List Items Addressed This Visit        Digestive    Liver cirrhosis (HCC)       Genitourinary    Urinary tract infection without hematuria - Primary    Chronic kidney disease (CKD), active medical management without dialysis, stage 4 (severe) (HCC)    Relevant Orders    CBC and differential    Comprehensive metabolic panel    Acute kidney injury superimposed on chronic kidney disease ([facility] [address] )       Other    Cigarette nicotine dependence without complication    Multiple myeloma ([facility] [address] )    Relevant Orders    Ambulatory referral to Hematology / Oncology             Reason for visit is hospital follow up    Encounter provider Mary Cheng MD       Provider located at [address] [place] [address]      Recent Visits  No visits were found meeting these conditions  Showing recent visits within past 7 days and meeting all other requirements     Future Appointments  No visits were found meeting these conditions  Showing future appointments within next 150 days and meeting all other requirements        After connecting through [employer], the patient was identified by name and date of birth  Nikki Fernandez was informed that this is a telemedicine visit and that the visit is being conducted through [employer] and patient was informed that this is not a secure, HIPAA-compliant platform  She agrees to proceed     My office door was closed  No one else was in the room  She acknowledged consent and understanding of privacy and security of the video platform  The patient has agreed to participate and understands they can discontinue the visit at any time  Patient is aware this is a billable service  Subjective:     Patient ID: Nikki Fernandez is a 77 y o  female  HPI  Patient was in the hospital recently with a UTI, CESAR on top of CKD and metabolic encephalopathy secondary to Cirrhosis   She was sent home on nitrofurantoin which she was not able to tolerate and it was discontinued  Now she is on amoxicillin but is having stomach upset and diarrhea and is not taking it consistently  The sister was concerned about the high dose of torsemide she is on but I told her to discuss it with her cardiologist and nephrologist  She wanted to change her oncologist to Dr Alvin Guzmán and a referral was given for him  Review of Systems   Constitutional: Positive for appetite change and fatigue  Negative for chills, diaphoresis and fever  HENT: Negative for congestion, ear discharge, ear pain, hearing loss, postnasal drip, rhinorrhea, sinus pressure, sinus pain, sneezing, sore throat and voice change  Eyes: Negative for pain, discharge, redness and visual disturbance  Respiratory: Negative for cough, chest tightness, shortness of breath and wheezing  Cardiovascular: Negative for chest pain, palpitations and leg swelling  Gastrointestinal: Positive for diarrhea  Negative for abdominal distention, abdominal pain, blood in stool, constipation, nausea and vomiting  Endocrine: Negative for cold intolerance, heat intolerance, polydipsia, polyphagia and polyuria  Genitourinary: Positive for frequency  Negative for dysuria, flank pain, hematuria and urgency  Musculoskeletal: Negative for arthralgias, back pain, gait problem, joint swelling, myalgias, neck pain and neck stiffness  Skin: Negative for rash  Neurological: Negative for dizziness, tremors, syncope, facial asymmetry, speech difficulty, weakness, light-headedness, numbness and headaches  Hematological: Does not bruise/bleed easily  Psychiatric/Behavioral: Positive for dysphoric mood  Negative for behavioral problems, confusion and sleep disturbance  The patient is not nervous/anxious  Objective: There were no vitals filed for this visit  Physical Exam  Constitutional:       Appearance: Normal appearance  HENT:      Head: Normocephalic     Eyes: Conjunctiva/sclera: Conjunctivae normal    Pulmonary:      Effort: Pulmonary effort is normal    Neurological:      Mental Status: She is alert  Mental status is at baseline  Psychiatric:      Comments: Mood not good, was upset due to the diarrhea       was advised to continue amoxicillin regularly for 7 days for treatment of UTI  Was told to follow up with her nephrologist and get labs done, CBC and CMP were ordered  Transitional Care Management Review:  Daria Win is a 77 y o  female here for TCM follow up  During the TCM phone call patient stated:    TCM Call (since 1/1/2021)     Date and time call was made  1/25/2021  8:58 AM    Hospital care reviewed  Records reviewed    Patient was hospitialized at  [facility]        Date of Admission  01/18/21    Date of discharge  01/22/21    Diagnosis  Acute kidney injury superimposed on chronic kidney disease     Disposition  Home    Were the patients medications reviewed and updated  Yes (Comment)  bactrim    Current Symptoms  None      TCM Call (since 1/1/2021)     Post hospital issues  None    Should patient be enrolled in anticoag monitoring? No    Scheduled for follow up?   Yes (Comment)  02/01/2021 at 330pm VIRTUAL VISIT    Patients specialists  Nephrologist    Nephrologist name  Dr Aden Harrell    Nephrologist contact #  [phone]    Referrals needed  n/a    Did you obtain your prescribed medications  No    Why were you unable to obtain your medications  sister helps    Do you need help managing your prescriptions or medications  Yes    Why type of assitance do you need  sister helps    Is transportation to your appointment needed  Yes    Specify why  sister drives    I have advised the patient to call PCP with any new or worsening symptoms  [facility] members    Support System  Family    The type of support provided  Emotional; Physical; Financial    Are you recieving any outpatient services  Yes    What type of services  visiting nurse    Are you recieving home care services  No    Are you using any community resources  No    Current waiver services  No    Have you fallen in the last 12 months  No    Interperter language line needed  No    Counseling  Family    Counseling topics  Importance of RX compliance          I spent 20 minutes with the patient during this visit      Tae Horton MD

## 2021-02-02 ENCOUNTER — TELEPHONE (OUTPATIENT)
Dept: HEMATOLOGY ONCOLOGY | Facility: CLINIC | Age: 76
End: 2021-02-02

## 2021-02-02 NOTE — TELEPHONE ENCOUNTER
New Patient Encounter    New Patient Intake Form   Patient Details:  Lavern Barker  1945  [id number]    Background Information:  [address] starts by opening a telephone encounter and gathering the following information   Who is calling to schedule? If not self, relationship to patient? Kalpesh Zimmerman (caretaker)   Referring Provider Dr Delmar Souza   What is the diagnosis? Multiple myeloma in remission    Is this diagnosis confirmed? Yes   When was the diagnosis? 2020   Is there a confirmed diagnosis from a biopsy/tissue reviewed by pathology? Were outside slides requested? NA   Is patient aware of diagnosis? Yes   Is there a personal history and what kind? Is there a family history and what kind? Reason for visit? New Diagnosis   Have you had any imaging or labs done? If so: when, where? yes  [facility]   Are records in [place]? yes   If patient has a prior history of breast cancer were old records obtained? NA   Was the patient told to bring a disk? no   Does the patient smoke or Vape? no   If yes, how many packs or cartridges per day? Na     Scheduling Information:   Preferred [place]:  [facility]     Are there any dates/time the patient cannot be seen? Miscellaneous:    After completing the above information, please route to Financial Counselor and the appropriate Nurse Navigator for review

## 2021-02-04 ENCOUNTER — PATIENT OUTREACH (OUTPATIENT)
Dept: INTERNAL MEDICINE CLINIC | Facility: CLINIC | Age: 76
End: 2021-02-04

## 2021-02-04 NOTE — PROGRESS NOTES
Telephone call placed to patient's sister Chucky Chilel today to provide f/u communication  Patient was in the hospital from 1/18-1/22 due to CESAR  Chucky Chilel informed me that she was sent home on antibiotics and it had to be changed due to causing patient to be less coherent  Patient was given another antibiotic which caused her to have diarrhea for two days and loss of appetite  Chucky Chilel informed me that patient is doing better now and is eating better and tolerating the antibiotic  Chucky Chilel informed me that she is feeling overwhelmed at times with caring for patient and spoke about having her own issues with depression  I provided supportive listening and advised her to accept support offered by patient's son  Chucky Chilel reports that patient's son and his fiance visit every Saturday which is a support  She confirms that she received the letter that I sent vie e-mail from [facility] regarding documents required for waiver  Chucky Chilel informed me that patient's son agrees to obtain all the documents and bank statements required  However, she believes that she may have missed the deadline  I informed Chucky Chilel that I believe that patient still has time to submit the documents even though the deadline has past     Telephone call placed to [facility] to discuss MA process  April confirms that patient has 60 days after the deadline to submit the required information but suggests that family sends them in whenever they can  DPW will just update her status  I will continue to follow and provide ongoing assistance and support

## 2021-02-10 ENCOUNTER — PATIENT OUTREACH (OUTPATIENT)
Dept: INTERNAL MEDICINE CLINIC | Facility: CLINIC | Age: 76
End: 2021-02-10

## 2021-02-15 ENCOUNTER — LAB (OUTPATIENT)
Dept: LAB | Facility: CLINIC | Age: 76
End: 2021-02-15
Payer: MEDICARE

## 2021-02-15 DIAGNOSIS — N18.4 CHRONIC KIDNEY DISEASE (CKD), ACTIVE MEDICAL MANAGEMENT WITHOUT DIALYSIS, STAGE 4 (SEVERE) (HCC): ICD-10-CM

## 2021-02-15 DIAGNOSIS — D64.9 ANEMIA, UNSPECIFIED TYPE: ICD-10-CM

## 2021-02-15 DIAGNOSIS — N18.32 STAGE 3B CHRONIC KIDNEY DISEASE (HCC): ICD-10-CM

## 2021-02-15 LAB
25(OH)D3 SERPL-MCNC: 25.1 NG/ML (ref 30–100)
ALBUMIN SERPL BCP-MCNC: 3.3 G/DL (ref 3.5–5)
ALP SERPL-CCNC: 354 U/L (ref 46–116)
ALT SERPL W P-5'-P-CCNC: 22 U/L (ref 12–78)
ANION GAP SERPL CALCULATED.3IONS-SCNC: 8 MMOL/L (ref 4–13)
ANISOCYTOSIS BLD QL SMEAR: PRESENT
AST SERPL W P-5'-P-CCNC: 35 U/L (ref 5–45)
BASOPHILS # BLD MANUAL: 0.06 THOUSAND/UL (ref 0–0.1)
BASOPHILS NFR MAR MANUAL: 2 % (ref 0–1)
BILIRUB SERPL-MCNC: 1.41 MG/DL (ref 0.2–1)
BUN SERPL-MCNC: 53 MG/DL (ref 5–25)
CALCIUM ALBUM COR SERPL-MCNC: 9.8 MG/DL (ref 8.3–10.1)
CALCIUM SERPL-MCNC: 9.2 MG/DL (ref 8.3–10.1)
CHLORIDE SERPL-SCNC: 104 MMOL/L (ref 100–108)
CO2 SERPL-SCNC: 23 MMOL/L (ref 21–32)
CREAT SERPL-MCNC: 3.23 MG/DL (ref 0.6–1.3)
DACRYOCYTES BLD QL SMEAR: PRESENT
EOSINOPHIL # BLD MANUAL: 0.1 THOUSAND/UL (ref 0–0.4)
EOSINOPHIL NFR BLD MANUAL: 3 % (ref 0–6)
ERYTHROCYTE [DISTWIDTH] IN BLOOD BY AUTOMATED COUNT: 20.9 % (ref 11.6–15.1)
FERRITIN SERPL-MCNC: 4170 NG/ML (ref 8–388)
GFR SERPL CREATININE-BSD FRML MDRD: 16 ML/MIN/1.73SQ M
GLUCOSE SERPL-MCNC: 105 MG/DL (ref 65–140)
HCT VFR BLD AUTO: 23.6 % (ref 34.8–46.1)
HGB BLD-MCNC: 7.3 G/DL (ref 11.5–15.4)
IRON SATN MFR SERPL: 92 %
IRON SERPL-MCNC: 210 UG/DL (ref 50–170)
LYMPHOCYTES # BLD AUTO: 0.51 THOUSAND/UL (ref 0.6–4.47)
LYMPHOCYTES # BLD AUTO: 16 % (ref 14–44)
MCH RBC QN AUTO: 27.8 PG (ref 26.8–34.3)
MCHC RBC AUTO-ENTMCNC: 30.9 G/DL (ref 31.4–37.4)
MCV RBC AUTO: 90 FL (ref 82–98)
MONOCYTES # BLD AUTO: 0.1 THOUSAND/UL (ref 0–1.22)
MONOCYTES NFR BLD: 3 % (ref 4–12)
MYELOCYTES NFR BLD MANUAL: 3 % (ref 0–1)
NEUTROPHILS # BLD MANUAL: 2.31 THOUSAND/UL (ref 1.85–7.62)
NEUTS SEG NFR BLD AUTO: 73 % (ref 43–75)
NRBC BLD AUTO-RTO: 6 /100 WBCS
PHOSPHATE SERPL-MCNC: 3.9 MG/DL (ref 2.3–4.1)
PLATELET # BLD AUTO: 62 THOUSANDS/UL (ref 149–390)
PLATELET BLD QL SMEAR: ABNORMAL
PMV BLD AUTO: 9.8 FL (ref 8.9–12.7)
POIKILOCYTOSIS BLD QL SMEAR: PRESENT
POLYCHROMASIA BLD QL SMEAR: PRESENT
POTASSIUM SERPL-SCNC: 5.5 MMOL/L (ref 3.5–5.3)
PROT SERPL-MCNC: 6.9 G/DL (ref 6.4–8.2)
PTH-INTACT SERPL-MCNC: 244.2 PG/ML (ref 18.4–80.1)
RBC # BLD AUTO: 2.63 MILLION/UL (ref 3.81–5.12)
RBC MORPH BLD: PRESENT
SODIUM SERPL-SCNC: 135 MMOL/L (ref 136–145)
TARGETS BLD QL SMEAR: PRESENT
TIBC SERPL-MCNC: 229 UG/DL (ref 250–450)
WBC # BLD AUTO: 3.17 THOUSAND/UL (ref 4.31–10.16)

## 2021-02-15 PROCEDURE — 80053 COMPREHEN METABOLIC PANEL: CPT

## 2021-02-15 PROCEDURE — 85007 BL SMEAR W/DIFF WBC COUNT: CPT

## 2021-02-15 PROCEDURE — 83550 IRON BINDING TEST: CPT

## 2021-02-15 PROCEDURE — 84100 ASSAY OF PHOSPHORUS: CPT

## 2021-02-15 PROCEDURE — 83540 ASSAY OF IRON: CPT

## 2021-02-15 PROCEDURE — 83970 ASSAY OF PARATHORMONE: CPT

## 2021-02-15 PROCEDURE — 85027 COMPLETE CBC AUTOMATED: CPT

## 2021-02-15 PROCEDURE — 82728 ASSAY OF FERRITIN: CPT

## 2021-02-15 PROCEDURE — 82306 VITAMIN D 25 HYDROXY: CPT

## 2021-02-15 PROCEDURE — 36415 COLL VENOUS BLD VENIPUNCTURE: CPT

## 2021-02-17 DIAGNOSIS — N17.9 AKI (ACUTE KIDNEY INJURY) (HCC): Primary | ICD-10-CM

## 2021-02-19 NOTE — PROGRESS NOTES
[facility] - Hematology & Medical Oncology  Outpatient Visit Encounter Note    Lory Frazier 77 y o  female [id number] Date:  2/23/2021      SUBJECTIVE          Lory Frazier is a 77 y o  here for new consultation with me today  The patient is referred by Dr Valente Laird and the reason for consultation is recently diagnosed multiple myeloma  In review of her chart and talking with the patient,  She has been managed by Dr Corinne Hoar of [facility] for her diagnosis of IgG kappa multiple myeloma  This diagnosis was made because of her pancytopenia  She was last seen by him on September 28, 2020  From the notes, her previous managing hematologist wrote that he was concerned about the patient's multiple comorbidities that include cirrhosis  In his notes he recommends supportive management because he is fearful of adverse side effects from Revlimid and steroids and previously there was decision for holding off on bortezomib given her liver dysfunction and hyperbilirubinemia  He had made a recommendation for hospice  She is here with her sister  The patient has dementia  The sister is one of the primary caregivers  The son is the principle decision maker and spoke to me via office phone at the end of the office visit  Sister says that patient has no headache, f/c/n/v/cp/ap/sob  She has no fall or dizziness  She is here in wheelchair as she cannot walk long distance without support  Denies blood in stool  I have reviewed the relevant past medical, surgical, social and family history  I have also reviewed allergies and medications for this patient  Review of Systems  Review of Systems   All other systems reviewed and are negative          OBJECTIVE     Physical Exam  Vitals:    02/23/21 1511   BP: (!) 110/48   BP Location: Left arm   Patient Position: Sitting   Cuff Size: Adult   Pulse: 60   Resp: 16   Temp: 98 °F (36 7 °C)   TempSrc: Tympanic SpO2: 96%       Physical Exam  Vitals signs reviewed  Constitutional:       Appearance: Normal appearance  She is normal weight  She is not toxic-appearing  Eyes:      General: No scleral icterus  Extraocular Movements: Extraocular movements intact  Cardiovascular:      Rate and Rhythm: Normal rate  Pulmonary:      Effort: No respiratory distress  Abdominal:      General: There is no distension  Neurological:      Mental Status: Mental status is at baseline  Imaging  Relevant imaging reviewed in chart    Labs  Relevant labs reviewed in chart   ASSESSMENT & PLAN      Diagnosis ICD-10-CM Associated Orders   1  Multiple myeloma in remission [facility])  C90 01 Ambulatory referral to Hematology / Oncology   2  Alcoholic cirrhosis of liver with ascites ([facility] [address] )  K70 31    3  Chronic kidney disease (CKD), active medical management without dialysis, stage 4 (severe) (HCC)  N18 4      IgG Kappa Multiple Myeloma  · Previously managed by Dr Melissa Cage (hematologist) at [facility]  · In my clinical judgement, there are several contraindications for anti-myeloma therapy including chronic renal and liver failure/disease, hence safe administration is not possible  Sister and son understand and agree with that  · Hence, I will not order any myeloma related labs because there is no active treatment  · I recommend hospice but son, who is the principle decision maker as patient has listed dementia diagnosis, does not want to pursue that  Anemia of Chronic Disease - CKD and MM  · Naturally, due to renal failure and myeloma, her Hb will continue to worsen, making her a transfusion dependent patient either now or eventually  · Getting CBC done today and then decision for pRBC transfusion to maintain Hb >7 5    Follow Up   1 month      All questions were answered to the patient's satisfaction during this encounter  They appreciated and thanked me for spending time with them   The patient knows the contact information for our office and know to reach out for any relevant concerns related to this encounter  For all other listed problems and medical diagnosis in his chart - they are managed by PCP and/or other specialists, which patient acknowledges  Deann Kevin MD  Hematology & Medical Oncology

## 2021-02-22 ENCOUNTER — TELEPHONE (OUTPATIENT)
Dept: HEMATOLOGY ONCOLOGY | Facility: CLINIC | Age: 76
End: 2021-02-22

## 2021-02-23 ENCOUNTER — CONSULT (OUTPATIENT)
Dept: HEMATOLOGY ONCOLOGY | Facility: CLINIC | Age: 76
End: 2021-02-23
Payer: MEDICARE

## 2021-02-23 ENCOUNTER — TELEPHONE (OUTPATIENT)
Dept: INTERNAL MEDICINE CLINIC | Facility: CLINIC | Age: 76
End: 2021-02-23

## 2021-02-23 ENCOUNTER — DOCUMENTATION (OUTPATIENT)
Dept: OTHER | Facility: HOSPITAL | Age: 76
End: 2021-02-23

## 2021-02-23 ENCOUNTER — LAB (OUTPATIENT)
Dept: LAB | Facility: HOSPITAL | Age: 76
End: 2021-02-23
Attending: INTERNAL MEDICINE
Payer: MEDICARE

## 2021-02-23 ENCOUNTER — TELEPHONE (OUTPATIENT)
Dept: OTHER | Facility: OTHER | Age: 76
End: 2021-02-23

## 2021-02-23 VITALS
OXYGEN SATURATION: 96 % | DIASTOLIC BLOOD PRESSURE: 48 MMHG | RESPIRATION RATE: 16 BRPM | HEART RATE: 60 BPM | TEMPERATURE: 98 F | SYSTOLIC BLOOD PRESSURE: 110 MMHG

## 2021-02-23 DIAGNOSIS — C90.01 MULTIPLE MYELOMA IN REMISSION (HCC): Primary | ICD-10-CM

## 2021-02-23 DIAGNOSIS — I10 BENIGN ESSENTIAL HYPERTENSION: ICD-10-CM

## 2021-02-23 DIAGNOSIS — K70.31 ALCOHOLIC CIRRHOSIS OF LIVER WITH ASCITES (HCC): ICD-10-CM

## 2021-02-23 DIAGNOSIS — N17.9 AKI (ACUTE KIDNEY INJURY) (HCC): ICD-10-CM

## 2021-02-23 DIAGNOSIS — N18.4 CHRONIC KIDNEY DISEASE (CKD), ACTIVE MEDICAL MANAGEMENT WITHOUT DIALYSIS, STAGE 4 (SEVERE) (HCC): ICD-10-CM

## 2021-02-23 LAB
ANION GAP SERPL CALCULATED.3IONS-SCNC: 9 MMOL/L (ref 4–13)
ANISOCYTOSIS BLD QL SMEAR: PRESENT
BASOPHILS # BLD MANUAL: 0.03 THOUSAND/UL (ref 0–0.1)
BASOPHILS NFR MAR MANUAL: 1 % (ref 0–1)
BUN SERPL-MCNC: 22 MG/DL (ref 5–25)
CALCIUM SERPL-MCNC: 9.2 MG/DL (ref 8.3–10.1)
CHLORIDE SERPL-SCNC: 105 MMOL/L (ref 100–108)
CO2 SERPL-SCNC: 22 MMOL/L (ref 21–32)
CREAT SERPL-MCNC: 1.56 MG/DL (ref 0.6–1.3)
EOSINOPHIL # BLD MANUAL: 0.06 THOUSAND/UL (ref 0–0.4)
EOSINOPHIL NFR BLD MANUAL: 2 % (ref 0–6)
ERYTHROCYTE [DISTWIDTH] IN BLOOD BY AUTOMATED COUNT: 21.5 % (ref 11.6–15.1)
GFR SERPL CREATININE-BSD FRML MDRD: 37 ML/MIN/1.73SQ M
GLUCOSE P FAST SERPL-MCNC: 95 MG/DL (ref 65–99)
HCT VFR BLD AUTO: 22.8 % (ref 34.8–46.1)
HGB BLD-MCNC: 6.9 G/DL (ref 11.5–15.4)
LYMPHOCYTES # BLD AUTO: 1.2 THOUSAND/UL (ref 0.6–4.47)
LYMPHOCYTES # BLD AUTO: 42 % (ref 14–44)
MCH RBC QN AUTO: 27.7 PG (ref 26.8–34.3)
MCHC RBC AUTO-ENTMCNC: 30.3 G/DL (ref 31.4–37.4)
MCV RBC AUTO: 92 FL (ref 82–98)
METAMYELOCYTES NFR BLD MANUAL: 1 % (ref 0–1)
MONOCYTES # BLD AUTO: 0.14 THOUSAND/UL (ref 0–1.22)
MONOCYTES NFR BLD: 5 % (ref 4–12)
NEUTROPHILS # BLD MANUAL: 1.37 THOUSAND/UL (ref 1.85–7.62)
NEUTS BAND NFR BLD MANUAL: 4 % (ref 0–8)
NEUTS SEG NFR BLD AUTO: 44 % (ref 43–75)
NRBC BLD AUTO-RTO: 5 /100 WBCS
NRBC BLD AUTO-RTO: 7 /100 WBC (ref 0–2)
PLATELET # BLD AUTO: 45 THOUSANDS/UL (ref 149–390)
PLATELET BLD QL SMEAR: ABNORMAL
POIKILOCYTOSIS BLD QL SMEAR: PRESENT
POLYCHROMASIA BLD QL SMEAR: PRESENT
POTASSIUM SERPL-SCNC: 5.2 MMOL/L (ref 3.5–5.3)
RBC # BLD AUTO: 2.49 MILLION/UL (ref 3.81–5.12)
SODIUM SERPL-SCNC: 136 MMOL/L (ref 136–145)
TARGETS BLD QL SMEAR: PRESENT
TOTAL CELLS COUNTED SPEC: 100
VARIANT LYMPHS # BLD AUTO: 1 %
WBC # BLD AUTO: 2.86 THOUSAND/UL (ref 4.31–10.16)

## 2021-02-23 PROCEDURE — 85027 COMPLETE CBC AUTOMATED: CPT

## 2021-02-23 PROCEDURE — 85007 BL SMEAR W/DIFF WBC COUNT: CPT

## 2021-02-23 PROCEDURE — 99214 OFFICE O/P EST MOD 30 MIN: CPT | Performed by: INTERNAL MEDICINE

## 2021-02-23 PROCEDURE — 80048 BASIC METABOLIC PNL TOTAL CA: CPT

## 2021-02-23 RX ORDER — DILTIAZEM HYDROCHLORIDE 240 MG/1
CAPSULE, COATED, EXTENDED RELEASE ORAL
Qty: 30 CAPSULE | Refills: 0 | Status: SHIPPED | OUTPATIENT
Start: 2021-02-23 | End: 2021-02-24

## 2021-02-23 NOTE — TELEPHONE ENCOUNTER
Ronak called regarding refill  Last refill for diltiazem was for 120mg by Dr Will Donnelly  Pharmacy needs clarification       Ronak [phone]

## 2021-02-23 NOTE — PROGRESS NOTES
Called and left message regarding Hb being 6 9  Told to either inform her Oncologist or go to ER to get PRBC transfusion

## 2021-02-24 ENCOUNTER — TELEPHONE (OUTPATIENT)
Dept: INTERNAL MEDICINE CLINIC | Facility: CLINIC | Age: 76
End: 2021-02-24

## 2021-02-24 ENCOUNTER — HOSPITAL ENCOUNTER (OUTPATIENT)
Facility: HOSPITAL | Age: 76
Setting detail: OBSERVATION
Discharge: HOME HEALTH | End: 2021-02-25
Attending: EMERGENCY MEDICINE | Admitting: INTERNAL MEDICINE
Payer: MEDICARE

## 2021-02-24 ENCOUNTER — TELEPHONE (OUTPATIENT)
Dept: HEMATOLOGY ONCOLOGY | Facility: CLINIC | Age: 76
End: 2021-02-24

## 2021-02-24 DIAGNOSIS — C90.00 MULTIPLE MYELOMA (HCC): ICD-10-CM

## 2021-02-24 DIAGNOSIS — E87.5 HYPERKALEMIA: ICD-10-CM

## 2021-02-24 DIAGNOSIS — D64.9 ANEMIA, UNSPECIFIED TYPE: Primary | ICD-10-CM

## 2021-02-24 DIAGNOSIS — I48.0 PAROXYSMAL ATRIAL FIBRILLATION (HCC): ICD-10-CM

## 2021-02-24 DIAGNOSIS — N17.9 AKI (ACUTE KIDNEY INJURY) (HCC): ICD-10-CM

## 2021-02-24 DIAGNOSIS — K70.31 ALCOHOLIC CIRRHOSIS OF LIVER WITH ASCITES (HCC): ICD-10-CM

## 2021-02-24 DIAGNOSIS — C90.01 MULTIPLE MYELOMA IN REMISSION (HCC): ICD-10-CM

## 2021-02-24 DIAGNOSIS — D64.9 ANEMIA: Primary | ICD-10-CM

## 2021-02-24 PROBLEM — I50.40 COMBINED SYSTOLIC AND DIASTOLIC HEART FAILURE (HCC): Status: RESOLVED | Noted: 2021-01-18 | Resolved: 2021-02-24

## 2021-02-24 LAB
ABO GROUP BLD: NORMAL
ALBUMIN SERPL BCP-MCNC: 2.9 G/DL (ref 3.5–5)
ALP SERPL-CCNC: 325 U/L (ref 46–116)
ALT SERPL W P-5'-P-CCNC: 19 U/L (ref 12–78)
ANION GAP SERPL CALCULATED.3IONS-SCNC: 10 MMOL/L (ref 4–13)
ANION GAP SERPL CALCULATED.3IONS-SCNC: 8 MMOL/L (ref 4–13)
ANION GAP SERPL CALCULATED.3IONS-SCNC: 9 MMOL/L (ref 4–13)
ANISOCYTOSIS BLD QL SMEAR: PRESENT
AST SERPL W P-5'-P-CCNC: 36 U/L (ref 5–45)
BACTERIA UR QL AUTO: ABNORMAL /HPF
BASOPHILS # BLD MANUAL: 0 THOUSAND/UL (ref 0–0.1)
BASOPHILS NFR MAR MANUAL: 0 % (ref 0–1)
BILIRUB DIRECT SERPL-MCNC: 0.66 MG/DL (ref 0–0.2)
BILIRUB SERPL-MCNC: 1.1 MG/DL (ref 0.2–1)
BILIRUB UR QL STRIP: NEGATIVE
BLD GP AB SCN SERPL QL: NEGATIVE
BUN SERPL-MCNC: 23 MG/DL (ref 5–25)
BUN SERPL-MCNC: 25 MG/DL (ref 5–25)
BUN SERPL-MCNC: 25 MG/DL (ref 5–25)
CALCIUM SERPL-MCNC: 9.2 MG/DL (ref 8.3–10.1)
CHLORIDE SERPL-SCNC: 102 MMOL/L (ref 100–108)
CLARITY UR: ABNORMAL
CO2 SERPL-SCNC: 26 MMOL/L (ref 21–32)
COLOR UR: YELLOW
CREAT SERPL-MCNC: 1.8 MG/DL (ref 0.6–1.3)
CREAT SERPL-MCNC: 1.81 MG/DL (ref 0.6–1.3)
CREAT SERPL-MCNC: 1.83 MG/DL (ref 0.6–1.3)
EOSINOPHIL # BLD MANUAL: 0.03 THOUSAND/UL (ref 0–0.4)
EOSINOPHIL NFR BLD MANUAL: 1 % (ref 0–6)
ERYTHROCYTE [DISTWIDTH] IN BLOOD BY AUTOMATED COUNT: 21.5 % (ref 11.6–15.1)
FINE GRAN CASTS URNS QL MICRO: ABNORMAL /LPF
GFR SERPL CREATININE-BSD FRML MDRD: 31 ML/MIN/1.73SQ M
GFR SERPL CREATININE-BSD FRML MDRD: 31 ML/MIN/1.73SQ M
GFR SERPL CREATININE-BSD FRML MDRD: 32 ML/MIN/1.73SQ M
GLUCOSE SERPL-MCNC: 85 MG/DL (ref 65–140)
GLUCOSE SERPL-MCNC: 85 MG/DL (ref 65–140)
GLUCOSE SERPL-MCNC: 91 MG/DL (ref 65–140)
GLUCOSE UR STRIP-MCNC: NEGATIVE MG/DL
HCT VFR BLD AUTO: 23 % (ref 34.8–46.1)
HGB BLD-MCNC: 7 G/DL (ref 11.5–15.4)
HGB UR QL STRIP.AUTO: ABNORMAL
KETONES UR STRIP-MCNC: ABNORMAL MG/DL
LEUKOCYTE ESTERASE UR QL STRIP: ABNORMAL
LYMPHOCYTES # BLD AUTO: 1.36 THOUSAND/UL (ref 0.6–4.47)
LYMPHOCYTES # BLD AUTO: 42 % (ref 14–44)
MCH RBC QN AUTO: 27.3 PG (ref 26.8–34.3)
MCHC RBC AUTO-ENTMCNC: 30.4 G/DL (ref 31.4–37.4)
MCV RBC AUTO: 90 FL (ref 82–98)
METAMYELOCYTES NFR BLD MANUAL: 1 % (ref 0–1)
MONOCYTES # BLD AUTO: 0.39 THOUSAND/UL (ref 0–1.22)
MONOCYTES NFR BLD: 12 % (ref 4–12)
MYELOCYTES NFR BLD MANUAL: 1 % (ref 0–1)
NEUTROPHILS # BLD MANUAL: 1.33 THOUSAND/UL (ref 1.85–7.62)
NEUTS BAND NFR BLD MANUAL: 2 % (ref 0–8)
NEUTS SEG NFR BLD AUTO: 39 % (ref 43–75)
NITRITE UR QL STRIP: NEGATIVE
NON-SQ EPI CELLS URNS QL MICRO: ABNORMAL /HPF
NRBC BLD AUTO-RTO: 1 /100 WBC (ref 0–2)
NRBC BLD AUTO-RTO: 7 /100 WBCS
PH UR STRIP.AUTO: 7 [PH]
PLATELET # BLD AUTO: 45 THOUSANDS/UL (ref 149–390)
PLATELET BLD QL SMEAR: ABNORMAL
POLYCHROMASIA BLD QL SMEAR: PRESENT
POTASSIUM SERPL-SCNC: 5.6 MMOL/L (ref 3.5–5.3)
POTASSIUM SERPL-SCNC: 5.9 MMOL/L (ref 3.5–5.3)
POTASSIUM SERPL-SCNC: 6.4 MMOL/L (ref 3.5–5.3)
PROT SERPL-MCNC: 6.4 G/DL (ref 6.4–8.2)
PROT UR STRIP-MCNC: ABNORMAL MG/DL
RBC # BLD AUTO: 2.56 MILLION/UL (ref 3.81–5.12)
RBC #/AREA URNS AUTO: ABNORMAL /HPF
RH BLD: POSITIVE
SODIUM SERPL-SCNC: 136 MMOL/L (ref 136–145)
SODIUM SERPL-SCNC: 137 MMOL/L (ref 136–145)
SODIUM SERPL-SCNC: 138 MMOL/L (ref 136–145)
SP GR UR STRIP.AUTO: 1.02 (ref 1–1.03)
SPECIMEN EXPIRATION DATE: NORMAL
TOTAL CELLS COUNTED SPEC: 100
TROPONIN I SERPL-MCNC: <0.02 NG/ML
UROBILINOGEN UR QL STRIP.AUTO: 0.2 E.U./DL
VARIANT LYMPHS # BLD AUTO: 2 %
WBC # BLD AUTO: 3.24 THOUSAND/UL (ref 4.31–10.16)
WBC #/AREA URNS AUTO: ABNORMAL /HPF

## 2021-02-24 PROCEDURE — P9016 RBC LEUKOCYTES REDUCED: HCPCS

## 2021-02-24 PROCEDURE — 36415 COLL VENOUS BLD VENIPUNCTURE: CPT | Performed by: EMERGENCY MEDICINE

## 2021-02-24 PROCEDURE — 81001 URINALYSIS AUTO W/SCOPE: CPT | Performed by: EMERGENCY MEDICINE

## 2021-02-24 PROCEDURE — 99220 PR INITIAL OBSERVATION CARE/DAY 70 MINUTES: CPT | Performed by: INTERNAL MEDICINE

## 2021-02-24 PROCEDURE — 99291 CRITICAL CARE FIRST HOUR: CPT | Performed by: EMERGENCY MEDICINE

## 2021-02-24 PROCEDURE — 86900 BLOOD TYPING SEROLOGIC ABO: CPT | Performed by: EMERGENCY MEDICINE

## 2021-02-24 PROCEDURE — 84484 ASSAY OF TROPONIN QUANT: CPT | Performed by: EMERGENCY MEDICINE

## 2021-02-24 PROCEDURE — 80048 BASIC METABOLIC PNL TOTAL CA: CPT | Performed by: EMERGENCY MEDICINE

## 2021-02-24 PROCEDURE — 85027 COMPLETE CBC AUTOMATED: CPT | Performed by: EMERGENCY MEDICINE

## 2021-02-24 PROCEDURE — 93005 ELECTROCARDIOGRAM TRACING: CPT

## 2021-02-24 PROCEDURE — 85007 BL SMEAR W/DIFF WBC COUNT: CPT | Performed by: EMERGENCY MEDICINE

## 2021-02-24 PROCEDURE — 86850 RBC ANTIBODY SCREEN: CPT | Performed by: EMERGENCY MEDICINE

## 2021-02-24 PROCEDURE — 99285 EMERGENCY DEPT VISIT HI MDM: CPT

## 2021-02-24 PROCEDURE — 86923 COMPATIBILITY TEST ELECTRIC: CPT

## 2021-02-24 PROCEDURE — 86901 BLOOD TYPING SEROLOGIC RH(D): CPT | Performed by: EMERGENCY MEDICINE

## 2021-02-24 PROCEDURE — 80076 HEPATIC FUNCTION PANEL: CPT | Performed by: EMERGENCY MEDICINE

## 2021-02-24 RX ORDER — METOPROLOL SUCCINATE 100 MG/1
100 TABLET, EXTENDED RELEASE ORAL DAILY
Status: DISCONTINUED | OUTPATIENT
Start: 2021-02-25 | End: 2021-02-25 | Stop reason: HOSPADM

## 2021-02-24 RX ORDER — DILTIAZEM HYDROCHLORIDE 120 MG/1
120 CAPSULE, COATED, EXTENDED RELEASE ORAL DAILY
Status: DISCONTINUED | OUTPATIENT
Start: 2021-02-25 | End: 2021-02-25 | Stop reason: HOSPADM

## 2021-02-24 RX ORDER — DILTIAZEM HYDROCHLORIDE 120 MG/1
120 CAPSULE, COATED, EXTENDED RELEASE ORAL DAILY
Qty: 90 CAPSULE | Refills: 3 | Status: ON HOLD | OUTPATIENT
Start: 2021-02-24 | End: 2021-03-26 | Stop reason: SDUPTHER

## 2021-02-24 RX ORDER — PANTOPRAZOLE SODIUM 40 MG/1
40 TABLET, DELAYED RELEASE ORAL EVERY MORNING
Status: DISCONTINUED | OUTPATIENT
Start: 2021-02-25 | End: 2021-02-25 | Stop reason: HOSPADM

## 2021-02-24 RX ORDER — SODIUM CHLORIDE 9 MG/ML
20 INJECTION, SOLUTION INTRAVENOUS ONCE
Status: CANCELLED | OUTPATIENT
Start: 2021-03-10

## 2021-02-24 RX ORDER — FUROSEMIDE 10 MG/ML
20 INJECTION INTRAMUSCULAR; INTRAVENOUS ONCE
Status: COMPLETED | OUTPATIENT
Start: 2021-02-24 | End: 2021-02-25

## 2021-02-24 RX ORDER — MIRTAZAPINE 15 MG/1
15 TABLET, FILM COATED ORAL
Status: DISCONTINUED | OUTPATIENT
Start: 2021-02-24 | End: 2021-02-25 | Stop reason: HOSPADM

## 2021-02-24 RX ORDER — LACTULOSE 20 G/30ML
10 SOLUTION ORAL 2 TIMES DAILY
Status: DISCONTINUED | OUTPATIENT
Start: 2021-02-24 | End: 2021-02-25 | Stop reason: HOSPADM

## 2021-02-24 RX ORDER — ONDANSETRON 2 MG/ML
4 INJECTION INTRAMUSCULAR; INTRAVENOUS EVERY 6 HOURS PRN
Status: DISCONTINUED | OUTPATIENT
Start: 2021-02-24 | End: 2021-02-25 | Stop reason: HOSPADM

## 2021-02-24 RX ORDER — FOLIC ACID 1 MG/1
1000 TABLET ORAL DAILY
Status: DISCONTINUED | OUTPATIENT
Start: 2021-02-25 | End: 2021-02-25 | Stop reason: HOSPADM

## 2021-02-24 RX ORDER — CALCIUM GLUCONATE 20 MG/ML
1 INJECTION, SOLUTION INTRAVENOUS ONCE
Status: COMPLETED | OUTPATIENT
Start: 2021-02-24 | End: 2021-02-24

## 2021-02-24 RX ORDER — LANOLIN ALCOHOL/MO/W.PET/CERES
100 CREAM (GRAM) TOPICAL DAILY
Status: DISCONTINUED | OUTPATIENT
Start: 2021-02-25 | End: 2021-02-25 | Stop reason: HOSPADM

## 2021-02-24 RX ORDER — ACETAMINOPHEN 325 MG/1
650 TABLET ORAL EVERY 6 HOURS PRN
Status: DISCONTINUED | OUTPATIENT
Start: 2021-02-24 | End: 2021-02-25 | Stop reason: HOSPADM

## 2021-02-24 RX ADMIN — MAGNESIUM GLUCONATE 500 MG ORAL TABLET 400 MG: 500 TABLET ORAL at 22:13

## 2021-02-24 RX ADMIN — CALCIUM GLUCONATE 1 G: 20 INJECTION, SOLUTION INTRAVENOUS at 17:58

## 2021-02-24 RX ADMIN — MIRTAZAPINE 15 MG: 15 TABLET, FILM COATED ORAL at 22:13

## 2021-02-24 RX ADMIN — LACTULOSE 10 G: 20 SOLUTION ORAL at 22:13

## 2021-02-24 NOTE — ASSESSMENT & PLAN NOTE
Lab Results   Component Value Date    EGFR 31 02/24/2021    EGFR 32 02/24/2021    EGFR 37 02/23/2021    CREATININE 1 81 (H) 02/24/2021    CREATININE 1 80 (H) 02/24/2021    CREATININE 1 56 (H) 02/23/2021   Follows with Dr Mechelle Alba  Creatinine baseline may be 1 3-1 5  See plan under CESAR

## 2021-02-24 NOTE — TELEPHONE ENCOUNTER
diltiazem (CARDIZEM CD) 120 mg 24 hr capsule    Originally prescribed when she was in [facility]    she ordered it through the pharmacy and was told Dr denied it    but showed that it was filled in her chart    please clarify

## 2021-02-24 NOTE — ASSESSMENT & PLAN NOTE
Recent diagnosis, established care with Dr Talia Gao earlier this month   Prior oncologist Dr Zendejas Patches with [facility]

## 2021-02-24 NOTE — TELEPHONE ENCOUNTER
Spoke with Jones Craven, advised her that Betsey's Hgb was 6 9 and Dr Gonzalo Jim would like her to go to the ER for a blood transfusion  Advised her Dr Gonzalo Jim would like Radha Connvania to get weekly labs and bi-weekly blood transfusion  Jones Craven stated Radha Connvania is sleeping at the moment and had a bad night  She does not want to wake her up but assured me she will take her for a blood transfusion today  ADT21 order placed

## 2021-02-24 NOTE — ASSESSMENT & PLAN NOTE
Patient baseline poor historian, also with sundowning per sister  On prior admission, has required restraints as she continues to pull out IVs   Will monitor carefully and only use restraints as needed

## 2021-02-24 NOTE — ED PROVIDER NOTES
History  Chief Complaint   Patient presents with    Abnormal Lab     Sent in by  for hgb of 6 9, for blood transfusion  Denies complaints     61-year-old female with history of hypertension, CHF, chronic kidney disease, cirrhosis, and multiple myeloma presents with chief complaint of low hemoglobin  She is complaining only of generalized fatigue  No chest pain or shortness of breath  No nausea, vomiting, diarrhea  Patient does have some baseline dementia and so is not able to provide detailed history of present illness  On reviewing the medical record I can see that her primary physician and/or her hematologist oncologist directed her to the emergency department for transfusion  History provided by:  Patient   used: No    Medical Problem  Location:  Generalized  Quality:  Unable to specify  Severity:  Mild  Onset quality:  Gradual  Timing:  Constant  Progression:  Worsening  Chronicity:  New  Context:  History of multiple myeloma  Relieved by:  Nothing  Worsened by:  Nothing  Ineffective treatments:  None tried  Associated symptoms: fatigue    Associated symptoms: no abdominal pain, no chest pain, no nausea, no shortness of breath and no vomiting        Prior to Admission Medications   Prescriptions Last Dose Informant Patient Reported?  Taking?   diltiazem (CARDIZEM CD) 120 mg 24 hr capsule  Family Member No No   Sig: Take 1 capsule (120 mg total) by mouth daily   lactulose 20 g/30 mL  Family Member No No   Sig: Take 15 mL (10 g total) by mouth 2 (two) times a day   metoprolol succinate (TOPROL-XL) 100 mg 24 hr tablet  Family Member No No   Sig: Take 1 tablet (100 mg total) by mouth daily   metoprolol succinate (TOPROL-XL) 25 mg 24 hr tablet  Family Member No No   Sig: TAKE TWO TABLETS BY MOUTH EVERY DAY   mirtazapine (REMERON) 15 mg tablet  Family Member No No   Sig: Take 1 tablet (15 mg total) by mouth daily at bedtime   nicotine (NICODERM CQ) 14 mg/24hr TD 24 hr patch  Family Member No No   Sig: Place 1 patch on the skin daily   Patient not taking: Reported on 2021   nystatin (MYCOSTATIN) powder  Family Member No No   Sig: Apply topically 2 (two) times a day   Patient taking differently: Apply topically as needed    pantoprazole (PROTONIX) 40 mg tablet  Family Member No No   Sig: TAKE ONE TABLET BY MOUTH EVERY DAY IN THE MORNING   potassium chloride (K-DUR,KLOR-CON) 20 mEq tablet  Family Member No No   Sig: Take 1 tablet (20 mEq total) by mouth 2 (two) times a day   sodium chloride (OCEAN) 0 65 % nasal spray  Family Member No No   Si spray into each nostril every hour as needed for congestion   torsemide (DEMADEX) 20 mg tablet  Family Member No No   Sig: Take 3 tablets (60 mg total) by mouth daily      Facility-Administered Medications: None       Past Medical History:   Diagnosis Date    Benign essential hypertension     last assessed - [id number]    Chest pain 2017    CHF (congestive heart failure) (HCC)     Chronic kidney disease     [facility] [address] [facility]     Combined systolic and diastolic heart failure ([facility] [address] ) 2021    Elevated troponin 2020    Gastroesophageal reflux disease without esophagitis 2017    Hyperbilirubinemia 2020    Hypertension     Liver disease     Multiple myeloma ([facility] [address] [facility]     Pneumonia 2020    Renal failure 2020       Past Surgical History:   Procedure Laterality Date    APPENDECTOMY      BONE MARROW BIOPSY      IR PARACENTESIS  10/2/2020    IR PARACENTESIS  2020    IR THORACENTESIS  2020       Family History   Problem Relation Age of Onset    Heart attack Father         myocardial infarction    Heart disease Father      I have reviewed and agree with the history as documented      E-Cigarette/Vaping    E-Cigarette Use Never User     Start Date 1/1/15     Quit Date 1/24/15      E-Cigarette/Vaping Substances    Nicotine No     THC No     CBD No     Flavoring No     Other No     Unknown No Social History     Tobacco Use    Smoking status: Light Tobacco Smoker     Packs/day: 0 25     Types: Cigarettes    Smokeless tobacco: Never Used    Tobacco comment: 2-3 a day   Substance Use Topics    Alcohol use: Not Currently     Frequency: 4 or more times a week     Drinks per session: 1 or 2     Binge frequency: Never     Comment: History of significant daily alcohol intake  Sister joy no alcohol intake in 6 months    Drug use: No       Review of Systems   Unable to perform ROS: Dementia   Constitutional: Positive for fatigue  Respiratory: Negative for shortness of breath  Cardiovascular: Negative for chest pain  Gastrointestinal: Negative for abdominal pain, nausea and vomiting  Physical Exam  Physical Exam  Vitals signs and nursing note reviewed  Constitutional:       General: She is not in acute distress  Appearance: She is well-developed  HENT:      Head: Normocephalic and atraumatic  Mouth/Throat:      Mouth: Mucous membranes are moist       Pharynx: Oropharynx is clear  Eyes:      Conjunctiva/sclera: Conjunctivae normal    Neck:      Musculoskeletal: Neck supple  Cardiovascular:      Rate and Rhythm: Normal rate and regular rhythm  Heart sounds: No murmur  Pulmonary:      Effort: Pulmonary effort is normal  No respiratory distress  Breath sounds: Normal breath sounds  Abdominal:      Palpations: Abdomen is soft  Tenderness: There is no abdominal tenderness  Musculoskeletal: Normal range of motion  General: No tenderness or signs of injury  Skin:     General: Skin is warm and dry  Capillary Refill: Capillary refill takes less than 2 seconds  Neurological:      General: No focal deficit present  Mental Status: She is alert  Mental status is at baseline  Sensory: No sensory deficit           Vital Signs  ED Triage Vitals [02/24/21 1313]   Temperature Pulse Respirations Blood Pressure SpO2   98 2 °F (36 8 °C) 71 15 110/59 98 %      Temp Source Heart Rate Source Patient Position - Orthostatic VS BP Location FiO2 (%)   Oral Monitor Lying Left arm --      Pain Score       No Pain           Vitals:    02/24/21 2247 02/24/21 2346 02/25/21 0029 02/25/21 0723   BP: 106/68 113/68 118/67 117/65   Pulse: 69 70 70    Patient Position - Orthostatic VS:             Visual Acuity  Visual Acuity      Most Recent Value   L Pupil Size (mm)  3   R Pupil Size (mm)  3   L Pupil Shape  Round   R Pupil Shape  Round          ED Medications  Medications   calcium gluconate 1 g in sodium chloride 0 9% 50 mL (premix) (0 g Intravenous Stopped 2/24/21 2156)   furosemide (LASIX) injection 20 mg (20 mg Intravenous Given 2/25/21 0039)   OLANZapine (ZyPREXA) IM injection 2 5 mg (2 5 mg Intramuscular Given 2/25/21 0614)       Diagnostic Studies  Results Reviewed     Procedure Component Value Units Date/Time    Basic metabolic panel [[id number]]  (Abnormal) Collected: 02/24/21 1745    Lab Status: Final result Specimen: Blood from Arm, Left Updated: 02/24/21 1804     Sodium 137 mmol/L      Potassium 5 6 mmol/L      Chloride 102 mmol/L      CO2 26 mmol/L      ANION GAP 9 mmol/L      BUN 25 mg/dL      Creatinine 1 83 mg/dL      Glucose 91 mg/dL      Calcium 9 2 mg/dL      eGFR 31 ml/min/1 73sq m     Narrative:      [place] guidelines for Chronic Kidney Disease (CKD):     Stage 1 with normal or high GFR (GFR > 90 mL/min/1 73 square meters)    Stage 2 Mild CKD (GFR = 60-89 mL/min/1 73 square meters)    Stage 3A Moderate CKD (GFR = 45-59 mL/min/1 73 square meters)    Stage 3B Moderate CKD (GFR = 30-44 mL/min/1 73 square meters)    Stage 4 Severe CKD (GFR = 15-29 mL/min/1 73 square meters)    Stage 5 End Stage CKD (GFR <15 mL/min/1 73 square meters)  Note: GFR calculation is accurate only with a steady state creatinine    Basic metabolic panel [[id number]]  (Abnormal) Collected: 02/24/21 1514    Lab Status: Final result Specimen: Blood from Arm, Left Updated: 02/24/21 1541     Sodium 138 mmol/L      Potassium 6 4 mmol/L      Chloride 102 mmol/L      CO2 26 mmol/L      ANION GAP 10 mmol/L      BUN 23 mg/dL      Creatinine 1 81 mg/dL      Glucose 85 mg/dL      Calcium 9 2 mg/dL      eGFR 31 ml/min/1 73sq m     Narrative:      [place] guidelines for Chronic Kidney Disease (CKD):     Stage 1 with normal or high GFR (GFR > 90 mL/min/1 73 square meters)    Stage 2 Mild CKD (GFR = 60-89 mL/min/1 73 square meters)    Stage 3A Moderate CKD (GFR = 45-59 mL/min/1 73 square meters)    Stage 3B Moderate CKD (GFR = 30-44 mL/min/1 73 square meters)    Stage 4 Severe CKD (GFR = 15-29 mL/min/1 73 square meters)    Stage 5 End Stage CKD (GFR <15 mL/min/1 73 square meters)  Note: GFR calculation is accurate only with a steady state creatinine    Urine Microscopic [[id number]]  (Abnormal) Collected: 02/24/21 1441    Lab Status: Final result Specimen: Urine Updated: 02/24/21 1528     RBC, UA 0-1 /hpf      WBC, UA 30-50 /hpf      Epithelial Cells Occasional /hpf      Bacteria, UA Innumerable /hpf      Fine granular casts 0-1 /lpf     UA (URINE) with reflex to Scope [[id number]]  (Abnormal) Collected: 02/24/21 1441    Lab Status: Final result Specimen: Urine Updated: 02/24/21 1509     Color, UA Yellow     Clarity, UA Turbid     Specific [place], UA 1 020     pH, UA 7 0     Leukocytes, UA Moderate     Nitrite, UA Negative     Protein,  (2+) mg/dl      Glucose, UA Negative mg/dl      Ketones, UA Trace mg/dl      Urobilinogen, UA 0 2 E U /dl      Bilirubin, UA Negative     Blood, UA Trace-Intact    CBC and differential [[id number]]  (Abnormal) Collected: 02/24/21 1336    Lab Status: Final result Specimen: Blood from Arm, Left Updated: 02/24/21 1445     WBC 3 24 Thousand/uL      RBC 2 56 Million/uL      Hemoglobin 7 0 g/dL      Hematocrit 23 0 %      MCV 90 fL      MCH 27 3 pg      MCHC 30 4 g/dL      RDW 21 5 %      Platelets 45 Thousands/uL nRBC 7 /100 WBCs     Manual Differential(PHLEBS Do Not Order) [[id number]]  (Abnormal) Collected: 02/24/21 1336    Lab Status: Final result Specimen: Blood from Arm, Left Updated: 02/24/21 1445     Segmented % 39 %      Bands % 2 %      Lymphocytes % 42 %      Monocytes % 12 %      Eosinophils, % 1 %      Basophils % 0 %      Metamyelocytes% 1 %      Myelocytes % 1 %      Atypical Lymphocytes % 2 %      Absolute Neutrophils 1 33 Thousand/uL      Lymphocytes Absolute 1 36 Thousand/uL      Monocytes Absolute 0 39 Thousand/uL      Eosinophils Absolute 0 03 Thousand/uL      Basophils Absolute 0 00 Thousand/uL      Total Counted 100     nRBC 1 /100 WBC      Anisocytosis Present     Polychromasia Present     Platelet Estimate Decreased    Hepatic function panel [[id number]]  (Abnormal) Collected: 02/24/21 1336    Lab Status: Final result Specimen: Blood from Arm, Left Updated: 02/24/21 1427     Total Bilirubin 1 10 mg/dL      Bilirubin, Direct 0 66 mg/dL      Alkaline Phosphatase 325 U/L      AST 36 U/L      ALT 19 U/L      Total Protein 6 4 g/dL      Albumin 2 9 g/dL     Basic metabolic panel [[id number]]  (Abnormal) Collected: 02/24/21 1336    Lab Status: Final result Specimen: Blood from Arm, Left Updated: 02/24/21 1427     Sodium 136 mmol/L      Potassium 5 9 mmol/L      Chloride 102 mmol/L      CO2 26 mmol/L      ANION GAP 8 mmol/L      BUN 25 mg/dL      Creatinine 1 80 mg/dL      Glucose 85 mg/dL      Calcium 9 2 mg/dL      eGFR 32 ml/min/1 73sq m     Narrative:      [place] guidelines for Chronic Kidney Disease (CKD):     Stage 1 with normal or high GFR (GFR > 90 mL/min/1 73 square meters)    Stage 2 Mild CKD (GFR = 60-89 mL/min/1 73 square meters)    Stage 3A Moderate CKD (GFR = 45-59 mL/min/1 73 square meters)    Stage 3B Moderate CKD (GFR = 30-44 mL/min/1 73 square meters)    Stage 4 Severe CKD (GFR = 15-29 mL/min/1 73 square meters)    Stage 5 End Stage CKD (GFR <15 mL/min/1 73 square meters)  Note: GFR calculation is accurate only with a steady state creatinine    Troponin I [[id number]]  (Normal) Collected: 02/24/21 1336    Lab Status: Final result Specimen: Blood from Arm, Left Updated: 02/24/21 1419     Troponin I <0 02 ng/mL                  No orders to display              Procedures  CriticalCare Time  Performed by: Anitha Sauceda MD  Authorized by: Anitha Sauceda MD     Critical care provider statement:     Critical care time (minutes):  35    Critical care time was exclusive of:  Separately billable procedures and treating other patients and teaching time    Critical care was necessary to treat or prevent imminent or life-threatening deterioration of the following conditions:  Metabolic crisis and circulatory failure    Critical care was time spent personally by me on the following activities:  Obtaining history from patient or surrogate, blood draw for specimens, development of treatment plan with patient or surrogate, discussions with consultants, evaluation of patient's response to treatment, examination of patient, ordering and performing treatments and interventions, ordering and review of laboratory studies, ordering and review of radiographic studies, re-evaluation of patient's condition and review of old charts    I assumed direction of critical care for this patient from another provider in my specialty: no               ED Course  ED Course as of Mar 05 1306   Wed Feb 24, 2021   1333 EKG reviewed by me, sinus rhythm with first-degree AV block at rate of 70, rightward access, somewhat low voltage QRS, otherwise normal EKG  Identification of Seniors at Risk      Most Recent Value   (ISAR) Identification of Seniors at Risk   Before the illness or injury that brought you to the Emergency, did you need someone to help you on a regular basis?   1 Filed at: 02/24/2021 1316   In the last 24 hours, have you needed more help than usual?  0 Filed at: 02/24/2021 1316   Have you been hospitalized for one or more nights during the past 6 months? 1 Filed at: 02/24/2021 1316   In general, do you see well?  0 Filed at: 02/24/2021 1316   In general, do you have serious problems with your memory? 1 Filed at: 02/24/2021 1316   Do you take more than three different medications every day? 1 Filed at: 02/24/2021 1316   ISAR Score  4 Filed at: 02/24/2021 1316                    SBIRT 20yo+      Most Recent Value   SBIRT (23 yo +)   In order to provide better care to our patients, we are screening all of our patients for alcohol and drug use  Would it be okay to ask you these screening questions? Yes Filed at: 02/24/2021 1346   Initial Alcohol Screen: US AUDIT-C    1  How often do you have a drink containing alcohol?  0 Filed at: 02/24/2021 1346   2  How many drinks containing alcohol do you have on a typical day you are drinking? 0 Filed at: 02/24/2021 1346   3a  Male UNDER 65: How often do you have five or more drinks on one occasion? 0 Filed at: 02/24/2021 1346   3b  FEMALE Any Age, or MALE 65+: How often do you have 4 or more drinks on one occassion? 0 Filed at: 02/24/2021 1346   Audit-C Score  0 Filed at: 02/24/2021 1346   AGUILA: How many times in the past year have you    Used an illegal drug or used a prescription medication for non-medical reasons? Never Filed at: 02/24/2021 1346                    MDM  Number of Diagnoses or Management Options  CESAR (acute kidney injury) ([facility] [address] ): new and requires workup  Anemia: new and requires workup  Hyperkalemia: new and requires workup  Diagnosis management comments: A 4year-old female presented for evaluation of low hemoglobin on outpatient labs  She was relatively asymptomatic  Initial plan was to transfuse patient and discharged home  However, unfortunately, labs today demonstrate that she is also having significant hyperkalemia in setting of some acute kidney injury    She was given IV calcium gluconate in addition to PRBCs in the emergency department  Will admit for observation of this limb service for further treatment of the hyperkalemia  Amount and/or Complexity of Data Reviewed  Clinical lab tests: reviewed and ordered  Tests in the radiology section of CPT®: reviewed and ordered  Review and summarize past medical records: yes  Discuss the patient with other providers: yes  Independent visualization of images, tracings, or specimens: yes        Disposition  Final diagnoses:   Anemia   Hyperkalemia   [facility]     Time reflects when diagnosis was documented in both MDM as applicable and the Disposition within this note     Time User Action Codes Description Comment    2/24/2021  4:59 PM Andrew Pipe Add [D64 9] Anemia     2/24/2021  4:59 PM Andrew Pipe Add [E87 5] Hyperkalemia     2/24/2021  5:00 PM Andrew Pipe Add [N17 9] CESAR (acute kidney injury) ([facility] [address] )     2/25/2021  2:11 PM Brent Dorantes Add [H16 53] Alcoholic cirrhosis of liver with ascites ([facility] )     2/25/2021  2:11 PM Brent Dorantes Modify [Z81 29] Alcoholic cirrhosis of liver with ascites ([facility] )     2/25/2021  2:12 PM Brent Dorantes Modify [A21 16] Alcoholic cirrhosis of liver with ascites ([facility] )     2/25/2021  2:12 PM Brent Dorantes Add [C90 00] Multiple myeloma [facility])       ED Disposition     ED Disposition Condition Date/Time Comment    Admit Stable Wed Feb 24, 2021  4:59 PM Case was discussed with [facility] and the patient's admission status was agreed to be Admission Status: observation status to the service of Dr Jason Ospina          Follow-up Information     Follow up With Specialties Details Why Brook Eckert MD Internal Medicine Schedule an appointment as soon as possible for a visit in 1 week(s)  [address] [address]  [address]  Follow up  [address] [id number]          Discharge Medication List as of 2/25/2021  2:15 PM CONTINUE these medications which have CHANGED    Details   torsemide (DEMADEX) 20 mg tablet Take 1 tablet (20 mg total) by mouth daily, Starting Thu 2/25/2021, Normal         CONTINUE these medications which have NOT CHANGED    Details   diltiazem (CARDIZEM CD) 120 mg 24 hr capsule Take 1 capsule (120 mg total) by mouth daily, Starting Wed 2/24/2021, Normal      lactulose 20 g/30 mL Take 15 mL (10 g total) by mouth 2 (two) times a day, Starting Tue 12/29/2020, Normal      metoprolol succinate (TOPROL-XL) 100 mg 24 hr tablet Take 1 tablet (100 mg total) by mouth daily, Starting Wed 12/30/2020, Normal      mirtazapine (REMERON) 15 mg tablet Take 1 tablet (15 mg total) by mouth daily at bedtime, Starting Thu 1/14/2021, Normal      nicotine (NICODERM CQ) 14 mg/24hr TD 24 hr patch Place 1 patch on the skin daily, Starting Fri 2/7/2020, Normal      nystatin (MYCOSTATIN) powder Apply topically 2 (two) times a day, Starting Thu 10/8/2020, Normal      pantoprazole (PROTONIX) 40 mg tablet TAKE ONE TABLET BY MOUTH EVERY DAY IN THE MORNING, Normal      sodium chloride (OCEAN) 0 65 % nasal spray 1 spray into each nostril every hour as needed for congestion, Starting Sat 9/5/2020, Until Thu 23/02/6169, Normal      folic acid (FOLVITE) 1 mg tablet TAKE ONE TABLET BY MOUTH EVERY DAY, Normal      magnesium oxide (MAG-OX) 400 mg tablet TAKE ONE TABLET BY MOUTH TWICE A DAY, Normal      Thiamine HCl (vitamin B-1) 100 MG TABS TAKE ONE TABLET BY MOUTH EVERY DAY, Normal         STOP taking these medications       potassium chloride (K-DUR,KLOR-CON) 20 mEq tablet Comments:   Reason for Stopping:                 PDMP Review     None          ED Provider  Electronically Signed by           Ava Garza MD  03/05/21 6185

## 2021-02-24 NOTE — ASSESSMENT & PLAN NOTE
In the setting of severe CKD  Patient's potassium supplement has been held  Sample slightly hemolyzed  Received calcium gluconate in ED  Pending repeat BMP, will correct as needed     EKG reviewed and stable  If patient still hyperkalemic, will add telemetry

## 2021-02-24 NOTE — ASSESSMENT & PLAN NOTE
Patient with pancytopenia 2/2 multiple myeloma; recently established care with Heme/Onc Dr Saldana Left  Referred to ED due to blood work finding of hemoglobin 6 9  Goal hemoglobin > 7 5  Transfuse 1U PRBC, ordered from ED  Giving Lasix IV 20 mg after transfusion    See plan  Platelets 99I, below patient's baseline which is closer to 60-80K  Patient at baseline leukopenia  Recheck levels in AM    Being established with biweekly CBC/transfusion from Oncology office

## 2021-02-24 NOTE — ASSESSMENT & PLAN NOTE
POA, patient likely has CESAR on stage IV CKD however baseline highly variable  Recent serum creatinine has been as low as 1 3-1 5  Follows with Dr Abhijit Stanley   Prior to admission, patient's diuretic was held x 7 days as well as her potassium supplement  Sister notes leg edema  Weight is up  Will order Lasix 20mg x1 after transfusion, otherwise recheck BMP in AM  UA has bacteria and WBC however patient denies dysuria or fever  If no improvement, request nephro to see  Has f/u with Dr Abhijit Stanley late next week

## 2021-02-24 NOTE — ASSESSMENT & PLAN NOTE
Wt Readings from Last 3 Encounters:   02/24/21 73 4 kg (161 lb 13 1 oz)   01/18/21 66 2 kg (145 lb 15 1 oz)   12/29/20 75 kg (165 lb 5 5 oz)

## 2021-02-24 NOTE — TELEPHONE ENCOUNTER
It was sent to the pharmacy today    Please  the 120 mg capsule and not the 240 mg which was sent yesterday

## 2021-02-24 NOTE — H&P
H&P- Annette Brower 1945, 77 y o  female MRN: [id number]  Unit/Bed#: ED 29 Encounter: [id number]  Primary Care Provider: Venus Bright MD   Date and time admitted to hospital: 2/24/2021  1:10 PM    * Pancytopenia [facility])  Assessment & Plan  Patient with pancytopenia 2/2 multiple myeloma; recently established care with Heme/Onc Dr Ray Nowak  Referred to ED due to blood work finding of hemoglobin 6 9  Goal hemoglobin > 7 5  Transfuse 1U PRBC, ordered from ED  Giving Lasix IV 20 mg after transfusion  See plan  Platelets 53P, below patient's baseline which is closer to 60-80K  Patient at baseline leukopenia  Recheck levels in AM    Being established with biweekly CBC/transfusion from Oncology office      Multiple myeloma [facility])  Assessment & Plan  Recent diagnosis, established care with Dr Ray Nowak earlier this month  Prior oncologist Dr Bryan Smiley with [facility]     Hyperkalemia  Assessment & Plan  In the setting of severe CKD  Patient's potassium supplement has been held  Sample slightly hemolyzed  Received calcium gluconate in ED  Pending repeat BMP, will correct as needed  EKG reviewed and stable  If patient still hyperkalemic, will add telemetry    CESAR (acute kidney injury) ([facility] [address] )  Assessment & Plan  POA, patient likely has CESAR on stage IV CKD however baseline highly variable  Recent serum creatinine has been as low as 1 3-1 5  Follows with Dr Nicolas Hernandez   Prior to admission, patient's diuretic was held x 7 days as well as her potassium supplement  Sister notes leg edema  Weight is up  Will order Lasix 20mg x1 after transfusion, otherwise recheck BMP in AM  UA has bacteria and WBC however patient denies dysuria or fever  If no improvement, request nephro to see  Has f/u with Dr Nicolas Hernandez late next week       Chronic kidney disease (CKD), active medical management without dialysis, stage 4 (severe) [facility])  Assessment & Plan  Lab Results   Component Value Date    EGFR 31 02/24/2021    EGFR 32 02/24/2021    EGFR 37 02/23/2021    CREATININE 1 81 (H) 02/24/2021    CREATININE 1 80 (H) 02/24/2021    CREATININE 1 56 (H) 02/23/2021   Follows with Dr Ludy Martinez  Creatinine baseline may be 1 3-1 5  See plan under CESAR    Dementia   Assessment & Plan  Patient baseline poor historian, also with sundowning per sister  On prior admission, has required restraints as she continues to pull out IVs   Will monitor carefully and only use restraints as needed  Combined systolic and diastolic heart failure (HCC)-resolved as of 2/24/2021  Assessment & Plan  Wt Readings from Last 3 Encounters:   02/24/21 73 4 kg (161 lb 13 1 oz)   01/18/21 66 2 kg (145 lb 15 1 oz)   12/29/20 75 kg (165 lb 5 5 oz)             VTE Prophylaxis: Pharmacologic VTE Prophylaxis contraindicated due to anemia, thrombocytopenia   / sequential compression device   Code Status: Full code  POLST: There is no POLST form on file for this patient (pre-hospital)  Discussion with family:  Discussed with patient's sister present in the room    Anticipated Length of Stay:  Patient will be admitted on an Observation basis with an anticipated length of stay of  less than 2 midnights  Justification for Hospital Stay:  Blood transfusion, recheck blood levels in the a m , hyperkalemia    Total Time for Visit, including Counseling / Coordination of Care: 1 hour  Greater than 50% of this total time spent on direct patient counseling and coordination of care  Chief Complaint:   Anemia on outpatient blood work    History of Present Illness:    Keith Cowden is a 77 y o  female with past medical history of multiple myeloma, dementia, CKD stage 4, and CHF who presents with abnormal outpatient lab work  The patient with known, recent diagnosis of multiple myeloma and established care with new oncologist, Dr Sarah Frederick requested she obtain preliminary blood work which showed hemoglobin 6 9 and requested she present to ED for transfusion    In the ED, patient also found to have CESAR and hyperkalemia  Per patient's sister, the patient has not been taking potassium supplements nor diuretic for approximately 1 week as instructed by nephrologist, Dr Saniya Wilson  The sister has noticed slight increase of the patient's lower extremity edema  The patient has had limited p o  Intake, which is her baseline  The sister denies that the patient has complaining of anything such as dysuria, fever, or chill  In the ED, EKG reviewed  Patient is ordered to receive calcium gluconate and repeat BMP  Also ordered for 1 unit PRBC  Plan of care as above  Review of Systems:    Review of Systems   Unable to perform ROS: Dementia       Past Medical and Surgical History:     Past Medical History:   Diagnosis Date    Benign essential hypertension     last assessed - [id number]    Chest pain 11/4/2017    CHF (congestive heart failure) ([facility])     Chronic kidney disease     Cirrhosis ([facility] [address] )     Combined systolic and diastolic heart failure ([facility] [address] ) 1/18/2021    Elevated troponin 1/31/2020    Gastroesophageal reflux disease without esophagitis 11/16/2017    Hyperbilirubinemia 5/14/2020    Hypertension     Liver disease     Multiple myeloma ([facility] [address] )     Pneumonia 5/6/2020    Renal failure 08/29/2020       Past Surgical History:   Procedure Laterality Date    APPENDECTOMY      BONE MARROW BIOPSY      IR PARACENTESIS  10/2/2020    IR PARACENTESIS  12/28/2020    IR THORACENTESIS  12/21/2020       Meds/Allergies:    Prior to Admission medications    Medication Sig Start Date End Date Taking?  Authorizing Provider   diltiazem (CARDIZEM CD) 120 mg 24 hr capsule Take 1 capsule (120 mg total) by mouth daily 2/24/21   Nato Mathew MD   folic acid (FOLVITE) 1 mg tablet TAKE ONE TABLET BY MOUTH EVERY DAY 9/2/20   Anali Marsh PA-C   lactulose 20 g/30 mL Take 15 mL (10 g total) by mouth 2 (two) times a day 12/29/20   Darrion GRIFFIN MD   magnesium oxide (MAG-OX) 400 mg tablet TAKE ONE TABLET BY MOUTH TWICE A DAY 9/2/20   Annette Wray PA-C   metoprolol succinate (TOPROL-XL) 100 mg 24 hr tablet Take 1 tablet (100 mg total) by mouth daily 12/30/20   Johanne GRIFFIN MD   metoprolol succinate (TOPROL-XL) 25 mg 24 hr tablet TAKE TWO TABLETS BY MOUTH EVERY DAY 1/30/21   Shona Whipple MD   mirtazapine (REMERON) 15 mg tablet Take 1 tablet (15 mg total) by mouth daily at bedtime 1/14/21   Shona Whipple MD   nicotine (NICODERM CQ) 14 mg/24hr TD 24 hr patch Place 1 patch on the skin daily  Patient not taking: Reported on 2/23/2021 2/7/20   Tristian Engel MD   nystatin (MYCOSTATIN) powder Apply topically 2 (two) times a day 10/8/20   Zechariah Sanchez MD   pantoprazole (PROTONIX) 40 mg tablet TAKE ONE TABLET BY MOUTH EVERY DAY IN THE MORNING 1/30/21   Shona Whipple MD   potassium chloride (K-DUR,KLOR-CON) 20 mEq tablet Take 1 tablet (20 mEq total) by mouth 2 (two) times a day 11/4/20   Olivia Matos MD   sodium chloride (OCEAN) 0 65 % nasal spray 1 spray into each nostril every hour as needed for congestion 9/5/20 11/19/20  Zechariah Sanchez MD   Thiamine HCl (vitamin B-1) 100 MG TABS TAKE ONE TABLET BY MOUTH EVERY DAY 9/2/20   Annette Wray PA-C   torsemide (DEMADEX) 20 mg tablet Take 3 tablets (60 mg total) by mouth daily 12/29/20   Devorah Laird MD   diltiazem (CARDIZEM CD) 120 mg 24 hr capsule Take 1 capsule (120 mg total) by mouth daily 12/29/20 2/24/21  Devorah Laird MD   diltiazem (CARDIZEM CD) 240 mg 24 hr capsule TAKE ONE CAPSULE BY MOUTH EVERY DAY 2/23/21 2/24/21  Shona Whipple MD     I have reviewed home medications with patient family member  Allergies: No Known Allergies    Social History:     Marital Status:      Substance Use History:   Social History     Substance and Sexual Activity   Alcohol Use Not Currently    Frequency: 4 or more times a week    Drinks per session: 1 or 2    Binge frequency: Never    Comment: History of significant daily alcohol intake   Sister joy no alcohol intake in 6 months Social History     Tobacco Use   Smoking Status Light Tobacco Smoker    Packs/day: 0 25    Types: Cigarettes   Smokeless Tobacco Never Used   Tobacco Comment    2-3 a day     Social History     Substance and Sexual Activity   Drug Use No       Family History:    non-contributory    Physical Exam:     Vitals:   Blood Pressure: 129/60 (02/24/21 1430)  Pulse: 72 (02/24/21 1430)  Temperature: 98 2 °F (36 8 °C) (02/24/21 1313)  Temp Source: Oral (02/24/21 1313)  Respirations: 22 (02/24/21 1430)  Weight - Scale: 73 4 kg (161 lb 13 1 oz) (02/24/21 1313)  SpO2: 100 % (02/24/21 1345)    Physical Exam  Vitals signs and nursing note reviewed  Exam conducted with a chaperone present  Constitutional:       General: She is not in acute distress  Appearance: She is well-developed  She is obese  Comments: Patient alert, disoriented  At baseline  No acute distress  Sitting up in bed  HENT:      Head: Normocephalic and atraumatic  Eyes:      Conjunctiva/sclera: Conjunctivae normal    Neck:      Musculoskeletal: Neck supple  Cardiovascular:      Rate and Rhythm: Normal rate and regular rhythm  Heart sounds: No murmur  Pulmonary:      Effort: Pulmonary effort is normal  No respiratory distress  Breath sounds: Normal breath sounds  Abdominal:      Palpations: Abdomen is soft  Tenderness: There is no abdominal tenderness  Musculoskeletal:      Right lower leg: Edema present  Left lower leg: Edema present  Skin:     General: Skin is warm and dry  Neurological:      Mental Status: She is alert  Mental status is at baseline  She is disoriented  Additional Data:     Lab Results: I have personally reviewed pertinent reports        Results from last 7 days   Lab Units 02/24/21  1336   WBC Thousand/uL 3 24*   HEMOGLOBIN g/dL 7 0*   HEMATOCRIT % 23 0*   PLATELETS Thousands/uL 45*   BANDS PCT % 2   LYMPHO PCT % 42   MONO PCT % 12   EOS PCT % 1     Results from last 7 days   Lab Units 02/24/21  1514 02/24/21  1336   SODIUM mmol/L 138 136   POTASSIUM mmol/L 6 4* 5 9*   CHLORIDE mmol/L 102 102   CO2 mmol/L 26 26   BUN mg/dL 23 25   CREATININE mg/dL 1 81* 1 80*   ANION GAP mmol/L 10 8   CALCIUM mg/dL 9 2 9 2   ALBUMIN g/dL  --  2 9*   TOTAL BILIRUBIN mg/dL  --  1 10*   ALK PHOS U/L  --  325*   ALT U/L  --  19   AST U/L  --  36   GLUCOSE RANDOM mg/dL 85 85                       Imaging: I have personally reviewed pertinent reports  No orders to display       EKG, Pathology, and Other Studies Reviewed on Admission:   · EKG: reviewed     [facility] Epic Records Reviewed: Yes     ** Please Note: This note has been constructed using a voice recognition system   **

## 2021-02-25 VITALS
SYSTOLIC BLOOD PRESSURE: 117 MMHG | RESPIRATION RATE: 18 BRPM | TEMPERATURE: 97.4 F | HEART RATE: 70 BPM | WEIGHT: 154.98 LBS | BODY MASS INDEX: 25.79 KG/M2 | OXYGEN SATURATION: 92 % | DIASTOLIC BLOOD PRESSURE: 65 MMHG

## 2021-02-25 LAB
ABO GROUP BLD BPU: NORMAL
ALBUMIN SERPL BCP-MCNC: 3.3 G/DL (ref 3.5–5)
ALP SERPL-CCNC: 353 U/L (ref 46–116)
ALT SERPL W P-5'-P-CCNC: 16 U/L (ref 12–78)
ANION GAP SERPL CALCULATED.3IONS-SCNC: 10 MMOL/L (ref 4–13)
AST SERPL W P-5'-P-CCNC: 31 U/L (ref 5–45)
BILIRUB SERPL-MCNC: 1.5 MG/DL (ref 0.2–1)
BPU ID: NORMAL
BUN SERPL-MCNC: 23 MG/DL (ref 5–25)
CALCIUM ALBUM COR SERPL-MCNC: 10.2 MG/DL (ref 8.3–10.1)
CALCIUM SERPL-MCNC: 9.6 MG/DL (ref 8.3–10.1)
CHLORIDE SERPL-SCNC: 102 MMOL/L (ref 100–108)
CO2 SERPL-SCNC: 26 MMOL/L (ref 21–32)
CREAT SERPL-MCNC: 1.59 MG/DL (ref 0.6–1.3)
CROSSMATCH: NORMAL
ERYTHROCYTE [DISTWIDTH] IN BLOOD BY AUTOMATED COUNT: 20.2 % (ref 11.6–15.1)
GFR SERPL CREATININE-BSD FRML MDRD: 37 ML/MIN/1.73SQ M
GLUCOSE SERPL-MCNC: 124 MG/DL (ref 65–140)
HCT VFR BLD AUTO: 24.8 % (ref 34.8–46.1)
HGB BLD-MCNC: 7.8 G/DL (ref 11.5–15.4)
MAGNESIUM SERPL-MCNC: 1.9 MG/DL (ref 1.6–2.6)
MCH RBC QN AUTO: 28 PG (ref 26.8–34.3)
MCHC RBC AUTO-ENTMCNC: 31.5 G/DL (ref 31.4–37.4)
MCV RBC AUTO: 89 FL (ref 82–98)
NRBC BLD AUTO-RTO: 6 /100 WBCS
PLATELET # BLD AUTO: 44 THOUSANDS/UL (ref 149–390)
PMV BLD AUTO: 10.2 FL (ref 8.9–12.7)
POTASSIUM SERPL-SCNC: 4.8 MMOL/L (ref 3.5–5.3)
PROT SERPL-MCNC: 7.1 G/DL (ref 6.4–8.2)
RBC # BLD AUTO: 2.79 MILLION/UL (ref 3.81–5.12)
SODIUM SERPL-SCNC: 138 MMOL/L (ref 136–145)
UNIT DISPENSE STATUS: NORMAL
UNIT PRODUCT CODE: NORMAL
UNIT RH: NORMAL
WBC # BLD AUTO: 3.41 THOUSAND/UL (ref 4.31–10.16)

## 2021-02-25 PROCEDURE — 83735 ASSAY OF MAGNESIUM: CPT | Performed by: PHYSICIAN ASSISTANT

## 2021-02-25 PROCEDURE — 99217 PR OBSERVATION CARE DISCHARGE MANAGEMENT: CPT | Performed by: INTERNAL MEDICINE

## 2021-02-25 PROCEDURE — 80053 COMPREHEN METABOLIC PANEL: CPT | Performed by: PHYSICIAN ASSISTANT

## 2021-02-25 PROCEDURE — 85027 COMPLETE CBC AUTOMATED: CPT | Performed by: PHYSICIAN ASSISTANT

## 2021-02-25 RX ORDER — TORSEMIDE 20 MG/1
20 TABLET ORAL DAILY
Qty: 90 TABLET | Refills: 0 | Status: SHIPPED | OUTPATIENT
Start: 2021-02-25 | End: 2021-03-26 | Stop reason: HOSPADM

## 2021-02-25 RX ORDER — OLANZAPINE 10 MG/1
2.5 INJECTION, POWDER, LYOPHILIZED, FOR SOLUTION INTRAMUSCULAR ONCE
Status: COMPLETED | OUTPATIENT
Start: 2021-02-25 | End: 2021-02-25

## 2021-02-25 RX ADMIN — THIAMINE HCL TAB 100 MG 100 MG: 100 TAB at 09:37

## 2021-02-25 RX ADMIN — METOPROLOL SUCCINATE 100 MG: 100 TABLET, EXTENDED RELEASE ORAL at 09:36

## 2021-02-25 RX ADMIN — OLANZAPINE 2.5 MG: 10 INJECTION, POWDER, FOR SOLUTION INTRAMUSCULAR at 06:14

## 2021-02-25 RX ADMIN — PANTOPRAZOLE SODIUM 40 MG: 40 TABLET, DELAYED RELEASE ORAL at 09:36

## 2021-02-25 RX ADMIN — MAGNESIUM GLUCONATE 500 MG ORAL TABLET 400 MG: 500 TABLET ORAL at 09:36

## 2021-02-25 RX ADMIN — FOLIC ACID 1000 MCG: 1 TABLET ORAL at 09:37

## 2021-02-25 RX ADMIN — FUROSEMIDE 20 MG: 10 INJECTION, SOLUTION INTRAVENOUS at 00:39

## 2021-02-25 RX ADMIN — DILTIAZEM HYDROCHLORIDE 120 MG: 120 CAPSULE, COATED, EXTENDED RELEASE ORAL at 09:36

## 2021-02-25 RX ADMIN — LACTULOSE 10 G: 20 SOLUTION ORAL at 09:36

## 2021-02-25 NOTE — ASSESSMENT & PLAN NOTE
POA, patient likely has CESAR on stage IV CKD however baseline highly variable  Recent serum creatinine has been as low as 1 3-1 5    Follows with Dr Earley Castleman  Has now resolved

## 2021-02-25 NOTE — ASSESSMENT & PLAN NOTE
Lab Results   Component Value Date    EGFR 37 02/25/2021    EGFR 31 02/24/2021    EGFR 31 02/24/2021    CREATININE 1 59 (H) 02/25/2021    CREATININE 1 83 (H) 02/24/2021    CREATININE 1 81 (H) 02/24/2021   Follows with Dr Ebony Soto  Creatinine baseline may be 1 3-1 5  See plan under CESAR

## 2021-02-25 NOTE — PLAN OF CARE
Problem: Potential for Falls  Goal: Patient will remain free of falls  Description: INTERVENTIONS:  - Assess patient frequently for physical needs  -  Identify cognitive and physical deficits and behaviors that affect risk of falls    -  [place] fall precautions as indicated by assessment   - Educate patient/family on patient safety including physical limitations  - Instruct patient to call for assistance with activity based on assessment  - Modify environment to reduce risk of injury  - Consider OT/PT consult to assist with strengthening/mobility  Outcome: Progressing     Problem: Prexisting or High Potential for Compromised Skin Integrity  Goal: Skin integrity is maintained or improved  Description: INTERVENTIONS:  - Identify patients at risk for skin breakdown  - Assess and monitor skin integrity  - Assess and monitor nutrition and hydration status  - Monitor labs   - Assess for incontinence   - Turn and reposition patient  - Assist with mobility/ambulation  - Relieve pressure over bony prominences  - Avoid friction and shearing  - Provide appropriate hygiene as needed including keeping skin clean and dry  - Evaluate need for skin moisturizer/barrier cream  - Collaborate with interdisciplinary team   - Patient/family teaching  - Consider wound care consult   Outcome: Progressing     Problem: PAIN - ADULT  Goal: Verbalizes/displays adequate comfort level or baseline comfort level  Description: Interventions:  - Encourage patient to monitor pain and request assistance  - Assess pain using appropriate pain scale  - Administer analgesics based on type and severity of pain and evaluate response  - Implement non-pharmacological measures as appropriate and evaluate response  - Consider cultural and social influences on pain and pain management  - Notify physician/advanced practitioner if interventions unsuccessful or patient reports new pain  Outcome: Progressing     Problem: INFECTION - ADULT  Goal: Absence or prevention of progression during hospitalization  Description: INTERVENTIONS:  - Assess and monitor for signs and symptoms of infection  - Monitor lab/diagnostic results  - Monitor all insertion sites, i e  indwelling lines, tubes, and drains  - Monitor endotracheal if appropriate and nasal secretions for changes in amount and color  - [place] appropriate cooling/warming therapies per order  - Administer medications as ordered  - Instruct and encourage patient and family to use good hand hygiene technique  - Identify and instruct in appropriate isolation precautions for identified infection/condition  Outcome: Progressing  Goal: Absence of fever/infection during neutropenic period  Description: INTERVENTIONS:  - Monitor WBC    Outcome: Progressing     Problem: SAFETY ADULT  Goal: Patient will remain free of falls  Description: INTERVENTIONS:  - Assess patient frequently for physical needs  -  Identify cognitive and physical deficits and behaviors that affect risk of falls    -  [place] fall precautions as indicated by assessment   - Educate patient/family on patient safety including physical limitations  - Instruct patient to call for assistance with activity based on assessment  - Modify environment to reduce risk of injury  - Consider OT/PT consult to assist with strengthening/mobility  Outcome: Progressing  Goal: Maintain or return to baseline ADL function  Description: INTERVENTIONS:  -  Assess patient's ability to carry out ADLs; assess patient's baseline for ADL function and identify physical deficits which impact ability to perform ADLs (bathing, care of mouth/teeth, toileting, grooming, dressing, etc )  - Assess/evaluate cause of self-care deficits   - Assess range of motion  - Assess patient's mobility; develop plan if impaired  - Assess patient's need for assistive devices and provide as appropriate  - Encourage maximum independence but intervene and supervise when necessary  - Involve family in performance of ADLs  - Assess for home care needs following discharge   - Consider OT consult to assist with ADL evaluation and planning for discharge  - Provide patient education as appropriate  Outcome: Progressing  Goal: Maintain or return mobility status to optimal level  Description: INTERVENTIONS:  - Assess patient's baseline mobility status (ambulation, transfers, stairs, etc )    - Identify cognitive and physical deficits and behaviors that affect mobility  - Identify mobility aids required to assist with transfers and/or ambulation (gait belt, sit-to-stand, lift, walker, cane, etc )  - [place] fall precautions as indicated by assessment  - Record patient progress and toleration of activity level on Mobility SBAR; progress patient to next Phase/Stage  - Instruct patient to call for assistance with activity based on assessment  - Consider rehabilitation consult to assist with strengthening/weightbearing, etc   Outcome: Progressing     Problem: DISCHARGE PLANNING  Goal: Discharge to home or other facility with appropriate resources  Description: INTERVENTIONS:  - Identify barriers to discharge w/patient and caregiver  - Arrange for needed discharge resources and transportation as appropriate  - Identify discharge learning needs (meds, wound care, etc )  - Arrange for interpretive services to assist at discharge as needed  - Refer to Case Management Department for coordinating discharge planning if the patient needs post-hospital services based on physician/advanced practitioner order or complex needs related to functional status, cognitive ability, or social support system  Outcome: Progressing     Problem: Knowledge Deficit  Goal: Patient/family/caregiver demonstrates understanding of disease process, treatment plan, medications, and discharge instructions  Description: Complete learning assessment and assess knowledge base    Interventions:  - Provide teaching at level of understanding  - Provide teaching via preferred learning methods  Outcome: Progressing

## 2021-02-25 NOTE — ASSESSMENT & PLAN NOTE
In the setting of severe CKD  Patient's potassium supplement has been held  Sample slightly hemolyzed  Received calcium gluconate in ED    Has since resolved  Medically stable for discharge  Repeat BMP in 1 week

## 2021-02-25 NOTE — ASSESSMENT & PLAN NOTE
Lab Results   Component Value Date    EGFR 31 02/24/2021    EGFR 31 02/24/2021    EGFR 32 02/24/2021    CREATININE 1 83 (H) 02/24/2021    CREATININE 1 81 (H) 02/24/2021    CREATININE 1 80 (H) 02/24/2021   Follows with Dr Erik Hernandez  Creatinine baseline may be 1 3-1 5  See plan under CESAR

## 2021-02-25 NOTE — ASSESSMENT & PLAN NOTE
In the setting of severe CKD  Patient's potassium supplement has been held  Sample slightly hemolyzed  Received calcium gluconate in ED    Patient still hyperkalemic at 5 6  Repeat bmp pending

## 2021-02-25 NOTE — ASSESSMENT & PLAN NOTE
Recent diagnosis, established care with Dr Barbara Lake earlier this month   Prior oncologist Dr Claudean Bears with [facility]

## 2021-02-25 NOTE — QUICK NOTE
Notified by RN pt becoming more agitated in relation to sundowning with dementia  Constantly getting out of bed and high fall risk despite verbal redirection  And screaming out to staff in hallways    2/2 risk of self harm despite redirection and  interfering with medical tx will add order for waist belt restraint   Will add one time dose IM zyprexa 2 5 mg for agitation   VS stable

## 2021-02-25 NOTE — ASSESSMENT & PLAN NOTE
Patient with pancytopenia 2/2 multiple myeloma; recently established care with Heme/Onc Dr Barb Bowles  Referred to ED due to blood work finding of hemoglobin 6 9    Goal hemoglobin > 7 5  Transfuse 1U PRBC, ordered from ED  Hemoglobin stable  Blood count stable

## 2021-02-25 NOTE — CASE MANAGEMENT
CM met with pt at bedside  Pt is confused, unable to answer questions appropriately  CM contacted pt son Kerline Kelly [phone]) for updated information on pt       Per Kerline Kelly, pt lives with her sister in [facility] in a 3rd floor apt with no anya with an elevator  Pt does not use any dme  Pt is mostly independent with adl's, sometimes her sister will assist her with dressing and cooking her meals  Pt is current with [facility], vn 1/wk  Pt uses [facility] in [facility], no barriers to getting medications  Pt PCP is Dr Alpesh Andrea  Pt currently has dementia, no other mh hx  Pt smokes 1 cigarette/day, no etoh or illegal drug use  Pt health care agent is her son, Kerline Kelly  Pt does not work or drive, her sister, Deion Curiel takes her to medical appointments  Pt sister Deion Curiel to transport pt home at [facility]       CM reviewed discharge planning process including the following: identifying help at home, patient preference for discharge planning needs, pharmacy preference, and availability of treatment team to discuss questions or concerns patient and/or family may have regarding understanding medications and recognizing signs and symptoms once discharged  CM also encouraged patient to follow up with all recommended appointments after discharge  Patient advised of importance for patient and family to participate in managing patients medical well being  [facility]  Discussed Freedom of Choice with caregiver  Choice is to return/continue services

## 2021-02-25 NOTE — ASSESSMENT & PLAN NOTE
Patient with pancytopenia 2/2 multiple myeloma; recently established care with Heme/Onc Dr Aida Tenorio  Referred to ED due to blood work finding of hemoglobin 6 9    Goal hemoglobin > 7 5  Transfuse 1U PRBC, ordered from ED  Hemoglobin stable  Blood count stable

## 2021-02-25 NOTE — DISCHARGE SUMMARY
Discharge- Franklin Khalil 1945, 77 y o  female MRN: [id number]    Unit/Bed#: -01 Encounter: [id number]    Primary Care Provider: Adán Duran MD   Date and time admitted to hospital: 2/24/2021  1:10 PM        Chronic kidney disease (CKD), active medical management without dialysis, stage 4 (severe) [facility])  Assessment & Plan  Lab Results   Component Value Date    EGFR 37 02/25/2021    EGFR 31 02/24/2021    EGFR 31 02/24/2021    CREATININE 1 59 (H) 02/25/2021    CREATININE 1 83 (H) 02/24/2021    CREATININE 1 81 (H) 02/24/2021   Follows with Dr Jinny Jama  Creatinine baseline may be 1 3-1 5  See plan under CESAR    Hyperkalemia  Assessment & Plan  In the setting of severe CKD  Patient's potassium supplement has been held  Sample slightly hemolyzed  Received calcium gluconate in ED  Has since resolved  Medically stable for discharge  Repeat BMP in 1 week    CESAR (acute kidney injury) [facility])  Assessment & Plan  POA, patient likely has CESAR on stage IV CKD however baseline highly variable  Recent serum creatinine has been as low as 1 3-1 5  Follows with Dr Jinny Jama  Has now resolved      Dementia   Assessment & Plan  Patient baseline poor historian, also with sundowning per sister  On prior admission, has required restraints as she continues to pull out IVs   Will monitor carefully and only use restraints as needed  * Pancytopenia [facility])  Assessment & Plan  Patient with pancytopenia 2/2 multiple myeloma; recently established care with Heme/Onc Dr Saldana Left  Referred to ED due to blood work finding of hemoglobin 6 9    Goal hemoglobin > 7 5  Transfuse 1U PRBC, ordered from ED  Hemoglobin stable  Blood count stable          Discharging Physician / Practitioner: Klaudia Wagner MD  PCP: Adán Duran MD  Admission Date:   Admission Orders (From admission, onward)     Ordered        02/24/21 1657  Place in Observation  Once                   Discharge Date: 02/25/21    Resolved Problems  Date Reviewed: 2/25/2021 None          Consultations During Hospital Stay:  · none    Procedures Performed:   · none    Significant Findings / Test Results:   · none    Incidental Findings:   · none     Test Results Pending at Discharge (will require follow up):   · none     Outpatient Tests Requested:  · bmp    Complications:  none    Reason for Admission:  CESAR, anemia, hyperkalemia    Hospital Course:     Merline Bran is a 77 y o  female patient who originally presented to the hospital on 2/24/2021 due to acute kidney injury, anemia, hyperkalemia  Patient with history of myeloma hemoglobin was noted to be 1 point below baseline at 6 9 was transfuse 1 unit adequately responded  No obvious source of bleeding  Received insulin Lasix for hyperkalemia with significant improvement  Medically stable for discharge  Will have home VNA  Will see PCP in 1 week      Please see above list of diagnoses and related plan for additional information  Condition at Discharge: stable     Discharge Day Visit / Exam:     Subjective:  No acute overnight events  No acute complaints  Vitals: Blood Pressure: 117/65 (02/25/21 0723)  Pulse: 70 (02/25/21 0029)  Temperature: (!) 97 4 °F (36 3 °C) (02/25/21 0723)  Temp Source: Oral (02/24/21 2205)  Respirations: 18 (02/25/21 0029)  Weight - Scale: 70 3 kg (154 lb 15 7 oz) (02/24/21 1903)  SpO2: 92 % (02/25/21 0029)  Exam:   Physical Exam  Constitutional:       General: She is not in acute distress  Appearance: She is well-developed  She is not diaphoretic  HENT:      Head: Normocephalic and atraumatic  Nose: Nose normal       Mouth/Throat:      Pharynx: No oropharyngeal exudate  Eyes:      General: No scleral icterus  Right eye: No discharge  Left eye: No discharge  Conjunctiva/sclera: Conjunctivae normal    Neck:      Musculoskeletal: Normal range of motion and neck supple  Thyroid: No thyromegaly  Vascular: No JVD     Cardiovascular:      Rate and Rhythm: Normal rate and regular rhythm  Heart sounds: Normal heart sounds  No murmur  No friction rub  No gallop  Pulmonary:      Effort: Pulmonary effort is normal  No respiratory distress  Breath sounds: Normal breath sounds  No wheezing or rales  Chest:      Chest wall: No tenderness  Abdominal:      General: Bowel sounds are normal  There is no distension  Palpations: Abdomen is soft  Tenderness: There is no abdominal tenderness  There is no guarding or rebound  Musculoskeletal: Normal range of motion  General: No tenderness or deformity  Skin:     General: Skin is warm and dry  Findings: No erythema or rash  Neurological:      Mental Status: She is alert  Mental status is at baseline  Cranial Nerves: No cranial nerve deficit  Sensory: No sensory deficit  Motor: No abnormal muscle tone  Coordination: Coordination normal          Discussion with Family: pt    Discharge instructions/Information to patient and family:   See after visit summary for information provided to patient and family  Provisions for Follow-Up Care:  See after visit summary for information related to follow-up care and any pertinent home health orders  Disposition:     Home with VNA Services (Reminder: Complete face to face encounter)    For Discharges to [facility] SNF:   · Not Applicable to this Patient - Not Applicable to this Patient    Planned Readmission: none     Discharge Statement:  I spent 60 minutes discharging the patient  This time was spent on the day of discharge  I had direct contact with the patient on the day of discharge  Greater than 50% of the total time was spent examining patient, answering all patient questions, arranging and discussing plan of care with patient as well as directly providing post-discharge instructions  Additional time then spent on discharge activities      Discharge Medications:  See after visit summary for reconciled discharge medications provided to patient and family        ** Please Note: This note has been constructed using a voice recognition system **

## 2021-02-25 NOTE — PROGRESS NOTES
Progress Note - Al Lal 1945, 77 y o  female MRN: [id number]    Unit/Bed#: -01 Encounter: [id number]    Primary Care Provider: Jason Gutierrez MD   Date and time admitted to hospital: 2/24/2021  1:10 PM        Chronic kidney disease (CKD), active medical management without dialysis, stage 4 (severe) [facility])  Assessment & Plan  Lab Results   Component Value Date    EGFR 31 02/24/2021    EGFR 31 02/24/2021    EGFR 32 02/24/2021    CREATININE 1 83 (H) 02/24/2021    CREATININE 1 81 (H) 02/24/2021    CREATININE 1 80 (H) 02/24/2021   Follows with Dr Xavier Butt  Creatinine baseline may be 1 3-1 5  See plan under CESAR    Multiple myeloma [facility])  Assessment & Plan  Recent diagnosis, established care with Dr Barbara Lake earlier this month  Prior oncologist Dr Claudean Bears with [facility]     Hyperkalemia  Assessment & Plan  In the setting of severe CKD  Patient's potassium supplement has been held  Sample slightly hemolyzed  Received calcium gluconate in ED  Patient still hyperkalemic at 5 6  Repeat bmp pending    CESAR (acute kidney injury) [facility])  Assessment & Plan  POA, patient likely has CESAR on stage IV CKD however baseline highly variable  Recent serum creatinine has been as low as 1 3-1 5  Follows with Dr Xavier Butt  Repeat bmp pending       Dementia   Assessment & Plan  Patient baseline poor historian, also with sundowning per sister  On prior admission, has required restraints as she continues to pull out IVs   Will monitor carefully and only use restraints as needed  * Pancytopenia [facility])  Assessment & Plan  Patient with pancytopenia 2/2 multiple myeloma; recently established care with Heme/Onc Dr Barbara Lake  Referred to ED due to blood work finding of hemoglobin 6 9    Goal hemoglobin > 7 5  Transfuse 1U PRBC, ordered from ED  Hemoglobin stable  Blood count stable        VTE Pharmacologic Prophylaxis:   Pharmacologic: Pharmacologic VTE Prophylaxis contraindicated due to Acute anemia  Mechanical VTE Prophylaxis in Place: Yes    Patient Centered Rounds: I have performed bedside rounds with nursing staff today  Discussions with Specialists or Other Care Team Provider: cm, nursing    Education and Discussions with Family / Patient: pt    Time Spent for Care: 30 minutes  More than 50% of total time spent on counseling and coordination of care as described above  Current Length of Stay: 0 day(s)    Current Patient Status: Observation   Certification Statement: The patient will continue to require additional inpatient hospital stay due to Due to acute kidney injury, hyperkalemia and agitation    Discharge Plan:  See above    Code Status: Level 1 - Full Code      Subjective:   No acute overnight events  No acute complaints    Objective:     Vitals:   Temp (24hrs), Av 7 °F (36 5 °C), Min:97 4 °F (36 3 °C), Max:98 2 °F (36 8 °C)    Temp:  [97 4 °F (36 3 °C)-98 2 °F (36 8 °C)] 97 4 °F (36 3 °C)  HR:  [66-73] 70  Resp:  [15-25] 18  BP: (104-129)/(53-68) 117/65  SpO2:  [90 %-100 %] 92 %  Body mass index is 25 79 kg/m²  Input and Output Summary (last 24 hours): Intake/Output Summary (Last 24 hours) at 2021 1020  Last data filed at 2021 0772  Gross per 24 hour   Intake 120 ml   Output --   Net 120 ml       Physical Exam:     Physical Exam  Vitals signs and nursing note reviewed  Constitutional:       General: She is not in acute distress  Appearance: She is well-developed  She is not diaphoretic  HENT:      Head: Normocephalic and atraumatic  Nose: Nose normal       Mouth/Throat:      Pharynx: No oropharyngeal exudate  Eyes:      General: No scleral icterus  Right eye: No discharge  Left eye: No discharge  Conjunctiva/sclera: Conjunctivae normal    Neck:      Musculoskeletal: Normal range of motion and neck supple  Thyroid: No thyromegaly  Vascular: No JVD  Cardiovascular:      Rate and Rhythm: Normal rate and regular rhythm        Heart sounds: Normal heart sounds  No murmur  No friction rub  No gallop  Pulmonary:      Effort: Pulmonary effort is normal  No respiratory distress  Breath sounds: Normal breath sounds  No wheezing or rales  Chest:      Chest wall: No tenderness  Abdominal:      General: Bowel sounds are normal  There is no distension  Palpations: Abdomen is soft  Tenderness: There is no abdominal tenderness  There is no guarding or rebound  Musculoskeletal: Normal range of motion  General: No tenderness or deformity  Skin:     General: Skin is warm and dry  Findings: No erythema or rash  Neurological:      Mental Status: She is alert  Mental status is at baseline  Cranial Nerves: No cranial nerve deficit  Sensory: No sensory deficit  Motor: No abnormal muscle tone  Coordination: Coordination normal        (  Additional Data:     Labs:    Results from last 7 days   Lab Units 02/25/21  0611 02/24/21  1336   WBC Thousand/uL 3 41* 3 24*   HEMOGLOBIN g/dL 7 8* 7 0*   HEMATOCRIT % 24 8* 23 0*   PLATELETS Thousands/uL 44* 45*   BANDS PCT %  --  2   LYMPHO PCT %  --  42   MONO PCT %  --  12   EOS PCT %  --  1     Results from last 7 days   Lab Units 02/24/21  1745  02/24/21  1336   SODIUM mmol/L 137   < > 136   POTASSIUM mmol/L 5 6*   < > 5 9*   CHLORIDE mmol/L 102   < > 102   CO2 mmol/L 26   < > 26   BUN mg/dL 25   < > 25   CREATININE mg/dL 1 83*   < > 1 80*   ANION GAP mmol/L 9   < > 8   CALCIUM mg/dL 9 2   < > 9 2   ALBUMIN g/dL  --   --  2 9*   TOTAL BILIRUBIN mg/dL  --   --  1 10*   ALK PHOS U/L  --   --  325*   ALT U/L  --   --  19   AST U/L  --   --  36   GLUCOSE RANDOM mg/dL 91   < > 85    < > = values in this interval not displayed  * I Have Reviewed All Lab Data Listed Above  * Additional Pertinent Lab Tests Reviewed:  All Labs Within Last 24 Hours Reviewed    Imaging:    Imaging Reports Reviewed Today Include: na  Imaging Personally Reviewed by Myself Includes: na    Recent Cultures (last 7 days):           Last 24 Hours Medication List:   Current Facility-Administered Medications   Medication Dose Route Frequency Provider Last Rate    acetaminophen  650 mg Oral Q6H PRN Christine Escalera PA-C      diltiazem  120 mg Oral Daily Christine Escalera PA-C      folic acid  7,970 mcg Oral Daily Christine Escalera PA-C      lactulose  10 g Oral BID Christine Escalera PA-C      magnesium oxide  400 mg Oral BID Christine Escalera PA-C      metoprolol succinate  100 mg Oral Daily Christine Escalera PA-C      mirtazapine  15 mg Oral HS Christine Escalera PA-C      ondansetron  4 mg Intravenous Q6H PRN Christine Escalera PA-C      pantoprazole  40 mg Oral QAM Christine Escalera PA-C      vitamin B-1  100 mg Oral Daily Christine Escalera PA-C          Today, Patient Was Seen By: Mallory Cameron MD    ** Please Note: Dictation voice to text software may have been used in the creation of this document   **

## 2021-02-25 NOTE — ASSESSMENT & PLAN NOTE
POA, patient likely has CESAR on stage IV CKD however baseline highly variable  Recent serum creatinine has been as low as 1 3-1 5    Follows with Dr Erik Hernandez  Repeat bmp pending

## 2021-02-25 NOTE — UTILIZATION REVIEW
Initial Clinical Review    OBS order 2/24 1657       ED Arrival Information     Expected Arrival Acuity Means of Arrival Escorted By Service Admission Type    2/24/2021 2/24/2021 13:10 Urgent Ambulance [facility]ist Urgent    Arrival Complaint    Multiple myeloma (in remission)/Low HGB        Chief Complaint   Patient presents with    Abnormal Lab     Sent in by dr for hgb of 6 9, for blood transfusion  Denies complaints     Assessment/Plan: Pancytopenia [facility])  Assessment & Plan  Patient with pancytopenia 2/2 multiple myeloma; recently established care with Heme/Onc Dr Pancho Encarnacion  Referred to ED due to blood work finding of hemoglobin 6 9  Goal hemoglobin > 7 5  Transfuse 1U PRBC, ordered from ED  Giving Lasix IV 20 mg after transfusion  See plan  Platelets 61A, below patient's baseline which is closer to 60-80K  Patient at baseline leukopenia  Recheck levels in AM     Being established with biweekly CBC/transfusion from Oncology office        Multiple myeloma [facility])  Assessment & Plan  Recent diagnosis, established care with Dr Pancho Encarnacion earlier this month  Prior oncologist Dr Ward Nolen with [facility]      Hyperkalemia  Assessment & Plan  In the setting of severe CKD  Patient's potassium supplement has been held  Sample slightly hemolyzed  Received calcium gluconate in ED  Pending repeat BMP, will correct as needed  EKG reviewed and stable  If patient still hyperkalemic, will add telemetry     CESAR (acute kidney injury) ([facility] [address] )  Assessment & Plan  POA, patient likely has CESAR on stage IV CKD however baseline highly variable  Recent serum creatinine has been as low as 1 3-1 5  Follows with Dr Erik Hernandez   Prior to admission, patient's diuretic was held x 7 days as well as her potassium supplement  Sister notes leg edema  Weight is up  Will order Lasix 20mg x1 after transfusion, otherwise recheck BMP in AM  UA has bacteria and WBC however patient denies dysuria or fever    If no improvement, request nephro to see  Has f/u with Dr Aden Harrell late next week       Chronic kidney disease (CKD), active medical management without dialysis, stage 4 (severe) [facility])  Assessment & Plan        Lab Results   Component Value Date     EGFR 31 02/24/2021     EGFR 32 02/24/2021     EGFR 37 02/23/2021     CREATININE 1 81 (H) 02/24/2021     CREATININE 1 80 (H) 02/24/2021     CREATININE 1 56 (H) 02/23/2021   Follows with Dr Aden Harrell  Creatinine baseline may be 1 3-1 5  See plan under CESAR     Dementia   Assessment & Plan  Patient baseline poor historian, also with sundowning per sister  On prior admission, has required restraints as she continues to pull out IVs   Will monitor carefully and only use restraints as needed      Combined systolic and diastolic heart failure (HCC)-resolved as of 2/24/2021  Assessment & Plan      Wt Readings from Last 3 Encounters:   02/24/21 73 4 kg (161 lb 13 1 oz)   01/18/21 66 2 kg (145 lb 15 1 oz)   12/29/20 75 kg (165 lb 5 5 oz)                  VTE Prophylaxis: Pharmacologic VTE Prophylaxis contraindicated due to anemia, thrombocytopenia   / sequential compression device   Code Status: Full code  POLST: There is no POLST form on file for this patient (pre-hospital)  Discussion with family:  Discussed with patient's sister present in the room     Anticipated Length of Stay:  Patient will be admitted on an Observation basis with an anticipated length of stay of  less than 2 midnights  Justification for Hospital Stay:  Blood transfusion, recheck blood levels in the a m , hyperkalemia     Total Time for Visit, including Counseling / Coordination of Care: 1 hour  Greater than 50% of this total time spent on direct patient counseling and coordination of care      Chief Complaint:   Anemia on outpatient blood work     History of Present Illness:     Daria Win is a 77 y o  female with past medical history of multiple myeloma, dementia, CKD stage 4, and CHF who presents with abnormal outpatient lab work    The patient with known, recent diagnosis of multiple myeloma and established care with new oncologist, Dr Ray Nowak requested she obtain preliminary blood work which showed hemoglobin 6 9 and requested she present to ED for transfusion  In the ED, patient also found to have CESAR and hyperkalemia  Per patient's sister, the patient has not been taking potassium supplements nor diuretic for approximately 1 week as instructed by nephrologist, Dr Nicolas Hernandez  The sister has noticed slight increase of the patient's lower extremity edema  The patient has had limited p o  Intake, which is her baseline  The sister denies that the patient has complaining of anything such as dysuria, fever, or chill      In the ED, EKG reviewed  Patient is ordered to receive calcium gluconate and repeat BMP  Also ordered for 1 unit PRBC    Plan of care as above        ED Triage Vitals [02/24/21 1313]   Temperature Pulse Respirations Blood Pressure SpO2   98 2 °F (36 8 °C) 71 15 110/59 98 %      Temp Source Heart Rate Source Patient Position - Orthostatic VS BP Location FiO2 (%)   Oral Monitor Lying Left arm --      Pain Score       No Pain          Wt Readings from Last 1 Encounters:   02/24/21 70 3 kg (154 lb 15 7 oz)     Additional Vital Signs:   02/25/21 00:29:55  97 5 °F (36 4 °C)  70  18  118/67  84  92 %  --  --   02/24/21 23:46:05  97 8 °F (36 6 °C)  70  --  113/68  83  92 %  --  --   02/24/21 22:47:57  97 4 °F (36 3 °C)Abnormal   69  --  106/68  81  93 %  --  --   02/24/21 22:26:01  97 6 °F (36 4 °C)  73  --  110/55  73  90 %  --  --   02/24/21 22:05:37  97 8 °F (36 6 °C)  69  18  118/58  78  91 %  None (Room air)  --   02/24/21 1930  --  --  --  --  --  93 %  None (Room air)  --   02/24/21 18:45:01  98 2 °F (36 8 °C)  68  17  117/57  77  92 %  None (Room air)  Lying   02/24/21 1800  --  66  25Abnormal   104/53  76  92 %  None (Room air)  Lying   02/24/21 1430  --  72  22  129/60  86  --  --  Lying   02/24/21 1347  --  --  --  -- --  --  None (Room air)  --   02/24/21 1345  --  69  22  108/55  78  100 %           Pertinent Labs/Diagnostic Test Results:         Results from last 7 days   Lab Units 02/25/21  0611 02/24/21  1336 02/23/21   WBC Thousand/uL 3 41* 3 24* 2 86*   HEMOGLOBIN g/dL 7 8* 7 0* 6 9*   HEMATOCRIT % 24 8* 23 0* 22 8*   PLATELETS Thousands/uL 44* 45* 45*   BANDS PCT %  --  2 4       Results from last 7 days   Lab Units 02/24/21  1745 02/24/21  1514 02/24/21  1336 02/23/21   SODIUM mmol/L 137 138 136 136   POTASSIUM mmol/L 5 6* 6 4* 5 9* 5 2   CHLORIDE mmol/L 102 102 102 105   CO2 mmol/L 26 26 26 22   ANION GAP mmol/L 9 10 8 9   BUN mg/dL 25 23 25 22   CREATININE mg/dL 1 83* 1 81* 1 80* 1 56*   EGFR ml/min/1 73sq m 31 31 32 37   CALCIUM mg/dL 9 2 9 2 9 2 9 2     Results from last 7 days   Lab Units 02/24/21  1336   AST U/L 36   ALT U/L 19   ALK PHOS U/L 325*   TOTAL PROTEIN g/dL 6 4   ALBUMIN g/dL 2 9*   TOTAL BILIRUBIN mg/dL 1 10*   BILIRUBIN DIRECT mg/dL 0 66*     Results from last 7 days   Lab Units 02/24/21  1745 02/24/21  1514 02/24/21  1336   GLUCOSE RANDOM mg/dL 91 85 85     Results from last 7 days   Lab Units 02/24/21  1336   TROPONIN I ng/mL <0 02     Results from last 7 days   Lab Units 02/24/21  1441   CLARITY UA  Turbid   COLOR UA  Yellow   SPEC GRAV UA  1 020   PH UA  7 0   GLUCOSE UA mg/dl Negative   KETONES UA mg/dl Trace*   BLOOD UA  Trace-Intact*   PROTEIN UA mg/dl 100 (2+)*   NITRITE UA  Negative   BILIRUBIN UA  Negative   UROBILINOGEN UA E U /dl 0 2   LEUKOCYTES UA  Moderate*   WBC UA /hpf 30-50*   RBC UA /hpf 0-1   BACTERIA UA /hpf Innumerable*   EPITHELIAL CELLS WET PREP /hpf Occasional     Results from last 7 days   Lab Units 02/24/21  1336 02/23/21   TOTAL COUNTED  100 100     ED Treatment:   Medication Administration from 02/24/2021 0824 to 02/24/2021 1836       Date/Time Order Dose Route Action Action by Comments     02/24/2021 4932 calcium gluconate 1 g in sodium chloride 0 9% 50 mL (premix) 1 g Intravenous New Bag Gricelda Juarez RN         Past Medical History:   Diagnosis Date    Benign essential hypertension     last assessed - [id number]    Chest pain 11/4/2017    CHF (congestive heart failure) (HCC)     Chronic kidney disease     Cirrhosis ([place] [address] )     Combined systolic and diastolic heart failure ([place] [address] ) 1/18/2021    Elevated troponin 1/31/2020    Gastroesophageal reflux disease without esophagitis 11/16/2017    Hyperbilirubinemia 5/14/2020    Hypertension     Liver disease     Multiple myeloma ([place] [address] )     Pneumonia 5/6/2020    Renal failure 08/29/2020     Present on Admission:   Pancytopenia ([place] [address] )   CESAR (acute kidney injury) ([place] [address] )   Chronic kidney disease (CKD), active medical management without dialysis, stage 4 (severe) ([place] [address] )   Multiple myeloma ([place] [address] )   Hyperkalemia   Dementia       Admitting Diagnosis: Hyperkalemia [E87 5]  Anemia [D64 9]  Abnormal laboratory test [R89 9]  CESAR (acute kidney injury) ([place] [address] ) [N17 9]  Age/Sex: 77 y o  female  Admission Orders:  Scheduled Medications:  diltiazem, 120 mg, Oral, Daily  folic acid, 7,153 mcg, Oral, Daily  lactulose, 10 g, Oral, BID  magnesium oxide, 400 mg, Oral, BID  metoprolol succinate, 100 mg, Oral, Daily  mirtazapine, 15 mg, Oral, HS  pantoprazole, 40 mg, Oral, QAM  vitamin B-1, 100 mg, Oral, Daily      Continuous IV Infusions:     PRN Meds:  acetaminophen, 650 mg, Oral, Q6H PRN  ondansetron, 4 mg, Intravenous, Q6H PRN          Network Utilization Review Department  ATTENTION: Please call with any questions or concerns to [phone] and carefully listen to the prompts so that you are directed to the right person  All voicemails are confidential   Taylor Yoshi all requests for admission clinical reviews, approved or denied determinations and any other requests to dedicated fax number below belonging to the campus where the patient is receiving treatment   List of dedicated fax numbers for the Facilities:  [address] [place] [address] DENIALS (Administrative/Medical Necessity) [phone]   [address] (Maternity/NICU/Pediatrics) [address] [facility],[address] Floor [facility] [address] [facility]  [phone]   Belinda Martinez 5067 (  Ramon Sosa "Daria" 103) [address] [address] [place] [address] [phone]   [place] [address] [phone]

## 2021-02-26 ENCOUNTER — TELEPHONE (OUTPATIENT)
Dept: INTERNAL MEDICINE CLINIC | Facility: CLINIC | Age: 76
End: 2021-02-26

## 2021-02-26 ENCOUNTER — TRANSITIONAL CARE MANAGEMENT (OUTPATIENT)
Dept: INTERNAL MEDICINE CLINIC | Facility: CLINIC | Age: 76
End: 2021-02-26

## 2021-02-26 DIAGNOSIS — G92.8 TOXIC METABOLIC ENCEPHALOPATHY: ICD-10-CM

## 2021-02-26 DIAGNOSIS — F10.27 DEMENTIA ASSOCIATED WITH ALCOHOLISM WITH BEHAVIORAL DISTURBANCE (HCC): Primary | ICD-10-CM

## 2021-02-26 LAB
ATRIAL RATE: 70 BPM
P AXIS: 34 DEGREES
PR INTERVAL: 228 MS
QRS AXIS: 92 DEGREES
QRSD INTERVAL: 70 MS
QT INTERVAL: 406 MS
QTC INTERVAL: 438 MS
T WAVE AXIS: 61 DEGREES
VENTRICULAR RATE: 70 BPM

## 2021-02-26 PROCEDURE — 93010 ELECTROCARDIOGRAM REPORT: CPT | Performed by: INTERNAL MEDICINE

## 2021-02-26 NOTE — TELEPHONE ENCOUNTER
[facility]    She is asking if  can get Palliative care for pt    comfort measures  Lillian Gomez give more options for support in the home    her caregivers are really having to deal with a lot  She said the only Co in this area for that is [facility] fax [phone]  The order can be faxed to them

## 2021-02-26 NOTE — TELEPHONE ENCOUNTER
Vanessa Grover , Nurse for [facility] called to report that José Miguel Frost has a 103 fever and elevated heart rate-- 115 at rest; 120 upon moderate exertion  She is coherent and she just had an extended blood transfusion earlier this week  She doesn't feel that she needs to go back to the ER but would like for you to review and see if there needs to be any changes in her medications  A nurse is scheduled to visit her again on Sunday  Please advise Vanessa Grover at [phone] of any advise for the patient or any changes

## 2021-02-26 NOTE — TELEPHONE ENCOUNTER
The high heart rate is because of the fever  I would not change any of her medications  unsupervised    She should go to the ER

## 2021-03-01 ENCOUNTER — TELEMEDICINE (OUTPATIENT)
Dept: INTERNAL MEDICINE CLINIC | Facility: CLINIC | Age: 76
End: 2021-03-01
Payer: MEDICARE

## 2021-03-01 DIAGNOSIS — F10.11 HISTORY OF ALCOHOL ABUSE: ICD-10-CM

## 2021-03-01 DIAGNOSIS — N18.4 CHRONIC KIDNEY DISEASE (CKD), ACTIVE MEDICAL MANAGEMENT WITHOUT DIALYSIS, STAGE 4 (SEVERE) (HCC): ICD-10-CM

## 2021-03-01 DIAGNOSIS — R82.71 BACTERIA IN URINE: Primary | ICD-10-CM

## 2021-03-01 DIAGNOSIS — C90.01 MULTIPLE MYELOMA IN REMISSION (HCC): ICD-10-CM

## 2021-03-01 DIAGNOSIS — F03.91 DEMENTIA WITH BEHAVIORAL DISTURBANCE, UNSPECIFIED DEMENTIA TYPE (HCC): ICD-10-CM

## 2021-03-01 PROCEDURE — 99214 OFFICE O/P EST MOD 30 MIN: CPT | Performed by: NURSE PRACTITIONER

## 2021-03-01 RX ORDER — FOLIC ACID 1 MG/1
TABLET ORAL
Qty: 90 TABLET | Refills: 1 | Status: SHIPPED | OUTPATIENT
Start: 2021-03-01 | End: 2021-04-28 | Stop reason: HOSPADM

## 2021-03-01 RX ORDER — METHION/INOS/CHOL BT/B COM/LIV 110MG-86MG
CAPSULE ORAL
Qty: 90 EACH | Refills: 1 | Status: SHIPPED | OUTPATIENT
Start: 2021-03-01 | End: 2021-04-28 | Stop reason: HOSPADM

## 2021-03-01 RX ORDER — MAGNESIUM OXIDE 400 MG/1
TABLET ORAL
Qty: 180 TABLET | Refills: 1 | Status: SHIPPED | OUTPATIENT
Start: 2021-03-01 | End: 2021-04-28 | Stop reason: HOSPADM

## 2021-03-01 NOTE — PROGRESS NOTES
Assessment/Plan:      Patient stable overall  Continue with blood transfusions as ordered by hem/onc  Referral for palliative care is ordered if needed  Will recheck hemoglobin and UA later this week  Problem List Items Addressed This Visit        Nervous and Auditory    Dementia ([facility] [address] )       Genitourinary    Chronic kidney disease (CKD), active medical management without dialysis, stage 4 (severe) ([facility] [address] )       Other    Multiple myeloma ([facility] [address] )      Other Visit Diagnoses     Bacteria in urine    -  Primary    Relevant Orders    UA w Reflex to Microscopic w Reflex to Culture -Lab Collect             Reason for visit is hospital follow up from blood transfusion    Encounter provider Ramon Parra [place]       Provider located at [address] [place] [address]      Recent Visits  Date Type Provider Dept   02/26/21 Telephone Mary Cheng MD [address]   02/24/21 Telephone [facility] 61   02/23/21 Telephone Mary Cheng  [facility] recent visits within past 7 days and meeting all other requirements     Today's Visits  Date Type Provider Dept   03/01/21 [facility] [address], [address] today's visits and meeting all other requirements     Future Appointments  No visits were found meeting these conditions  Showing future appointments within next 150 days and meeting all other requirements        After connecting through televideo, the patient was identified by name and date of birth  Nikki Fernandez was informed that this is a telemedicine visit and that the visit is being conducted through [employer] and patient was informed that this is not a secure, HIPAA-compliant platform  She agrees to proceed     My office door was closed  No one else was in the room    She acknowledged consent and understanding of privacy and security of the video platform  The patient has agreed to participate and understands they can discontinue the visit at any time  Patient is aware this is a billable service  Subjective:     Patient ID: Daria Win is a 77 y o  female  Patient admitted to hospital for one day for blood transfusion  Patient had consult with Dr Walter Morales for multiple myeloma diagnosis  Patient is a not a candidate for treatment due to underlying rahel issues  Was informed that she should most likely be referred to palliative care  Patient's sister Pascal Gowers states that the son did not want palliative care and opted for routine blood transfusions  Patient is scheduled every two weeks for infusions  Has an appt with nephrology later this week and will repeat labs prior to this appt  Has follow up with hematology at the end of this month  Patient has been doing well over all  Has more energy since stopping seroquel and since the transfusion  Is taking torsemide daily and wearing compression stockings to help improve edema  Review of Systems   Constitutional: Negative for chills, fatigue and fever  Respiratory: Negative for chest tightness and shortness of breath  Cardiovascular: Positive for leg swelling  Negative for chest pain  Gastrointestinal: Negative for abdominal pain, constipation and diarrhea  Genitourinary: Negative for dysuria  Psychiatric/Behavioral: Negative for sleep disturbance  Objective: There were no vitals filed for this visit  Physical Exam  Constitutional:       General: She is not in acute distress  Appearance: Normal appearance  She is well-developed and well-groomed  She is not ill-appearing  HENT:      Head: Normocephalic and atraumatic  Right Ear: Hearing normal       Left Ear: Hearing normal       Nose: Nose normal    Pulmonary:      Effort: No tachypnea or respiratory distress     Neurological:      Mental Status: She is alert and oriented to person, place, and time  Mental status is at baseline  Psychiatric:         Attention and Perception: Attention normal          Mood and Affect: Mood normal          Speech: Speech normal          Behavior: Behavior normal  Behavior is cooperative  Thought Content: Thought content normal              Transitional Care Management Review:  Ladonna Morris is a 77 y o  female here for TCM follow up  During the TCM phone call patient stated:    TCM Call (since 1/29/2021)     Date and time call was made  2/26/2021 11:57 AM    Hospital care reviewed  Records reviewed    Patient was hospitialized at  [facility]        Date of Admission  02/24/21    Date of discharge  02/25/21    Diagnosis  Pancytopenia    Disposition  Home; Home health services    Current Symptoms  None      TCM Call (since 1/29/2021)     Post hospital issues  None    Scheduled for follow up? Yes (Comment)  APPT ON 03/01/2021 WITH [facility] specialists  Nephrologist; Other (comment)    Nephrologist name  Dr Steph Escalante    Nephrologist contact #  [phone]    Other specialists names  Hematology/Dr Roney Burt    Other specialists contcat #  [phone]    Did you obtain your prescribed medications  No (Comment)  -    Why were you unable to obtain your medications  Nothing prescribed    Do you need help managing your prescriptions or medications  Yes    Why type of assitance do you need  Sister helps her    Is transportation to your appointment needed  Yes    Specify why  Sister helps her    I have advised the patient to call PCP with any new or worsening symptoms  Yariel LICONA      Living Arrangements  Family members    Support System  Family    The type of support provided  Emotional; Financial; Physical    Do you have social support  Yes, as much as I need    Are you recieving any outpatient services  Yes    What type of services  [address]    Are you recieving home care services  Yes    Types of home care services  Nurse visit; Home PT; Other (comment)    Comment  Occupational Therapy    Have you fallen in the last 12 months  No    Interperter language line needed  No    Counseling  Family; Patient    Counseling topics  Importance of RX compliance    Comments  APPT ON 03/01/2021 WITH [facility]          I spent 15 minutes with the patient during this visit      MEREDITH Benson

## 2021-03-01 NOTE — PATIENT INSTRUCTIONS
Blood Transfusion   GENERAL INFORMATION:   What is a blood transfusion? A blood transfusion is a procedure used to give you blood through an IV  The IV is placed into a large vein, usually in your arm  You may get only part of the blood, such as red blood cells, platelets, or plasma  Transfusions are usually done in the hospital or in a transfusion center  Why do I need a blood transfusion? A blood transfusion can help replace blood you lost through illness, injury, or surgery  · You may need a transfusion to increase your blood volume after surgery or hemorrhage (uncontrolled bleeding)  Platelets and plasma may be used during surgery because they help your body stop bleeding  · You may get a transfusion of red blood cells for chronic anemia (lack of red blood cells)  You may get platelets or plasma to treat a condition that affects blood clotting, such as hemophilia  You may need platelets if you had chemotherapy to treat cancer  What do I need to know about blood transfusions? · Donor blood is tested for HIV, hepatitis, syphilis, West Nile virus, and other diseases before transfusion  · Blood transfusions are usually not painful  Caregivers will use the smallest needle possible so the procedure is comfortable  They will also try to find a large vein to use  You may feel some pain or see bruises near the site for a few days after the transfusion  · Most people do not need to make changes to what they eat or to their activities before or after a transfusion  Ask your caregiver if you need to make any changes  · A transfusion can last 1 to 4 hours  Ask your caregiver how long your transfusion should take  · You may need to arrange for transportation home if your transfusion is at a transfusion center  Ask your caregiver if you will be able to drive home  · Ask what you can bring into the transfusion room  You may be able to eat, read, or watch TV during the transfusion   You may also be able to go to the restroom with help from a caregiver  · You may be able to supply your own blood for transfusion during a planned surgery  This is called autologous blood donation  Your blood will need to be drawn and stored a few weeks before the surgery  What happens before a blood transfusion? The caregiver will explain the procedure to you and answer your questions  · Informed consent  is a legal document that explains the tests, treatments, or procedures that you may need  Informed consent means you understand what will be done and can make decisions about what you want  You give your permission when you sign the consent form  You can have someone sign this form for you if you are not able to sign it  You have the right to understand your medical care in words you know  Before you sign the consent form, understand the risks and benefits of what will be done  Make sure all your questions are answered  · Identification:  Caregivers will verify the written order for a transfusion  Two caregivers must check that your name, birth date, and ID number match the blood ordered for you  A caregiver may scan the barcodes on the blood bag and on your ID bracelet to ensure a match  · Blood sample:  Before a planned transfusion, caregivers will take a sample of your blood to learn your blood type and Rh factor  The 4 blood types are O, A, B, and AB  Your Rh factor is either positive or negative  The transfused blood must be the right type and Rh factor for you  You can get sick if your immune system tries to destroy blood that is not right for you  This is called a blood transfusion reaction  Ask your caregiver for more information about blood transfusion reactions  · Medicines: You can have an allergic reaction to the blood, even if it is the right type and Rh factor  Tell the caregiver if you have ever developed a fever, itching, swelling, or hives during a blood transfusion   You may be given antihistamines or fever reducers to help prevent an allergic reaction  · IV:  An IV will be placed in your vein if you do not already have a central catheter  · Vital signs:  Caregivers will check your blood pressure, heart rate, breathing rate, and temperature before the transfusion starts  They will ask if you feel any pain  What happens during a blood transfusion? · Equipment:  The bag that contains blood will hang next to your bed or chair during the transfusion  There will also be a bag that contains saline  Tubing connects the blood and saline bags to your IV  The caregiver will let saline run through the tubing first  He will also run saline through the line in between blood bags if the first empties before the second arrives  · Transfusion starts: The caregiver will open a clamp so the blood can enter your IV  The blood transfusion will start slowly so caregivers can watch for signs of a reaction  Even a small amount of donor blood can cause a reaction  A caregiver will stay with you for at least 15 minutes after the transfusion starts  · Vital signs:  Caregivers will check your vital signs at least once every hour until the transfusion is done  Tell them if you have signs of a reaction, such as pain, nausea, or itching  They will stop the transfusion immediately  If you are not awake or alert, caregivers will check for a drop in blood pressure, blood in your urine, and bleeding  What happens after a blood transfusion? · Assessment:  Caregivers will check your vital signs  You may have blood taken to check that your body accepted the donor blood  You will have to stay a short time after the transfusion ends so caregivers can watch you for signs of a reaction  · Equipment:  The tubing is flushed with saline  Your IV will be removed if you do not need it for other treatment  What are the risks of blood transfusion? · Transfusion side effects may be immediate or days later   Fever, chills, or mild allergic reactions such as itching, hives, or swelling can happen within hours of transfusion  You may develop shortness of breath or other breathing problems, sometimes severe  A very rare allergic reaction called anaphylaxis may cause you to go into shock and stop breathing  Delayed reactions include bruising, tiredness, or weakness that develops within 10 days of transfusion  Delayed reactions may cause mild to severe effects the next time you receive blood  · If you do not have a blood transfusion, your condition may worsen, or it may take longer than expected to recover  Refusal of a blood transfusion in an emergency can be life-threatening  Ask your caregiver about other treatment options if you are unsure about blood transfusion  When should I contact my caregiver? Contact your caregiver if:  · You have questions or concerns about blood transfusions  When should I seek immediate care? Seek care immediately or call 911 if:  · You develop a high fever and chills  · You feel dizzy or faint  · You cannot urinate, or you urinate very little  · You develop headaches or double vision  · You have a seizure  · You have chest pain or feel short of breath  · You feel tired and weak  · You see pinpoint purple spots or purple patches on your body  · Your skin or eyes look yellow  CARE AGREEMENT:   You have the right to help plan your care  Learn about your health condition and how it may be treated  Discuss treatment options with your caregivers to decide what care you want to receive  You always have the right to refuse treatment  The above information is an  only  It is not intended as medical advice for individual conditions or treatments  Talk to your doctor, nurse or pharmacist before following any medical regimen to see if it is safe and effective for you    © 2014 [employer] is for End User's use only and may not be sold, redistributed or otherwise used for commercial purposes  All illustrations and images included in CareNotes® are the copyrighted property of A D A M [employer] Inc  or Ollie Spears

## 2021-03-04 ENCOUNTER — OFFICE VISIT (OUTPATIENT)
Dept: NEPHROLOGY | Facility: CLINIC | Age: 76
End: 2021-03-04
Payer: MEDICARE

## 2021-03-04 VITALS
BODY MASS INDEX: 28.08 KG/M2 | TEMPERATURE: 97.2 F | SYSTOLIC BLOOD PRESSURE: 108 MMHG | HEIGHT: 62 IN | RESPIRATION RATE: 16 BRPM | HEART RATE: 63 BPM | WEIGHT: 152.6 LBS | DIASTOLIC BLOOD PRESSURE: 58 MMHG

## 2021-03-04 DIAGNOSIS — N18.31 STAGE 3A CHRONIC KIDNEY DISEASE (HCC): ICD-10-CM

## 2021-03-04 DIAGNOSIS — K70.31 ALCOHOLIC CIRRHOSIS OF LIVER WITH ASCITES (HCC): Primary | ICD-10-CM

## 2021-03-04 PROCEDURE — 99215 OFFICE O/P EST HI 40 MIN: CPT | Performed by: INTERNAL MEDICINE

## 2021-03-04 NOTE — PROGRESS NOTES
NEPHROLOGY PROGRESS NOTE    Patient: Lavern Barker               Sex: female          DOA: No admission date for patient encounter  YOB: 1945        Age:  77 y o         LOS: [unfilled]  3/4/2021        BACKGROUND     36-year-old female with past medical history of dementia, alcoholic liver cirrhosis, multiple myeloma, anemia, CHF with diastolic dysfunction, atrial fibrillation who is seen in consultation in-hospital in October 2020  SUBJECTIVE     Patient presents to renal clinic with her sister  Noted labs few weeks ago which revealed severe anemia and hemoglobin of 6 9  Patient received blood transfusion resulting in improvement in symptoms of fatigue  Also noted to have hyperkalemia and which improved  Patient seen by Hem/Onc and recommended no further therapy regarding her myeloma  REVIEW OF SYSTEMS     Review of Systems   Constitutional: Negative  HENT: Negative  Eyes: Negative  Respiratory: Negative  Cardiovascular: Positive for leg swelling  Gastrointestinal: Negative  Endocrine: Negative  Genitourinary: Negative  Musculoskeletal: Negative  Skin: Negative  Allergic/Immunologic: Negative  Neurological: Negative  Hematological: Negative  All other systems reviewed and are negative  OBJECTIVE     Current Weight: Weight - Scale: 69 2 kg (152 lb 9 6 oz)  Vitals:    03/04/21 1408   BP: 108/58   Pulse: 63   Resp: 16   Temp: (!) 97 2 °F (36 2 °C)     Body mass index is 27 91 kg/m²        CURRENT MEDICATIONS       Current Outpatient Medications:     diltiazem (CARDIZEM CD) 120 mg 24 hr capsule, Take 1 capsule (120 mg total) by mouth daily, Disp: 90 capsule, Rfl: 3    folic acid (FOLVITE) 1 mg tablet, TAKE ONE TABLET BY MOUTH EVERY DAY, Disp: 90 tablet, Rfl: 1    lactulose 20 g/30 mL, Take 15 mL (10 g total) by mouth 2 (two) times a day, Disp: 1892 mL, Rfl: 0    magnesium oxide (MAG-OX) 400 mg tablet, TAKE ONE TABLET BY MOUTH TWICE A DAY, Disp: 180 tablet, Rfl: 1    metoprolol succinate (TOPROL-XL) 100 mg 24 hr tablet, Take 1 tablet (100 mg total) by mouth daily, Disp: 30 tablet, Rfl: 0    mirtazapine (REMERON) 15 mg tablet, Take 1 tablet (15 mg total) by mouth daily at bedtime, Disp: 90 tablet, Rfl: 1    nystatin (MYCOSTATIN) powder, Apply topically 2 (two) times a day (Patient taking differently: Apply topically as needed ), Disp: 15 g, Rfl: 0    pantoprazole (PROTONIX) 40 mg tablet, TAKE ONE TABLET BY MOUTH EVERY DAY IN THE MORNING, Disp: 30 tablet, Rfl: 2    sodium chloride (OCEAN) 0 65 % nasal spray, 1 spray into each nostril every hour as needed for congestion, Disp: 30 mL, Rfl: 0    Thiamine HCl (vitamin B-1) 100 MG TABS, TAKE ONE TABLET BY MOUTH EVERY DAY, Disp: 90 each, Rfl: 1    torsemide (DEMADEX) 20 mg tablet, Take 1 tablet (20 mg total) by mouth daily, Disp: 90 tablet, Rfl: 0    nicotine (NICODERM CQ) 14 mg/24hr TD 24 hr patch, Place 1 patch on the skin daily (Patient not taking: Reported on 2/23/2021), Disp: 28 patch, Rfl: 0      PHYSICAL EXAMINATION     Physical Exam  Constitutional:       Appearance: She is well-developed  HENT:      Head: Normocephalic and atraumatic  Eyes:      Pupils: Pupils are equal, round, and reactive to light  Neck:      Musculoskeletal: Neck supple  Cardiovascular:      Rate and Rhythm: Normal rate  Rhythm irregular  Heart sounds: Murmur present  Pulmonary:      Effort: Pulmonary effort is normal    Abdominal:      General: Bowel sounds are normal       Palpations: Abdomen is soft  Musculoskeletal: Normal range of motion  Right lower leg: Edema present  Left lower leg: Edema present  Skin:     General: Skin is warm  Neurological:      Mental Status: She is alert and oriented to person, place, and time             LAB RESULTS     Results from last 6 Months   Lab Units 02/25/21  1117 02/25/21  7082 02/24/21  1745 02/24/21  1514 02/24/21  1336 02/23/21 02/15/21  8348 01/19/21  0526  01/18/21  1421  01/16/21  1149  11/19/20  1452   WBC Thousand/uL  --  3 41*  --   --  3 24* 2 86* 3 17*   < >  --    < >  --    < > 3 43*   < > 3 52*   HEMOGLOBIN g/dL  --  7 8*  --   --  7 0* 6 9* 7 3*   < >  --    < >  --    < > 7 6*   < > 7 8*   HEMATOCRIT %  --  24 8*  --   --  23 0* 22 8* 23 6*   < >  --    < >  --    < > 24 9*   < > 25 9*   PLATELETS Thousands/uL  --  44*  --   --  45* 45* 62*   < >  --    < >  --    < > 78*   < > 80*   POTASSIUM mmol/L 4 8  --  5 6* 6 4* 5 9* 5 2 5 5*   < > 4 6  --  5 0  --  5 7*   < > 4 6   CHLORIDE mmol/L 102  --  102 102 102 105 104   < > 102  --  99*  --  100   < > 106   CO2 mmol/L 26  --  26 26 26 22 23   < > 27  --  27  --  27   < > 30   BUN mg/dL 23  --  25 23 25 22 53*   < > 41*  --  45*  --  47*   < > 24   CREATININE mg/dL 1 59*  --  1 83* 1 81* 1 80* 1 56* 3 23*   < > 2 38*  --  2 60*  --  3 09*   < > 2 13*   CALCIUM mg/dL 9 6  --  9 2 9 2 9 2 9 2 9 2   < > 9 0  --  9 6  --  9 5   < > 9 0   MAGNESIUM mg/dL 1 9  --   --   --   --   --   --   --  1 9  --  2 1  --   --    < >  --    PHOSPHORUS mg/dL  --   --   --   --   --   --  3 9  --   --   --  4 0  --  3 6  --  2 8   IRON ug/dL  --   --   --   --   --   --  210*  --   --   --   --   --  181*  --  180*   FERRITIN ng/mL  --   --   --   --   --   --  4,170*  --   --   --   --   --  3,688*  --  2,654*    < > = values in this interval not displayed  RADIOLOGY RESULTS            Reviewed            ASSESSMENT/PLAN     61-year-old female with past medical history of alcoholic liver cirrhosis, chronic kidney disease stage 3, hypertension, atrial fibrillation, multiple myeloma, CHF with diastolic dysfunction who presents to renal clinic for follow-up  1  Chronic kidney disease stage 3b:  Based on labs obtained serum creatinine noted to be 1 5 mg/dL    -etiology of CKD likely appears to be severe acute kidney injury that occurred in August 2020 likely a combination of CHF exacerbation with diastolic dysfunction along with decompensated liver cirrhosis  -patient remains at risk of worsening renal parameters given significant cardiac dysfunction along with liver cirrhosis  -probable component of multiple myeloma that is untreated causing renal dysfunction exists  Patient is not a dialysis candidate given multiple comorbidities  2  Volume overload: Mildly hypervolemic with resulting lower extremity edema  Will increase torsemide to 20 mg in AM and 10 mg in PM    3  Hyperkalemia: Serum potassium is 4 8 and improved  Likely etiology of hyperkalemia was severe anemia resulting in decreased distal tubule delivery  4   liver cirrhosis:  Compensated  On lactulose  Will refer to GI due to having h/o decompensated cirrhosis  5  Secondary hyperparathyroidism of renal origin:  Insufficient vitamin D levels  On OTC vitamin D 3 1000 u daily  Mildly elevated calcium levels corrected at 10 2      6  Anemia due to CKD:  Multifactorial with most recent hemoglobin of 7 8 likely secondary to CKD along with having underlying multiple myeloma which is untreated  Patient to get biweekly blood transfusion as per Hem/Onc    7  CHF with diastolic dysfunction:  Patient with grade 2 diastolic dysfunction as noted on echo  Increase diuretic dose to 20 + 10 mg     8  Multiple myeloma:  Diagnosed in 2020 and under the care of [facility]  9  Atrial fibrillation:  Rate controlled on Cardizem and metoprolol  F/u in 3 5 months                    James Evangelista MD  Nephrology  3/4/2021

## 2021-03-04 NOTE — LETTER
March 4, 2021     Criss Jacobsen MD  [address] [place] [address]    Patient: Munir Dempsey   YOB: 1945   Date of Visit: 3/4/2021       Dear Dr Mile Arnett:    Thank you for referring Munir Dempsey to me for evaluation  Below are my notes for this consultation  If you have questions, please do not hesitate to call me  I look forward to following your patient along with you  Sincerely,        Og Martinez MD        CC: No Recipients  Og Martinez MD  3/4/2021  2:54 PM  Incomplete  NEPHROLOGY PROGRESS NOTE    Patient: Munir Dempsey               Sex: female          DOA: No admission date for patient encounter  YOB: 1945        Age:  77 y o         LOS: [unfilled]  3/4/2021        BACKGROUND     67-year-old female with past medical history of dementia, alcoholic liver cirrhosis, multiple myeloma, anemia, CHF with diastolic dysfunction, atrial fibrillation who is seen in consultation in-hospital in October 2020  SUBJECTIVE     Patient presents to renal clinic with her sister  Noted labs few weeks ago which revealed severe anemia and hemoglobin of 6 9  Patient received blood transfusion resulting in improvement in symptoms of fatigue  Also noted to have hyperkalemia and which improved  Patient seen by Hem/Onc and recommended no further therapy regarding her myeloma  REVIEW OF SYSTEMS     Review of Systems   Constitutional: Negative  HENT: Negative  Eyes: Negative  Respiratory: Negative  Cardiovascular: Positive for leg swelling  Gastrointestinal: Negative  Endocrine: Negative  Genitourinary: Negative  Musculoskeletal: Negative  Skin: Negative  Allergic/Immunologic: Negative  Neurological: Negative  Hematological: Negative  All other systems reviewed and are negative        OBJECTIVE     Current Weight: Weight - Scale: 69 2 kg (152 lb 9 6 oz)  Vitals:    03/04/21 1408   BP: 108/58   Pulse: 63   Resp: 16   Temp: (!) 97 2 °F (36 2 °C)     Body mass index is 27 91 kg/m²  CURRENT MEDICATIONS       Current Outpatient Medications:     diltiazem (CARDIZEM CD) 120 mg 24 hr capsule, Take 1 capsule (120 mg total) by mouth daily, Disp: 90 capsule, Rfl: 3    folic acid (FOLVITE) 1 mg tablet, TAKE ONE TABLET BY MOUTH EVERY DAY, Disp: 90 tablet, Rfl: 1    lactulose 20 g/30 mL, Take 15 mL (10 g total) by mouth 2 (two) times a day, Disp: 1892 mL, Rfl: 0    magnesium oxide (MAG-OX) 400 mg tablet, TAKE ONE TABLET BY MOUTH TWICE A DAY, Disp: 180 tablet, Rfl: 1    metoprolol succinate (TOPROL-XL) 100 mg 24 hr tablet, Take 1 tablet (100 mg total) by mouth daily, Disp: 30 tablet, Rfl: 0    mirtazapine (REMERON) 15 mg tablet, Take 1 tablet (15 mg total) by mouth daily at bedtime, Disp: 90 tablet, Rfl: 1    nystatin (MYCOSTATIN) powder, Apply topically 2 (two) times a day (Patient taking differently: Apply topically as needed ), Disp: 15 g, Rfl: 0    pantoprazole (PROTONIX) 40 mg tablet, TAKE ONE TABLET BY MOUTH EVERY DAY IN THE MORNING, Disp: 30 tablet, Rfl: 2    sodium chloride (OCEAN) 0 65 % nasal spray, 1 spray into each nostril every hour as needed for congestion, Disp: 30 mL, Rfl: 0    Thiamine HCl (vitamin B-1) 100 MG TABS, TAKE ONE TABLET BY MOUTH EVERY DAY, Disp: 90 each, Rfl: 1    torsemide (DEMADEX) 20 mg tablet, Take 1 tablet (20 mg total) by mouth daily, Disp: 90 tablet, Rfl: 0    nicotine (NICODERM CQ) 14 mg/24hr TD 24 hr patch, Place 1 patch on the skin daily (Patient not taking: Reported on 2/23/2021), Disp: 28 patch, Rfl: 0      PHYSICAL EXAMINATION     Physical Exam  Constitutional:       Appearance: She is well-developed  HENT:      Head: Normocephalic and atraumatic  Eyes:      Pupils: Pupils are equal, round, and reactive to light  Neck:      Musculoskeletal: Neck supple  Cardiovascular:      Rate and Rhythm: Normal rate  Rhythm irregular  Heart sounds: Murmur present     Pulmonary:      Effort: Pulmonary effort is normal    Abdominal:      General: Bowel sounds are normal       Palpations: Abdomen is soft  Musculoskeletal: Normal range of motion  Right lower leg: Edema present  Left lower leg: Edema present  Skin:     General: Skin is warm  Neurological:      Mental Status: She is alert and oriented to person, place, and time  LAB RESULTS     Results from last 6 Months   Lab Units 02/25/21  1117 02/25/21  0611 02/24/21  1745 02/24/21  1514 02/24/21  1336 02/23/21 02/15/21  1548  01/19/21  0526  01/18/21  1421  01/16/21  1149  11/19/20  1452   WBC Thousand/uL  --  3 41*  --   --  3 24* 2 86* 3 17*   < >  --    < >  --    < > 3 43*   < > 3 52*   HEMOGLOBIN g/dL  --  7 8*  --   --  7 0* 6 9* 7 3*   < >  --    < >  --    < > 7 6*   < > 7 8*   HEMATOCRIT %  --  24 8*  --   --  23 0* 22 8* 23 6*   < >  --    < >  --    < > 24 9*   < > 25 9*   PLATELETS Thousands/uL  --  44*  --   --  45* 45* 62*   < >  --    < >  --    < > 78*   < > 80*   POTASSIUM mmol/L 4 8  --  5 6* 6 4* 5 9* 5 2 5 5*   < > 4 6  --  5 0  --  5 7*   < > 4 6   CHLORIDE mmol/L 102  --  102 102 102 105 104   < > 102  --  99*  --  100   < > 106   CO2 mmol/L 26  --  26 26 26 22 23   < > 27  --  27  --  27   < > 30   BUN mg/dL 23  --  25 23 25 22 53*   < > 41*  --  45*  --  47*   < > 24   CREATININE mg/dL 1 59*  --  1 83* 1 81* 1 80* 1 56* 3 23*   < > 2 38*  --  2 60*  --  3 09*   < > 2 13*   CALCIUM mg/dL 9 6  --  9 2 9 2 9 2 9 2 9 2   < > 9 0  --  9 6  --  9 5   < > 9 0   MAGNESIUM mg/dL 1 9  --   --   --   --   --   --   --  1 9  --  2 1  --   --    < >  --    PHOSPHORUS mg/dL  --   --   --   --   --   --  3 9  --   --   --  4 0  --  3 6  --  2 8   IRON ug/dL  --   --   --   --   --   --  210*  --   --   --   --   --  181*  --  180*   FERRITIN ng/mL  --   --   --   --   --   --  4,170*  --   --   --   --   --  3,688*  --  2,654*    < > = values in this interval not displayed               RADIOLOGY RESULTS Reviewed            ASSESSMENT/PLAN     71-year-old female with past medical history of alcoholic liver cirrhosis, chronic kidney disease stage 3, hypertension, atrial fibrillation, multiple myeloma, CHF with diastolic dysfunction who presents to renal clinic for follow-up  1  Chronic kidney disease stage 3b:  Based on labs obtained serum creatinine noted to be 1 5 mg/dL  -etiology of CKD likely appears to be severe acute kidney injury that occurred in August 2020 likely a combination of CHF exacerbation with diastolic dysfunction along with decompensated liver cirrhosis  -patient remains at risk of worsening renal parameters given significant cardiac dysfunction along with liver cirrhosis  -probable component of multiple myeloma that is untreated causing renal dysfunction exists  Patient is not a dialysis candidate given multiple comorbidities  2  Volume overload: Mildly hypervolemic with resulting lower extremity edema  Will increase torsemide to 20 mg in AM and 10 mg in PM    3  Hyperkalemia: Serum potassium is 4 8 and improved  Likely etiology of hyperkalemia was severe anemia resulting in decreased distal tubule delivery  4   liver cirrhosis:  Compensated  On lactulose  Will refer to GI due to having h/o decompensated cirrhosis  5  Secondary hyperparathyroidism of renal origin:  Insufficient vitamin D levels  On OTC vitamin D 3 1000 u daily  Mildly elevated calcium levels corrected at 10 2      6  Anemia due to CKD:  Multifactorial with most recent hemoglobin of 7 8 likely secondary to CKD along with having underlying multiple myeloma which is untreated  Patient to get biweekly blood transfusion as per Hem/Onc    7  CHF with diastolic dysfunction:  Patient with grade 2 diastolic dysfunction as noted on echo  Increase diuretic dose to 20 + 10 mg     8  Multiple myeloma:  Diagnosed in 2020 and under the care of [facility]      9  Atrial fibrillation:  Rate controlled on Cardizem and metoprolol  F/u in 3 5 months                    Summer Reyes MD  Nephrology  3/4/2021

## 2021-03-05 ENCOUNTER — TELEPHONE (OUTPATIENT)
Dept: GASTROENTEROLOGY | Facility: CLINIC | Age: 76
End: 2021-03-05

## 2021-03-05 DIAGNOSIS — K70.30 ALCOHOLIC CIRRHOSIS OF LIVER WITHOUT ASCITES (HCC): Primary | ICD-10-CM

## 2021-03-05 NOTE — TELEPHONE ENCOUNTER
Apoorva Singh - patient OV 03/17/21 with Apoorva Singh  Does she need to have BW before OV   Please call Kalpesh Zimmerman at [phone] ty

## 2021-03-09 ENCOUNTER — LAB (OUTPATIENT)
Dept: LAB | Facility: HOSPITAL | Age: 76
End: 2021-03-09
Attending: INTERNAL MEDICINE
Payer: MEDICARE

## 2021-03-09 DIAGNOSIS — K70.30 ALCOHOLIC CIRRHOSIS OF LIVER WITHOUT ASCITES (HCC): ICD-10-CM

## 2021-03-09 DIAGNOSIS — Z23 ENCOUNTER FOR IMMUNIZATION: ICD-10-CM

## 2021-03-09 DIAGNOSIS — C90.01 MULTIPLE MYELOMA IN REMISSION (HCC): ICD-10-CM

## 2021-03-09 LAB
25(OH)D3 SERPL-MCNC: 24.8 NG/ML (ref 30–100)
ABO GROUP BLD: NORMAL
AFP-TM SERPL-MCNC: 2.8 NG/ML (ref 0.5–8)
ALBUMIN SERPL BCP-MCNC: 3.1 G/DL (ref 3.5–5)
ALP SERPL-CCNC: 320 U/L (ref 46–116)
ALT SERPL W P-5'-P-CCNC: 18 U/L (ref 12–78)
ANION GAP SERPL CALCULATED.3IONS-SCNC: 13 MMOL/L (ref 4–13)
ANISOCYTOSIS BLD QL SMEAR: PRESENT
AST SERPL W P-5'-P-CCNC: 31 U/L (ref 5–45)
BASOPHILS # BLD MANUAL: 0.06 THOUSAND/UL (ref 0–0.1)
BASOPHILS NFR MAR MANUAL: 2 % (ref 0–1)
BILIRUB SERPL-MCNC: 1.32 MG/DL (ref 0.2–1)
BLASTS NFR BLD MANUAL: 1 %
BLD GP AB SCN SERPL QL: NEGATIVE
BUN SERPL-MCNC: 27 MG/DL (ref 5–25)
CALCIUM ALBUM COR SERPL-MCNC: 10.2 MG/DL (ref 8.3–10.1)
CALCIUM SERPL-MCNC: 9.5 MG/DL (ref 8.3–10.1)
CHLORIDE SERPL-SCNC: 104 MMOL/L (ref 100–108)
CO2 SERPL-SCNC: 29 MMOL/L (ref 21–32)
CREAT SERPL-MCNC: 2.03 MG/DL (ref 0.6–1.3)
EOSINOPHIL # BLD MANUAL: 0.03 THOUSAND/UL (ref 0–0.4)
EOSINOPHIL NFR BLD MANUAL: 1 % (ref 0–6)
ERYTHROCYTE [DISTWIDTH] IN BLOOD BY AUTOMATED COUNT: 21.4 % (ref 11.6–15.1)
GFR SERPL CREATININE-BSD FRML MDRD: 27 ML/MIN/1.73SQ M
GLUCOSE P FAST SERPL-MCNC: 116 MG/DL (ref 65–99)
HCT VFR BLD AUTO: 24.7 % (ref 34.8–46.1)
HGB BLD-MCNC: 7.3 G/DL (ref 11.5–15.4)
HYPERCHROMIA BLD QL SMEAR: PRESENT
INR PPP: 1.14 (ref 0.84–1.19)
LYMPHOCYTES # BLD AUTO: 1.29 THOUSAND/UL (ref 0.6–4.47)
LYMPHOCYTES # BLD AUTO: 42 % (ref 14–44)
MCH RBC QN AUTO: 27.2 PG (ref 26.8–34.3)
MCHC RBC AUTO-ENTMCNC: 29.6 G/DL (ref 31.4–37.4)
MCV RBC AUTO: 92 FL (ref 82–98)
METAMYELOCYTES NFR BLD MANUAL: 1 % (ref 0–1)
MONOCYTES # BLD AUTO: 0.43 THOUSAND/UL (ref 0–1.22)
MONOCYTES NFR BLD: 14 % (ref 4–12)
MYELOCYTES NFR BLD MANUAL: 2 % (ref 0–1)
NEUTROPHILS # BLD MANUAL: 1.07 THOUSAND/UL (ref 1.85–7.62)
NEUTS SEG NFR BLD AUTO: 35 % (ref 43–75)
NRBC BLD AUTO-RTO: 11 /100 WBC (ref 0–2)
NRBC BLD AUTO-RTO: 8 /100 WBCS
PHOSPHATE SERPL-MCNC: 3.8 MG/DL (ref 2.3–4.1)
PLATELET # BLD AUTO: 51 THOUSANDS/UL (ref 149–390)
PLATELET BLD QL SMEAR: ABNORMAL
PMV BLD AUTO: 9.4 FL (ref 8.9–12.7)
POTASSIUM SERPL-SCNC: 4.2 MMOL/L (ref 3.5–5.3)
PROT SERPL-MCNC: 6.4 G/DL (ref 6.4–8.2)
PROTHROMBIN TIME: 14.8 SECONDS (ref 11.6–14.5)
PTH-INTACT SERPL-MCNC: 137.6 PG/ML (ref 18.4–80.1)
RBC # BLD AUTO: 2.68 MILLION/UL (ref 3.81–5.12)
RH BLD: POSITIVE
SODIUM SERPL-SCNC: 146 MMOL/L (ref 136–145)
SPECIMEN EXPIRATION DATE: NORMAL
TOTAL CELLS COUNTED SPEC: 100
VARIANT LYMPHS # BLD AUTO: 2 %
WBC # BLD AUTO: 3.06 THOUSAND/UL (ref 4.31–10.16)

## 2021-03-09 PROCEDURE — 82306 VITAMIN D 25 HYDROXY: CPT | Performed by: INTERNAL MEDICINE

## 2021-03-09 PROCEDURE — 86901 BLOOD TYPING SEROLOGIC RH(D): CPT

## 2021-03-09 PROCEDURE — 86923 COMPATIBILITY TEST ELECTRIC: CPT

## 2021-03-09 PROCEDURE — 85007 BL SMEAR W/DIFF WBC COUNT: CPT | Performed by: INTERNAL MEDICINE

## 2021-03-09 PROCEDURE — 82105 ALPHA-FETOPROTEIN SERUM: CPT

## 2021-03-09 PROCEDURE — 85610 PROTHROMBIN TIME: CPT

## 2021-03-09 PROCEDURE — 36415 COLL VENOUS BLD VENIPUNCTURE: CPT | Performed by: INTERNAL MEDICINE

## 2021-03-09 PROCEDURE — 86900 BLOOD TYPING SEROLOGIC ABO: CPT

## 2021-03-09 PROCEDURE — 85027 COMPLETE CBC AUTOMATED: CPT | Performed by: INTERNAL MEDICINE

## 2021-03-09 PROCEDURE — 80053 COMPREHEN METABOLIC PANEL: CPT | Performed by: INTERNAL MEDICINE

## 2021-03-09 PROCEDURE — 83970 ASSAY OF PARATHORMONE: CPT | Performed by: INTERNAL MEDICINE

## 2021-03-09 PROCEDURE — 86850 RBC ANTIBODY SCREEN: CPT

## 2021-03-09 PROCEDURE — 84100 ASSAY OF PHOSPHORUS: CPT | Performed by: INTERNAL MEDICINE

## 2021-03-09 RX ORDER — SODIUM CHLORIDE 9 MG/ML
20 INJECTION, SOLUTION INTRAVENOUS ONCE
Status: CANCELLED | OUTPATIENT
Start: 2021-03-24

## 2021-03-10 ENCOUNTER — HOSPITAL ENCOUNTER (OUTPATIENT)
Dept: INFUSION CENTER | Facility: CLINIC | Age: 76
Discharge: HOME | End: 2021-03-10
Payer: MEDICARE

## 2021-03-10 ENCOUNTER — PATIENT OUTREACH (OUTPATIENT)
Dept: INTERNAL MEDICINE CLINIC | Facility: CLINIC | Age: 76
End: 2021-03-10

## 2021-03-10 VITALS
SYSTOLIC BLOOD PRESSURE: 107 MMHG | HEART RATE: 81 BPM | TEMPERATURE: 96.8 F | RESPIRATION RATE: 18 BRPM | DIASTOLIC BLOOD PRESSURE: 56 MMHG

## 2021-03-10 DIAGNOSIS — C90.01 MULTIPLE MYELOMA IN REMISSION (HCC): Primary | ICD-10-CM

## 2021-03-10 PROCEDURE — 36430 TRANSFUSION BLD/BLD COMPNT: CPT

## 2021-03-10 PROCEDURE — P9016 RBC LEUKOCYTES REDUCED: HCPCS

## 2021-03-10 RX ORDER — SODIUM CHLORIDE 9 MG/ML
20 INJECTION, SOLUTION INTRAVENOUS ONCE
Status: COMPLETED | OUTPATIENT
Start: 2021-03-10 | End: 2021-03-10

## 2021-03-10 RX ADMIN — SODIUM CHLORIDE 20 ML/HR: 0.9 INJECTION, SOLUTION INTRAVENOUS at 13:51

## 2021-03-10 NOTE — PROGRESS NOTES
Pt here for PRBC  She is a hard stick it took multiple attempts to get IV site  Sister Jagdeep Area was with her as pt does have dementia  Left wrist IV placed with positive blood return noted  Tolerated infusion without incident  PIV removed  AVS declined  Wheeled out in stable condition

## 2021-03-10 NOTE — PROGRESS NOTES
Spoke with sister, Daiana Coughlin  She is getting Betsey ready to go to appointment for her infusion  She reports that Lory Srivastava is doing well, "Sleeping and eating a little better"  States that Seroquel was discontinued "three weeks ago and they have noticed a remarkable improvement" in patients disposition and engagement  She added that she did not process waiver documentation and missed the deadline for submission  She states that she now has "all the paperwork and will apply again"  Daiana Coughlin was appreciative for the support from [facility] and   Will continue with monthly outreach

## 2021-03-11 LAB
ABO GROUP BLD BPU: NORMAL
BPU ID: NORMAL
CROSSMATCH: NORMAL
UNIT DISPENSE STATUS: NORMAL
UNIT PRODUCT CODE: NORMAL
UNIT RH: NORMAL

## 2021-03-16 RX ORDER — FUROSEMIDE 80 MG
80 TABLET ORAL DAILY
Status: ON HOLD | COMMUNITY
Start: 2021-03-01 | End: 2021-03-26 | Stop reason: SDUPTHER

## 2021-03-17 ENCOUNTER — OFFICE VISIT (OUTPATIENT)
Dept: GASTROENTEROLOGY | Facility: CLINIC | Age: 76
End: 2021-03-17
Payer: MEDICARE

## 2021-03-17 VITALS
BODY MASS INDEX: 28.26 KG/M2 | DIASTOLIC BLOOD PRESSURE: 58 MMHG | HEART RATE: 62 BPM | HEIGHT: 62 IN | SYSTOLIC BLOOD PRESSURE: 98 MMHG | WEIGHT: 153.6 LBS

## 2021-03-17 DIAGNOSIS — K70.31 ALCOHOLIC CIRRHOSIS OF LIVER WITH ASCITES (HCC): ICD-10-CM

## 2021-03-17 PROCEDURE — 99213 OFFICE O/P EST LOW 20 MIN: CPT | Performed by: PHYSICIAN ASSISTANT

## 2021-03-17 NOTE — PROGRESS NOTES
[facility] - Outpatient Follow-up Note  Lavern Barker 77 y o  female MRN: [id number]  Encounter: [id number]          ASSESSMENT AND PLAN:      1  Alcoholic cirrhosis of liver with ascites ([address] )  Complicated by CKD, multiple myeloma and dementia  She is requiring a blood transfusion every 2 weeks  She is doing ok  Labs are stable  MELD 15  Daniela Class C  Sober since 12/2019    HE: On lactulose 15mL 1-2 times per day; Had been on Xifaxan although it is unclear why this was stopped  Will maintain current regimen for now  Varices: grade 1 on EGD 6/2020 - will update EGD in June of this year  Ascites: not problematic over the past few months; last paracentesis was in December of 2020 without any fluid removed  HCC: Negative CT and AFP in December    Patient will f/u in June   Will ensure labs are UTD, schedule imaging and EGD    ______________________________________________________________________    SUBJECTIVE:  69-year-old female with alcohol-related cirrhosis complicated by chronic kidney disease, multiple myeloma and dementia who presents for routine follow-up  She is accompanied today by her sister who is also her caretaker  The patient is doing okay  Her labs seemed to be stable  She is sober since December of 2019  Although problematic in the past ascites has not been a big issue for her recently  She does not complain of bloating or abdominal distention  She has no abdominal pain  Her last paracentesis was scheduled for December of 2020 at which time no fluid was removed  Patient has not had any rectal bleeding, melena or hematemesis  She is tolerating a regular diet  Her sister does sure that she maintains a low-sodium diet  She is fluid restricted by Nephrology to 1800 mL of liquid a day  She has lower extremity edema but it seems to be improved over the past few weeks  She takes torsemide on a daily basis  Diuretics are maintained by Nephrology    Patient has struggled with encephalopathy in the past   She is on lactulose 15 mL once  A day  Patient's sister believes that she is having 1-2 stools a day  She was previously on Xifaxan although it is unclear as to why this was stopped  The patient's last upper endoscopy was in June of 2020 documenting grade 1 varices  Due her multiple myeloma she has struggled with low red blood cell counts  She is currently requiring a blood transfusion about every 2 weeks  She is followed closely by Hematology  In the past year she has undergone upper endoscopy, colonoscopy and video capsule endoscopy documenting no evidence of GI bleeding  Her last image was in December 2020 which showed a cirrhotic appearing liver without any overt liver lesions  Her alpha fetoprotein was documented as normal at that time  REVIEW OF SYSTEMS IS OTHERWISE NEGATIVE  Historical Information   Past Medical History:   Diagnosis Date    Benign essential hypertension     last assessed - [id number]    Chest pain 11/4/2017    CHF (congestive heart failure) (HCC)     Chronic kidney disease     [address] [address]     Combined systolic and diastolic heart failure ([facility] [address] ) 1/18/2021    Elevated troponin 1/31/2020    Gastroesophageal reflux disease without esophagitis 11/16/2017    Hyperbilirubinemia 5/14/2020    Hypertension     Liver disease     Multiple myeloma ([facility] [address] )     Pneumonia 5/6/2020    Renal failure 08/29/2020     Past Surgical History:   Procedure Laterality Date    APPENDECTOMY      BONE MARROW BIOPSY      IR PARACENTESIS  10/2/2020    IR PARACENTESIS  12/28/2020    IR THORACENTESIS  12/21/2020     Social History   Social History     Substance and Sexual Activity   Alcohol Use Not Currently    Frequency: 4 or more times a week    Drinks per session: 1 or 2    Binge frequency: Never    Comment: History of significant daily alcohol intake   Sister joy no alcohol intake in 6 months     Social History     Substance and Sexual Activity   Drug Use No     Social History     Tobacco Use   Smoking Status Light Tobacco Smoker    Packs/day: 0 25    Types: Cigarettes   Smokeless Tobacco Never Used   Tobacco Comment    2-3 a day     Family History   Problem Relation Age of Onset    Heart attack Father         myocardial infarction    Heart disease Father        Meds/Allergies       Current Outpatient Medications:     diltiazem (CARDIZEM CD) 120 mg 24 hr capsule    folic acid (FOLVITE) 1 mg tablet    furosemide (LASIX) 80 mg tablet    lactulose 20 g/30 mL    magnesium oxide (MAG-OX) 400 mg tablet    metoprolol succinate (TOPROL-XL) 100 mg 24 hr tablet    mirtazapine (REMERON) 15 mg tablet    pantoprazole (PROTONIX) 40 mg tablet    Thiamine HCl (vitamin B-1) 100 MG TABS    torsemide (DEMADEX) 20 mg tablet    nicotine (NICODERM CQ) 14 mg/24hr TD 24 hr patch    nystatin (MYCOSTATIN) powder    sodium chloride (OCEAN) 0 65 % nasal spray    No Known Allergies        Objective     Blood pressure 98/58, pulse 62, height 5' 2" (1 575 m), weight 69 7 kg (153 lb 9 6 oz), not currently breastfeeding  Body mass index is 28 09 kg/m²  PHYSICAL EXAM:      General Appearance:   Alert, cooperative, no distress   HEENT:   Normocephalic, atraumatic, anicteric      Neck:  Supple, symmetrical, trachea midline   Lungs:   Clear to auscultation bilaterally; no rales, rhonchi or wheezing; respirations unlabored    Heart[de-identified]   Regular rate and rhythm; no murmur, rub, or gallop  Abdomen:   Soft, non-tender, non-distended; normal bowel sounds; no masses, no organomegaly    Genitalia:   Deferred    Rectal:   Deferred    Extremities:  No cyanosis, clubbing or edema    Pulses:  2+ and symmetric    Skin:  No jaundice, rashes, or lesions    Lymph nodes:  No palpable cervical lymphadenopathy        Lab Results:   No visits with results within 1 Day(s) from this visit     Latest known visit with results is:   Hospital Outpatient Visit on 03/10/2021   Component Date Value    Unit Product Code 03/11/2021 [id number]     Unit Number 03/11/2021 [id number]     Unit ABO 03/11/2021 A     Unit [id number] 03/11/2021 POS     Crossmatch 03/11/2021 Compatible     Unit Dispense Status 03/11/2021 Presumed Trans          Radiology Results:   No results found

## 2021-03-22 ENCOUNTER — APPOINTMENT (OUTPATIENT)
Dept: LAB | Facility: HOSPITAL | Age: 76
End: 2021-03-22
Payer: MEDICARE

## 2021-03-22 DIAGNOSIS — C90.01 MULTIPLE MYELOMA IN REMISSION (HCC): ICD-10-CM

## 2021-03-22 LAB
ABO GROUP BLD: NORMAL
BLD GP AB SCN SERPL QL: NEGATIVE
RH BLD: POSITIVE
SPECIMEN EXPIRATION DATE: NORMAL

## 2021-03-22 PROCEDURE — 86900 BLOOD TYPING SEROLOGIC ABO: CPT

## 2021-03-22 PROCEDURE — 86850 RBC ANTIBODY SCREEN: CPT

## 2021-03-22 PROCEDURE — 86901 BLOOD TYPING SEROLOGIC RH(D): CPT

## 2021-03-22 PROCEDURE — 86920 COMPATIBILITY TEST SPIN: CPT

## 2021-03-23 RX ORDER — SODIUM CHLORIDE 9 MG/ML
20 INJECTION, SOLUTION INTRAVENOUS ONCE
Status: CANCELLED | OUTPATIENT
Start: 2021-03-24

## 2021-03-24 ENCOUNTER — APPOINTMENT (EMERGENCY)
Dept: CT IMAGING | Facility: HOSPITAL | Age: 76
End: 2021-03-24
Payer: MEDICARE

## 2021-03-24 ENCOUNTER — HOSPITAL ENCOUNTER (OUTPATIENT)
Facility: HOSPITAL | Age: 76
Setting detail: OBSERVATION
Discharge: HOME HEALTH | End: 2021-03-26
Attending: EMERGENCY MEDICINE | Admitting: INTERNAL MEDICINE
Payer: MEDICARE

## 2021-03-24 ENCOUNTER — HOSPITAL ENCOUNTER (OUTPATIENT)
Dept: INFUSION CENTER | Facility: CLINIC | Age: 76
Discharge: HOME | End: 2021-03-24
Payer: MEDICARE

## 2021-03-24 ENCOUNTER — TELEPHONE (OUTPATIENT)
Dept: HEMATOLOGY ONCOLOGY | Facility: CLINIC | Age: 76
End: 2021-03-24

## 2021-03-24 ENCOUNTER — APPOINTMENT (EMERGENCY)
Dept: RADIOLOGY | Facility: HOSPITAL | Age: 76
End: 2021-03-24
Payer: MEDICARE

## 2021-03-24 VITALS
RESPIRATION RATE: 16 BRPM | TEMPERATURE: 97.1 F | DIASTOLIC BLOOD PRESSURE: 54 MMHG | HEART RATE: 114 BPM | SYSTOLIC BLOOD PRESSURE: 88 MMHG

## 2021-03-24 DIAGNOSIS — N18.4 CHRONIC KIDNEY DISEASE (CKD), ACTIVE MEDICAL MANAGEMENT WITHOUT DIALYSIS, STAGE 4 (SEVERE) (HCC): ICD-10-CM

## 2021-03-24 DIAGNOSIS — K70.31 ALCOHOLIC CIRRHOSIS OF LIVER WITH ASCITES (HCC): ICD-10-CM

## 2021-03-24 DIAGNOSIS — I48.0 PAROXYSMAL ATRIAL FIBRILLATION (HCC): ICD-10-CM

## 2021-03-24 DIAGNOSIS — E86.0 DEHYDRATION: Primary | ICD-10-CM

## 2021-03-24 DIAGNOSIS — C90.01 MULTIPLE MYELOMA IN REMISSION (HCC): ICD-10-CM

## 2021-03-24 DIAGNOSIS — C90.01 MULTIPLE MYELOMA IN REMISSION (HCC): Primary | ICD-10-CM

## 2021-03-24 DIAGNOSIS — R41.82 ALTERED MENTAL STATUS, UNSPECIFIED: ICD-10-CM

## 2021-03-24 DIAGNOSIS — I95.9 HYPOTENSION: ICD-10-CM

## 2021-03-24 DIAGNOSIS — S22.49XA RIB FRACTURES: ICD-10-CM

## 2021-03-24 PROBLEM — S22.39XA RIB FRACTURE: Status: ACTIVE | Noted: 2021-03-24

## 2021-03-24 LAB
ALBUMIN SERPL BCP-MCNC: 3.1 G/DL (ref 3.5–5)
ALP SERPL-CCNC: 261 U/L (ref 46–116)
ALT SERPL W P-5'-P-CCNC: 17 U/L (ref 12–78)
AMMONIA PLAS-SCNC: 106 UMOL/L (ref 11–35)
ANION GAP SERPL CALCULATED.3IONS-SCNC: 10 MMOL/L (ref 4–13)
ANION GAP SERPL CALCULATED.3IONS-SCNC: 12 MMOL/L (ref 4–13)
ANISOCYTOSIS BLD QL SMEAR: PRESENT
APTT PPP: 32 SECONDS (ref 23–37)
AST SERPL W P-5'-P-CCNC: 51 U/L (ref 5–45)
BASE EX.OXY STD BLDV CALC-SCNC: 83.2 % (ref 60–80)
BASE EXCESS BLDV CALC-SCNC: -3.7 MMOL/L
BASOPHILS # BLD MANUAL: 0.03 THOUSAND/UL (ref 0–0.1)
BASOPHILS NFR MAR MANUAL: 1 % (ref 0–1)
BILIRUB SERPL-MCNC: 2.02 MG/DL (ref 0.2–1)
BUN SERPL-MCNC: 30 MG/DL (ref 5–25)
BUN SERPL-MCNC: 30 MG/DL (ref 5–25)
CALCIUM ALBUM COR SERPL-MCNC: 9.1 MG/DL (ref 8.3–10.1)
CALCIUM SERPL-MCNC: 8.4 MG/DL (ref 8.3–10.1)
CALCIUM SERPL-MCNC: 8.5 MG/DL (ref 8.3–10.1)
CHLORIDE SERPL-SCNC: 102 MMOL/L (ref 100–108)
CHLORIDE SERPL-SCNC: 104 MMOL/L (ref 100–108)
CO2 SERPL-SCNC: 24 MMOL/L (ref 21–32)
CO2 SERPL-SCNC: 24 MMOL/L (ref 21–32)
CREAT SERPL-MCNC: 1.85 MG/DL (ref 0.6–1.3)
CREAT SERPL-MCNC: 1.91 MG/DL (ref 0.6–1.3)
EOSINOPHIL # BLD MANUAL: 0.03 THOUSAND/UL (ref 0–0.4)
EOSINOPHIL NFR BLD MANUAL: 1 % (ref 0–6)
ERYTHROCYTE [DISTWIDTH] IN BLOOD BY AUTOMATED COUNT: 19.4 % (ref 11.6–15.1)
GFR SERPL CREATININE-BSD FRML MDRD: 29 ML/MIN/1.73SQ M
GFR SERPL CREATININE-BSD FRML MDRD: 30 ML/MIN/1.73SQ M
GLUCOSE P FAST SERPL-MCNC: 137 MG/DL (ref 65–99)
GLUCOSE SERPL-MCNC: 112 MG/DL (ref 65–140)
GLUCOSE SERPL-MCNC: 137 MG/DL (ref 65–140)
HCO3 BLDV-SCNC: 20.8 MMOL/L (ref 24–30)
HCT VFR BLD AUTO: 27.6 % (ref 34.8–46.1)
HGB BLD-MCNC: 8.7 G/DL (ref 11.5–15.4)
HYPERCHROMIA BLD QL SMEAR: PRESENT
INR PPP: 1.3 (ref 0.84–1.19)
LACTATE SERPL-SCNC: 1.6 MMOL/L (ref 0.5–2)
LACTATE SERPL-SCNC: 1.8 MMOL/L (ref 0.5–2)
LIPASE SERPL-CCNC: 502 U/L (ref 73–393)
LYMPHOCYTES # BLD AUTO: 0.95 THOUSAND/UL (ref 0.6–4.47)
LYMPHOCYTES # BLD AUTO: 31 % (ref 14–44)
MAGNESIUM SERPL-MCNC: 1.7 MG/DL (ref 1.6–2.6)
MCH RBC QN AUTO: 28.3 PG (ref 26.8–34.3)
MCHC RBC AUTO-ENTMCNC: 31.5 G/DL (ref 31.4–37.4)
MCV RBC AUTO: 90 FL (ref 82–98)
METAMYELOCYTES NFR BLD MANUAL: 2 % (ref 0–1)
MONOCYTES # BLD AUTO: 0.31 THOUSAND/UL (ref 0–1.22)
MONOCYTES NFR BLD: 10 % (ref 4–12)
NEUTROPHILS # BLD MANUAL: 1.69 THOUSAND/UL (ref 1.85–7.62)
NEUTS BAND NFR BLD MANUAL: 3 % (ref 0–8)
NEUTS SEG NFR BLD AUTO: 52 % (ref 43–75)
NRBC BLD AUTO-RTO: 10 /100 WBCS
NRBC BLD AUTO-RTO: 4 /100 WBC (ref 0–2)
NT-PROBNP SERPL-MCNC: 7773 PG/ML
O2 CT BLDV-SCNC: 11.6 ML/DL
PCO2 BLDV: 35 MM HG (ref 42–50)
PH BLDV: 7.39 [PH] (ref 7.3–7.4)
PLATELET # BLD AUTO: 40 THOUSANDS/UL (ref 149–390)
PLATELET BLD QL SMEAR: ABNORMAL
PO2 BLDV: 52.9 MM HG (ref 35–45)
POIKILOCYTOSIS BLD QL SMEAR: PRESENT
POTASSIUM SERPL-SCNC: 3.9 MMOL/L (ref 3.5–5.3)
POTASSIUM SERPL-SCNC: 5.7 MMOL/L (ref 3.5–5.3)
PROT SERPL-MCNC: 6.4 G/DL (ref 6.4–8.2)
PROTHROMBIN TIME: 15.6 SECONDS (ref 11.6–14.5)
RBC # BLD AUTO: 3.07 MILLION/UL (ref 3.81–5.12)
SCHISTOCYTES BLD QL SMEAR: PRESENT
SODIUM SERPL-SCNC: 136 MMOL/L (ref 136–145)
SODIUM SERPL-SCNC: 140 MMOL/L (ref 136–145)
TOTAL CELLS COUNTED SPEC: 100
TROPONIN I SERPL-MCNC: <0.02 NG/ML
WBC # BLD AUTO: 3.08 THOUSAND/UL (ref 4.31–10.16)

## 2021-03-24 PROCEDURE — 82805 BLOOD GASES W/O2 SATURATION: CPT | Performed by: EMERGENCY MEDICINE

## 2021-03-24 PROCEDURE — 84484 ASSAY OF TROPONIN QUANT: CPT | Performed by: EMERGENCY MEDICINE

## 2021-03-24 PROCEDURE — 83605 ASSAY OF LACTIC ACID: CPT | Performed by: EMERGENCY MEDICINE

## 2021-03-24 PROCEDURE — 82140 ASSAY OF AMMONIA: CPT | Performed by: EMERGENCY MEDICINE

## 2021-03-24 PROCEDURE — 96360 HYDRATION IV INFUSION INIT: CPT

## 2021-03-24 PROCEDURE — 93005 ELECTROCARDIOGRAM TRACING: CPT

## 2021-03-24 PROCEDURE — 87040 BLOOD CULTURE FOR BACTERIA: CPT | Performed by: EMERGENCY MEDICINE

## 2021-03-24 PROCEDURE — 99220 PR INITIAL OBSERVATION CARE/DAY 70 MINUTES: CPT | Performed by: INTERNAL MEDICINE

## 2021-03-24 PROCEDURE — 36415 COLL VENOUS BLD VENIPUNCTURE: CPT | Performed by: EMERGENCY MEDICINE

## 2021-03-24 PROCEDURE — P9016 RBC LEUKOCYTES REDUCED: HCPCS

## 2021-03-24 PROCEDURE — 83690 ASSAY OF LIPASE: CPT | Performed by: EMERGENCY MEDICINE

## 2021-03-24 PROCEDURE — 72125 CT NECK SPINE W/O DYE: CPT

## 2021-03-24 PROCEDURE — 85610 PROTHROMBIN TIME: CPT | Performed by: EMERGENCY MEDICINE

## 2021-03-24 PROCEDURE — 70450 CT HEAD/BRAIN W/O DYE: CPT

## 2021-03-24 PROCEDURE — 83735 ASSAY OF MAGNESIUM: CPT | Performed by: EMERGENCY MEDICINE

## 2021-03-24 PROCEDURE — 96361 HYDRATE IV INFUSION ADD-ON: CPT

## 2021-03-24 PROCEDURE — 80053 COMPREHEN METABOLIC PANEL: CPT | Performed by: EMERGENCY MEDICINE

## 2021-03-24 PROCEDURE — 36430 TRANSFUSION BLD/BLD COMPNT: CPT

## 2021-03-24 PROCEDURE — 71045 X-RAY EXAM CHEST 1 VIEW: CPT

## 2021-03-24 PROCEDURE — 83880 ASSAY OF NATRIURETIC PEPTIDE: CPT | Performed by: EMERGENCY MEDICINE

## 2021-03-24 PROCEDURE — 84145 PROCALCITONIN (PCT): CPT | Performed by: EMERGENCY MEDICINE

## 2021-03-24 PROCEDURE — 85027 COMPLETE CBC AUTOMATED: CPT | Performed by: EMERGENCY MEDICINE

## 2021-03-24 PROCEDURE — 80048 BASIC METABOLIC PNL TOTAL CA: CPT | Performed by: EMERGENCY MEDICINE

## 2021-03-24 PROCEDURE — 99285 EMERGENCY DEPT VISIT HI MDM: CPT

## 2021-03-24 PROCEDURE — 85007 BL SMEAR W/DIFF WBC COUNT: CPT | Performed by: EMERGENCY MEDICINE

## 2021-03-24 PROCEDURE — 85730 THROMBOPLASTIN TIME PARTIAL: CPT | Performed by: EMERGENCY MEDICINE

## 2021-03-24 PROCEDURE — 99285 EMERGENCY DEPT VISIT HI MDM: CPT | Performed by: EMERGENCY MEDICINE

## 2021-03-24 RX ORDER — DEXTROSE MONOHYDRATE 25 G/50ML
25 INJECTION, SOLUTION INTRAVENOUS ONCE
Status: DISCONTINUED | OUTPATIENT
Start: 2021-03-24 | End: 2021-03-24

## 2021-03-24 RX ORDER — HEPARIN SODIUM 5000 [USP'U]/ML
5000 INJECTION, SOLUTION INTRAVENOUS; SUBCUTANEOUS EVERY 8 HOURS SCHEDULED
Status: DISCONTINUED | OUTPATIENT
Start: 2021-03-24 | End: 2021-03-24

## 2021-03-24 RX ORDER — ACETAMINOPHEN 325 MG/1
650 TABLET ORAL EVERY 6 HOURS PRN
Status: DISCONTINUED | OUTPATIENT
Start: 2021-03-24 | End: 2021-03-26 | Stop reason: HOSPADM

## 2021-03-24 RX ORDER — FUROSEMIDE 40 MG/1
80 TABLET ORAL DAILY
Status: CANCELLED | OUTPATIENT
Start: 2021-03-25

## 2021-03-24 RX ORDER — MIRTAZAPINE 15 MG/1
15 TABLET, FILM COATED ORAL
Status: DISCONTINUED | OUTPATIENT
Start: 2021-03-24 | End: 2021-03-26 | Stop reason: HOSPADM

## 2021-03-24 RX ORDER — NICOTINE 21 MG/24HR
1 PATCH, TRANSDERMAL 24 HOURS TRANSDERMAL DAILY
Status: DISCONTINUED | OUTPATIENT
Start: 2021-03-25 | End: 2021-03-26 | Stop reason: HOSPADM

## 2021-03-24 RX ORDER — TORSEMIDE 20 MG/1
20 TABLET ORAL DAILY
Status: DISCONTINUED | OUTPATIENT
Start: 2021-03-25 | End: 2021-03-26 | Stop reason: HOSPADM

## 2021-03-24 RX ORDER — TORSEMIDE 10 MG/1
10 TABLET ORAL
Status: DISCONTINUED | OUTPATIENT
Start: 2021-03-24 | End: 2021-03-26 | Stop reason: HOSPADM

## 2021-03-24 RX ORDER — OLANZAPINE 10 MG/1
2.5 INJECTION, POWDER, LYOPHILIZED, FOR SOLUTION INTRAMUSCULAR
Status: DISCONTINUED | OUTPATIENT
Start: 2021-03-24 | End: 2021-03-26 | Stop reason: HOSPADM

## 2021-03-24 RX ORDER — SODIUM CHLORIDE 9 MG/ML
20 INJECTION, SOLUTION INTRAVENOUS ONCE
Status: COMPLETED | OUTPATIENT
Start: 2021-03-24 | End: 2021-03-24

## 2021-03-24 RX ORDER — LACTULOSE 20 G/30ML
10 SOLUTION ORAL 2 TIMES DAILY
Status: DISCONTINUED | OUTPATIENT
Start: 2021-03-24 | End: 2021-03-26 | Stop reason: HOSPADM

## 2021-03-24 RX ORDER — PANTOPRAZOLE SODIUM 40 MG/1
40 TABLET, DELAYED RELEASE ORAL EVERY MORNING
Status: DISCONTINUED | OUTPATIENT
Start: 2021-03-25 | End: 2021-03-26 | Stop reason: HOSPADM

## 2021-03-24 RX ORDER — OLANZAPINE 10 MG/1
10 TABLET, ORALLY DISINTEGRATING ORAL ONCE
Status: COMPLETED | OUTPATIENT
Start: 2021-03-24 | End: 2021-03-24

## 2021-03-24 RX ADMIN — SODIUM CHLORIDE 20 ML/HR: 0.9 INJECTION, SOLUTION INTRAVENOUS at 11:36

## 2021-03-24 RX ADMIN — OLANZAPINE 10 MG: 10 TABLET, ORALLY DISINTEGRATING ORAL at 18:31

## 2021-03-24 RX ADMIN — SODIUM CHLORIDE 500 ML: 0.9 INJECTION, SOLUTION INTRAVENOUS at 16:24

## 2021-03-24 RX ADMIN — MIRTAZAPINE 15 MG: 15 TABLET, FILM COATED ORAL at 22:54

## 2021-03-24 RX ADMIN — LACTULOSE 10 G: 20 SOLUTION ORAL at 22:54

## 2021-03-24 NOTE — TELEPHONE ENCOUNTER
Patient's sister is calling from the ER waiting room  She was sent to the ER from infusion per Varun Choudhury  She has not been brought back yet and patient's sister wants to take her home to clean her up  She is not very happy about that she has not yet been brought back  They are in [facility] ER  Will forward to Dr Bindu Choudhury () [phone] (H)

## 2021-03-24 NOTE — ASSESSMENT & PLAN NOTE
Lab Results   Component Value Date    EGFR 30 03/24/2021    EGFR 27 03/09/2021    EGFR 37 02/25/2021    CREATININE 1 85 (H) 03/24/2021    CREATININE 2 03 (H) 03/09/2021    CREATININE 1 59 (H) 02/25/2021   Baseline creatinine approximately 1 5  A bit elevated from baseline  Continue home meds

## 2021-03-24 NOTE — PROGRESS NOTES
Pts BP was noted to be 83/53, pulse 118  Pt has not had BP meds today  Spoke to [employer], per Dr Chucky Kevin pt is to complete transfusion and then proceed to the ED for further evaulation

## 2021-03-24 NOTE — PROGRESS NOTES
Pt's sister agreeable to take pt to ED for evaluation after completion of blood transfusion  PIV will be maintained for pt to go to ED

## 2021-03-24 NOTE — ED PROVIDER NOTES
History  Chief Complaint   Patient presents with    Hypotension     pt from infusion center, pt received blood today was found to be 39'G systolic, pt has IV established due to being a very difficult stick, pt has completed transfusion for today (1 unit)     77year old female patient presents emergency department for evaluation of hypotension found at the infusion center after she was given blood for anemia of chronic disease  The patient was also found on physical exam to have small open ulcerations in and around her buttocks  Patient is currently tachycardic at 110, she is afebrile at 98 5 will have a septic evaluation done due to concern for her tachycardia, hypotension, open sores  History provided by:  Patient and relative   used: No    Medical Problem - Major  Severity:  Moderate  Onset quality:  Gradual  Timing:  Constant  Progression:  Worsening  Chronicity:  New  Associated symptoms: no abdominal pain, no congestion, no diarrhea, no loss of consciousness and no rash        Prior to Admission Medications   Prescriptions Last Dose Informant Patient Reported? Taking?    Thiamine HCl (vitamin B-1) 100 MG TABS  Family Member No No   Sig: TAKE ONE TABLET BY MOUTH EVERY DAY   diltiazem (CARDIZEM CD) 120 mg 24 hr capsule  Family Member No No   Sig: Take 1 capsule (120 mg total) by mouth daily   folic acid (FOLVITE) 1 mg tablet  Family Member No No   Sig: TAKE ONE TABLET BY MOUTH EVERY DAY   furosemide (LASIX) 80 mg tablet  Family Member Yes No   Sig: Take 80 mg by mouth daily   lactulose 20 g/30 mL  Family Member No No   Sig: Take 15 mL (10 g total) by mouth 2 (two) times a day   magnesium oxide (MAG-OX) 400 mg tablet  Family Member No No   Sig: TAKE ONE TABLET BY MOUTH TWICE A DAY   metoprolol succinate (TOPROL-XL) 100 mg 24 hr tablet  Family Member No No   Sig: Take 1 tablet (100 mg total) by mouth daily   mirtazapine (REMERON) 15 mg tablet  Family Member No No   Sig: Take 1 tablet (15 mg total) by mouth daily at bedtime   nicotine (NICODERM CQ) 14 mg/24hr TD 24 hr patch  Family Member No No   Sig: Place 1 patch on the skin daily   Patient not taking: Reported on 2021   nystatin (MYCOSTATIN) powder  Family Member No No   Sig: Apply topically 2 (two) times a day   Patient not taking: Reported on 3/17/2021   pantoprazole (PROTONIX) 40 mg tablet  Family Member No No   Sig: TAKE ONE TABLET BY MOUTH EVERY DAY IN THE MORNING   sodium chloride (OCEAN) 0 65 % nasal spray  Family Member No No   Si spray into each nostril every hour as needed for congestion   torsemide (DEMADEX) 20 mg tablet  Family Member No No   Sig: Take 1 tablet (20 mg total) by mouth daily      Facility-Administered Medications: None       Past Medical History:   Diagnosis Date    Benign essential hypertension     last assessed - [id number]    Chest pain 2017    CHF (congestive heart failure) (HCC)     Chronic kidney disease     [facility]     Combined systolic and diastolic heart failure ([facility] [facility] ) 2021    Elevated troponin 2020    Gastroesophageal reflux disease without esophagitis 2017    Hyperbilirubinemia 2020    Hypertension     Liver disease     Multiple myeloma ([facility] [facility]     Pneumonia 2020    Renal failure 2020       Past Surgical History:   Procedure Laterality Date    APPENDECTOMY      BONE MARROW BIOPSY      IR PARACENTESIS  10/2/2020    IR PARACENTESIS  2020    IR THORACENTESIS  2020       Family History   Problem Relation Age of Onset    Heart attack Father         myocardial infarction    Heart disease Father      I have reviewed and agree with the history as documented      E-Cigarette/Vaping    E-Cigarette Use Never User     Start Date 1/1/15     Quit Date 1/24/15      E-Cigarette/Vaping Substances    Nicotine No     THC No     CBD No     Flavoring No     Other No     Unknown No      Social History     Tobacco Use    Smoking status: Light Tobacco Smoker     Packs/day: 0 25     Types: Cigarettes    Smokeless tobacco: Never Used    Tobacco comment: 2-3 a day   Substance Use Topics    Alcohol use: Not Currently     Frequency: 4 or more times a week     Drinks per session: 1 or 2     Binge frequency: Never     Comment: History of significant daily alcohol intake  Sister joy no alcohol intake in 6 months    Drug use: No       Review of Systems   HENT: Negative for congestion  Gastrointestinal: Negative for abdominal pain and diarrhea  Skin: Negative for rash  Neurological: Negative for loss of consciousness  All other systems reviewed and are negative  Physical Exam  Physical Exam  Vitals signs and nursing note reviewed  Constitutional:       Appearance: She is well-developed  HENT:      Head: Normocephalic and atraumatic  Right Ear: External ear normal       Left Ear: External ear normal    Eyes:      Conjunctiva/sclera: Conjunctivae normal    Neck:      Thyroid: No thyromegaly  Vascular: No JVD  Trachea: No tracheal deviation  Cardiovascular:      Rate and Rhythm: Normal rate  Pulmonary:      Effort: Pulmonary effort is normal       Breath sounds: Normal breath sounds  No stridor  Abdominal:      General: There is no distension  Palpations: Abdomen is soft  There is no mass  Tenderness: There is no abdominal tenderness  There is no guarding  Hernia: No hernia is present  Musculoskeletal: Normal range of motion  General: No tenderness or deformity  Lymphadenopathy:      Cervical: No cervical adenopathy  Skin:     General: Skin is warm  Coloration: Skin is not pale  Findings: No erythema or rash  Neurological:      Mental Status: She is alert and oriented to person, place, and time     Psychiatric:         Behavior: Behavior normal          Vital Signs  ED Triage Vitals   Temperature Pulse Respirations Blood Pressure SpO2   03/24/21 1429 03/24/21 1429 03/24/21 1429 03/24/21 1429 03/24/21 1429   98 5 °F (36 9 °C) (!) 110 16 97/59 100 %      Temp Source Heart Rate Source Patient Position - Orthostatic VS BP Location FiO2 (%)   03/24/21 2039 03/24/21 1833 03/24/21 1429 03/24/21 1429 --   Oral Monitor Sitting Right arm       Pain Score       03/24/21 2200       No Pain           Vitals:    03/25/21 2320 03/25/21 2324 03/26/21 1140 03/26/21 1543   BP: 146/88 146/88 121/81 150/82   Pulse:    67   Patient Position - Orthostatic VS:             Visual Acuity      ED Medications  Medications   nicotine (NICODERM CQ) 14 mg/24hr TD 24 hr patch 1 patch (1 patch Transdermal Not Given 3/26/21 1139)   acetaminophen (TYLENOL) tablet 650 mg (has no administration in time range)   OLANZapine (ZyPREXA) IM injection 2 5 mg (has no administration in time range)   lactulose oral solution 10 g (10 g Oral Given 3/26/21 1136)   mirtazapine (REMERON) tablet 15 mg (15 mg Oral Given 3/25/21 2150)   torsemide (DEMADEX) tablet 20 mg (20 mg Oral Given 3/26/21 1136)   pantoprazole (PROTONIX) EC tablet 40 mg (40 mg Oral Given 3/26/21 0624)   torsemide (DEMADEX) tablet 10 mg (10 mg Oral Given 3/25/21 1823)   sodium chloride 0 9 % bolus 500 mL (0 mL Intravenous Stopped 3/24/21 1832)   OLANZapine (ZyPREXA ZYDIS) dispersible tablet 10 mg (10 mg Oral Given 3/24/21 1831)       Diagnostic Studies  Results Reviewed     Procedure Component Value Units Date/Time    Blood culture [[id number]] Collected: 03/24/21 1611    Lab Status: Preliminary result Specimen: Blood from Arm, Right Updated: 03/25/21 2001     Blood Culture No Growth at 24 hrs  Blood culture [[id number]] Collected: 03/24/21 1622    Lab Status: Preliminary result Specimen: Blood from Arm, Left Updated: 03/25/21 2001     Blood Culture No Growth at 24 hrs      CBC (With Platelets) [[id number]]  (Abnormal) Collected: 03/25/21 0618    Lab Status: Final result Specimen: Blood from Arm, Left Updated: 03/25/21 0638     WBC 2 95 Thousand/uL      RBC 3 22 Million/uL Hemoglobin 9 2 g/dL      Hematocrit 28 9 %      MCV 90 fL      MCH 28 6 pg      MCHC 31 8 g/dL      RDW 19 9 %      Platelets 37 Thousands/uL      MPV 10 4 fL     Basic metabolic panel [[id number]]  (Abnormal) Collected: 03/25/21 0618    Lab Status: Final result Specimen: Blood from Arm, Left Updated: 03/25/21 0636     Sodium 142 mmol/L      Potassium 3 6 mmol/L      Chloride 107 mmol/L      CO2 23 mmol/L      ANION GAP 12 mmol/L      BUN 31 mg/dL      Creatinine 1 63 mg/dL      Glucose 116 mg/dL      Glucose, Fasting 116 mg/dL      Calcium 8 6 mg/dL      eGFR 36 ml/min/1 73sq m     Narrative:      [place] guidelines for Chronic Kidney Disease (CKD):     Stage 1 with normal or high GFR (GFR > 90 mL/min/1 73 square meters)    Stage 2 Mild CKD (GFR = 60-89 mL/min/1 73 square meters)    Stage 3A Moderate CKD (GFR = 45-59 mL/min/1 73 square meters)    Stage 3B Moderate CKD (GFR = 30-44 mL/min/1 73 square meters)    Stage 4 Severe CKD (GFR = 15-29 mL/min/1 73 square meters)    Stage 5 End Stage CKD (GFR <15 mL/min/1 73 square meters)  Note: GFR calculation is accurate only with a steady state creatinine    Procalcitonin [[id number]]  (Abnormal) Collected: 03/24/21 1921    Lab Status: Final result Specimen: Blood from Arm, Left Updated: 03/25/21 0202     Procalcitonin 0 61 ng/ml     Lactate Blood [[id number]]  (Normal) Collected: 03/24/21 1921    Lab Status: Final result Specimen: Blood from Arm, Left Updated: 03/24/21 1947     LACTIC ACID 1 8 mmol/L     Narrative:      Result may be elevated if tourniquet was used during collection      Ammonia [[id number]]  (Abnormal) Collected: 03/24/21 1921    Lab Status: Final result Specimen: Blood from Arm, Left Updated: 03/24/21 1939     Ammonia 106 umol/L     Basic metabolic panel [[id number]]  (Abnormal) Collected: 03/24/21 1921    Lab Status: Final result Specimen: Blood from Arm, Left Updated: 03/24/21 1938     Sodium 136 mmol/L      Potassium 3 9 mmol/L      Chloride 102 mmol/L      CO2 24 mmol/L      ANION GAP 10 mmol/L      BUN 30 mg/dL      Creatinine 1 91 mg/dL      Glucose 137 mg/dL      Glucose, Fasting 137 mg/dL      Calcium 8 5 mg/dL      eGFR 29 ml/min/1 73sq m     Narrative:      [place] guidelines for Chronic Kidney Disease (CKD):     Stage 1 with normal or high GFR (GFR > 90 mL/min/1 73 square meters)    Stage 2 Mild CKD (GFR = 60-89 mL/min/1 73 square meters)    Stage 3A Moderate CKD (GFR = 45-59 mL/min/1 73 square meters)    Stage 3B Moderate CKD (GFR = 30-44 mL/min/1 73 square meters)    Stage 4 Severe CKD (GFR = 15-29 mL/min/1 73 square meters)    Stage 5 End Stage CKD (GFR <15 mL/min/1 73 square meters)  Note: GFR calculation is accurate only with a steady state creatinine    Blood gas, venous [[id number]]  (Abnormal) Collected: 03/24/21 1623    Lab Status: Final result Specimen: Blood from Arm, Left Updated: 03/24/21 1740     pH, Cam 7 391     pCO2, Cam 35 0 mm Hg      pO2, Cam 52 9 mm Hg      HCO3, Cam 20 8 mmol/L      Base Excess, Cam -3 7 mmol/L      O2 Content, Cam 11 6 ml/dL      O2 HGB, VENOUS 83 2 %     Manual Differential(PHLEBS Do Not Order) [[id number]]  (Abnormal) Collected: 03/24/21 1623    Lab Status: Final result Specimen: Blood from Arm, Left Updated: 03/24/21 1733     Segmented % 52 %      Bands % 3 %      Lymphocytes % 31 %      Monocytes % 10 %      Eosinophils, % 1 %      Basophils % 1 %      Metamyelocytes% 2 %      Absolute Neutrophils 1 69 Thousand/uL      Lymphocytes Absolute 0 95 Thousand/uL      Monocytes Absolute 0 31 Thousand/uL      Eosinophils Absolute 0 03 Thousand/uL      Basophils Absolute 0 03 Thousand/uL      Total Counted 100     nRBC 4 /100 WBC      Anisocytosis Present     Hypochromia Present     Poikilocytes Present     Schistocytes Present     Platelet Estimate Decreased    CBC and differential [[id number]]  (Abnormal) Collected: 03/24/21 1623    Lab Status: Final result Specimen: Blood from Arm, Left Updated: 03/24/21 1733     WBC 3 08 Thousand/uL      RBC 3 07 Million/uL      Hemoglobin 8 7 g/dL      Hematocrit 27 6 %      MCV 90 fL      MCH 28 3 pg      MCHC 31 5 g/dL      RDW 19 4 %      Platelets 40 Thousands/uL      nRBC 10 /100 WBCs     Narrative: This is an appended report  These results have been appended to a previously verified report      B-type natriuretic peptide [[id number]]  (Abnormal) Collected: 03/24/21 1623    Lab Status: Final result Specimen: Blood from Arm, Left Updated: 03/24/21 1656     NT-proBNP 7,773 pg/mL     Comprehensive metabolic panel [[id number]]  (Abnormal) Collected: 03/24/21 1623    Lab Status: Final result Specimen: Blood from Arm, Left Updated: 03/24/21 1656     Sodium 140 mmol/L      Potassium 5 7 mmol/L      Chloride 104 mmol/L      CO2 24 mmol/L      ANION GAP 12 mmol/L      BUN 30 mg/dL      Creatinine 1 85 mg/dL      Glucose 112 mg/dL      Calcium 8 4 mg/dL      Corrected Calcium 9 1 mg/dL      AST 51 U/L      ALT 17 U/L      Alkaline Phosphatase 261 U/L      Total Protein 6 4 g/dL      Albumin 3 1 g/dL      Total Bilirubin 2 02 mg/dL      eGFR 30 ml/min/1 73sq m     Narrative:      [place] guidelines for Chronic Kidney Disease (CKD):     Stage 1 with normal or high GFR (GFR > 90 mL/min/1 73 square meters)    Stage 2 Mild CKD (GFR = 60-89 mL/min/1 73 square meters)    Stage 3A Moderate CKD (GFR = 45-59 mL/min/1 73 square meters)    Stage 3B Moderate CKD (GFR = 30-44 mL/min/1 73 square meters)    Stage 4 Severe CKD (GFR = 15-29 mL/min/1 73 square meters)    Stage 5 End Stage CKD (GFR <15 mL/min/1 73 square meters)  Note: GFR calculation is accurate only with a steady state creatinine    Lipase [[id number]]  (Abnormal) Collected: 03/24/21 1623    Lab Status: Final result Specimen: Blood from Arm, Left Updated: 03/24/21 1656     Lipase 502 u/L     Magnesium [[id number]]  (Normal) Collected: 03/24/21 1623 Lab Status: Final result Specimen: Blood from Arm, Left Updated: 03/24/21 1656     Magnesium 1 7 mg/dL     Troponin I [[id number]]  (Normal) Collected: 03/24/21 1623    Lab Status: Final result Specimen: Blood from Arm, Left Updated: 03/24/21 1651     Troponin I <0 02 ng/mL     Lactate Blood [[id number]]  (Normal) Collected: 03/24/21 1623    Lab Status: Final result Specimen: Blood from Arm, Left Updated: 03/24/21 1649     LACTIC ACID 1 6 mmol/L     Narrative:      Result may be elevated if tourniquet was used during collection  Protime-INR [[id number]]  (Abnormal) Collected: 03/24/21 1623    Lab Status: Final result Specimen: Blood from Arm, Left Updated: 03/24/21 1648     Protime 15 6 seconds      INR 1 30    APTT [[id number]]  (Normal) Collected: 03/24/21 1623    Lab Status: Final result Specimen: Blood from Arm, Left Updated: 03/24/21 1648     PTT 32 seconds                  CT head without contrast   Final Result by Nile Talbot MD (03/24 1812)      No acute intracranial abnormality  Workstation performed: [id number]         CT spine cervical without contrast   Final Result by Nile Talbot MD (03/24 1806)         1  No evidence of acute cervical spine fracture or traumatic malalignment  2   Moderate right pleural effusion  Workstation performed: [id number]         XR chest 1 view portable   Final Result by Alyson Garcia MD (03/24 1707)      Mild pulmonary congestion with moderate chronic right pleural effusion  Mildly displaced fractures of the posterior right and left 6th ribs, new since 1/20/2021          I personally discussed this study with Omari Verdugo on 3/24/2021 at 5:07 PM                          Workstation performed: [id number]                    Procedures  Procedures         ED Course  ED Course as of Mar 26 1615   Wed Mar 24, 2021   1717 With the newly found bilateral 6th rib fractures ct of the head and cervical spine will be done MDM  Number of Diagnoses or Management Options  Altered mental status, unspecified: new and requires workup  Dehydration: new and requires workup  Rib fractures: new and requires workup     Amount and/or Complexity of Data Reviewed  Clinical lab tests: ordered and reviewed  Tests in the radiology section of CPT®: ordered and reviewed  Decide to obtain previous medical records or to obtain history from someone other than the patient: yes  Review and summarize past medical records: yes    Patient Progress  Patient progress: stable      Disposition  Final diagnoses:   Dehydration   Rib fractures   Altered mental status, unspecified     Time reflects when diagnosis was documented in both MDM as applicable and the Disposition within this note     Time User Action Codes Description Comment    3/24/2021  6:38 PM [place] Laci Add [E86 0] Dehydration     3/24/2021  6:38 PM [place] Laci Add [S22 39XA] Rib fractures     3/24/2021  6:39 PM [place] Laci Add [R41 82] Altered mental status, unspecified     3/24/2021 10:43 PM Vick Cassette Add [R64 58] Alcoholic cirrhosis of liver with ascites ([place] [address] )     3/24/2021 10:43 PM Vick Cassette Modify [E10 81] Alcoholic cirrhosis of liver with ascites ([place] [address] )     3/24/2021 10:43 PM Vick Cassette Modify [K20 34] Alcoholic cirrhosis of liver with ascites ([place] [address] )     3/26/2021  8:31 AM Migdalia Lark Add [I48 0] Paroxysmal atrial fibrillation (HCC)/Tachycardia     3/26/2021  8:31 AM Migdalia Lark Modify [I48 0] Paroxysmal atrial fibrillation (HCC)/Tachycardia     3/26/2021  8:32 AM Migdalia Lark Modify [I48 0] Paroxysmal atrial fibrillation (HCC)/Tachycardia     3/26/2021  8:34 AM Migdalia Lark Modify [I48 0] Paroxysmal atrial fibrillation (HCC)/Tachycardia     3/26/2021  8:34 AM Migdalia Lark Add [C90 01] Multiple myeloma in remission ([place] [address] )     3/26/2021  8:34 AM Migdalia Lark Add [N18 4] Chronic kidney disease (CKD), active medical management without dialysis, stage 4 (severe) ([address] )     3/26/2021  8:34 AM Chanel Ram Add [I95 9] Hypotension       ED Disposition     ED Disposition Condition Date/Time Comment    Admit Stable Wed Mar 24, 2021  6:38 PM Case was discussed with Jayna and the patient's admission status was agreed to be Admission Status: observation status to the service of Dr Yee Alcazar           Follow-up Information     Follow up With Specialties Details Why [id number] [facility]  Follow up [address], [address] [facility] [address] [phone] [place] [id number]  [phone]          Discharge Medication List as of 3/26/2021 11:26 AM      CONTINUE these medications which have CHANGED    Details   diltiazem (CARDIZEM CD) 120 mg 24 hr capsule Take 1 capsule (120 mg total) by mouth daily, Starting Fri 3/26/2021, Normal      furosemide (LASIX) 40 mg tablet Take 1 5 tablets (60 mg total) by mouth daily, Starting Fri 3/26/2021, Until Sun 4/25/2021, Normal      metoprolol succinate (TOPROL-XL) 100 mg 24 hr tablet Take 0 5 tablets (50 mg total) by mouth daily, Starting Fri 3/26/2021, Normal         CONTINUE these medications which have NOT CHANGED    Details   folic acid (FOLVITE) 1 mg tablet TAKE ONE TABLET BY MOUTH EVERY DAY, Normal      lactulose 20 g/30 mL Take 15 mL (10 g total) by mouth 2 (two) times a day, Starting Tue 12/29/2020, Normal      magnesium oxide (MAG-OX) 400 mg tablet TAKE ONE TABLET BY MOUTH TWICE A DAY, Normal      mirtazapine (REMERON) 15 mg tablet Take 1 tablet (15 mg total) by mouth daily at bedtime, Starting Thu 1/14/2021, Normal      nicotine (NICODERM CQ) 14 mg/24hr TD 24 hr patch Place 1 patch on the skin daily, Starting Fri 2/7/2020, Normal      nystatin (MYCOSTATIN) powder Apply topically 2 (two) times a day, Starting Thu 10/8/2020, Normal      pantoprazole (PROTONIX) 40 mg tablet TAKE ONE TABLET BY MOUTH EVERY DAY IN THE MORNING, Normal      sodium chloride (OCEAN) 0 65 % nasal spray 1 spray into each nostril every hour as needed for congestion, Starting Sat 9/5/2020, Until Wed 3/10/2021, Normal      Thiamine HCl (vitamin B-1) 100 MG TABS TAKE ONE TABLET BY MOUTH EVERY DAY, Normal         STOP taking these medications       torsemide (DEMADEX) 20 mg tablet Comments:   Reason for Stopping:             Outpatient Discharge Orders   Ambulatory Referral to Home Health   Standing Status: Future Standing Exp   Date: 03/26/22      Discharge Diet     Discharge Diet     Activity as tolerated       PDMP Review     None          ED Provider  Electronically Signed by           Ira Gaytan DO  03/26/21 7039

## 2021-03-24 NOTE — PROGRESS NOTES
Pt tolerated blood transfusion  PIV flushed and remains in L hand to go over to ED  ED charge nurse called and notified  Pt left via wheelchair accompanied by her sister

## 2021-03-24 NOTE — PROGRESS NOTES
Pt presents for 1 unit RBCs accompanied by her sister  Pt has limited sites for peripheral access  PIV established after multiple attempts by 3 different RNs  Vitals stable

## 2021-03-24 NOTE — TELEPHONE ENCOUNTER
Please advise sister we do not recommend that she takes her home, she was sent to the ER for evaluation for hypotension  She can ask a nurse at the ER to assist with patient care

## 2021-03-24 NOTE — ASSESSMENT & PLAN NOTE
Fractures noted to the left and right 6th rib  No recent acute trauma or recent notable falls  Currently without pain  No further intervention necessary at this time

## 2021-03-24 NOTE — ASSESSMENT & PLAN NOTE
Was noted to have transient hypotension a transfusion center  Which has since improved  Continue to monitor

## 2021-03-24 NOTE — H&P
[facility]  H&P- Palmer Allen 1945, 77 y o  female MRN: [id number]  Unit/Bed#: ED 18 Encounter: [id number]  Primary Care Provider: Abbe Ruiz MD   Date and time admitted to hospital: 3/24/2021  3:35 PM    Rib fracture  Assessment & Plan  Fractures noted to the left and right 6th rib  No recent acute trauma or recent notable falls  Currently without pain  No further intervention necessary at this time    Dementia [facility])  Assessment & Plan  Delirium precautions    Chronic kidney disease (CKD), active medical management without dialysis, stage 4 (severe) [facility])  Assessment & Plan  Lab Results   Component Value Date    EGFR 30 03/24/2021    EGFR 27 03/09/2021    EGFR 37 02/25/2021    CREATININE 1 85 (H) 03/24/2021    CREATININE 2 03 (H) 03/09/2021    CREATININE 1 59 (H) 02/25/2021   Baseline creatinine approximately 1 5  A bit elevated from baseline  Continue home meds    Multiple myeloma ([facility] [address] )  Assessment & Plan  Follows with oncology outpatient  Blood counts currently stable  Continue to monitor    Hyperkalemia  Assessment & Plan  In the setting of  CKD  Potassium noted to be 5 7  Will treat  Repeat BMP in a m  * Hypotension  Assessment & Plan  Was noted to have transient hypotension a transfusion center  Which has since improved  Continue to monitor      VTE Prophylaxis: Heparin  / sequential compression device   Code Status: full code  POLST: There is no POLST form on file for this patient (pre-hospital)  Discussion with family: pt    Anticipated Length of Stay:  Patient will be admitted on an Observation basis with an anticipated length of stay of  < 2 midnights  Justification for Hospital Stay:  Hypotension    Total Time for Visit, including Counseling / Coordination of Care: 1 hour  Greater than 50% of this total time spent on direct patient counseling and coordination of care      Chief Complaint:   Hypotension    History of Present Illness:    Palmer Allen is a 77 y o  female who presents with past medical history significant for multiple myeloma, dementia initially presented due to hypotension  Patient was noted to be hypotensive and infusion center with systolic BP noted to be 70  On arrival to the emergency department she no longer had hypotension  She currently has no acute complaints  Denies chest pain, shortness breath, fevers, chills, abdominal pain, nausea, vomiting, constipation, dysuria, cough, numbness, weakness, headaches, or any other symptoms  Review of Systems:    Review of Systems    Past Medical and Surgical History:     Past Medical History:   Diagnosis Date    Benign essential hypertension     last assessed - [id number]    Chest pain 11/4/2017    CHF (congestive heart failure) (HCC)     Chronic kidney disease     Cirrhosis ([facility] [address] )     Combined systolic and diastolic heart failure ([facility] [address] ) 1/18/2021    Elevated troponin 1/31/2020    Gastroesophageal reflux disease without esophagitis 11/16/2017    Hyperbilirubinemia 5/14/2020    Hypertension     Liver disease     Multiple myeloma ([facility] [address] )     Pneumonia 5/6/2020    Renal failure 08/29/2020       Past Surgical History:   Procedure Laterality Date    APPENDECTOMY      BONE MARROW BIOPSY      IR PARACENTESIS  10/2/2020    IR PARACENTESIS  12/28/2020    IR THORACENTESIS  12/21/2020       Meds/Allergies:    Prior to Admission medications    Medication Sig Start Date End Date Taking?  Authorizing Provider   diltiazem (CARDIZEM CD) 120 mg 24 hr capsule Take 1 capsule (120 mg total) by mouth daily 2/24/21   Ed MD Kris   folic acid (FOLVITE) 1 mg tablet TAKE ONE TABLET BY MOUTH EVERY DAY 3/1/21   Tania Astorga PA-C   furosemide (LASIX) 80 mg tablet Take 80 mg by mouth daily 3/1/21   Historical Provider, MD   lactulose 20 g/30 mL Take 15 mL (10 g total) by mouth 2 (two) times a day 12/29/20   Abisai GRIFFIN MD   magnesium oxide (MAG-OX) 400 mg tablet TAKE ONE TABLET BY MOUTH TWICE A DAY 3/1/21 CLARA Smith-C   metoprolol succinate (TOPROL-XL) 100 mg 24 hr tablet Take 1 tablet (100 mg total) by mouth daily 12/30/20   Inocencio GRIFFIN MD   mirtazapine (REMERON) 15 mg tablet Take 1 tablet (15 mg total) by mouth daily at bedtime 1/14/21   Samanta Chavez MD   nicotine (NICODERM CQ) 14 mg/24hr TD 24 hr patch Place 1 patch on the skin daily  Patient not taking: Reported on 2/23/2021 2/7/20   Caio Wolfe MD   nystatin (MYCOSTATIN) powder Apply topically 2 (two) times a day  Patient not taking: Reported on 3/17/2021 10/8/20   Joshua Antony MD   pantoprazole (PROTONIX) 40 mg tablet TAKE ONE TABLET BY MOUTH EVERY DAY IN THE MORNING 1/30/21   Samanta Chavez MD   sodium chloride (OCEAN) 0 65 % nasal spray 1 spray into each nostril every hour as needed for congestion 9/5/20 3/10/21  Joshua Antony MD   Thiamine HCl (vitamin B-1) 100 MG TABS TAKE ONE TABLET BY MOUTH EVERY DAY 3/1/21   MckaylaretCLARA Gonzalez-C   torsemide (DEMADEX) 20 mg tablet Take 1 tablet (20 mg total) by mouth daily 2/25/21   Peterson Maldonado MD     I have reviewed home medications with patient personally  Allergies: No Known Allergies    Social History:     Marital Status:      Patient Pre-hospital Living Situation: home  Patient Pre-hospital Level of Mobility: independent  Patient Pre-hospital Diet Restrictions: none  Substance Use History:   Social History     Substance and Sexual Activity   Alcohol Use Not Currently    Frequency: 4 or more times a week    Drinks per session: 1 or 2    Binge frequency: Never    Comment: History of significant daily alcohol intake   Sister joy no alcohol intake in 6 months     Social History     Tobacco Use   Smoking Status Light Tobacco Smoker    Packs/day: 0 25    Types: Cigarettes   Smokeless Tobacco Never Used   Tobacco Comment    2-3 a day     Social History     Substance and Sexual Activity   Drug Use No       Family History:    Family History   Problem Relation Age of Onset    Heart attack Father myocardial infarction    Heart disease Father        Physical Exam:     Vitals:   Blood Pressure: 103/55 (03/24/21 1833)  Pulse: (!) 120 (03/24/21 1833)  Temperature: 98 5 °F (36 9 °C) (03/24/21 1429)  Respirations: 21 (03/24/21 1833)  SpO2: 96 % (03/24/21 1833)    Physical Exam  Constitutional:       General: She is not in acute distress  Appearance: She is well-developed  She is not diaphoretic  HENT:      Head: Normocephalic and atraumatic  Nose: Nose normal       Mouth/Throat:      Pharynx: No oropharyngeal exudate  Eyes:      General: No scleral icterus  Right eye: No discharge  Left eye: No discharge  Conjunctiva/sclera: Conjunctivae normal    Neck:      Musculoskeletal: Normal range of motion and neck supple  Thyroid: No thyromegaly  Vascular: No JVD  Cardiovascular:      Rate and Rhythm: Normal rate and regular rhythm  Heart sounds: Normal heart sounds  No murmur  No friction rub  No gallop  Pulmonary:      Effort: Pulmonary effort is normal  No respiratory distress  Breath sounds: Normal breath sounds  No wheezing or rales  Chest:      Chest wall: No tenderness  Abdominal:      General: Bowel sounds are normal  There is no distension  Palpations: Abdomen is soft  Tenderness: There is no abdominal tenderness  There is no guarding or rebound  Musculoskeletal: Normal range of motion  General: No tenderness or deformity  Skin:     General: Skin is warm and dry  Findings: No erythema or rash  Neurological:      Mental Status: She is alert  Mental status is at baseline  Cranial Nerves: No cranial nerve deficit  Sensory: No sensory deficit  Motor: No abnormal muscle tone  Coordination: Coordination normal            Additional Data:     Lab Results: I have personally reviewed pertinent reports        Results from last 7 days   Lab Units 03/24/21  1623   WBC Thousand/uL 3 08*   HEMOGLOBIN g/dL 8 7* HEMATOCRIT % 27 6*   PLATELETS Thousands/uL 40*   BANDS PCT % 3   LYMPHO PCT % 31   MONO PCT % 10   EOS PCT % 1     Results from last 7 days   Lab Units 03/24/21  1623   SODIUM mmol/L 140   POTASSIUM mmol/L 5 7*   CHLORIDE mmol/L 104   CO2 mmol/L 24   BUN mg/dL 30*   CREATININE mg/dL 1 85*   ANION GAP mmol/L 12   CALCIUM mg/dL 8 4   ALBUMIN g/dL 3 1*   TOTAL BILIRUBIN mg/dL 2 02*   ALK PHOS U/L 261*   ALT U/L 17   AST U/L 51*   GLUCOSE RANDOM mg/dL 112     Results from last 7 days   Lab Units 03/24/21  1623   INR  1 30*             Results from last 7 days   Lab Units 03/24/21  1623   LACTIC ACID mmol/L 1 6       Imaging: I have personally reviewed pertinent reports  CT head without contrast   Final Result by Layne Keith MD (03/24 1812)      No acute intracranial abnormality  Workstation performed: [id number]         CT spine cervical without contrast   Final Result by Layne Keith MD (03/24 1806)         1  No evidence of acute cervical spine fracture or traumatic malalignment  2   Moderate right pleural effusion  Workstation performed: [id number]         XR chest 1 view portable   Final Result by Key Oconnell MD (03/24 1707)      Mild pulmonary congestion with moderate chronic right pleural effusion  Mildly displaced fractures of the posterior right and left 6th ribs, new since 1/20/2021  I personally discussed this study with Lopez Lynch on 3/24/2021 at 5:07 PM                          Workstation performed: [id number]             EKG, Pathology, and Other Studies Reviewed on Admission:   · EKG: reviewed    Allscripts / Epic Records Reviewed: Yes     ** Please Note: This note has been constructed using a voice recognition system   **

## 2021-03-25 LAB
ABO GROUP BLD BPU: NORMAL
ANION GAP SERPL CALCULATED.3IONS-SCNC: 12 MMOL/L (ref 4–13)
BPU ID: NORMAL
BUN SERPL-MCNC: 31 MG/DL (ref 5–25)
CALCIUM SERPL-MCNC: 8.6 MG/DL (ref 8.3–10.1)
CHLORIDE SERPL-SCNC: 107 MMOL/L (ref 100–108)
CO2 SERPL-SCNC: 23 MMOL/L (ref 21–32)
CREAT SERPL-MCNC: 1.63 MG/DL (ref 0.6–1.3)
CROSSMATCH: NORMAL
ERYTHROCYTE [DISTWIDTH] IN BLOOD BY AUTOMATED COUNT: 19.9 % (ref 11.6–15.1)
GFR SERPL CREATININE-BSD FRML MDRD: 36 ML/MIN/1.73SQ M
GLUCOSE P FAST SERPL-MCNC: 116 MG/DL (ref 65–99)
GLUCOSE SERPL-MCNC: 116 MG/DL (ref 65–140)
HCT VFR BLD AUTO: 28.9 % (ref 34.8–46.1)
HGB BLD-MCNC: 9.2 G/DL (ref 11.5–15.4)
MCH RBC QN AUTO: 28.6 PG (ref 26.8–34.3)
MCHC RBC AUTO-ENTMCNC: 31.8 G/DL (ref 31.4–37.4)
MCV RBC AUTO: 90 FL (ref 82–98)
PLATELET # BLD AUTO: 37 THOUSANDS/UL (ref 149–390)
PMV BLD AUTO: 10.4 FL (ref 8.9–12.7)
POTASSIUM SERPL-SCNC: 3.6 MMOL/L (ref 3.5–5.3)
PROCALCITONIN SERPL-MCNC: 0.59 NG/ML
PROCALCITONIN SERPL-MCNC: 0.61 NG/ML
RBC # BLD AUTO: 3.22 MILLION/UL (ref 3.81–5.12)
SODIUM SERPL-SCNC: 142 MMOL/L (ref 136–145)
UNIT DISPENSE STATUS: NORMAL
UNIT PRODUCT CODE: NORMAL
UNIT RH: NORMAL
WBC # BLD AUTO: 2.95 THOUSAND/UL (ref 4.31–10.16)

## 2021-03-25 PROCEDURE — 99226 PR SBSQ OBSERVATION CARE/DAY 35 MINUTES: CPT | Performed by: FAMILY MEDICINE

## 2021-03-25 PROCEDURE — 80048 BASIC METABOLIC PNL TOTAL CA: CPT | Performed by: INTERNAL MEDICINE

## 2021-03-25 PROCEDURE — 84145 PROCALCITONIN (PCT): CPT | Performed by: EMERGENCY MEDICINE

## 2021-03-25 PROCEDURE — 85027 COMPLETE CBC AUTOMATED: CPT | Performed by: INTERNAL MEDICINE

## 2021-03-25 RX ADMIN — LACTULOSE 10 G: 20 SOLUTION ORAL at 21:50

## 2021-03-25 RX ADMIN — MIRTAZAPINE 15 MG: 15 TABLET, FILM COATED ORAL at 21:50

## 2021-03-25 RX ADMIN — TORSEMIDE 20 MG: 20 TABLET ORAL at 09:28

## 2021-03-25 RX ADMIN — PANTOPRAZOLE SODIUM 40 MG: 40 TABLET, DELAYED RELEASE ORAL at 06:46

## 2021-03-25 RX ADMIN — LACTULOSE 10 G: 20 SOLUTION ORAL at 09:27

## 2021-03-25 RX ADMIN — TORSEMIDE 10 MG: 10 TABLET ORAL at 18:23

## 2021-03-25 RX ADMIN — NICOTINE 1 PATCH: 14 PATCH, EXTENDED RELEASE TRANSDERMAL at 09:27

## 2021-03-25 NOTE — PLAN OF CARE
Problem: Prexisting or High Potential for Compromised Skin Integrity  Goal: Skin integrity is maintained or improved  Description: INTERVENTIONS:  - Identify patients at risk for skin breakdown  - Assess and monitor skin integrity  - Assess and monitor nutrition and hydration status  - Monitor labs   - Assess for incontinence   - Turn and reposition patient  - Assist with mobility/ambulation  - Relieve pressure over bony prominences  - Avoid friction and shearing  - Provide appropriate hygiene as needed including keeping skin clean and dry  - Evaluate need for skin moisturizer/barrier cream  - Collaborate with interdisciplinary team   - Patient/family teaching  - Consider wound care consult   Outcome: Progressing     Problem: SAFETY ADULT  Goal: Patient will remain free of falls  Description: INTERVENTIONS:  - Assess patient frequently for physical needs  -  Identify cognitive and physical deficits and behaviors that affect risk of falls    -  [place] fall precautions as indicated by assessment   - Educate patient/family on patient safety including physical limitations  - Instruct patient to call for assistance with activity based on assessment  - Modify environment to reduce risk of injury  - Consider OT/PT consult to assist with strengthening/mobility  Outcome: Progressing  Goal: Maintain or return to baseline ADL function  Description: INTERVENTIONS:  -  Assess patient's ability to carry out ADLs; assess patient's baseline for ADL function and identify physical deficits which impact ability to perform ADLs (bathing, care of mouth/teeth, toileting, grooming, dressing, etc )  - Assess/evaluate cause of self-care deficits   - Assess range of motion  - Assess patient's mobility; develop plan if impaired  - Assess patient's need for assistive devices and provide as appropriate  - Encourage maximum independence but intervene and supervise when necessary  - Involve family in performance of ADLs  - Assess for home care needs following discharge   - Consider OT consult to assist with ADL evaluation and planning for discharge  - Provide patient education as appropriate  Outcome: Progressing  Goal: Maintain or return mobility status to optimal level  Description: INTERVENTIONS:  - Assess patient's baseline mobility status (ambulation, transfers, stairs, etc )    - Identify cognitive and physical deficits and behaviors that affect mobility  - Identify mobility aids required to assist with transfers and/or ambulation (gait belt, sit-to-stand, lift, walker, cane, etc )  - [place] fall precautions as indicated by assessment  - Record patient progress and toleration of activity level on Mobility SBAR; progress patient to next Phase/Stage  - Instruct patient to call for assistance with activity based on assessment  - Consider rehabilitation consult to assist with strengthening/weightbearing, etc   Outcome: Progressing     Problem: DISCHARGE PLANNING  Goal: Discharge to home or other facility with appropriate resources  Description: INTERVENTIONS:  - Identify barriers to discharge w/patient and caregiver  - Arrange for needed discharge resources and transportation as appropriate  - Identify discharge learning needs (meds, wound care, etc )  - Arrange for interpretive services to assist at discharge as needed  - Refer to Case Management Department for coordinating discharge planning if the patient needs post-hospital services based on physician/advanced practitioner order or complex needs related to functional status, cognitive ability, or social support system  Outcome: Progressing     Problem: Knowledge Deficit  Goal: Patient/family/caregiver demonstrates understanding of disease process, treatment plan, medications, and discharge instructions  Description: Complete learning assessment and assess knowledge base    Interventions:  - Provide teaching at level of understanding  - Provide teaching via preferred learning methods  Outcome: Progressing

## 2021-03-25 NOTE — ASSESSMENT & PLAN NOTE
Lab Results   Component Value Date    EGFR 36 03/25/2021    EGFR 29 03/24/2021    EGFR 30 03/24/2021    CREATININE 1 63 (H) 03/25/2021    CREATININE 1 91 (H) 03/24/2021    CREATININE 1 85 (H) 03/24/2021   Baseline creatinine approximately 1 5  A bit elevated from baseline  Continue home meds

## 2021-03-25 NOTE — ED NOTES
Pt moved to different bed to be closer to nursing station for observation  Moved to hospital bed for comfort       Nadiya Vásquez RN  03/24/21 Marleni Coleman RN  03/24/21 2022

## 2021-03-25 NOTE — PROGRESS NOTES
[facility]  Progress Note - Tex Cho 1945, 77 y o  female MRN: [id number]  Unit/Bed#: -01 Encounter: [id number]  Primary Care Provider: Jay Stewart MD   Date and time admitted to hospital: 3/24/2021  3:35 PM    * Hypotension  Assessment & Plan  -hypotension has resolved but the procalcitonin is elevated and is more than it has ever been in the past   Blood cultures had been ordered during admission  Preliminary cultures are not available at this time for my review  Because of the elevated procalcitonin and presentation with hypo tension and with patient's immunocompromised status, I will wait for the blood cultures to be back at least a preliminary cultures before patient can be discharged  Will monitor her off the antibiotics    Hyperkalemia  Assessment & Plan  Resolved    Rib fracture  Assessment & Plan  Unsure etiology because she has not had any falls or trauma  Could this be secondary to multiple myeloma    She would have to follow-up with your outpatient oncologist for further management plans  No acute intervention planned at this time  Not in any kind of pain    Chronic kidney disease (CKD), active medical management without dialysis, stage 4 (severe) [facility])  Assessment & Plan  Lab Results   Component Value Date    EGFR 36 03/25/2021    EGFR 29 03/24/2021    EGFR 30 03/24/2021    CREATININE 1 63 (H) 03/25/2021    CREATININE 1 91 (H) 03/24/2021    CREATININE 1 85 (H) 03/24/2021   Baseline creatinine approximately 1 5  A bit elevated from baseline  Continue home meds    Multiple myeloma (HCC)  Assessment & Plan  Procalcitonin could be elevated secondary to multiple myeloma as well but will need to rule out infection prior to discharge   Follows with oncology outpatient  Blood counts currently stable  Continue to monitor        VTE Pharmacologic Prophylaxis:   Pharmacologic: Pharmacologic VTE Prophylaxis contraindicated due to Thrombocytopenia  Mechanical VTE Prophylaxis in Place: Yes    Patient Centered Rounds: I have performed bedside rounds with nursing staff today  Discussions with Specialists or Other Care Team Provider:  Yes, case management    Education and Discussions with Family / Patient:  Yes, patient and patient's sister Ayah Osman has been updated by me personally  All questions have been answered to her satisfaction    Time Spent for Care: 20 minutes  More than 50% of total time spent on counseling and coordination of care as described above  Current Length of Stay: 0 day(s)    Current Patient Status: Observation   Certification Statement: The patient will continue to require additional inpatient hospital stay due to Ongoing evaluation for hypertension and ruling out an infection n    Discharge Plan:  Likely 24 hours    Code Status: Level 1 - Full Code      Subjective:   Seen and examined at bedside  In good mood  Confused at baseline  Denies any new complaints    Objective:     Vitals:   Temp (24hrs), Av °F (37 2 °C), Min:98 9 °F (37 2 °C), Max:99 °F (37 2 °C)    Temp:  [98 9 °F (37 2 °C)-99 °F (37 2 °C)] 98 9 °F (37 2 °C)  HR:  [115-122] 122  Resp:  [18-25] 18  BP: (103-139)/(55-90) 139/90  SpO2:  [90 %-96 %] 90 %  Body mass index is 26 69 kg/m²  Input and Output Summary (last 24 hours):        Intake/Output Summary (Last 24 hours) at 3/25/2021 1433  Last data filed at 3/25/2021 1236  Gross per 24 hour   Intake 620 ml   Output --   Net 620 ml       Physical Exam:     Physical Exam  S1-S2 audible, regular, lungs clear to auscultation anteriorly, abdomen nontender nondistended bowel sounds audible all 4 quadrants, alert, cooperative    Additional Data:     Labs:    Results from last 7 days   Lab Units 21  0618 21  1623   WBC Thousand/uL 2 95* 3 08*   HEMOGLOBIN g/dL 9 2* 8 7*   HEMATOCRIT % 28 9* 27 6*   PLATELETS Thousands/uL 37* 40*   BANDS PCT %  --  3   LYMPHO PCT %  --  31   MONO PCT %  --  10   EOS PCT %  --  1     Results from last 7 days   Lab Units 03/25/21  0618  03/24/21  1623   SODIUM mmol/L 142   < > 140   POTASSIUM mmol/L 3 6   < > 5 7*   CHLORIDE mmol/L 107   < > 104   CO2 mmol/L 23   < > 24   BUN mg/dL 31*   < > 30*   CREATININE mg/dL 1 63*   < > 1 85*   ANION GAP mmol/L 12   < > 12   CALCIUM mg/dL 8 6   < > 8 4   ALBUMIN g/dL  --   --  3 1*   TOTAL BILIRUBIN mg/dL  --   --  2 02*   ALK PHOS U/L  --   --  261*   ALT U/L  --   --  17   AST U/L  --   --  51*   GLUCOSE RANDOM mg/dL 116   < > 112    < > = values in this interval not displayed  Results from last 7 days   Lab Units 03/24/21  1623   INR  1 30*             Results from last 7 days   Lab Units 03/24/21  1921 03/24/21  1623   LACTIC ACID mmol/L 1 8 1 6   PROCALCITONIN ng/ml 0 61*  --            * I Have Reviewed All Lab Data Listed Above  * Additional Pertinent Lab Tests Reviewed: All Labs Within Last 24 Hours Reviewed    Imaging:    Imaging Reports Reviewed Today Include:  Na  Imaging Personally Reviewed by Myself Includes:  Na    Recent Cultures (last 7 days):     Results from last 7 days   Lab Units 03/24/21  1622 03/24/21  1611   BLOOD CULTURE  Received in Microbiology Lab  Culture in Progress  Received in Microbiology Lab  Culture in Progress  Last 24 Hours Medication List:   Current Facility-Administered Medications   Medication Dose Route Frequency Provider Last Rate    acetaminophen  650 mg Oral Q6H PRN Brent Doarntes MD      lactulose  10 g Oral BID Brent Dorantes MD      mirtazapine  15 mg Oral HS Brent Dorantes MD      nicotine  1 patch Transdermal Daily Brent Dorantes MD      OLANZapine  2 5 mg Intramuscular Q3H PRN Brent Dorantes MD      pantoprazole  40 mg Oral QAM Brent Dorantes MD      torsemide  10 mg Oral Before Valarie Morocho MD      torsemide  20 mg Oral Daily Rosy Yang MD          Today, Patient Was Seen By: Hilary Lara MD    ** Please Note: Dictation voice to text software may have been used in the creation of this document  **

## 2021-03-25 NOTE — ED NOTES
Pt was incontinent of stool prior to coming in  Greenish mucus stool, large amount  Pt cleaned up, placed in clean gown       Dolores Fontenot, ANDREY  03/24/21 2011

## 2021-03-25 NOTE — ASSESSMENT & PLAN NOTE
-hypotension has resolved but the procalcitonin is elevated and is more than it has ever been in the past   Blood cultures had been ordered during admission  Preliminary cultures are not available at this time for my review  Because of the elevated procalcitonin and presentation with hypo tension and with patient's immunocompromised status, I will wait for the blood cultures to be back at least a preliminary cultures before patient can be discharged    Will monitor her off the antibiotics

## 2021-03-25 NOTE — CASE MANAGEMENT
LOS: 1 DAY  PATIENT IS NOT A BUNDLE  PATIENT IS NOT A 30 DAY READMISSION  UNPLANNED READMISSION RISK SCORE IS N/A  Cm called patient's son due to patient being demented  Son reported that patient resides with her sister in a 3rd floor apartment with no KRISTEN and elevator access  Patient uses no DME but has a wheelchair for when she goes out  Patient is mostly independent with ADLs, but it depends on her strength and level of confusion  Patient has never been to [facility], but she is current with [facility]  Patient fills her prescriptions at [facility] in [facility] and denied any barriers to obtaining or affording prescriptions  Patient's PCP is Minesh  Son denied [address] Hx and reported that she smokes 1 cigarette per day  Patient's son is her POA  Patient does not work or drive  Patient's sister assists with transportation and may be able to transport at discharge  Otherwise, patient will need BLS transport  CM reviewed discharge planning process including the following: identifying help at home, patient preference for discharge planning needs, pharmacy preference, and availability of treatment team to discuss questions or concerns patient and/or family may have regarding understanding medications and recognizing signs and symptoms once discharged  CM also encouraged patient to follow up with all recommended appointments after discharge  Patient advised of importance for patient and family to participate in managing patients medical well being  Son would like patient to continue services for patient through [facility]  Cm sent referral for [facility] via [employer]  Cm department will continue to follow patient through discharge

## 2021-03-25 NOTE — ASSESSMENT & PLAN NOTE
Procalcitonin could be elevated secondary to multiple myeloma as well but will need to rule out infection prior to discharge   Follows with oncology outpatient  Blood counts currently stable  Continue to monitor

## 2021-03-25 NOTE — UTILIZATION REVIEW
Initial Clinical Review    Admission: Date/Time/Statement:   Admission Orders (From admission, onward)     Ordered        03/24/21 1839  Place in Observation  Once                   Orders Placed This Encounter   Procedures    Place in Observation     Standing Status:   Standing     Number of Occurrences:   1     Order Specific Question:   Level of Care     Answer:   Med Surg [16]     ED Arrival Information     Expected Arrival Acuity Means of Arrival Escorted By Service Admission Type    - 3/24/2021 14:27 Urgent Walk-In Family Member General Medicine Urgent    Arrival Complaint    LOW BLOOD PRESSURE        Chief Complaint   Patient presents with    Hypotension     pt from infusion center, pt received blood today was found to be 19'X systolic, pt has IV established due to being a very difficult stick, pt has completed transfusion for today (1 unit)     Assessment/Plan: Rib fracture  Assessment & Plan  Fractures noted to the left and right 6th rib  No recent acute trauma or recent notable falls  Currently without pain  No further intervention necessary at this time     [facility])  Assessment & Plan  Delirium precautions     Chronic kidney disease (CKD), active medical management without dialysis, stage 4 (severe) [facility])  Assessment & Plan        Lab Results   Component Value Date     EGFR 30 03/24/2021     EGFR 27 03/09/2021     EGFR 37 02/25/2021     CREATININE 1 85 (H) 03/24/2021     CREATININE 2 03 (H) 03/09/2021     CREATININE 1 59 (H) 02/25/2021   Baseline creatinine approximately 1 5  A bit elevated from baseline  Continue home meds     Multiple myeloma ([facility] [place] [address] )  Assessment & Plan  Follows with oncology outpatient  Blood counts currently stable  Continue to monitor     Hyperkalemia  Assessment & Plan  In the setting of  CKD  Potassium noted to be 5 7  Will treat  Repeat BMP in a m         * Hypotension  Assessment & Plan  Was noted to have transient hypotension a transfusion center  Which has since improved  Continue to monitor        VTE Prophylaxis: Heparin  / sequential compression device   Code Status: full code  POLST: There is no POLST form on file for this patient (pre-hospital)  Discussion with family: pt     Anticipated Length of Stay:  Patient will be admitted on an Observation basis with an anticipated length of stay of  < 2 midnights  Justification for Hospital Stay:  Hypotension     Total Time for Visit, including Counseling / Coordination of Care: 1 hour  Greater than 50% of this total time spent on direct patient counseling and coordination of care      Chief Complaint:   Hypotension     History of Present Illness:     Colleen Sabillon is a 77 y o  female who presents with past medical history significant for multiple myeloma, dementia initially presented due to hypotension  Patient was noted to be hypotensive and infusion center with systolic BP noted to be 70  On arrival to the emergency department she no longer had hypotension  She currently has no acute complaints    Denies chest pain, shortness breath, fevers, chills, abdominal pain, nausea, vomiting, constipation, dysuria, cough, numbness, weakness, headaches, or any other symptoms        ED Triage Vitals   Temperature Pulse Respirations Blood Pressure SpO2   03/24/21 1429 03/24/21 1429 03/24/21 1429 03/24/21 1429 03/24/21 1429   98 5 °F (36 9 °C) (!) 110 16 97/59 100 %      Temp Source Heart Rate Source Patient Position - Orthostatic VS BP Location FiO2 (%)   03/24/21 2039 03/24/21 1833 03/24/21 1429 03/24/21 1429 --   Oral Monitor Sitting Right arm       Pain Score       --                 Wt Readings from Last 1 Encounters:   03/24/21 66 2 kg (145 lb 15 1 oz)     Additional Vital Signs:  03/24/21 21:20:49  98 9 °F (37 2 °C)  122Abnormal   18  112/66  81  90 %  --  --   03/24/21 2039  99 °F (37 2 °C)  --  --  --  --  --  --  --   03/24/21 2030  --  115Abnormal   25Abnormal   123/64  89  91 %  None (Room air)  --   03/24/21 1930  -- 120Abnormal   22  110/59  78  93 %  None (Room air)  Lying   03/24/21 1915  --  120Abnormal   22  113/57  78  96 %  None (Room air)  Lying   03/24/21 1833  --  120Abnormal   21  103/55  --  96 %  None (Room air)         Pertinent Labs/Diagnostic Test Results:   3/24 PCXR Mild pulmonary congestion with moderate chronic right pleural effusion      Mildly displaced fractures of the posterior right and left 6th ribs, new since 1/20/2021  3/24 CT head No acute intracranial abnormality  3/24 CT cervical spine    1  No evidence of acute cervical spine fracture or traumatic malalignment    2   Moderate right pleural effusion           Results from last 7 days   Lab Units 03/25/21 0618 03/24/21 1623 03/22/21  1404   WBC Thousand/uL 2 95* 3 08*  --    HEMOGLOBIN g/dL 9 2* 8 7* 7 8*   HEMATOCRIT % 28 9* 27 6* 25 6*   PLATELETS Thousands/uL 37* 40*  --    BANDS PCT %  --  3  --      Results from last 7 days   Lab Units 03/25/21 0618 03/24/21 1921 03/24/21  1623   SODIUM mmol/L 142 136 140   POTASSIUM mmol/L 3 6 3 9 5 7*   CHLORIDE mmol/L 107 102 104   CO2 mmol/L 23 24 24   ANION GAP mmol/L 12 10 12   BUN mg/dL 31* 30* 30*   CREATININE mg/dL 1 63* 1 91* 1 85*   EGFR ml/min/1 73sq m 36 29 30   CALCIUM mg/dL 8 6 8 5 8 4   MAGNESIUM mg/dL  --   --  1 7     Results from last 7 days   Lab Units 03/24/21 1921 03/24/21  1623   AST U/L  --  51*   ALT U/L  --  17   ALK PHOS U/L  --  261*   TOTAL PROTEIN g/dL  --  6 4   ALBUMIN g/dL  --  3 1*   TOTAL BILIRUBIN mg/dL  --  2 02*   AMMONIA umol/L 106*  --      Results from last 7 days   Lab Units 03/25/21 0618 03/24/21 1921 03/24/21  1623   GLUCOSE RANDOM mg/dL 116 137 112      Results from last 7 days   Lab Units 03/24/21  1623   PH REBEKAH  7 391   PCO2 REBEKAH mm Hg 35 0*   PO2 REBEKAH mm Hg 52 9*   HCO3 REBEKAH mmol/L 20 8*   BASE EXC REBEKAH mmol/L -3 7   O2 CONTENT REBEKAH ml/dL 11 6   O2 HGB, VENOUS % 83 2*     Results from last 7 days   Lab Units 03/24/21  1623   TROPONIN I ng/mL <0 02     Results from last 7 days   Lab Units 03/24/21  1623   PROTIME seconds 15 6*   INR  1 30*   PTT seconds 32     Results from last 7 days   Lab Units 03/24/21  1921   PROCALCITONIN ng/ml 0 61*     Results from last 7 days   Lab Units 03/24/21  1921 03/24/21  1623   LACTIC ACID mmol/L 1 8 1 6     Results from last 7 days   Lab Units 03/24/21  1623   NT-PRO BNP pg/mL 7,773*     Results from last 7 days   Lab Units 03/24/21  1623   LIPASE u/L 502*     Results from last 7 days   Lab Units 03/24/21  1622 03/24/21  1611   BLOOD CULTURE  Received in Microbiology Lab  Culture in Progress  Received in Microbiology Lab  Culture in Progress       Results from last 7 days   Lab Units 03/24/21  1623   TOTAL COUNTED  100           ED Treatment:   Medication Administration from 03/24/2021 1427 to 03/24/2021 2112       Date/Time Order Dose Route Action Action by Comments     03/24/2021 1832 sodium chloride 0 9 % bolus 500 mL 0 mL Intravenous Stopped Patrick Huff RN      03/24/2021 1624 sodium chloride 0 9 % bolus 500 mL 500 mL Intravenous New Bag Patrick Huff RN      03/24/2021 1831 OLANZapine (ZyPREXA ZYDIS) dispersible tablet 10 mg 10 mg Oral Given Patrick Huff RN      03/24/2021 1942 torsemide (DEMADEX) tablet 10 mg 0 mg Oral Hold Eryn Mcgraw RN      03/24/2021 1941 insulin regular (HumuLIN R,NovoLIN R) injection 10 Units   Intravenous Canceled Entry Eryn Mcgraw RN      03/24/2021 1941 dextrose 50 % IV solution 25 mL   Intravenous Canceled Entry Eryn Mcgraw RN         Past Medical History:   Diagnosis Date    Benign essential hypertension     last assessed - [id number]    Chest pain 11/4/2017    CHF (congestive heart failure) (HCC)     Chronic kidney disease     Cirrhosis ([facility] [address] )     Combined systolic and diastolic heart failure ([facility] [address] ) 1/18/2021    Elevated troponin 1/31/2020    Gastroesophageal reflux disease without esophagitis 11/16/2017    Hyperbilirubinemia 5/14/2020    Hypertension     Liver disease     [facility]     Pneumonia 5/6/2020    Renal failure 08/29/2020     Present on Admission:   [facility]   Chronic kidney disease (CKD), active medical management without dialysis, stage 4 (severe) (HCC)   Hyperkalemia   Dementia (HCC)      Admitting Diagnosis: Dehydration [E86 0]  Rib fractures [S22 39XA]  Hypotension [I95 9]  Altered mental status, unspecified [R41 82]  Age/Sex: 77 y o  female  Admission Orders:  Scheduled Medications:  lactulose, 10 g, Oral, BID  mirtazapine, 15 mg, Oral, HS  nicotine, 1 patch, Transdermal, Daily  pantoprazole, 40 mg, Oral, QAM  torsemide, 10 mg, Oral, Before Dinner  torsemide, 20 mg, Oral, Daily      Continuous IV Infusions:     PRN Meds:  acetaminophen, 650 mg, Oral, Q6H PRN  OLANZapine, 2 5 mg, Intramuscular, Q3H PRN        IP CONSULT TO CASE MANAGEMENT    Network Utilization Review Department  ATTENTION: Please call with any questions or concerns to [phone] and carefully listen to the prompts so that you are directed to the right person  All voicemails are confidential   [facility] all requests for admission clinical reviews, approved or denied determinations and any other requests to dedicated fax number below belonging to the campus where the patient is receiving treatment   List of dedicated fax numbers for the Facilities:  [address] DENIALS (Administrative/Medical Necessity) [phone]   [address] (Maternity/NICU/Pediatrics) [phone]   [address] [place] [address] Dr Alexandra Martinez [address] (  [facility] [address]) [address] [place] [address] [phone]     [address] [phone]   [place] [address] [phone]

## 2021-03-26 ENCOUNTER — TELEPHONE (OUTPATIENT)
Dept: INTERNAL MEDICINE CLINIC | Facility: CLINIC | Age: 76
End: 2021-03-26

## 2021-03-26 ENCOUNTER — TELEPHONE (OUTPATIENT)
Dept: HEMATOLOGY ONCOLOGY | Facility: CLINIC | Age: 76
End: 2021-03-26

## 2021-03-26 ENCOUNTER — PATIENT OUTREACH (OUTPATIENT)
Dept: INTERNAL MEDICINE CLINIC | Facility: CLINIC | Age: 76
End: 2021-03-26

## 2021-03-26 VITALS
OXYGEN SATURATION: 98 % | HEART RATE: 67 BPM | HEIGHT: 62 IN | BODY MASS INDEX: 26.86 KG/M2 | RESPIRATION RATE: 17 BRPM | TEMPERATURE: 98 F | DIASTOLIC BLOOD PRESSURE: 82 MMHG | WEIGHT: 145.94 LBS | SYSTOLIC BLOOD PRESSURE: 150 MMHG

## 2021-03-26 PROCEDURE — 99217 PR OBSERVATION CARE DISCHARGE MANAGEMENT: CPT | Performed by: FAMILY MEDICINE

## 2021-03-26 RX ORDER — DILTIAZEM HYDROCHLORIDE 120 MG/1
120 CAPSULE, COATED, EXTENDED RELEASE ORAL DAILY
Qty: 90 CAPSULE | Refills: 0 | Status: ON HOLD | OUTPATIENT
Start: 2021-03-26 | End: 2021-04-09 | Stop reason: SDUPTHER

## 2021-03-26 RX ORDER — FUROSEMIDE 40 MG/1
60 TABLET ORAL DAILY
Qty: 30 TABLET | Refills: 0 | Status: ON HOLD | OUTPATIENT
Start: 2021-03-26 | End: 2021-04-09 | Stop reason: SDUPTHER

## 2021-03-26 RX ORDER — METOPROLOL SUCCINATE 100 MG/1
50 TABLET, EXTENDED RELEASE ORAL DAILY
Qty: 30 TABLET | Refills: 0 | Status: SHIPPED | OUTPATIENT
Start: 2021-03-26 | End: 2021-04-09 | Stop reason: HOSPADM

## 2021-03-26 RX ADMIN — LACTULOSE 10 G: 20 SOLUTION ORAL at 11:36

## 2021-03-26 RX ADMIN — TORSEMIDE 20 MG: 20 TABLET ORAL at 11:36

## 2021-03-26 RX ADMIN — PANTOPRAZOLE SODIUM 40 MG: 40 TABLET, DELAYED RELEASE ORAL at 06:24

## 2021-03-26 NOTE — TELEPHONE ENCOUNTER
----- Message from Jason Gutierrez MD sent at 3/26/2021 10:10 AM EDT -----  Regarding: FW: change of meds, will need a close follow up  Please have her come in for follow up as advised by her nephrologist  He needs me to monitor her BP  ----- Message -----  From: Jose Eduardo Ahmadi MD  Sent: 3/26/2021   8:34 AM EDT  To: Jason Gutierrez MD  Subject: change of meds, will need a close follow up      [place] [facility] [place]  Blood cultures were normal  BP back to normal   I have reduced the dose of her metoprolol to 50 mg from 100mg  Have continued the cardizem at 120mg which she was taking at home  Also lasix dose reduced to 60mg from 80mg  She will need a close follow up for BP monitoring or med check and changes      Thank you,    MD SPRING Kay

## 2021-03-26 NOTE — DISCHARGE INSTR - AVS FIRST PAGE
· Reduce dose of metoprolol to 50 mg and lasix to 60 mg   · Follow up with PCP in 1 week  · Monitor  BP closely

## 2021-03-26 NOTE — ASSESSMENT & PLAN NOTE
- hypotension resolved  - medication adjustment made at DC with lowering the dose of metoprolol to 50mg from 100mg and lowering lasix to 60mg from 80 mg  - BP are now better   - though procal was elevated it has come down and likely it is not coming from infection but could be sec to myeloma  - blood cultures shows no growth, no fever or other symptoms noted  - plan for DC today

## 2021-03-26 NOTE — TELEPHONE ENCOUNTER
Reschedule Appointment     Who is calling in Sister    Doctor Appointment Sched[facility]31 [facility] date and time 03/30 at 3:30pm   Location [place]   Patient verbalized understanding    yes

## 2021-03-26 NOTE — PROGRESS NOTES
Telephone call placed to Susie Carrillo today to provide follow up communication but she was unavailable at the time of my call  I left a message on her VM requesting that she return my call  I reviewed patient's chart and patient was back in the hospital on 3/24 due to hypotension and she is being discharged back home today with a referral to [facility]  I will continue to follow and provide ongoing assistance and support for patient and caregiver

## 2021-03-26 NOTE — DISCHARGE SUMMARY
[facility]  Discharge- Jack Griggs 1945, 77 y o  female MRN: [id number]  Unit/Bed#: -01 Encounter: [id number]  Primary Care Provider: Deng Rosario MD   Date and time admitted to hospital: 3/24/2021  3:35 PM    * Hypotension  Assessment & Plan  - hypotension resolved  - medication adjustment made at DC with lowering the dose of metoprolol to 50mg from 100mg and lowering lasix to 60mg from 80 mg  - BP are now better   - though procal was elevated it has come down and likely it is not coming from infection but could be sec to myeloma  - blood cultures shows no growth, no fever or other symptoms noted  - plan for DC today    Hyperkalemia  Assessment & Plan  Resolved    Rib fracture  Assessment & Plan  Unsure etiology because she has not had any falls or trauma  Could this be secondary to multiple myeloma    She would have to follow-up with your outpatient oncologist for further management plans  No acute intervention planned at this time  Not in any kind of pain    Dementia [facility])  Assessment & Plan  Delirium precautions    Chronic kidney disease (CKD), active medical management without dialysis, stage 4 (severe) [facility])  Assessment & Plan  Lab Results   Component Value Date    EGFR 36 03/25/2021    EGFR 29 03/24/2021    EGFR 30 03/24/2021    CREATININE 1 63 (H) 03/25/2021    CREATININE 1 91 (H) 03/24/2021    CREATININE 1 85 (H) 03/24/2021   Baseline creatinine approximately 1 5  A bit elevated from baseline  Continue home meds    Multiple myeloma (HCC)  Assessment & Plan  Procalcitonin could be elevated secondary to multiple myeloma as well but will need to rule out infection prior to discharge   Follows with oncology outpatient  Blood counts currently stable  Continue to monitor              Resolved Problems  Date Reviewed: 3/26/2021    None          Admission Date:   Admission Orders (From admission, onward)     Ordered        03/24/21 3606  Place in Observation  Once Admitting Diagnosis: Dehydration [E86 0]  Rib fractures [S22 39XA]  Hypotension [I95 9]  Altered mental status, unspecified [R41 82]    HPI: Emily Albarran is a 77 y o  female who presents with past medical history significant for multiple myeloma, dementia initially presented due to hypotension  Patient was noted to be hypotensive and infusion center with systolic BP noted to be 70  On arrival to the emergency department she no longer had hypotension  She currently has no acute complaints  Denies chest pain, shortness breath, fevers, chills, abdominal pain, nausea, vomiting, constipation, dysuria, cough, numbness, weakness, headaches, or any other symptoms  Procedures Performed: No orders of the defined types were placed in this encounter  Blood culture    Summary of Hospital Course:  After hospitalization patient was monitored very closely  Blood pressures have stabilized with holding the BP meds while in the hospital  Blood cultures were negative  No fever was identified  Pt stable for DC home wth     Significant Findings, Care, Treatment and Services Provided:   Blood cultures negative  downtrending procal likely from the MM    Complications: none    Condition at Discharge: stable     s1,2 (+) regular, lungs diminished at bases but no crackles at bases, confused at baseline, was in re strain secondary irritation which is very baseline behavior, abdomen nontender nondistended bowel sounds audible all 4 quadrants, NC/AT, oral mucosa moist, pallor present    Discharge instructions/Information to patient and family:   See after visit summary for information provided to patient and family  Provisions for Follow-Up Care:  See after visit summary for information related to follow-up care and any pertinent home health orders  PCP: Soheila Polanco MD    Disposition: home heatl    Planned Readmission: No    Discharge Statement   I spent 45 minutes discharging the patient   This time was spent on the day of discharge  I had direct contact with the patient on the day of discharge  Additional documentation is required if more than 30 minutes were spent on discharge  Discharge Medications:  See after visit summary for reconciled discharge medications provided to patient and family

## 2021-03-26 NOTE — ASSESSMENT & PLAN NOTE
Unsure etiology because she has not had any falls or trauma  Could this be secondary to multiple myeloma    She would have to follow-up with your outpatient oncologist for further management plans  No acute intervention planned at this time  Not in any kind of pain

## 2021-03-28 LAB
ATRIAL RATE: 240 BPM
ATRIAL RATE: 242 BPM
P AXIS: 74 DEGREES
P AXIS: 83 DEGREES
PR INTERVAL: 104 MS
QRS AXIS: 100 DEGREES
QRS AXIS: 103 DEGREES
QRSD INTERVAL: 70 MS
QRSD INTERVAL: 74 MS
QT INTERVAL: 318 MS
QT INTERVAL: 326 MS
QTC INTERVAL: 449 MS
QTC INTERVAL: 533 MS
T WAVE AXIS: 47 DEGREES
T WAVE AXIS: 58 DEGREES
VENTRICULAR RATE: 120 BPM
VENTRICULAR RATE: 161 BPM

## 2021-03-28 PROCEDURE — 93010 ELECTROCARDIOGRAM REPORT: CPT | Performed by: INTERNAL MEDICINE

## 2021-03-28 NOTE — PROGRESS NOTES
[facility] - Hematology & Medical Oncology  Outpatient Visit Encounter Note    Laura Contreras 77 y o  female [id number] Date:  3/30/2021      HEMATOLOGICAL HISTORY      Clotting History Denies   Bleeding History Denies   Cancer History Denies prior to her current diagnosis   Family Cancer History Cousin with MM   H/O Blood/Plt Transfusion This year for anemia   Tobacco Use 2-3 cigarettes a day, has been smoking for 50+ years   Alcohol Use Denies   Occupation        SUBJECTIVE      Laura Contreras is a 77 y  o  with cirrhosis, CKD stage IV, dementia, and multiple other cardiovascular comorbidities here for follow up with me today  She was previously seen by Dr Gali Napier after being recently diagnosed with IgG kappa multiple myeloma  The patient is referred by Dr Renu Back  She was previously managed by Dr Yelena Padilla at [facility] for her diagnosis of IgG kappa multiple myeloma after presenting with pancytopenia  He was concerned about the patient's multiple comorbidities specifically pertaining to her cirrhosis  He recommended supportive management to avoid the adverse effects from Revlimid and steroids  Previously, there was decision to hold bortezomib due to her liver dysfunction and hyperbilirubinemia  He had made a recommendation for hospice  Given the several contraindications for anti myeloma therapy including chronic renal and liver failure, Dr Gali Napier also agreed that safe administration of those medications would not be a possibility  Dr Gali Napier recommended hospice but Betsey's son, the principal decision maker, did not wish to pursue that  For her anemia of chronic disease secondary to both CKD and multiple myeloma, she is being monitored with regular CBC and will receive pRBC transfusion to maintain Hb >7 0 g/dL    Her most recent CBC demonstrated stable hemoglobin with decreasing platelet and WBC count     2/25/2021  3/9/2021 3/24/2021  3/25/2021    WBC 3 41 (L) 3 06 (L) 3 08 (L) 2 95 (L)   Red Blood Cell Count 2 79 (L) 2 68 (L) 3 07 (L) 3 22 (L)   Hemoglobin 7 8 (L) 7 3 (L) 8 7 (L) 9 2 (L)   HCT 24 8 (L) 24 7 (L) 27 6 (L) 28 9 (L)   MCV 89 92 90 90   MCH 28 0 27 2 28 3 28 6   MCHC 31 5 29 6 (L) 31 5 31 8   RDW 20 2 (H) 21 4 (H) 19 4 (H) 19 9 (H)   Platelet Count 44 (LL) 51 (L) 40 (LL) 37 (LL)     She has been requiring blood transfusion every 2 weeks  She recently presented to the ER for hypotension for and a medication dose adjustment was made  Rib fractures were also incidentally found which were presumed to be secondary to her multiple myeloma  Visit Today  She is here with her sister Susie Carrillo  Due to Betsey's dementia, Susie Gerardo provided the majority of the history  Susie Carrillo is one of the primary caregivers and her son is the principle decision maker  Susie Carrillo tells me that Cynthia Sabillon blood pressure has always been low and that the blood pressure she took this morning was around the same as her presenting blood pressure (88/56)  She has been taking lactulose for her cirrhosis which has been causing chronic diarrhea  They have been doing their best to ensure adequate hydration  Susie Carrillo tells me that Cynthia Sabillon does not have a poor appetite but she has been eating less  Susieleann Carrillo has been supplementing her meals with ensure  Susie Carrillo tells me that Cynthia Sabillon has not complained of any fevers, chills, headache, lightheadedness, dizziness, changes in vision, chest pain, SOB, nausea, vomiting, abdominal pain  She walks independently at home without a walker or cane and denies any falls  She is here in wheelchair as she cannot walk long distance without support  She denies blood in stool and Susie Carrillo denies any changes in mental status  I have reviewed the relevant past medical, surgical, social and family history  I have also reviewed allergies and medications for this patient      Review of Systems  Review of Systems   All other systems reviewed and are negative  OBJECTIVE     Physical Exam  Vitals:    03/30/21 1528   BP: (!) 88/56   BP Location: Left arm   Patient Position: Sitting   Cuff Size: Large   Pulse: (!) 116   Resp: 16   Temp: 97 5 °F (36 4 °C)   TempSrc: Temporal   SpO2: 95%   Weight: 63 3 kg (139 lb 8 oz)   Height: 5' 2" (1 575 m)       Physical Exam  Vitals signs reviewed  Constitutional:       General: She is not in acute distress  Appearance: Normal appearance  She is normal weight  She is not ill-appearing or toxic-appearing  Comments: Mcneil Nyhan, tired, pleasant 52-year-old female in a wheelchair  HENT:      Head: Normocephalic and atraumatic  Eyes:      Extraocular Movements: Extraocular movements intact  Conjunctiva/sclera: Conjunctivae normal       Pupils: Pupils are equal, round, and reactive to light  Comments: Conjunctival pallor present  Neck:      Musculoskeletal: Normal range of motion and neck supple  No neck rigidity  Cardiovascular:      Rate and Rhythm: Normal rate and regular rhythm  Pulses: Normal pulses  Heart sounds: Normal heart sounds  No murmur  No friction rub  No gallop  Pulmonary:      Effort: Pulmonary effort is normal  No respiratory distress  Breath sounds: Normal breath sounds  No wheezing or rales  Abdominal:      General: Bowel sounds are normal  There is no distension  Palpations: Abdomen is soft  There is no mass  Tenderness: There is no abdominal tenderness  There is no guarding  Musculoskeletal: Normal range of motion  General: No swelling or tenderness  Right lower leg: No edema  Left lower leg: No edema  Lymphadenopathy:      Cervical: No cervical adenopathy  Skin:     General: Skin is warm and dry  Coloration: Skin is not jaundiced or pale  Findings: No bruising, erythema or rash  Neurological:      General: No focal deficit present  Mental Status: She is alert and oriented to person, place, and time  Mental status is at baseline  Psychiatric:         Mood and Affect: Mood normal          Behavior: Behavior normal           Imaging  Relevant imaging reviewed in chart    Labs  Relevant labs reviewed in chart   ASSESSMENT & PLAN      Diagnosis ICD-10-CM Associated Orders   1  Multiple myeloma not having achieved remission (HCC)  C90 00 Ambulatory referral to Palliative Care   2  Goals of care, counseling/discussion  Z71 89 Ambulatory referral to Palliative Care   3  Anemia of chronic disease  D63 8    4  Pancytopenia  D61 818         IgG Kappa Multiple Myeloma  · Due to her chronic renal disease and liver failure, the decision to not pursue anti-myeloma therapy was made via shared decision-making with the patient's family during her last visit  Thus, myeloma related labs are not indicated as it will not   · Currently, there is no change in decision to pursue hospice  · However, I strongly support Dr Char Hickman decision for a referral to Palliative care for discussion about symptom management for poor appetite, pain, insomnia, and other symptoms she may develop over time from her MM  This is clinically indicated given her current disease states  I attempted to call Anamika Arriaga son to discuss this but he was unable to answer the phone  I will call again tomorrow regarding this decision  Anemia of Chronic Disease - CKD and MM  · Due to renal failure and myeloma, her hemoglobin is expected to continue worsening, and she will be transfusion-dependent eventually  I reviewed this with Anamika Arriaga and Kalpesh Zimmerman and they voiced understanding  · We will continue with CBC q2 weeks according to the frequency recommended by nephrologist  Kalpesh Zimmerman wanted to change the day of the week she gets her labs drawn and blood transfusions and I recommended that she speak to her nephrologist about this because he is the ordering physician of her CBC and we will coordinate her pRBC transfusion accordingly     · Currently on pRBC transfusion q 2 weeks regimen  Her current parameters for pRBC transfusion is to maintain Hb >7 0 g/dL  I also recommended that she increase her oral hydration as tolerated given her recent admission for dehydration and her current low blood pressure and medication-induced diarrhea  · I recommended continued follow-up with nephrologist     Pancytopenia - alcoholic cirrhosis and MM  · Her pancytopenia will likely worsen secondary to her progressing alcoholic cirrhosis and multiple myeloma  We will continue to monitor this with her frequent blood work  She voiced understanding  I also recommend continued follow-up with gastroenterologist     Follow Up   3 months to discuss lab work and plan moving forward  All questions were answered to the patient's satisfaction during this encounter  They appreciated and thanked me for spending time with them  The patient knows the contact information for our office and know to reach out for any relevant concerns related to this encounter  For all other listed problems and medical diagnosis in his chart - they are managed by PCP and/or other specialists, which patient acknowledges        Ronaldo Back PA-C  Hematology & Medical Oncology

## 2021-03-29 ENCOUNTER — OFFICE VISIT (OUTPATIENT)
Dept: INTERNAL MEDICINE CLINIC | Facility: CLINIC | Age: 76
End: 2021-03-29
Payer: MEDICARE

## 2021-03-29 ENCOUNTER — TRANSITIONAL CARE MANAGEMENT (OUTPATIENT)
Dept: INTERNAL MEDICINE CLINIC | Facility: CLINIC | Age: 76
End: 2021-03-29

## 2021-03-29 VITALS
HEIGHT: 62 IN | TEMPERATURE: 96.5 F | DIASTOLIC BLOOD PRESSURE: 52 MMHG | SYSTOLIC BLOOD PRESSURE: 88 MMHG | OXYGEN SATURATION: 96 % | HEART RATE: 120 BPM | BODY MASS INDEX: 26.69 KG/M2

## 2021-03-29 DIAGNOSIS — S22.39XD CLOSED FRACTURE OF ONE RIB WITH ROUTINE HEALING, UNSPECIFIED LATERALITY, SUBSEQUENT ENCOUNTER: ICD-10-CM

## 2021-03-29 DIAGNOSIS — N18.4 CHRONIC KIDNEY DISEASE (CKD), ACTIVE MEDICAL MANAGEMENT WITHOUT DIALYSIS, STAGE 4 (SEVERE) (HCC): ICD-10-CM

## 2021-03-29 DIAGNOSIS — F03.91 DEMENTIA WITH BEHAVIORAL DISTURBANCE, UNSPECIFIED DEMENTIA TYPE (HCC): ICD-10-CM

## 2021-03-29 DIAGNOSIS — Z23 ENCOUNTER FOR IMMUNIZATION: Primary | ICD-10-CM

## 2021-03-29 DIAGNOSIS — C90.00 MULTIPLE MYELOMA NOT HAVING ACHIEVED REMISSION (HCC): ICD-10-CM

## 2021-03-29 DIAGNOSIS — I50.32 CHRONIC DIASTOLIC CHF (CONGESTIVE HEART FAILURE) (HCC): ICD-10-CM

## 2021-03-29 DIAGNOSIS — F33.41 RECURRENT MAJOR DEPRESSIVE DISORDER, IN PARTIAL REMISSION (HCC): ICD-10-CM

## 2021-03-29 LAB
BACTERIA BLD CULT: NORMAL
BACTERIA BLD CULT: NORMAL

## 2021-03-29 PROCEDURE — 99214 OFFICE O/P EST MOD 30 MIN: CPT | Performed by: NURSE PRACTITIONER

## 2021-03-29 PROCEDURE — 1123F ACP DISCUSS/DSCN MKR DOCD: CPT | Performed by: NURSE PRACTITIONER

## 2021-03-29 RX ORDER — MIRTAZAPINE 7.5 MG/1
TABLET, FILM COATED ORAL
Qty: 90 TABLET | Refills: 3 | Status: SHIPPED | OUTPATIENT
Start: 2021-03-29 | End: 2021-03-31

## 2021-03-30 ENCOUNTER — OFFICE VISIT (OUTPATIENT)
Dept: HEMATOLOGY ONCOLOGY | Facility: CLINIC | Age: 76
End: 2021-03-30
Payer: MEDICARE

## 2021-03-30 VITALS
DIASTOLIC BLOOD PRESSURE: 56 MMHG | OXYGEN SATURATION: 95 % | HEIGHT: 62 IN | TEMPERATURE: 97.5 F | WEIGHT: 139.5 LBS | BODY MASS INDEX: 25.67 KG/M2 | HEART RATE: 116 BPM | RESPIRATION RATE: 16 BRPM | SYSTOLIC BLOOD PRESSURE: 88 MMHG

## 2021-03-30 DIAGNOSIS — D63.8 ANEMIA OF CHRONIC DISEASE: ICD-10-CM

## 2021-03-30 DIAGNOSIS — C90.00 MULTIPLE MYELOMA NOT HAVING ACHIEVED REMISSION (HCC): Primary | ICD-10-CM

## 2021-03-30 DIAGNOSIS — Z71.89 GOALS OF CARE, COUNSELING/DISCUSSION: ICD-10-CM

## 2021-03-30 DIAGNOSIS — D61.818 PANCYTOPENIA (HCC): ICD-10-CM

## 2021-03-30 PROCEDURE — 99214 OFFICE O/P EST MOD 30 MIN: CPT | Performed by: STUDENT IN AN ORGANIZED HEALTH CARE EDUCATION/TRAINING PROGRAM

## 2021-03-30 NOTE — PATIENT INSTRUCTIONS
Hypotension   WHAT YOU NEED TO KNOW:   What is hypotension? Hypotension is a condition that causes your blood pressure (BP) to drop lower than it should be  Hypotension may be mild, serious, or life-threatening  What are the most common types of hypotension? · Acute:  Acute hypotension is a sudden drop in your BP that may be life-threatening  · Constitutional:  Constitutional hypotension means your BP is lower than it should be most or all of the time  This is a chronic condition that occurs with no known medical cause  · Orthostatic:  Orthostatic hypotension normally occurs when you stand up from a sitting or lying position  It is also called postural hypotension  · Postprandial:  Postprandial hypotension means your BP becomes too low after you eat a meal  Your BP may drop within 2 hours after you eat, and is more common when you eat meals high in carbohydrates  What causes hypotension? · Anemia or blood loss    · Nervous system, heart, or adrenal disorders    · Dehydration from not drinking enough liquids, frequent vomiting, diarrhea, or severe burns    · Some medicines, such as those used to treat high blood pressure, heart conditions, pain, depression, or cancer    · A blood infection (sepsis)    What increases my risk for hypotension? · Increasing age    · Drug and alcohol use    · Being bedridden for a long period of time    · Low body weight    · Hemodialysis    · Medical conditions such as diabetes, Parkinson disease, and Alzheimer disease    What are the signs and symptoms of hypotension? · Feeling anxious, nauseated, tired, or weak    · Lightheadedness, dizziness, or fainting    · Increased sweating, palpitations (fast, forceful heartbeats), tremors, or a seizure     · Headache or pain in your neck, shoulders, chest, lower back, buttocks, or legs    · Blurred vision or changes in vision    · Confusion, decreased memory, or inability to pay attention    How is hypotension diagnosed? Your healthcare provider will ask about your symptoms, health conditions, and medicines  Tell him how often you have symptoms, and if they change during the day  Tell him if you recently have had diarrhea, vomiting, or blood loss  Your healthcare provider will carefully examine you, listen to your heart, and may check your eyes  He may check your body movements and sensations (ability to feel something that touches you)  You may also need the following:  · Blood pressure testing: This may be done while you lie down, sit, and stand  You may need to wear a BP monitor to record your BP for up to 24 hours  · Tilt table testing: You are secured on a table that changes your position  Your BP is checked when the table moves you into each position  What tests may help find the cause of my hypotension? Many times, hypotension is a symptom of another condition  You may need any of the following tests to find the cause of your hypotension:  · Blood tests:  A sample of your blood or urine may be checked for anemia or other conditions causing your hypotension  · EKG: This test records the electrical activity of your heart  It is used to check for damage or other heart problems that may be causing your hypotension  · Autonomic nervous system tests:  Your healthcare provider may check for changes in how fast your heart beats when you take deep breaths  He may also check for changes in your BP while you put your hand in ice cold water  · 24 hour urine test:  During this test you will need to collect all of your urine for 24 hours  You will urinate into a container and the urine will be put into a jug  The jug will need to be kept cold  If you urinate during the night, you will need to save that urine  Healthcare providers will measure and record how much you urinate  At the end of 24 hours, the urine will be sent to a lab for tests  · Echocardiogram:  This is a type of ultrasound, also called an echo   An ultrasound uses sound waves to show pictures of your heart on a monitor  An ultrasound may be done to show how your heart moves when it beats  How is hypotension treated? Your healthcare provider will work with you to find the cause of your hypotension and help treat your symptoms  You may need the following:  · Compression stockings or abdominal binder: These may help blood return to your heart and decrease your hypotension  · IV fluids: These may be used to increase your BP if you are dehydrated or have blood loss or sepsis  · Medicines:      ? Alpha-adrenoreceptor agonists: These medicines may increase your BP and decrease your symptoms  ? Steroids: This medicine helps prevent salt loss from your body  Steroids may also help increase the amount of fluid in your body and raise your BP  ? Vasopressors: These medicines help constrict (make smaller) your blood vessels and increase your BP  Vasopressor medicines may increase the blood flow to your brain and help decrease your symptoms  ? Antidiuretic hormone: This medicine helps control your BP and helps decrease your need to urinate during the night  ? Antiparkinson medicine: This medicine may help increase your standing BP and decrease your symptoms  What are the risks of hypotension? Without treatment, your symptoms may get worse  You may faint or fall often, which can lead to injuries, such as a broken bone  You may be at increased risk for depression, confusion, and memory problems  Hypotension may cause decreased blood flow to your brain and heart  This may lead to a stroke or heart attack and can be life-threatening  Sepsis-related hypotension is life-threatening without treatment  How can I manage my symptoms? · Change your position slowly:  When you get out of bed, sit up first, then slowly move your legs to the side of the bed  If you are not having any symptoms, slowly stand up   If you have symptoms, sit down right away     · Avoid straining:  Activities and movements that cause you to strain can cause a drop in your BP  Activities to avoid include lifting, coughing, and other movements that increase the feeling of pressure in your chest     · Avoid the heat: This can cause a decrease in your BP  Stay inside during very hot days, or limit the amount of time you are outside  Do not take hot baths  · Exercise and do physical counter maneuvers:  Ask your healthcare provider about the best exercise plan for you  Physical counter maneuvers may help to increase your BP and increase blood flow to your heart  They include crossing your legs, squatting, and bending at the waist  You can also rise up on your toes while you are standing, and tighten your thigh muscles  · Drink liquids as directed:  Ask your healthcare provider how much liquid to drink each day and which liquids are best for you  Drink 500 milliliters (½ liter) of liquid quickly in the morning or before meals to help increase your BP  Your healthcare provider may tell you to drink 2 cups of coffee with, or after, breakfast and lunch  The caffeine in the coffee can help prevent a drop in your BP  Your healthcare provider may also give you caffeine pills  · Change how you eat meals: If your BP drops after you eat large meals, try to eat smaller meals more often  Eat foods low in carbohydrates and cholesterol to help prevent BP drops after you eat  Ask if you need to increase the amount of sodium (salt) you eat each day  · Raise the head of your bed:  Raise the head of your bed 4 to 8 inches  This can help prevent morning BP drops and decrease the need to urinate during the night  · Do not drink alcohol:  Alcohol can make your symptoms worse  Ask for information if you need help quitting  When should I contact my healthcare provider? · You vomit several times or have diarrhea, and you cannot drink liquid  · You have a fever       · You have new or increased symptoms, such as dizziness, weakness, or fainting  · Your legs, ankles, and feet are swollen, or you gain weight for no known reason  · You have questions or concerns about your condition or care  When should I seek immediate care or call 911? · You become confused or cannot speak  · You urinate very little or not at all  · You have a seizure  · You have chest pain or trouble breathing  · You have changes in vision or cannot see  CARE AGREEMENT:   You have the right to help plan your care  Learn about your health condition and how it may be treated  Discuss treatment options with your healthcare providers to decide what care you want to receive  You always have the right to refuse treatment  The above information is an  only  It is not intended as medical advice for individual conditions or treatments  Talk to your doctor, nurse or pharmacist before following any medical regimen to see if it is safe and effective for you  © Copyright [address] Information is for End User's use only and may not be sold, redistributed or otherwise used for commercial purposes   All illustrations and images included in CareNotes® are the copyrighted property of [employer]  or [address] [place] [address]

## 2021-03-30 NOTE — PROGRESS NOTES
INTERNAL MEDICINE TRANSITION OF CARE OFFICE VISIT  [facility] - [facility]    NAME: Merline Bran  AGE: 77 y o  SEX: female  : 1945     DATE: 3/30/2021     Assessment and Plan:     Problem List Items Addressed This Visit        Cardiovascular and Mediastinum    Acute on chronic diastolic CHF     Wt Readings from Last 3 Encounters:   21 66 2 kg (145 lb 15 1 oz)   21 69 7 kg (153 lb 9 6 oz)   21 69 2 kg (152 lb 9 6 oz)     Weight is stable today  Edema of b/l LE +1 nonpitting  BP is still low however manual recheck gives average bp of 95/61 with   Patient is asymptomatic, prior bp's are low 100's to 90's over 50-60's  HR is elevated for her norm of the 60-70's  Patient is not eating well  Advised to increase intake of protein shakes to 2-3 times daily and although she needs to monitor salt intake, can increase short term to help improve pressures  Patient has VNA services once every two weeks  Advised sister to check bp daily and call with any dizziness, lightheadedness, weakness, or sob  Nervous and Auditory    Dementia (HCC)     Stable  Musculoskeletal and Integument    Rib fracture     Advised follow up with hem/onc which is scheduled for tomorrow  Also discussed if patient is sitting in a chair with arm rests on the sides, fracture could be due to the pressure of leaning on the chair  Advised pillows or cushioning to all sides of the chair  Genitourinary    Chronic kidney disease (CKD), active medical management without dialysis, stage 4 (severe) (HCC)     Lab Results   Component Value Date    EGFR 36 2021    EGFR 29 2021    EGFR 30 2021    CREATININE 1 63 (H) 2021    CREATININE 1 91 (H) 2021    CREATININE 1 85 (H) 2021     Patient to continue lasix 60 mg daily  Monitor daily weights and LE edema               Other    Multiple myeloma ([address] )      Other Visit Diagnoses     Encounter for immunization    -  Primary           Transitional Care Management Review:     Keith Cowden is a 77 y o  female here for TCM follow-up    During the TCM phone call patient stated:    TCM Call (since 2/27/2021)     Date and time call was made  3/29/2021  9:28 AM    Hospital care reviewed  Records reviewed    Patient was hospitial[facility] at  [facility]        Date of Admission  03/24/21    Date of discharge  03/26/21    Diagnosis  hypotension    Disposition  Home; Home health services      TCM Call (since 2/27/2021)     None           HPI:     Patient here for hospital follow up  Admitted for two days for hypotension  Here with sister today  Patient states she feels well overall  Denies fatigue, weakness or dizziness  She is in wheelchair for appt, but states at home she walks around by herself  She does not have much of an appetite  Family states they try to encourage food throughout the day, but she does not eat much  She does drink water, juice, and a protein drink daily  Sister questioning why they stopped torsemide and put her back on lasix  States she has not taken lasix in many months  I do not see any notes from hospital stay about his  Looks like they mentioned decreasing daily dose of lasix, however chart shows patient was on torsemide since at least November last year  The following portions of the patient's history were reviewed and updated as appropriate: allergies, current medications, past family history, past medical history, past social history, past surgical history and problem list      Review of Systems:     Review of Systems   Constitutional: Negative for chills, fatigue and fever  Respiratory: Negative for chest tightness and shortness of breath  Cardiovascular: Positive for leg swelling  Negative for chest pain  Neurological: Negative for dizziness, weakness, light-headedness and headaches     Psychiatric/Behavioral: Negative for sleep disturbance  The patient is not nervous/anxious  Problem List:     Patient Active Problem List   Diagnosis    Anemia    Cigarette nicotine dependence without complication    Hyperglycemia    Paroxysmal SVT (supraventricular tachycardia) (HCC)    Sinus tachycardia    Urinary incontinence    Memory difficulties    Gait disturbance    Obesity (BMI 30 0-34  9)    Recurrent major depressive disorder, in partial remission (HCC)    Pancytopenia ([address] )    History of alcohol abuse    Metabolic encephalopathy    Liver cirrhosis (HCC)    Acute on chronic diastolic CHF    Dementia     Urinary tract infection without hematuria    CESAR (acute kidney injury) (HCC)    Dysphagia    Paroxysmal atrial fibrillation (HCC)/Tachycardia    Troponin level elevated    Neutropenia (HCC)    Hyperphosphatemia    Hyperkalemia    Hyponatremia    Multiple myeloma (HCC)    Epistaxis    Azotemia    Thrombocytopenia (HCC)    Pulmonary hypertension (HCC)    Hyperuricemia    Acute renal failure with acute tubular necrosis superimposed on chronic kidney disease (HCC)    Ascites    Chronic kidney disease (CKD), active medical management without dialysis, stage 4 (severe) (HCC)    Volume overload    Diarrhea    Dementia (HCC)    Acute kidney injury superimposed on chronic kidney disease (HCC)    Hypotension    Rib fracture        Objective:     BP (!) 88/52 (BP Location: Left arm, Patient Position: Sitting) Comment: 100/63 hr 114, 94/60 hr 114, 91/60 hr 112, avg 95/61 hr 112  Pulse (!) 120   Temp (!) 96 5 °F (35 8 °C) (Tympanic) Comment: fdfdf  Ht 5' 2" (1 575 m)   SpO2 96%   BMI 26 69 kg/m²     Physical Exam  Vitals signs reviewed  Constitutional:       General: She is not in acute distress  Appearance: Normal appearance  She is not ill-appearing  HENT:      Head: Normocephalic and atraumatic  Cardiovascular:      Rate and Rhythm: Normal rate and regular rhythm        Heart sounds: Normal heart sounds, S1 normal and S2 normal    Pulmonary:      Effort: Pulmonary effort is normal  No accessory muscle usage  Breath sounds: Normal breath sounds  No wheezing  Musculoskeletal:      Right lower le+ Edema present  Left lower le+ Edema present  Skin:     General: Skin is warm and dry  Capillary Refill: Capillary refill takes less than 2 seconds  Findings: No rash  Neurological:      General: No focal deficit present  Mental Status: She is alert and oriented to person, place, and time  Mental status is at baseline  Motor: Motor function is intact  Psychiatric:         Attention and Perception: Attention and perception normal          Mood and Affect: Mood and affect normal          Speech: Speech normal          Behavior: Behavior normal  Behavior is cooperative  Thought Content: Thought content normal          Laboratory Results: I have personally reviewed the pertinent laboratory results/reports     Radiology/Other Diagnostic Testing Results: I have personally reviewed pertinent reports  Xr Chest 1 View Portable    Result Date: 3/24/2021  CHEST INDICATION:   tachycardia/hypotension  COMPARISON:  Chest radiograph from 2021 and chest CT from 2020  EXAM PERFORMED/VIEWS:  XR CHEST PORTABLE  FINDINGS: Mild cardiomegaly  Mild pulmonary venous congestion  Chronic moderate right effusion and right base atelectasis  Mildly displaced fractures of the posterior right and left 6th ribs, new since 2021  Mild pulmonary congestion with moderate chronic right pleural effusion  Mildly displaced fractures of the posterior right and left 6th ribs, new since 2021  I personally discussed this study with Manju Dasilva on 3/24/2021 at 5:07 PM   Workstation performed: [id number]     Ct Head Without Contrast    Result Date: 3/24/2021  CT BRAIN - WITHOUT CONTRAST INDICATION:   Unexplained rib fractures  COMPARISON:  2021   TECHNIQUE:  CT examination of the brain was performed  In addition to axial images, sagittal and coronal 2D reformatted images were created and submitted for interpretation  Radiation dose length product (DLP) for this visit:  756 67 mGy-cm   This examination, like all CT scans performed in the [facility], was performed utilizing techniques to minimize radiation dose exposure, including the use of iterative  reconstruction and automated exposure control  IMAGE QUALITY:  Diagnostic  FINDINGS: PARENCHYMA:  Periventricular and subcortical hypoattenuating foci consistent with microangiopathic disease  No intracranial hemorrhage or mass effect  VENTRICLES AND EXTRA-AXIAL SPACES:  No hydrocephalus or extra-axial collection  VISUALIZED ORBITS AND PARANASAL SINUSES:  Intact globes and orbits  Postsurgical change from right turbinectomy  No significant paranasal sinus disease  CALVARIUM AND EXTRACRANIAL SOFT TISSUES:  No acute calvarial fracture  No acute intracranial abnormality  Workstation performed: [id number]     Ct Spine Cervical Without Contrast    Result Date: 3/24/2021  CT CERVICAL SPINE - WITHOUT CONTRAST INDICATION:   Unexplained rib fractures  COMPARISON:  11/4/2017  TECHNIQUE:  CT examination of the cervical spine was performed without intravenous contrast   Contiguous axial images were obtained  Sagittal and coronal reconstructions were performed  Radiation dose length product (DLP) for this visit:  406 74 mGy-cm   This examination, like all CT scans performed in the [facility], was performed utilizing techniques to minimize radiation dose exposure, including the use of iterative  reconstruction and automated exposure control  IMAGE QUALITY:  Diagnostic  FINDINGS: ALIGNMENT:  Normal alignment of the cervical spine  No subluxation  VERTEBRAL BODIES:  No acute cervical spine fracture  DEGENERATIVE CHANGES:  No significant cervical degenerative changes are noted   PREVERTEBRAL AND PARASPINAL SOFT TISSUES:  No prevertebral soft tissue swelling  THORACIC INLET:  Markedly enlarged thyroid extending into the thoracic inlet without tracheal narrowing  Stable thyroid nodules  Partially visualized moderate right pleural effusion  1   No evidence of acute cervical spine fracture or traumatic malalignment  2   Moderate right pleural effusion  Workstation performed: [id number]        Current Medications:     Outpatient Medications Prior to Visit   Medication Sig Dispense Refill    diltiazem (CARDIZEM CD) 120 mg 24 hr capsule Take 1 capsule (120 mg total) by mouth daily 90 capsule 0    folic acid (FOLVITE) 1 mg tablet TAKE ONE TABLET BY MOUTH EVERY DAY 90 tablet 1    furosemide (LASIX) 40 mg tablet Take 1 5 tablets (60 mg total) by mouth daily 30 tablet 0    lactulose 20 g/30 mL Take 15 mL (10 g total) by mouth 2 (two) times a day 1892 mL 0    magnesium oxide (MAG-OX) 400 mg tablet TAKE ONE TABLET BY MOUTH TWICE A  tablet 1    metoprolol succinate (TOPROL-XL) 100 mg 24 hr tablet Take 0 5 tablets (50 mg total) by mouth daily 30 tablet 0    mirtazapine (REMERON) 15 mg tablet Take 1 tablet (15 mg total) by mouth daily at bedtime 90 tablet 1    nicotine (NICODERM CQ) 14 mg/24hr TD 24 hr patch Place 1 patch on the skin daily 28 patch 0    nystatin (MYCOSTATIN) powder Apply topically 2 (two) times a day 15 g 0    pantoprazole (PROTONIX) 40 mg tablet TAKE ONE TABLET BY MOUTH EVERY DAY IN THE MORNING 30 tablet 2    sodium chloride (OCEAN) 0 65 % nasal spray 1 spray into each nostril every hour as needed for congestion 30 mL 0    Thiamine HCl (vitamin B-1) 100 MG TABS TAKE ONE TABLET BY MOUTH EVERY DAY 90 each 1     No facility-administered medications prior to visit          MEREDITH Valenzuela  [facility]

## 2021-03-30 NOTE — ASSESSMENT & PLAN NOTE
Wt Readings from Last 3 Encounters:   03/24/21 66 2 kg (145 lb 15 1 oz)   03/17/21 69 7 kg (153 lb 9 6 oz)   03/04/21 69 2 kg (152 lb 9 6 oz)     Weight is stable today  Edema of b/l LE +1 nonpitting  BP is still low however manual recheck gives average bp of 95/61 with   Patient is asymptomatic, prior bp's are low 100's to 90's over 50-60's  HR is elevated for her norm of the 60-70's  Patient is not eating well  Advised to increase intake of protein shakes to 2-3 times daily and although she needs to monitor salt intake, can increase short term to help improve pressures  Patient has VNA services once every two weeks  Advised sister to check bp daily and call with any dizziness, lightheadedness, weakness, or sob

## 2021-03-30 NOTE — ASSESSMENT & PLAN NOTE
Advised follow up with hem/onc which is scheduled for tomorrow  Also discussed if patient is sitting in a chair with arm rests on the sides, fracture could be due to the pressure of leaning on the chair  Advised pillows or cushioning to all sides of the chair

## 2021-03-30 NOTE — ASSESSMENT & PLAN NOTE
Lab Results   Component Value Date    EGFR 36 03/25/2021    EGFR 29 03/24/2021    EGFR 30 03/24/2021    CREATININE 1 63 (H) 03/25/2021    CREATININE 1 91 (H) 03/24/2021    CREATININE 1 85 (H) 03/24/2021     Patient to continue lasix 60 mg daily  Monitor daily weights and LE edema

## 2021-03-31 ENCOUNTER — TELEPHONE (OUTPATIENT)
Dept: HEMATOLOGY ONCOLOGY | Facility: CLINIC | Age: 76
End: 2021-03-31

## 2021-03-31 DIAGNOSIS — F33.41 RECURRENT MAJOR DEPRESSIVE DISORDER, IN PARTIAL REMISSION (HCC): ICD-10-CM

## 2021-03-31 RX ORDER — MIRTAZAPINE 7.5 MG/1
TABLET, FILM COATED ORAL
Qty: 90 TABLET | Refills: 3 | Status: SHIPPED | OUTPATIENT
Start: 2021-03-31 | End: 2021-04-01 | Stop reason: CLARIF

## 2021-03-31 NOTE — TELEPHONE ENCOUNTER
Telephone Call Documentation:  I spoke with Theora Dancer, Betsey's son and principle decision maker, about her visit yesterday  I reviewed our recommendation for her to be evaluated by Palliative care for adequate symptom management  I explained that Dr Joann Gutierrez already placed the referral, so he may need to call the palliative care department to make an appointment  I again confirmed that we do not recommend myeloma-targeted treatment given her current kidney and liver disease states  I also reviewed that her current parameters for pRBC transfusion is to maintain Hb >7 0 g/dL  I answered all his questions and he voiced understanding

## 2021-04-01 ENCOUNTER — APPOINTMENT (EMERGENCY)
Dept: CT IMAGING | Facility: HOSPITAL | Age: 76
DRG: 441 | End: 2021-04-01
Payer: MEDICARE

## 2021-04-01 ENCOUNTER — APPOINTMENT (EMERGENCY)
Dept: RADIOLOGY | Facility: HOSPITAL | Age: 76
DRG: 441 | End: 2021-04-01
Payer: MEDICARE

## 2021-04-01 ENCOUNTER — HOSPITAL ENCOUNTER (INPATIENT)
Facility: HOSPITAL | Age: 76
LOS: 8 days | Discharge: HOME HEALTH | DRG: 441 | End: 2021-04-09
Attending: EMERGENCY MEDICINE | Admitting: INTERNAL MEDICINE
Payer: MEDICARE

## 2021-04-01 DIAGNOSIS — E80.6 HYPERBILIRUBINEMIA: ICD-10-CM

## 2021-04-01 DIAGNOSIS — D69.6 THROMBOCYTOPENIA (HCC): ICD-10-CM

## 2021-04-01 DIAGNOSIS — K70.30 ALCOHOLIC CIRRHOSIS, UNSPECIFIED WHETHER ASCITES PRESENT (HCC): ICD-10-CM

## 2021-04-01 DIAGNOSIS — I48.0 PAROXYSMAL ATRIAL FIBRILLATION (HCC): ICD-10-CM

## 2021-04-01 DIAGNOSIS — Z85.79 HISTORY OF MULTIPLE MYELOMA: ICD-10-CM

## 2021-04-01 DIAGNOSIS — D72.825 BANDEMIA: ICD-10-CM

## 2021-04-01 DIAGNOSIS — Z87.19 HISTORY OF CIRRHOSIS: ICD-10-CM

## 2021-04-01 DIAGNOSIS — E72.20 HYPERAMMONEMIA (HCC): ICD-10-CM

## 2021-04-01 DIAGNOSIS — N39.0 URINARY TRACT INFECTION: Primary | ICD-10-CM

## 2021-04-01 DIAGNOSIS — F03.91 DEMENTIA WITH BEHAVIORAL DISTURBANCE, UNSPECIFIED DEMENTIA TYPE (HCC): ICD-10-CM

## 2021-04-01 DIAGNOSIS — D64.9 CHRONIC ANEMIA: ICD-10-CM

## 2021-04-01 DIAGNOSIS — K70.31 ALCOHOLIC CIRRHOSIS OF LIVER WITH ASCITES (HCC): ICD-10-CM

## 2021-04-01 DIAGNOSIS — C90.00 MULTIPLE MYELOMA NOT HAVING ACHIEVED REMISSION (HCC): ICD-10-CM

## 2021-04-01 DIAGNOSIS — G93.40 ACUTE ENCEPHALOPATHY: ICD-10-CM

## 2021-04-01 DIAGNOSIS — N17.9 ACUTE KIDNEY INJURY SUPERIMPOSED ON CHRONIC KIDNEY DISEASE (HCC): ICD-10-CM

## 2021-04-01 DIAGNOSIS — R78.81 BACTEREMIA: ICD-10-CM

## 2021-04-01 DIAGNOSIS — N18.9 CKD (CHRONIC KIDNEY DISEASE): ICD-10-CM

## 2021-04-01 DIAGNOSIS — N18.9 ACUTE KIDNEY INJURY SUPERIMPOSED ON CHRONIC KIDNEY DISEASE (HCC): ICD-10-CM

## 2021-04-01 PROBLEM — I50.30 DIASTOLIC CHF (HCC): Status: ACTIVE | Noted: 2021-01-18

## 2021-04-01 PROBLEM — I95.9 HYPOTENSION (ARTERIAL): Status: ACTIVE | Noted: 2021-04-01

## 2021-04-01 PROBLEM — J18.9 PNEUMONIA: Status: ACTIVE | Noted: 2020-05-06

## 2021-04-01 LAB
ALBUMIN SERPL BCP-MCNC: 3.1 G/DL (ref 3.5–5)
ALP SERPL-CCNC: 251 U/L (ref 46–116)
ALT SERPL W P-5'-P-CCNC: 24 U/L (ref 12–78)
AMMONIA PLAS-SCNC: 115 UMOL/L (ref 11–35)
ANION GAP SERPL CALCULATED.3IONS-SCNC: 12 MMOL/L (ref 4–13)
ANISOCYTOSIS BLD QL SMEAR: PRESENT
APTT PPP: 34 SECONDS (ref 23–37)
AST SERPL W P-5'-P-CCNC: 41 U/L (ref 5–45)
BACTERIA UR QL AUTO: ABNORMAL /HPF
BASOPHILS # BLD MANUAL: 0 THOUSAND/UL (ref 0–0.1)
BASOPHILS NFR MAR MANUAL: 0 % (ref 0–1)
BILIRUB DIRECT SERPL-MCNC: 1.32 MG/DL (ref 0–0.2)
BILIRUB SERPL-MCNC: 2.04 MG/DL (ref 0.2–1)
BILIRUB UR QL STRIP: ABNORMAL
BUN SERPL-MCNC: 42 MG/DL (ref 5–25)
CALCIUM SERPL-MCNC: 9 MG/DL (ref 8.3–10.1)
CHLORIDE SERPL-SCNC: 105 MMOL/L (ref 100–108)
CLARITY UR: ABNORMAL
CO2 SERPL-SCNC: 25 MMOL/L (ref 21–32)
COLOR UR: YELLOW
CREAT SERPL-MCNC: 2.09 MG/DL (ref 0.6–1.3)
EOSINOPHIL # BLD MANUAL: 0.04 THOUSAND/UL (ref 0–0.4)
EOSINOPHIL NFR BLD MANUAL: 1 % (ref 0–6)
ERYTHROCYTE [DISTWIDTH] IN BLOOD BY AUTOMATED COUNT: 21.2 % (ref 11.6–15.1)
GFR SERPL CREATININE-BSD FRML MDRD: 26 ML/MIN/1.73SQ M
GLUCOSE SERPL-MCNC: 124 MG/DL (ref 65–140)
GLUCOSE UR STRIP-MCNC: NEGATIVE MG/DL
HCT VFR BLD AUTO: 26.2 % (ref 34.8–46.1)
HGB BLD-MCNC: 8.2 G/DL (ref 11.5–15.4)
HGB UR QL STRIP.AUTO: ABNORMAL
HYPERCHROMIA BLD QL SMEAR: PRESENT
INR PPP: 1.18 (ref 0.84–1.19)
KETONES UR STRIP-MCNC: NEGATIVE MG/DL
LACTATE SERPL-SCNC: 1.8 MMOL/L (ref 0.5–2)
LEUKOCYTE ESTERASE UR QL STRIP: ABNORMAL
LYMPHOCYTES # BLD AUTO: 1.02 THOUSAND/UL (ref 0.6–4.47)
LYMPHOCYTES # BLD AUTO: 23 % (ref 14–44)
MAGNESIUM SERPL-MCNC: 1.7 MG/DL (ref 1.6–2.6)
MCH RBC QN AUTO: 28.4 PG (ref 26.8–34.3)
MCHC RBC AUTO-ENTMCNC: 31.3 G/DL (ref 31.4–37.4)
MCV RBC AUTO: 91 FL (ref 82–98)
MONOCYTES # BLD AUTO: 0.4 THOUSAND/UL (ref 0–1.22)
MONOCYTES NFR BLD: 9 % (ref 4–12)
MYELOCYTES NFR BLD MANUAL: 2 % (ref 0–1)
NEUTROPHILS # BLD MANUAL: 2.84 THOUSAND/UL (ref 1.85–7.62)
NEUTS BAND NFR BLD MANUAL: 12 % (ref 0–8)
NEUTS SEG NFR BLD AUTO: 52 % (ref 43–75)
NITRITE UR QL STRIP: POSITIVE
NON-SQ EPI CELLS URNS QL MICRO: ABNORMAL /HPF
NRBC BLD AUTO-RTO: 14 /100 WBC (ref 0–2)
NRBC BLD AUTO-RTO: 9 /100 WBCS
NT-PROBNP SERPL-MCNC: ABNORMAL PG/ML
OTHER STN SPEC: ABNORMAL
PH UR STRIP.AUTO: 6 [PH]
PLATELET # BLD AUTO: 38 THOUSANDS/UL (ref 149–390)
PLATELET BLD QL SMEAR: ABNORMAL
POIKILOCYTOSIS BLD QL SMEAR: PRESENT
POLYCHROMASIA BLD QL SMEAR: PRESENT
POTASSIUM SERPL-SCNC: 4.9 MMOL/L (ref 3.5–5.3)
PROCALCITONIN SERPL-MCNC: 1.17 NG/ML
PROT SERPL-MCNC: 6.4 G/DL (ref 6.4–8.2)
PROT UR STRIP-MCNC: ABNORMAL MG/DL
PROTHROMBIN TIME: 15.2 SECONDS (ref 11.6–14.5)
RBC # BLD AUTO: 2.89 MILLION/UL (ref 3.81–5.12)
RBC #/AREA URNS AUTO: ABNORMAL /HPF
SCHISTOCYTES BLD QL SMEAR: PRESENT
SODIUM SERPL-SCNC: 142 MMOL/L (ref 136–145)
SP GR UR STRIP.AUTO: 1.01 (ref 1–1.03)
TARGETS BLD QL SMEAR: PRESENT
TOTAL CELLS COUNTED SPEC: 100
TROPONIN I SERPL-MCNC: <0.02 NG/ML
UROBILINOGEN UR QL STRIP.AUTO: 1 E.U./DL
VARIANT LYMPHS # BLD AUTO: 1 %
WBC # BLD AUTO: 4.44 THOUSAND/UL (ref 4.31–10.16)
WBC #/AREA URNS AUTO: ABNORMAL /HPF

## 2021-04-01 PROCEDURE — 96374 THER/PROPH/DIAG INJ IV PUSH: CPT

## 2021-04-01 PROCEDURE — 83880 ASSAY OF NATRIURETIC PEPTIDE: CPT | Performed by: EMERGENCY MEDICINE

## 2021-04-01 PROCEDURE — 81001 URINALYSIS AUTO W/SCOPE: CPT | Performed by: EMERGENCY MEDICINE

## 2021-04-01 PROCEDURE — 99223 1ST HOSP IP/OBS HIGH 75: CPT | Performed by: INTERNAL MEDICINE

## 2021-04-01 PROCEDURE — 87086 URINE CULTURE/COLONY COUNT: CPT | Performed by: EMERGENCY MEDICINE

## 2021-04-01 PROCEDURE — 85007 BL SMEAR W/DIFF WBC COUNT: CPT | Performed by: EMERGENCY MEDICINE

## 2021-04-01 PROCEDURE — 84484 ASSAY OF TROPONIN QUANT: CPT | Performed by: EMERGENCY MEDICINE

## 2021-04-01 PROCEDURE — 87077 CULTURE AEROBIC IDENTIFY: CPT | Performed by: EMERGENCY MEDICINE

## 2021-04-01 PROCEDURE — 85610 PROTHROMBIN TIME: CPT | Performed by: EMERGENCY MEDICINE

## 2021-04-01 PROCEDURE — 93005 ELECTROCARDIOGRAM TRACING: CPT

## 2021-04-01 PROCEDURE — 84145 PROCALCITONIN (PCT): CPT | Performed by: EMERGENCY MEDICINE

## 2021-04-01 PROCEDURE — 36415 COLL VENOUS BLD VENIPUNCTURE: CPT | Performed by: EMERGENCY MEDICINE

## 2021-04-01 PROCEDURE — 80076 HEPATIC FUNCTION PANEL: CPT | Performed by: EMERGENCY MEDICINE

## 2021-04-01 PROCEDURE — 80048 BASIC METABOLIC PNL TOTAL CA: CPT | Performed by: EMERGENCY MEDICINE

## 2021-04-01 PROCEDURE — 85730 THROMBOPLASTIN TIME PARTIAL: CPT | Performed by: EMERGENCY MEDICINE

## 2021-04-01 PROCEDURE — 70450 CT HEAD/BRAIN W/O DYE: CPT

## 2021-04-01 PROCEDURE — 83605 ASSAY OF LACTIC ACID: CPT | Performed by: EMERGENCY MEDICINE

## 2021-04-01 PROCEDURE — 99285 EMERGENCY DEPT VISIT HI MDM: CPT

## 2021-04-01 PROCEDURE — 87040 BLOOD CULTURE FOR BACTERIA: CPT | Performed by: EMERGENCY MEDICINE

## 2021-04-01 PROCEDURE — 87186 SC STD MICRODIL/AGAR DIL: CPT | Performed by: EMERGENCY MEDICINE

## 2021-04-01 PROCEDURE — G1004 CDSM NDSC: HCPCS

## 2021-04-01 PROCEDURE — 99285 EMERGENCY DEPT VISIT HI MDM: CPT | Performed by: EMERGENCY MEDICINE

## 2021-04-01 PROCEDURE — 82140 ASSAY OF AMMONIA: CPT | Performed by: EMERGENCY MEDICINE

## 2021-04-01 PROCEDURE — 71045 X-RAY EXAM CHEST 1 VIEW: CPT

## 2021-04-01 PROCEDURE — 83735 ASSAY OF MAGNESIUM: CPT | Performed by: EMERGENCY MEDICINE

## 2021-04-01 PROCEDURE — 85027 COMPLETE CBC AUTOMATED: CPT | Performed by: EMERGENCY MEDICINE

## 2021-04-01 RX ORDER — MIRTAZAPINE 15 MG/1
15 TABLET, FILM COATED ORAL
Status: DISCONTINUED | OUTPATIENT
Start: 2021-04-01 | End: 2021-04-09 | Stop reason: HOSPADM

## 2021-04-01 RX ORDER — PANTOPRAZOLE SODIUM 40 MG/1
40 TABLET, DELAYED RELEASE ORAL EVERY MORNING
Status: DISCONTINUED | OUTPATIENT
Start: 2021-04-02 | End: 2021-04-09 | Stop reason: HOSPADM

## 2021-04-01 RX ORDER — LACTULOSE 20 G/30ML
20 SOLUTION ORAL 2 TIMES DAILY
Status: DISCONTINUED | OUTPATIENT
Start: 2021-04-01 | End: 2021-04-05

## 2021-04-01 RX ORDER — METOPROLOL TARTRATE 5 MG/5ML
2.5 INJECTION INTRAVENOUS EVERY 6 HOURS PRN
Status: DISCONTINUED | OUTPATIENT
Start: 2021-04-01 | End: 2021-04-09 | Stop reason: HOSPADM

## 2021-04-01 RX ORDER — METOPROLOL SUCCINATE 50 MG/1
50 TABLET, EXTENDED RELEASE ORAL EVERY 12 HOURS SCHEDULED
Status: DISCONTINUED | OUTPATIENT
Start: 2021-04-01 | End: 2021-04-02

## 2021-04-01 RX ORDER — LACTULOSE 20 G/30ML
30 SOLUTION ORAL ONCE
Status: COMPLETED | OUTPATIENT
Start: 2021-04-01 | End: 2021-04-01

## 2021-04-01 RX ORDER — LANOLIN ALCOHOL/MO/W.PET/CERES
100 CREAM (GRAM) TOPICAL DAILY
Status: DISCONTINUED | OUTPATIENT
Start: 2021-04-02 | End: 2021-04-09 | Stop reason: HOSPADM

## 2021-04-01 RX ORDER — LANOLIN ALCOHOL/MO/W.PET/CERES
6 CREAM (GRAM) TOPICAL
Status: DISCONTINUED | OUTPATIENT
Start: 2021-04-01 | End: 2021-04-09 | Stop reason: HOSPADM

## 2021-04-01 RX ORDER — NYSTATIN 100000 [USP'U]/G
POWDER TOPICAL 2 TIMES DAILY
Status: DISCONTINUED | OUTPATIENT
Start: 2021-04-01 | End: 2021-04-09 | Stop reason: HOSPADM

## 2021-04-01 RX ORDER — FOLIC ACID 1 MG/1
1000 TABLET ORAL DAILY
Status: DISCONTINUED | OUTPATIENT
Start: 2021-04-02 | End: 2021-04-09 | Stop reason: HOSPADM

## 2021-04-01 RX ADMIN — LACTULOSE 20 G: 20 SOLUTION ORAL at 20:58

## 2021-04-01 RX ADMIN — MELATONIN TAB 3 MG 6 MG: 3 TAB at 20:59

## 2021-04-01 RX ADMIN — SODIUM CHLORIDE, SODIUM LACTATE, POTASSIUM CHLORIDE, AND CALCIUM CHLORIDE 1000 ML: .6; .31; .03; .02 INJECTION, SOLUTION INTRAVENOUS at 15:51

## 2021-04-01 RX ADMIN — CEFTRIAXONE SODIUM 1000 MG: 10 INJECTION, POWDER, FOR SOLUTION INTRAVENOUS at 16:48

## 2021-04-01 RX ADMIN — MAGNESIUM OXIDE TAB 400 MG (241.3 MG ELEMENTAL MG) 400 MG: 400 (241.3 MG) TAB at 21:05

## 2021-04-01 RX ADMIN — MIRTAZAPINE 15 MG: 15 TABLET, FILM COATED ORAL at 21:01

## 2021-04-01 RX ADMIN — LACTULOSE 30 G: 20 SOLUTION ORAL at 15:52

## 2021-04-01 NOTE — ASSESSMENT & PLAN NOTE
· Alcoholic cirrhosis of liver quit drinking in 2019  · Concurrent thrombocytopenia and hepatic encephalopathy

## 2021-04-01 NOTE — ASSESSMENT & PLAN NOTE
· Currently tachycardic as she did not take her diltiazem this morning  · Due to hypotension will defer further diltiazem    Increase metoprolol succinate to 50 mg b i d   · No anticoagulation due to thrombocytopenia

## 2021-04-01 NOTE — ASSESSMENT & PLAN NOTE
· Dementia without behavioral disturbance; lives with sister  · Continue mirtazapine and add melatonin  · Reportedly had adverse side effects with quetiapine and olanzapine with increased agitation

## 2021-04-01 NOTE — ASSESSMENT & PLAN NOTE
· Acute encephalopathy poly factorial secondary to UTI and hepatic encephalopathy  · UTI:  Continue empiric ceftriaxone  Follow-up on urine cultures  · Hepatic encephalopathy:  Increase lactulose to 20 g b i d    Recheck ammonia in a m   · PT/OT to evaluate for discharge needs

## 2021-04-01 NOTE — H&P
[facility]  H&P- Cynthia Dial 1945, 77 y o  female MRN: [id number]  Unit/Bed#: ED 16 Encounter: [id number]  Primary Care Provider: Elkin Fischer MD   Date and time admitted to hospital: 4/1/2021  1:10 PM    Assessment and Plan  * Hepatic encephalopathy ([place] [address] )  Assessment & Plan  · Acute encephalopathy poly factorial secondary to UTI and hepatic encephalopathy  · UTI:  Continue empiric ceftriaxone  Follow-up on urine cultures  · Hepatic encephalopathy:  Increase lactulose to 20 g b i d  Recheck ammonia in a m   · PT/OT to evaluate for discharge needs    Acute kidney injury superimposed on chronic kidney disease ([place] [address] )  Assessment & Plan  · CESAR on CKD 3b likely secondary to poor intake from UTI and hepatic encephalopathy  · Received IVF in ED  Defer more fluids given history of ascites and CHF  · Recheck labs in a m  Results from last 7 days   Lab Units 04/01/21  1400   BUN mg/dL 42*   CREATININE mg/dL 2 09*   EGFR ml/min/1 73sq m 26       Paroxysmal atrial fibrillation (HCC)  Assessment & Plan  · Currently tachycardic as she did not take her diltiazem this morning  · Due to hypotension will defer further diltiazem  Increase metoprolol succinate to 50 mg b i d   · No anticoagulation due to thrombocytopenia    Hypotension (arterial)  Assessment & Plan  · Recent admission for hypotension  Will defer on further diltiazem if possible    Alcoholic cirrhosis ([place] [address] )  Assessment & Plan  · Alcoholic cirrhosis of liver quit drinking in 2019  · Concurrent thrombocytopenia and hepatic encephalopathy      Diastolic CHF (HCC)  Assessment & Plan  Wt Readings from Last 3 Encounters:   04/01/21 71 1 kg (156 lb 12 oz)   03/30/21 63 3 kg (139 lb 8 oz)   03/24/21 66 2 kg (145 lb 15 1 oz)     · Holding furosemide secondary to kidney injury    Dementia ([facility] )  Assessment & Plan  · Dementia without behavioral disturbance; lives with sister  · Continue mirtazapine and add melatonin  · Reportedly had adverse side effects with quetiapine and olanzapine with increased agitation    Thrombocytopenia (HCC)  Assessment & Plan  · Thrombocytopenia secondary to cirrhosis  No evidence of bleeding  Results from last 7 days   Lab Units 04/01/21  1400   PLATELETS Thousands/uL 38*       VTE Prophylaxis: Pharmacologic VTE Prophylaxis contraindicated due to thrombocytopenia  Code Status: Prior  Anticipated Length of Stay:  Patient will be admitted on an Inpatient basis with an anticipated length of stay of  greater than 2 midnights  Justification for Hospital Stay: Hepatic encephalopathy [facility])  Total Time for Visit, including Counseling / Coordination of Care: x mins  Greater than 50% of this total time spent on direct patient counseling and coordination of care  Chief Complaint:     Chief Complaint   Patient presents with    Altered Mental Status     Pt sent in by cam home for slight fever and increasing AMS      History of Present Illness:    Agnieszka Melton is a 77 y o  female with a past medical history of alcoholic cirrhosis dementia paroxysmal atrial fibrillation diastolic CHF who presents with worsening weakness and fever  The patient has had multiple readmissions for wide variety of comorbidities most recently for hypotension  Her torsemide was changed to furosemide and her metoprolol was cut in half  She lives with her sister at baseline and today visiting nurses noted that she was febrile with increase confusion  She was sent here to the hospital where she was found to have elevated ammonia, tachycardia, and evidence of UTI  On examination she complains of some back pain and abdominal pain but no chest pain or shortness of breath  She did not take her lactulose or diltiazem this morning  Review of Systems:  Review of Systems   Constitutional: Positive for fatigue and fever  Negative for chills and diaphoresis  HENT: Negative for dental problem and facial swelling      Eyes: Negative for photophobia, pain and visual disturbance  Respiratory: Negative for shortness of breath, wheezing and stridor  Cardiovascular: Negative for chest pain and palpitations  Gastrointestinal: Positive for abdominal pain  Negative for abdominal distention, diarrhea, nausea and vomiting  Genitourinary: Negative for dysuria, hematuria and urgency  Musculoskeletal: Positive for back pain  Negative for myalgias  Skin: Negative for rash  Neurological: Positive for weakness  Negative for dizziness, seizures, speech difficulty, numbness and headaches  Psychiatric/Behavioral: Positive for confusion and decreased concentration  Negative for agitation and suicidal ideas  The patient is not nervous/anxious  All other systems reviewed and are negative  Past Medical and Surgical History:   Past Medical History:   Diagnosis Date    Alcoholic cirrhosis ([facility] )     Benign essential hypertension     last assessed - [id number]    Cirrhosis (HCC)     CKD (chronic kidney disease) stage 3, GFR 30-59 ml/min     Diastolic CHF ([place] [address] ) 64/04/4274    Gastroesophageal reflux disease without esophagitis 11/16/2017    Hepatic encephalopathy ([place] [address] )     Hyperbilirubinemia 5/14/2020    Hypertension     Multiple myeloma ([place] [address] )     Paroxysmal atrial fibrillation ([place] [address] )     Pneumonia 5/6/2020     Past Surgical History:   Procedure Laterality Date    APPENDECTOMY      BONE MARROW BIOPSY      IR PARACENTESIS  10/2/2020    IR PARACENTESIS  12/28/2020    IR THORACENTESIS  12/21/2020     Meds/Allergies: Allergies: Allergies   Allergen Reactions    Seroquel [Quetiapine] Irritability    Zyprexa [Olanzapine] Confusion     Prior to Admission Medications   Prescriptions Last Dose Informant Patient Reported? Taking?    Thiamine HCl (vitamin B-1) 100 MG TABS  Family Member No No   Sig: TAKE ONE TABLET BY MOUTH EVERY DAY   diltiazem (CARDIZEM CD) 120 mg 24 hr capsule  Family Member No No   Sig: Take 1 capsule (120 mg total) by mouth daily   folic acid (FOLVITE) 1 mg tablet  Family Member No No   Sig: TAKE ONE TABLET BY MOUTH EVERY DAY   furosemide (LASIX) 40 mg tablet  Family Member No No   Sig: Take 1 5 tablets (60 mg total) by mouth daily   lactulose 20 g/30 mL  Family Member No No   Sig: Take 15 mL (10 g total) by mouth 2 (two) times a day   magnesium oxide (MAG-OX) 400 mg tablet  Family Member No No   Sig: TAKE ONE TABLET BY MOUTH TWICE A DAY   metoprolol succinate (TOPROL-XL) 100 mg 24 hr tablet  Family Member No No   Sig: Take 0 5 tablets (50 mg total) by mouth daily   mirtazapine (REMERON) 15 mg tablet  Family Member No No   Sig: Take 1 tablet (15 mg total) by mouth daily at bedtime   nystatin (MYCOSTATIN) powder  Family Member No No   Sig: Apply topically 2 (two) times a day   pantoprazole (PROTONIX) 40 mg tablet  Family Member No No   Sig: TAKE ONE TABLET BY MOUTH EVERY DAY IN THE MORNING   sodium chloride (OCEAN) 0 65 % nasal spray  Family Member No No   Si spray into each nostril every hour as needed for congestion      Facility-Administered Medications: None     Social History:     Social History     Socioeconomic History    Marital status:      Spouse name: Not on file    Number of children: Not on file    Years of education: Not on file    Highest education level: Not on file   Occupational History    Not on file   Social Needs    Financial resource strain: Not on file    Food insecurity     Worry: Not on file     Inability: Not on file    Transportation needs     Medical: Not on file     Non-medical: Not on file   Tobacco Use    Smoking status: Light Tobacco Smoker     Packs/day: 0 25     Types: Cigarettes    Smokeless tobacco: Never Used    Tobacco comment: 2-3 a day   Substance and Sexual Activity    Alcohol use: Not Currently     Frequency: 4 or more times a week     Drinks per session: 1 or 2     Binge frequency: Never     Comment: History of significant daily alcohol intake   Sister joy no alcohol intake in 6 months    Drug use: No    Sexual activity: Not Currently   Lifestyle    Physical activity     Days per week: 0 days     Minutes per session: 0 min    Stress: Not at all   Relationships    Social connections     Talks on phone: Not on file     Gets together: Not on file     Attends Protestant service: Not on file     Active member of club or organization: Not on file     Attends meetings of clubs or organizations: Not on file     Relationship status: Not on file    Intimate partner violence     Fear of current or ex partner: Not on file     Emotionally abused: Not on file     Physically abused: Not on file     Forced sexual activity: Not on file   Other Topics Concern    Not on file   Social History Narrative    No advance directives     Patient Pre-hospital Living Situation:  Lives with sister  Patient Pre-hospital Level of Mobility:   Patient Pre-hospital Diet Restrictions:     Family History:  Family History   Problem Relation Age of Onset    Heart attack Father         myocardial infarction    Heart disease Father      Physical Exam:   Vitals:   Blood Pressure: 109/73 (04/01/21 1647)  Pulse: (!) 117 (04/01/21 1647)  Temperature: 99 1 °F (37 3 °C) (04/01/21 1503)  Temp Source: Rectal (04/01/21 1503)  Respirations: 17 (04/01/21 1647)  Weight - Scale: 71 1 kg (156 lb 12 oz) (04/01/21 1317)  SpO2: 94 % (04/01/21 1647)    Physical Exam  Vitals signs reviewed  Constitutional:       General: She is not in acute distress  Appearance: Normal appearance  HENT:      Head: Atraumatic  Eyes:      General: No scleral icterus  Extraocular Movements: Extraocular movements intact  Pupils: Pupils are equal, round, and reactive to light  Cardiovascular:      Rate and Rhythm: Tachycardia present  Rhythm irregular  Heart sounds: Normal heart sounds  Pulmonary:      Breath sounds: Decreased breath sounds present  No wheezing     Abdominal:      General: Bowel sounds are normal  Palpations: Abdomen is soft  Tenderness: There is no guarding or rebound  Musculoskeletal:         General: No swelling  Right lower leg: Edema present  Left lower leg: Edema present  Skin:     General: Skin is warm  Neurological:      Mental Status: She is alert  Mental status is at baseline  She is disoriented  Psychiatric:         Mood and Affect: Mood normal        Lab Results: I have personally reviewed pertinent reports      Results from last 7 days   Lab Units 04/01/21  1400   WBC Thousand/uL 4 44   HEMOGLOBIN g/dL 8 2*   HEMATOCRIT % 26 2*   PLATELETS Thousands/uL 38*   LYMPHO PCT % 23   MONO PCT % 9   EOS PCT % 1   BANDS PCT % 12*     Results from last 7 days   Lab Units 04/01/21  1400   SODIUM mmol/L 142   POTASSIUM mmol/L 4 9   CHLORIDE mmol/L 105   CO2 mmol/L 25   ANION GAP mmol/L 12   BUN mg/dL 42*   CREATININE mg/dL 2 09*   CALCIUM mg/dL 9 0   ALBUMIN g/dL 3 1*   TOTAL BILIRUBIN mg/dL 2 04*   ALK PHOS U/L 251*   ALT U/L 24   AST U/L 41   EGFR ml/min/1 73sq m 26   GLUCOSE RANDOM mg/dL 124     Results from last 7 days   Lab Units 04/01/21  1400   INR  1 18     Results from last 7 days   Lab Units 04/01/21  1400   TROPONIN I ng/mL <0 02     Results from last 7 days   Lab Units 04/01/21  1400   LACTIC ACID mmol/L 1 8         Results from last 7 days   Lab Units 04/01/21  1400   NT-PRO BNP pg/mL 12,194*      Results from last 7 days   Lab Units 04/01/21  1426   COLOR UA  Yellow   CLARITY UA  Slightly Cloudy   SPEC GRAV UA  1 015   PH UA  6 0   LEUKOCYTES UA  Moderate*   NITRITE UA  Positive*   GLUCOSE UA mg/dl Negative   KETONES UA mg/dl Negative   BILIRUBIN UA  Small*   BLOOD UA  Small*      Results from last 7 days   Lab Units 04/01/21  1426   RBC UA /hpf 1-2   WBC UA /hpf 30-50*   EPITHELIAL CELLS WET PREP /hpf Occasional   BACTERIA UA /hpf Moderate*          Imaging: I have personally reviewed pertinent films in PACS  Ct Head Without Contrast    Result Date: 4/1/2021  Impression: No acute intracranial abnormality  Generalized atrophy and stable moderate cerebral chronic microangiopathic disease  Stable inflammatory changes of the right frontoethmoidal recess and postoperative changes of the right nasal cavity  Workstation performed: [id number]       EKG, Pathology, and Other Studies Reviewed on Admission:   EKG  Result Date: 04/01/21  Personally reviewed strips with impression of:  Atrial fibrillation 121 bpm    Allscripts/ Epic Records Reviewed: Yes    ** Please Note: This note has been constructed using a voice recognition system   **

## 2021-04-01 NOTE — ED PROVIDER NOTES
History  Chief Complaint   Patient presents with    Altered Mental Status     Pt sent in by [place] [facility] for slight fever and increasing AMS      HPI    Prior to Admission Medications   Prescriptions Last Dose Informant Patient Reported? Taking?    Thiamine HCl (vitamin B-1) 100 MG TABS  Family Member No No   Sig: TAKE ONE TABLET BY MOUTH EVERY DAY   diltiazem (CARDIZEM CD) 120 mg 24 hr capsule  Family Member No No   Sig: Take 1 capsule (120 mg total) by mouth daily   folic acid (FOLVITE) 1 mg tablet  Family Member No No   Sig: TAKE ONE TABLET BY MOUTH EVERY DAY   furosemide (LASIX) 40 mg tablet  Family Member No No   Sig: Take 1 5 tablets (60 mg total) by mouth daily   lactulose 20 g/30 mL  Family Member No No   Sig: Take 15 mL (10 g total) by mouth 2 (two) times a day   magnesium oxide (MAG-OX) 400 mg tablet  Family Member No No   Sig: TAKE ONE TABLET BY MOUTH TWICE A DAY   metoprolol succinate (TOPROL-XL) 100 mg 24 hr tablet  Family Member No No   Sig: Take 0 5 tablets (50 mg total) by mouth daily   mirtazapine (REMERON) 15 mg tablet  Family Member No No   Sig: Take 1 tablet (15 mg total) by mouth daily at bedtime   mirtazapine (REMERON) 7 5 MG tablet   No No   Sig: TAKE ONE TABLET BY MOUTH EVERY DAY AT BEDTIME   nicotine (NICODERM CQ) 14 mg/24hr TD 24 hr patch  Family Member No No   Sig: Place 1 patch on the skin daily   nystatin (MYCOSTATIN) powder  Family Member No No   Sig: Apply topically 2 (two) times a day   pantoprazole (PROTONIX) 40 mg tablet  Family Member No No   Sig: TAKE ONE TABLET BY MOUTH EVERY DAY IN THE MORNING   sodium chloride (OCEAN) 0 65 % nasal spray  Family Member No No   Si spray into each nostril every hour as needed for congestion      Facility-Administered Medications: None       Past Medical History:   Diagnosis Date    Benign essential hypertension     last assessed - [id number]    Chest pain 2017    CHF (congestive heart failure) (HCC)     Chronic kidney disease     Cirrhosis ([facility] )     [facility] systolic and diastolic [facility] failure ([facility] ) 1/18/2021    Elevated troponin 1/31/2020    Gastroesophageal reflux disease without esophagitis 11/16/2017    Hyperbilirubinemia 5/14/2020    Hypertension     Liver disease     Multiple myeloma ([facility] )     Pneumonia 5/6/2020    Renal failure 08/29/2020       Past Surgical History:   Procedure Laterality Date    APPENDECTOMY      BONE MARROW BIOPSY      IR PARACENTESIS  10/2/2020    IR PARACENTESIS  12/28/2020    IR THORACENTESIS  12/21/2020       Family History   Problem Relation Age of Onset    Heart attack Father         myocardial infarction    Heart disease Father      I have reviewed and agree with the history as documented  E-Cigarette/Vaping    E-Cigarette Use Never User     Start Date 1/1/15     Quit Date 1/24/15      E-Cigarette/Vaping Substances    Nicotine No     THC No     CBD No     Flavoring No     Other No     Unknown No      Social History     Tobacco Use    Smoking status: Light Tobacco Smoker     Packs/day: 0 25     Types: Cigarettes    Smokeless tobacco: Never Used    Tobacco comment: 2-3 a day   Substance Use Topics    Alcohol use: Not Currently     Frequency: 4 or more times a week     Drinks per session: 1 or 2     Binge frequency: Never     Comment: History of significant daily alcohol intake  Sister joy no alcohol intake in 6 months    Drug use: No       Review of Systems    Physical Exam  Physical Exam  Vitals signs and nursing note reviewed  Constitutional:       General: She is not in acute distress  Appearance: She is well-developed  She is ill-appearing (chronic)  HENT:      Head: Normocephalic and atraumatic  Eyes:      Conjunctiva/sclera: Conjunctivae normal       Pupils: Pupils are equal, round, and reactive to light  Neck:      Musculoskeletal: Normal range of motion  Trachea: No tracheal deviation  Cardiovascular:      Rate and Rhythm: Regular rhythm  Tachycardia present  Heart sounds: Normal heart sounds  Pulmonary:      Effort: Pulmonary effort is normal  No respiratory distress  Breath sounds: Normal breath sounds  Abdominal:      General: There is no distension  Palpations: Abdomen is soft  Tenderness: There is no abdominal tenderness  Skin:     General: Skin is warm and dry  Neurological:      Mental Status: She is alert  Mental status is at baseline  She is disoriented  GCS: GCS eye subscore is 4  GCS verbal subscore is 4  GCS motor subscore is 5  Comments: States her name but is otherwise confused  Says she is in [facility]  Does not know what type of facility she is in  Keeps saying you're hurting me    When asked if she is able to follow directions, she states yes but makes no attempt to do so  When her arms and legs are lifted she makes no attempt to hold them up despite stating that she is able to, and they will hold her back to the bed  There is mild resistance with trying to lift both arms and legs into the air  Psychiatric:         Behavior: Behavior normal          Vital Signs  ED Triage Vitals [04/01/21 1318]   Temperature Pulse Respirations Blood Pressure SpO2   98 °F (36 7 °C) (!) 119 16 95/52 98 %      Temp src Heart Rate Source Patient Position - Orthostatic VS BP Location FiO2 (%)   -- -- -- -- --      Pain Score       --           Vitals:    04/01/21 1318   BP: 95/52   Pulse: (!) 119         Visual Acuity      ED Medications  Medications - No data to display    Diagnostic Studies  Results Reviewed     Procedure Component Value Units Date/Time    Blood culture #1 [[id number]] Collected: 04/01/21 1610    Lab Status: Preliminary result Specimen: Blood from Arm, Left Updated: 04/01/21 2102     Blood Culture Received in Microbiology Lab  Culture in Progress      Blood culture #2 [[id number]] Collected: 04/01/21 1400    Lab Status: Preliminary result Specimen: Blood from Arm, Left Updated: 04/01/21 2102 Blood Culture Received in Microbiology Lab  Culture in Progress  Procalcitonin with AM Reflex [[id number]]  (Abnormal) Collected: 04/01/21 1400    CBC and differential [[id number]]  (Abnormal) Collected: 04/01/21 1400    Lab Status: Final result Specimen: Blood from Arm, Left Updated: 04/01/21 1501     WBC 4 44 Thousand/uL      RBC 2 89 Million/uL      Hemoglobin 8 2 g/dL      Hematocrit 26 2 %      MCV 91 fL      MCH 28 4 pg      MCHC 31 3 g/dL      RDW 21 2 %      Platelets 38 Thousands/uL      nRBC 9 /100 WBCs     Manual Differential(PHLEBS Do Not Order) [[id number]]  (Abnormal) Collected: 04/01/21 1400    Lab Status: Final result Specimen: Blood from Arm, Left Updated: 04/01/21 1501     Segmented % 52 %      Bands % 12 %      Lymphocytes % 23 %      Monocytes % 9 %      Eosinophils, % 1 %      Basophils % 0 %      Myelocytes % 2 %      Atypical Lymphocytes % 1 %      Absolute Neutrophils 2 84 Thousand/uL      Lymphocytes Absolute 1 02 Thousand/uL      Monocytes Absolute 0 40 Thousand/uL      Eosinophils Absolute 0 04 Thousand/uL      Basophils Absolute 0 00 Thousand/uL      Total Counted 100     nRBC 14 /100 WBC      Anisocytosis Present     Hypochromia Present     Poikilocytes Present     Polychromasia Present     Schistocytes Present     Target Cells Present     Platelet Estimate Decreased    Urine Microscopic [[id number]]  (Abnormal) Collected: 04/01/21 1426    Lab Status: Final result Specimen: Urine, Clean Catch Updated: 04/01/21 1443     RBC, UA 1-2 /hpf      WBC, UA 30-50 /hpf      Epithelial Cells Occasional /hpf      Bacteria, UA Moderate /hpf      OTHER OBSERVATIONS WBCs Clumped    Urine culture [[id number]] Collected: 04/01/21 1426    Lab Status:  In process Specimen: Urine, Clean Catch Updated: 04/01/21 1443    UA w Reflex to Microscopic w Reflex to Culture [[id number]]  (Abnormal) Collected: 04/01/21 1426    Lab Status: Final result Specimen: Urine, Clean Catch Updated: 04/01/21 1432     Color, UA Yellow     Clarity, UA Slightly Cloudy     Specific Gravity, UA 1 015     pH, UA 6 0     Leukocytes, UA Moderate     Nitrite, UA Positive     Protein, UA 30 (1+) mg/dl      Glucose, UA Negative mg/dl      Ketones, UA Negative mg/dl      Urobilinogen, UA 1 0 E U /dl      Bilirubin, UA Small     Blood, UA Small    Lactic acid [[id number]]  (Normal) Collected: 04/01/21 1400    Lab Status: Final result Specimen: Blood from Arm, Left Updated: 04/01/21 1430     LACTIC ACID 1 8 mmol/L     Narrative:      Result may be elevated if tourniquet was used during collection      Troponin I [[id number]]  (Normal) Collected: 04/01/21 1400    Lab Status: Final result Specimen: Blood from Arm, Left Updated: 04/01/21 1430     Troponin I <0 02 ng/mL     Magnesium [[id number]]  (Normal) Collected: 04/01/21 1400    Lab Status: Final result Specimen: Blood from Arm, Left Updated: 04/01/21 1428     Magnesium 1 7 mg/dL     Ammonia [[id number]]  (Abnormal) Collected: 04/01/21 1400    Lab Status: Final result Specimen: Blood from Arm, Left Updated: 04/01/21 1428     Ammonia 115 umol/L     Basic metabolic panel [[id number]]  (Abnormal) Collected: 04/01/21 1400    Lab Status: Final result Specimen: Blood from Arm, Left Updated: 04/01/21 1428     Sodium 142 mmol/L      Potassium 4 9 mmol/L      Chloride 105 mmol/L      CO2 25 mmol/L      ANION GAP 12 mmol/L      BUN 42 mg/dL      Creatinine 2 09 mg/dL      Glucose 124 mg/dL      Calcium 9 0 mg/dL      eGFR 26 ml/min/1 73sq m     Narrative:      [place] guidelines for Chronic Kidney Disease (CKD):     Stage 1 with normal or high GFR (GFR > 90 mL/min/1 73 square meters)    Stage 2 Mild CKD (GFR = 60-89 mL/min/1 73 square meters)    Stage 3A Moderate CKD (GFR = 45-59 mL/min/1 73 square meters)    Stage 3B Moderate CKD (GFR = 30-44 mL/min/1 73 square meters)    Stage 4 Severe CKD (GFR = 15-29 mL/min/1 73 square meters)    Stage 5 End Stage CKD (GFR <15 mL/min/1 73 square meters)  Note: GFR calculation is accurate only with a steady state creatinine    Hepatic function panel [[id number]]  (Abnormal) Collected: 04/01/21 1400    Lab Status: Final result Specimen: Blood from Arm, Left Updated: 04/01/21 1428     Total Bilirubin 2 04 mg/dL      Bilirubin, Direct 1 32 mg/dL      Alkaline Phosphatase 251 U/L      AST 41 U/L      ALT 24 U/L      Total Protein 6 4 g/dL      Albumin 3 1 g/dL     APTT [[id number]]  (Normal) Collected: 04/01/21 1400    Lab Status: Final result Specimen: Blood from Arm, Left Updated: 04/01/21 1424     PTT 34 seconds     Protime-INR [[id number]]  (Abnormal) Collected: 04/01/21 1400    Lab Status: Final result Specimen: Blood from Arm, Left Updated: 04/01/21 1423     Protime 15 2 seconds      INR 1 18                 CT head without contrast   Final Result by Mia Jackson MD (04/01 1556)      No acute intracranial abnormality  Generalized atrophy and stable moderate cerebral chronic microangiopathic disease  Stable inflammatory changes of the right frontoethmoidal recess and postoperative changes of the right nasal cavity                    Workstation performed: [id number]         XR chest portable                 Procedures  ECG 12 Lead Documentation Only    Date/Time: 4/1/2021 1:41 PM  Performed by: Tashi Gaming MD  Authorized by: Tashi Gaming MD     Indications / Diagnosis:  AMS, tachycardia  ECG reviewed by me, the ED Provider: yes    Patient location:  ED  Previous ECG:     Previous ECG:  Compared to current    Comparison ECG info:  03/24/21    Similarity:  Changes noted  Interpretation:     Interpretation: abnormal    Rate:     ECG rate:  121    ECG rate assessment: tachycardic    Rhythm:     Rhythm: sinus tachycardia    Ectopy:     Ectopy: none    QRS:     QRS axis:  Right    QRS intervals:  Normal  Conduction:     Conduction: abnormal      Abnormal conduction: incomplete RBBB    ST segments:     ST segments: Normal  T waves:     T waves: flattening      Flattening:  I and aVL  Other findings:     Other findings: prolonged qTc interval               ED Course                                           MDM  Number of Diagnoses or Management Options  Acute encephalopathy: new and requires workup  Bandemia: new and requires workup  Chronic anemia: new and requires workup  CKD (chronic kidney disease): new and requires workup  History of cirrhosis: new and requires workup  History of multiple myeloma: new and requires workup  Hyperammonemia ([facility] ): new and requires workup  Hyperbilirubinemia: new and requires workup  Thrombocytopenia [facility] new and requires workup  Urinary tract infection: new and requires workup  Diagnosis management comments: This is a 76 yo female who presents here today with EMS for altered mental status  She has a history of dementia, and is unable to provide any information  She tells me she is in [facility], does not know that she is in a medical facility, or why she is here  When asked if she has any pain, she starts yelling "you're hurting me, you keep hurting me," even when not being touched, or staff is several feet away from her  She is unable to specify what hurts, or provide any further information  I spoke with her sister over the phone, who states the patient is sometimes conversant, but is not usually able to say where she is; [facility] is where she grew up, but has been here for over 25 years  She is not usually able to express her complaints  The sister says she has been more confused than normal for the past 2 days, is not able to make it to the bathroom to either urine or defecate, which she is normally able to do so  She had a temperature of 99 9 today, which prompted sister to call 911  Her blood pressure was 88/58 yesterday, but was in the 90s today, which is her baseline  ROS: Otherwise negative, unless stated as above  She is chronically ill-appearing, in no acute distress    She is tachycardic but afebrile  She has no focal source of infection on exam   She is uncooperative with neurologic exam, stating that yes she is able to do things and follow instructions but does not actually do so, including holding upper arms or legs when they were lifted up for her  With him limitations, exam is otherwise unremarkable  Concern is for underlying infection, worsening of CESAR with possible uremia, hyperammonemia, electrolyte abnormality  There is no reported history of trauma, however with history of thrombocytopenia and altered mental status, she may have a spontaneous bleed verses bleed from mild trauma  We will get a CT scan of her head, and lab work to evaluate  CT scan shows no acute abnormalities  Chest x-ray was reviewed by myself, and shows chronic changes but no acute abnormalities  Urine is nitrite positive with moderate leukocytes, 30-50 white blood cells and moderate bacteria, concerning for UTI, especially as this was on a cath urine sample  Ammonia is 115, with a baseline of around 60-70  Creatinine is 2 09, with a baseline of around 1 6-1 9  Thrombocytopenia and anemia at baseline  She she does have elevated bilirubin to 2 0 for an alk-phos of 251, similar to baseline  She does have a bandemia of 12 despite normal white blood cell count  She has normal lactic acid, and despite mild tachycardia has otherwise had normal vital signs here, not reflective of severe sepsis or septic shock  When I discussed with the patient's sister the results, the patient has not taken her lactulose in the past two days  We will admit her for further management  Given persistent tachycardia we will give her fluid bolus given reported decreased oral intake and high normal creatinine  However, she does have a history of CHF and was otherwise stable vital signs and normal lactic acid we will do cautious fluid bolus         Amount and/or Complexity of Data Reviewed  Clinical lab tests: reviewed and ordered  Tests in the radiology section of CPT®: ordered and reviewed  Decide to obtain previous medical records or to obtain history from someone other than the patient: yes  Review and summarize past medical records: yes  Independent visualization of images, tracings, or specimens: yes        Disposition  Final diagnoses:   Urinary tract infection   Hyperammonemia ([facility] )   Acute encephalopathy   Bandemia   CKD (chronic kidney disease)   History of cirrhosis   Hyperbilirubinemia   Chronic anemia   Thrombocytopenia ([facility] ) - chronic   History of multiple myeloma     Time reflects when diagnosis was documented in both MDM as applicable and the Disposition within this note     Time User Action Codes Description Comment    4/1/2021  4:00 PM Downs-Tesoriero, Vara Southward Add [N39 0] Urinary tract infection     4/1/2021  4:00 PM Downs-Tesoriero, Vara Southward Add [E72 20] Hyperammonemia ([facility] )     4/1/2021  4:00 PM Downs-Tesoriero, Vara Southward Add [G93 40] Acute encephalopathy     4/1/2021  4:00 PM Deepak Aver Add [X95 049] Bandemia     4/1/2021  4:00 PM Downs-Tesoriero, Vara Southward Add [N18 9] CKD (chronic kidney disease)     4/1/2021  4:01 PM Downs-Tesoriero, Vara Southward Add [Z87 19] History of cirrhosis     4/1/2021  4:01 PM Downs-Tesoriero, Vara Southward Add [E80 6] Hyperbilirubinemia     4/1/2021  4:01 PM Downs-Tesoriero, Vara Southward Add [D64 9] Chronic anemia     4/1/2021  4:01 PM Downs-Tesoriero, Vara Southward Add [D69 6] Thrombocytopenia ([facility] )     4/1/2021  4:01 PM Downs-Tesoriero, Vara Southward Modify [D69 6] Thrombocytopenia ([facility] ) chronic    4/1/2021  4:01 PM Downs-Tesoriero, Vara Southward Add [Z85 79] History of multiple myeloma       ED Disposition     ED Disposition Condition Date/Time Comment    Admit Stable Thu Apr 1, 2021  3:22 PM Case was discussed with Dr Dustin Bryant and the patient's admission status was agreed to be Admission Status: inpatient status to the service of Dr Dustin Bryant           Follow-up Information    None         Patient's Medications   Discharge Prescriptions    No medications on file     No discharge procedures on file      PDMP Review     None          ED Provider  Electronically Signed by           [place] MD  04/01/21 0659

## 2021-04-01 NOTE — ASSESSMENT & PLAN NOTE
· CESAR on CKD 3b likely secondary to poor intake from UTI and hepatic encephalopathy  · Received IVF in ED  Defer more fluids given history of ascites and CHF  · Recheck labs in a m      Results from last 7 days   Lab Units 04/01/21  1400   BUN mg/dL 42*   CREATININE mg/dL 2 09*   EGFR ml/min/1 73sq m 26

## 2021-04-01 NOTE — PLAN OF CARE
Problem: SAFETY ADULT  Goal: Patient will remain free of falls  Description: INTERVENTIONS:  - Assess patient frequently for physical needs  -  Identify cognitive and physical deficits and behaviors that affect risk of falls  -  [place] fall precautions as indicated by assessment   - Educate patient/family on patient safety including physical limitations  - Instruct patient to call for assistance with activity based on assessment  - Modify environment to reduce risk of injury  - Consider OT/PT consult to assist with strengthening/mobility  Outcome: Progressing     Problem: DISCHARGE PLANNING  Goal: Discharge to home or other facility with appropriate resources  Description: INTERVENTIONS:  - Identify barriers to discharge w/patient and caregiver  - Arrange for needed discharge resources and transportation as appropriate  - Identify discharge learning needs (meds, wound care, etc )  - Arrange for interpretive services to assist at discharge as needed  - Refer to Case Management Department for coordinating discharge planning if the patient needs post-hospital services based on physician/advanced practitioner order or complex needs related to functional status, cognitive ability, or social support system  Outcome: Progressing     Problem: Knowledge Deficit  Goal: Patient/family/caregiver demonstrates understanding of disease process, treatment plan, medications, and discharge instructions  Description: Complete learning assessment and assess knowledge base    Interventions:  - Provide teaching at level of understanding  - Provide teaching via preferred learning methods  Outcome: Progressing

## 2021-04-01 NOTE — ASSESSMENT & PLAN NOTE
· Thrombocytopenia secondary to cirrhosis  No evidence of bleeding      Results from last 7 days   Lab Units 04/01/21  1400   PLATELETS Thousands/uL 38*

## 2021-04-02 LAB
ALBUMIN SERPL BCP-MCNC: 3 G/DL (ref 3.5–5)
ALP SERPL-CCNC: 248 U/L (ref 46–116)
ALT SERPL W P-5'-P-CCNC: 18 U/L (ref 12–78)
AMMONIA PLAS-SCNC: 98 UMOL/L (ref 11–35)
ANION GAP SERPL CALCULATED.3IONS-SCNC: 13 MMOL/L (ref 4–13)
AST SERPL W P-5'-P-CCNC: 34 U/L (ref 5–45)
ATRIAL RATE: 141 BPM
BILIRUB SERPL-MCNC: 2.07 MG/DL (ref 0.2–1)
BUN SERPL-MCNC: 38 MG/DL (ref 5–25)
CALCIUM ALBUM COR SERPL-MCNC: 10.2 MG/DL (ref 8.3–10.1)
CALCIUM SERPL-MCNC: 9.4 MG/DL (ref 8.3–10.1)
CHLORIDE SERPL-SCNC: 108 MMOL/L (ref 100–108)
CO2 SERPL-SCNC: 23 MMOL/L (ref 21–32)
CREAT SERPL-MCNC: 1.61 MG/DL (ref 0.6–1.3)
ERYTHROCYTE [DISTWIDTH] IN BLOOD BY AUTOMATED COUNT: 21.2 % (ref 11.6–15.1)
GFR SERPL CREATININE-BSD FRML MDRD: 36 ML/MIN/1.73SQ M
GLUCOSE SERPL-MCNC: 120 MG/DL (ref 65–140)
HCT VFR BLD AUTO: 25.6 % (ref 34.8–46.1)
HGB BLD-MCNC: 7.9 G/DL (ref 11.5–15.4)
INR PPP: 1.21 (ref 0.84–1.19)
MCH RBC QN AUTO: 28.1 PG (ref 26.8–34.3)
MCHC RBC AUTO-ENTMCNC: 30.9 G/DL (ref 31.4–37.4)
MCV RBC AUTO: 91 FL (ref 82–98)
PLATELET # BLD AUTO: 36 THOUSANDS/UL (ref 149–390)
POTASSIUM SERPL-SCNC: 4.5 MMOL/L (ref 3.5–5.3)
PROCALCITONIN SERPL-MCNC: 0.87 NG/ML
PROT SERPL-MCNC: 6.3 G/DL (ref 6.4–8.2)
PROTHROMBIN TIME: 15.6 SECONDS (ref 11.6–14.5)
QRS AXIS: 103 DEGREES
QRSD INTERVAL: 96 MS
QT INTERVAL: 406 MS
QTC INTERVAL: 576 MS
RBC # BLD AUTO: 2.81 MILLION/UL (ref 3.81–5.12)
SODIUM SERPL-SCNC: 144 MMOL/L (ref 136–145)
T WAVE AXIS: 110 DEGREES
VENTRICULAR RATE: 121 BPM
WBC # BLD AUTO: 4.6 THOUSAND/UL (ref 4.31–10.16)

## 2021-04-02 PROCEDURE — 85610 PROTHROMBIN TIME: CPT | Performed by: INTERNAL MEDICINE

## 2021-04-02 PROCEDURE — 87040 BLOOD CULTURE FOR BACTERIA: CPT | Performed by: NURSE PRACTITIONER

## 2021-04-02 PROCEDURE — 93010 ELECTROCARDIOGRAM REPORT: CPT | Performed by: INTERNAL MEDICINE

## 2021-04-02 PROCEDURE — 80053 COMPREHEN METABOLIC PANEL: CPT | Performed by: INTERNAL MEDICINE

## 2021-04-02 PROCEDURE — 85027 COMPLETE CBC AUTOMATED: CPT | Performed by: INTERNAL MEDICINE

## 2021-04-02 PROCEDURE — 99232 SBSQ HOSP IP/OBS MODERATE 35: CPT | Performed by: NURSE PRACTITIONER

## 2021-04-02 PROCEDURE — 82140 ASSAY OF AMMONIA: CPT | Performed by: INTERNAL MEDICINE

## 2021-04-02 PROCEDURE — 84145 PROCALCITONIN (PCT): CPT | Performed by: EMERGENCY MEDICINE

## 2021-04-02 RX ORDER — METOPROLOL SUCCINATE 50 MG/1
50 TABLET, EXTENDED RELEASE ORAL DAILY
Status: DISCONTINUED | OUTPATIENT
Start: 2021-04-03 | End: 2021-04-05

## 2021-04-02 RX ORDER — DILTIAZEM HYDROCHLORIDE 120 MG/1
120 CAPSULE, COATED, EXTENDED RELEASE ORAL DAILY
Status: DISCONTINUED | OUTPATIENT
Start: 2021-04-02 | End: 2021-04-05

## 2021-04-02 RX ADMIN — MIRTAZAPINE 15 MG: 15 TABLET, FILM COATED ORAL at 21:20

## 2021-04-02 RX ADMIN — NYSTATIN: 100000 POWDER TOPICAL at 18:43

## 2021-04-02 RX ADMIN — LACTULOSE 20 G: 20 SOLUTION ORAL at 21:20

## 2021-04-02 RX ADMIN — THIAMINE HCL TAB 100 MG 100 MG: 100 TAB at 08:32

## 2021-04-02 RX ADMIN — PANTOPRAZOLE SODIUM 40 MG: 40 TABLET, DELAYED RELEASE ORAL at 08:32

## 2021-04-02 RX ADMIN — CEFTRIAXONE SODIUM 1000 MG: 10 INJECTION, POWDER, FOR SOLUTION INTRAVENOUS at 17:55

## 2021-04-02 RX ADMIN — FOLIC ACID 1000 MCG: 1 TABLET ORAL at 08:32

## 2021-04-02 RX ADMIN — METOPROLOL SUCCINATE 50 MG: 50 TABLET, EXTENDED RELEASE ORAL at 08:32

## 2021-04-02 RX ADMIN — LACTULOSE 20 G: 20 SOLUTION ORAL at 08:32

## 2021-04-02 RX ADMIN — NYSTATIN: 100000 POWDER TOPICAL at 08:33

## 2021-04-02 RX ADMIN — MELATONIN TAB 3 MG 6 MG: 3 TAB at 21:20

## 2021-04-02 RX ADMIN — MAGNESIUM OXIDE TAB 400 MG (241.3 MG ELEMENTAL MG) 400 MG: 400 (241.3 MG) TAB at 18:43

## 2021-04-02 RX ADMIN — MAGNESIUM OXIDE TAB 400 MG (241.3 MG ELEMENTAL MG) 400 MG: 400 (241.3 MG) TAB at 08:32

## 2021-04-02 NOTE — ASSESSMENT & PLAN NOTE
· Patient is still tachycardic in the 120s 130s will resume diltiazem  · Hypotension has improved  · Continue home Toprol XL  · No anticoagulation due to thrombocytopenia

## 2021-04-02 NOTE — ASSESSMENT & PLAN NOTE
· CESAR on CKD 3b likely secondary to poor intake and hypotension from UTI and hepatic encephalopathy  · Received IVF in ED    Defer more fluids given history of ascites and CHF  · Improved to 1 61  · Baseline appears to be somewhere between 1 5-1 8    Results from last 7 days   Lab Units 04/02/21  0549 04/01/21  1400   BUN mg/dL 38* 42*   CREATININE mg/dL 1 61* 2 09*   EGFR ml/min/1 73sq m 36 26

## 2021-04-02 NOTE — ASSESSMENT & PLAN NOTE
Wt Readings from Last 3 Encounters:   04/01/21 66 9 kg (147 lb 7 8 oz)   03/30/21 63 3 kg (139 lb 8 oz)   03/24/21 66 2 kg (145 lb 15 1 oz)     · Holding furosemide secondary to kidney injury

## 2021-04-02 NOTE — ASSESSMENT & PLAN NOTE
· Acute encephalopathy poly factorial secondary to UTI and hepatic encephalopathy  · UTI:  Continue empiric ceftriaxone  Urine cultures are pending  · Hepatic encephalopathy:  Increase lactulose to 20 g b i d  [place] Ammonia level improved to 98, will repeat in a m    · Blood cultures are pending  · PT/OT to evaluate for discharge needs

## 2021-04-02 NOTE — ASSESSMENT & PLAN NOTE
· Thrombocytopenia secondary to cirrhosis  No evidence of bleeding      Results from last 7 days   Lab Units 04/02/21  0549 04/01/21  1400   PLATELETS Thousands/uL 36* 38*

## 2021-04-02 NOTE — CASE MANAGEMENT
LOS: 1 DAY  PATIENT IS NOT A BUNDLE  PATIENT IS NOT A 30 DAY READMISSION  UNPLANNED READMISSION RISK SCORE IS 55 (RED/HIGH)  Cm called patient's son due to patient being confused  Son reported that patient still resides with her sister in a 3rd floor apartment with no KRISTEN  Patient has elevator access to her apartment  Patient uses no DME at home, but she uses a wheelchair when they are out in public, because patient cannot walk long distances  Patient is mostly independent with ADLs and has never been to STR  Patient is [facility] with [facility]  Patient fills her prescriptions at [facility] in [place] and denied any barriers to obtaining or affording prescriptions  Her PCP is Dr Dotty Patel  Son reported that patient has no MH Hx and only smokes 1 cigarette per day  Patient's son is her POA, and he has the documentation to support this  Patient is retired and not able to drive  Her sister provides transportation  Patient's sister vs  [facility] will provide transportation at discharge  CM reviewed discharge planning process including the following: identifying help at home, patient preference for discharge planning needs, pharmacy preference, and availability of treatment team to discuss questions or concerns patient and/or family may have regarding understanding medications and recognizing signs and symptoms once discharged  CM also encouraged patient to follow up with all recommended appointments after discharge  Patient advised of importance for patient and family to participate in managing patients medical well being  Son reported that besides resuming care with [facility], patient should have no other needs at discharge  Cm sent referral to [facility] via [employer] for NANCI  Cm department will continue to follow patient through discharge

## 2021-04-02 NOTE — OCCUPATIONAL THERAPY NOTE
Occupational Therapy Cancellation Note        Patient Name: Monika Kelsey  IJQAS'L Date: 4/2/2021 04/02/21 1215   OT Last Visit   OT Visit Date 04/02/21   Note Type   Note type Evaluation   Cancel Reasons Medical status       OT orders received, chart review and actual observation of patient in room indicates patient with elevated HR  (122-124 bpm at rest)   OT evaluation cancelled at this time,  will continue to follow patient and evaluate as indicated

## 2021-04-02 NOTE — PHYSICAL THERAPY NOTE
Physical Therapy Cancellation    Patient's Name: Douglas Barnett    Admitting Diagnosis  Hyperbilirubinemia [E80 6]  Hyperammonemia ([address] ) [E72 20]  Weakness [R53 1]  Urinary tract infection [N39 0]  Thrombocytopenia (HCC) [D69 6]  CKD (chronic kidney disease) [N18 9]  Bandemia [D72 825]  History of cirrhosis [Z87 19]  History of multiple myeloma [Z85 79]  Chronic anemia [D64 9]  Acute encephalopathy [G93 40]    Problem List  Patient Active Problem List   Diagnosis    Anemia    Cigarette nicotine dependence without complication    Hyperglycemia    Paroxysmal SVT (supraventricular tachycardia) (HCC)    Sinus tachycardia    Urinary incontinence    Memory difficulties    Gait disturbance    Obesity (BMI 30 0-34  9)    Recurrent major depressive disorder, in partial remission (HCC)    Pancytopenia ([address] )    History of alcohol abuse    Metabolic encephalopathy    Liver cirrhosis (HCC)    Acute on chronic diastolic CHF    Dementia     Urinary tract infection without hematuria    CESAR (acute kidney injury) (HCC)    Dysphagia    Paroxysmal atrial fibrillation (HCC)    Troponin level elevated    Neutropenia (HCC)    Hyperphosphatemia    Hyperkalemia    Hyponatremia    Multiple myeloma (HCC)    Epistaxis    Azotemia    Thrombocytopenia (HCC)    Pulmonary hypertension (HCC)    Hyperuricemia    Acute renal failure with acute tubular necrosis superimposed on chronic kidney disease (HCC)    Ascites    Chronic kidney disease (CKD), active medical management without dialysis, stage 4 (severe) (HCC)    Volume overload    Diarrhea    Dementia (HCC)    Acute kidney injury superimposed on chronic kidney disease (HCC)    Hypotension    Rib fracture    Pneumonia    Hypertension    Hepatic encephalopathy (HCC)    Diastolic CHF (HCC)    CKD (chronic kidney disease) stage 3, GFR 30-59 ml/min    Alcoholic cirrhosis (HCC)    Hypotension (arterial)       Past Medical History  Past Medical History: Diagnosis Date    Alcoholic cirrhosis ([facility] )     Benign essential hypertension     last assessed - [id number]    Cirrhosis (HCC)     CKD (chronic kidney disease) stage 3, GFR 30-59 ml/min     Diastolic CHF ([facility] ) 33/69/2229    Gastroesophageal reflux disease without esophagitis 11/16/2017    Hepatic encephalopathy (HCC)     Hyperbilirubinemia 5/14/2020    Hypertension     Multiple myeloma (HCC)     Paroxysmal atrial fibrillation ([facility] )     Pneumonia 5/6/2020       Past Surgical History  Past Surgical History:   Procedure Laterality Date    APPENDECTOMY      BONE MARROW BIOPSY      IR PARACENTESIS  10/2/2020    IR PARACENTESIS  12/28/2020    IR THORACENTESIS  12/21/2020 04/02/21 1216   Note Type   Cancel Reasons Medical status   Assessment   Assessment PT order received  Chart review performed  At this time, PT evaluation cancelled d/t elevated HR (reading 122-124bpm at rest)  PT will follow and evaluate as appropriate  Discussed same with ANDREY Graf, PT

## 2021-04-02 NOTE — ASSESSMENT & PLAN NOTE
· Hypotension has improved however tachycardia is still significant will resume Cardizem and monitor

## 2021-04-02 NOTE — PROGRESS NOTES
[address] [facility]  Progress Note - Kamlesh Mathew 1945, 77 y o  female MRN: [id number]  Unit/Bed#: -01 Encounter: [id number]  Primary Care Provider: Toby Hilton MD   Date and time admitted to hospital: 4/1/2021  1:10 PM    * Hepatic encephalopathy ([facility] )  Assessment & Plan  · Acute encephalopathy poly factorial secondary to UTI and hepatic encephalopathy  · UTI:  Continue empiric ceftriaxone  Urine cultures are pending  · Hepatic encephalopathy:  Increase lactulose to 20 g b i d  [facility] Ammonia level improved to 98, will repeat in a m  · Blood cultures are pending  · PT/OT to evaluate for discharge needs    Acute kidney injury superimposed on chronic kidney disease ([facility] )  Assessment & Plan  · CESAR on CKD 3b likely secondary to poor intake and hypotension from UTI and hepatic encephalopathy  · Received IVF in ED  Defer more fluids given history of ascites and CHF  · Improved to 1 61  · Baseline appears to be somewhere between 1 5-1 8    Results from last 7 days   Lab Units 04/02/21  0549 04/01/21  1400   BUN mg/dL 38* 42*   CREATININE mg/dL 1 61* 2 09*   EGFR ml/min/1 73sq m 36 26       Hypotension (arterial)  Assessment & Plan  · Hypotension has improved however tachycardia is still significant will resume Cardizem and monitor    Alcoholic cirrhosis (HCC)  Assessment & Plan  · Alcoholic cirrhosis of liver quit drinking in 2019  · Concurrent thrombocytopenia and hepatic encephalopathy      Diastolic CHF (HCC)  Assessment & Plan  Wt Readings from Last 3 Encounters:   04/01/21 66 9 kg (147 lb 7 8 oz)   03/30/21 63 3 kg (139 lb 8 oz)   03/24/21 66 2 kg (145 lb 15 1 oz)     · Holding furosemide secondary to kidney injury    Dementia ([facility] )  Assessment & Plan  · Dementia without behavioral disturbance; lives with sister  · Continue mirtazapine and add melatonin  · Reportedly had adverse side effects with quetiapine and olanzapine with increased agitation    Thrombocytopenia ([facility] )  Assessment & Plan  · Thrombocytopenia secondary to cirrhosis  No evidence of bleeding  Results from last 7 days   Lab Units 21  0549 21  1400   PLATELETS Thousands/uL 36* 38*       Paroxysmal atrial fibrillation (HCC)  Assessment & Plan  · Patient is still tachycardic in the 120s 130s will resume diltiazem  · Hypotension has improved  · Continue home Toprol XL  · No anticoagulation due to thrombocytopenia         VTE Pharmacologic Prophylaxis:   Pharmacologic: Pharmacologic VTE Prophylaxis contraindicated due to Thrombocytopenia  Mechanical VTE Prophylaxis in Place: Yes    Patient Centered Rounds: I have performed bedside rounds with nursing staff today  Discussions with Specialists or Other Care Team Provider:  Reviewed previous provider's notes discussed with case management primary RN    Education and Discussions with Family / Patient:  Discussed plan of care with family denies any additional questions or concerns at this time    Time Spent for Care: 20 minutes  More than 50% of total time spent on counseling and coordination of care as described above  Current Length of Stay: 1 day(s)    Current Patient Status: Inpatient   Certification Statement: The patient will continue to require additional inpatient hospital stay due to IV antibiotics, pending cultures, improvement of encephalopathy    Discharge Plan / Estimated Discharge Date:  48 hours    Code Status: Level 1 - Full Code    Subjective:   Denies any chest pain chest tightness shortness of breath or difficulty breathing she is resting in bed she    She responds to her name but she cannot tell me where she is or why she is here she is commenting on had not wanting someone else in her health any longer emotional support provided    Objective:     Vitals:   Temp (24hrs), Av 4 °F (36 9 °C), Min:97 9 °F (36 6 °C), Max:99 1 °F (37 3 °C)    Temp:  [97 9 °F (36 6 °C)-99 1 °F (37 3 °C)] 98 °F (36 7 °C)  HR:  [117-125] 125  Resp:  [16-18] 18  BP: ()/(52-80) 130/78  SpO2:  [86 %-98 %] 94 %  Body mass index is 26 98 kg/m²  Input and Output Summary (last 24 hours): Intake/Output Summary (Last 24 hours) at 4/2/2021 1027  Last data filed at 4/2/2021 0815  Gross per 24 hour   Intake 240 ml   Output --   Net 240 ml       Physical Exam:     Physical Exam  Vitals signs and nursing note reviewed  Constitutional:       General: She is in acute distress  Appearance: She is ill-appearing  HENT:      Head: Atraumatic  Neck:      Musculoskeletal: Normal range of motion  Cardiovascular:      Rate and Rhythm: Normal rate  Pulmonary:      Effort: Pulmonary effort is normal       Breath sounds: Normal breath sounds  Abdominal:      General: Abdomen is flat  Palpations: Abdomen is soft  Musculoskeletal: Normal range of motion  Skin:     General: Skin is warm and dry  Neurological:      Mental Status: She is alert  She is disoriented  Psychiatric:         Mood and Affect: Mood is anxious  Behavior: Behavior is cooperative  Thought Content: Thought content normal          Cognition and Memory: Cognition is impaired  Memory is impaired  Judgment: Judgment is inappropriate  Additional Data:     Labs:    Results from last 7 days   Lab Units 04/02/21  0549 04/01/21  1400   WBC Thousand/uL 4 60 4 44   HEMOGLOBIN g/dL 7 9* 8 2*   HEMATOCRIT % 25 6* 26 2*   PLATELETS Thousands/uL 36* 38*   LYMPHO PCT %  --  23   MONO PCT %  --  9   EOS PCT %  --  1     Results from last 7 days   Lab Units 04/02/21  0549   POTASSIUM mmol/L 4 5   CHLORIDE mmol/L 108   CO2 mmol/L 23   BUN mg/dL 38*   CREATININE mg/dL 1 61*   CALCIUM mg/dL 9 4   ALK PHOS U/L 248*   ALT U/L 18   AST U/L 34     Results from last 7 days   Lab Units 04/02/21  0549   INR  1 21*       * I Have Reviewed All Lab Data Listed Above  * Additional Pertinent Lab Tests Reviewed:  All [facility] Admission Reviewed    Recent Cultures (last 7 days): Results from last 7 days   Lab Units 04/01/21  1610 04/01/21  1400   BLOOD CULTURE  Received in Microbiology Lab  Culture in Progress  Received in Microbiology Lab  Culture in Progress  Last 24 Hours Medication List:   Current Facility-Administered Medications   Medication Dose Route Frequency Provider Last Rate    cefTRIAXone  1,000 mg Intravenous Q24H Jing Arriaza,       diltiazem  120 mg Oral Daily MEREDITH Rodriguez      folic acid  7,339 mcg Oral Daily Dougie Ruiz,       lactulose  20 g Oral BID Dougie Ruiz, DO      magnesium oxide  400 mg Oral BID Dougie Ruiz, DO      melatonin  6 mg Oral HS Dougie Ruiz,       metoprolol  2 5 mg Intravenous Q6H PRN Jing Arriaza DO      [START ON 4/3/2021] metoprolol succinate  50 mg Oral Daily MEREDITH Rodriguez      mirtazapine  15 mg Oral HS Jing Arriaza DO      nicotine  1 patch Transdermal Daily Jing Arriaza,       nystatin   Topical BID Dougie Ruiz, DO      pantoprazole  40 mg Oral QAM Dougie Ruiz,       vitamin B-1  100 mg Oral Daily Jing Arriaza DO          Today, Patient Was Seen By: MEREDITH Rodriguez    ** Please Note: Dragon 360 Dictation voice to text software may have been used in the creation of this document   **

## 2021-04-03 PROBLEM — R78.81 BACTEREMIA: Status: ACTIVE | Noted: 2021-04-03

## 2021-04-03 LAB
AMMONIA PLAS-SCNC: 70 UMOL/L (ref 11–35)
ANION GAP SERPL CALCULATED.3IONS-SCNC: 9 MMOL/L (ref 4–13)
BUN SERPL-MCNC: 37 MG/DL (ref 5–25)
CALCIUM SERPL-MCNC: 9.2 MG/DL (ref 8.3–10.1)
CHLORIDE SERPL-SCNC: 113 MMOL/L (ref 100–108)
CO2 SERPL-SCNC: 26 MMOL/L (ref 21–32)
CREAT SERPL-MCNC: 1.41 MG/DL (ref 0.6–1.3)
ERYTHROCYTE [DISTWIDTH] IN BLOOD BY AUTOMATED COUNT: 21.8 % (ref 11.6–15.1)
GFR SERPL CREATININE-BSD FRML MDRD: 42 ML/MIN/1.73SQ M
GLUCOSE SERPL-MCNC: 111 MG/DL (ref 65–140)
HCT VFR BLD AUTO: 25.7 % (ref 34.8–46.1)
HGB BLD-MCNC: 7.6 G/DL (ref 11.5–15.4)
MCH RBC QN AUTO: 27.6 PG (ref 26.8–34.3)
MCHC RBC AUTO-ENTMCNC: 29.6 G/DL (ref 31.4–37.4)
MCV RBC AUTO: 94 FL (ref 82–98)
PLATELET # BLD AUTO: 36 THOUSANDS/UL (ref 149–390)
POTASSIUM SERPL-SCNC: 4.3 MMOL/L (ref 3.5–5.3)
RBC # BLD AUTO: 2.75 MILLION/UL (ref 3.81–5.12)
SODIUM SERPL-SCNC: 148 MMOL/L (ref 136–145)
WBC # BLD AUTO: 4.07 THOUSAND/UL (ref 4.31–10.16)

## 2021-04-03 PROCEDURE — 80048 BASIC METABOLIC PNL TOTAL CA: CPT | Performed by: NURSE PRACTITIONER

## 2021-04-03 PROCEDURE — 82140 ASSAY OF AMMONIA: CPT | Performed by: NURSE PRACTITIONER

## 2021-04-03 PROCEDURE — 85027 COMPLETE CBC AUTOMATED: CPT | Performed by: NURSE PRACTITIONER

## 2021-04-03 PROCEDURE — 99232 SBSQ HOSP IP/OBS MODERATE 35: CPT | Performed by: NURSE PRACTITIONER

## 2021-04-03 RX ORDER — FUROSEMIDE 40 MG/1
40 TABLET ORAL DAILY
Status: DISCONTINUED | OUTPATIENT
Start: 2021-04-03 | End: 2021-04-04

## 2021-04-03 RX ADMIN — METOPROLOL SUCCINATE 50 MG: 50 TABLET, EXTENDED RELEASE ORAL at 09:28

## 2021-04-03 RX ADMIN — LACTULOSE 20 G: 20 SOLUTION ORAL at 09:28

## 2021-04-03 RX ADMIN — PANTOPRAZOLE SODIUM 40 MG: 40 TABLET, DELAYED RELEASE ORAL at 06:20

## 2021-04-03 RX ADMIN — MAGNESIUM OXIDE TAB 400 MG (241.3 MG ELEMENTAL MG) 400 MG: 400 (241.3 MG) TAB at 09:27

## 2021-04-03 RX ADMIN — CEFTRIAXONE SODIUM 1000 MG: 10 INJECTION, POWDER, FOR SOLUTION INTRAVENOUS at 17:44

## 2021-04-03 RX ADMIN — METOROPROLOL TARTRATE 2.5 MG: 5 INJECTION, SOLUTION INTRAVENOUS at 14:08

## 2021-04-03 RX ADMIN — MIRTAZAPINE 15 MG: 15 TABLET, FILM COATED ORAL at 21:56

## 2021-04-03 RX ADMIN — NYSTATIN: 100000 POWDER TOPICAL at 17:44

## 2021-04-03 RX ADMIN — DILTIAZEM HYDROCHLORIDE 120 MG: 120 CAPSULE, COATED, EXTENDED RELEASE ORAL at 09:28

## 2021-04-03 RX ADMIN — LACTULOSE 20 G: 20 SOLUTION ORAL at 21:57

## 2021-04-03 RX ADMIN — MAGNESIUM OXIDE TAB 400 MG (241.3 MG ELEMENTAL MG) 400 MG: 400 (241.3 MG) TAB at 18:11

## 2021-04-03 RX ADMIN — FUROSEMIDE 40 MG: 40 TABLET ORAL at 14:08

## 2021-04-03 RX ADMIN — THIAMINE HCL TAB 100 MG 100 MG: 100 TAB at 09:27

## 2021-04-03 RX ADMIN — NYSTATIN: 100000 POWDER TOPICAL at 09:28

## 2021-04-03 RX ADMIN — FOLIC ACID 1000 MCG: 1 TABLET ORAL at 09:27

## 2021-04-03 RX ADMIN — MELATONIN TAB 3 MG 6 MG: 3 TAB at 21:57

## 2021-04-03 NOTE — PROGRESS NOTES
[address] [facility]  Progress Note - Ahsan Khan 1945, 77 y o  female MRN: [id number]  Unit/Bed#: -01 Encounter: [id number]  Primary Care Provider: Lawyer Radha MD   Date and time admitted to hospital: 4/1/2021  1:10 PM    * Hepatic encephalopathy ([facility] )  Assessment & Plan  · Acute metabolic encephalopathy poly factorial secondary to UTI and hepatic encephalopathy, and bacteremia  · UTI:  Continue empiric ceftriaxone  Urine cultures are pending  · Hepatic encephalopathy:  Increase lactulose to 20 g b i d  Willy Heckler Ammonia level improved to 98, will repeat in a m  · Blood cultures are pending  · PT/OT to evaluate for discharge needs    Acute kidney injury superimposed on chronic kidney disease ([facility] )  Assessment & Plan  · CESAR on CKD 3b likely secondary to poor intake and hypotension from UTI and hepatic encephalopathy  · Received IVF in ED  No additional fluids  · Will resume Lasix  · Improved to 1   For 1 within baseline  · Baseline appears to be somewhere between 1 5-1 8    Results from last 7 days   Lab Units 04/03/21  0704 04/02/21  0549 04/01/21  1400   BUN mg/dL 37* 38* 42*   CREATININE mg/dL 1 41* 1 61* 2 09*   EGFR ml/min/1 73sq m 42 36 26       Urinary tract infection  Assessment & Plan  Urine culture growing Klebsiella  Continue ceftriaxone  Awaiting urine sensitivities  Blood cultures positive  Infectious disease consulted  Repeat blood cultures ordered    Bacteremia  Assessment & Plan  One set of blood cultures growing gram positive cocci in clusters  Repeat blood cultures are pending  Continue antibiotics  Infectious Disease consulted  Urine cultures growing Klebsiella  Awaiting sensitivities    Hypotension (arterial)  Assessment & Plan  · Hypotension has resolved  · Tachycardia persistent  · Continue home Cardizem and metoprolol    Alcoholic cirrhosis (HCC)  Assessment & Plan  · Alcoholic cirrhosis of liver quit drinking in 2019  · Concurrent thrombocytopenia and hepatic encephalopathy  Diastolic CHF (HCC)  Assessment & Plan  Wt Readings from Last 3 Encounters:   04/01/21 66 9 kg (147 lb 7 8 oz)   03/30/21 63 3 kg (139 lb 8 oz)   03/24/21 66 2 kg (145 lb 15 1 oz)     · Initially held in the setting of acute kidney injury, typically takes 60 mg b i d , will resume 40 daily monitor    Dementia ([facility] )  Assessment & Plan  · Dementia without behavioral disturbance; lives with sister  · Continue mirtazapine and add melatonin  · Reportedly had adverse side effects with quetiapine and olanzapine with increased agitation    Thrombocytopenia (HCC)  Assessment & Plan  · Thrombocytopenia secondary to cirrhosis  No evidence of bleeding  Results from last 7 days   Lab Units 04/03/21  0704 04/02/21  0549 04/01/21  1400   PLATELETS Thousands/uL 36* 36* 38*       Paroxysmal atrial fibrillation (HCC)  Assessment & Plan  · Patient is still tachycardic in the 120s 130s will resume diltiazem  · Hypotension has improved  · Continue home Toprol XL  · No anticoagulation due to thrombocytopenia         VTE Pharmacologic Prophylaxis:   Pharmacologic: Pharmacologic VTE Prophylaxis contraindicated due to Thrombocytopenia  Mechanical VTE Prophylaxis in Place: Yes    Patient Centered Rounds: I have performed bedside rounds with nursing staff today  Discussions with Specialists or Other Care Team Provider:  Reviewed previous provider's notes discussed with case management primary RN    Education and Discussions with Family / Patient:  Discussed plan of care with family denies any additional questions or concerns at this time    Time Spent for Care: 20 minutes  More than 50% of total time spent on counseling and coordination of care as described above      Current Length of Stay: 2 day(s)    Current Patient Status: Inpatient   Certification Statement: The patient will continue to require additional inpatient hospital stay due to IV antibiotics, supportive care    Discharge Plan / Estimated Discharge Date: 48 hours at best likely longer      Code Status: Level 1 - Full Code      Subjective:   Patient is comfortable in bed, has periods of agitation  Objective:     Vitals:   Temp (24hrs), Av 7 °F (36 5 °C), Min:97 3 °F (36 3 °C), Max:98 °F (36 7 °C)    Temp:  [97 3 °F (36 3 °C)-98 °F (36 7 °C)] 97 3 °F (36 3 °C)  HR:  [121-132] 121  Resp:  [20] 20  BP: (102-123)/(64-81) 120/78  SpO2:  [91 %-100 %] 91 %  Body mass index is 26 98 kg/m²  Input and Output Summary (last 24 hours): Intake/Output Summary (Last 24 hours) at 4/3/2021 1031  Last data filed at 2021 1234  Gross per 24 hour   Intake 120 ml   Output --   Net 120 ml       Physical Exam:     Physical Exam  Vitals signs and nursing note reviewed  HENT:      Head: Normocephalic  Right Ear: Tympanic membrane normal    Neck:      Musculoskeletal: Normal range of motion  Cardiovascular:      Rate and Rhythm: Tachycardia present  Pulmonary:      Effort: Pulmonary effort is normal    Abdominal:      Palpations: Abdomen is soft  Musculoskeletal: Normal range of motion  Skin:     General: Skin is warm and dry  Neurological:      Mental Status: She is alert  Mental status is at baseline  Psychiatric:         Mood and Affect: Mood normal      Additional Data:     Labs:    Results from last 7 days   Lab Units 21  0704  21  1400   WBC Thousand/uL 4 07*   < > 4 44   HEMOGLOBIN g/dL 7 6*   < > 8 2*   HEMATOCRIT % 25 7*   < > 26 2*   PLATELETS Thousands/uL 36*   < > 38*   LYMPHO PCT %  --   --  23   MONO PCT %  --   --  9   EOS PCT %  --   --  1    < > = values in this interval not displayed       Results from last 7 days   Lab Units 21  0704 21  0549   POTASSIUM mmol/L 4 3 4 5   CHLORIDE mmol/L 113* 108   CO2 mmol/L 26 23   BUN mg/dL 37* 38*   CREATININE mg/dL 1 41* 1 61*   CALCIUM mg/dL 9 2 9 4   ALK PHOS U/L  --  248*   ALT U/L  --  18   AST U/L  --  34     Results from last 7 days   Lab Units 21  0549   INR 1 21*       * I Have Reviewed All Lab Data Listed Above  * Additional Pertinent Lab Tests Reviewed: [address] [facility] Admission Reviewed    I  Recent Cultures (last 7 days):     Results from last 7 days   Lab Units 04/01/21  1610 04/01/21  1426 04/01/21  1400   BLOOD CULTURE  No Growth at 24 hrs   --   --    [employer] STAIN RESULT   --   --  Gram positive cocci in clusters*   URINE CULTURE   --  >100,000 cfu/ml Klebsiella-Enterobacter  group*  --        Last 24 Hours Medication List:   Current Facility-Administered Medications   Medication Dose Route Frequency Provider Last Rate    cefTRIAXone  1,000 mg Intravenous Q24H Dougie Ruiz, DO 1,000 mg (04/02/21 1755)    diltiazem  120 mg Oral Daily MEREDITH Manrique      folic acid  3,568 mcg Oral Daily Dougie Ruiz, DO      furosemide  40 mg Oral Daily Delmar Nobles, ZACHARYNP      lactulose  20 g Oral BID Dougie Ruiz, DO      magnesium oxide  400 mg Oral BID Dougie Ruiz, DO      melatonin  6 mg Oral HS Dougie Ruiz, DO      metoprolol  2 5 mg Intravenous Q6H PRN Glorianne Kamini, DO      metoprolol succinate  50 mg Oral Daily MEREDITH Manrique      mirtazapine  15 mg Oral HS Glorianne Kamini, DO      nicotine  1 patch Transdermal Daily Glorianne Kamini, DO      nystatin   Topical BID Dougie Ruiz, DO      pantoprazole  40 mg Oral QAM Dougie Ruiz, DO      vitamin B-1  100 mg Oral Daily Glorianne Kamini, DO          Today, Patient Was Seen By: MEREDITH Manrique    ** Please Note: Dragon 360 Dictation voice to text software may have been used in the creation of this document   **

## 2021-04-03 NOTE — ASSESSMENT & PLAN NOTE
Wt Readings from Last 3 Encounters:   04/01/21 66 9 kg (147 lb 7 8 oz)   03/30/21 63 3 kg (139 lb 8 oz)   03/24/21 66 2 kg (145 lb 15 1 oz)     · Initially held in the setting of acute kidney injury, typically takes 60 mg b i d , will resume 40 daily monitor

## 2021-04-03 NOTE — PHYSICAL THERAPY NOTE
Physical Therapy Evaluation     Patient's Name: Monroe Brown    Admitting Diagnosis  Hyperbilirubinemia [E80 6]  Hyperammonemia ([address] ) [E72 20]  Weakness [R53 1]  Urinary tract infection [N39 0]  Thrombocytopenia (HCC) [D69 6]  CKD (chronic kidney disease) [N18 9]  Bandemia [D72 825]  History of cirrhosis [Z87 19]  History of multiple myeloma [Z85 79]  Chronic anemia [D64 9]  Acute encephalopathy [G93 40]    Problem List  Patient Active Problem List   Diagnosis    Anemia    Cigarette nicotine dependence without complication    Hyperglycemia    Paroxysmal SVT (supraventricular tachycardia) (HCC)    Sinus tachycardia    Urinary incontinence    Memory difficulties    Gait disturbance    Obesity (BMI 30 0-34  9)    Recurrent major depressive disorder, in partial remission (HCC)    Pancytopenia ([address] )    History of alcohol abuse    Metabolic encephalopathy    Liver cirrhosis (HCC)    Acute on chronic diastolic CHF    Dementia     Urinary tract infection    CESAR (acute kidney injury) (HCC)    Dysphagia    Paroxysmal atrial fibrillation (HCC)    Troponin level elevated    Neutropenia (HCC)    Hyperphosphatemia    Hyperkalemia    Hyponatremia    Multiple myeloma (HCC)    Epistaxis    Azotemia    Thrombocytopenia (HCC)    Pulmonary hypertension (HCC)    Hyperuricemia    Acute renal failure with acute tubular necrosis superimposed on chronic kidney disease (HCC)    Ascites    Chronic kidney disease (CKD), active medical management without dialysis, stage 4 (severe) (HCC)    Volume overload    Diarrhea    Dementia (HCC)    Acute kidney injury superimposed on chronic kidney disease (HCC)    Hypotension    Rib fracture    Pneumonia    Hypertension    Hepatic encephalopathy (HCC)    Diastolic CHF (HCC)    CKD (chronic kidney disease) stage 3, GFR 30-59 ml/min    Alcoholic cirrhosis (HCC)    Hypotension (arterial)    Bacteremia       Past Medical History  Past Medical History: Diagnosis Date    Alcoholic cirrhosis ([facility] )     Benign essential hypertension     last assessed - [id number]    Cirrhosis (HCC)     CKD (chronic kidney disease) stage 3, GFR 30-59 ml/min     Diastolic CHF ([facility] ) 71/62/9358    Gastroesophageal reflux disease without esophagitis 11/16/2017    Hepatic encephalopathy (HCC)     Hyperbilirubinemia 5/14/2020    Hypertension     Multiple myeloma (HCC)     Paroxysmal atrial fibrillation ([facility] )     Pneumonia 5/6/2020       Past Surgical History  Past Surgical History:   Procedure Laterality Date    APPENDECTOMY      BONE MARROW BIOPSY      IR PARACENTESIS  10/2/2020    IR PARACENTESIS  12/28/2020    IR THORACENTESIS  12/21/2020 04/03/21 1342   PT Last Visit   PT Visit Date 04/03/21   Note Type   Note type Evaluation   Assessment   Assessment Pt order received and chart review performed  At this time PT evaluation canceled due to elevated resting HR (124-126bpm)  Pt will follow and evaluate as appropriate  ANDREY Lazcano made aware              Blease Hamman, PT

## 2021-04-03 NOTE — ASSESSMENT & PLAN NOTE
· Acute metabolic encephalopathy poly factorial secondary to UTI and hepatic encephalopathy, and bacteremia  · UTI:  Continue empiric ceftriaxone  Urine cultures are pending  · Hepatic encephalopathy:  Increase lactulose to 20 g b i d  Felipe Pimentel Ammonia level improved to 98, will repeat in a m    · Blood cultures are pending  · PT/OT to evaluate for discharge needs

## 2021-04-03 NOTE — ASSESSMENT & PLAN NOTE
· Thrombocytopenia secondary to cirrhosis  No evidence of bleeding      Results from last 7 days   Lab Units 04/03/21  0704 04/02/21  0549 04/01/21  1400   PLATELETS Thousands/uL 36* 36* 38*

## 2021-04-03 NOTE — ASSESSMENT & PLAN NOTE
Urine culture growing Klebsiella  Continue ceftriaxone  Awaiting urine sensitivities  Blood cultures positive  Infectious disease consulted  Repeat blood cultures ordered

## 2021-04-03 NOTE — ASSESSMENT & PLAN NOTE
One set of blood cultures growing gram positive cocci in clusters  Repeat blood cultures are pending  Continue antibiotics  Infectious Disease consulted  Urine cultures growing Klebsiella  Awaiting sensitivities

## 2021-04-03 NOTE — ASSESSMENT & PLAN NOTE
· CESAR on CKD 3b likely secondary to poor intake and hypotension from UTI and hepatic encephalopathy  · Received IVF in ED  No additional fluids  · Will resume Lasix  · Improved to 1   For 1 within baseline  · Baseline appears to be somewhere between 1 5-1 8    Results from last 7 days   Lab Units 04/03/21  0704 04/02/21  0549 04/01/21  1400   BUN mg/dL 37* 38* 42*   CREATININE mg/dL 1 41* 1 61* 2 09*   EGFR ml/min/1 73sq m 42 36 26

## 2021-04-04 LAB
ANION GAP SERPL CALCULATED.3IONS-SCNC: 10 MMOL/L (ref 4–13)
BACTERIA BLD CULT: ABNORMAL
BACTERIA UR CULT: ABNORMAL
BACTERIA UR CULT: ABNORMAL
BUN SERPL-MCNC: 32 MG/DL (ref 5–25)
CALCIUM SERPL-MCNC: 9 MG/DL (ref 8.3–10.1)
CHLORIDE SERPL-SCNC: 108 MMOL/L (ref 100–108)
CO2 SERPL-SCNC: 27 MMOL/L (ref 21–32)
CREAT SERPL-MCNC: 1.35 MG/DL (ref 0.6–1.3)
ERYTHROCYTE [DISTWIDTH] IN BLOOD BY AUTOMATED COUNT: 21.8 % (ref 11.6–15.1)
GFR SERPL CREATININE-BSD FRML MDRD: 45 ML/MIN/1.73SQ M
GLUCOSE SERPL-MCNC: 98 MG/DL (ref 65–140)
GRAM STN SPEC: ABNORMAL
HCT VFR BLD AUTO: 25.1 % (ref 34.8–46.1)
HGB BLD-MCNC: 7.6 G/DL (ref 11.5–15.4)
MCH RBC QN AUTO: 27.8 PG (ref 26.8–34.3)
MCHC RBC AUTO-ENTMCNC: 30.3 G/DL (ref 31.4–37.4)
MCV RBC AUTO: 92 FL (ref 82–98)
PLATELET # BLD AUTO: 35 THOUSANDS/UL (ref 149–390)
POTASSIUM SERPL-SCNC: 4.7 MMOL/L (ref 3.5–5.3)
RBC # BLD AUTO: 2.73 MILLION/UL (ref 3.81–5.12)
SODIUM SERPL-SCNC: 145 MMOL/L (ref 136–145)
WBC # BLD AUTO: 4.06 THOUSAND/UL (ref 4.31–10.16)

## 2021-04-04 PROCEDURE — 85027 COMPLETE CBC AUTOMATED: CPT | Performed by: NURSE PRACTITIONER

## 2021-04-04 PROCEDURE — 99232 SBSQ HOSP IP/OBS MODERATE 35: CPT | Performed by: NURSE PRACTITIONER

## 2021-04-04 PROCEDURE — 97167 OT EVAL HIGH COMPLEX 60 MIN: CPT

## 2021-04-04 PROCEDURE — 80048 BASIC METABOLIC PNL TOTAL CA: CPT | Performed by: NURSE PRACTITIONER

## 2021-04-04 PROCEDURE — 97163 PT EVAL HIGH COMPLEX 45 MIN: CPT

## 2021-04-04 RX ORDER — FUROSEMIDE 40 MG/1
40 TABLET ORAL
Status: DISCONTINUED | OUTPATIENT
Start: 2021-04-04 | End: 2021-04-06

## 2021-04-04 RX ADMIN — MIRTAZAPINE 15 MG: 15 TABLET, FILM COATED ORAL at 22:07

## 2021-04-04 RX ADMIN — DILTIAZEM HYDROCHLORIDE 120 MG: 120 CAPSULE, COATED, EXTENDED RELEASE ORAL at 09:06

## 2021-04-04 RX ADMIN — LACTULOSE 20 G: 20 SOLUTION ORAL at 22:08

## 2021-04-04 RX ADMIN — METOPROLOL SUCCINATE 50 MG: 50 TABLET, EXTENDED RELEASE ORAL at 09:06

## 2021-04-04 RX ADMIN — PANTOPRAZOLE SODIUM 40 MG: 40 TABLET, DELAYED RELEASE ORAL at 06:54

## 2021-04-04 RX ADMIN — MAGNESIUM OXIDE TAB 400 MG (241.3 MG ELEMENTAL MG) 400 MG: 400 (241.3 MG) TAB at 09:06

## 2021-04-04 RX ADMIN — MAGNESIUM OXIDE TAB 400 MG (241.3 MG ELEMENTAL MG) 400 MG: 400 (241.3 MG) TAB at 18:10

## 2021-04-04 RX ADMIN — FOLIC ACID 1000 MCG: 1 TABLET ORAL at 09:06

## 2021-04-04 RX ADMIN — NYSTATIN: 100000 POWDER TOPICAL at 09:07

## 2021-04-04 RX ADMIN — FUROSEMIDE 40 MG: 40 TABLET ORAL at 09:06

## 2021-04-04 RX ADMIN — MELATONIN TAB 3 MG 6 MG: 3 TAB at 22:08

## 2021-04-04 RX ADMIN — THIAMINE HCL TAB 100 MG 100 MG: 100 TAB at 09:06

## 2021-04-04 RX ADMIN — LACTULOSE 20 G: 20 SOLUTION ORAL at 09:07

## 2021-04-04 RX ADMIN — CEFTRIAXONE SODIUM 1000 MG: 10 INJECTION, POWDER, FOR SOLUTION INTRAVENOUS at 17:09

## 2021-04-04 RX ADMIN — NYSTATIN: 100000 POWDER TOPICAL at 18:10

## 2021-04-04 NOTE — ASSESSMENT & PLAN NOTE
· Thrombocytopenia secondary to cirrhosis  No evidence of bleeding      Results from last 7 days   Lab Units 04/04/21  0449 04/03/21  0704 04/02/21  0549 04/01/21  1400   PLATELETS Thousands/uL 35* 36* 36* 38*

## 2021-04-04 NOTE — OCCUPATIONAL THERAPY NOTE
Occupational Therapy Evaluation        Patient Name: Ave Ribeiro  QEIPI'Z Date: 4/4/2021 04/04/21 1119   OT Last Visit   OT Visit Date 04/04/21   Note Type   Note type Evaluation   Restrictions/Precautions   Weight Bearing Precautions Per Order No   Braces or Orthoses Other (Comment)  (none per chart review)   Other Precautions Bed Alarm;Cognitive; Restraints; Fall Risk;Multiple lines   Pain Assessment   Pain Assessment Tool Pain Assessment not indicated - pt denies pain   Pain Score No Pain   Home Living   Type of Home Apartment  (3rd floor apartment)   Home Layout One level;Elevator   Bathroom Shower/Tub None  (sponge bathing only)   Bathroom Toilet [address] Schwartz   Prior Function   Level of [place] Needs assistance with IADLs; Needs assistance with ADLs and functional mobility  (as per chart, patient is "mostly independent with ADLs" )   Lives With Family  (sister)   Receives Help From Family;Home health   ADL Assistance Needs assistance   IADLs Needs assistance   Falls in the last 6 months   (unknown patient is a poor historian)   Lifestyle   Autonomy Patient unable to provide history secondary to cognitive deficits  CM Note indicates: " Son reported that patient still resides with her sister in a 3rd floor apartment with no KRISTEN  Patient has elevator access to her apartment  Patient uses no DME at home, but she uses a wheelchair when they are out in public, because patient cannot walk long distances  Patient is mostly independent with ADLs and has never been to STR   Patient is current with [facility]"   Reciprocal Relationships Supportive family   Psychosocial   Psychosocial (WDL) WDL   Ability to Express Feelings Needs assistance   Ability to Express Needs Needs assistance   Ability to Express Thoughts Needs assistance   Ability to Understand Others Usually understands   ADL   Eating Assistance 5  Supervision/Setup   Eating Deficit Increased time to complete   Grooming Assistance 5  Supervision/Setup   Grooming Deficit Increased time to complete   UB Bathing Assistance 4  Minimal Assistance   UB Bathing Deficit Increased time to complete   LB Bathing Assistance 2  Maximal Assistance   LB Bathing Deficit Increased time to complete   UB Dressing Assistance 3  Moderate Assistance   UB Dressing Deficit Setup; Increased time to complete;Supervision/safety   LB Dressing Assistance 2  Maximal Assistance   LB Dressing Deficit Setup;Supervision/safety; Increased time to complete   Toileting Assistance  2  Maximal Assistance   Toileting Deficit Increased time to complete   Bed Mobility   Rolling R 4  Minimal assistance   Additional items Assist x 1; Increased time required;Verbal cues;LE management; Bedrails   Rolling L 4  Minimal assistance   Additional items Assist x 1; Increased time required;Verbal cues;LE management; Bedrails   Supine to Sit 4  Minimal assistance   Additional items Assist x 2; Increased time required;Verbal cues;LE management;HOB elevated   Sit to Supine 4  Minimal assistance   Additional items Assist x 2; Increased time required;Verbal cues;LE management   Transfers   Sit to Stand Unable to assess   Additional Comments unable to assess due to elevated HR with minimal activity   Balance   Static Sitting Fair +   Dynamic Sitting Fair   Activity Tolerance   Activity Tolerance Treatment limited secondary to medical complications (Comment)  (elevated HR with minimal activity; patient asymptomatic)   Nurse Made Aware ANDREY Agudelo made aware of session outcomes   RUE Assessment   RUE Assessment X   LUE Assessment   LUE Assessment X   Hand Function   Gross Motor Coordination Impaired   Fine Motor Coordination Functional   Sensation   Light Touch No apparent deficits  (BUEs)   Vision-Basic Assessment   Current Vision Does not wear glasses   Cognition   Overall Cognitive Status Impaired   Arousal/Participation Alert; Responsive; Cooperative Attention Attends with cues to redirect   Orientation Level Oriented to person;Disoriented to place; Disoriented to time;Disoriented to situation   Memory Decreased short term memory;Decreased recall of biographical information;Decreased recall of recent events;Decreased recall of precautions   Following Commands Follows one step commands inconsistently   Assessment   Limitation Decreased ADL status; Decreased UE ROM; Decreased UE strength;Decreased Safe judgement during ADL;Decreased endurance;Decreased self-care trans;Decreased high-level ADLs   Prognosis Good   Assessment Patient is a 77 y o  female seen for OT evaluation s/p admit to [address] [facility] on 4/1/2021 w/Hepatic encephalopathy ([facility] [address] )  Commorbidities affecting patient's functional performance at time of assessment include: Bacteremia, Hypotension (arterial), Alcoholic cirrhosis, Diastolic CHF, Memory difficulties, Paroxysmal atrial fibrillation  Orders placed for OT evaluation and treatment  Performed at least two patient identifiers during session including name and wristband  Prior to admission, Patient unable to provide history secondary to cognitive deficits  CM Note indicates: " Son reported that patient still resides with her sister in a 3rd floor apartment with no KRISTEN  Patient has elevator access to her apartment  Patient uses no DME at home, but she uses a wheelchair when they are out in public, because patient cannot walk long distances  Patient is mostly independent with ADLs and has never been to STR  Patient is current with [facility]"  Personal factors affecting patient at time of initial evaluation include: limited insight into deficits, decreased initiation and engagement, difficulty performing ADLs and difficulty performing IADLs   Upon evaluation, patient requires moderate assist for UB ADLs, maximal assist for LB ADLs, Patient is alert, oriented to name,, disoriented to time,, disoriented to place, and disoriented to situation, Occupational performance is affected by the following deficits: orientation, decreased functional use of BUEs, decreased muscle strength, degenerative arthritic joint changes, impaired gross motor coordination, dynamic sit/ stand balance deficit with poor standing tolerance time for self care and functional mobility, decreased activity tolerance, impaired memory, impaired problem solving and decreased safety awareness  Patient to benefit from continued Occupational Therapy treatment while in the hospital to address deficits as defined above and maximize level of functional independence with ADLs and functional mobility  Occupational Performance areas to address include: bathing/ shower, dressing, toilet hygiene, transfer to all surfaces, functional ambulation, functional mobility, IADLs: safety procedures, Leisure Participation and Social participation  From OT standpoint, recommendation at time of d/c would be Short Term Rehab  Plan   Treatment Interventions ADL retraining;Functional transfer training;UE strengthening/ROM; Endurance training;Patient/family training; Compensatory technique education;Continued evaluation; Energy conservation; Activityengagement   Goal Expiration Date 04/18/21   OT Frequency 3-5x/wk   Recommendation   OT Discharge Recommendation Post-Acute Rehabilitation Services   AM-PAC Daily Activity Inpatient   Lower Body Dressing 2   Bathing 2   Toileting 2   Upper Body Dressing 2   Grooming 3   Eating 3   Daily Activity Raw Score 14   Daily Activity Standardized Score (Calc for Raw Score >=11) 33 39   AM-PAC Applied Cognition Inpatient   Following a Speech/Presentation 2   Understanding Ordinary Conversation 2   Taking Medications 1   Remembering Where Things Are Placed or Put Away 1   Remembering List of 4-5 Errands 1   Taking Care of Complicated Tasks 1   Applied Cognition Raw Score 8   Applied Cognition Standardized Score 19 32   Barthel Index   Feeding 5   Bathing 0   Grooming Score 0 Dressing Score 5   Bladder Score 5   Bowels Score 5   Toilet Use Score 5   Transfers (Bed/Chair) Score 5   Mobility (Level Surface) Score 0   Stairs Score 0   Barthel Index Score 30   Modified [place] Scale   Modified [place] Scale 5     Occupational Therapy goals: In 7-14 days:     1- Patient will verbalize and demonstrate use of energy conservation/ deep breathing technique and work simplification skills during functional activity with no verbal cues  2-Patient will verbalize and demonstrate good body mechanics and joint protection techniques during  ADLs/ IADLs with no verbal cues  3- Patient will increase OOB/ sitting tolerance to 2-4 hours per day for increased participation in self care and leisure tasks with no s/s of exertion  4-Patient will increase standing tolerance time to 5  minutes with unilateral UE support to complete sink level ADLs@ mod I level   5- Patient will increase sitting tolerance at edge of bed to 20 minutes to complete UB ADLs @ set up assist level  6- Patient will transfer bed to Chair / toilet at Set up assist level with AD as indicated  7- Patient will complete UB ADLs with set up assist  8- Patient will complete LB ADLs with min assist with the use of adaptive equipment  9- Patient will complete toileting hygiene with set up assist/ supervision for thoroughness    10-Patient/ Family  will demonstrate competency with UE Home Exercise Program

## 2021-04-04 NOTE — PLAN OF CARE
Problem: OCCUPATIONAL THERAPY ADULT  Goal: Performs self-care activities at highest level of function for planned discharge setting  See evaluation for individualized goals  Description: Treatment Interventions: ADL retraining, Functional transfer training, UE strengthening/ROM, Endurance training, Patient/family training, Compensatory technique education, Continued evaluation, Energy conservation, Activityengagement          See flowsheet documentation for full assessment, interventions and recommendations  Note: Limitation: Decreased ADL status, Decreased UE ROM, Decreased UE strength, Decreased Safe judgement during ADL, Decreased endurance, Decreased self-care trans, Decreased high-level ADLs  Prognosis: Good  Assessment: Patient is a 77 y o  female seen for OT evaluation s/p admit to [address] [place] [address] on 4/1/2021 w/Hepatic encephalopathy ([facility] [address] )  Commorbidities affecting patient's functional performance at time of assessment include: Bacteremia, Hypotension (arterial), Alcoholic cirrhosis, Diastolic CHF, Memory difficulties, Paroxysmal atrial fibrillation  Orders placed for OT evaluation and treatment  Performed at least two patient identifiers during session including name and wristband  Prior to admission, Patient unable to provide history secondary to cognitive deficits  CM Note indicates: " Son reported that patient still resides with her sister in a 3rd floor apartment with no KRISTEN  Patient has elevator access to her apartment  Patient uses no DME at home, but she uses a wheelchair when they are out in public, because patient cannot walk long distances  Patient is mostly independent with ADLs and has never been to STR  Patient is current with [facility]"  Personal factors affecting patient at time of initial evaluation include: limited insight into deficits, decreased initiation and engagement, difficulty performing ADLs and difficulty performing IADLs   Upon evaluation, patient requires moderate assist for UB ADLs, maximal assist for LB ADLs, Patient is alert, oriented to name,, disoriented to time,, disoriented to place, and disoriented to situation, Occupational performance is affected by the following deficits: orientation, decreased functional use of BUEs, decreased muscle strength, degenerative arthritic joint changes, impaired gross motor coordination, dynamic sit/ stand balance deficit with poor standing tolerance time for self care and functional mobility, decreased activity tolerance, impaired memory, impaired problem solving and decreased safety awareness  Patient to benefit from continued Occupational Therapy treatment while in the hospital to address deficits as defined above and maximize level of functional independence with ADLs and functional mobility  Occupational Performance areas to address include: bathing/ shower, dressing, toilet hygiene, transfer to all surfaces, functional ambulation, functional mobility, IADLs: safety procedures, Leisure Participation and Social participation  From OT standpoint, recommendation at time of d/c would be Short Term Rehab         OT Discharge Recommendation: Post-Acute Rehabilitation Services

## 2021-04-04 NOTE — ASSESSMENT & PLAN NOTE
One set of blood cultures growing gram positive cocci in clusters, Staph aureus likely contamination  Repeat blood cultures are pending  Continue antibiotics  Hold off on infectious disease consult at this time pending repeat blood cultures  Urine cultures growing Klebsiella  Awaiting sensitivities

## 2021-04-04 NOTE — PROGRESS NOTES
[facility]  Progress Note - Elpidio Haney 1945, 77 y o  female MRN: [id number]  Unit/Bed#: -01 Encounter: [id number]  Primary Care Provider: Landen Byrd MD   Date and time admitted to hospital: 4/1/2021  1:10 PM    * Hepatic encephalopathy ([facility] [address] )  Assessment & Plan  · Acute metabolic encephalopathy poly factorial secondary to UTI and hepatic encephalopathy, and bacteremia  · UTI:  Continue empiric ceftriaxone  Urine cultures are pending  · Hepatic encephalopathy:  Increase lactulose to 20 g b i d  Lajean Cassette Ammonia level improved to 98, will repeat in a m  · Repeat Blood cultures are pending  · PT/OT to evaluate for discharge needs    Urinary tract infection  Assessment & Plan  Urine culture growing Klebsiella  Continue ceftriaxone  Awaiting urine sensitivities  1/2 bottles positive likely skin contamination, repeat blood cultures are pending  Hold off on infectious disease consult at this time pending repeat blood cultures    Bacteremia  Assessment & Plan  One set of blood cultures growing gram positive cocci in clusters, Staph aureus likely contamination  Repeat blood cultures are pending  Continue antibiotics  Hold off on infectious disease consult at this time pending repeat blood cultures  Urine cultures growing Klebsiella  Awaiting sensitivities    Hypotension (arterial)  Assessment & Plan  · Hypotension has resolved  · Tachycardia persistent  · Continue home Cardizem and metoprolol    Alcoholic cirrhosis (HCC)  Assessment & Plan  · Alcoholic cirrhosis of liver quit drinking in 2019  · Concurrent thrombocytopenia and hepatic encephalopathy      [facility]  Assessment & Plan  Wt Readings from Last 3 Encounters:   04/01/21 66 9 kg (147 lb 7 8 oz)   03/30/21 63 3 kg (139 lb 8 oz)   03/24/21 66 2 kg (145 lb 15 1 oz)     · Initially held in the setting of acute kidney injury, typically takes 60 mg b i d   · Kidney function remains stable with 40 mg daily will increase to 40 mg b i d  And monitor    Thrombocytopenia (HCC)  Assessment & Plan  · Thrombocytopenia secondary to cirrhosis  No evidence of bleeding  Results from last 7 days   Lab Units 21  0449 21  0704 21  0549 21  1400   PLATELETS Thousands/uL 35* 36* 36* 38*       Paroxysmal atrial fibrillation (HCC)  Assessment & Plan  · Patient is still tachycardic in the 120s 130s will resume diltiazem  · Continue home Toprol XL  · No anticoagulation due to thrombocytopenia         VTE Pharmacologic Prophylaxis:   Pharmacologic: Pharmacologic VTE Prophylaxis contraindicated due to Thrombocytopenia  Mechanical VTE Prophylaxis in Place: Yes    Patient Centered Rounds: I have performed bedside rounds with nursing staff today  Discussions with Specialists or Other Care Team Provider:  Reviewed previous provider's notes discussed with case management primary RN  Education and Discussions with Family / Patient:  Discussed plan of care with patient denies any additional questions or concerns at this time    Time Spent for Care: 20 minutes  More than 50% of total time spent on counseling and coordination of care as described above  Current Length of Stay: 3 day(s)    Current Patient Status: Inpatient   Certification Statement: The patient will continue to require additional inpatient hospital stay due to IV antibiotics    Discharge Plan / Estimated Discharge Date:  48 hours    Code Status: Level 1 - Full Code    Subjective:   Patient is confused but very aler does not appear to be in any is distress    Objective:     Vitals:   Temp (24hrs), Av 4 °F (36 3 °C), Min:97 1 °F (36 2 °C), Max:97 5 °F (36 4 °C)    Temp:  [97 1 °F (36 2 °C)-97 5 °F (36 4 °C)] 97 5 °F (36 4 °C)  HR:  [123-127] 123  BP: (110-131)/(69-77) 131/77  SpO2:  [93 %] 93 %  Body mass index is 26 98 kg/m²  Input and Output Summary (last 24 hours):        Intake/Output Summary (Last 24 hours) at 2021 1049  Last data filed at 2021 2484  Gross per 24 hour   Intake 390 ml   Output --   Net 390 ml       Physical Exam:     Physical Exam  Vitals signs and nursing note reviewed  Constitutional:       General: She is not in acute distress  Appearance: She is normal weight  She is ill-appearing  Cardiovascular:      Rate and Rhythm: Normal rate  Pulses: Normal pulses  Pulmonary:      Effort: Pulmonary effort is normal    Abdominal:      Palpations: Abdomen is soft  Musculoskeletal: Normal range of motion  Skin:     General: Skin is warm and dry  Neurological:      Mental Status: She is alert  Mental status is at baseline  Psychiatric:         Mood and Affect: Mood normal        Additional Data:     Labs:    Results from last 7 days   Lab Units 04/04/21  0449 04/01/21  1400   WBC Thousand/uL 4 06*   < > 4 44   HEMOGLOBIN g/dL 7 6*   < > 8 2*   HEMATOCRIT % 25 1*   < > 26 2*   PLATELETS Thousands/uL 35*   < > 38*   LYMPHO PCT %  --   --  23   MONO PCT %  --   --  9   EOS PCT %  --   --  1    < > = values in this interval not displayed  Results from last 7 days   Lab Units 04/04/21  0449 04/02/21  0549   POTASSIUM mmol/L 4 7   < > 4 5   CHLORIDE mmol/L 108   < > 108   CO2 mmol/L 27   < > 23   BUN mg/dL 32*   < > 38*   CREATININE mg/dL 1 35*   < > 1 61*   CALCIUM mg/dL 9 0   < > 9 4   ALK PHOS U/L  --   --  248*   ALT U/L  --   --  18   AST U/L  --   --  34    < > = values in this interval not displayed  Results from last 7 days   Lab Units 04/02/21  0549   INR  1 21*       * I Have Reviewed All Lab Data Listed Above  * Additional Pertinent Lab Tests Reviewed: Kay 66 Admission Reviewed    Recent Cultures (last 7 days):     Results from last 7 days   Lab Units 04/02/21  2239 04/01/21  1610 04/01/21  1426 04/01/21  1400   BLOOD CULTURE  Received in Microbiology Lab  Culture in Progress  Received in Microbiology Lab  Culture in Progress  No Growth at 48 hrs    --  Staphylococcus coagulase negative* GRAM STAIN RESULT   --   --   --  Gram positive cocci in clusters*   URINE CULTURE   --   --  >100,000 cfu/ml Klebsiella-Enterobacter  group*  10,000-19,000 cfu/ml   --        Last 24 Hours Medication List:   Current Facility-Administered Medications   Medication Dose Route Frequency Provider Last Rate    cefTRIAXone  1,000 mg Intravenous Q24H Dougie Ruiz, DO 1,000 mg (04/03/21 1744)    diltiazem  120 mg Oral Daily MEREDITH Berger      folic acid  0,858 mcg Oral Daily Dougie Ruiz, DO      furosemide  40 mg Oral BID (diuretic) MEREDITH Berger      lactulose  20 g Oral BID Amira Cordia, DO      magnesium oxide  400 mg Oral BID Dougie Ruiz, DO      melatonin  6 mg Oral HS Dougie Ruiz, DO      metoprolol  2 5 mg Intravenous Q6H PRN Amira Cordia, DO      metoprolol succinate  50 mg Oral Daily MEREDITH Berger      mirtazapine  15 mg Oral HS Amira Cordia, DO      nicotine  1 patch Transdermal Daily Amira Cordia, DO      nystatin   Topical BID Dougie Joseph, DO      pantoprazole  40 mg Oral QAM Dougie Ruiz, DO      vitamin B-1  100 mg Oral Daily Amira Cordia, DO          Today, Patient Was Seen By: MEREDITH Berger    ** Please Note: Dragon 360 Dictation voice to text software may have been used in the creation of this document   **

## 2021-04-04 NOTE — ASSESSMENT & PLAN NOTE
Wt Readings from Last 3 Encounters:   04/01/21 66 9 kg (147 lb 7 8 oz)   03/30/21 63 3 kg (139 lb 8 oz)   03/24/21 66 2 kg (145 lb 15 1 oz)     · Initially held in the setting of acute kidney injury, typically takes 60 mg b i d   · Kidney function remains stable with 40 mg daily will increase to 40 mg b i d   And monitor

## 2021-04-04 NOTE — ASSESSMENT & PLAN NOTE
· Acute metabolic encephalopathy poly factorial secondary to UTI and hepatic encephalopathy, and bacteremia  · UTI:  Continue empiric ceftriaxone  Urine cultures are pending  · Hepatic encephalopathy:  Increase lactulose to 20 g b i d  Gerhardt Sep Ammonia level improved to 98, will repeat in a m    · Repeat Blood cultures are pending  · PT/OT to evaluate for discharge needs

## 2021-04-04 NOTE — ASSESSMENT & PLAN NOTE
Urine culture growing Klebsiella  Continue ceftriaxone  Awaiting urine sensitivities  1/2 bottles positive likely skin contamination, repeat blood cultures are pending  Hold off on infectious disease consult at this time pending repeat blood cultures

## 2021-04-04 NOTE — ASSESSMENT & PLAN NOTE
· Patient is still tachycardic in the 120s 130s will resume diltiazem  · Continue home Toprol XL  · No anticoagulation due to thrombocytopenia

## 2021-04-04 NOTE — PLAN OF CARE
Problem: PHYSICAL THERAPY ADULT  Goal: Performs mobility at highest level of function for planned discharge setting  See evaluation for individualized goals  Description: Treatment/Interventions: LE strengthening/ROM, Therapeutic exercise, Endurance training, Equipment eval/education, Patient/family training, Bed mobility, Continued evaluation, Spoke to nursing, OT          See flowsheet documentation for full assessment, interventions and recommendations  Note: Prognosis: Fair  Problem List: Decreased strength, Decreased endurance, Impaired balance, Decreased mobility, Decreased cognition, Decreased safety awareness, Decreased skin integrity  Assessment: Pt is 77 y o  female seen for PT evaluation s/p admit to [facility] on 4/1/2021 w/ Hepatic encephalopathy ([facility] [address] )  PT consulted to assess pt's functional mobility and d/c needs  Order placed for PT eval and tx, w/ up and OOB as tolerated order  Comorbidities affecting pt's physical performance at time of assessment include: Bacteremia, Hypotension (arterial), Alcoholic cirrhosis, Diastolic CHF, Memory difficulties, Paroxysmal atrial fibrillation  PTA, pt was dependent for all mobility tasks and lives w/ sister in one level apartment with elevator access  Personal factors affecting pt at time of IE include: inaccessible home environment, inability to ambulate household distances, inability to navigate level surfaces w/o external assistance, decreased cognition, limited insight into impairments, inability to perform IADLs, inability to perform ADLs and inability to live alone  Please find objective findings from PT assessment regarding body systems outlined above with impairments and limitations including weakness, impaired balance, decreased endurance, decreased activity tolerance, decreased functional mobility tolerance, decreased safety awareness, fall risk, decreased skin integrity and decreased cognition   The following objective measures performed on IE also reveal limitations: Barthel Index: 30/100 and Modified [place]: 5 (severe disability)  Pt's clinical presentation is currently unstable/unpredictable seen in pt's presentation of ongoing medical management/monitoring, elevated HR with minimal activity impacting overall mobility status and need for input for task focus and mobility technique/safety  Pt to benefit from continued PT tx to address deficits as defined above and maximize level of functional independent mobility and consistency  From PT/mobility standpoint, recommendation at time of d/c would be STR pending progress in order to facilitate return to PLOF  Barriers to Discharge: Inaccessible home environment     PT Discharge Recommendation: [address]     PT - OK to Discharge: Yes(when medically cleared; if to STR)    See flowsheet documentation for full assessment

## 2021-04-05 ENCOUNTER — APPOINTMENT (INPATIENT)
Dept: NON INVASIVE DIAGNOSTICS | Facility: HOSPITAL | Age: 76
DRG: 441 | End: 2021-04-05
Payer: MEDICARE

## 2021-04-05 ENCOUNTER — APPOINTMENT (INPATIENT)
Dept: CT IMAGING | Facility: HOSPITAL | Age: 76
DRG: 441 | End: 2021-04-05
Payer: MEDICARE

## 2021-04-05 PROBLEM — S32.010A COMPRESSION FRACTURE OF FIRST LUMBAR VERTEBRA (HCC): Status: ACTIVE | Noted: 2021-04-05

## 2021-04-05 PROBLEM — E87.0 HYPERNATREMIA: Status: ACTIVE | Noted: 2021-04-05

## 2021-04-05 PROBLEM — D61.818 PANCYTOPENIA (HCC): Status: ACTIVE | Noted: 2020-08-04

## 2021-04-05 LAB
ABO GROUP BLD: NORMAL
ANION GAP SERPL CALCULATED.3IONS-SCNC: 8 MMOL/L (ref 4–13)
BLD GP AB SCN SERPL QL: NEGATIVE
BUN SERPL-MCNC: 32 MG/DL (ref 5–25)
CALCIUM SERPL-MCNC: 9.2 MG/DL (ref 8.3–10.1)
CHLORIDE SERPL-SCNC: 113 MMOL/L (ref 100–108)
CO2 SERPL-SCNC: 26 MMOL/L (ref 21–32)
CREAT SERPL-MCNC: 1.33 MG/DL (ref 0.6–1.3)
ERYTHROCYTE [DISTWIDTH] IN BLOOD BY AUTOMATED COUNT: 21.7 % (ref 11.6–15.1)
GFR SERPL CREATININE-BSD FRML MDRD: 45 ML/MIN/1.73SQ M
GLUCOSE SERPL-MCNC: 103 MG/DL (ref 65–140)
HCT VFR BLD AUTO: 25.4 % (ref 34.8–46.1)
HGB BLD-MCNC: 7.6 G/DL (ref 11.5–15.4)
MCH RBC QN AUTO: 27.5 PG (ref 26.8–34.3)
MCHC RBC AUTO-ENTMCNC: 29.9 G/DL (ref 31.4–37.4)
MCV RBC AUTO: 92 FL (ref 82–98)
PLATELET # BLD AUTO: 36 THOUSANDS/UL (ref 149–390)
POTASSIUM SERPL-SCNC: 4.8 MMOL/L (ref 3.5–5.3)
RBC # BLD AUTO: 2.76 MILLION/UL (ref 3.81–5.12)
RH BLD: POSITIVE
SODIUM SERPL-SCNC: 147 MMOL/L (ref 136–145)
SPECIMEN EXPIRATION DATE: NORMAL
WBC # BLD AUTO: 3.92 THOUSAND/UL (ref 4.31–10.16)

## 2021-04-05 PROCEDURE — 74176 CT ABD & PELVIS W/O CONTRAST: CPT

## 2021-04-05 PROCEDURE — 86923 COMPATIBILITY TEST ELECTRIC: CPT

## 2021-04-05 PROCEDURE — 99223 1ST HOSP IP/OBS HIGH 75: CPT | Performed by: INTERNAL MEDICINE

## 2021-04-05 PROCEDURE — 86850 RBC ANTIBODY SCREEN: CPT | Performed by: NURSE PRACTITIONER

## 2021-04-05 PROCEDURE — 85027 COMPLETE CBC AUTOMATED: CPT | Performed by: NURSE PRACTITIONER

## 2021-04-05 PROCEDURE — 93306 TTE W/DOPPLER COMPLETE: CPT

## 2021-04-05 PROCEDURE — 99232 SBSQ HOSP IP/OBS MODERATE 35: CPT | Performed by: PHYSICIAN ASSISTANT

## 2021-04-05 PROCEDURE — 86900 BLOOD TYPING SEROLOGIC ABO: CPT | Performed by: NURSE PRACTITIONER

## 2021-04-05 PROCEDURE — 80048 BASIC METABOLIC PNL TOTAL CA: CPT | Performed by: NURSE PRACTITIONER

## 2021-04-05 PROCEDURE — 86901 BLOOD TYPING SEROLOGIC RH(D): CPT | Performed by: NURSE PRACTITIONER

## 2021-04-05 PROCEDURE — G1004 CDSM NDSC: HCPCS

## 2021-04-05 RX ORDER — LACTULOSE 20 G/30ML
20 SOLUTION ORAL 3 TIMES DAILY
Status: DISCONTINUED | OUTPATIENT
Start: 2021-04-05 | End: 2021-04-09 | Stop reason: HOSPADM

## 2021-04-05 RX ADMIN — MIRTAZAPINE 15 MG: 15 TABLET, FILM COATED ORAL at 22:46

## 2021-04-05 RX ADMIN — NYSTATIN: 100000 POWDER TOPICAL at 18:56

## 2021-04-05 RX ADMIN — CEFTRIAXONE SODIUM 1000 MG: 10 INJECTION, POWDER, FOR SOLUTION INTRAVENOUS at 18:55

## 2021-04-05 RX ADMIN — NYSTATIN: 100000 POWDER TOPICAL at 11:54

## 2021-04-05 RX ADMIN — MELATONIN TAB 3 MG 6 MG: 3 TAB at 22:53

## 2021-04-05 RX ADMIN — PANTOPRAZOLE SODIUM 40 MG: 40 TABLET, DELAYED RELEASE ORAL at 06:09

## 2021-04-05 RX ADMIN — RIFAXIMIN 550 MG: 550 TABLET ORAL at 22:46

## 2021-04-05 RX ADMIN — MAGNESIUM OXIDE TAB 400 MG (241.3 MG ELEMENTAL MG) 400 MG: 400 (241.3 MG) TAB at 18:55

## 2021-04-05 RX ADMIN — MAGNESIUM OXIDE TAB 400 MG (241.3 MG ELEMENTAL MG) 400 MG: 400 (241.3 MG) TAB at 11:38

## 2021-04-05 RX ADMIN — FUROSEMIDE 40 MG: 40 TABLET ORAL at 11:37

## 2021-04-05 RX ADMIN — METOPROLOL SUCCINATE 50 MG: 50 TABLET, EXTENDED RELEASE ORAL at 11:38

## 2021-04-05 RX ADMIN — LACTULOSE 20 G: 20 SOLUTION ORAL at 22:46

## 2021-04-05 RX ADMIN — DILTIAZEM HYDROCHLORIDE 30 MG: 30 TABLET, FILM COATED ORAL at 12:26

## 2021-04-05 RX ADMIN — METOPROLOL TARTRATE 25 MG: 25 TABLET, FILM COATED ORAL at 22:53

## 2021-04-05 RX ADMIN — DILTIAZEM HYDROCHLORIDE 30 MG: 30 TABLET, FILM COATED ORAL at 18:55

## 2021-04-05 RX ADMIN — NICOTINE 1 PATCH: 7 PATCH, EXTENDED RELEASE TRANSDERMAL at 11:39

## 2021-04-05 RX ADMIN — FOLIC ACID 1000 MCG: 1 TABLET ORAL at 11:37

## 2021-04-05 RX ADMIN — LACTULOSE 20 G: 20 SOLUTION ORAL at 18:55

## 2021-04-05 RX ADMIN — THIAMINE HCL TAB 100 MG 100 MG: 100 TAB at 11:38

## 2021-04-05 RX ADMIN — LACTULOSE 20 G: 20 SOLUTION ORAL at 11:39

## 2021-04-05 RX ADMIN — METOPROLOL TARTRATE 25 MG: 25 TABLET, FILM COATED ORAL at 12:26

## 2021-04-05 NOTE — PROGRESS NOTES
[address] [facility]  Progress Note - Kamlesh Mathew 1945, 77 y o  female MRN: [id number]  Unit/Bed#: -01 Encounter: [id number]  Primary Care Provider: Toby Hilton MD   Date and time admitted to hospital: 4/1/2021  1:10 PM      DOS: 4/5/2021  * Acute encephalopathy  Assessment & Plan  · Patient presented with acute metabolic encephalopathy likely multifactorial in setting of UTI, hepatic encephalopathy  · Slowly improving, patient continues to be confused, however upon discussion with patient's sister who she resides with patient is typically confused at baseline  · UA positive for nitrites, leukocytes and moderate bacteria  · Urine culture positive for Klebsiella, susceptible to ceftriaxone, continue (day 4)  · GI following for hepatic encephalopathy,  · Ammonia has improved to 70  · Increase lactulose to 20 mg t i d  To titrate to goal of 2-3 bowel movements a day  · Rifaxan added BID   · PT/OT recommending short-term rehab, however patient's family declining, requesting home with VNA services once medically stable for discharge    Alcoholic cirrhosis ([facility] )  Assessment & Plan  · Patient with history of alcoholic cirrhosis, quit drinking in 2019  · GI consulted,  · Patient with increased abdominal distension  · CT abdomen pelvis with cirrhosis, small amount of perihepatic ascites, decreased from prior  · Trend CMP and INR daily  · Lactulose increased to 20 mg t i d   With a goal of 2-3 bowel movements daily  · Xifaxan twice a day added as well  · Patient likely does not have enough ascites to obtain paracentesis, currently on IV ceftriaxone for underlying UTI and SBP prophylaxis    Paroxysmal atrial fibrillation ([facility] )  Assessment & Plan  · Patient has had persistent tachycardia here in the hospital, heart rate ranging from 120s to 130s on review today  · Therefore Cardiology consulted,  · EKG from 04/02 with AFib with 2-1 heart block, incomplete RBBB  · Patient's Toprol-XL and Cardizem 120 mg daily discontinued  · Started on Lopressor 25 mg b i d  And Cardizem 30 mg q 6 hours for rate control  · Echo ordered, results pending  · Patient not maintained on anticoagulation as an outpatient secondary to thrombocytopenia, dementia and fall risk    Compression fracture of first lumbar vertebra [facility])  Assessment & Plan  · Incidental finding noted on CT scan  · Mild L1 compression fracture favored to be subacute/chronic, however new since prior study from 12/2020  · Patient denies any significant back pain, however does have underlying dementia  · Continue supportive care, p r n  Tylenol  · Recommend follow-up with orthopedics as an outpatient    Hypernatremia  Assessment & Plan  · Sodium 147 today  · Lasix increased  · Nephrology following, appreciate recommendations  · Obtain repeat CMP in the a m  Bacteremia  Assessment & Plan  · Patient with 1/2 blood cultures positive for Staph coag-negative  · Likely contaminant, not true bacteremia as additional blood cultures are now been negative times 48 hours  · No need for ID consultation at this time  · Urine culture growing Klebsiella, continue IV ceftriaxone for underlying UTI    Hypotension (arterial)  Assessment & Plan  · Patient with history of intermittent hypotension  · Likely acutely worsened in setting of persistent tachycardia  · Cardiology following,  · Continue Cardizem and Lopressor as outlined above with holding parameters as blood pressure tolerates  · Telemetry monitoring    Diastolic CHF (HCC)  Assessment & Plan  Wt Readings from Last 3 Encounters:   04/05/21 66 9 kg (147 lb 7 8 oz)   03/30/21 63 3 kg (139 lb 8 oz)   03/24/21 66 2 kg (145 lb 15 1 oz)     · Concerning for acute diastolic heart failure as evidenced by BNP greater than 12,000, increase in weight  · Did receive gentle IV fluids initially on admission for acute kidney injury  · Patient is typically maintained on Lasix 60 mg daily at home  · Cardiology following,  · P o   Lasix increased to 40 mg b i d   · Echo obtained, follow-up results  · Obtain daily weights, accurate I&Os, low sodium diet     Acute kidney injury superimposed on chronic kidney disease (HCC)  Assessment & Plan  · POA, CESAR on CKD 3b likely secondary to poor intake and hypotension from underlying UTI  · Received IV fluid hydration in the ER, additional fluids are currently on hold  · Nephrology consult by Cardiology,  · Patient currently on p o  Lasix 40 mg b i d , continue for now as there is concern for volume overload  · Creatinine did improved to 1 33 today  · Baseline appears to be somewhere between 1 5-1 8    Results from last 7 days   Lab Units 04/05/21  0438 04/04/21  0449 04/03/21  0704 04/02/21  0549 04/01/21  1400   BUN mg/dL 32* 32* 37* 38* 42*   CREATININE mg/dL 1 33* 1 35* 1 41* 1 61* 2 09*   EGFR ml/min/1 73sq m 45 45 42 36 26       Dementia ([facility])  Assessment & Plan  · Dementia without behavioral disturbance; patient lives with sister  · Continue mirtazapine 50 mg daily and melatonin 6 mg daily  · Reportedly had adverse side effects with quetiapine and olanzapine with increased agitation  · Currently in soft limb upper restraints for safety    Pancytopenia (HCC)  Assessment & Plan  · WBC 3 92, platelets 36, hemoglobin 7 6  · Chronic on review, likely secondary to underlying cirrhosis  · No bleeding noted currently  · Patient's sister reports that the patient undergoes blood transfusions every 2 weeks, she reports that the patient is due on Wednesday is requesting her to get blood transfusion here    Will continue to monitor CBC, if continued down trending of hemoglobin would give 1 unit packed red blood cells  · Monitor    Results from last 7 days   Lab Units 04/05/21  0438 04/04/21  0449 04/03/21  0704 04/02/21  0549 04/01/21  1400   PLATELETS Thousands/uL 36* 35* 36* 36* 38*       Urinary tract infection  Assessment & Plan  · Patient presented with acute encephalopathy, UA positive  · Urine culture with Klebsiella, sensitive to ceftriaxone  · Continue IV ceftriaxone, day   · Unable to determine if patient was having urinary symptoms secondary to her baseline dementia  · Monitor closely    VTE Pharmacologic Prophylaxis:   Pharmacologic: Pharmacologic VTE Prophylaxis contraindicated due to Pancytopenia  Mechanical VTE Prophylaxis in Place: Yes    Patient Centered Rounds: I have performed bedside rounds with nursing staff today  Discussions with Specialists or Other Care Team Provider:  Discussed with GI, RN, cm and reviewed previous notes    Education and Discussions with Family / Patient:  Discussed with patient at bedside as well as patient's sister over the phone regarding plan of care    Time Spent for Care: 30 minutes  More than 50% of total time spent on counseling and coordination of care as described above  Current Length of Stay: 4 day(s)    Current Patient Status: Inpatient   Certification Statement: The patient will continue to require additional inpatient hospital stay due to Continued improvement of mentation, stabilization of heart rates, additional GI workup    Discharge Plan:  Not medically stable as above, likely not for another 48 hours pending improvement    Code Status: Level 1 - Full Code      Subjective:   Patient is confused  Does report some abdominal pain, but at times noted to shake her head yes for all review of system questions  Objective:     Vitals:   Temp (24hrs), Av 9 °F (36 6 °C), Min:97 4 °F (36 3 °C), Max:98 4 °F (36 9 °C)    Temp:  [97 4 °F (36 3 °C)-98 4 °F (36 9 °C)] 97 4 °F (36 3 °C)  HR:  [123-127] 127  Resp:  [18-19] 18  BP: ()/(64-66) 103/66  SpO2:  [89 %-96 %] 93 %  Body mass index is 26 98 kg/m²  Input and Output Summary (last 24 hours):        Intake/Output Summary (Last 24 hours) at 2021 1943  Last data filed at 2021 1356  Gross per 24 hour   Intake 360 ml   Output 300 ml   Net 60 ml       Physical Exam:     Physical Exam  Vitals signs reviewed  Constitutional:       General: She is not in acute distress  Appearance: She is ill-appearing (Chronic)  She is not toxic-appearing  Comments: Patient is in no acute distress sitting in her hospital bed resting comfortably  Recently had an episode of vomiting up clear liquids  Dementia at baseline, confused   HENT:      Head: Normocephalic and atraumatic  Eyes:      Conjunctiva/sclera: Conjunctivae normal       Pupils: Pupils are equal, round, and reactive to light  Cardiovascular:      Rate and Rhythm: Tachycardia present  Rhythm irregular  Pulses: Normal pulses  Pulmonary:      Effort: Pulmonary effort is normal  No respiratory distress  Breath sounds: No wheezing  Abdominal:      General: Bowel sounds are normal  There is distension  Palpations: Abdomen is soft  Tenderness: There is abdominal tenderness  There is no guarding  Musculoskeletal:      Right lower leg: No edema  Left lower leg: No edema  Skin:     General: Skin is warm and dry  Findings: No erythema  Neurological:      Mental Status: She is alert  Additional Data:     Labs:    Results from last 7 days   Lab Units 04/05/21  0438  04/01/21  1400   WBC Thousand/uL 3 92*   < > 4 44   HEMOGLOBIN g/dL 7 6*   < > 8 2*   HEMATOCRIT % 25 4*   < > 26 2*   PLATELETS Thousands/uL 36*   < > 38*   LYMPHO PCT %  --   --  23   MONO PCT %  --   --  9   EOS PCT %  --   --  1    < > = values in this interval not displayed  Results from last 7 days   Lab Units 04/05/21  0438  04/02/21  0549   POTASSIUM mmol/L 4 8   < > 4 5   CHLORIDE mmol/L 113*   < > 108   CO2 mmol/L 26   < > 23   BUN mg/dL 32*   < > 38*   CREATININE mg/dL 1 33*   < > 1 61*   CALCIUM mg/dL 9 2   < > 9 4   ALK PHOS U/L  --   --  248*   ALT U/L  --   --  18   AST U/L  --   --  34    < > = values in this interval not displayed       Results from last 7 days   Lab Units 04/02/21  0549   INR  1 21*       * I Have Reviewed All Lab Data Listed Above  * Additional Pertinent Lab Tests Reviewed: All Labs Within Last 24 Hours Reviewed    Imaging:    Imaging Reports Reviewed Today Include:  CT abdomen pelvis  Imaging Personally Reviewed by Myself Includes:  None    Recent Cultures (last 7 days):     Results from last 7 days   Lab Units 04/02/21  2239 04/01/21  1610 04/01/21  1426 04/01/21  1400   BLOOD CULTURE  No Growth at 48 hrs  No Growth at 48 hrs  No Growth at 72 hrs   --  Staphylococcus coagulase negative*   GRAM STAIN RESULT   --   --   --  Gram positive cocci in clusters*   URINE CULTURE   --   --  >100,000 cfu/ml Klebsiella variicola*  10,000-19,000 cfu/ml   --        Last 24 Hours Medication List:   Current Facility-Administered Medications   Medication Dose Route Frequency Provider Last Rate    cefTRIAXone  1,000 mg Intravenous Q24H Dougie Ruiz, DO 1,000 mg (04/05/21 1855)    diltiazem  30 mg Oral Q6H [address] Debby Esqueda MD      folic acid  3,088 mcg Oral Daily Hien Jonny, DO      furosemide  40 mg Oral BID (diuretic) MEREDITH Saldana      lactulose  20 g Oral TID Hayes Marsh PA-C      magnesium oxide  400 mg Oral BID Dougie Joseph, DO      melatonin  6 mg Oral HS Dougie Ruiz, DO      metoprolol  2 5 mg Intravenous Q6H PRN Hien Fuller, DO      metoprolol tartrate  25 mg Oral Q12H Jane Gao MD      mirtazapine  15 mg Oral HS Hien Bradford, DO      nicotine  1 patch Transdermal Daily Hien Bradford, DO      nystatin   Topical BID Dougie Joseph, DO      pantoprazole  40 mg Oral QAM Dougie Ruiz, DO      rifaximin  550 mg Oral Q12H [address] Hayes Marsh PA-C      vitamin B-1  100 mg Oral Daily Hien Fuller DO          Today, Patient Was Seen By: George Pavon PA-C    ** Please Note: Dictation voice to text software may have been used in the creation of this document   **

## 2021-04-05 NOTE — ASSESSMENT & PLAN NOTE
· Patient presented with acute encephalopathy, UA positive  · Urine culture with Klebsiella, sensitive to ceftriaxone  · Continue IV ceftriaxone, day 4/7  · Unable to determine if patient was having urinary symptoms secondary to her baseline dementia  · Monitor closely

## 2021-04-05 NOTE — CONSULTS
Consultation - [facility]  Tex Cho 77 y o  female MRN: [id number]  Unit/Bed#: -01 Encounter: [id number]         Reason for Consult / Principal Problem:  Encephalopathy, abdominal distention/pain    HPI:  Eduardo Castillo is a 30-year-old female with history of alcoholic cirrhosis complicated by hepatic encephalopathy and ascites, CHF, CKD, multiple myeloma, dementia and paroxysmal atrial fibrillation  Patient was admitted 4 days ago for hepatic encephalopathy  On admission, she had a urinalysis that showed positive bacteria and positive nitrite  Urine culture growing Klebsiella, and the patient was started on ceftriaxone  During admission, patient with persistent confusion  She is confused at baseline  She had a negative CT of the head for acute pathology, but did show known chronic changes  Repeat blood cultures are negative for now  Original blood cultures showing contaminant with likely skin lisa  Her last EGD was in June 2020  This was normal   Colonoscopy with several small polyps that were not removed at that time  Review of Systems:  Unable to obtain secondary to history of dementia and current altered mental status        Historical Information   Past Medical History:   Diagnosis Date    Alcoholic cirrhosis ([facility] [address] )     Benign essential hypertension     last assessed - [id number]    Cirrhosis (HCC)     CKD (chronic kidney disease) stage 3, GFR 30-59 ml/min     Diastolic CHF ([facility] [address] ) 22/27/6855    Gastroesophageal reflux disease without esophagitis 11/16/2017    Hepatic encephalopathy ([address] )     Hyperbilirubinemia 5/14/2020    Hypertension     Multiple myeloma (HCC)     Paroxysmal atrial fibrillation ([facility] [address] )     Pneumonia 5/6/2020     Past Surgical History:   Procedure Laterality Date    APPENDECTOMY      BONE MARROW BIOPSY      IR PARACENTESIS  10/2/2020    IR PARACENTESIS  12/28/2020    IR THORACENTESIS  12/21/2020     Social History   Social History Substance and Sexual Activity   Alcohol Use Not Currently    Frequency: 4 or more times a week    Drinks per session: 1 or 2    Binge frequency: Never    Comment: History of significant daily alcohol intake  Sister joy no alcohol intake in 6 months     Social History     Substance and Sexual Activity   Drug Use No     Social History     Tobacco Use   Smoking Status Light Tobacco Smoker    Packs/day: 0 25    Types: Cigarettes   Smokeless Tobacco Never Used   Tobacco Comment    2-3 a day     Family History   Problem Relation Age of Onset    Heart attack Father         myocardial infarction    Heart disease Father         Meds/Allergies     Current Facility-Administered Medications   Medication Dose Route Frequency    cefTRIAXone (ROCEPHIN) 1,000 mg in dextrose 5 % 50 mL IVPB  1,000 mg Intravenous Q24H    diltiazem (CARDIZEM) tablet 30 mg  30 mg Oral O2O [address]    folic acid (FOLVITE) tablet 1,000 mcg  1,000 mcg Oral Daily    furosemide (LASIX) tablet 40 mg  40 mg Oral BID (diuretic)    lactulose oral solution 20 g  20 g Oral BID    magnesium oxide (MAG-OX) tablet 400 mg  400 mg Oral BID    melatonin tablet 6 mg  6 mg Oral HS    metoprolol (LOPRESSOR) injection 2 5 mg  2 5 mg Intravenous Q6H PRN    metoprolol tartrate (LOPRESSOR) tablet 25 mg  25 mg Oral Q12H SHAYNA    mirtazapine (REMERON) tablet 15 mg  15 mg Oral HS    nicotine (NICODERM CQ) 7 mg/24hr TD 24 hr patch 1 patch  1 patch Transdermal Daily    nystatin (MYCOSTATIN) powder   Topical BID    pantoprazole (PROTONIX) EC tablet 40 mg  40 mg Oral QAM    thiamine tablet 100 mg  100 mg Oral Daily       Allergies   Allergen Reactions    Seroquel [Quetiapine] Irritability    Zyprexa [Olanzapine] Confusion         Objective     Blood pressure 102/65, pulse (!) 125, temperature 97 6 °F (36 4 °C), resp  rate 18, height 5' 2" (1 575 m), weight 66 9 kg (147 lb 7 8 oz), SpO2 (!) 89 %, not currently breastfeeding        Intake/Output Summary (Last 24 hours) at 4/5/2021 1436  Last data filed at 4/5/2021 1356  Gross per 24 hour   Intake 540 ml   Output 300 ml   Net 240 ml         PHYSICAL EXAM:      General Appearance:   Alert and oriented x 3  Cooperative, and in no respiratory distress   HEENT:   Normocephalic, atraumatic, anicteric      Neck:  Supple, symmetrical, trachea midline   Lungs:   Clear to auscultation bilaterally; no rales, rhonchi or wheezing; respirations unlabored    Heart[de-identified]   S1 and S2 normal; regular rate and rhythm; no murmur, rub, or gallop  Abdomen:   Soft, non-tender, non-distended; normal bowel sounds; no masses, no organomegaly    Genitalia:   Deferred    Rectal:   Deferred    Extremities:  No cyanosis, clubbing or edema    Pulses:  2+ and symmetric all extremities    Skin:  Skin color, texture, turgor normal, no rashes or lesions    Lymph nodes:  No palpable cervical or supraclavicular lymphadenopathy        Lab Results:   Results from last 7 days   Lab Units 04/05/21  0438  04/01/21  1400   WBC Thousand/uL 3 92*   < > 4 44   HEMOGLOBIN g/dL 7 6*   < > 8 2*   HEMATOCRIT % 25 4*   < > 26 2*   PLATELETS Thousands/uL 36*   < > 38*   LYMPHO PCT %  --   --  23   MONO PCT %  --   --  9   EOS PCT %  --   --  1    < > = values in this interval not displayed  Results from last 7 days   Lab Units 04/05/21  0438  04/02/21  0549   POTASSIUM mmol/L 4 8   < > 4 5   CHLORIDE mmol/L 113*   < > 108   CO2 mmol/L 26   < > 23   BUN mg/dL 32*   < > 38*   CREATININE mg/dL 1 33*   < > 1 61*   CALCIUM mg/dL 9 2   < > 9 4   ALK PHOS U/L  --   --  248*   ALT U/L  --   --  18   AST U/L  --   --  34    < > = values in this interval not displayed  Results from last 7 days   Lab Units 04/02/21  0549   INR  1 21*           Imaging Studies: I have personally reviewed pertinent imaging studies      Xr Chest Portable    Result Date: 4/1/2021  Impression: Persistent mild congestive changes with right mid to lower lung airspace opacity and increased moderate to large pleural effusion  Workstation performed: [id number]     Ct Head Without Contrast    Result Date: 4/1/2021  Impression: No acute intracranial abnormality  Generalized atrophy and stable moderate cerebral chronic microangiopathic disease  Stable inflammatory changes of the right frontoethmoidal recess and postoperative changes of the right nasal cavity  Workstation performed: [id number]       ASSESSMENT and PLAN:      1) Alcoholic cirrhosis complicated by previous ascites, hepatic encephalopathy - Unable to calculate recent MELD as INR is not drawn  Patient with UTI on admission, likely precipitating hepatic encephalopathy  Per other staff who have cared for her in the past state that the patient is confused at baseline  She does have history of documented dementia  Recent AFP in March was negative  - Treatment of UTI  - See plan below  - CMP and INR daily  - Continue lactulose with a goal of 2-3 bowel movements daily  - Add Xifaxan twice daily    2) Abdominal pain/abdominal distension - Patient has not had any abdominal imaging since December  On exam, she is complaining of diffuse abdominal pain but is not guarding  Her abdomen is also distended  Her last paracentesis was in October 2020   - Will order CT of the abdomen pelvis without contrast  - Consider liver Doppler  - Further recommendations based on above, if there is ascites she should have diagnostic paracentesis to rule out SBP     The patient was seen and examined by Dr Kevin Miller, all key medical decisions were made with Dr Kevin Miller  Thank you for allowing us to participate in the care of this pleasant patient  We will follow up with you closely

## 2021-04-05 NOTE — ASSESSMENT & PLAN NOTE
· Dementia without behavioral disturbance; patient lives with sister  · Continue mirtazapine 50 mg daily and melatonin 6 mg daily  · Reportedly had adverse side effects with quetiapine and olanzapine with increased agitation  · Currently in soft limb upper restraints for safety

## 2021-04-05 NOTE — ASSESSMENT & PLAN NOTE
· Sodium 147 today  · Lasix increased  · Nephrology following, appreciate recommendations  · Obtain repeat CMP in the a m

## 2021-04-05 NOTE — CASE MANAGEMENT
Cm called patient's POA/son Rivas to review patient's IMM and DCP  Son agreeable to discharge home with [facility] from [address] and provided verbal understanding of patient's Medicare rights  Cm placed the original copy of the IMM in medical records  Cm department will continue to follow patient through discharge

## 2021-04-05 NOTE — ASSESSMENT & PLAN NOTE
· Patient presented with acute metabolic encephalopathy likely multifactorial in setting of UTI, hepatic encephalopathy  · Slowly improving, patient continues to be confused, however upon discussion with patient's sister who she resides with patient is typically confused at baseline  · UA positive for nitrites, leukocytes and moderate bacteria  · Urine culture positive for Klebsiella, susceptible to ceftriaxone, continue (day 4)  · GI following for hepatic encephalopathy,  · Ammonia has improved to 70  · Increase lactulose to 20 mg t i d   To titrate to goal of 2-3 bowel movements a day  · Rifaxan added BID   · PT/OT recommending short-term rehab, however patient's family declining, requesting home with [facility]NA services once medically stable for discharge

## 2021-04-05 NOTE — ASSESSMENT & PLAN NOTE
· Patient with history of alcoholic cirrhosis, quit drinking in 2019  · GI consulted,  · Patient with increased abdominal distension  · CT abdomen pelvis with cirrhosis, small amount of perihepatic ascites, decreased from prior  · Trend CMP and INR daily  · Lactulose increased to 20 mg t i d   With a goal of 2-3 bowel movements daily  · Xifaxan twice a day added as well  · Patient likely does not have enough ascites to obtain paracentesis, currently on IV ceftriaxone for underlying UTI and SBP prophylaxis

## 2021-04-05 NOTE — CONSULTS
Consultation - Nephrology   Douglas Barnett 77 y o  female MRN: [id number]  Unit/Bed#: -01 Encounter: [id number]    Referring PHYSICIAN: Gonzalo Carrasco     REASON FOR THE CONSULTATION:  CKD    DATE OF CONSULTATION:  04/05/2021    ADMISSION DIAGNOSIS: Hepatic encephalopathy ([address] )     CHIEF COMPLAINT     Patient known to with stage III CKD admitted the hospital with confusion and I am being considered will CKD    HPI     Patient is quite confused  Does not appear in distress    Denies any acute complaint doses very confused    Denies any chest pain palpitation    Patient does history of stage III CKD  Also history of hypertension alcoholic cirrhosis and does get admitted heart often with encephalopathy    Patient also history of myeloma and atrial fibrillation in the past    PAST MEDICAL HISTORY     Past Medical History:   Diagnosis Date    Alcoholic cirrhosis ([address] )     Benign essential hypertension     last assessed - [id number]    Cirrhosis (HCC)     CKD (chronic kidney disease) stage 3, GFR 30-59 ml/min     Diastolic CHF ([address] ) 33/95/3861    Gastroesophageal reflux disease without esophagitis 11/16/2017    Hepatic encephalopathy ([address] )     Hyperbilirubinemia 5/14/2020    Hypertension     Multiple myeloma ([address] )     Paroxysmal atrial fibrillation ([address] )     Pneumonia 5/6/2020       PAST SURGICAL HISTORY     Past Surgical History:   Procedure Laterality Date    APPENDECTOMY      BONE MARROW BIOPSY      IR PARACENTESIS  10/2/2020    IR PARACENTESIS  12/28/2020    IR THORACENTESIS  12/21/2020       ALLERGIES     Allergies   Allergen Reactions    Seroquel [Quetiapine] Irritability    Zyprexa [Olanzapine] Confusion       SOCIAL HISTORY     Social History     Substance and Sexual Activity   Alcohol Use Not Currently    Frequency: 4 or more times a week    Drinks per session: 1 or 2    Binge frequency: Never    Comment: History of significant daily alcohol intake   Sister joy no alcohol intake in 6 months     Social History     Substance and Sexual Activity   Drug Use No     Social History     Tobacco Use   Smoking Status Light Tobacco Smoker    Packs/day: 0 25    Types: Cigarettes   Smokeless Tobacco Never Used   Tobacco Comment    2-3 a day       FAMILY HISTORY     Family History   Problem Relation Age of Onset    Heart attack Father         myocardial infarction    Heart disease Father        CURRENT MEDICATIONS       Current Facility-Administered Medications:     cefTRIAXone (ROCEPHIN) 1,000 mg in dextrose 5 % 50 mL IVPB, 1,000 mg, Intravenous, Q24H, Dougie Ruiz DO, Last Rate: 100 mL/hr at 04/04/21 1709, 1,000 mg at 04/04/21 1709    diltiazem (CARDIZEM) tablet 30 mg, 30 mg, Oral, Q6H [address], Twin Dozier MD    folic acid (FOLVITE) tablet 1,000 mcg, 1,000 mcg, Oral, Daily, Dougie Ruiz DO, 1,000 mcg at 04/05/21 1137    furosemide (LASIX) tablet 40 mg, 40 mg, Oral, BID (diuretic), ZACHARY GivensNP, 40 mg at 04/05/21 1137    lactulose oral solution 20 g, 20 g, Oral, BID, Dougie Ruiz, DO, 20 g at 04/05/21 1139    magnesium oxide (MAG-OX) tablet 400 mg, 400 mg, Oral, BID, Dougie Ruiz, DO, 400 mg at 04/05/21 1138    melatonin tablet 6 mg, 6 mg, Oral, HS, Dougie Ruiz, DO, 6 mg at 04/04/21 2208    metoprolol (LOPRESSOR) injection 2 5 mg, 2 5 mg, Intravenous, Q6H PRN, Dewayne Bernheim, DO, 2 5 mg at 04/03/21 1408    metoprolol tartrate (LOPRESSOR) tablet 25 mg, 25 mg, Oral, Q12H [address], Twin Dozier MD    mirtazapine (REMERON) tablet 15 mg, 15 mg, Oral, HS, Dougie Ruiz, DO, 15 mg at 04/04/21 2207    nicotine (NICODERM CQ) 7 mg/24hr TD 24 hr patch 1 patch, 1 patch, Transdermal, Daily, Dougie Ruiz DO, 1 patch at 04/05/21 1139    nystatin (MYCOSTATIN) powder, , Topical, BID, Dougie Ruiz DO, Given at 04/05/21 1154    pantoprazole (PROTONIX) EC tablet 40 mg, 40 mg, Oral, QAM, Dougie Ruiz DO, 40 mg at 04/05/21 0609    thiamine tablet 100 mg, 100 mg, Oral, Daily, Danny Bernheim, DO, 100 mg at 04/05/21 1138    REVIEW OF SYSTEMS     Review of Systems   Constitutional: Negative for activity change and fatigue  HENT: Negative for congestion and ear discharge  Eyes: Negative for photophobia and pain  Respiratory: Negative for apnea and choking  Cardiovascular: Negative for chest pain and palpitations  Gastrointestinal: Negative for abdominal distention, abdominal pain and blood in stool  Endocrine: Negative for heat intolerance and polyphagia  Genitourinary: Negative for flank pain and urgency  Musculoskeletal: Negative for neck pain and neck stiffness  Skin: Negative for color change and wound  Allergic/Immunologic: Negative for food allergies and immunocompromised state  Neurological: Negative for seizures and facial asymmetry  Hematological: Negative for adenopathy  Does not bruise/bleed easily  Psychiatric/Behavioral: Positive for confusion  Negative for self-injury and suicidal ideas  LAB RESULTS        Results from last 7 days   Lab Units 04/05/21  0438 04/04/21  0449 04/03/21  0704 04/02/21  0549 04/01/21  1400   WBC Thousand/uL 3 92* 4 06* 4 07* 4 60 4 44   HEMOGLOBIN g/dL 7 6* 7 6* 7 6* 7 9* 8 2*   HEMATOCRIT % 25 4* 25 1* 25 7* 25 6* 26 2*   PLATELETS Thousands/uL 36* 35* 36* 36* 38*   POTASSIUM mmol/L 4 8 4 7 4 3 4 5 4 9   CHLORIDE mmol/L 113* 108 113* 108 105   CO2 mmol/L 26 27 26 23 25   BUN mg/dL 32* 32* 37* 38* 42*   CREATININE mg/dL 1 33* 1 35* 1 41* 1 61* 2 09*   EGFR ml/min/1 73sq m 45 45 42 36 26   CALCIUM mg/dL 9 2 9 0 9 2 9 4 9 0   MAGNESIUM mg/dL  --   --   --   --  1 7       I have personally reviewed the old medical records and patient's previously known baseline creatinine level is ~ 1 3    RADIOLOGY RESULTS     Results for orders placed during the hospital encounter of 04/01/21   XR chest portable    Narrative CHEST     INDICATION:   AMS, fever      COMPARISON:  3/24/2021    EXAM PERFORMED/VIEWS:  XR CHEST PORTABLE      FINDINGS:    Heart shadow is enlarged but unchanged from prior exam   Atherosclerotic aorta  Persistent mild congestive changes with right mid to lower lung airspace opacity and increased moderate to large pleural effusion  Osseous structures appear within normal limits for patient age  Impression Persistent mild congestive changes with right mid to lower lung airspace opacity and increased moderate to large pleural effusion  Workstation performed: [id number]       Results for orders placed during the hospital encounter of 01/28/20   XR chest pa & lateral    Narrative CHEST     INDICATION: Follow-up congestive heart failure    COMPARISON:  1/30/2020    EXAM PERFORMED/VIEWS:  XR CHEST PA & LATERAL      FINDINGS:    Heart shadow is enlarged but unchanged from prior exam     There continues to be mild vascular congestion though interstitial edema has improved  There are small pleural effusions  Osseous structures appear within normal limits for patient age  Impression Improved congestive heart failure with mild vascular congestion and small effusions  Workstation performed: [id number]       Results for orders placed during the hospital encounter of 08/29/20   CT chest wo contrast    Narrative CT CHEST WITHOUT IV CONTRAST    INDICATION:   Respiratory illness, acute (Age => 41y)  COMPARISON:  May 6, 2020    TECHNIQUE: CT examination of the chest was performed without intravenous contrast   Axial, sagittal, and coronal 2D reformatted images were created from the source data and submitted for interpretation  Radiation dose length product (DLP) for this visit:  308 mGy-cm   This examination, like all CT scans performed in the [facility], was performed utilizing techniques to minimize radiation dose exposure, including the use of iterative   reconstruction and automated exposure control       FINDINGS:    LUNGS:  Collapse consolidation noted within the right lower lobe  Lobular areas of groundglass density noted in the subpleural region in the left lingular area, in the right middle lobe area  The trachea and central bronchial patent    PLEURA:  Moderate sized right effusion seen    HEART/GREAT VESSELS:  Cardiomegaly seen  Coronary artery calcification seen  Mitral annular calcification seen  The ascending aorta measures 3 4 cm  MEDIASTINUM AND MATT:  No significant mediastinal lymph node enlargement seen      CHEST WALL AND LOWER NECK:   A right thyroid nodule seen which measures 1 4 cm, unchanged from the previous study    VISUALIZED STRUCTURES IN THE UPPER ABDOMEN:  Nodular contour of the liver compatible with cirrhosis  Ascites seen  Enlarged IVC seen with spleen appear unremarkable  Adrenal gland appear unremarkable    OSSEOUS STRUCTURES:  Degenerative changes are seen within the thoracic spine      Impression Moderate right effusion     Atelectasis/consolidation right lower lung may be due to compressive atelectasis however underlying infiltrates are difficult to exclude    Lobular areas of groundglass density in the left upper lobe, right middle lobe, lingular region may be due to infiltrate which could be atypical or viral or drug induced these are may be related to areas of residual congestion    Cirrhosis    Enlarged IVC with enlarged right atrium right and right ventricle, correlate with tricuspid regurgitation, right heart failure    Follow-up suggested in 6-8 weeks to demonstrate resolution  The study was marked in EPIC for significant notification  Workstation performed: [id number]       No results found for this or any previous visit  Results for orders placed during the hospital encounter of 12/18/20   CT abdomen pelvis wo contrast    Narrative CT ABDOMEN AND PELVIS WITHOUT IV CONTRAST    INDICATION:   abdominal distention      COMPARISON:  CT scan 9/29/2020    TECHNIQUE:  CT examination of the abdomen and pelvis was performed without intravenous contrast   Axial, sagittal, and coronal 2D reformatted images were created from the source data and submitted for interpretation  Radiation dose length product (DLP) for this visit:  704 84 mGy-cm   This examination, like all CT scans performed in the [facility], was performed utilizing techniques to minimize radiation dose exposure, including the use of iterative   reconstruction and automated exposure control  Enteric contrast was not administered  FINDINGS:    ABDOMEN    LOWER CHEST:  Stable right pleural effusion with right basilar compressive atelectasis  Stable cardiomegaly with mitral annulus calcification  LIVER/BILIARY TREE:  The liver demonstrates cirrhotic morphology  Within the limitations of this examination there is no evidence of suspicious hepatic mass  No biliary dilatation  GALLBLADDER:  There are gallstone(s) within the gallbladder, without pericholecystic inflammatory changes  SPLEEN:  Unremarkable  PANCREAS:  Unremarkable  ADRENAL GLANDS:  Unremarkable  KIDNEYS/URETERS:  Stable right upper pole cyst  No hydronephrosis  STOMACH AND BOWEL:  Unremarkable  APPENDIX:  No findings to suggest appendicitis  ABDOMINOPELVIC CAVITY:  Stable ascites  No pneumoperitoneum  No lymphadenopathy  VESSELS:  Atherosclerotic changes are present  No evidence of aneurysm  PELVIS    REPRODUCTIVE ORGANS:  Unremarkable for patient's age  URINARY BLADDER:  Unremarkable  ABDOMINAL WALL/INGUINAL REGIONS:  Unremarkable  OSSEOUS STRUCTURES:  No acute fracture or destructive osseous lesion  Impression Stable cirrhosis with ascites  Stable right pleural effusion with compressive atelectasis of the right lung base  Stable cardiomegaly  Workstation performed: [id number]       No results found for this or any previous visit      OBJECTIVE     Current Weight: Weight - Scale: 66 9 kg (147 lb 7 8 oz)  Vitals:    04/05/21 1142 BP: 102/65   Pulse:    Resp:    Temp:    SpO2:        Intake/Output Summary (Last 24 hours) at 4/5/2021 1223  Last data filed at 4/5/2021 1157  Gross per 24 hour   Intake 658 ml   Output --   Net 658 ml       PHYSICAL EXAMINATION     Physical Exam  Constitutional:       General: She is not in acute distress  Appearance: She is well-developed  HENT:      Head: Normocephalic  Eyes:      General: No scleral icterus  Conjunctiva/sclera: Conjunctivae normal    Neck:      Musculoskeletal: Neck supple  Vascular: No JVD  Cardiovascular:      Rate and Rhythm: Tachycardia present  Heart sounds: Normal heart sounds  Pulmonary:      Effort: Pulmonary effort is normal       Breath sounds: Normal breath sounds  No wheezing  Abdominal:      General: Bowel sounds are normal       Palpations: Abdomen is soft  Tenderness: There is no abdominal tenderness  Musculoskeletal: Normal range of motion  Skin:     General: Skin is warm  Findings: No rash  Neurological:      Mental Status: She is alert  She is disoriented  Psychiatric:      Comments: Quite confused          PLAN / RECOMMENDATIONS      CKD stage 3:  Seems to be stable at this point  Patient does have cardiorenal syndrome  Will advised continue present management and monitor    Altered mental status:  Seems to be confused likely to be encephalopathy though stable at this point    Atrial fibrillation:  Patient heart rate is quite fast not well control  Cardiology following    Anemia:  Hemoglobin is seems to be stable  Multifactorial    Thrombocytopenia:  related to liver disease  Stable without any evidence of bleed    Will continue monitor with you    Thank you for the consultation to participate in patient's care  I have personally discussed my plan with the referring physician  Marimar Carmona MD  Nephrology  4/5/2021        Portions of the record may have been created with voice recognition software   Occasional wrong word or "sound a like" substitutions may have occurred due to the inherent limitations of voice recognition software  Read the chart carefully and recognize, using context, where substitutions have occurred

## 2021-04-05 NOTE — ASSESSMENT & PLAN NOTE
Wt Readings from Last 3 Encounters:   04/05/21 66 9 kg (147 lb 7 8 oz)   03/30/21 63 3 kg (139 lb 8 oz)   03/24/21 66 2 kg (145 lb 15 1 oz)     · Concerning for acute diastolic heart failure as evidenced by BNP greater than 12,000, increase in weight  · Did receive gentle IV fluids initially on admission for acute kidney injury  · Patient is typically maintained on Lasix 60 mg daily at home  · Cardiology following,  · P o   Lasix increased to 40 mg b i d   · Echo obtained, follow-up results  · Obtain daily weights, accurate I&Os, low sodium diet

## 2021-04-05 NOTE — ASSESSMENT & PLAN NOTE
· Incidental finding noted on CT scan  · Mild L1 compression fracture favored to be subacute/chronic, however new since prior study from 12/2020  · Patient denies any significant back pain, however does have underlying dementia  · Continue supportive care, p r n   Tylenol  · Recommend follow-up with orthopedics as an outpatient

## 2021-04-05 NOTE — ASSESSMENT & PLAN NOTE
· Patient with history of intermittent hypotension  · Likely acutely worsened in setting of persistent tachycardia  · Cardiology following,  · Continue Cardizem and Lopressor as outlined above with holding parameters as blood pressure tolerates  · Telemetry monitoring

## 2021-04-05 NOTE — ASSESSMENT & PLAN NOTE
· POA, CESAR on CKD 3b likely secondary to poor intake and hypotension from underlying UTI  · Received IV fluid hydration in the ER, additional fluids are currently on hold  · Nephrology consult by Cardiology,  · Patient currently on p o   Lasix 40 mg b i d , continue for now as there is concern for volume overload  · Creatinine did improved to 1 33 today  · Baseline appears to be somewhere between 1 5-1 8    Results from last 7 days   Lab Units 04/05/21  0438 04/04/21  0449 04/03/21  0704 04/02/21  0549 04/01/21  1400   BUN mg/dL 32* 32* 37* 38* 42*   CREATININE mg/dL 1 33* 1 35* 1 41* 1 61* 2 09*   EGFR ml/min/1 73sq m 45 45 42 36 26

## 2021-04-05 NOTE — ASSESSMENT & PLAN NOTE
· Patient with 1/2 blood cultures positive for Staph coag-negative  · Likely contaminant, not true bacteremia as additional blood cultures are now been negative times 48 hours  · No need for ID consultation at this time  · Urine culture growing Klebsiella, continue IV ceftriaxone for underlying UTI

## 2021-04-05 NOTE — CONSULTS
Consultation - Cardiology   Kamlesh Mathew 77 y o  female MRN: [id number]  Unit/Bed#: -01 Encounter: [id number]  04/05/21  1:54 PM    Assessment/ Plan:  1  Paroxysmal atrial fibrillation  - telemetry demonstrates Afib; heart rates in the 120s  - EKG 4/2 - Afib with 2:1 heart block, incomplete right bundle-branch block, ST elevation in inferior leads  - not on anticoagulation outpatient due to fall risk, thrombocytopenia, and dementia  - troponins negative  - d/c Toprol XL and Cardizem 120 mg  - start Lopressor 25 b i d  And cardizem 30 mg Q 6 hours for rate control as blood pressure tolerates    2  Acute diastolic HFrEF  - echo 78/00/6940 - EF 50-55%, no regional wall motion abnormalities, mild concentric hypertrophy, grade 2 diastolic dysfunction, mild MR/TR, markedly dilated left atrium, moderately dilated right atrium  - BNP 12,194  - weight on admission 147 lb; no other weights available  - follow-up echo today  - on Lasix 60 mg daily at home  - continue Lasix 40 mg Lasix p o  B i d  as blood pressure tolerates  - daily weights  - strict I's and O's  - salt and fluid restriction    3  CESAR superimposed on CKD stage 3  - creat on admission 2 09 > 1 33  - nephrology consult placed for further management  - avoid nephrotic agents     4  Hypertension  - well controlled as an outpatient  - now with episodes of hypotension; SBPs 90s  - continue lopressor as blood pressure tolerates  - continue to monitor closely    5   Hepatic encephalopathy  - likely secondary to UTI/bacteremia   - blood cultures 1/2 on 04/01 positive for Staph coagulase negative and Gram-positive cocci in clusters; repeat cultures pending  - on IV antibiotics  - management per primary team    History of Present Illness   Physician Requesting Consult: Shakira Das MD    Reason for Consult / Principal Problem:  PAF    HPI: Kamlesh Mathew is a 77y o  year old female with past medical history significant for alcoholic cirrhosis, alcoholic dementia, hepatic encephalopathy, hypertension, multiple myeloma, paroxysmal AFib, and CKD stage 3 who presents with worsening weakness, fever, and altered mental status  Recently, patient was hospitalized for hypotension  At that time, her metoprolol was cut in half and her torsemide was changed to furosemide  Upon arrival to the ED, patient was found to be tachycardic, have elevated ammonia levels, and evidence of UTI  Per the patient's sister, the patient did not her take her lactulose or Cardizem the morning of coming to the emergency department  Upon examination, the patient is only alert to self and is unable to articulate why she came into the hospital   She remains in Afib and is persistently tachycardic  For this reason, cardiology was consulted      Inpatient consult to Cardiology  Consult performed by: Lisa Galeano RN  Consult ordered by: Bea Thomas PA-C    EKG: - Afib with 2:1 heart block, incomplete right bundle-branch block, ST elevation in inferior leads    Review of Systems   Unable to perform ROS: Dementia     Historical Information   Past Medical History:   Diagnosis Date    [facility]coholic cirrhosis ([facility] )     Benign essential hypertension     last assessed - [id number]    Cirrhosis ([facility] )     CKD (chronic kidney disease) stage 3, GFR 30-59 ml/min     Diastolic CHF ([facility] ) 89/85/0321    Gastroesophageal reflux disease without esophagitis 11/16/2017    Hepatic encephalopathy ([facility] )     Hyperbilirubinemia 5/14/2020    Hypertension     Multiple myeloma ([facility]     Paroxysmal atrial fibrillation ([facility]     Pneumonia 5/6/2020     Past Surgical History:   Procedure Laterality Date    APPENDECTOMY      BONE MARROW BIOPSY      IR PARACENTESIS  10/2/2020    IR PARACENTESIS  12/28/2020    IR THORACENTESIS  12/21/2020     Social History     Substance and Sexual Activity   Alcohol Use Not Currently    Frequency: 4 or more times a week    Drinks per session: 1 or 2    Binge frequency: Never Comment: History of significant daily alcohol intake  Sister joy no alcohol intake in 6 months     Social History     Substance and Sexual Activity   Drug Use No     Social History     Tobacco Use   Smoking Status Light Tobacco Smoker    Packs/day: 0 25    Types: Cigarettes   Smokeless Tobacco Never Used   Tobacco Comment    2-3 a day       Family History:   Family History   Problem Relation Age of Onset    Heart attack Father         myocardial infarction    Heart disease Father        Meds/Allergies   all current active meds have been reviewed  Allergies   Allergen Reactions    Seroquel [Quetiapine] Irritability    Zyprexa [Olanzapine] Confusion       Objective   Vitals: Blood pressure 102/65, pulse (!) 125, temperature 97 6 °F (36 4 °C), resp  rate 18, height 5' 2" (1 575 m), weight 66 9 kg (147 lb 7 8 oz), SpO2 (!) 89 %, not currently breastfeeding , Body mass index is 26 98 kg/m² ,   Orthostatic Blood Pressures      Most Recent Value   Blood Pressure  102/65 filed at 04/05/2021 1142   Patient Position - Orthostatic VS  Lying filed at 04/02/2021 0171        Systolic (38VDB), IKC:40 , Min:92 , SVK:243     Diastolic (15XAE), VCO:46, Min:63, Max:65      Intake/Output Summary (Last 24 hours) at 4/5/2021 1354  Last data filed at 4/5/2021 1157  Gross per 24 hour   Intake 540 ml   Output --   Net 540 ml       Invasive Devices     Peripheral Intravenous Line            Peripheral IV 04/03/21 Right Antecubital 2 days                Physical Exam:  GEN: Alert and oriented to self, in no acute distress, appears chronically ill  HEENT: Sclera anicteric, conjunctivae pink, mucous membranes moist  Oropharynx clear  NECK: Supple, no carotid bruits, no significant JVD  Trachea midline, no thyromegaly  HEART: Irregular rhythm, normal S1 and S2, no murmurs, clicks, gallops or rubs  PMI nondisplaced, no thrills  LUNGS: Clear to auscultation bilaterally; no wheezes, rales, or rhonchi   No increased work of breathing or signs of respiratory distress  ABDOMEN: Soft, nontender, nondistended, normoactive bowel sounds  EXTREMITIES: Skin warm and well perfused, no clubbing, cyanosis, or edema  NEURO: No focal findings  Normal speech  Mood and affect normal  Confused  SKIN: Normal without suspicious lesions on exposed skin      Lab Results:     Troponins:   Results from last 7 days   Lab Units 04/01/21  1400   TROPONIN I ng/mL <0 02       CBC with diff:   Results from last 7 days   Lab Units 04/05/21 0438 04/04/21 0449 04/03/21  0704 04/02/21  0549 04/01/21  1400   WBC Thousand/uL 3 92* 4 06* 4 07* 4 60 4 44   HEMOGLOBIN g/dL 7 6* 7 6* 7 6* 7 9* 8 2*   HEMATOCRIT % 25 4* 25 1* 25 7* 25 6* 26 2*   MCV fL 92 92 94 91 91   PLATELETS Thousands/uL 36* 35* 36* 36* 38*   MCH pg 27 5 27 8 27 6 28 1 28 4   MCHC g/dL 29 9* 30 3* 29 6* 30 9* 31 3*   RDW % 21 7* 21 8* 21 8* 21 2* 21 2*   NRBC AUTO /100 WBCs  --   --   --   --  9   NRBC /100 WBC  --   --   --   --  14*         CMP:   Results from last 7 days   Lab Units 04/05/21 0438 04/04/21 0449 04/03/21  0704 04/02/21  0549 04/01/21  1400   POTASSIUM mmol/L 4 8 4 7 4 3 4 5 4 9   CHLORIDE mmol/L 113* 108 113* 108 105   CO2 mmol/L 26 27 26 23 25   BUN mg/dL 32* 32* 37* 38* 42*   CREATININE mg/dL 1 33* 1 35* 1 41* 1 61* 2 09*   CALCIUM mg/dL 9 2 9 0 9 2 9 4 9 0   AST U/L  --   --   --  34 41   ALT U/L  --   --   --  18 24   ALK PHOS U/L  --   --   --  248* 251*   EGFR ml/min/1 73sq m 45 45 42 36 26

## 2021-04-05 NOTE — ASSESSMENT & PLAN NOTE
· WBC 3 92, platelets 36, hemoglobin 7 6  · Chronic on review, likely secondary to underlying cirrhosis  · No bleeding noted currently  · Patient's sister reports that the patient undergoes blood transfusions every 2 weeks, she reports that the patient is due on Wednesday is requesting her to get blood transfusion here    Will continue to monitor CBC, if continued down trending of hemoglobin would give 1 unit packed red blood cells  · Monitor    Results from last 7 days   Lab Units 04/05/21  0438 04/04/21  0449 04/03/21  0704 04/02/21  0549 04/01/21  1400   PLATELETS Thousands/uL 36* 35* 36* 36* 38*

## 2021-04-06 LAB
25(OH)D3 SERPL-MCNC: 30.7 NG/ML (ref 30–100)
ALBUMIN SERPL BCP-MCNC: 3 G/DL (ref 3.5–5)
ALP SERPL-CCNC: 284 U/L (ref 46–116)
ALT SERPL W P-5'-P-CCNC: 25 U/L (ref 12–78)
ANION GAP SERPL CALCULATED.3IONS-SCNC: 11 MMOL/L (ref 4–13)
AST SERPL W P-5'-P-CCNC: 37 U/L (ref 5–45)
BACTERIA BLD CULT: NORMAL
BACTERIA UR QL AUTO: ABNORMAL /HPF
BASOPHILS # BLD MANUAL: 0.09 THOUSAND/UL (ref 0–0.1)
BASOPHILS NFR MAR MANUAL: 2 % (ref 0–1)
BILIRUB SERPL-MCNC: 1.42 MG/DL (ref 0.2–1)
BILIRUB UR QL STRIP: ABNORMAL
BUN SERPL-MCNC: 31 MG/DL (ref 5–25)
CALCIUM ALBUM COR SERPL-MCNC: 10.1 MG/DL (ref 8.3–10.1)
CALCIUM SERPL-MCNC: 9.3 MG/DL (ref 8.3–10.1)
CHLORIDE SERPL-SCNC: 112 MMOL/L (ref 100–108)
CLARITY UR: CLEAR
CO2 SERPL-SCNC: 28 MMOL/L (ref 21–32)
COLOR UR: YELLOW
CREAT SERPL-MCNC: 1.28 MG/DL (ref 0.6–1.3)
CREAT UR-MCNC: 98.3 MG/DL
EOSINOPHIL # BLD MANUAL: 0.05 THOUSAND/UL (ref 0–0.4)
EOSINOPHIL NFR BLD MANUAL: 1 % (ref 0–6)
ERYTHROCYTE [DISTWIDTH] IN BLOOD BY AUTOMATED COUNT: 22.2 % (ref 11.6–15.1)
GFR SERPL CREATININE-BSD FRML MDRD: 48 ML/MIN/1.73SQ M
GLUCOSE SERPL-MCNC: 116 MG/DL (ref 65–140)
GLUCOSE UR STRIP-MCNC: NEGATIVE MG/DL
HCT VFR BLD AUTO: 24.9 % (ref 34.8–46.1)
HGB BLD-MCNC: 7.7 G/DL (ref 11.5–15.4)
HGB UR QL STRIP.AUTO: ABNORMAL
HYPERCHROMIA BLD QL SMEAR: PRESENT
INR PPP: 1.23 (ref 0.84–1.19)
KETONES UR STRIP-MCNC: NEGATIVE MG/DL
LEUKOCYTE ESTERASE UR QL STRIP: NEGATIVE
LYMPHOCYTES # BLD AUTO: 0.89 THOUSAND/UL (ref 0.6–4.47)
LYMPHOCYTES # BLD AUTO: 19 % (ref 14–44)
MCH RBC QN AUTO: 28.4 PG (ref 26.8–34.3)
MCHC RBC AUTO-ENTMCNC: 30.9 G/DL (ref 31.4–37.4)
MCV RBC AUTO: 92 FL (ref 82–98)
MICROALBUMIN UR-MCNC: 298 MG/L (ref 0–20)
MICROALBUMIN/CREAT 24H UR: 303 MG/G CREATININE (ref 0–30)
MONOCYTES # BLD AUTO: 0.7 THOUSAND/UL (ref 0–1.22)
MONOCYTES NFR BLD: 15 % (ref 4–12)
NEUTROPHILS # BLD MANUAL: 2.94 THOUSAND/UL (ref 1.85–7.62)
NEUTS BAND NFR BLD MANUAL: 4 % (ref 0–8)
NEUTS SEG NFR BLD AUTO: 59 % (ref 43–75)
NITRITE UR QL STRIP: NEGATIVE
NON-SQ EPI CELLS URNS QL MICRO: ABNORMAL /HPF
NRBC BLD AUTO-RTO: 10 /100 WBC (ref 0–2)
NRBC BLD AUTO-RTO: 12 /100 WBCS
PH UR STRIP.AUTO: 5.5 [PH]
PHOSPHATE SERPL-MCNC: 2.9 MG/DL (ref 2.3–4.1)
PLATELET # BLD AUTO: 40 THOUSANDS/UL (ref 149–390)
PLATELET BLD QL SMEAR: ABNORMAL
POTASSIUM SERPL-SCNC: 4.4 MMOL/L (ref 3.5–5.3)
PROT SERPL-MCNC: 6.8 G/DL (ref 6.4–8.2)
PROT UR STRIP-MCNC: ABNORMAL MG/DL
PROTHROMBIN TIME: 14.9 SECONDS (ref 11.6–14.5)
PTH-INTACT SERPL-MCNC: 123.2 PG/ML (ref 18.4–80.1)
RBC # BLD AUTO: 2.71 MILLION/UL (ref 3.81–5.12)
RBC #/AREA URNS AUTO: ABNORMAL /HPF
SCHISTOCYTES BLD QL SMEAR: PRESENT
SODIUM SERPL-SCNC: 151 MMOL/L (ref 136–145)
SP GR UR STRIP.AUTO: 1.01 (ref 1–1.03)
TARGETS BLD QL SMEAR: PRESENT
TOTAL CELLS COUNTED SPEC: 100
URATE SERPL-MCNC: 12.6 MG/DL (ref 2–6.8)
UROBILINOGEN UR QL STRIP.AUTO: 0.2 E.U./DL
WBC # BLD AUTO: 4.67 THOUSAND/UL (ref 4.31–10.16)
WBC #/AREA URNS AUTO: ABNORMAL /HPF

## 2021-04-06 PROCEDURE — 80053 COMPREHEN METABOLIC PANEL: CPT | Performed by: PHYSICIAN ASSISTANT

## 2021-04-06 PROCEDURE — 99232 SBSQ HOSP IP/OBS MODERATE 35: CPT | Performed by: INTERNAL MEDICINE

## 2021-04-06 PROCEDURE — 82043 UR ALBUMIN QUANTITATIVE: CPT | Performed by: INTERNAL MEDICINE

## 2021-04-06 PROCEDURE — 84550 ASSAY OF BLOOD/URIC ACID: CPT | Performed by: INTERNAL MEDICINE

## 2021-04-06 PROCEDURE — 83970 ASSAY OF PARATHORMONE: CPT | Performed by: INTERNAL MEDICINE

## 2021-04-06 PROCEDURE — 82570 ASSAY OF URINE CREATININE: CPT | Performed by: INTERNAL MEDICINE

## 2021-04-06 PROCEDURE — 93306 TTE W/DOPPLER COMPLETE: CPT | Performed by: INTERNAL MEDICINE

## 2021-04-06 PROCEDURE — 85027 COMPLETE CBC AUTOMATED: CPT | Performed by: INTERNAL MEDICINE

## 2021-04-06 PROCEDURE — 81001 URINALYSIS AUTO W/SCOPE: CPT | Performed by: INTERNAL MEDICINE

## 2021-04-06 PROCEDURE — 85007 BL SMEAR W/DIFF WBC COUNT: CPT | Performed by: INTERNAL MEDICINE

## 2021-04-06 PROCEDURE — 99232 SBSQ HOSP IP/OBS MODERATE 35: CPT | Performed by: PHYSICIAN ASSISTANT

## 2021-04-06 PROCEDURE — 82306 VITAMIN D 25 HYDROXY: CPT | Performed by: INTERNAL MEDICINE

## 2021-04-06 PROCEDURE — 84100 ASSAY OF PHOSPHORUS: CPT | Performed by: INTERNAL MEDICINE

## 2021-04-06 PROCEDURE — 99233 SBSQ HOSP IP/OBS HIGH 50: CPT | Performed by: INTERNAL MEDICINE

## 2021-04-06 PROCEDURE — 85610 PROTHROMBIN TIME: CPT | Performed by: PHYSICIAN ASSISTANT

## 2021-04-06 RX ORDER — DEXTROSE MONOHYDRATE 50 MG/ML
75 INJECTION, SOLUTION INTRAVENOUS CONTINUOUS
Status: DISCONTINUED | OUTPATIENT
Start: 2021-04-06 | End: 2021-04-09 | Stop reason: HOSPADM

## 2021-04-06 RX ORDER — FUROSEMIDE 40 MG/1
40 TABLET ORAL DAILY
Status: DISCONTINUED | OUTPATIENT
Start: 2021-04-07 | End: 2021-04-07

## 2021-04-06 RX ORDER — SODIUM CHLORIDE 9 MG/ML
20 INJECTION, SOLUTION INTRAVENOUS ONCE
Status: CANCELLED | OUTPATIENT
Start: 2021-04-07

## 2021-04-06 RX ORDER — DILTIAZEM HYDROCHLORIDE 60 MG/1
60 TABLET, FILM COATED ORAL EVERY 6 HOURS SCHEDULED
Status: DISCONTINUED | OUTPATIENT
Start: 2021-04-06 | End: 2021-04-09 | Stop reason: HOSPADM

## 2021-04-06 RX ADMIN — FUROSEMIDE 40 MG: 40 TABLET ORAL at 08:34

## 2021-04-06 RX ADMIN — CEFTRIAXONE SODIUM 1000 MG: 10 INJECTION, POWDER, FOR SOLUTION INTRAVENOUS at 17:32

## 2021-04-06 RX ADMIN — PANTOPRAZOLE SODIUM 40 MG: 40 TABLET, DELAYED RELEASE ORAL at 06:58

## 2021-04-06 RX ADMIN — LACTULOSE 20 G: 20 SOLUTION ORAL at 17:27

## 2021-04-06 RX ADMIN — LACTULOSE 20 G: 20 SOLUTION ORAL at 21:49

## 2021-04-06 RX ADMIN — LACTULOSE 20 G: 20 SOLUTION ORAL at 08:34

## 2021-04-06 RX ADMIN — METOPROLOL TARTRATE 25 MG: 25 TABLET, FILM COATED ORAL at 08:35

## 2021-04-06 RX ADMIN — NYSTATIN: 100000 POWDER TOPICAL at 17:26

## 2021-04-06 RX ADMIN — DILTIAZEM HYDROCHLORIDE 60 MG: 60 TABLET, FILM COATED ORAL at 13:21

## 2021-04-06 RX ADMIN — RIFAXIMIN 550 MG: 550 TABLET ORAL at 08:35

## 2021-04-06 RX ADMIN — DEXTROSE 75 ML/HR: 5 SOLUTION INTRAVENOUS at 16:08

## 2021-04-06 RX ADMIN — MAGNESIUM OXIDE TAB 400 MG (241.3 MG ELEMENTAL MG) 400 MG: 400 (241.3 MG) TAB at 08:34

## 2021-04-06 RX ADMIN — RIFAXIMIN 550 MG: 550 TABLET ORAL at 21:50

## 2021-04-06 RX ADMIN — MIRTAZAPINE 15 MG: 15 TABLET, FILM COATED ORAL at 21:52

## 2021-04-06 RX ADMIN — DILTIAZEM HYDROCHLORIDE 30 MG: 30 TABLET, FILM COATED ORAL at 06:58

## 2021-04-06 RX ADMIN — FOLIC ACID 1000 MCG: 1 TABLET ORAL at 08:34

## 2021-04-06 RX ADMIN — NICOTINE 1 PATCH: 7 PATCH, EXTENDED RELEASE TRANSDERMAL at 08:38

## 2021-04-06 RX ADMIN — NYSTATIN: 100000 POWDER TOPICAL at 08:38

## 2021-04-06 RX ADMIN — MELATONIN TAB 3 MG 6 MG: 3 TAB at 21:49

## 2021-04-06 RX ADMIN — DILTIAZEM HYDROCHLORIDE 30 MG: 30 TABLET, FILM COATED ORAL at 00:19

## 2021-04-06 RX ADMIN — MAGNESIUM OXIDE TAB 400 MG (241.3 MG ELEMENTAL MG) 400 MG: 400 (241.3 MG) TAB at 17:28

## 2021-04-06 RX ADMIN — THIAMINE HCL TAB 100 MG 100 MG: 100 TAB at 08:35

## 2021-04-06 NOTE — ASSESSMENT & PLAN NOTE
· WBC improved to 4 67 today, platelets improved to 40, hemoglobin improved to 7 7   · Chronic on review, likely secondary to underlying cirrhosis and multiple myeloma  · No active bleeding currently   · Patient's sister reports that the patient undergoes blood transfusions every 2 weeks, she reports that the patient is due on Wednesday, is requesting her to get blood transfusion here    Will continue to monitor CBC, if continued down trending of hemoglobin would give 1 unit packed red blood cells tomorrow as previously scheduled to keep hemoglobin above 7  · Monitor CBC closely     Results from last 7 days   Lab Units 04/06/21  0702 04/05/21  0438 04/04/21  0449 04/03/21  0704 04/02/21  0549 04/01/21  1400   PLATELETS Thousands/uL 40* 36* 35* 36* 36* 38*

## 2021-04-06 NOTE — PROGRESS NOTES
[facility]  Progress Note - Jose Antonio Draft 1945, 77 y o  female MRN: [id number]  Unit/Bed#: -01 Encounter: [id number]  Primary Care Provider: Vale Valencia MD   Date and time admitted to hospital: 4/1/2021  1:10 PM      DOS: 4/6/2021  * Acute encephalopathy  Assessment & Plan  · Patient presented with acute metabolic encephalopathy likely multifactorial in setting of UTI, hepatic encephalopathy  · Patient continues to improve, after speaking with the patient's sister who she resides with, patient is typically confused at baseline, appears to be close to baseline currently    · UA positive for nitrites, leukocytes and moderate bacteria  · Urine culture positive for Klebsiella, susceptible to ceftriaxone, continue (day 5/7)  · GI following for hepatic encephalopathy,  · Most recent ammonia level was 70  · Continue lactulose to 20 mg t i d  To titrate to goal of 2-3 bowel movements a day  · Rifaxan BID   · CT scan with minimal ascites, no indication for paracentesis at this time  · PT/OT recommending short-term rehab, however patient's family declining, requesting home with VNA services once medically stable for discharge    Alcoholic cirrhosis ([facility] )  Assessment & Plan  · Patient with history of alcoholic cirrhosis, quit drinking in 2019  · GI consulted,  · Patient with increased abdominal distension  · CT abdomen pelvis with cirrhosis, small amount of perihepatic ascites, decreased from prior  No indication for paracentesis at this time  · Trend CMP and INR daily  · Lactulose 20 mg t i d   With a goal of 2-3 bowel movements daily  · Xifaxan twice a day as well  · Currently on IV ceftriaxone for underlying UTI and SBP prophylaxis    Paroxysmal atrial fibrillation (HCC)  Assessment & Plan  · Patient has had persistent tachycardia here in the hospital, heart rate in the 120s on review today  · Cardiology following,  · EKG from 04/02 with AFib with 2-1 heart block, incomplete RBBB  · Continue Lopressor 25 mg b i d  · Cardizem increased to 60 mg Q6H today for better HR control, HR continues to be in the 120s today   · Echo from yesterday showed EF of 82%, grade 2 diastolic dysfunction  · Patient not maintained on anticoagulation as an outpatient secondary to thrombocytopenia, dementia and fall risk    Compression fracture of first lumbar vertebra [facility])  Assessment & Plan  · Incidental finding noted on CT scan  · Mild L1 compression fracture favored to be subacute/chronic, however new since prior study from 12/2020  · Patient denies any significant back pain, however does have underlying dementia  · Continue supportive care, p r n  Tylenol  · Recommend follow-up with orthopedics as an outpatient  · Likely secondary to patient's underlying multiple myeloma, nontraumatic, sister denies any recent falls or trauma    Hypernatremia  Assessment & Plan  · Sodium 151 today  · Nephrology following,  · Thought to be medication related in setting of lactulose causing volume depletion  · Started on IV D5W at 75 cc/hour  · Monitor BMP in the a m    · Lasix decreased to 40 mg daily as well    Bacteremia  Assessment & Plan  · Patient with 1/2 blood cultures positive for Staph coag-negative  · Likely contaminant, not true bacteremia as additional blood cultures are now been negative times 72 hours  · No need for ID consultation at this time  · Urine culture growing Klebsiella, continue IV ceftriaxone for underlying UTI    Hypotension (arterial)  Assessment & Plan  · Patient with history of intermittent hypotension  · Likely acutely worsened in setting of persistent tachycardia  · Cardiology following,  · Continue Cardizem and Lopressor as outlined above with holding parameters as blood pressure tolerates  · Telemetry monitoring    Diastolic CHF (HCC)  Assessment & Plan  Wt Readings from Last 3 Encounters:   04/05/21 66 9 kg (147 lb 7 8 oz)   03/30/21 63 3 kg (139 lb 8 oz)   03/24/21 66 2 kg (145 lb 15 1 oz)     · Concerning for acute diastolic heart failure as evidenced by BNP greater than 12,000, increase in weight  · Did receive gentle IV fluids initially on admission for acute kidney injury  · Patient is typically maintained on Lasix 60 mg daily at home  · Cardiology following,  · P o  Lasix decreased to 40 mg daily  · Patient placed on D5W by Nephrology in setting of worsening hypernatremia  · Echo with EF 55%, G2 DD  · Obtain daily weights, accurate I&Os, low sodium diet     Acute kidney injury superimposed on chronic kidney disease ([facility])  Assessment & Plan  · POA, CESAR on CKD 3b likely secondary to poor intake and hypotension from underlying UTI  · Received IV fluid hydration in the ER, additional fluids are currently on hold  · Nephrology consult by Cardiology,  · Patient was placed on p o   Lasix 40 mg b i d , however due to hypernatremia this was changed to 40 mg daily  · Creatinine did improved to 1 28 today  · Baseline appears to be somewhere between 1 5-1 8    Results from last 7 days   Lab Units 04/06/21  0702 04/05/21  0438 04/04/21  0449 04/03/21  0704 04/02/21  0549 04/01/21  1400   BUN mg/dL 31* 32* 32* 37* 38* 42*   CREATININE mg/dL 1 28 1 33* 1 35* 1 41* 1 61* 2 09*   EGFR ml/min/1 73sq m 48 45 45 42 36 26       Dementia ([facility])  Assessment & Plan  · Patient appears to be at baseline today per discussion with sister who she lives with  · Dementia without behavioral disturbance  · Continue mirtazapine 50 mg daily and melatonin 6 mg daily  · Reportedly had adverse side effects with quetiapine and olanzapine with increased agitation  · Currently in soft limb upper restraints for safety    Pancytopenia ([facility])  Assessment & Plan  · WBC improved to 4 67 today, platelets improved to 40, hemoglobin improved to 7 7   · Chronic on review, likely secondary to underlying cirrhosis and multiple myeloma  · No active bleeding currently   · Patient's sister reports that the patient undergoes blood transfusions every 2 weeks, she reports that the patient is due on Wednesday, is requesting her to get blood transfusion here  Will continue to monitor CBC, if continued down trending of hemoglobin would give 1 unit packed red blood cells tomorrow as previously scheduled to keep hemoglobin above 7  · Monitor CBC closely     Results from last 7 days   Lab Units 04/06/21  0702 04/05/21  0438 04/04/21  0449 04/03/21  0704 04/02/21  0549 04/01/21  1400   PLATELETS Thousands/uL 40* 36* 35* 36* 36* 38*       Multiple myeloma (HCC)  Assessment & Plan  · Patient with history of IgG kappa multiple myeloma  · Follows with Hematology/Oncology as an outpatient  · Currently patient is not maintained on any anti myeloma therapy, it was strongly recommended by Oncology per review of notes for referral for palliative care, hospice had previously been declined by patient's family  · Likely cause for her compression fracture noted on CT scan, patient without any trauma  · Continue supportive care    Urinary tract infection  Assessment & Plan  · Patient presented with acute encephalopathy, UA positive  · Urine culture with Klebsiella, sensitive to ceftriaxone  · Continue IV ceftriaxone, day 5/7  · Unable to determine if patient was having urinary symptoms secondary to her baseline dementia  · Monitor closely      VTE Pharmacologic Prophylaxis:   Pharmacologic: Pharmacologic VTE Prophylaxis contraindicated due to Thrombocytopenia  Mechanical VTE Prophylaxis in Place: Yes    Patient Centered Rounds: I have performed bedside rounds with nursing staff today  Discussions with Specialists or Other Care Team Provider:  Discussed with Nephrology, Cardiology, RN, cm and reviewed previous notes    Education and Discussions with Family / Patient:  Discussed with patient at bedside as well as patient's sister over the phone regarding plan of care    Time Spent for Care: 30 minutes    More than 50% of total time spent on counseling and coordination of care as described above  Current Length of Stay: 5 day(s)    Current Patient Status: Inpatient   Certification Statement: The patient will continue to require additional inpatient hospital stay due to Hypernatremia, requiring IV D5W, continue medication adjustments for tachycardia    Discharge Plan:  Not medically stable as above, anticipate next 24-48 hours pending improvement of sodium, improvement of heart rates    Code Status: Level 1 - Full Code      Subjective:   Patient continues to be pleasantly demented today, nursing staff reports that she did not sleep very well last night and is tired today  However she is more interactive with me today  She reports that she feels okay, denies any significant pain  Patient's sister reports that she was at the bedside yesterday and states that the patient was very close to her baseline even yesterday  Objective:     Vitals:   Temp (24hrs), Av 5 °F (36 4 °C), Min:97 3 °F (36 3 °C), Max:98 °F (36 7 °C)    Temp:  [97 3 °F (36 3 °C)-98 °F (36 7 °C)] 97 3 °F (36 3 °C)  HR:  [] 120  Resp:  [16-19] 19  BP: ()/(65-83) 98/65  SpO2:  [88 %-100 %] 94 %  Body mass index is 26 98 kg/m²  Input and Output Summary (last 24 hours): Intake/Output Summary (Last 24 hours) at 2021 1806  Last data filed at 2021 1635  Gross per 24 hour   Intake 1060 ml   Output --   Net 1060 ml       Physical Exam:     Physical Exam  Vitals signs reviewed  Constitutional:       General: She is not in acute distress  Appearance: She is not toxic-appearing  Comments: Patient is in no acute distress lying in her hospital bed resting comfortably  Disoriented, confusion at baseline   HENT:      Head: Normocephalic and atraumatic  Eyes:      Conjunctiva/sclera: Conjunctivae normal       Pupils: Pupils are equal, round, and reactive to light  Cardiovascular:      Rate and Rhythm: Tachycardia present  Rhythm irregular  Pulses: Normal pulses     Pulmonary: Effort: Pulmonary effort is normal  No respiratory distress  Breath sounds: Normal breath sounds  No wheezing  Abdominal:      General: Bowel sounds are normal  There is distension (Area of induration over the right upper quadrant, unchanged from prior)  Palpations: Abdomen is soft  Tenderness: There is no abdominal tenderness  Musculoskeletal:      Right lower leg: No edema  Left lower leg: No edema  Skin:     General: Skin is warm and dry  Findings: No erythema  Neurological:      Mental Status: She is alert  Additional Data:     Labs:    Results from last 7 days   Lab Units 04/06/21  0702   WBC Thousand/uL 4 67   HEMOGLOBIN g/dL 7 7*   HEMATOCRIT % 24 9*   PLATELETS Thousands/uL 40*   LYMPHO PCT % 19   MONO PCT % 15*   EOS PCT % 1     Results from last 7 days   Lab Units 04/06/21  0702   POTASSIUM mmol/L 4 4   CHLORIDE mmol/L 112*   CO2 mmol/L 28   BUN mg/dL 31*   CREATININE mg/dL 1 28   CALCIUM mg/dL 9 3   ALK PHOS U/L 284*   ALT U/L 25   AST U/L 37     Results from last 7 days   Lab Units 04/06/21  0702   INR  1 23*       * I Have Reviewed All Lab Data Listed Above  * Additional Pertinent Lab Tests Reviewed: All Labs Within Last 24 Hours Reviewed    Imaging:    Imaging Reports Reviewed Today Include: ECHo  Imaging Personally Reviewed by Myself Includes:  none    Recent Cultures (last 7 days):     Results from last 7 days   Lab Units 04/02/21  2239 04/01/21  1610 04/01/21  1426 04/01/21  1400   BLOOD CULTURE  No Growth at 72 hrs  No Growth at 72 hrs  No Growth After 4 Days    --  Staphylococcus coagulase negative*   GRAM STAIN RESULT   --   --   --  Gram positive cocci in clusters*   URINE CULTURE   --   --  >100,000 cfu/ml Klebsiella variicola*  10,000-19,000 cfu/ml   --        Last 24 Hours Medication List:   Current Facility-Administered Medications   Medication Dose Route Frequency Provider Last Rate    cefTRIAXone  1,000 mg Intravenous Q24H Álvaro Larose DO 1,000 mg (04/06/21 1732)    dextrose  75 mL/hr Intravenous Continuous Dona Garcia MD 75 mL/hr (04/06/21 1608)    diltiazem  60 mg Oral Q6H [facility] Shakila Joyce MD      folic acid  5,835 mcg Oral Daily Manoj Spotted, DO      [START ON 4/7/2021] furosemide  40 mg Oral Daily Shakila Joyce MD      lactulose  20 g Oral TID Devin Gonzalez PA-C      magnesium oxide  400 mg Oral BID Manoj Spotted, DO      melatonin  6 mg Oral HS Dougie Joseph, DO      metoprolol  2 5 mg Intravenous Q6H PRN Manoj Spotted, DO      metoprolol tartrate  25 mg Oral Q12H [facility] Shakila Joyce MD      mirtazapine  15 mg Oral HS Manoj Spotted, DO      nicotine  1 patch Transdermal Daily Manoj Spotted, DO      nystatin   Topical BID Dougie Ruiz, DO      pantoprazole  40 mg Oral QAM Dougie Ruiz, DO      rifaximin  550 mg Oral Q12H [facility] Devin Gonzalez PA-C      vitamin B-1  100 mg Oral Daily Manoj Spotted, DO          Today, Patient Was Seen By: Bluford Hashimoto, PA-C    ** Please Note: Dictation voice to text software may have been used in the creation of this document   **

## 2021-04-06 NOTE — ASSESSMENT & PLAN NOTE
· Sodium 151 today  · Nephrology following,  · Thought to be medication related in setting of lactulose causing volume depletion  · Started on IV D5W at 75 cc/hour  · Monitor BMP in the a m    · Lasix decreased to 40 mg daily as well

## 2021-04-06 NOTE — PLAN OF CARE

## 2021-04-06 NOTE — CASE MANAGEMENT
Per rounding with SLIM, patient is anticipated to be discharged in 24 hours  Patient's sodium increased, and her heart rate was uncontrolled overnight  Patient continues to require wrist restraints, tele, and lactalose  Cm department will continue to follow patient through discharge

## 2021-04-06 NOTE — ASSESSMENT & PLAN NOTE
· Patient presented with acute encephalopathy, UA positive  · Urine culture with Klebsiella, sensitive to ceftriaxone  · Continue IV ceftriaxone, day 5/7  · Unable to determine if patient was having urinary symptoms secondary to her baseline dementia  · Monitor closely

## 2021-04-06 NOTE — ASSESSMENT & PLAN NOTE
Wt Readings from Last 3 Encounters:   04/05/21 66 9 kg (147 lb 7 8 oz)   03/30/21 63 3 kg (139 lb 8 oz)   03/24/21 66 2 kg (145 lb 15 1 oz)     · Concerning for acute diastolic heart failure as evidenced by BNP greater than 12,000, increase in weight  · Did receive gentle IV fluids initially on admission for acute kidney injury  · Patient is typically maintained on Lasix 60 mg daily at home  · Cardiology following,  · P o   Lasix decreased to 40 mg daily  · Patient placed on D5W by Nephrology in setting of worsening hypernatremia  · Echo with EF 55%, G2 DD  · Obtain daily weights, accurate I&Os, low sodium diet

## 2021-04-06 NOTE — PROGRESS NOTES
Cardiology Progress Note - Douglas Barnett 77 y o  female MRN: [id number]  Unit/Bed#: -01 Encounter: [id number]      Assessment/Plan:  1  Paroxysmal atrial fibrillation  - likely precipitated by UTI/bacteremia  - EKG 4/2 - Afib with 2:1 heart block, incomplete right bundle-branch block, ST elevation in inferior leads  - troponins negative  - review of telemetry today - sinus tachycardia, heart rate in the 120  - not on anticoagulation outpatient due to fall risk, thrombocytopenia, and dementia  - continue metoprolol 25 mg b i d  and Cardizem 60 mg Q 6 hours for rate control  - continue monitor on telemetry     2  Acute diastolic HFrEF  - echo yesterday - EF 55%, no regional wall motion abnormalities grade 2 diastolic dysfunction, mild MR and MV stenosis, moderate to severe TR, mildly calcified AV  - BNP 12,194 on admission  - weight on admission 156 lb; 147 lb this morning  - decrease Lasix 40 mg to daily dosing  - plan to add ACE/ARB when able  - daily weights  - strict I's and O's  - salt and fluid restriction     3  CESAR superimposed on CKD stage 3  - creat on admission 2 09 > 1 28  - nephrology following and managing  - avoid nephrotoxic agents   - monitor urine output     4  Hypertension  - well controlled  - current blood pressure 118/80  - continue lopressor as blood pressure tolerates  - continue to monitor closely     5  Hepatic encephalopathy  - likely secondary to UTI/bacteremia   - blood cultures 1/2 on 04/01 positive for Staph coagulase negative and Gram-positive cocci in clusters; repeat cultures show no growth at 72 hours  - urine culture positive for Klebsiella  - on IV antibiotics  - management per primary team     Subjective:   Patient seen and examined  No significant events overnight  Patient is still confused, but denies any chest pain or shortness of breath  Objective:   Vitals: Blood pressure 118/80, pulse (!) 120, temperature 97 5 °F (36 4 °C), resp   rate 16, height 5' 2" (1 575 m), weight 66 9 kg (147 lb 7 8 oz), SpO2 94 %, not currently breastfeeding , Body mass index is 26 98 kg/m² ,   Orthostatic Blood Pressures      Most Recent Value   Blood Pressure  118/80 filed at 04/06/2021 1321   Patient Position - Orthostatic VS  Lying filed at 04/05/2021 2250          Intake/Output Summary (Last 24 hours) at 4/6/2021 1342  Last data filed at 4/5/2021 1930  Gross per 24 hour   Intake 220 ml   Output 300 ml   Net -80 ml     Physical Exam:  GEN: [employer] appears chronically ill, alert and oriented to self  HEENT: pupils equal, round, and reactive to light; extraocular muscles intact  NECK: supple, no carotid bruits   HEART: regular rhythm, tachycardic, normal S1 and S2, no murmurs, clicks, gallops or rubs   LUNGS: clear to auscultation bilaterally; no wheezes, rales, or rhonchi   ABDOMEN: normal bowel sounds, distended and firm, no tenderness  EXTREMITIES: peripheral pulses normal; no clubbing, cyanosis, or edema  NEURO: Confused    Medications:    Current Facility-Administered Medications:     cefTRIAXone (ROCEPHIN) 1,000 mg in dextrose 5 % 50 mL IVPB, 1,000 mg, Intravenous, Q24H, Dougie Ruiz DO, Last Rate: 100 mL/hr at 04/05/21 1855, 1,000 mg at 04/05/21 1855    diltiazem (CARDIZEM) tablet 60 mg, 60 mg, Oral, Q6H [address], Belen Oreilly MD, 60 mg at 11/05/56 8971    folic acid (FOLVITE) tablet 1,000 mcg, 1,000 mcg, Oral, Daily, Dougie Ruiz DO, 1,000 mcg at 04/06/21 0834    [START ON 4/7/2021] furosemide (LASIX) tablet 40 mg, 40 mg, Oral, Daily, Belen Oreilly MD    lactulose oral solution 20 g, 20 g, Oral, TID, Julieta Lo PA-C, 20 g at 04/06/21 0834    magnesium oxide (MAG-OX) tablet 400 mg, 400 mg, Oral, BID, Dougie Ruiz DO, 400 mg at 04/06/21 0834    melatonin tablet 6 mg, 6 mg, Oral, HS, Dougie Ruiz DO, 6 mg at 04/05/21 2253    metoprolol (LOPRESSOR) injection 2 5 mg, 2 5 mg, Intravenous, Q6H PRN, Ben Sanchez DO, 2 5 mg at 04/03/21 1408    metoprolol tartrate (LOPRESSOR) tablet 25 mg, 25 mg, Oral, Q12H [address], Myron Christopher MD, 25 mg at 04/06/21 0835    mirtazapine (REMERON) tablet 15 mg, 15 mg, Oral, HS, Dougie Ruiz, DO, 15 mg at 04/05/21 2246    nicotine (NICODERM CQ) 7 mg/24hr TD 24 hr patch 1 patch, 1 patch, Transdermal, Daily, Dougie Ruiz, DO, 1 patch at 04/06/21 0838    nystatin (MYCOSTATIN) powder, , Topical, BID, Rajani Anon, DO, Given at 04/06/21 0838    pantoprazole (PROTONIX) EC tablet 40 mg, 40 mg, Oral, QAM, Dougie Ruiz, DO, 40 mg at 04/06/21 0658    rifaximin (XIFAXAN) tablet 550 mg, 550 mg, Oral, Q12H [address], Luc Arango PA-C, 550 mg at 04/06/21 0835    thiamine tablet 100 mg, 100 mg, Oral, Daily, Dougie Ruiz, DO, 100 mg at 04/06/21 0835     Labs & Results:  Results from last 7 days   Lab Units 04/01/21  1400   TROPONIN I ng/mL <0 02     Results from last 7 days   Lab Units 04/06/21  0702 04/05/21  0438 04/04/21  0449   WBC Thousand/uL 4 67 3 92* 4 06*   HEMOGLOBIN g/dL 7 7* 7 6* 7 6*   HEMATOCRIT % 24 9* 25 4* 25 1*   PLATELETS Thousands/uL 40* 36* 35*         Results from last 7 days   Lab Units 04/06/21  0702 04/05/21  0438 04/04/21  0449  04/02/21  0549 04/01/21  1400   POTASSIUM mmol/L 4 4 4 8 4 7   < > 4 5 4 9   CHLORIDE mmol/L 112* 113* 108   < > 108 105   CO2 mmol/L 28 26 27   < > 23 25   BUN mg/dL 31* 32* 32*   < > 38* 42*   CREATININE mg/dL 1 28 1 33* 1 35*   < > 1 61* 2 09*   CALCIUM mg/dL 9 3 9 2 9 0   < > 9 4 9 0   ALK PHOS U/L 284*  --   --   --  248* 251*   ALT U/L 25  --   --   --  18 24   AST U/L 37  --   --   --  34 41    < > = values in this interval not displayed       Results from last 7 days   Lab Units 04/06/21  0702 04/02/21  0549 04/01/21  1400   INR  1 23* 1 21* 1 18   PTT seconds  --   --  34     Results from last 7 days   Lab Units 04/01/21  1400   MAGNESIUM mg/dL 1 7

## 2021-04-06 NOTE — PROGRESS NOTES
NEPHROLOGY PROGRESS NOTE    Patient: Mickey Velazco               Sex: female          DOA: 4/1/2021  1:10 PM   YOB: 1945        Age:  77 y o         LOS:  LOS: 5 days       HPI     Patient with CKD and altered mental status likely because of encephalopathy    SUBJECTIVE     Remained confused    No other acute complaint    Patient is getting lactulose for possible metabolic encephalopathy        CURRENT MEDICATIONS       Current Facility-Administered Medications:     cefTRIAXone (ROCEPHIN) 1,000 mg in dextrose 5 % 50 mL IVPB, 1,000 mg, Intravenous, Q24H, Dougie Ruiz DO, Last Rate: 100 mL/hr at 04/05/21 1855, 1,000 mg at 04/05/21 1855    diltiazem (CARDIZEM) tablet 60 mg, 60 mg, Oral, Q6H [address], Eulalio Broderick MD, 60 mg at 39/96/17 4380    folic acid (FOLVITE) tablet 1,000 mcg, 1,000 mcg, Oral, Daily, Dougie Ruiz DO, 1,000 mcg at 04/06/21 0834    [START ON 4/7/2021] furosemide (LASIX) tablet 40 mg, 40 mg, Oral, Daily, Eulalio Broderick MD    lactulose oral solution 20 g, 20 g, Oral, TID, Julieta Lo PA-C, 20 g at 04/06/21 0834    magnesium oxide (MAG-OX) tablet 400 mg, 400 mg, Oral, BID, Dougie Ruiz DO, 400 mg at 04/06/21 0834    melatonin tablet 6 mg, 6 mg, Oral, HS, Dougie Ruiz DO, 6 mg at 04/05/21 2253    metoprolol (LOPRESSOR) injection 2 5 mg, 2 5 mg, Intravenous, Q6H PRN, Dougie Ruiz DO, 2 5 mg at 04/03/21 1408    metoprolol tartrate (LOPRESSOR) tablet 25 mg, 25 mg, Oral, Q12H [address], Eulalio Broderick MD, 25 mg at 04/06/21 0835    mirtazapine (REMERON) tablet 15 mg, 15 mg, Oral, HS, Dougie Ruiz DO, 15 mg at 04/05/21 2246    nicotine (NICODERM CQ) 7 mg/24hr TD 24 hr patch 1 patch, 1 patch, Transdermal, Daily, Dougie Ruiz DO, 1 patch at 04/06/21 0838    nystatin (MYCOSTATIN) powder, , Topical, BID, Dougie Ruiz DO, Given at 04/06/21 0838    pantoprazole (PROTONIX) EC tablet 40 mg, 40 mg, Oral, QAM, Dougie Ruiz DO, 40 mg at 04/06/21 0658    rifaximin Ardelia Coca) tablet 550 mg, 550 mg, Oral, Q12H [address], Roselia PellCLARA booth-C, 550 mg at 04/06/21 7674    thiamine tablet 100 mg, 100 mg, Oral, Daily, Dougie Ruiz DO, 100 mg at 04/06/21 0835    OBJECTIVE     Current Weight: Weight - Scale: 66 9 kg (147 lb 7 8 oz)  Vitals:    04/06/21 1321   BP: 118/80   Pulse:    Resp:    Temp:    SpO2:        Intake/Output Summary (Last 24 hours) at 4/6/2021 1531  Last data filed at 4/6/2021 1346  Gross per 24 hour   Intake 520 ml   Output --   Net 520 ml       PHYSICAL EXAMINATION     Physical Exam  Constitutional:       General: She is not in acute distress  Appearance: She is well-developed  HENT:      Head: Normocephalic  Eyes:      General: No scleral icterus  Conjunctiva/sclera: Conjunctivae normal    Neck:      Musculoskeletal: Neck supple  Vascular: No JVD  Cardiovascular:      Rate and Rhythm: Normal rate  Heart sounds: Normal heart sounds  Pulmonary:      Effort: Pulmonary effort is normal       Breath sounds: No wheezing  Abdominal:      Palpations: Abdomen is soft  Tenderness: There is no abdominal tenderness  Musculoskeletal: Normal range of motion  Skin:     General: Skin is warm  Findings: No rash  Neurological:      Mental Status: She is alert  She is disoriented            LAB RESULTS     Results from last 7 days   Lab Units 04/06/21  0702 04/05/21  0438 04/04/21  0449 04/03/21  0704 04/02/21  0549 04/01/21  1400   WBC Thousand/uL 4 67 3 92* 4 06* 4 07* 4 60 4 44   HEMOGLOBIN g/dL 7 7* 7 6* 7 6* 7 6* 7 9* 8 2*   HEMATOCRIT % 24 9* 25 4* 25 1* 25 7* 25 6* 26 2*   PLATELETS Thousands/uL 40* 36* 35* 36* 36* 38*   POTASSIUM mmol/L 4 4 4 8 4 7 4 3 4 5 4 9   CHLORIDE mmol/L 112* 113* 108 113* 108 105   CO2 mmol/L 28 26 27 26 23 25   BUN mg/dL 31* 32* 32* 37* 38* 42*   CREATININE mg/dL 1 28 1 33* 1 35* 1 41* 1 61* 2 09*   EGFR ml/min/1 73sq m 48 45 45 42 36 26   CALCIUM mg/dL 9 3 9 2 9 0 9 2 9 4 9 0   MAGNESIUM mg/dL  --   --   -- --   --  1 7   PHOSPHORUS mg/dL 2 9  --   --   --   --   --        RADIOLOGY RESULTS      Results for orders placed during the hospital encounter of 04/01/21   XR chest portable    Narrative CHEST     INDICATION:   AMS, fever  COMPARISON:  3/24/2021    EXAM PERFORMED/VIEWS:  XR CHEST PORTABLE      FINDINGS:    Heart shadow is enlarged but unchanged from prior exam   Atherosclerotic aorta  Persistent mild congestive changes with right mid to lower lung airspace opacity and increased moderate to large pleural effusion  Osseous structures appear within normal limits for patient age  Impression Persistent mild congestive changes with right mid to lower lung airspace opacity and increased moderate to large pleural effusion  Workstation performed: [id number]       Results for orders placed during the hospital encounter of 01/28/20   XR chest pa & lateral    Narrative CHEST     INDICATION: Follow-up congestive heart failure    COMPARISON:  1/30/2020    EXAM PERFORMED/VIEWS:  XR CHEST PA & LATERAL      FINDINGS:    Heart shadow is enlarged but unchanged from prior exam     There continues to be mild vascular congestion though interstitial edema has improved  There are small pleural effusions  Osseous structures appear within normal limits for patient age  Impression Improved congestive heart failure with mild vascular congestion and small effusions  Workstation performed: [id number]       Results for orders placed during the hospital encounter of 08/29/20   CT chest wo contrast    Narrative CT CHEST WITHOUT IV CONTRAST    INDICATION:   Respiratory illness, acute (Age => 41y)  COMPARISON:  May 6, 2020    TECHNIQUE: CT examination of the chest was performed without intravenous contrast   Axial, sagittal, and coronal 2D reformatted images were created from the source data and submitted for interpretation  Radiation dose length product (DLP) for this visit:  308 mGy-cm     This examination, like all CT scans performed in the [facility], was performed utilizing techniques to minimize radiation dose exposure, including the use of iterative   reconstruction and automated exposure control  FINDINGS:    LUNGS:  Collapse consolidation noted within the right lower lobe  Lobular areas of groundglass density noted in the subpleural region in the left lingular area, in the right middle lobe area  The trachea and central bronchial patent    PLEURA:  Moderate sized right effusion seen    HEART/GREAT VESSELS:  Cardiomegaly seen  Coronary artery calcification seen  Mitral annular calcification seen  The ascending aorta measures 3 4 cm  MEDIASTINUM AND MATT:  No significant mediastinal lymph node enlargement seen      CHEST WALL AND LOWER NECK:   A right thyroid nodule seen which measures 1 4 cm, unchanged from the previous study    VISUALIZED STRUCTURES IN THE UPPER ABDOMEN:  Nodular contour of the liver compatible with cirrhosis  Ascites seen  Enlarged IVC seen with spleen appear unremarkable  Adrenal gland appear unremarkable    OSSEOUS STRUCTURES:  Degenerative changes are seen within the thoracic spine      Impression Moderate right effusion     Atelectasis/consolidation right lower lung may be due to compressive atelectasis however underlying infiltrates are difficult to exclude    Lobular areas of groundglass density in the left upper lobe, right middle lobe, lingular region may be due to infiltrate which could be atypical or viral or drug induced these are may be related to areas of residual congestion    Cirrhosis    Enlarged IVC with enlarged right atrium right and right ventricle, correlate with tricuspid regurgitation, right heart failure    Follow-up suggested in 6-8 weeks to demonstrate resolution  The study was marked in EPIC for significant notification  Workstation performed: [id number]       No results found for this or any previous visit    Results for orders placed during the hospital encounter of 04/01/21   CT abdomen pelvis wo contrast    Narrative CT ABDOMEN AND PELVIS WITHOUT IV CONTRAST    INDICATION:   Abdominal distension  abdominal pain and distention, alcoholic cirrhosis  COMPARISON:  CT dated 12/18/2020    TECHNIQUE:  CT examination of the abdomen and pelvis was performed without intravenous contrast   Axial, sagittal, and coronal 2D reformatted images were created from the source data and submitted for interpretation  Radiation dose length product (DLP) for this visit:  577 mGy-cm   This examination, like all CT scans performed in the [facility], was performed utilizing techniques to minimize radiation dose exposure, including the use of iterative   reconstruction and automated exposure control  Enteric contrast was not administered  FINDINGS:    ABDOMEN    LOWER CHEST:  Stable right pleural effusion with right middle and right lower lobe compressive atelectasis  Stable cardiomegaly  Coronary artery and mitral annular calcifications  LIVER/BILIARY TREE:  The liver demonstrates cirrhotic morphology  Within the limitations of this examination there is no evidence of suspicious hepatic mass  No biliary dilatation  GALLBLADDER:  There are gallstone(s) within the gallbladder, without pericholecystic inflammatory changes  SPLEEN:  Unremarkable  PANCREAS:  Unremarkable  ADRENAL GLANDS:  Unremarkable  KIDNEYS/URETERS:  Stable right renal cyst possibly with milk of calcium  No stones or hydronephrosis  STOMACH AND BOWEL:  Unremarkable  APPENDIX:  No findings to suggest appendicitis  ABDOMINOPELVIC CAVITY: Small amount of perihepatic ascites  VESSELS:  Atherosclerotic changes are present  No evidence of aneurysm  PELVIS    REPRODUCTIVE ORGANS:  Unremarkable for patient's age  URINARY BLADDER:  Unremarkable  ABDOMINAL WALL/INGUINAL REGIONS:  Unremarkable      OSSEOUS STRUCTURES:  Mild superior endplate compression at L1 with slight bony retropulsion that mildly indents the ventral thecal sac  No significant canal stenosis  Although new since 12/18/2020, this is favored to be subacute/chronic  Impression Cirrhosis  Small amount of perihepatic ascites which is decreased from prior  Mild L1 compression fracture favored to be subacute/chronic but new since 12/18/2020  Workstation performed: [id number]       No results found for this or any previous visit  PLAN / RECOMMENDATIONS      CKD stage 3:  Seems to be stable    Hypernatremia:  Likely because of lactulose causing GI loss of water  Will start IV D5W    Altered mental status:  Fluctuating  Patient does have baseline dementia and now has a possible encephalopathy    Will follow    Kimberlyn Campbell MD  Nephrology  4/6/2021        Portions of the record may have been created with voice recognition software  Occasional wrong word or "sound a like" substitutions may have occurred due to the inherent limitations of voice recognition software  Read the chart carefully and recognize, using context, where substitutions have occurred

## 2021-04-06 NOTE — ASSESSMENT & PLAN NOTE
· Incidental finding noted on CT scan  · Mild L1 compression fracture favored to be subacute/chronic, however new since prior study from 12/2020  · Patient denies any significant back pain, however does have underlying dementia  · Continue supportive care, p r n   Tylenol  · Recommend follow-up with orthopedics as an outpatient  · Likely secondary to patient's underlying multiple myeloma, nontraumatic, sister denies any recent falls or trauma

## 2021-04-06 NOTE — ASSESSMENT & PLAN NOTE
· Patient presented with acute metabolic encephalopathy likely multifactorial in setting of UTI, hepatic encephalopathy  · Patient continues to improve, after speaking with the patient's sister who she resides with, patient is typically confused at baseline, appears to be close to baseline currently    · UA positive for nitrites, leukocytes and moderate bacteria  · Urine culture positive for Klebsiella, susceptible to ceftriaxone, continue (day 5/7)  · GI following for hepatic encephalopathy,  · Most recent ammonia level was 70  · Continue lactulose to 20 mg t i d   To titrate to goal of 2-3 bowel movements a day  · Rifaxan BID   · CT scan with minimal ascites, no indication for paracentesis at this time  · PT/OT recommending short-term rehab, however patient's family declining, requesting home with VNA services once medically stable for discharge

## 2021-04-06 NOTE — PLAN OF CARE
Problem: SAFETY ADULT  Goal: Patient will remain free of falls  Description: INTERVENTIONS:  - Assess patient frequently for physical needs  -  Identify cognitive and physical deficits and behaviors that affect risk of falls  -  [place] fall precautions as indicated by assessment   - Educate patient/family on patient safety including physical limitations  - Instruct patient to call for assistance with activity based on assessment  - Modify environment to reduce risk of injury  - Consider OT/PT consult to assist with strengthening/mobility  Outcome: Progressing     Problem: DISCHARGE PLANNING  Goal: Discharge to home or other facility with appropriate resources  Description: INTERVENTIONS:  - Identify barriers to discharge w/patient and caregiver  - Arrange for needed discharge resources and transportation as appropriate  - Identify discharge learning needs (meds, wound care, etc )  - Arrange for interpretive services to assist at discharge as needed  - Refer to Case Management Department for coordinating discharge planning if the patient needs post-hospital services based on physician/advanced practitioner order or complex needs related to functional status, cognitive ability, or social support system  Outcome: Progressing     Problem: Knowledge Deficit  Goal: Patient/family/caregiver demonstrates understanding of disease process, treatment plan, medications, and discharge instructions  Description: Complete learning assessment and assess knowledge base  Interventions:  - Provide teaching at level of understanding  - Provide teaching via preferred learning methods  Outcome: Progressing     Problem: Potential for Falls  Goal: Patient will remain free of falls  Description: INTERVENTIONS:  - Assess patient frequently for physical needs  -  Identify cognitive and physical deficits and behaviors that affect risk of falls    -  [place] fall precautions as indicated by assessment   - Educate patient/family on patient safety including physical limitations  - Instruct patient to call for assistance with activity based on assessment  - Modify environment to reduce risk of injury  - Consider OT/PT consult to assist with strengthening/mobility  Outcome: Progressing     Problem: Prexisting or High Potential for Compromised Skin Integrity  Goal: Skin integrity is maintained or improved  Description: INTERVENTIONS:  - Identify patients at risk for skin breakdown  - Assess and monitor skin integrity  - Assess and monitor nutrition and hydration status  - Monitor labs   - Assess for incontinence   - Turn and reposition patient  - Assist with mobility/ambulation  - Relieve pressure over bony prominences  - Avoid friction and shearing  - Provide appropriate hygiene as needed including keeping skin clean and dry  - Evaluate need for skin moisturizer/barrier cream  - Collaborate with interdisciplinary team   - Patient/family teaching  - Consider wound care consult   Outcome: Progressing     Problem: Nutrition/Hydration-ADULT  Goal: Nutrient/Hydration intake appropriate for improving, restoring or maintaining nutritional needs  Description: Monitor and assess patient's nutrition/hydration status for malnutrition  Collaborate with interdisciplinary team and initiate plan and interventions as ordered  Monitor patient's weight and dietary intake as ordered or per policy  Utilize nutrition screening tool and intervene as necessary  Determine patient's food preferences and provide high-protein, high-caloric foods as appropriate       INTERVENTIONS:  - Monitor oral intake, urinary output, labs, and treatment plans  - Assess nutrition and hydration status and recommend course of action  - Evaluate amount of meals eaten  - Assist patient with eating if necessary   - Allow adequate time for meals  - Recommend/ encourage appropriate diets, oral nutritional supplements, and vitamin/mineral supplements  - Order, calculate, and assess calorie counts as needed  - Recommend, monitor, and adjust tube feedings and TPN/PPN based on assessed needs  - Assess need for intravenous fluids  - Provide specific nutrition/hydration education as appropriate  - Include patient/family/caregiver in decisions related to nutrition  Outcome: Progressing

## 2021-04-06 NOTE — ASSESSMENT & PLAN NOTE
· Patient with 1/2 blood cultures positive for Staph coag-negative  · Likely contaminant, not true bacteremia as additional blood cultures are now been negative times 72 hours  · No need for ID consultation at this time  · Urine culture growing Klebsiella, continue IV ceftriaxone for underlying UTI

## 2021-04-06 NOTE — ASSESSMENT & PLAN NOTE
· Patient has had persistent tachycardia here in the hospital, heart rate in the 120s on review today  · Cardiology following,  · EKG from 04/02 with AFib with 2-1 heart block, incomplete RBBB  · Continue Lopressor 25 mg b i d     · Cardizem increased to 60 mg Q6H today for better HR control, HR continues to be in the 120s today   · Echo from yesterday showed EF of 64%, grade 2 diastolic dysfunction  · Patient not maintained on anticoagulation as an outpatient secondary to thrombocytopenia, dementia and fall risk

## 2021-04-06 NOTE — ASSESSMENT & PLAN NOTE
· Patient with history of IgG kappa multiple myeloma  · Follows with Hematology/Oncology as an outpatient  · Currently patient is not maintained on any anti myeloma therapy, it was strongly recommended by Oncology per review of notes for referral for palliative care, hospice had previously been declined by patient's family  · Likely cause for her compression fracture noted on CT scan, patient without any trauma  · Continue supportive care

## 2021-04-06 NOTE — ASSESSMENT & PLAN NOTE
· POA, CESAR on CKD 3b likely secondary to poor intake and hypotension from underlying UTI  · Received IV fluid hydration in the ER, additional fluids are currently on hold  · Nephrology consult by Cardiology,  · Patient was placed on p o   Lasix 40 mg b i d , however due to hypernatremia this was changed to 40 mg daily  · Creatinine did improved to 1 28 today  · Baseline appears to be somewhere between 1 5-1 8    Results from last 7 days   Lab Units 04/06/21  0702 04/05/21  0438 04/04/21  0449 04/03/21  0704 04/02/21  0549 04/01/21  1400   BUN mg/dL 31* 32* 32* 37* 38* 42*   CREATININE mg/dL 1 28 1 33* 1 35* 1 41* 1 61* 2 09*   EGFR ml/min/1 73sq m 48 45 45 42 36 26

## 2021-04-06 NOTE — ASSESSMENT & PLAN NOTE
· Patient appears to be at baseline today per discussion with sister who she lives with  · Dementia without behavioral disturbance  · Continue mirtazapine 50 mg daily and melatonin 6 mg daily  · Reportedly had adverse side effects with quetiapine and olanzapine with increased agitation  · Currently in soft limb upper restraints for safety

## 2021-04-06 NOTE — ASSESSMENT & PLAN NOTE
· Patient with history of alcoholic cirrhosis, quit drinking in 2019  · GI consulted,  · Patient with increased abdominal distension  · CT abdomen pelvis with cirrhosis, small amount of perihepatic ascites, decreased from prior  No indication for paracentesis at this time  · Trend CMP and INR daily  · Lactulose 20 mg t i d   With a goal of 2-3 bowel movements daily  · Xifaxan twice a day as well  · Currently on IV ceftriaxone for underlying UTI and SBP prophylaxis

## 2021-04-07 ENCOUNTER — HOSPITAL ENCOUNTER (OUTPATIENT)
Dept: INFUSION CENTER | Facility: CLINIC | Age: 76
Discharge: HOME | End: 2021-04-07

## 2021-04-07 DIAGNOSIS — C90.01 MULTIPLE MYELOMA IN REMISSION (HCC): Primary | ICD-10-CM

## 2021-04-07 LAB
ANION GAP SERPL CALCULATED.3IONS-SCNC: 10 MMOL/L (ref 4–13)
ANISOCYTOSIS BLD QL SMEAR: PRESENT
ATRIAL RATE: 115 BPM
BASOPHILS # BLD MANUAL: 0.04 THOUSAND/UL (ref 0–0.1)
BASOPHILS NFR MAR MANUAL: 1 % (ref 0–1)
BUN SERPL-MCNC: 29 MG/DL (ref 5–25)
CALCIUM SERPL-MCNC: 8.9 MG/DL (ref 8.3–10.1)
CHLORIDE SERPL-SCNC: 107 MMOL/L (ref 100–108)
CO2 SERPL-SCNC: 28 MMOL/L (ref 21–32)
CREAT SERPL-MCNC: 1.48 MG/DL (ref 0.6–1.3)
EOSINOPHIL # BLD MANUAL: 0.11 THOUSAND/UL (ref 0–0.4)
EOSINOPHIL NFR BLD MANUAL: 3 % (ref 0–6)
ERYTHROCYTE [DISTWIDTH] IN BLOOD BY AUTOMATED COUNT: 21.7 % (ref 11.6–15.1)
GFR SERPL CREATININE-BSD FRML MDRD: 40 ML/MIN/1.73SQ M
GLUCOSE SERPL-MCNC: 151 MG/DL (ref 65–140)
HCT VFR BLD AUTO: 23.2 % (ref 34.8–46.1)
HGB BLD-MCNC: 7 G/DL (ref 11.5–15.4)
LYMPHOCYTES # BLD AUTO: 1.02 THOUSAND/UL (ref 0.6–4.47)
LYMPHOCYTES # BLD AUTO: 28 % (ref 14–44)
MCH RBC QN AUTO: 27.8 PG (ref 26.8–34.3)
MCHC RBC AUTO-ENTMCNC: 30.2 G/DL (ref 31.4–37.4)
MCV RBC AUTO: 92 FL (ref 82–98)
METAMYELOCYTES NFR BLD MANUAL: 2 % (ref 0–1)
MONOCYTES # BLD AUTO: 0.11 THOUSAND/UL (ref 0–1.22)
MONOCYTES NFR BLD: 3 % (ref 4–12)
MYELOCYTES NFR BLD MANUAL: 2 % (ref 0–1)
NEUTROPHILS # BLD MANUAL: 2.14 THOUSAND/UL (ref 1.85–7.62)
NEUTS BAND NFR BLD MANUAL: 6 % (ref 0–8)
NEUTS SEG NFR BLD AUTO: 53 % (ref 43–75)
NRBC BLD AUTO-RTO: 11 /100 WBCS
NRBC BLD AUTO-RTO: 4 /100 WBC (ref 0–2)
PLATELET # BLD AUTO: 40 THOUSANDS/UL (ref 149–390)
PLATELET BLD QL SMEAR: ABNORMAL
POTASSIUM SERPL-SCNC: 4.8 MMOL/L (ref 3.5–5.3)
PR INTERVAL: 240 MS
QRS AXIS: 93 DEGREES
QRSD INTERVAL: 76 MS
QT INTERVAL: 376 MS
QTC INTERVAL: 496 MS
RBC # BLD AUTO: 2.52 MILLION/UL (ref 3.81–5.12)
SODIUM SERPL-SCNC: 145 MMOL/L (ref 136–145)
T WAVE AXIS: 96 DEGREES
TOTAL CELLS COUNTED SPEC: 100
VARIANT LYMPHS # BLD AUTO: 2 %
VENTRICULAR RATE: 105 BPM
WBC # BLD AUTO: 3.63 THOUSAND/UL (ref 4.31–10.16)

## 2021-04-07 PROCEDURE — 99232 SBSQ HOSP IP/OBS MODERATE 35: CPT | Performed by: INTERNAL MEDICINE

## 2021-04-07 PROCEDURE — 93010 ELECTROCARDIOGRAM REPORT: CPT | Performed by: INTERNAL MEDICINE

## 2021-04-07 PROCEDURE — 93005 ELECTROCARDIOGRAM TRACING: CPT

## 2021-04-07 PROCEDURE — 85027 COMPLETE CBC AUTOMATED: CPT | Performed by: INTERNAL MEDICINE

## 2021-04-07 PROCEDURE — 85007 BL SMEAR W/DIFF WBC COUNT: CPT | Performed by: INTERNAL MEDICINE

## 2021-04-07 PROCEDURE — 80048 BASIC METABOLIC PNL TOTAL CA: CPT | Performed by: INTERNAL MEDICINE

## 2021-04-07 PROCEDURE — 99232 SBSQ HOSP IP/OBS MODERATE 35: CPT | Performed by: NURSE PRACTITIONER

## 2021-04-07 RX ORDER — FUROSEMIDE 40 MG/1
40 TABLET ORAL DAILY
Status: DISCONTINUED | OUTPATIENT
Start: 2021-04-08 | End: 2021-04-08

## 2021-04-07 RX ADMIN — LACTULOSE 20 G: 20 SOLUTION ORAL at 21:15

## 2021-04-07 RX ADMIN — RIFAXIMIN 550 MG: 550 TABLET ORAL at 21:30

## 2021-04-07 RX ADMIN — NYSTATIN: 100000 POWDER TOPICAL at 09:08

## 2021-04-07 RX ADMIN — DILTIAZEM HYDROCHLORIDE 60 MG: 60 TABLET, FILM COATED ORAL at 17:23

## 2021-04-07 RX ADMIN — LACTULOSE 20 G: 20 SOLUTION ORAL at 09:03

## 2021-04-07 RX ADMIN — CEFTRIAXONE SODIUM 1000 MG: 10 INJECTION, POWDER, FOR SOLUTION INTRAVENOUS at 17:16

## 2021-04-07 RX ADMIN — MAGNESIUM OXIDE TAB 400 MG (241.3 MG ELEMENTAL MG) 400 MG: 400 (241.3 MG) TAB at 09:08

## 2021-04-07 RX ADMIN — NICOTINE 1 PATCH: 7 PATCH, EXTENDED RELEASE TRANSDERMAL at 09:08

## 2021-04-07 RX ADMIN — FUROSEMIDE 40 MG: 40 TABLET ORAL at 09:08

## 2021-04-07 RX ADMIN — FOLIC ACID 1000 MCG: 1 TABLET ORAL at 09:04

## 2021-04-07 RX ADMIN — THIAMINE HCL TAB 100 MG 100 MG: 100 TAB at 09:04

## 2021-04-07 RX ADMIN — MIRTAZAPINE 15 MG: 15 TABLET, FILM COATED ORAL at 21:15

## 2021-04-07 RX ADMIN — MAGNESIUM OXIDE TAB 400 MG (241.3 MG ELEMENTAL MG) 400 MG: 400 (241.3 MG) TAB at 17:16

## 2021-04-07 RX ADMIN — NYSTATIN: 100000 POWDER TOPICAL at 17:20

## 2021-04-07 RX ADMIN — DILTIAZEM HYDROCHLORIDE 60 MG: 60 TABLET, FILM COATED ORAL at 06:20

## 2021-04-07 RX ADMIN — METOPROLOL TARTRATE 25 MG: 25 TABLET, FILM COATED ORAL at 21:11

## 2021-04-07 RX ADMIN — RIFAXIMIN 550 MG: 550 TABLET ORAL at 09:08

## 2021-04-07 RX ADMIN — LACTULOSE 20 G: 20 SOLUTION ORAL at 17:16

## 2021-04-07 RX ADMIN — METOPROLOL TARTRATE 25 MG: 25 TABLET, FILM COATED ORAL at 09:04

## 2021-04-07 RX ADMIN — DILTIAZEM HYDROCHLORIDE 60 MG: 60 TABLET, FILM COATED ORAL at 11:12

## 2021-04-07 RX ADMIN — MELATONIN TAB 3 MG 6 MG: 3 TAB at 21:11

## 2021-04-07 NOTE — ASSESSMENT & PLAN NOTE
Wt Readings from Last 3 Encounters:   04/07/21 66 7 kg (147 lb 0 8 oz)   03/30/21 63 3 kg (139 lb 8 oz)   03/24/21 66 2 kg (145 lb 15 1 oz)     · Concerning for acute diastolic heart failure as evidenced by BNP greater than 12,000, increase in weight  · Did receive gentle IV fluids initially on admission for acute kidney injury  · Patient is typically maintained on Lasix 60 mg daily at home  · Cardiology following,  · P o   Lasix decreased to 40 mg daily  · Patient placed on D5W by Nephrology in setting of worsening hypernatremia  · Echo with EF 55%, G2 DD  · Obtain daily weights, accurate I&Os, low sodium diet

## 2021-04-07 NOTE — PROGRESS NOTES
Cardiology Progress Note - Ej Cuenca 77 y o  female MRN: [id number]  Unit/Bed#: -01 Encounter: [id number]      Assessment/Plan:  1  Paroxysmal atrial fibrillation  - likely precipitated by UTI/bacteremia  - EKG 4/2 - Afib with 2:1 heart block, incomplete right bundle-branch block, ST elevation in inferior leads  - EKG ordered for this AM - pending  - troponins negative  - review of telemetry today - ST/SA, PVCs, heart rates in the 90s -100s  - not on anticoagulation outpatient due to fall risk, thrombocytopenia, and dementia  - continue metoprolol 25 mg b i d  and Cardizem 60 mg Q 6 hours for rate control  - continue monitor on telemetry     2  Acute diastolic HFrEF  - echo - EF 55%, no regional wall motion abnormalities grade 2 diastolic dysfunction, mild MR and MV stenosis, moderate to severe TR, mildly calcified AV  - BNP 12,194 on admission  - weight on admission 156 lb; 147 lb this morning  - continue Lasix 40 mg daily  - plan to add ACE/ARB when able  - daily weights  - strict I's and O's  - salt and fluid restriction     3  CESAR superimposed on CKD stage 3  - creat on admission 2 09 > 1 28 yesterday, no AM labs available  - nephrology following and managing  - avoid nephrotoxic agents   - monitor urine output     4  Hypertension  - well controlled  - current blood pressure 111/73  - continue lopressor as blood pressure tolerates  - continue to monitor closely     5  Hepatic encephalopathy  - likely secondary to UTI/bacteremia   - blood cultures 1/2 on 04/01 positive for Staph coagulase negative and Gram-positive cocci in clusters; repeat cultures show no growth at 72 hours  - urine culture positive for Klebsiella  - on IV antibiotics  - management per primary team     Subjective:   Patient seen and examined  No significant events overnight  Patient remains confused, but does not complain of any chest pain or shortness of breath       Objective:   Vitals: Blood pressure 111/73, pulse (!) 107, temperature (!) 97 3 °F (36 3 °C), temperature source Oral, resp   rate 16, height 5' 2" (1 575 m), weight 66 7 kg (147 lb 0 8 oz), SpO2 97 %, not currently breastfeeding , Body mass index is 26 9 kg/m² ,   Orthostatic Blood Pressures      Most Recent Value   Blood Pressure  111/73 filed at 04/07/2021 1110   Patient Position - Orthostatic VS  Lying filed at 04/07/2021 1110          Intake/Output Summary (Last 24 hours) at 4/7/2021 1116  Last data filed at 4/6/2021 1635  Gross per 24 hour   Intake 840 ml   Output --   Net 840 ml     Physical Exam:  GEN: Simon Pizarro appears chronically ill, alert and oriented to self, confused  HEENT: pupils equal, round, and reactive to light; extraocular muscles intact  NECK: supple, no carotid bruits   HEART: regular to irregular rhythm, tachycardic, normal S1 and S2, no murmurs, clicks, gallops or rubs   LUNGS: clear to auscultation bilaterally; no wheezes, rales, or rhonchi   ABDOMEN: normal bowel sounds, soft, no tenderness, distended, firm  EXTREMITIES: peripheral pulses normal; no clubbing, cyanosis, or edema    Medications:    Current Facility-Administered Medications:     cefTRIAXone (ROCEPHIN) 1,000 mg in dextrose 5 % 50 mL IVPB, 1,000 mg, Intravenous, Q24H, Dougie Ruiz DO, Last Rate: 100 mL/hr at 04/06/21 1732, 1,000 mg at 04/06/21 1732    dextrose 5 % infusion, 75 mL/hr, Intravenous, Continuous, Leah Hampton MD, Last Rate: 75 mL/hr at 04/06/21 1608, 75 mL/hr at 04/06/21 1608    diltiazem (CARDIZEM) tablet 60 mg, 60 mg, Oral, Q6H [address], Thais Montero MD, 60 mg at 01/73/90 4755    folic acid (FOLVITE) tablet 1,000 mcg, 1,000 mcg, Oral, Daily, Dougie Ruiz DO, 1,000 mcg at 04/07/21 0904    [START ON 4/8/2021] furosemide (LASIX) tablet 40 mg, 40 mg, Oral, Daily, MEREDITH Ivy    lactulose oral solution 20 g, 20 g, Oral, TID, Julieta Lo PA-C, 20 g at 04/07/21 0903    magnesium oxide (MAG-OX) tablet 400 mg, 400 mg, Oral, BID, Otilia Carey DO, 400 mg at 04/07/21 0908    melatonin tablet 6 mg, 6 mg, Oral, HS, Dougie Joseph, DO, 6 mg at 04/06/21 2149    metoprolol (LOPRESSOR) injection 2 5 mg, 2 5 mg, Intravenous, Q6H PRN, Royer Graciaing, DO, 2 5 mg at 04/03/21 1408    metoprolol tartrate (LOPRESSOR) tablet 25 mg, 25 mg, Oral, Q12H [address], Cathy Dunlap MD, 25 mg at 04/07/21 0904    mirtazapine (REMERON) tablet 15 mg, 15 mg, Oral, HS, Dougienikia Ruiz, DO, 15 mg at 04/06/21 2152    nicotine (NICODERM CQ) 7 mg/24hr TD 24 hr patch 1 patch, 1 patch, Transdermal, Daily, Dougie Ruiz, DO, 1 patch at 04/07/21 0908    nystatin (MYCOSTATIN) powder, , Topical, BID, Royer Catching, DO, Given at 04/07/21 0908    pantoprazole (PROTONIX) EC tablet 40 mg, 40 mg, Oral, QAM, Dougie Johnsonng, DO, 40 mg at 04/06/21 0658    rifaximin (XIFAXAN) tablet 550 mg, 550 mg, Oral, Q12H [address], Abhishek Hernandez PA-C, 550 mg at 04/07/21 0908    thiamine tablet 100 mg, 100 mg, Oral, Daily, Dougie Ruiz, DO, 100 mg at 04/07/21 0904     Labs & Results:  Results from last 7 days   Lab Units 04/01/21  1400   TROPONIN I ng/mL <0 02     Results from last 7 days   Lab Units 04/06/21  0702 04/05/21  0438 04/04/21  0449   WBC Thousand/uL 4 67 3 92* 4 06*   HEMOGLOBIN g/dL 7 7* 7 6* 7 6*   HEMATOCRIT % 24 9* 25 4* 25 1*   PLATELETS Thousands/uL 40* 36* 35*         Results from last 7 days   Lab Units 04/06/21  0702 04/05/21  0438 04/04/21  0449  04/02/21  0549 04/01/21  1400   POTASSIUM mmol/L 4 4 4 8 4 7   < > 4 5 4 9   CHLORIDE mmol/L 112* 113* 108   < > 108 105   CO2 mmol/L 28 26 27   < > 23 25   BUN mg/dL 31* 32* 32*   < > 38* 42*   CREATININE mg/dL 1 28 1 33* 1 35*   < > 1 61* 2 09*   CALCIUM mg/dL 9 3 9 2 9 0   < > 9 4 9 0   ALK PHOS U/L 284*  --   --   --  248* 251*   ALT U/L 25  --   --   --  18 24   AST U/L 37  --   --   --  34 41    < > = values in this interval not displayed       Results from last 7 days   Lab Units 04/06/21  0702 04/02/21  0549 04/01/21  1400   INR  1 23* 1 21* 1 18 PTT seconds  --   --  34     Results from last 7 days   Lab Units 04/01/21  1400   MAGNESIUM mg/dL 1 7

## 2021-04-07 NOTE — ASSESSMENT & PLAN NOTE
· Patient presented with acute encephalopathy, UA positive  · Urine culture with Klebsiella, sensitive to ceftriaxone  · Continue IV ceftriaxone, day 6/7  · Unable to determine if patient was having urinary symptoms secondary to her baseline dementia  · Monitor closely

## 2021-04-07 NOTE — PROGRESS NOTES
NEPHROLOGY PROGRESS NOTE    Patient: Jennifer Gonzáles               Sex: female          DOA: 4/1/2021  1:10 PM   YOB: 1945        Age:  77 y o         LOS:  LOS: 6 days       HPI     Patient is CKD stage 3    SUBJECTIVE     Patient remained confused    Does not appear in distress    No chest pain no palpitation    CURRENT MEDICATIONS       Current Facility-Administered Medications:     cefTRIAXone (ROCEPHIN) 1,000 mg in dextrose 5 % 50 mL IVPB, 1,000 mg, Intravenous, Q24H, Dougie Ruiz DO, Last Rate: 100 mL/hr at 04/06/21 1732, 1,000 mg at 04/06/21 1732    dextrose 5 % infusion, 75 mL/hr, Intravenous, Continuous, Keenan Ambriz MD, Last Rate: 75 mL/hr at 04/06/21 1608, 75 mL/hr at 04/06/21 1608    diltiazem (CARDIZEM) tablet 60 mg, 60 mg, Oral, Q6H [address], Cathy Dunlap MD, 60 mg at 77/22/44 6641    folic acid (FOLVITE) tablet 1,000 mcg, 1,000 mcg, Oral, Daily, Dougie Ruiz DO, 1,000 mcg at 04/07/21 0904    [START ON 4/8/2021] furosemide (LASIX) tablet 40 mg, 40 mg, Oral, Daily, MEREDITH Jacobo    lactulose oral solution 20 g, 20 g, Oral, TID, Julieta Lo PA-C, 20 g at 04/07/21 0903    magnesium oxide (MAG-OX) tablet 400 mg, 400 mg, Oral, BID, Dougie Ruiz DO, 400 mg at 04/07/21 0908    melatonin tablet 6 mg, 6 mg, Oral, HS, Dougie Ruiz DO, 6 mg at 04/06/21 2149    metoprolol (LOPRESSOR) injection 2 5 mg, 2 5 mg, Intravenous, Q6H PRN, Dougie Ruiz DO, 2 5 mg at 04/03/21 1408    metoprolol tartrate (LOPRESSOR) tablet 25 mg, 25 mg, Oral, Q12H [address], Cathy Dunlap MD, 25 mg at 04/07/21 0904    mirtazapine (REMERON) tablet 15 mg, 15 mg, Oral, HS, Dougie Ruiz DO, 15 mg at 04/06/21 2152    nicotine (NICODERM CQ) 7 mg/24hr TD 24 hr patch 1 patch, 1 patch, Transdermal, Daily, Dougie Ruiz DO, 1 patch at 04/07/21 0908    nystatin (MYCOSTATIN) powder, , Topical, BID, Dougie Ruiz DO, Given at 04/07/21 0908    pantoprazole (PROTONIX) EC tablet 40 mg, 40 mg, Oral, QAM, Julissa Trujillo, , 40 mg at 04/06/21 0658    rifaximin (XIFAXAN) tablet 550 mg, 550 mg, Oral, Q12H [facility], Debra Fuentes PA-C, 550 mg at 04/07/21 0908    thiamine tablet 100 mg, 100 mg, Oral, Daily, Dougie Ruiz DO, 100 mg at 04/07/21 0904    OBJECTIVE     Current Weight: Weight - Scale: 66 7 kg (147 lb 0 8 oz)  Vitals:    04/07/21 1110   BP: 111/73   Pulse: (!) 107   Resp: 16   Temp: (!) 97 3 °F (36 3 °C)   SpO2: 97%       Intake/Output Summary (Last 24 hours) at 4/7/2021 1454  Last data filed at 4/7/2021 1354  Gross per 24 hour   Intake 778 ml   Output --   Net 778 ml       PHYSICAL EXAMINATION     Physical Exam  Constitutional:       General: She is not in acute distress  Appearance: She is well-developed  HENT:      Head: Normocephalic  Eyes:      General: No scleral icterus  Conjunctiva/sclera: Conjunctivae normal    Neck:      Musculoskeletal: Neck supple  Vascular: No JVD  Cardiovascular:      Rate and Rhythm: Normal rate  Heart sounds: Normal heart sounds  Pulmonary:      Effort: Pulmonary effort is normal       Breath sounds: No wheezing  Abdominal:      Palpations: Abdomen is soft  Tenderness: There is no abdominal tenderness  Musculoskeletal: Normal range of motion  Skin:     General: Skin is warm  Findings: No rash  Neurological:      Mental Status: She is alert and oriented to person, place, and time     Psychiatric:         Behavior: Behavior normal           LAB RESULTS     Results from last 7 days   Lab Units 04/06/21  0702 04/05/21  0438 04/04/21  0449 04/03/21  0704 04/02/21  0549 04/01/21  1400   WBC Thousand/uL 4 67 3 92* 4 06* 4 07* 4 60 4 44   HEMOGLOBIN g/dL 7 7* 7 6* 7 6* 7 6* 7 9* 8 2*   HEMATOCRIT % 24 9* 25 4* 25 1* 25 7* 25 6* 26 2*   PLATELETS Thousands/uL 40* 36* 35* 36* 36* 38*   POTASSIUM mmol/L 4 4 4 8 4 7 4 3 4 5 4 9   CHLORIDE mmol/L 112* 113* 108 113* 108 105   CO2 mmol/L 28 26 27 26 23 25   BUN mg/dL 31* 32* 32* 37* 38* 42* CREATININE mg/dL 1 28 1 33* 1 35* 1 41* 1 61* 2 09*   EGFR ml/min/1 73sq m 48 45 45 42 36 26   CALCIUM mg/dL 9 3 9 2 9 0 9 2 9 4 9 0   MAGNESIUM mg/dL  --   --   --   --   --  1 7   PHOSPHORUS mg/dL 2 9  --   --   --   --   --        RADIOLOGY RESULTS      Results for orders placed during the hospital encounter of 04/01/21   XR chest portable    Narrative CHEST     INDICATION:   AMS, fever  COMPARISON:  3/24/2021    EXAM PERFORMED/VIEWS:  XR CHEST PORTABLE      FINDINGS:    Heart shadow is enlarged but unchanged from prior exam   Atherosclerotic aorta  Persistent mild congestive changes with right mid to lower lung airspace opacity and increased moderate to large pleural effusion  Osseous structures appear within normal limits for patient age  Impression Persistent mild congestive changes with right mid to lower lung airspace opacity and increased moderate to large pleural effusion  Workstation performed: [id number]       Results for orders placed during the hospital encounter of 01/28/20   XR chest pa & lateral    Narrative CHEST     INDICATION: Follow-up congestive heart failure    COMPARISON:  1/30/2020    EXAM PERFORMED/VIEWS:  XR CHEST PA & LATERAL      FINDINGS:    Heart shadow is enlarged but unchanged from prior exam     There continues to be mild vascular congestion though interstitial edema has improved  There are small pleural effusions  Osseous structures appear within normal limits for patient age  Impression Improved congestive heart failure with mild vascular congestion and small effusions  Workstation performed: [id number]       Results for orders placed during the hospital encounter of 08/29/20   CT chest wo contrast    Narrative CT CHEST WITHOUT IV CONTRAST    INDICATION:   Respiratory illness, acute (Age => 41y)      COMPARISON:  May 6, 2020    TECHNIQUE: CT examination of the chest was performed without intravenous contrast   Axial, sagittal, and coronal 2D reformatted images were created from the source data and submitted for interpretation  Radiation dose length product (DLP) for this visit:  308 mGy-cm   This examination, like all CT scans performed in the [facility], was performed utilizing techniques to minimize radiation dose exposure, including the use of iterative   reconstruction and automated exposure control  FINDINGS:    LUNGS:  Collapse consolidation noted within the right lower lobe  Lobular areas of groundglass density noted in the subpleural region in the left lingular area, in the right middle lobe area  The trachea and central bronchial patent    PLEURA:  Moderate sized right effusion seen    HEART/GREAT VESSELS:  Cardiomegaly seen  Coronary artery calcification seen  Mitral annular calcification seen  The ascending aorta measures 3 4 cm      MEDIASTINUM AND MATT:  No significant mediastinal lymph node enlargement seen      CHEST WALL AND LOWER NECK:   A right thyroid nodule seen which measures 1 4 cm, unchanged from the previous study    VISUALIZED STRUCTURES IN THE UPPER ABDOMEN:  Nodular contour of the liver compatible with cirrhosis  Ascites seen  Enlarged IVC seen with spleen appear unremarkable  Adrenal gland appear unremarkable    OSSEOUS STRUCTURES:  Degenerative changes are seen within the thoracic spine      Impression Moderate right effusion     Atelectasis/consolidation right lower lung may be due to compressive atelectasis however underlying infiltrates are difficult to exclude    Lobular areas of groundglass density in the left upper lobe, right middle lobe, lingular region may be due to infiltrate which could be atypical or viral or drug induced these are may be related to areas of residual congestion    Cirrhosis    Enlarged IVC with enlarged right atrium right and right ventricle, correlate with tricuspid regurgitation, right heart failure    Follow-up suggested in 6-8 weeks to demonstrate resolution  The study was marked in EPIC for significant notification  Workstation performed: [id number]       No results found for this or any previous visit  Results for orders placed during the hospital encounter of 04/01/21   CT abdomen pelvis wo contrast    Narrative CT ABDOMEN AND PELVIS WITHOUT IV CONTRAST    INDICATION:   Abdominal distension  abdominal pain and distention, alcoholic cirrhosis  COMPARISON:  CT dated 12/18/2020    TECHNIQUE:  CT examination of the abdomen and pelvis was performed without intravenous contrast   Axial, sagittal, and coronal 2D reformatted images were created from the source data and submitted for interpretation  Radiation dose length product (DLP) for this visit:  577 mGy-cm   This examination, like all CT scans performed in the [facility], was performed utilizing techniques to minimize radiation dose exposure, including the use of iterative   reconstruction and automated exposure control  Enteric contrast was not administered  FINDINGS:    ABDOMEN    LOWER CHEST:  Stable right pleural effusion with right middle and right lower lobe compressive atelectasis  Stable cardiomegaly  Coronary artery and mitral annular calcifications  LIVER/BILIARY TREE:  The liver demonstrates cirrhotic morphology  Within the limitations of this examination there is no evidence of suspicious hepatic mass  No biliary dilatation  GALLBLADDER:  There are gallstone(s) within the gallbladder, without pericholecystic inflammatory changes  SPLEEN:  Unremarkable  PANCREAS:  Unremarkable  ADRENAL GLANDS:  Unremarkable  KIDNEYS/URETERS:  Stable right renal cyst possibly with milk of calcium  No stones or hydronephrosis  STOMACH AND BOWEL:  Unremarkable  APPENDIX:  No findings to suggest appendicitis  ABDOMINOPELVIC CAVITY: Small amount of perihepatic ascites  VESSELS:  Atherosclerotic changes are present    No evidence of aneurysm  PELVIS    REPRODUCTIVE ORGANS:  Unremarkable for patient's age  URINARY BLADDER:  Unremarkable  ABDOMINAL WALL/INGUINAL REGIONS:  Unremarkable  OSSEOUS STRUCTURES:  Mild superior endplate compression at L1 with slight bony retropulsion that mildly indents the ventral thecal sac  No significant canal stenosis  Although new since 12/18/2020, this is favored to be subacute/chronic  Impression Cirrhosis  Small amount of perihepatic ascites which is decreased from prior  Mild L1 compression fracture favored to be subacute/chronic but new since 12/18/2020  Workstation performed: [id number]       No results found for this or any previous visit  PLAN / RECOMMENDATIONS      CKD stage 3:  Stable    Hypernatremia:  No new blood work today as patient is very difficult draw    Altered mental status:  Due to dementia    Will continue to monitor    Kishore Mcdowell MD  Nephrology  4/7/2021        Portions of the record may have been created with voice recognition software  Occasional wrong word or "sound a like" substitutions may have occurred due to the inherent limitations of voice recognition software  Read the chart carefully and recognize, using context, where substitutions have occurred

## 2021-04-07 NOTE — NURSING NOTE
Spoke with Dr Ankit Gamino regarding inability to obtain labs, ok for now  Spoke with leni manuel NP about same, already three attempts made and patient is combative, and likewise a difficult stick  Will continue to monitor and try at a later time to obtain blood

## 2021-04-07 NOTE — ASSESSMENT & PLAN NOTE
· Patient with 1/2 blood cultures positive for Staph coag-negative  · Likely contaminant, not true bacteremia as additional blood cultures are now been negative >72 hours  · No need for ID consultation at this time  · Urine culture growing Klebsiella, continue IV ceftriaxone for underlying UTI

## 2021-04-07 NOTE — PROGRESS NOTES
[address] [facility]  Progress Note - Damon Ferreira 1945, 77 y o  female MRN: [id number]  Unit/Bed#: -Cathy Encounter: [id number]  Primary Care Provider: Deonte Cartagena MD   Date and time admitted to hospital: 4/1/2021  1:10 PM    * Acute encephalopathy  Assessment & Plan  · Patient presented with acute metabolic encephalopathy likely multifactorial in setting of UTI, hepatic encephalopathy  · Patient continues to improve, after speaking with the patient's sister who she resides with, patient is typically confused at baseline, appears to be close to baseline currently    · UA positive for nitrites, leukocytes and moderate bacteria  · Urine culture positive for Klebsiella, susceptible to ceftriaxone, continue (day 5/7)  · GI following for hepatic encephalopathy,  · Most recent ammonia level was 70  · Continue lactulose to 20 mg t i d  To titrate to goal of 2-3 bowel movements a day  · Rifaxan BID   · CT scan with minimal ascites, no indication for paracentesis at this time  · PT/OT recommending short-term rehab, however patient's family declining, requesting home with VNA services once medically stable for discharge    Acute kidney injury superimposed on chronic kidney disease ([facility] [address] )  Assessment & Plan  · POA, CESAR on CKD 3b likely secondary to poor intake and hypotension from underlying UTI  · Received IV fluid hydration in the ER, additional fluids are currently on hold  · Nephrology consult by Cardiology,  · Patient was placed on p o   Lasix 40 mg b i d , however due to hypernatremia this was changed to 40 mg daily  · Creatinine did improved to 1 28 today  · Baseline appears to be somewhere between 1 5-1 8    Results from last 7 days   Lab Units 04/06/21  0702 04/05/21  0438 04/04/21  0449 04/03/21  0704 04/02/21  0549 04/01/21  1400   BUN mg/dL 31* 32* 32* 37* 38* 42*   CREATININE mg/dL 1 28 1 33* 1 35* 1 41* 1 61* 2 09*   EGFR ml/min/1 73sq m 48 45 45 42 36 26       Urinary tract infection  Assessment & Plan  · Patient presented with acute encephalopathy, UA positive  · Urine culture with Klebsiella, sensitive to ceftriaxone  · Continue IV ceftriaxone, day 6/7  · Unable to determine if patient was having urinary symptoms secondary to her baseline dementia  · Monitor closely    Compression fracture of first lumbar vertebra [facility])  Assessment & Plan  · Incidental finding noted on CT scan  · Mild L1 compression fracture favored to be subacute/chronic, however new since prior study from 12/2020  · Patient denies any significant back pain, however does have underlying dementia  · Continue supportive care, p r n  Tylenol  · Recommend follow-up with orthopedics as an outpatient  · Likely secondary to patient's underlying multiple myeloma, nontraumatic, sister denies any recent falls or trauma    Hypernatremia  Assessment & Plan  · Sodium 151 today  · Nephrology following,  · Thought to be medication related in setting of lactulose causing volume depletion  · Started on IV D5W at 75 cc/hour  · Monitor BMP in the a m    · Lasix decreased to 40 mg daily as well    Bacteremia  Assessment & Plan  · Patient with 1/2 blood cultures positive for Staph coag-negative  · Likely contaminant, not true bacteremia as additional blood cultures are now been negative >72 hours  · No need for ID consultation at this time  · Urine culture growing Klebsiella, continue IV ceftriaxone for underlying UTI    Hypotension (arterial)  Assessment & Plan  · Patient with history of intermittent hypotension  · Likely acutely worsened in setting of persistent tachycardia  · Cardiology following,  · Continue Cardizem and Lopressor as outlined above with holding parameters as blood pressure tolerates  · Telemetry monitoring    Alcoholic cirrhosis (HCC)  Assessment & Plan  · Patient with history of alcoholic cirrhosis, quit drinking in 2019  · GI consulted,  · Patient with increased abdominal distension  · CT abdomen pelvis with cirrhosis, small amount of perihepatic ascites, decreased from prior  No indication for paracentesis at this time  · Trend CMP and INR daily  · Lactulose 20 mg t i d  With a goal of 2-3 bowel movements daily  · Xifaxan twice a day as well  · Currently on IV ceftriaxone for underlying UTI and SBP prophylaxis    Diastolic CHF (HCC)  Assessment & Plan  Wt Readings from Last 3 Encounters:   04/07/21 66 7 kg (147 lb 0 8 oz)   03/30/21 63 3 kg (139 lb 8 oz)   03/24/21 66 2 kg (145 lb 15 1 oz)     · Concerning for acute diastolic heart failure as evidenced by BNP greater than 12,000, increase in weight  · Did receive gentle IV fluids initially on admission for acute kidney injury  · Patient is typically maintained on Lasix 60 mg daily at home  · Cardiology following,  · P o  Lasix decreased to 40 mg daily  · Patient placed on D5W by Nephrology in setting of worsening hypernatremia  · Echo with EF 55%, G2 DD  · Obtain daily weights, accurate I&Os, low sodium diet     Dementia (HCC)  Assessment & Plan  · Patient appears to be at baseline today per discussion with sister who she lives with  · Dementia without behavioral disturbance  · Continue mirtazapine 50 mg daily and melatonin 6 mg daily  · Reportedly had adverse side effects with quetiapine and olanzapine with increased agitation  · Currently in soft limb upper restraints for safety    Pancytopenia ([facility] [address] )  Assessment & Plan  · WBC improved to 4 67 today, platelets improved to 40, hemoglobin improved to 7 7   · Chronic on review, likely secondary to underlying cirrhosis and multiple myeloma  · No active bleeding currently   · Patient's sister reports that the patient undergoes blood transfusions every 2 weeks, she reports that the patient is due on Wednesday, is requesting her to get blood transfusion here    Will continue to monitor CBC, if continued down trending of hemoglobin would give 1 unit packed red blood cells tomorrow as previously scheduled to keep hemoglobin above 7  · Monitor CBC closely     Results from last 7 days   Lab Units 04/06/21  0702 04/05/21  0438 04/04/21  0449 04/03/21  0704 04/02/21  0549 04/01/21  1400   PLATELETS Thousands/uL 40* 36* 35* 36* 36* 38*       Multiple myeloma (HCC)  Assessment & Plan  · Patient with history of IgG kappa multiple myeloma  · Follows with Hematology/Oncology as an outpatient  · Currently patient is not maintained on any anti myeloma therapy, it was strongly recommended by Oncology per review of notes for referral for palliative care, hospice had previously been declined by patient's family  · Likely cause for her compression fracture noted on CT scan, patient without any trauma  · Continue supportive care    Paroxysmal atrial fibrillation ([address] )  Assessment & Plan  · Patient has had persistent tachycardia here in the hospital, heart rate in the 120s on review today  · Cardiology following,  · EKG from 04/02 with AFib with 2-1 heart block, incomplete RBBB  · Continue Lopressor 25 mg b i d  · Cardizem increased to 60 mg Q6H today for better HR control, HR continues to be in the 120s today   · Echo from yesterday showed EF of 35%, grade 2 diastolic dysfunction  · Patient not maintained on anticoagulation as an outpatient secondary to thrombocytopenia, dementia and fall risk         VTE Pharmacologic Prophylaxis:   Pharmacologic: Pharmacologic VTE Prophylaxis contraindicated due to Thrombocytopenia  Mechanical VTE Prophylaxis in Place: Yes    Patient Centered Rounds: I have performed bedside rounds with nursing staff today  Discussions with Specialists or Other Care Team Provider:  Discussed plan of care with case management primary RN     Education and Discussions with Family / Patient: Discussed plan of care with patient and family denies any additional questions or concerns at this time    Time Spent for Care: 20 minutes  More than 50% of total time spent on counseling and coordination of care as described above      Current Length of Stay: 6 day(s)    Current Patient Status: Inpatient   Certification Statement: The patient will continue to require additional inpatient hospital stay due to Monitoring of hypernatremia and kidney function and heart rate    Discharge Plan:  24-48 hours pending improvement of heart rate kidney function sodium    Code Status: Level 1 - Full Code      Subjective:   Offers no complaints resting comfortably in bed appears to be in no distress    Objective:     Vitals:   Temp (24hrs), Av 4 °F (36 3 °C), Min:97 3 °F (36 3 °C), Max:97 5 °F (36 4 °C)    Temp:  [97 3 °F (36 3 °C)-97 5 °F (36 4 °C)] 97 5 °F (36 4 °C)  HR:  [] 90  Resp:  [16-21] 16  BP: ()/(64-80) 120/77  Body mass index is 26 9 kg/m²  Input and Output Summary (last 24 hours): Intake/Output Summary (Last 24 hours) at 2021 1029  Last data filed at 2021 1635  Gross per 24 hour   Intake 840 ml   Output --   Net 840 ml       Physical Exam:     Physical Exam  Vitals signs and nursing note reviewed  Cardiovascular:      Rate and Rhythm: Normal rate  Pulses: Normal pulses  Pulmonary:      Effort: Pulmonary effort is normal    Abdominal:      Palpations: Abdomen is soft  Musculoskeletal: Normal range of motion  Skin:     General: Skin is warm and dry  Neurological:      Mental Status: She is alert  Mental status is at baseline     Psychiatric:         Mood and Affect: Mood normal        Additional Data:     Labs:    Results from last 7 days   Lab Units 21  0702   WBC Thousand/uL 4 67   HEMOGLOBIN g/dL 7 7*   HEMATOCRIT % 24 9*   PLATELETS Thousands/uL 40*   BANDS PCT % 4   LYMPHO PCT % 19   MONO PCT % 15*   EOS PCT % 1     Results from last 7 days   Lab Units 21  0702   SODIUM mmol/L 151*   POTASSIUM mmol/L 4 4   CHLORIDE mmol/L 112*   CO2 mmol/L 28   BUN mg/dL 31*   CREATININE mg/dL 1 28   ANION GAP mmol/L 11   CALCIUM mg/dL 9 3   ALBUMIN g/dL 3 0*   TOTAL BILIRUBIN mg/dL 1 42*   ALK PHOS U/L 284* ALT U/L 25   AST U/L 37   GLUCOSE RANDOM mg/dL 116     Results from last 7 days   Lab Units 04/06/21  0702   INR  1 23*             Results from last 7 days   Lab Units 04/02/21  0549 04/01/21  1400   LACTIC ACID mmol/L  --  1 8   PROCALCITONIN ng/ml 0 87* 1 17*           * I Have Reviewed All Lab Data Listed Above  * Additional Pertinent Lab Tests Reviewed: [address] Admission Reviewed  Recent Cultures (last 7 days):     Results from last 7 days   Lab Units 04/02/21  2239 04/01/21  1610 04/01/21  1426 04/01/21  1400   BLOOD CULTURE  No Growth at 72 hrs  No Growth at 72 hrs  No Growth After 5 Days    --  Staphylococcus coagulase negative*   GRAM STAIN RESULT   --   --   --  Gram positive cocci in clusters*   URINE CULTURE   --   --  >100,000 cfu/ml Klebsiella variicola*  10,000-19,000 cfu/ml   --        Last 24 Hours Medication List:   Current Facility-Administered Medications   Medication Dose Route Frequency Provider Last Rate    cefTRIAXone  1,000 mg Intravenous Q24H Dougie Ruiz, DO 1,000 mg (04/06/21 1732)    dextrose  75 mL/hr Intravenous Continuous Jennifer Hodge MD 75 mL/hr (04/06/21 1608)    diltiazem  60 mg Oral Q6H [facility] Annie Vázquez MD      folic acid  3,395 mcg Oral Daily Davis Player, DO      [START ON 4/8/2021] furosemide  40 mg Oral Daily MEREDITH Linares      lactulose  20 g Oral TID Melissa Mosqueda PA-C      magnesium oxide  400 mg Oral BID Davis Player, DO      melatonin  6 mg Oral HS Dougie Ruiz, DO      metoprolol  2 5 mg Intravenous Q6H PRN Davis Player, DO      metoprolol tartrate  25 mg Oral Q12H [facility] Annie Vázquez MD      mirtazapine  15 mg Oral HS Davis Player, DO      nicotine  1 patch Transdermal Daily Torey Player, DO      nystatin   Topical BID Dougie Ruiz, DO      pantoprazole  40 mg Oral QAM Dougie Ruiz, DO      rifaximin  550 mg Oral Q12H [facility] Melissa Mosqueda PA-C      vitamin B-1  100 mg Oral Daily Torey Player, DO Today, Patient Was Seen By: MEREDITH Saldana    ** Please Note: Dictation voice to text software may have been used in the creation of this document   **

## 2021-04-07 NOTE — NURSING NOTE
Pt with leaking IV, change due  Pt at this time refusing to have IV inserted, and likewise refusing to have labswork drawn, pt getting increasingly aggitated  will try again at a later time

## 2021-04-07 NOTE — ASSESSMENT & PLAN NOTE
· Patient has had persistent tachycardia here in the hospital, heart rate in the 120s on review today  · Cardiology following,  · EKG from 04/02 with AFib with 2-1 heart block, incomplete RBBB  · Continue Lopressor 25 mg b i d     · Cardizem increased to 60 mg Q6H today for better HR control, HR continues to be in the 120s today   · Echo from yesterday showed EF of 16%, grade 2 diastolic dysfunction  · Patient not maintained on anticoagulation as an outpatient secondary to thrombocytopenia, dementia and fall risk

## 2021-04-07 NOTE — CASE MANAGEMENT
Cm called patient's son to review IMM and DCP  Son agreeable to having patient return home tomorrow with [facility]  Son reported understanding of medicare rights  Cm placed the IMM in medical records  Cm department will continue to follow patient through discharge

## 2021-04-08 LAB
ANION GAP SERPL CALCULATED.3IONS-SCNC: 6 MMOL/L (ref 4–13)
BACTERIA BLD CULT: NORMAL
BACTERIA BLD CULT: NORMAL
BASOPHILS # BLD MANUAL: 0.08 THOUSAND/UL (ref 0–0.1)
BASOPHILS NFR MAR MANUAL: 2 % (ref 0–1)
BUN SERPL-MCNC: 28 MG/DL (ref 5–25)
CALCIUM SERPL-MCNC: 8.9 MG/DL (ref 8.3–10.1)
CHLORIDE SERPL-SCNC: 107 MMOL/L (ref 100–108)
CO2 SERPL-SCNC: 27 MMOL/L (ref 21–32)
CREAT SERPL-MCNC: 1.44 MG/DL (ref 0.6–1.3)
EOSINOPHIL # BLD MANUAL: 0 THOUSAND/UL (ref 0–0.4)
EOSINOPHIL NFR BLD MANUAL: 0 % (ref 0–6)
ERYTHROCYTE [DISTWIDTH] IN BLOOD BY AUTOMATED COUNT: 21.9 % (ref 11.6–15.1)
GFR SERPL CREATININE-BSD FRML MDRD: 41 ML/MIN/1.73SQ M
GLUCOSE SERPL-MCNC: 126 MG/DL (ref 65–140)
HCT VFR BLD AUTO: 23.8 % (ref 34.8–46.1)
HGB BLD-MCNC: 7.2 G/DL (ref 11.5–15.4)
HYPERCHROMIA BLD QL SMEAR: PRESENT
LG PLATELETS BLD QL SMEAR: PRESENT
LYMPHOCYTES # BLD AUTO: 1.01 THOUSAND/UL (ref 0.6–4.47)
LYMPHOCYTES # BLD AUTO: 25 % (ref 14–44)
MCH RBC QN AUTO: 28 PG (ref 26.8–34.3)
MCHC RBC AUTO-ENTMCNC: 30.3 G/DL (ref 31.4–37.4)
MCV RBC AUTO: 93 FL (ref 82–98)
MONOCYTES # BLD AUTO: 0.44 THOUSAND/UL (ref 0–1.22)
MONOCYTES NFR BLD: 11 % (ref 4–12)
MYELOCYTES NFR BLD MANUAL: 2 % (ref 0–1)
NEUTROPHILS # BLD MANUAL: 2.41 THOUSAND/UL (ref 1.85–7.62)
NEUTS BAND NFR BLD MANUAL: 2 % (ref 0–8)
NEUTS SEG NFR BLD AUTO: 58 % (ref 43–75)
NRBC BLD AUTO-RTO: 10 /100 WBC (ref 0–2)
NRBC BLD AUTO-RTO: 11 /100 WBCS
OVALOCYTES BLD QL SMEAR: PRESENT
PLATELET # BLD AUTO: 39 THOUSANDS/UL (ref 149–390)
PLATELET BLD QL SMEAR: ABNORMAL
POLYCHROMASIA BLD QL SMEAR: PRESENT
POTASSIUM SERPL-SCNC: 4.7 MMOL/L (ref 3.5–5.3)
RBC # BLD AUTO: 2.57 MILLION/UL (ref 3.81–5.12)
SODIUM SERPL-SCNC: 140 MMOL/L (ref 136–145)
TARGETS BLD QL SMEAR: PRESENT
TOTAL CELLS COUNTED SPEC: 100
WBC # BLD AUTO: 4.02 THOUSAND/UL (ref 4.31–10.16)

## 2021-04-08 PROCEDURE — 85007 BL SMEAR W/DIFF WBC COUNT: CPT | Performed by: INTERNAL MEDICINE

## 2021-04-08 PROCEDURE — 99232 SBSQ HOSP IP/OBS MODERATE 35: CPT | Performed by: INTERNAL MEDICINE

## 2021-04-08 PROCEDURE — 85027 COMPLETE CBC AUTOMATED: CPT | Performed by: INTERNAL MEDICINE

## 2021-04-08 PROCEDURE — 30233N1 TRANSFUSION OF NONAUTOLOGOUS RED BLOOD CELLS INTO PERIPHERAL VEIN, PERCUTANEOUS APPROACH: ICD-10-PCS | Performed by: STUDENT IN AN ORGANIZED HEALTH CARE EDUCATION/TRAINING PROGRAM

## 2021-04-08 PROCEDURE — 99232 SBSQ HOSP IP/OBS MODERATE 35: CPT | Performed by: NURSE PRACTITIONER

## 2021-04-08 PROCEDURE — P9016 RBC LEUKOCYTES REDUCED: HCPCS

## 2021-04-08 PROCEDURE — 80048 BASIC METABOLIC PNL TOTAL CA: CPT | Performed by: INTERNAL MEDICINE

## 2021-04-08 RX ORDER — HYDROXYZINE HYDROCHLORIDE 25 MG/1
25 TABLET, FILM COATED ORAL EVERY 6 HOURS PRN
Status: DISCONTINUED | OUTPATIENT
Start: 2021-04-08 | End: 2021-04-09 | Stop reason: HOSPADM

## 2021-04-08 RX ORDER — LORAZEPAM 0.5 MG/1
0.5 TABLET ORAL ONCE AS NEEDED
Status: COMPLETED | OUTPATIENT
Start: 2021-04-08 | End: 2021-04-08

## 2021-04-08 RX ADMIN — FOLIC ACID 1000 MCG: 1 TABLET ORAL at 09:30

## 2021-04-08 RX ADMIN — NYSTATIN: 100000 POWDER TOPICAL at 18:06

## 2021-04-08 RX ADMIN — DILTIAZEM HYDROCHLORIDE 60 MG: 60 TABLET, FILM COATED ORAL at 17:29

## 2021-04-08 RX ADMIN — METOPROLOL TARTRATE 25 MG: 25 TABLET, FILM COATED ORAL at 17:30

## 2021-04-08 RX ADMIN — RIFAXIMIN 550 MG: 550 TABLET ORAL at 22:32

## 2021-04-08 RX ADMIN — DILTIAZEM HYDROCHLORIDE 60 MG: 60 TABLET, FILM COATED ORAL at 00:45

## 2021-04-08 RX ADMIN — THIAMINE HCL TAB 100 MG 100 MG: 100 TAB at 10:00

## 2021-04-08 RX ADMIN — RIFAXIMIN 550 MG: 550 TABLET ORAL at 10:00

## 2021-04-08 RX ADMIN — MELATONIN TAB 3 MG 6 MG: 3 TAB at 22:32

## 2021-04-08 RX ADMIN — LACTULOSE 20 G: 20 SOLUTION ORAL at 17:30

## 2021-04-08 RX ADMIN — DILTIAZEM HYDROCHLORIDE 60 MG: 60 TABLET, FILM COATED ORAL at 06:33

## 2021-04-08 RX ADMIN — NICOTINE 1 PATCH: 7 PATCH, EXTENDED RELEASE TRANSDERMAL at 09:31

## 2021-04-08 RX ADMIN — LACTULOSE 20 G: 20 SOLUTION ORAL at 09:30

## 2021-04-08 RX ADMIN — LACTULOSE 20 G: 20 SOLUTION ORAL at 22:32

## 2021-04-08 RX ADMIN — PANTOPRAZOLE SODIUM 40 MG: 40 TABLET, DELAYED RELEASE ORAL at 06:33

## 2021-04-08 RX ADMIN — MAGNESIUM OXIDE TAB 400 MG (241.3 MG ELEMENTAL MG) 400 MG: 400 (241.3 MG) TAB at 17:29

## 2021-04-08 RX ADMIN — LORAZEPAM 0.5 MG: 0.5 TABLET ORAL at 00:45

## 2021-04-08 RX ADMIN — DILTIAZEM HYDROCHLORIDE 60 MG: 60 TABLET, FILM COATED ORAL at 11:20

## 2021-04-08 RX ADMIN — NYSTATIN: 100000 POWDER TOPICAL at 09:30

## 2021-04-08 RX ADMIN — METOPROLOL TARTRATE 25 MG: 25 TABLET, FILM COATED ORAL at 09:30

## 2021-04-08 RX ADMIN — CEFTRIAXONE SODIUM 1000 MG: 10 INJECTION, POWDER, FOR SOLUTION INTRAVENOUS at 17:59

## 2021-04-08 RX ADMIN — MAGNESIUM OXIDE TAB 400 MG (241.3 MG ELEMENTAL MG) 400 MG: 400 (241.3 MG) TAB at 10:00

## 2021-04-08 RX ADMIN — MIRTAZAPINE 15 MG: 15 TABLET, FILM COATED ORAL at 22:17

## 2021-04-08 NOTE — ASSESSMENT & PLAN NOTE
· Patient with history of alcoholic cirrhosis, quit drinking in 2019  · GI signed off  · Patient with increased abdominal distension  · CT abdomen pelvis with cirrhosis, small amount of perihepatic ascites, decreased from prior  No indication for paracentesis at this time  · Trend CMP and INR daily  · Lactulose 20 mg t i d   With a goal of 2-3 bowel movements daily  · Xifaxan twice a day as well

## 2021-04-08 NOTE — ASSESSMENT & PLAN NOTE
· Patient presented with acute encephalopathy, UA positive  · Urine culture with Klebsiella, sensitive to ceftriaxone  · Continue IV ceftriaxone, day 7/7  · Unable to determine if patient was having urinary symptoms secondary to her baseline dementia

## 2021-04-08 NOTE — CASE MANAGEMENT
Per rounding with SLIM, patient is anticipated to be discharged in 24 hours  Patient is still tachycardic and will need a blood transfusion today  Cm informed [facility] of this discharge time frame via [employer]  Cm department will continue to follow patient through discharge

## 2021-04-08 NOTE — ASSESSMENT & PLAN NOTE
· POA, CESAR on CKD 3b likely secondary to poor intake and hypotension from underlying UTI  · Received IV fluid hydration in the ER, additional fluids are currently on hold  · Nephrology consult by Cardiology,  · Patient was placed on p o   Lasix 40 mg b i d , however due to hypernatremia this was changed to 40 mg daily  · Creatinine did improved to 1 28 today  · Baseline appears to be somewhere between 1 5-1 8    Results from last 7 days   Lab Units 04/08/21  0646 04/07/21  1642 04/06/21  0702 04/05/21  0438 04/04/21  0449 04/03/21  0704 04/02/21  0549 04/01/21  1400   BUN mg/dL 28* 29* 31* 32* 32* 37* 38* 42*   CREATININE mg/dL 1 44* 1 48* 1 28 1 33* 1 35* 1 41* 1 61* 2 09*   EGFR ml/min/1 73sq m 41 40 48 45 45 42 36 26

## 2021-04-08 NOTE — ASSESSMENT & PLAN NOTE
· Patient has had persistent tachycardia here in the hospital, heart rate in the 120s on review today  · Cardiology following,  · EKG from 04/02 with AFib with 2-1 heart block, incomplete RBBB  · Continue Lopressor 25 mg b i d     · Cardizem increased to 60 mg Q6H today for better HR control, HR intermittently improved but still having episodes in the 120s  · Echo from yesterday showed EF of 21%, grade 2 diastolic dysfunction  · Patient not maintained on anticoagulation as an outpatient secondary to thrombocytopenia, dementia and fall risk

## 2021-04-08 NOTE — MALNUTRITION/BMI
This medical record reflects one or more clinical indicators suggestive of malnutrition    Malnutrition Findings:   Adult Malnutrition type: Acute illness(Related to medical condition as evidenced by mild depletion of body fat (Orbit[employer]) and <50% energy intake needs met >5 days treated with diet and oral nutrition supplements)  Adult Degree of Malnutrition: Malnutrition of moderate degree  Malnutrition Characteristics: Inadequate energy, Fat loss        See Nutrition note dated 4/8/21 for additional details  Completed nutrition assessment is viewable in the nutrition documentation

## 2021-04-08 NOTE — PLAN OF CARE
Problem: SAFETY ADULT  Goal: Patient will remain free of falls  Description: INTERVENTIONS:  - Assess patient frequently for physical needs  -  Identify cognitive and physical deficits and behaviors that affect risk of falls  -  [place] fall precautions as indicated by assessment   - Educate patient/family on patient safety including physical limitations  - Instruct patient to call for assistance with activity based on assessment  - Modify environment to reduce risk of injury  - Consider OT/PT consult to assist with strengthening/mobility  Outcome: Progressing     Problem: DISCHARGE PLANNING  Goal: Discharge to home or other facility with appropriate resources  Description: INTERVENTIONS:  - Identify barriers to discharge w/patient and caregiver  - Arrange for needed discharge resources and transportation as appropriate  - Identify discharge learning needs (meds, wound care, etc )  - Arrange for interpretive services to assist at discharge as needed  - Refer to Case Management Department for coordinating discharge planning if the patient needs post-hospital services based on physician/advanced practitioner order or complex needs related to functional status, cognitive ability, or social support system  Outcome: Progressing     Problem: Knowledge Deficit  Goal: Patient/family/caregiver demonstrates understanding of disease process, treatment plan, medications, and discharge instructions  Description: Complete learning assessment and assess knowledge base  Interventions:  - Provide teaching at level of understanding  - Provide teaching via preferred learning methods  Outcome: Progressing     Problem: Potential for Falls  Goal: Patient will remain free of falls  Description: INTERVENTIONS:  - Assess patient frequently for physical needs  -  Identify cognitive and physical deficits and behaviors that affect risk of falls    -  [place] fall precautions as indicated by assessment   - Educate patient/family on patient safety including physical limitations  - Instruct patient to call for assistance with activity based on assessment  - Modify environment to reduce risk of injury  - Consider OT/PT consult to assist with strengthening/mobility  Outcome: Progressing     Problem: Prexisting or High Potential for Compromised Skin Integrity  Goal: Skin integrity is maintained or improved  Description: INTERVENTIONS:  - Identify patients at risk for skin breakdown  - Assess and monitor skin integrity  - Assess and monitor nutrition and hydration status  - Monitor labs   - Assess for incontinence   - Turn and reposition patient  - Assist with mobility/ambulation  - Relieve pressure over bony prominences  - Avoid friction and shearing  - Provide appropriate hygiene as needed including keeping skin clean and dry  - Evaluate need for skin moisturizer/barrier cream  - Collaborate with interdisciplinary team   - Patient/family teaching  - Consider wound care consult   Outcome: Progressing     Problem: Nutrition/Hydration-ADULT  Goal: Nutrient/Hydration intake appropriate for improving, restoring or maintaining nutritional needs  Description: Monitor and assess patient's nutrition/hydration status for malnutrition  Collaborate with interdisciplinary team and initiate plan and interventions as ordered  Monitor patient's weight and dietary intake as ordered or per policy  Utilize nutrition screening tool and intervene as necessary  Determine patient's food preferences and provide high-protein, high-caloric foods as appropriate       INTERVENTIONS:  - Monitor oral intake, urinary output, labs, and treatment plans  - Assess nutrition and hydration status and recommend course of action  - Evaluate amount of meals eaten  - Assist patient with eating if necessary   - Allow adequate time for meals  - Recommend/ encourage appropriate diets, oral nutritional supplements, and vitamin/mineral supplements  - Order, calculate, and assess calorie counts as needed  - Recommend, monitor, and adjust tube feedings and TPN/PPN based on assessed needs  - Assess need for intravenous fluids  - Provide specific nutrition/hydration education as appropriate  - Include patient/family/caregiver in decisions related to nutrition  Outcome: Progressing

## 2021-04-08 NOTE — PROGRESS NOTES
NEPHROLOGY PROGRESS NOTE    Patient: Burgess Lai               Sex: female          DOA: 4/1/2021  1:10 PM   YOB: 1945        Age:  77 y o         LOS:  LOS: 7 days       HPI     Patient with CKD and confusion    SUBJECTIVE     Remained confused which partly because of encephalopathy as well as dementia    No other acute complaint    Does not appear to be any distress    No shortness of breath    CURRENT MEDICATIONS       Current Facility-Administered Medications:     cefTRIAXone (ROCEPHIN) 1,000 mg in dextrose 5 % 50 mL IVPB, 1,000 mg, Intravenous, Q24H, Dougie Ruiz DO, Last Rate: 100 mL/hr at 04/07/21 1716, 1,000 mg at 04/07/21 1716    dextrose 5 % infusion, 75 mL/hr, Intravenous, Continuous, Edmundo Galvez MD, Last Rate: 75 mL/hr at 04/06/21 1608, 75 mL/hr at 04/06/21 1608    diltiazem (CARDIZEM) tablet 60 mg, 60 mg, Oral, Q6H [address], Kristy Kawasaki, MD, 60 mg at 02/42/91 0306    folic acid (FOLVITE) tablet 1,000 mcg, 1,000 mcg, Oral, Daily, Dougie Ruiz DO, 1,000 mcg at 04/08/21 0930    hydrOXYzine HCL (ATARAX) tablet 25 mg, 25 mg, Oral, Q6H PRN, MEREDITH Rodriguez    lactulose oral solution 20 g, 20 g, Oral, TID, Julieta Lo PA-C, 20 g at 04/08/21 0930    magnesium oxide (MAG-OX) tablet 400 mg, 400 mg, Oral, BID, Dougie Ruiz DO, 400 mg at 04/08/21 1000    melatonin tablet 6 mg, 6 mg, Oral, HS, Dougie Ruiz DO, 6 mg at 04/07/21 2111    metoprolol (LOPRESSOR) injection 2 5 mg, 2 5 mg, Intravenous, Q6H PRN, Jing Arriaza DO, 2 5 mg at 04/03/21 1408    metoprolol tartrate (LOPRESSOR) tablet 25 mg, 25 mg, Oral, TID, Gabi Rosa, CRNP    mirtazapine (REMERON) tablet 15 mg, 15 mg, Oral, HS, Dougie Ruiz DO, 15 mg at 04/07/21 2115    nicotine (NICODERM CQ) 7 mg/24hr TD 24 hr patch 1 patch, 1 patch, Transdermal, Daily, Dougie Ruiz DO, 1 patch at 04/08/21 0931    nystatin (MYCOSTATIN) powder, , Topical, BID, Dougie Ruiz DO, Given at 04/08/21 0930   pantoprazole (PROTONIX) EC tablet 40 mg, 40 mg, Oral, QAM, Dougie Joseph, DO, 40 mg at 04/08/21 9818    rifaximin (XIFAXAN) tablet 550 mg, 550 mg, Oral, Q12H [address], Shawnanda Soha FRANCISCA, 550 mg at 04/08/21 1000    thiamine tablet 100 mg, 100 mg, Oral, Daily, Dougie Ruiz, DO, 100 mg at 04/08/21 1000    OBJECTIVE     Current Weight: Weight - Scale: 63 5 kg (139 lb 15 9 oz)(pt is on bed rest)  Vitals:    04/08/21 1048   BP: 114/66   Pulse:    Resp:    Temp: 97 5 °F (36 4 °C)   SpO2:        Intake/Output Summary (Last 24 hours) at 4/8/2021 1106  Last data filed at 4/8/2021 0726  Gross per 24 hour   Intake 356 ml   Output --   Net 356 ml       PHYSICAL EXAMINATION     Physical Exam  Constitutional:       General: She is not in acute distress  Appearance: She is well-developed  HENT:      Head: Normocephalic  Eyes:      General: No scleral icterus  Conjunctiva/sclera: Conjunctivae normal    Neck:      Musculoskeletal: Neck supple  Vascular: No JVD  Cardiovascular:      Rate and Rhythm: Normal rate  Heart sounds: Normal heart sounds  Pulmonary:      Effort: Pulmonary effort is normal       Breath sounds: No wheezing  Abdominal:      Palpations: Abdomen is soft  Tenderness: There is no abdominal tenderness  Musculoskeletal: Normal range of motion  Skin:     General: Skin is warm  Findings: No rash  Neurological:      Mental Status: She is alert and oriented to person, place, and time     Psychiatric:         Behavior: Behavior normal           LAB RESULTS     Results from last 7 days   Lab Units 04/08/21  0646 04/07/21  1642 04/06/21  0702 04/05/21  0438 04/04/21  0449 04/03/21  0704 04/02/21  0549 04/01/21  1400   WBC Thousand/uL 4 02* 3 63* 4 67 3 92* 4 06* 4 07* 4 60 4 44   HEMOGLOBIN g/dL 7 2* 7 0* 7 7* 7 6* 7 6* 7 6* 7 9* 8 2*   HEMATOCRIT % 23 8* 23 2* 24 9* 25 4* 25 1* 25 7* 25 6* 26 2*   PLATELETS Thousands/uL 39* 40* 40* 36* 35* 36* 36* 38*   POTASSIUM mmol/L 4 7 4 8 4 4 4 8 4 7 4 3 4 5 4 9   CHLORIDE mmol/L 107 107 112* 113* 108 113* 108 105   CO2 mmol/L 27 28 28 26 27 26 23 25   BUN mg/dL 28* 29* 31* 32* 32* 37* 38* 42*   CREATININE mg/dL 1 44* 1 48* 1 28 1 33* 1 35* 1 41* 1 61* 2 09*   EGFR ml/min/1 73sq m 41 40 48 45 45 42 36 26   CALCIUM mg/dL 8 9 8 9 9 3 9 2 9 0 9 2 9 4 9 0   MAGNESIUM mg/dL  --   --   --   --   --   --   --  1 7   PHOSPHORUS mg/dL  --   --  2 9  --   --   --   --   --        RADIOLOGY RESULTS      Results for orders placed during the hospital encounter of 04/01/21   XR chest portable    Narrative CHEST     INDICATION:   AMS, fever  COMPARISON:  3/24/2021    EXAM PERFORMED/VIEWS:  XR CHEST PORTABLE      FINDINGS:    Heart shadow is enlarged but unchanged from prior exam   Atherosclerotic aorta  Persistent mild congestive changes with right mid to lower lung airspace opacity and increased moderate to large pleural effusion  Osseous structures appear within normal limits for patient age  Impression Persistent mild congestive changes with right mid to lower lung airspace opacity and increased moderate to large pleural effusion  Workstation performed: [id number]       Results for orders placed during the hospital encounter of 01/28/20   XR chest pa & lateral    Narrative CHEST     INDICATION: Follow-up congestive heart failure    COMPARISON:  1/30/2020    EXAM PERFORMED/VIEWS:  XR CHEST PA & LATERAL      FINDINGS:    Heart shadow is enlarged but unchanged from prior exam     There continues to be mild vascular congestion though interstitial edema has improved  There are small pleural effusions  Osseous structures appear within normal limits for patient age  Impression Improved congestive heart failure with mild vascular congestion and small effusions          Workstation performed: [id number]       Results for orders placed during the hospital encounter of 08/29/20   CT chest wo contrast    Narrative CT CHEST WITHOUT IV CONTRAST    INDICATION:   Respiratory illness, acute (Age => 41y)  COMPARISON:  May 6, 2020    TECHNIQUE: CT examination of the chest was performed without intravenous contrast   Axial, sagittal, and coronal 2D reformatted images were created from the source data and submitted for interpretation  Radiation dose length product (DLP) for this visit:  308 mGy-cm   This examination, like all CT scans performed in the [facility], was performed utilizing techniques to minimize radiation dose exposure, including the use of iterative   reconstruction and automated exposure control  FINDINGS:    LUNGS:  Collapse consolidation noted within the right lower lobe  Lobular areas of groundglass density noted in the subpleural region in the left lingular area, in the right middle lobe area  The trachea and central bronchial patent    PLEURA:  Moderate sized right effusion seen    HEART/GREAT VESSELS:  Cardiomegaly seen  Coronary artery calcification seen  Mitral annular calcification seen  The ascending aorta measures 3 4 cm      MEDIASTINUM AND MATT:  No significant mediastinal lymph node enlargement seen      CHEST WALL AND LOWER NECK:   A right thyroid nodule seen which measures 1 4 cm, unchanged from the previous study    VISUALIZED STRUCTURES IN THE UPPER ABDOMEN:  Nodular contour of the liver compatible with cirrhosis  Ascites seen  Enlarged IVC seen with spleen appear unremarkable  Adrenal gland appear unremarkable    OSSEOUS STRUCTURES:  Degenerative changes are seen within the thoracic spine      Impression Moderate right effusion     Atelectasis/consolidation right lower lung may be due to compressive atelectasis however underlying infiltrates are difficult to exclude    Lobular areas of groundglass density in the left upper lobe, right middle lobe, lingular region may be due to infiltrate which could be atypical or viral or drug induced these are may be related to areas of residual congestion    Cirrhosis    Enlarged IVC with enlarged right atrium right and right ventricle, correlate with tricuspid regurgitation, right heart failure    Follow-up suggested in 6-8 weeks to demonstrate resolution  The study was marked in EPIC for significant notification  Workstation performed: [id number]       No results found for this or any previous visit  Results for orders placed during the hospital encounter of 04/01/21   CT abdomen pelvis wo contrast    Narrative CT ABDOMEN AND PELVIS WITHOUT IV CONTRAST    INDICATION:   Abdominal distension  abdominal pain and distention, alcoholic cirrhosis  COMPARISON:  CT dated 12/18/2020    TECHNIQUE:  CT examination of the abdomen and pelvis was performed without intravenous contrast   Axial, sagittal, and coronal 2D reformatted images were created from the source data and submitted for interpretation  Radiation dose length product (DLP) for this visit:  577 mGy-cm   This examination, like all CT scans performed in the [facility], was performed utilizing techniques to minimize radiation dose exposure, including the use of iterative   reconstruction and automated exposure control  Enteric contrast was not administered  FINDINGS:    ABDOMEN    LOWER CHEST:  Stable right pleural effusion with right middle and right lower lobe compressive atelectasis  Stable cardiomegaly  Coronary artery and mitral annular calcifications  LIVER/BILIARY TREE:  The liver demonstrates cirrhotic morphology  Within the limitations of this examination there is no evidence of suspicious hepatic mass  No biliary dilatation  GALLBLADDER:  There are gallstone(s) within the gallbladder, without pericholecystic inflammatory changes  SPLEEN:  Unremarkable  PANCREAS:  Unremarkable  ADRENAL GLANDS:  Unremarkable  KIDNEYS/URETERS:  Stable right renal cyst possibly with milk of calcium  No stones or hydronephrosis      STOMACH AND BOWEL: Unremarkable  APPENDIX:  No findings to suggest appendicitis  ABDOMINOPELVIC CAVITY: Small amount of perihepatic ascites  VESSELS:  Atherosclerotic changes are present  No evidence of aneurysm  PELVIS    REPRODUCTIVE ORGANS:  Unremarkable for patient's age  URINARY BLADDER:  Unremarkable  ABDOMINAL WALL/INGUINAL REGIONS:  Unremarkable  OSSEOUS STRUCTURES:  Mild superior endplate compression at L1 with slight bony retropulsion that mildly indents the ventral thecal sac  No significant canal stenosis  Although new since 12/18/2020, this is favored to be subacute/chronic  Impression Cirrhosis  Small amount of perihepatic ascites which is decreased from prior  Mild L1 compression fracture favored to be subacute/chronic but new since 12/18/2020  Workstation performed: [id number]       No results found for this or any previous visit  PLAN / RECOMMENDATIONS      CKD stage 3:  Stable overall  Confusion:  Likely because of dementia    Disposition:  Can be discharged renal point of view            Dona Garcia MD  Nephrology  4/8/2021        Portions of the record may have been created with voice recognition software  Occasional wrong word or "sound a like" substitutions may have occurred due to the inherent limitations of voice recognition software  Read the chart carefully and recognize, using context, where substitutions have occurred

## 2021-04-08 NOTE — PROGRESS NOTES
[address]  Progress Note - Julianne Bronson 1945, 77 y o  female MRN: [id number]  Unit/Bed#: -Cathy Encounter: [id number]  Primary Care Provider: Shorty Villegas MD   Date and time admitted to hospital: 4/1/2021  1:10 PM    * Acute encephalopathy  Assessment & Plan  · Patient presented with acute metabolic encephalopathy likely multifactorial in setting of UTI, hepatic encephalopathy  · Patient continues to improve, after speaking with the patient's sister who she resides with, patient is typically confused at baseline, appears to be close to baseline currently    · UA positive for nitrites, leukocytes and moderate bacteria  · Urine culture positive for Klebsiella, susceptible to ceftriaxone, continue (day 7/7)  · GI following for hepatic encephalopathy,  · Most recent ammonia level was 70  · Continue lactulose to 20 mg t i d  To titrate to goal of 2-3 bowel movements a day  · Rifaxan BID   · CT scan with minimal ascites, no indication for paracentesis at this time  · PT/OT recommending short-term rehab, however patient's family declining, requesting home with VNA services once medically stable for discharge    Acute kidney injury superimposed on chronic kidney disease ([facility] [address] )  Assessment & Plan  · POA, CESAR on CKD 3b likely secondary to poor intake and hypotension from underlying UTI  · Received IV fluid hydration in the ER, additional fluids are currently on hold  · Nephrology consult by Cardiology,  · Patient was placed on p o   Lasix 40 mg b i d , however due to hypernatremia this was changed to 40 mg daily  · Creatinine did improved to 1 28 today  · Baseline appears to be somewhere between 1 5-1 8    Results from last 7 days   Lab Units 04/08/21  0646 04/07/21  1642 04/06/21  0702 04/05/21  0438 04/04/21  0449 04/03/21  0704 04/02/21  0549 04/01/21  1400   BUN mg/dL 28* 29* 31* 32* 32* 37* 38* 42*   CREATININE mg/dL 1 44* 1 48* 1 28 1 33* 1 35* 1 41* 1 61* 2 09*   EGFR ml/min/1 73sq m 38 39 50 45 39 42 45 30       Urinary tract infection  Assessment & Plan  · Patient presented with acute encephalopathy, UA positive  · Urine culture with Klebsiella, sensitive to ceftriaxone  · Continue IV ceftriaxone, day 7/7  · Unable to determine if patient was having urinary symptoms secondary to her baseline dementia    Compression fracture of first lumbar vertebra [facility])  Assessment & Plan  · Incidental finding noted on CT scan  · Mild L1 compression fracture favored to be subacute/chronic, however new since prior study from 12/2020  · Patient denies any significant back pain, however does have underlying dementia  · Continue supportive care, p r n  Tylenol  · Recommend follow-up with orthopedics as an outpatient  · Likely secondary to patient's underlying multiple myeloma, nontraumatic, sister denies any recent falls or trauma    Hypernatremia  Assessment & Plan  · Sodium improved to 140  · Nephrology following,  · Thought to be medication related in setting of lactulose causing volume depletion  · Continue on IV D5W at 75 cc/hour  · Monitor BMP in the a m    · Lasix decreased to 40 mg daily as well    Bacteremia  Assessment & Plan  · Patient with 1/2 blood cultures positive for Staph coag-negative  · Likely contaminant, not true bacteremia as additional blood cultures are now been negative >72 hours  · No need for ID consultation at this time  · Urine culture growing Klebsiella, continue IV ceftriaxone for underlying UTI    Hypotension (arterial)  Assessment & Plan  · Patient with history of intermittent hypotension  · Likely acutely worsened in setting of persistent tachycardia  · Cardiology following,  · Continue Cardizem and Lopressor as outlined above with holding parameters as blood pressure tolerates  · Telemetry monitoring    Alcoholic cirrhosis ([facility] [address] )  Assessment & Plan  · Patient with history of alcoholic cirrhosis, quit drinking in 2019  · GI signed off  · Patient with increased abdominal distension  · CT abdomen pelvis with cirrhosis, small amount of perihepatic ascites, decreased from prior  No indication for paracentesis at this time  · Trend CMP and INR daily  · Lactulose 20 mg t i d  With a goal of 2-3 bowel movements daily  · Xifaxan twice a day as well    Diastolic CHF ([facility] [address] )  Assessment & Plan  Wt Readings from Last 3 Encounters:   04/08/21 63 5 kg (139 lb 15 9 oz)   03/30/21 63 3 kg (139 lb 8 oz)   03/24/21 66 2 kg (145 lb 15 1 oz)     · Concerning for acute diastolic heart failure as evidenced by BNP greater than 12,000, increase in weight  · Did receive gentle IV fluids initially on admission for acute kidney injury  · Patient is typically maintained on Lasix 60 mg daily at home  · Cardiology following,  · P o  Lasix decreased to 40 mg daily  · Patient placed on D5W by Nephrology in setting of worsening hypernatremia  · Echo with EF 55%, G2 DD  · Obtain daily weights, accurate I&Os, low sodium diet     Dementia (HCC)  Assessment & Plan  · Patient appears to be at baseline today per discussion with sister who she lives with  · Dementia without behavioral disturbance  · Continue mirtazapine 50 mg daily and melatonin 6 mg daily  · Reportedly had adverse side effects with quetiapine and olanzapine with increased agitation  · Currently in soft limb upper restraints for safety    Pancytopenia ([facility] [address] )  Assessment & Plan  · WBC improved to 4 67 today, platelets improved to 40, hemoglobin worsened to 7 0/7 2 today, will order blood transfusion  · Chronic on review, likely secondary to underlying cirrhosis and multiple myeloma  · Patient's sister reports that the patient undergoes blood transfusions every 2 weeks, she reports that the patient is due on Wednesday, is requesting her to get blood transfusion here    Will continue to monitor CBC, if continued down trending of hemoglobin would give 1 unit packed red blood cells tomorrow as previously scheduled to keep hemoglobin above 7  · Monitor CBC closely     Results from last 7 days   Lab Units 04/08/21  0646 04/07/21  1642 04/06/21  0702 04/05/21  0438 04/04/21  0449 04/03/21  0704 04/02/21  0549 04/01/21  1400   PLATELETS Thousands/uL 39* 40* 40* 36* 35* 36* 36* 38*       Multiple myeloma (HCC)  Assessment & Plan  · Patient with history of IgG kappa multiple myeloma  · Follows with Hematology/Oncology as an outpatient  · Currently patient is not maintained on any anti myeloma therapy, it was strongly recommended by Oncology per review of notes for referral for palliative care, hospice had previously been declined by patient's family  · Likely cause for her compression fracture noted on CT scan, patient without any trauma  · Continue supportive care    Paroxysmal atrial fibrillation ([address] )  Assessment & Plan  · Patient has had persistent tachycardia here in the hospital, heart rate in the 120s on review today  · Cardiology following,  · EKG from 04/02 with AFib with 2-1 heart block, incomplete RBBB  · Continue Lopressor 25 mg b i d  · Cardizem increased to 60 mg Q6H today for better HR control, HR intermittently improved but still having episodes in the 120s  · Echo from yesterday showed EF of 45%, grade 2 diastolic dysfunction  · Patient not maintained on anticoagulation as an outpatient secondary to thrombocytopenia, dementia and fall risk         VTE Pharmacologic Prophylaxis:   Pharmacologic: Pharmacologic VTE Prophylaxis contraindicated due to Thrombocytopenia  Mechanical VTE Prophylaxis in Place: Yes    Patient Centered Rounds: I have performed bedside rounds with nursing staff today  Discussions with Specialists or Other Care Team Provider:  Reviewed Nephrology notes reviewed cardiology notes discussed with Cardiology discussed case management primary RN    Education and Discussions with Family / Patient:  Discussed plan of care with patient's sister denies any additional questions or concerns at this time    Time Spent for Care: 20 minutes  More than 50% of total time spent on counseling and coordination of care as described above  Current Length of Stay: 7 day(s)    Current Patient Status: Inpatient   Certification Statement: The patient will continue to require additional inpatient hospital stay due to Blood transfusion, monitoring of tachycardia, monitoring of hypernatremia    Discharge Plan / Estimated Discharge Date:  24-48 hours pending improvement of above      Code Status: Level 1 - Full Code      Subjective:   Resting comfortably in bed mildly agitated this morning per nursing however overall improved no acute distress noted  Objective:     Vitals:   Temp (24hrs), Av 5 °F (36 4 °C), Min:97 3 °F (36 3 °C), Max:97 7 °F (36 5 °C)    Temp:  [97 3 °F (36 3 °C)-97 7 °F (36 5 °C)] 97 7 °F (36 5 °C)  HR:  [] 126  Resp:  [16-17] 16  BP: (104-123)/(68-74) 112/70  SpO2:  [90 %-97 %] 90 %  Body mass index is 25 6 kg/m²  Input and Output Summary (last 24 hours): Intake/Output Summary (Last 24 hours) at 2021 1047  Last data filed at 2021 0726  Gross per 24 hour   Intake 356 ml   Output --   Net 356 ml       Physical Exam:     Physical Exam  Vitals signs and nursing note reviewed  HENT:      Head: Normocephalic  Nose: Nose normal    Neck:      Musculoskeletal: Normal range of motion  Cardiovascular:      Rate and Rhythm: Tachycardia present  Pulmonary:      Effort: Pulmonary effort is normal    Abdominal:      Palpations: Abdomen is soft  Musculoskeletal: Normal range of motion  Skin:     General: Skin is warm  Neurological:      Mental Status: She is alert  Mental status is at baseline     Psychiatric:         Mood and Affect: Mood normal        Additional Data:     Labs:    Results from last 7 days   Lab Units 21  0646   WBC Thousand/uL 4 02*   HEMOGLOBIN g/dL 7 2*   HEMATOCRIT % 23 8*   PLATELETS Thousands/uL 39*   LYMPHO PCT % 25   MONO PCT % 11   EOS PCT % 0     Results from last 7 days   Lab Units 04/08/21  0646  04/06/21  0702   POTASSIUM mmol/L 4 7   < > 4 4   CHLORIDE mmol/L 107   < > 112*   CO2 mmol/L 27   < > 28   BUN mg/dL 28*   < > 31*   CREATININE mg/dL 1 44*   < > 1 28   CALCIUM mg/dL 8 9   < > 9 3   ALK PHOS U/L  --   --  284*   ALT U/L  --   --  25   AST U/L  --   --  37    < > = values in this interval not displayed  Results from last 7 days   Lab Units 04/06/21  0702   INR  1 23*       * I Have Reviewed All Lab Data Listed Above  * Additional Pertinent Lab Tests Reviewed: [address] Admission Reviewed    Recent Cultures (last 7 days):     Results from last 7 days   Lab Units 04/02/21  2239 04/01/21  1610 04/01/21  1426 04/01/21  1400   BLOOD CULTURE  No Growth After 4 Days  No Growth After 4 Days  No Growth After 5 Days    --  Staphylococcus coagulase negative*   GRAM STAIN RESULT   --   --   --  Gram positive cocci in clusters*   URINE CULTURE   --   --  >100,000 cfu/ml Klebsiella variicola*  10,000-19,000 cfu/ml   --        Last 24 Hours Medication List:   Current Facility-Administered Medications   Medication Dose Route Frequency Provider Last Rate    cefTRIAXone  1,000 mg Intravenous Q24H Dougie Ruiz DO 1,000 mg (04/07/21 1716)    dextrose  75 mL/hr Intravenous Continuous Kimberlyn Campbell MD 75 mL/hr (04/06/21 1608)    diltiazem  60 mg Oral Q6H [address] Kj Gill MD      folic acid  1,164 mcg Oral Daily Dougie Ruiz, DO      hydrOXYzine HCL  25 mg Oral Q6H PRN RosaMEREDITH Velazquez      lactulose  20 g Oral TID Betsy Andrew PA-C      magnesium oxide  400 mg Oral BID Glorianne Kamini, DO      melatonin  6 mg Oral HS Dougie Ruiz, DO      metoprolol  2 5 mg Intravenous Q6H PRN Glorianne Kamini, DO      metoprolol tartrate  25 mg Oral TID Joyleamari Chavez CRNP      mirtazapine  15 mg Oral HS Glorianne Kamini, DO      nicotine  1 patch Transdermal Daily Glorimaddie Kamini, DO      nystatin   Topical BID Dougie Ruiz DO      pantoprazole  40 mg Oral QAM Genesis Harris DO Joseph      rifaximin  550 mg Oral Q12H [address] Willian Hoyos PA-C      vitamin B-1  100 mg Oral Daily Deshaun Rodriguez DO          Today, Patient Was Seen By: MEREDITH Baptiste    ** Please Note: Dragon 360 Dictation voice to text software may have been used in the creation of this document   **

## 2021-04-08 NOTE — PLAN OF CARE
Problem: SAFETY ADULT  Goal: Patient will remain free of falls  Description: INTERVENTIONS:  - Assess patient frequently for physical needs  -  Identify cognitive and physical deficits and behaviors that affect risk of falls  -  [place] fall precautions as indicated by assessment   - Educate patient/family on patient safety including physical limitations  - Instruct patient to call for assistance with activity based on assessment  - Modify environment to reduce risk of injury,personal  items within reach  - Consider OT/PT consult to assist with strengthening/mobility  Outcome: Not Progressing     Problem: Potential for Falls  Goal: Patient will remain free of falls  Description: INTERVENTIONS:  - Assess patient frequently for physical needs  -  Identify cognitive and physical deficits and behaviors that affect risk of falls    -  [place] fall precautions as indicated by assessment   - Educate patient/family on patient safety including physical limitations  - Instruct patient to call for assistance with activity based on assessment  - Modify environment to reduce risk of injury,personal  items within reach  - Consider OT/PT consult to assist with strengthening/mobility  Outcome: Not Progressing     Problem: Prexisting or High Potential for Compromised Skin Integrity  Goal: Skin integrity is maintained or improved  Description: INTERVENTIONS:  - Identify patients at risk for skin breakdown  - Assess and monitor skin integrity  - Assess and monitor nutrition and hydration status  - Monitor labs   - Assess for incontinence   - Turn and reposition patient  - Assist with mobility/ambulation  - Relieve pressure over bony prominences, allevy ndressing applied to heels , buttocks  - Avoid friction and shearing  - Provide appropriate hygiene as needed including keeping skin clean and dry  - Evaluate need for skin moisturizer/barrier cream  - Collaborate with interdisciplinary team   - Patient/family teaching  - Consider wound care consult   Outcome: Not Progressing     Problem: Nutrition/Hydration-ADULT  Goal: Nutrient/Hydration intake appropriate for improving, restoring or maintaining nutritional needs  Description: Monitor and assess patient's nutrition/hydration status for malnutrition  Collaborate with interdisciplinary team and initiate plan and interventions as ordered  Monitor patient's weight and dietary intake as ordered or per policy  Utilize nutrition screening tool and intervene as necessary  Determine patient's food preferences and provide high-protein, high-caloric foods as appropriate       INTERVENTIONS:  - Monitor oral intake, urinary output, labs, and treatment plans  - Assess nutrition and hydration status and recommend course of action  - Evaluate amount of meals eaten  - Assist patient with eating if necessary   - Allow adequate time for meals  - Recommend/ encourage appropriate diets, oral nutritional supplements, and vitamin/mineral supplements  - Order, calculate, and assess calorie counts as needed  -  - Assess need for intravenous fluids  - Provide specific nutrition/hydration education as appropriate  - Include patient/family/caregiver in decisions related to nutrition  Outcome: Not Progressing

## 2021-04-08 NOTE — PROGRESS NOTES
Cardiology Progress Note - Jennifer Gonzáles 77 y o  female MRN: [id number]  Unit/Bed#: -01 Encounter: [id number]      Assessment/Plan:  1  Paroxysmal atrial fibrillation  - likely precipitated by UTI/bacteremia  - EKG yesterday - AFib with RVR, nonspecific ST and T-wave abnormality  - troponins negative  - review of telemetry today - slow Afib to SB, heart rates in the 50s  - not on anticoagulation outpatient due to fall risk, thrombocytopenia, and dementia  - continue Cardizem 60 mg Q 6 hours for rate control  - increase metoprolol 25 mg from BID to TID  - continue monitor on telemetry     2  Acute diastolic HFrEF  - echo - EF 55%, no regional wall motion abnormalities grade 2 diastolic dysfunction, mild MR and MV stenosis, moderate to severe TR, mildly calcified AV  - BNP 12,194 on admission  - weight on admission 156 lb; 139 lb this morning  - hold diuretic in setting of CESAR  - plan to add ACE/ARB when able  - daily weights  - strict I's and O's  - salt and fluid restriction     3  CESAR superimposed on CKD stage 3  - creat on admission 2 09 > 1 44 today  - nephrology following and managing  - avoid nephrotoxic agents   - monitor urine output     4  Hypertension  - well controlled  - current blood pressure 110/60  - continue lopressor as blood pressure tolerates  - continue to monitor closely     5  Hepatic encephalopathy  - likely secondary to UTI/bacteremia   - blood cultures 1/2 on 04/01 positive for Staph coagulase negative and Gram-positive cocci in clusters; repeat cultures show no growth after 5 days  - urine culture positive for Klebsiella  - on IV antibiotics  - management per primary team    At this time, Cardiology will sign off  Please contact us with any questions or concerns  Subjective:   Patient seen and examined  No significant events overnight  Patient is still exhibiting confusion and lethargy      Objective:     Vitals: Blood pressure 110/60, pulse (!) 54, temperature (!) 97 1 °F (36 2 °C), temperature source Rectal, resp   rate 18, height 5' 2" (1 575 m), weight 63 5 kg (139 lb 15 9 oz), SpO2 99 %, not currently breastfeeding , Body mass index is 25 6 kg/m² ,   Orthostatic Blood Pressures      Most Recent Value   Blood Pressure  110/60 filed at 04/08/2021 1240   Patient Position - Orthostatic VS  Lying filed at 04/08/2021 1229          Intake/Output Summary (Last 24 hours) at 4/8/2021 1432  Last data filed at 4/8/2021 3765  Gross per 24 hour   Intake 118 ml   Output --   Net 118 ml     Physical Exam:  GEN: Agnieszka Melton appears chronically ill, alert and oriented to self, confused  HEENT: pupils equal, round, and reactive to light; extraocular muscles intact  NECK: supple, no carotid bruits   HEART: irregular rhythm, normal S1 and S2, no murmurs, clicks, gallops or rubs   LUNGS: clear to auscultation bilaterally; no wheezes, rales, or rhonchi   ABDOMEN: normal bowel sounds, soft, no tenderness, distended, firm  EXTREMITIES: peripheral pulses normal; no clubbing, cyanosis, or edema    Medications:    Current Facility-Administered Medications:     cefTRIAXone (ROCEPHIN) 1,000 mg in dextrose 5 % 50 mL IVPB, 1,000 mg, Intravenous, Q24H, Dougie Ruiz DO, Last Rate: 100 mL/hr at 04/07/21 1716, 1,000 mg at 04/07/21 1716    dextrose 5 % infusion, 75 mL/hr, Intravenous, Continuous, Brissa Duffy MD, Last Rate: 75 mL/hr at 04/06/21 1608, 75 mL/hr at 04/06/21 1608    diltiazem (CARDIZEM) tablet 60 mg, 60 mg, Oral, Q6H [address], Sylvester Berman MD, 60 mg at 18/92/56 4945    folic acid (FOLVITE) tablet 1,000 mcg, 1,000 mcg, Oral, Daily, Dougie Ruiz DO, 1,000 mcg at 04/08/21 0930    hydrOXYzine HCL (ATARAX) tablet 25 mg, 25 mg, Oral, Q6H PRN, MEREDITH Baptiste    lactulose oral solution 20 g, 20 g, Oral, TID, Julieta Lo PA-C, 20 g at 04/08/21 0930    magnesium oxide (MAG-OX) tablet 400 mg, 400 mg, Oral, BID, Dougie Ruiz DO, 400 mg at 04/08/21 1000    melatonin tablet 6 mg, 6 mg, Oral, HS, Dougie Ruiz DO, 6 mg at 04/07/21 2111    metoprolol (LOPRESSOR) injection 2 5 mg, 2 5 mg, Intravenous, Q6H PRN, Saurav Suárez DO, 2 5 mg at 04/03/21 1408    metoprolol tartrate (LOPRESSOR) tablet 25 mg, 25 mg, Oral, TID, MEREDITH Lai    mirtazapine (REMERON) tablet 15 mg, 15 mg, Oral, HS, Dougie Ruiz DO, 15 mg at 04/07/21 2115    nicotine (NICODERM CQ) 7 mg/24hr TD 24 hr patch 1 patch, 1 patch, Transdermal, Daily, Dougie Ruiz DO, 1 patch at 04/08/21 0931    nystatin (MYCOSTATIN) powder, , Topical, BID, Saurav Suárez DO, Given at 04/08/21 0930    pantoprazole (PROTONIX) EC tablet 40 mg, 40 mg, Oral, QAM, Dougie Ruiz DO, 40 mg at 04/08/21 8977    rifaximin (XIFAXAN) tablet 550 mg, 550 mg, Oral, Q12H [facility], Julieta Lo PA-C, 550 mg at 04/08/21 1000    thiamine tablet 100 mg, 100 mg, Oral, Daily, Dougie Ruiz DO, 100 mg at 04/08/21 1000     Labs & Results:        Results from last 7 days   Lab Units 04/08/21  0646 04/07/21  1642 04/06/21  0702   WBC Thousand/uL 4 02* 3 63* 4 67   HEMOGLOBIN g/dL 7 2* 7 0* 7 7*   HEMATOCRIT % 23 8* 23 2* 24 9*   PLATELETS Thousands/uL 39* 40* 40*         Results from last 7 days   Lab Units 04/08/21  0646 04/07/21  1642 04/06/21  0702  04/02/21  0549   POTASSIUM mmol/L 4 7 4 8 4 4   < > 4 5   CHLORIDE mmol/L 107 107 112*   < > 108   CO2 mmol/L 27 28 28   < > 23   BUN mg/dL 28* 29* 31*   < > 38*   CREATININE mg/dL 1 44* 1 48* 1 28   < > 1 61*   CALCIUM mg/dL 8 9 8 9 9 3   < > 9 4   ALK PHOS U/L  --   --  284*  --  248*   ALT U/L  --   --  25  --  18   AST U/L  --   --  37  --  34    < > = values in this interval not displayed       Results from last 7 days   Lab Units 04/06/21  0702 04/02/21  0549   INR  1 23* 1 21*

## 2021-04-08 NOTE — ASSESSMENT & PLAN NOTE
· Patient presented with acute metabolic encephalopathy likely multifactorial in setting of UTI, hepatic encephalopathy  · Patient continues to improve, after speaking with the patient's sister who she resides with, patient is typically confused at baseline, appears to be close to baseline currently    · UA positive for nitrites, leukocytes and moderate bacteria  · Urine culture positive for Klebsiella, susceptible to ceftriaxone, continue (day 7/7)  · GI following for hepatic encephalopathy,  · Most recent ammonia level was 70  · Continue lactulose to 20 mg t i d   To titrate to goal of 2-3 bowel movements a day  · Rifaxan BID   · CT scan with minimal ascites, no indication for paracentesis at this time  · PT/OT recommending short-term rehab, however patient's family declining, requesting home with VNA services once medically stable for discharge

## 2021-04-08 NOTE — ASSESSMENT & PLAN NOTE
· WBC improved to 4 67 today, platelets improved to 40, hemoglobin worsened to 7 0/7 2 today, will order blood transfusion  · Chronic on review, likely secondary to underlying cirrhosis and multiple myeloma  · Patient's sister reports that the patient undergoes blood transfusions every 2 weeks, she reports that the patient is due on Wednesday, is requesting her to get blood transfusion here    Will continue to monitor CBC, if continued down trending of hemoglobin would give 1 unit packed red blood cells tomorrow as previously scheduled to keep hemoglobin above 7  · Monitor CBC closely     Results from last 7 days   Lab Units 04/08/21  0646 04/07/21  1642 04/06/21  0702 04/05/21  0438 04/04/21  0449 04/03/21  0704 04/02/21  0549 04/01/21  1400   PLATELETS Thousands/uL 39* 40* 40* 36* 35* 36* 36* 38*

## 2021-04-08 NOTE — ASSESSMENT & PLAN NOTE
· Sodium improved to 140  · Nephrology following,  · Thought to be medication related in setting of lactulose causing volume depletion  · Continue on IV D5W at 75 cc/hour  · Monitor BMP in the a m    · Lasix decreased to 40 mg daily as well

## 2021-04-09 ENCOUNTER — PATIENT OUTREACH (OUTPATIENT)
Dept: INTERNAL MEDICINE CLINIC | Facility: CLINIC | Age: 76
End: 2021-04-09

## 2021-04-09 VITALS
WEIGHT: 139.99 LBS | BODY MASS INDEX: 25.76 KG/M2 | SYSTOLIC BLOOD PRESSURE: 104 MMHG | TEMPERATURE: 97.6 F | OXYGEN SATURATION: 93 % | RESPIRATION RATE: 18 BRPM | DIASTOLIC BLOOD PRESSURE: 59 MMHG | HEART RATE: 66 BPM | HEIGHT: 62 IN

## 2021-04-09 PROBLEM — R78.81 BACTEREMIA: Status: RESOLVED | Noted: 2021-04-03 | Resolved: 2021-04-09

## 2021-04-09 LAB
ABO GROUP BLD BPU: NORMAL
ABO GROUP BLD: NORMAL
BLD GP AB SCN SERPL QL: NEGATIVE
BPU ID: NORMAL
CROSSMATCH: NORMAL
ERYTHROCYTE [DISTWIDTH] IN BLOOD BY AUTOMATED COUNT: 20 % (ref 11.6–15.1)
HCT VFR BLD AUTO: 26.1 % (ref 34.8–46.1)
HGB BLD-MCNC: 8.2 G/DL (ref 11.5–15.4)
MCH RBC QN AUTO: 28.4 PG (ref 26.8–34.3)
MCHC RBC AUTO-ENTMCNC: 31.4 G/DL (ref 31.4–37.4)
MCV RBC AUTO: 90 FL (ref 82–98)
PLATELET # BLD AUTO: 38 THOUSANDS/UL (ref 149–390)
RBC # BLD AUTO: 2.89 MILLION/UL (ref 3.81–5.12)
RH BLD: POSITIVE
SPECIMEN EXPIRATION DATE: NORMAL
UNIT DISPENSE STATUS: NORMAL
UNIT PRODUCT CODE: NORMAL
UNIT RH: NORMAL
WBC # BLD AUTO: 3.79 THOUSAND/UL (ref 4.31–10.16)

## 2021-04-09 PROCEDURE — 86900 BLOOD TYPING SEROLOGIC ABO: CPT | Performed by: NURSE PRACTITIONER

## 2021-04-09 PROCEDURE — 86901 BLOOD TYPING SEROLOGIC RH(D): CPT | Performed by: NURSE PRACTITIONER

## 2021-04-09 PROCEDURE — 99232 SBSQ HOSP IP/OBS MODERATE 35: CPT | Performed by: INTERNAL MEDICINE

## 2021-04-09 PROCEDURE — 86850 RBC ANTIBODY SCREEN: CPT | Performed by: NURSE PRACTITIONER

## 2021-04-09 PROCEDURE — 85027 COMPLETE CBC AUTOMATED: CPT | Performed by: NURSE PRACTITIONER

## 2021-04-09 PROCEDURE — 99239 HOSP IP/OBS DSCHRG MGMT >30: CPT | Performed by: PHYSICIAN ASSISTANT

## 2021-04-09 RX ORDER — FUROSEMIDE 40 MG/1
40 TABLET ORAL DAILY
Qty: 30 TABLET | Refills: 0 | Status: SHIPPED | OUTPATIENT
Start: 2021-04-09 | End: 2021-04-28 | Stop reason: HOSPADM

## 2021-04-09 RX ORDER — LACTULOSE 20 G/30ML
20 SOLUTION ORAL 3 TIMES DAILY
Qty: 2700 ML | Refills: 0 | Status: SHIPPED | OUTPATIENT
Start: 2021-04-09 | End: 2021-04-28 | Stop reason: HOSPADM

## 2021-04-09 RX ORDER — DILTIAZEM HYDROCHLORIDE 240 MG/1
240 CAPSULE, COATED, EXTENDED RELEASE ORAL DAILY
Qty: 30 CAPSULE | Refills: 0 | Status: SHIPPED | OUTPATIENT
Start: 2021-04-09 | End: 2021-04-28 | Stop reason: HOSPADM

## 2021-04-09 RX ADMIN — MAGNESIUM OXIDE TAB 400 MG (241.3 MG ELEMENTAL MG) 400 MG: 400 (241.3 MG) TAB at 09:47

## 2021-04-09 RX ADMIN — METOPROLOL TARTRATE 25 MG: 25 TABLET, FILM COATED ORAL at 09:47

## 2021-04-09 RX ADMIN — PANTOPRAZOLE SODIUM 40 MG: 40 TABLET, DELAYED RELEASE ORAL at 07:53

## 2021-04-09 RX ADMIN — LACTULOSE 20 G: 20 SOLUTION ORAL at 09:47

## 2021-04-09 RX ADMIN — DILTIAZEM HYDROCHLORIDE 60 MG: 60 TABLET, FILM COATED ORAL at 07:53

## 2021-04-09 RX ADMIN — MAGNESIUM OXIDE TAB 400 MG (241.3 MG ELEMENTAL MG) 400 MG: 400 (241.3 MG) TAB at 17:52

## 2021-04-09 RX ADMIN — FOLIC ACID 1000 MCG: 1 TABLET ORAL at 09:47

## 2021-04-09 RX ADMIN — DILTIAZEM HYDROCHLORIDE 60 MG: 60 TABLET, FILM COATED ORAL at 13:39

## 2021-04-09 RX ADMIN — LACTULOSE 20 G: 20 SOLUTION ORAL at 17:00

## 2021-04-09 RX ADMIN — NICOTINE 1 PATCH: 7 PATCH, EXTENDED RELEASE TRANSDERMAL at 09:47

## 2021-04-09 RX ADMIN — RIFAXIMIN 550 MG: 550 TABLET ORAL at 09:47

## 2021-04-09 RX ADMIN — NYSTATIN: 100000 POWDER TOPICAL at 09:47

## 2021-04-09 RX ADMIN — THIAMINE HCL TAB 100 MG 100 MG: 100 TAB at 09:47

## 2021-04-09 RX ADMIN — NYSTATIN: 100000 POWDER TOPICAL at 17:52

## 2021-04-09 RX ADMIN — DILTIAZEM HYDROCHLORIDE 60 MG: 60 TABLET, FILM COATED ORAL at 01:25

## 2021-04-09 RX ADMIN — DEXTROSE 75 ML/HR: 5 SOLUTION INTRAVENOUS at 00:03

## 2021-04-09 NOTE — CASE MANAGEMENT
Cm received TT from Gabi at [facility] reporting that patient will be transported at 7:00 tonight by [facility]  Cm called RN to report  Cm called patient's sister to report

## 2021-04-09 NOTE — CASE MANAGEMENT
Cm called [facility] and spoke to [facility]  Cm requested BLS transport for 3 pm or later today  [facility] to call back with transportation time  Cm completed medical necessity and placed in medical records  Cm placed another copy in patient's binder  Cm department will continue to follow patient through discharge

## 2021-04-09 NOTE — CASE MANAGEMENT
Per rounding with [facility], nephrology and cardiology has signed off  Patient is likely to be discharged today  Cm informed [place] of potential discharge via [employer]  Cm sent in basket message to [facility] for TCM appointment  Cm department will continue to follow patient through discharge

## 2021-04-09 NOTE — ASSESSMENT & PLAN NOTE
· Patient has had persistent tachycardia here in the hospital, significantly improved to HR in the 70s today   · Cardiology signed off,  · EKG from 04/02 with AFib with 2-1 heart block, incomplete RBBB  · Continue Lopressor 25 mg b i d     · Cardizem increased to 60 mg Q6H, transition to 240 mg daily on discharge  · Echo from yesterday showed EF of 51%, grade 2 diastolic dysfunction  · Patient not maintained on anticoagulation as an outpatient secondary to thrombocytopenia, dementia and fall risk

## 2021-04-09 NOTE — CASE MANAGEMENT
Cm called patient's son Emanuel Pearce at [phone] and reviewed the IMM and DCP due to patient being confused  Cm reported that patient has been accepted by [facility] for SN and PT  Cm also reported that Cm will send a message for a TCM appointment with patient's PCP  Son agreeable with DCP and reported verbal understanding of his Medicare rights  Cm placed the IMM in medical records  Cm department will continue to follow patient through discharge

## 2021-04-09 NOTE — ASSESSMENT & PLAN NOTE
· Patient with history of intermittent hypotension  · Likely acutely worsened in setting of persistent tachycardia  · Cardiology following,  · Continue Cardizem and Lopressor as outlined above with holding parameters as blood pressure tolerates  · BP has been stable over the last 48 hours

## 2021-04-09 NOTE — PROGRESS NOTES
NEPHROLOGY PROGRESS NOTE    Patient: Imtiaz Dan               Sex: female          DOA: 4/1/2021  1:10 PM   YOB: 1945        Age:  77 y o         LOS:  LOS: 8 days   4/9/2021    REASON FOR THE CONSULTATION:      Acute kidney injury on chronic kidney disease stage 3    SUBJECTIVE     Patient is seen and examined next to the bedside  Noted to be on restraints      CURRENT MEDICATIONS       Current Facility-Administered Medications:     cefTRIAXone (ROCEPHIN) 1,000 mg in dextrose 5 % 50 mL IVPB, 1,000 mg, Intravenous, Q24H, Dougie Ruiz DO, Stopped at 04/08/21 1844    dextrose 5 % infusion, 75 mL/hr, Intravenous, Continuous, Tiffanie Blanchard MD, Last Rate: 75 mL/hr at 04/09/21 0003, 75 mL/hr at 04/09/21 0003    diltiazem (CARDIZEM) tablet 60 mg, 60 mg, Oral, Q6H [facility], Belen Oreilly MD, 60 mg at 87/18/48 5764    folic acid (FOLVITE) tablet 1,000 mcg, 1,000 mcg, Oral, Daily, Dougie Ruiz DO, 1,000 mcg at 04/09/21 0947    hydrOXYzine HCL (ATARAX) tablet 25 mg, 25 mg, Oral, Q6H PRN, MEREDITH Kebede    lactulose oral solution 20 g, 20 g, Oral, TID, Julieta Lo PA-C, 20 g at 04/09/21 0947    magnesium oxide (MAG-OX) tablet 400 mg, 400 mg, Oral, BID, Dougie Ruiz DO, 400 mg at 04/09/21 0947    melatonin tablet 6 mg, 6 mg, Oral, HS, Dougie Ruiz DO, 6 mg at 04/08/21 2232    metoprolol (LOPRESSOR) injection 2 5 mg, 2 5 mg, Intravenous, Q6H PRN, Ben Sacnhez DO, 2 5 mg at 04/03/21 1408    metoprolol tartrate (LOPRESSOR) tablet 25 mg, 25 mg, Oral, TID, MEREDITH Lai, 25 mg at 04/09/21 0947    mirtazapine (REMERON) tablet 15 mg, 15 mg, Oral, HS, Dougie Ruiz DO, 15 mg at 04/08/21 2217    nicotine (NICODERM CQ) 7 mg/24hr TD 24 hr patch 1 patch, 1 patch, Transdermal, Daily, Dougie Ruiz DO, 1 patch at 04/09/21 0947    nystatin (MYCOSTATIN) powder, , Topical, BID, Dougie Ruiz DO, Given at 04/09/21 0947    pantoprazole (PROTONIX) EC tablet 40 mg, 40 mg, Oral, QAM, Kelvin Judith, , 40 mg at 04/09/21 0753    rifaximin (XIFAXAN) tablet 550 mg, 550 mg, Oral, Q12H [address], Darrion Casarez PA-C, 550 mg at 04/09/21 9673    thiamine tablet 100 mg, 100 mg, Oral, Daily, Dougienikia Ruiz DO, 100 mg at 04/09/21 0947    REVIEW OF SYSTEMS     Review of Systems   Constitutional: Negative  HENT: Negative  Eyes: Negative  Respiratory: Negative  Cardiovascular: Negative  Gastrointestinal: Negative  Endocrine: Negative  Genitourinary: Negative  Musculoskeletal: Negative  Skin: Negative  Allergic/Immunologic: Negative  Neurological: Negative  Hematological: Negative  All other systems reviewed and are negative  OBJECTIVE     Current Weight: Weight - Scale: 63 5 kg (139 lb 15 9 oz)(pt is on bed rest)  Vitals:    04/09/21 0728   BP: 117/75   Pulse: 73   Resp: 12   Temp: 97 7 °F (36 5 °C)   SpO2: 98%     Body mass index is 25 6 kg/m²  Intake/Output Summary (Last 24 hours) at 4/9/2021 0952  Last data filed at 4/8/2021 1844  Gross per 24 hour   Intake 450 ml   Output --   Net 450 ml       PHYSICAL EXAMINATION     Physical Exam  Constitutional:       Appearance: She is well-developed  HENT:      Head: Normocephalic and atraumatic  Eyes:      Pupils: Pupils are equal, round, and reactive to light  Neck:      Musculoskeletal: Neck supple  Cardiovascular:      Rate and Rhythm: Normal rate and regular rhythm  Heart sounds: Normal heart sounds  Pulmonary:      Effort: Pulmonary effort is normal    Abdominal:      General: Bowel sounds are normal       Palpations: Abdomen is soft  Musculoskeletal: Normal range of motion  Skin:     General: Skin is warm  Neurological:      Mental Status: She is alert        Comments: Oriented to person and place only           LAB RESULTS     Results from last 7 days   Lab Units 04/08/21  0646 04/07/21  1642 04/06/21  0702 04/05/21  0438 04/04/21  0449 04/03/21  0704   WBC Thousand/uL 4 02* 3 63* 4 67 3 92* 4 06* 4 07*   HEMOGLOBIN g/dL 7 2* 7 0* 7 7* 7 6* 7 6* 7 6*   HEMATOCRIT % 23 8* 23 2* 24 9* 25 4* 25 1* 25 7*   PLATELETS Thousands/uL 39* 40* 40* 36* 35* 36*   POTASSIUM mmol/L 4 7 4 8 4 4 4 8 4 7 4 3   CHLORIDE mmol/L 107 107 112* 113* 108 113*   CO2 mmol/L 27 28 28 26 27 26   BUN mg/dL 28* 29* 31* 32* 32* 37*   CREATININE mg/dL 1 44* 1 48* 1 28 1 33* 1 35* 1 41*   EGFR ml/min/1 73sq m 41 40 48 45 45 42   CALCIUM mg/dL 8 9 8 9 9 3 9 2 9 0 9 2   PHOSPHORUS mg/dL  --   --  2 9  --   --   --            RADIOLOGY RESULTS      Results for orders placed during the hospital encounter of 04/01/21   XR chest portable    Narrative CHEST     INDICATION:   AMS, fever  COMPARISON:  3/24/2021    EXAM PERFORMED/VIEWS:  XR CHEST PORTABLE      FINDINGS:    Heart shadow is enlarged but unchanged from prior exam   Atherosclerotic aorta  Persistent mild congestive changes with right mid to lower lung airspace opacity and increased moderate to large pleural effusion  Osseous structures appear within normal limits for patient age  Impression Persistent mild congestive changes with right mid to lower lung airspace opacity and increased moderate to large pleural effusion  Workstation performed: [id number]           ASSESSMENT/PLAN     78-year-old female with past medical history of liver cirrhosis, multiple myeloma, chronic kidney disease stage 3, dementia who presented to our facility associated acute hepatic encephalopathy  1  Acute kidney injury on chronic kidney disease stage 3:  Presented with serum creatinine of 2 0  -baseline serum creatinine 1 5-1 820 multiple episodes of acute kidney injury in the past   -etiology of acute kidney injury thought to be secondary to hepatic encephalopathy leading to decreased p o  Intake plus complicated UTI  -current serum creatinine is 1 44 and below baseline      2  Hepatic encephalopathy:  Noted elevated ammonia level   -currently at baseline while on ceftriaxone, lactulose, rifaximin  3  Urinary tract infection:  Admission urinalysis urine culture revealing Klebsiella pneumonia with greater than a 1000 colonies  -remain on ceftriaxone at present time  4  Atrial fibrillation:  Rate and rhythm control at present time while on diltiazem            Andrea Crane MD  Nephrology  4/9/2021

## 2021-04-09 NOTE — ASSESSMENT & PLAN NOTE
· POA, CESAR on CKD 3b likely secondary to poor intake and hypotension from underlying UTI  · Received IV fluid hydration in the ER, additional fluids are currently on hold  · Nephrology consult by Cardiology,  · Patient was placed on p o   Lasix 40 mg b i d , however due to hypernatremia this was changed to 40 mg daily  · Creatinine stable at 1 44 today  · Baseline appears to be somewhere between 1 5-1 8    Results from last 7 days   Lab Units 04/08/21  0646 04/07/21  1642 04/06/21  0702 04/05/21  0438 04/04/21  0449 04/03/21  0704   BUN mg/dL 28* 29* 31* 32* 32* 37*   CREATININE mg/dL 1 44* 1 48* 1 28 1 33* 1 35* 1 41*   EGFR ml/min/1 73sq m 41 40 48 45 45 42

## 2021-04-09 NOTE — ASSESSMENT & PLAN NOTE
· Patient presented with acute metabolic encephalopathy likely multifactorial in setting of UTI, hepatic encephalopathy, significantly improved   · After speaking with the patient's sister who she resides with, patient is typically confused at baseline, appears to be close to baseline currently    · UA positive for nitrites, leukocytes and moderate bacteria  · Urine culture positive for Klebsiella, susceptible to ceftriaxone, completed total 7 day course of treatment inpatient   · GI following for hepatic encephalopathy,  · Most recent ammonia level was 70  · Continue lactulose to 20 mg t i d   To titrate to goal of 2-3 bowel movements a day  · Rifaxan BID   · CT scan with minimal ascites, no indication for paracentesis at this time  · No additional inpatient recommendations at this time, follow up with GI in outpatient setting  · PT/OT recommending short-term rehab, however patient's family declining, requesting home with VNA services once medically stable for discharge

## 2021-04-09 NOTE — ASSESSMENT & PLAN NOTE
· Patient with history of alcoholic cirrhosis, quit drinking in 2019  · GI signed off,  · CT abdomen pelvis with cirrhosis, small amount of perihepatic ascites, decreased from prior  No indication for paracentesis at this time  · Continue lactulose 20 mg t i d   With a goal of 2-3 bowel movements daily  · Xifaxan twice a day as well  · Follow up with GI in the outpatient setting

## 2021-04-09 NOTE — DISCHARGE INSTRUCTIONS
Encephalopathy   WHAT YOU NEED TO KNOW:   Encephalopathy is a term used to describe brain disease or brain damage  It usually develops because of a health condition such as cirrhosis, or a brain injury  Symptoms may be mild or severe, and may be short-term or permanent  DISCHARGE INSTRUCTIONS:   Call 911 or have someone else call for any of the following:   · You cannot be woken  · You had a seizure  Seek care immediately if:   · You had a seizure  · You feel confused, dizzy, or lightheaded  · Your heart is beating faster than is normal for you  · You have sudden shortness of breath  Contact your healthcare provider if:   · You have a fever  · You are sleeping more than usual     · You have questions or concerns about your condition or care  Medicines: You may need any of the following:  · Blood pressure medicine  may be given to raise or lower your blood pressure  · Antibiotics  help treat an infection caused by bacteria  · A vitamin B supplement  may be needed if the level in your blood is too low  · Take your medicine as directed  Contact your healthcare provider if you think your medicine is not helping or if you have side effects  Tell him or her if you are allergic to any medicine  Keep a list of the medicines, vitamins, and herbs you take  Include the amounts, and when and why you take them  Bring the list or the pill bottles to follow-up visits  Carry your medicine list with you in case of an emergency  Manage encephalopathy:   · Limit or do not drink alcohol  Alcohol can worsen your condition  Alcohol can also cause new or worsening damage to your liver and brain  Ask your healthcare provider if it is safe for you to drink alcohol  Limit alcohol to 1 drink a day if you are a woman, or 2 drinks a day if you are a man  A drink of alcohol is 12 ounces of beer, 5 ounces of wine, or 1½ ounces of liquor  · Eat low-protein and low-sodium foods, if directed    If your symptoms are caused by a liver disease, you may be asked to eat less protein and sodium (salt)  Some foods high in protein include beef, pork, poultry (chicken, turkey), beans, and nuts  Some foods high in sodium include salty snacks, canned foods, condiments, deli meats, and cured meat such as lira  Ask your dietitian for more information about foods to limit or avoid  Follow up with your healthcare provider as directed:  Write down your questions so you remember to ask them during your visits  © Copyright [address] Information is for End User's use only and may not be sold, redistributed or otherwise used for commercial purposes  All illustrations and images included in CareNotes® are the copyrighted property of [employer] D A M [employer] Inc  or [facility] Pete Marcelino   The above information is an  only  It is not intended as medical advice for individual conditions or treatments  Talk to your doctor, nurse or pharmacist before following any medical regimen to see if it is safe and effective for you  Please follow-up with your primary care provider within 1 week, obtain BMP to monitor sodium levels  Please follow-up with GI and continue lactulose and Xifaxan  Please follow-up with cardiology, monitor heart rates at home  Please follow-up with nephrology, encourage good oral intake at home   If you begin to have any worsening confusion, abdominal distention, nausea or vomiting please come back to the hospital for further evaluation  A-fib (Atrial Fibrillation)   WHAT YOU NEED TO KNOW:   What is atrial fibrillation (a-fib)? A-fib is an irregular heartbeat  It reduces your heart's ability to pump blood through your body  A-fib may come and go, or it may be a long-term condition  A-fib can cause life-threatening blood clots, stroke, or heart failure  It is important to treat and manage a-fib to help prevent these problems  What increases my risk for a-fib?    · High blood pressure, heart failure, or heart valve disease    · Smoking    · COPD, sleep apnea, a blood clot in your lung, or other lung diseases     · Diabetes, obesity, or thyroid disease    · Heavy alcohol use or large amounts of caffeine    · Age 72 years or older     · A family history of a-fib or other heart problems    What are the signs and symptoms of a-fib? · A heartbeat that races, pounds, or flutters    · Weakness, severe tiredness, or confusion    · Feeling lightheaded, sweaty, dizzy, or faint    · Shortness of breath or anxiety    · Chest pain or pressure    How is a-fib diagnosed? Your healthcare provider will examine you  Tell the provider about your symptoms, health conditions, and medicines  Tell the provider if you drink alcohol, smoke cigarettes, or use any illegal drugs  You will need an EKG to check your heart rhythm and how fast your heart beats  You may also need to wear a Holter monitor at home while you do your usual activities  The Holter monitor is a portable EKG machine  How is a-fib treated? Conditions that cause a-fib, such as thyroid disease, will be treated  You may also need any of the following:  · Heart medicines  help control your heart rate or rhythm  You may need more than one medicine to treat your symptoms  · Antiplatelet or blood thinner medicines  help prevent blood clots and stroke  · Cardioversion  is a procedure to return your heart rate and rhythm to normal  It can be done using medicines or electric shock  · A-fib ablation  is a procedure that uses energy to burn a small area of heart tissue  This creates scar tissue and prevents electrical signals that cause a-fib  You may need this procedure more than once  Ask for more information on a-fib ablation  · A pacemaker  may be inserted into your heart  A pacemaker is a device that controls your heartbeat  A pacemaker may be inserted during an ablation procedure or surgery   Ask your healthcare provider for more information on pacemakers  · Surgery  may be needed if other procedures do not work  During surgery your healthcare provider will make cuts in the upper part of your heart  The provider will stitch the cuts together to create scar tissue  The scar tissue will prevent electrical signals that cause a-fib  How can I manage a-fib? · Know your target heart rate  Learn how to check your pulse and monitor your heart rate  · Know the risks if you choose to drink alcohol  Alcohol can make a-fib hard to manage  Ask your healthcare provider if it is safe for you to drink alcohol  A drink of alcohol is 12 ounces of beer, 5 ounces of wine, or 1½ ounces of liquor  · Do not smoke  Nicotine can cause heart damage and make it more difficult to manage your a-fib  Do not use e-cigarettes or smokeless tobacco in place of cigarettes or to help you quit  They still contain nicotine  Ask your healthcare provider for information if you currently smoke and need help quitting  · Eat heart-healthy foods  Heart healthy foods will help keep your cholesterol low  These include fruits, vegetables, whole-grain breads, low-fat dairy products, beans, lean meats, and fish  Replace butter and margarine with heart-healthy oils such as olive oil and canola oil  · Maintain a healthy weight  Ask your healthcare provider how much you should weigh  Ask him or her to help you create a safe weight loss plan if you are overweight  Even a small goal of a 10% weight loss can improve your heart health  · Get regular physical activity  Physical activity helps improve your heart health  Get at least 150 minutes of moderate aerobic physical activity each week  Your healthcare provider can help you create an activity plan  · Manage other health conditions  This includes high blood pressure or cholesterol, sleep apnea, diabetes, and other heart conditions  Take medicine as directed and follow your treatment plan      Call your local emergency number (911 in the [place]) if:   · You have any of the following signs of a heart attack:      ? Squeezing, pressure, or pain in your chest    ? You may  also have any of the following:     ? Discomfort or pain in your back, neck, jaw, stomach, or arm    ? Shortness of breath    ? Nausea or vomiting    ? Lightheadedness or a sudden cold sweat    · You have any of the following signs of a stroke:      ? Numbness or drooping on one side of your face     ? Weakness in an arm or leg    ? Confusion or difficulty speaking    ? Dizziness, a severe headache, or vision loss    When should I call my cardiologist?   · Your arm or leg feels warm, tender, and painful  It may look swollen and red  · Your heart rate is more than 110 beats per minute  · You have new or worsening swelling in your legs, feet, ankles, or abdomen  · You are short of breath, even at rest      · You have questions or concerns about your condition or care  CARE AGREEMENT:   You have the right to help plan your care  Learn about your health condition and how it may be treated  Discuss treatment options with your healthcare providers to decide what care you want to receive  You always have the right to refuse treatment  The above information is an  only  It is not intended as medical advice for individual conditions or treatments  Talk to your doctor, nurse or pharmacist before following any medical regimen to see if it is safe and effective for you  © Copyright [address] Information is for End User's use only and may not be sold, redistributed or otherwise used for commercial purposes  All illustrations and images included in CareNotes® are the copyrighted property of [employer]  or [facility] Pete Marcelino     Acute Kidney Injury   WHAT YOU NEED TO KNOW:   What is acute kidney injury? Acute kidney injury (CESAR) is also called acute kidney failure, or acute renal failure   CESAR happens when your kidneys suddenly stop working correctly  Normally, the kidneys remove fluid, chemicals, and waste from your blood  These wastes are turned into urine by your kidneys  CESAR usually happens over hours or days  When you have CESAR, your kidneys do not remove the waste, chemicals, or extra fluid from your body  A normal amount of urine is not produced  CESAR is usually temporary, but it may become a chronic kidney condition  What causes CESAR? · Decreased blood flow to the kidney, such as from hypercalcemia (high blood calcium level) or severe heart disease     · A disease or condition that affects the kidneys, such as hypertension (high blood pressure) or diabetes     · A blockage in the kidney or ureter, such as a kidney or bladder stone, enlarged prostate, or tumor    What increases my risk for CESAR? · Being hospitalized with a serious illness, such as sepsis or severe burns    · Peripheral artery disease    · Older age in adults    · Kidney or liver diseases    · Medical conditions such as dehydration, hypertension, diabetes, or heart failure    · Certain medicines such as NSAIDs    What are the signs and symptoms of CESAR? You may not have any symptoms with early or mild CESAR  As CESAR progresses, you may have any of the following:  · Decrease in the amount of urine or no urination    · Swelling in your arms, legs, or feet     · Weakness, drowsiness, or no appetite    · Nausea, flank pain, muscle twitching or muscle cramps    · Itchy skin, or your breath or body smells like urine    · Behavior changes, confusion, disorientation, or seizures    How is CESAR diagnosed? There are many causes of CESAR  To find the cause and to treat your CESAR correctly, your healthcare provider may do any of the following:  · Blood and urine tests  show how well your kidneys are working  They may also show the cause of your CESAR  · An x-ray or ultrasound  may show problems with your kidneys  Your healthcare provider may see a blockage in your kidneys   He or she may see narrowing of the artery that sends blood to your kidneys  You may be given contrast liquid to help your kidneys show up better in the pictures  Tell the healthcare provider if you have ever had an allergic reaction to contrast liquid  How is CESAR treated? Treatment depends upon the cause of your acute kidney injury and how severe it is  Usually, CESAR will be monitored in the hospital  If you have mild CESAR, you may be able to go home to recover  Your healthcare providers will treat the cause of your CESAR  You may need IV fluids if your CESAR was caused by little or no fluid in your body  You may need dialysis to remove waste and extra fluid from your body  Your healthcare provider may tell you to eat food low in sodium (salt), potassium, phosphorus, or protein  You may need to see a dietitian before you are discharged to get help with planning your meals  How can I prevent CESAR? · Manage other health conditions  such as diabetes, high blood pressure, or heart disease  These conditions increase your risk for acute kidney injury  Take your medicines for these conditions as directed  Also, monitor your blood sugar and blood pressure levels as directed  Contact your healthcare provider if your levels are not in the range he or she says it should be  · Talk to your healthcare provider before you take over-the-counter-medicine  NSAIDs, stomach medicine, or laxatives may harm your kidneys and increase your risk for acute kidney injury  If it is okay to take the medicine, follow the directions on the package  Do not take more than directed  · Tell healthcare providers if you have had CESAR  before you get contrast liquid for an x-ray or CT scan  Your healthcare provider may give you medicine to prevent kidney problems caused by the liquid  CARE AGREEMENT:   You have the right to help plan your care  Learn about your health condition and how it may be treated   Discuss treatment options with your healthcare providers to decide what care you want to receive  You always have the right to refuse treatment  The above information is an  only  It is not intended as medical advice for individual conditions or treatments  Talk to your doctor, nurse or pharmacist before following any medical regimen to see if it is safe and effective for you  © Copyright [address] Information is for End User's use only and may not be sold, redistributed or otherwise used for commercial purposes  All illustrations and images included in CareNotes® are the copyrighted property of [employer]  or Juan Marcelino     Cirrhosis   WHAT YOU NEED TO KNOW:   Cirrhosis is long-term scarring of the liver  The liver makes enzymes and bile that help digest food and gives your body energy  It also removes harmful material from your body, such as alcohol and other chemicals  Cirrhosis is caused by repeated damage to your liver over time  Scar tissue starts to replace healthy liver tissue  The scar tissue prevents the liver from working properly  WHILE YOU ARE HERE:   Informed consent  is a legal document that explains the tests, treatments, or procedures that you may need  Informed consent means you understand what will be done and can make decisions about what you want  You give your permission when you sign the consent form  You can have someone sign this form for you if you are not able to sign it  You have the right to understand your medical care in words you know  Before you sign the consent form, understand the risks and benefits of what will be done  Make sure all your questions are answered  Medicines:   · Blood pressure medicine  lowers high blood pressure in the portal vein (the vein that goes to your liver)  · Diuretics  decrease fluid that collects in a part of your body, such as your legs and abdomen  Diuretics can also decrease your blood pressure   You will urinate more often when you take this medicine  · Antibiotics  prevent or treat a bacterial infection  Monitoring:   · Abdomen measuring  may be done every 4 to 8 hours  A sudden increase in the size of your abdomen may mean you are retaining fluid  · Intake and output  means healthcare providers will keep track of the amount of liquid you are getting  They also may need to know how much you are urinating  Ask how much liquid you should drink each day  Ask healthcare providers if they need to measure or collect your urine  · A neurological exam  is also called neuro signs, neuro checks, or neuro status  A neurologic exam can show healthcare providers how well your brain works after an injury or illness  Healthcare providers will check how your pupils react to light  They may check your memory and how easily you wake up  Your hand grasp and balance may also be tested  Tests:   · Blood tests  may be used to check your liver enzymes  This test shows how well your liver is working  · An ultrasound  may be used to check for damage to your liver or other tissues or organs  · CT or MRI  pictures may be taken of your liver  You may be given IV contrast liquid to help your liver show up better in the pictures  Tell the healthcare provider if you have ever had an allergic reaction to contrast liquid  Do not enter the MRI room with anything made of metal  Metal can cause serious injury  Tell the healthcare provider if you have any metal in or on your body  · A liver biopsy  is a procedure used to take a small piece of your liver to be tested for liver damage  · Esophagoscopy  is a test used to look at the inside of your throat and esophagus  A scope with a magnifying glass and a light on the end is gently put into your mouth and throat  Your healthcare provider will look for swollen blood vessels in your esophagus and stomach  The vessels may need to be treated to prevent them from bursting and bleeding      Treatment:   · A channel may be created inside your liver to increase blood flow  This will help decrease swelling in your abdomen and blood pressure in the portal vein  Your risk for bleeding in your esophagus and stomach is also decreased  · A liver transplant  may be needed if your liver fails  RISKS:   Without treatment, scar tissue will continue to replace healthy tissue  This can cause severe liver damage, and your liver can fail  You may develop swollen blood vessels in your esophagus and stomach  The vessels may burst and bleed over time  You could go into a coma, or bleed heavily  This can be life-threatening  CARE AGREEMENT:   You have the right to help plan your care  Learn about your health condition and how it may be treated  Discuss treatment options with your healthcare providers to decide what care you want to receive  You always have the right to refuse treatment  © Copyright [address] Information is for End User's use only and may not be sold, redistributed or otherwise used for commercial purposes  All illustrations and images included in CareNotes® are the copyrighted property of [employer] M [employer] Inc  or [address] [place], [place] [id number]   The above information is an  only  It is not intended as medical advice for individual conditions or treatments  Talk to your doctor, nurse or pharmacist before following any medical regimen to see if it is safe and effective for you  How to Stop Smoking   WHAT YOU NEED TO KNOW:   You will improve your health and the health of others around you if you stop smoking  Your risk for heart and lung disease, cancer, stroke, heart attack, and vision problems will also decrease  Your adolescent can help prevent or stop harm to his or her brain or body  This will help him or her become a healthy adult  You can benefit from quitting no matter how long you have smoked  DISCHARGE INSTRUCTIONS:   Prepare to stop smoking:  Nicotine is a highly addictive drug found in cigarettes  Withdrawal symptoms can happen when you stop smoking and make it hard to quit  These include anxiety, depression, irritability, trouble sleeping, and increased appetite  You increase your chances of success if you prepare to quit  · Set a quit date  Maicej Sheldon a date that is within the next 2 weeks  Do not pick a day that you think may be stressful or busy  Write down the day or Unga it on your calender  · Tell friends and family that you plan to quit  Explain that you may have withdrawal symptoms when you try to quit  Ask them to support you  They may be able to encourage you and help reduce your stress to make it easier for you to quit  · Make a list of your reasons for quitting  Put the list somewhere you will see it every day, such as your refrigerator  You can look at the list when you have a craving  · Remove all tobacco and nicotine products from your home, car, and workplace  Also, remove anything else that will tempt you to smoke, such as lighters, matches, or ashtrays  Clean your car, home, and places at work that smell like smoke  The smell of smoke can trigger a craving  · Identify triggers that make you want to smoke  This may include activities, feelings, or people  Also write down 1 way you can deal with each of your triggers  For example, if you want to smoke as soon as you wake up, plan another activity during this time, such as exercise  · Make a plan for how you will quit  Learn about the tools that can help you quit, such as medicine, counseling, or nicotine replacement therapy  Choose at least 2 options to help you quit  · Help your adolescent make a plan to quit  The plan will be more successful if your adolescent makes his or her own decisions  Do not try to pressure him or her to quit immediately or in a certain way  Be supportive and offer help if needed      Tools to help you stop smoking:   · Counseling  from a trained healthcare provider can provide you with support and skills to quit smoking  The provider will also teach you to manage your withdrawal symptoms and cravings  You may receive counseling from one counselor, in group therapy, or through phone therapy called a quit line  · Nicotine replacement therapy (NRT)  such as nicotine patches, gum, or lozenges may help reduce your nicotine cravings  You may get these without a doctor's order  Do not use e-cigarettes or smokeless tobacco in place of cigarettes or to help you quit  They still contain nicotine  · Prescription medicines  such as nasal sprays or nicotine inhalers may help reduce your withdrawal symptoms  Other medicines may also be used to reduce your urge to smoke  Ask your healthcare provider about these medicines  You may need to start certain medicines 2 weeks before your quit date for them to work well  · Hypnosis  is a practice that helps guide you through thoughts and feelings  Hypnosis may help decrease your cravings and make you more willing to quit  · Acupuncture therapy  uses very thin needles to balance energy channels in the body  This is thought to help decrease cravings and symptoms of nicotine withdrawal     · Support groups  let you talk to others who are trying to quit or have already quit  It may be helpful to speak with others about how they quit  Manage your cravings:   · Avoid situations, people, and places that tempt you to smoke  Go to nonsmoking places, such as libraries or restaurants  Understand what tempts you and try to avoid these things  · Keep your hands busy  Hold things such as a stress ball or pen  · Put candy or toothpicks in your mouth  Keep lollipops, sugarless gum, or toothpicks with you at all times  · Do not have alcohol or caffeine  These drinks may tempt you to smoke  Drink healthy liquids such as water or juice instead  · Reward yourself when you resist your cravings  Rewards will motivate you and help you stay positive      · Do an activity that distracts you from your craving  Examples include cleaning, creating art, or gardening  Prevent weight gain after you quit:  You may gain a few pounds after you quit smoking  It is healthier for you to gain a few pounds than to continue to smoke  The following can help you prevent weight gain:  · Eat healthy foods  These include fruits, vegetables, whole-grain breads, low-fat dairy products, beans, lean meats, and fish  Eat healthy snacks, such as low-fat yogurt, if you get hungry between meals  · Drink water before, during, and between meals  This will make your stomach feel full and help prevent you from overeating  Ask your healthcare provider how much liquid to drink each day and which liquids are best for you  · Be physically active  Activity may help reduce your cravings and reduce stress  Take a walk or do some kind of physical activity every day  Ask your healthcare provider which activities are right for you  For support and more information:   · [employer]  Phone: [phone]  Web Address: [employer]  [facility] Information is for End User's use only and may not be sold, redistributed or otherwise used for commercial purposes  All illustrations and images included in CareNotes® are the copyrighted property of [employer]  or [facility] Pete Marcelino   The above information is an  only  It is not intended as medical advice for individual conditions or treatments  Talk to your doctor, nurse or pharmacist before following any medical regimen to see if it is safe and effective for you

## 2021-04-09 NOTE — TRANSPORTATION MEDICAL NECESSITY
Section I - General Information    Name of Patient: Imtiaz Dan                 : 1945    Medicare #: [id number]  Transport Date: 21 (PCS is valid for round trips on this date and for all repetitive trips in the 60-day range as noted below )  Origin: [place] [address]: [address] [facility] [address] [place] [address]  Is the pt's stay covered under Medicare Part A (PPS/DRG)   [x]     Closest appropriate facility? If no, why is transport to more distant facility required? Yes  If hospice pt, is this transport related to pt's terminal illness? NA       Section II - Medical Necessity Questionnaire  Ambulance transportation is medically necessary only if other means of transport are contraindicated or would be potentially harmful to the patient  To meet this requirement, the patient must either be "bed confined" or suffer from a condition such that transport by means other than ambulance is contraindicated by the patient's condition  The following questions must be answered by the medical professional signing below for this form to be valid:    1)  Describe the MEDICAL CONDITION (physical and/or mental) of this patient AT [address] [place] [address] that requires the patient to be transported in an ambulance and why transport by other means is contraindicated by the patient's condition: Patient is confused and has impaired safety awareness  Patient has decreased strength and endurance  Patient is a fall risk  2) Is the patient "bed confined" as defined below? No  To be "be confined" the patient must satisfy all three of the following conditions: (1) unable to get up from bed without Assistance; AND (2) unable to ambulate; AND (3) unable to sit in a chair or wheelchair      3) Can this patient safely be transported by car or wheelchair van (i e , seated during transport without a medical attendant or monitoring)? No    4) In addition to completing questions 1-3 above, please check any of the following conditions that apply*:   *Note: supporting documentation for any boxes checked must be maintained in the patient's medical records  If hosp-hosp transfer, describe services needed at 2nd facility not available at 1st facility? Patient is confused  Moderate/severe pain on movement   Need or possible need for restraints   Medical attendant required   Unable to tolerate seated position for time needed to transport       Section III - Signature of Physician or Healthcare Professional  I certify that the above information is true and correct based on my evaluation of this patient, and represent that the patient requires transport by ambulance and that other forms of transport are contraindicated  I understand that this information will be used by the Centers for Medicare and Medicaid Services (CMS) to support the determination of medical necessity for ambulance services, and I represent that I have personal knowledge of the patient's condition at time of transport  []  If this box is checked, I also certify that the patient is physically or mentally incapable of signing the ambulance service's claim and that the institution with which I am affiliated has furnished care, services, or assistance to the patient  My signature below is made on behalf of the patient pursuant to 42 CFR §424 36(b)(4)  In accordance with 42 CFR §424 37, the specific reason(s) that the patient is physically or mentally incapable of signing the claim form is as follows:  N/A        Signature of Physician* or Healthcare Professional________________________________    Signature Date 04/09/21 (For scheduled repetitive transports, this form is not valid for transports performed more than 60 days after this date)    Printed Name & Credentials of Physician or Healthcare Professional (MD, DO, RN, etc ) Moustapha Lazaro, MSW    *Form must be signed by patient's attending physician for scheduled, repetitive transports   For non-repetitive, unscheduled ambulance transports, if unable to obtain the signature of the attending physician, any of the following may sign (choose appropriate option below)  [] Physician Assistant []  Clinical Nurse Specialist []  Registered Nurse  []  Nurse Practitioner  [x] Discharge Planner

## 2021-04-09 NOTE — ASSESSMENT & PLAN NOTE
Wt Readings from Last 3 Encounters:   04/08/21 63 5 kg (139 lb 15 9 oz)   03/30/21 63 3 kg (139 lb 8 oz)   03/24/21 66 2 kg (145 lb 15 1 oz)     · Concerning for acute diastolic heart failure as evidenced by BNP greater than 12,000, increase in weight  · Did receive gentle IV fluids initially on admission for acute kidney injury  · Patient is typically maintained on Lasix 60 mg daily at home  · Cardiology following,  · P o   Lasix decreased to 40 mg daily, stable continue on discharge  · Hypernatremia resolved   · Echo with EF 55%, G2 DD  · Obtain daily weights, accurate I&Os, low sodium diet

## 2021-04-09 NOTE — PROGRESS NOTES
Chart reviewed and patient was admitted to [facility] on 4/1 due to  Acute Encephalopathy  Patient is being discharged to home today with a referral to [facility]  Telephone call placed to patient's sister, Enedia Lo and message left on her VM requesting that she return my call so that I can continue to provide needed assistance with the waiver process

## 2021-04-09 NOTE — DISCHARGE SUMMARY
[address] [facility]  Discharge- Elpidio Herreratan 1945, 77 y o  female MRN: [id number]  Unit/Bed#: -01 Encounter: [id number]  Primary Care Provider: Landen Byrd MD   Date and time admitted to hospital: 4/1/2021  1:10 PM       DOS: 4/9/2021  * Acute encephalopathy  Assessment & Plan  · Patient presented with acute metabolic encephalopathy likely multifactorial in setting of UTI, hepatic encephalopathy, significantly improved   · After speaking with the patient's sister who she resides with, patient is typically confused at baseline, appears to be close to baseline currently    · UA positive for nitrites, leukocytes and moderate bacteria  · Urine culture positive for Klebsiella, susceptible to ceftriaxone, completed total 7 day course of treatment inpatient   · GI following for hepatic encephalopathy,  · Most recent ammonia level was 70  · Continue lactulose to 20 mg t i d  To titrate to goal of 2-3 bowel movements a day  · Rifaxan BID   · CT scan with minimal ascites, no indication for paracentesis at this time  · No additional inpatient recommendations at this time, follow up with GI in outpatient setting  · PT/OT recommending short-term rehab, however patient's family declining, requesting home with VNA services once medically stable for discharge    Alcoholic cirrhosis ([facility] )  Assessment & Plan  · Patient with history of alcoholic cirrhosis, quit drinking in 2019  · GI signed off,  · CT abdomen pelvis with cirrhosis, small amount of perihepatic ascites, decreased from prior  No indication for paracentesis at this time  · Continue lactulose 20 mg t i d   With a goal of 2-3 bowel movements daily  · Xifaxan twice a day as well  · Follow up with GI in the outpatient setting    Paroxysmal atrial fibrillation [facility])  Assessment & Plan  · Patient has had persistent tachycardia here in the hospital, significantly improved to HR in the 70s today   · Cardiology signed off,  · EKG from 04/02 with AFib with 2-1 heart block, incomplete RBBB  · Continue Lopressor 25 mg b i d  · Cardizem increased to 60 mg Q6H, transition to 240 mg daily on discharge  · Echo from yesterday showed EF of 23%, grade 2 diastolic dysfunction  · Patient not maintained on anticoagulation as an outpatient secondary to thrombocytopenia, dementia and fall risk    Compression fracture of first lumbar vertebra [facility])  Assessment & Plan  · Incidental finding noted on CT scan  · Mild L1 compression fracture favored to be subacute/chronic, however new since prior study from 12/2020  · Patient denies any significant back pain, however does have underlying dementia  · Continue supportive care, p r n   Tylenol  · Recommend follow-up with orthopedics as an outpatient  · Likely secondary to patient's underlying multiple myeloma, nontraumatic, sister denies any recent falls or trauma    Hypernatremia  Assessment & Plan  · Sodium improved to 140  · Nephrology following,  · Thought to be medication related in setting of lactulose causing volume depletion  · S/p IV D5W at 75 cc/hour, will d/c and encourage good po intake   · Lasix decreased to 40 mg daily as well  · Encourage obtaining BMP in the outpatient setting in one week    Bacteremia  Assessment & Plan  · Patient with 1/2 blood cultures positive for Staph coag-negative  · Likely contaminant, not true bacteremia as additional blood cultures are now been negative for 5 days  · No need for ID consultation at this time  · Urine culture growing Klebsiella, completed treatment as above    Hypotension (arterial)  Assessment & Plan  · Patient with history of intermittent hypotension  · Likely acutely worsened in setting of persistent tachycardia  · Cardiology following,  · Continue Cardizem and Lopressor as outlined above with holding parameters as blood pressure tolerates  · BP has been stable over the last 48 hours     Diastolic CHF (HCC)  Assessment & Plan  Wt Readings from Last 3 Encounters: 04/08/21 63 5 kg (139 lb 15 9 oz)   03/30/21 63 3 kg (139 lb 8 oz)   03/24/21 66 2 kg (145 lb 15 1 oz)     · Concerning for acute diastolic heart failure as evidenced by BNP greater than 12,000, increase in weight  · Did receive gentle IV fluids initially on admission for acute kidney injury  · Patient is typically maintained on Lasix 60 mg daily at home  · Cardiology following,  · P o  Lasix decreased to 40 mg daily, stable continue on discharge  · Hypernatremia resolved   · Echo with EF 55%, G2 DD  · Obtain daily weights, accurate I&Os, low sodium diet     Acute kidney injury superimposed on chronic kidney disease (HCC)  Assessment & Plan  · POA, CESAR on CKD 3b likely secondary to poor intake and hypotension from underlying UTI  · Received IV fluid hydration in the ER, additional fluids are currently on hold  · Nephrology consult by Cardiology,  · Patient was placed on p o   Lasix 40 mg b i d , however due to hypernatremia this was changed to 40 mg daily  · Creatinine stable at 1 44 today  · Baseline appears to be somewhere between 1 5-1 8    Results from last 7 days   Lab Units 04/08/21  0646 04/07/21  1642 04/06/21  0702 04/05/21  0438 04/04/21  0449 04/03/21  0704   BUN mg/dL 28* 29* 31* 32* 32* 37*   CREATININE mg/dL 1 44* 1 48* 1 28 1 33* 1 35* 1 41*   EGFR ml/min/1 73sq m 41 40 48 45 45 42       Dementia ([facility])  Assessment & Plan  · Patient appears to be at baseline today per discussion with sister who she lives with  · Dementia without behavioral disturbance  · Continue mirtazapine 50 mg daily and melatonin 6 mg daily  · Reportedly had adverse side effects with quetiapine and olanzapine with increased agitation  · Currently in soft limb upper restraints for safety    Pancytopenia ([facility])  Assessment & Plan  · WBC 3 79 today, platelets 38, hemoglobin improved to 8 2 today s/p 1 U PRBC on 4/8  · Chronic on review, likely secondary to underlying cirrhosis and multiple myeloma  · Patient's sister reports that the patient undergoes blood transfusions every 2 weeks  · Monitor CBC closely     Results from last 7 days   Lab Units 04/09/21  1243 04/08/21  0646 04/07/21  1642 04/06/21  0702 04/05/21  0438 04/04/21  0449 04/03/21  0704   PLATELETS Thousands/uL 38* 39* 40* 40* 36* 35* 36*       Multiple myeloma (HCC)  Assessment & Plan  · Patient with history of IgG kappa multiple myeloma  · Follows with Hematology/Oncology as an outpatient  · Currently patient is not maintained on any anti myeloma therapy, it was strongly recommended by Oncology per review of notes for referral for palliative care, hospice had previously been declined by patient's family  · Likely cause for her compression fracture noted on CT scan, patient without any trauma  · Continue supportive care, ambulatory palliative care consultation on discharge    Urinary tract infection  Assessment & Plan  · Patient presented with acute encephalopathy, UA positive  · Urine culture with Klebsiella, sensitive to ceftriaxone  · Completed 7 days total of IV ceftriaxone inpatient, no indication for additional abx on discharge  · Unable to determine if patient was having urinary symptoms secondary to her baseline dementia      Discharging Physician / Practitioner: Ronnie Marroquin PA-C  PCP: Teresa Rasheed MD  Admission Date:   Admission Orders (From admission, onward)     Ordered        04/01/21 1601  Inpatient Admission  Once                   Discharge Date: 04/09/21    Resolved Problems  Date Reviewed: 4/9/2021    None          Consultations During Hospital Stay:  · GI  · Nephrology  · Cardiology    Procedures Performed:   · Chest x-ray 4/1-persistent mild congestive changes with right mid to lower lung airspace opacity and increased moderate to large pleural effusion  · CT head 4/1-no acute intracranial abnormality  Generalized atrophy and stable moderate cerebral chronic microangiopathic disease    Stable inflammatory changes of the right frontoethmoidal recess and post operative changes of the right nasal cavity  · CT abd/pelvis 4/5 - Cirrhosis  Small amount of perihepatic ascites which is decreased from prior  Mild L1 compression fracture favored to be subacute/chronic but new since 12/18/2020  · ECHO 4/5 - EF 55%, G2DD    Significant Findings / Test Results:   · Pancytopenia  · Procal improved   · Hypernatremia resolved   · Cr  1 44 on discharge  · Blood cultures negative x 5 days  · 1/2 BC (+) staph coag negative   · Urine culture (+) for klebsiella   · Elevated alk phos  · T  Bili improved    Incidental Findings:   · Compression fracture, supportive care, follow-up with orthopedics as needed      Test Results Pending at Discharge (will require follow up): · None     Outpatient Tests Requested:  · Per PCP/Hematology, recommend outpatient BMP to monitor electrolytes and kidney function, CBC to monitor pancytopenia    Complications:  None    Reason for Admission:  Weakness, fever and encephalopathy    Hospital Course:     Kamlesh Mathew is a 77 y o  female patient with significant past medical history of dementia, multiple myeloma, CHF, alcoholic cirrhosis, paroxysmal AFib, CKD who originally presented to the hospital on 4/1/2021 due to weakness, fever and encephalopathy  She has had multiple readmissions in the past due to her underlying comorbidities  She was noted to have an elevated ammonia, tachycardia and abnormal UA on admission  Therefore her lactulose was resumed and patient was placed on IV ceftriaxone in setting of UTI  Patient's urine culture noted to be positive for Klebsiella, susceptible to ceftriaxone and therefore patient completed 7 day course of IV ceftriaxone in the hospital   She had persistent tachycardia and therefore Cardiology was consulted  Her Toprol was switched to Lopressor and Cardizem was switched to short-acting 60 mg q 6 hours with stabilization of heart rates  Therefore patient was transitioned to Lopressor 25 mg b i d   And Cardizem 240 mg daily for heart rate control  She was also seen by GI in setting of abdominal distension  CT scan was obtained with minimal amount of ascites, no indication for paracentesis  Patient completed course for SBP prophylaxis with IV ceftriaxone  Patient was placed on Xifaxan to continue on discharge and lactulose was increased to t i d  With significant improvement of ammonia and mentation  Patient continued to improve, however was noted to have hypernatremia and nephrology started her on IV D5  It was thought to be in setting of GI loss of water from lactulose/dehydration  Her hypernatremia resolved, patient was encouraged to have increased p o  Intake at home  She was cleared for discharge from Nephrology, Cardiology, GI standpoints  Patient's mentation significantly improved throughout hospitalization  She was very alert on day of discharge, continued to have confusion which was noted to be baseline per discussion with family members  Recommend outpatient palliative care referral as this was being discussed in the outpatient setting with patient's hematologist   Patient did receive a blood transfusion on 04/08 as she receives biweekly transfusions for her hemoglobin in setting of her multiple myeloma  Patient was discharged in stable condition  For additional information please refer to medical records  Medication changes include:  Discontinuation of Toprol-XL, addition of Lopressor 25 mg b i d , addition of Xifaxan 550 mg b i d , increase Cardizem to 240 mg daily, decrease Lasix to 40 mg daily and increase lactulose to 20 g t i d  Please see above list of diagnoses and related plan for additional information  Condition at Discharge: fair     Discharge Day Visit / Exam:     Subjective:  Patient reports that she feels good today  Offers no complaints  Denies any shortness of breath or chest pain    Vitals: Blood Pressure: 104/55 (04/09/21 1325)  Pulse: 73 (04/09/21 7762)  Temperature: 97 7 °F (36 5 °C) (04/09/21 0728)  Temp Source: Oral (04/09/21 0728)  Respirations: 12 (04/09/21 0728)  Height: 5' 2" (157 5 cm) (04/02/21 0814)  Weight - Scale: 63 5 kg (139 lb 15 9 oz)(pt is on bed rest) (04/08/21 0600)  SpO2: 98 % (04/09/21 0728)  Exam:   Physical Exam  Vitals signs reviewed  Constitutional:       General: She is not in acute distress  Appearance: She is not toxic-appearing  Comments: Patient is in no acute distress sitting in her hospital bed resting comfortably  Baseline dementia/confused   HENT:      Head: Normocephalic and atraumatic  Eyes:      Conjunctiva/sclera: Conjunctivae normal       Pupils: Pupils are equal, round, and reactive to light  Cardiovascular:      Rate and Rhythm: Normal rate  Rhythm irregular  Pulses: Normal pulses  Pulmonary:      Effort: Pulmonary effort is normal  No respiratory distress  Breath sounds: Normal breath sounds  No wheezing  Abdominal:      General: Bowel sounds are normal  There is distension  Palpations: Abdomen is soft  Tenderness: There is no abdominal tenderness  Musculoskeletal:      Right lower leg: No edema  Left lower leg: No edema  Skin:     General: Skin is warm and dry  Findings: No erythema  Neurological:      Mental Status: She is alert  Discussion with Family:  Discussed with patient at bedside as well as patient's sister over the phone regarding plan of care  Advised patient's sister to follow-up with GI, Cardiology, Nephrology  Previously had discussed compression fracture that was noted on CT scan  Discussed medication changes extensively and appropriate follow-up  Patient's family in agreement to plan and verbalized understanding  Discharge instructions/Information to patient and family:   See after visit summary for information provided to patient and family        Provisions for Follow-Up Care:  See after visit summary for information related to follow-up care and any pertinent home health orders  Disposition:     Home with VNA Services (Reminder: Complete face to face encounter), family declining STR    For Discharges to [address] [facility]:   · Not Applicable to this Patient - Not Applicable to this Patient    Planned Readmission: None, however likely in setting of underlying comorbidity      Discharge Statement:  I spent 50 minutes discharging the patient  This time was spent on the day of discharge  I had direct contact with the patient on the day of discharge  Greater than 50% of the total time was spent examining patient, answering all patient questions, arranging and discussing plan of care with patient as well as directly providing post-discharge instructions  Additional time then spent on discharge activities  Discharge Medications:  See after visit summary for reconciled discharge medications provided to patient and family        ** Please Note: This note has been constructed using a voice recognition system **

## 2021-04-09 NOTE — ASSESSMENT & PLAN NOTE
· Sodium improved to 140  · Nephrology following,  · Thought to be medication related in setting of lactulose causing volume depletion  · S/p IV D5W at 75 cc/hour, will d/c and encourage good po intake   · Lasix decreased to 40 mg daily as well  · Encourage obtaining BMP in the outpatient setting in one week

## 2021-04-09 NOTE — ASSESSMENT & PLAN NOTE
· Patient presented with acute encephalopathy, UA positive  · Urine culture with Klebsiella, sensitive to ceftriaxone  · Completed 7 days total of IV ceftriaxone inpatient, no indication for additional abx on discharge  · Unable to determine if patient was having urinary symptoms secondary to her baseline dementia

## 2021-04-09 NOTE — INCIDENTAL FINDINGS
The following findings require follow up:  Radiographic finding   Finding:  Mild L1 compression fracture favored to be subacute/chronic   Follow up required:  Orthopedics   Follow up should be done within 1 month(s)    Please notify the following clinician to assist with the follow up:   Dr Tima Paige

## 2021-04-09 NOTE — ASSESSMENT & PLAN NOTE
· Patient with history of IgG kappa multiple myeloma  · Follows with Hematology/Oncology as an outpatient  · Currently patient is not maintained on any anti myeloma therapy, it was strongly recommended by Oncology per review of notes for referral for palliative care, hospice had previously been declined by patient's family  · Likely cause for her compression fracture noted on CT scan, patient without any trauma  · Continue supportive care, ambulatory palliative care consultation on discharge

## 2021-04-09 NOTE — ASSESSMENT & PLAN NOTE
· WBC 3 79 today, platelets 38, hemoglobin improved to 8 2 today s/p 1 U PRBC on 4/8  · Chronic on review, likely secondary to underlying cirrhosis and multiple myeloma  · Patient's sister reports that the patient undergoes blood transfusions every 2 weeks  · Monitor CBC closely     Results from last 7 days   Lab Units 04/09/21  1243 04/08/21  0646 04/07/21  1642 04/06/21  0702 04/05/21  0438 04/04/21  0449 04/03/21  0704   PLATELETS Thousands/uL 38* 39* 40* 40* 36* 35* 36*

## 2021-04-09 NOTE — ASSESSMENT & PLAN NOTE
· Patient with 1/2 blood cultures positive for Staph coag-negative  · Likely contaminant, not true bacteremia as additional blood cultures are now been negative for 5 days  · No need for ID consultation at this time  · Urine culture growing Klebsiella, completed treatment as above

## 2021-04-12 ENCOUNTER — TELEPHONE (OUTPATIENT)
Dept: INTERNAL MEDICINE CLINIC | Facility: CLINIC | Age: 76
End: 2021-04-12

## 2021-04-12 ENCOUNTER — TRANSITIONAL CARE MANAGEMENT (OUTPATIENT)
Dept: INTERNAL MEDICINE CLINIC | Facility: CLINIC | Age: 76
End: 2021-04-12

## 2021-04-14 ENCOUNTER — PATIENT OUTREACH (OUTPATIENT)
Dept: INTERNAL MEDICINE CLINIC | Facility: CLINIC | Age: 76
End: 2021-04-14

## 2021-04-14 ENCOUNTER — OFFICE VISIT (OUTPATIENT)
Dept: INTERNAL MEDICINE CLINIC | Facility: CLINIC | Age: 76
End: 2021-04-14
Payer: MEDICARE

## 2021-04-14 VITALS
HEIGHT: 62 IN | BODY MASS INDEX: 26.02 KG/M2 | TEMPERATURE: 97.5 F | HEART RATE: 100 BPM | RESPIRATION RATE: 16 BRPM | OXYGEN SATURATION: 96 % | SYSTOLIC BLOOD PRESSURE: 99 MMHG | WEIGHT: 141.4 LBS | DIASTOLIC BLOOD PRESSURE: 60 MMHG

## 2021-04-14 DIAGNOSIS — I95.9 HYPOTENSION, UNSPECIFIED HYPOTENSION TYPE: ICD-10-CM

## 2021-04-14 DIAGNOSIS — S32.010A COMPRESSION FRACTURE OF L1 VERTEBRA, INITIAL ENCOUNTER (HCC): Primary | ICD-10-CM

## 2021-04-14 DIAGNOSIS — F03.91 DEMENTIA WITH BEHAVIORAL DISTURBANCE, UNSPECIFIED DEMENTIA TYPE (HCC): ICD-10-CM

## 2021-04-14 DIAGNOSIS — C90.00 MULTIPLE MYELOMA NOT HAVING ACHIEVED REMISSION (HCC): ICD-10-CM

## 2021-04-14 DIAGNOSIS — N18.4 CHRONIC KIDNEY DISEASE (CKD), ACTIVE MEDICAL MANAGEMENT WITHOUT DIALYSIS, STAGE 4 (SEVERE) (HCC): ICD-10-CM

## 2021-04-14 DIAGNOSIS — I50.32 CHRONIC DIASTOLIC CHF (CONGESTIVE HEART FAILURE) (HCC): ICD-10-CM

## 2021-04-14 DIAGNOSIS — K70.30 ALCOHOLIC CIRRHOSIS, UNSPECIFIED WHETHER ASCITES PRESENT (HCC): ICD-10-CM

## 2021-04-14 LAB
ATRIAL RATE: 227 BPM
QRS AXIS: 93 DEGREES
QRSD INTERVAL: 76 MS
QT INTERVAL: 364 MS
QTC INTERVAL: 481 MS
T WAVE AXIS: 100 DEGREES
VENTRICULAR RATE: 105 BPM

## 2021-04-14 PROCEDURE — 99496 TRANSJ CARE MGMT HIGH F2F 7D: CPT | Performed by: NURSE PRACTITIONER

## 2021-04-14 PROCEDURE — 93010 ELECTROCARDIOGRAM REPORT: CPT | Performed by: INTERNAL MEDICINE

## 2021-04-14 NOTE — PATIENT INSTRUCTIONS
Encephalopathy   WHAT YOU NEED TO KNOW:   What is encephalopathy? Encephalopathy is a term used to describe brain disease or brain damage  It usually develops because of a health condition such as cirrhosis, or a brain injury  Symptoms may be mild or severe, and may be short-term or permanent  What causes or increases my risk for encephalopathy? · A brain tumor, or a brain injury, such as a concussion    · Lack of oxygen to the brain, or damage to a brain blood vessel, such as a stroke    · An infection from bacteria, viruses, or parasites    · Exposure to a chemical such as carbon monoxide, lead, or ammonia    · A chronic infection, such as HIV, herpes, or hepatitis B or C    · High or low calcium, sodium, or glucose levels, or a lack of vitamin B1    · A large amount of alcohol, use of certain medicines or drugs, or withdrawal from alcohol or drugs    · Liver or kidney failure or cancer    · A mental condition, such as severe depression    What are the signs and symptoms of encephalopathy? Signs and symptoms will depend on what is causing your encephalopathy  You may have any of the following:  · Mood changes, a short attention span, and drowsiness    · Disorientation (not knowing where you are or what day it is)    · Personality changes, or getting angry easily    · Memory loss    · Movement problems, such as clumsiness, tremors, or muscle twitches or pain    How is the cause of encephalopathy diagnosed? Your healthcare provider may ask you about your symptoms and when they began  He may ask if you had a head injury or were recently sick  Tell him about all your medicines, and if you drink alcohol or use drugs  You may also need any of the following:  · Blood or urine tests  may be used to check for an infection or a chemical, such as lead  Your glucose, sodium, and vitamin B1 levels may also be tested  Your blood may be tested for alcohol, drugs, or medicines   Blood tests may also be used to check your liver or kidney function  · CT or MRI pictures  may show swelling, an injury, or an infection in your brain  · An EEG  may be used to check how your brain is working  Small pads or metal discs are put on your head  Each has a wire that is hooked to a machine  This machine prints a paper tracing of brain wave activity from different parts of your brain  · A neurologic exam  can show how well your brain works  Your healthcare provider will check how your pupils react to light  He may check your memory and how easily you wake up  Your hand grasp and balance may also be tested  · A lumbar puncture,  or spinal tap, may be used to check for an infection  Your healthcare provider will take a sample of spinal fluid to be tested  How is encephalopathy treated? The cause of your encephalopathy will be treated, if possible  You may need any of the following:  · Medicines  may be given to raise or lower your blood pressure  You may also need medicine to fight an infection  Medicines may be used to increase or decrease the amount of glucose in your blood  You may also need a vitamin B supplement if the amount in your blood is too low  · Liquids  may be given through your IV to increase your blood pressure  · Extra oxygen  may be given if your oxygen level is too low  · Artificial liver support  may be needed  A machine is used to clean your blood when your liver cannot  Chemicals and waste products are removed from your blood by a filtering machine  Your blood is passed through a filter and then returned to your body  · Surgery  to correct blood flow to the liver, or a liver transplant may be needed  What can I do to manage encephalopathy? · Limit or do not drink alcohol  Alcohol can worsen your condition  Alcohol can also cause new or worsening damage to your liver and brain  Ask your healthcare provider if it is safe for you to drink alcohol   Limit alcohol to 1 drink a day if you are a woman, or 2 drinks a day if you are a man  A drink of alcohol is 12 ounces of beer, 5 ounces of wine, or 1½ ounces of liquor  · Eat low-protein and low-sodium foods, if directed  If your symptoms are caused by a liver disease, you may be asked to eat less protein and sodium (salt)  Some foods high in protein include beef, pork, poultry (chicken, turkey), beans, and nuts  Some foods high in sodium include salty snacks, canned foods, condiments, deli meats, and cured meat such as lira  Ask your dietitian for more information about foods to limit or avoid  Call 911 or have someone else call for any of the following:   · You cannot be woken  · You had a seizure  When should I seek immediate care? · You feel confused, dizzy, or lightheaded  · Your heart is beating faster than is normal for you  · You have sudden shortness of breath  When should I contact my healthcare provider? · You have a fever  · You are sleeping more than usual     · You have questions or concerns about your condition or care  CARE AGREEMENT:   You have the right to help plan your care  Learn about your health condition and how it may be treated  Discuss treatment options with your healthcare providers to decide what care you want to receive  You always have the right to refuse treatment  The above information is an  only  It is not intended as medical advice for individual conditions or treatments  Talk to your doctor, nurse or pharmacist before following any medical regimen to see if it is safe and effective for you  © Copyright [address] Information is for End User's use only and may not be sold, redistributed or otherwise used for commercial purposes   All illustrations and images included in CareNotes® are the copyrighted property of [employer]  or [address] [place], [place] [address]

## 2021-04-14 NOTE — PROGRESS NOTES
Met with patient and sister Ja Castillo at PCP visit today  Patient was sitting in w/c and sleeping at the time  Ja Castillo informed me that she has financial paperwork requested for waiver but has not submitted it to the [employer]  Telephone call placed to Lorena Garza for assistance with this case  April informed me that patient will now need to complete a new MA application since deadline is past  April scheduled an appointment for Ja Castillo to be contacted by [employer] representative, Mr Mtz on Friday, 4/16 between 3 and 4pm to reapply  Ja Jonathan is agreeable with scheduled appointment  Ja Castillo also agrees to bring required financial document to the [facility] assistance office as soon as possible  Ja Castillo continues to express a desire to care for patient in her home and would like waiver services  I will continue to follow and provide ongoing assistance and support

## 2021-04-14 NOTE — PROGRESS NOTES
INTERNAL MEDICINE TRANSITION OF CARE OFFICE VISIT  [facility]    NAME: Cynthia Dial  AGE: 77 y o  SEX: female  : 1945     DATE: 2021     Assessment and Plan:   Patient doing well after recent hospitalization for UTI and encephalopathy  Received IV antibiotics and did not require PO treatment at discharge  She appears well today at her baseline without complaints  Advised to continue all prior medications as directed  Will start taking am dose of metoprolol whole instead of crushing pill to see if heart rate is better controlled  Problem List Items Addressed This Visit        Digestive    Alcoholic cirrhosis ([facility] [address] )       Cardiovascular and Mediastinum    Acute on chronic diastolic CHF    Hypotension (arterial)       Nervous and Auditory    Dementia (HCC)       Musculoskeletal and Integument    Compression fracture of first lumbar vertebra (HCC) - Primary       Genitourinary    Chronic kidney disease (CKD), active medical management without dialysis, stage 4 (severe) ([facility] [address] )       Other    Multiple myeloma ([facility] [address] )           Transitional Care Management Review:     Cynthia Dial is a 77 y o  female here for TCM follow-up    During the TCM phone call patient stated:    TCM Call (since 3/14/2021)     Date and time call was made  2021 11:12 AM    Hospital care reviewed  Records reviewed    Patient was hospitialized at  [facility]        Date of Admission  21    Date of discharge  21    Diagnosis  UTI, Acute encephalopathy    Disposition  Home; Home health services    Current Symptoms  None      TCM Call (since 3/14/2021)     Post hospital issues  None    Scheduled for follow up? Yes    Patients specialists  Other (comment);  Nephrologist; Cardiologist    Cardiologist name  Annie Vázquez MD    Cardiologist contact #  [phone]    Nephrologist name  Shira Vanessa MD    Nephrologist contact #  [phone]    Other specialists names  Jian Savage MD, [phone] / Bonnie Corral, [facility], [phone]    Did you obtain your prescribed medications  Yes (Comment)  metoprolol tartrate, rifaximin    Do you need help managing your prescriptions or medications  No    Is transportation to your appointment needed  No    I have advised the patient to call PCP with any new or worsening symptoms  King Dion, LPN    Living Arrangements  Family members    Are you recieving any outpatient services  Yes    What type of services  Transfusion Therapy    Are you recieving home care services  Yes    Types of home care services  Nurse visit; Home PT    Interperter language line needed  No    Counseling  Family    Counseling topics  Importance of RX compliance           HPI:     Patient here for hospital follow up visit  Was admitted for 9 days due to undiagnosed UTI and resultant encephalopathy  She was treated with IV antibiotics while inpatient  Her ammonia levels were elevated  Patient's sister states that she the patient started to have a fever and started to have changes in mental status and that is when she brought her to the ED for evaluation  Patient was advised to go to rehab after discharge but they had opted to be discharged to home  Sister states that while she is admitted the patient is normally in loose restraints and does not get up to walk around much  When she comes home it takes a few days to get her back to walking and her baseline functioning  Patient declines VNA services to the home so sister helps with most ADL's and getting her back to normal strength  Patient has had an improved appetite recently  She was happy to be outside today in the warm weather  Sister states that patient's heart rate still seems to be slightly elevated  She has been crushing the morning dose of metoprolol and wonders if she is getting the full dose   Today patient did not finish all of the apple sauce and we discussed this could be contributing to the elevated heart rate during the day  Patient usually swallows pill whole at night  Advised to stop crushing the pill and take whole if able to  The following portions of the patient's history were reviewed and updated as appropriate: allergies, current medications, past family history, past medical history, past social history, past surgical history and problem list      Review of Systems:     Review of Systems   Constitutional: Positive for fatigue  Negative for chills and fever  Respiratory: Negative for chest tightness and shortness of breath  Genitourinary: Negative for dysuria, flank pain and frequency  Neurological: Negative for weakness and headaches  Psychiatric/Behavioral: Positive for sleep disturbance  The patient is not nervous/anxious  Problem List:     Patient Active Problem List   Diagnosis    Anemia    Cigarette nicotine dependence without complication    Hyperglycemia    Paroxysmal SVT (supraventricular tachycardia) (HCC)    Sinus tachycardia    Urinary incontinence    Memory difficulties    Gait disturbance    Obesity (BMI 30 0-34  9)    Recurrent major depressive disorder, in partial remission [facility]    History of alcohol abuse    Metabolic encephalopathy    Liver cirrhosis (HCC)    Acute on chronic diastolic CHF    Dementia     Urinary tract infection    CESAR (acute kidney injury) (HCC)    Dysphagia    Paroxysmal atrial fibrillation (HCC)    Troponin level elevated    Neutropenia (HCC)    Hyperphosphatemia    Hyperkalemia    Hyponatremia    Multiple myeloma (HCC)    Epistaxis    Azotemia    Pancytopenia (HCC)    Pulmonary hypertension (HCC)    Hyperuricemia    Acute renal failure with acute tubular necrosis superimposed on chronic kidney disease (HCC)    Ascites    Chronic kidney disease (CKD), active medical management without dialysis, stage 4 (severe) (HCC)    Volume overload    Diarrhea    Dementia (HCC)    Acute kidney injury superimposed on chronic kidney disease (HCC)    Hypotension    Rib fracture    Pneumonia    Hypertension    Acute encephalopathy    Diastolic CHF (HCC)    CKD (chronic kidney disease) stage 3, GFR 30-59 ml/min    Alcoholic cirrhosis (HCC)    Hypotension (arterial)    Hypernatremia    Compression fracture of first lumbar vertebra (HCC)        Objective:     BP 99/60 (BP Location: Left arm, Patient Position: Sitting, Cuff Size: Standard)   Pulse 100   Temp 97 5 °F (36 4 °C) (Temporal) Comment: no nsaids  Resp 16   Ht 5' 2" (1 575 m)   Wt 64 1 kg (141 lb 6 4 oz)   SpO2 96%   BMI 25 86 kg/m²     Physical Exam  Vitals signs reviewed  Constitutional:       General: She is not in acute distress  Appearance: Normal appearance  She is well-developed and well-groomed  She is not ill-appearing  HENT:      Head: Normocephalic and atraumatic  Right Ear: Hearing normal       Left Ear: Hearing normal    Cardiovascular:      Rate and Rhythm: Normal rate and regular rhythm  Heart sounds: Normal heart sounds, S1 normal and S2 normal    Pulmonary:      Effort: Pulmonary effort is normal  No accessory muscle usage  Breath sounds: Normal breath sounds  No wheezing  Skin:     General: Skin is warm and dry  Capillary Refill: Capillary refill takes less than 2 seconds  Findings: No rash  Neurological:      General: No focal deficit present  Mental Status: She is alert and oriented to person, place, and time  Mental status is at baseline  Motor: Motor function is intact  Psychiatric:         Attention and Perception: Attention and perception normal          Mood and Affect: Mood and affect normal          Speech: Speech normal          Behavior: Behavior normal  Behavior is cooperative  Thought Content:  Thought content normal          Laboratory Results: I have personally reviewed the pertinent laboratory results/reports     Radiology/Other Diagnostic Testing Results: I have personally reviewed pertinent reports  No results found  Current Medications:     Outpatient Medications Prior to Visit   Medication Sig Dispense Refill    diltiazem (CARDIZEM CD) 240 mg 24 hr capsule Take 1 capsule (240 mg total) by mouth daily 30 capsule 0    folic acid (FOLVITE) 1 mg tablet TAKE ONE TABLET BY MOUTH EVERY DAY 90 tablet 1    furosemide (LASIX) 40 mg tablet Take 1 tablet (40 mg total) by mouth daily 30 tablet 0    lactulose 20 g/30 mL Take 30 mL (20 g total) by mouth 3 (three) times a day 2700 mL 0    magnesium oxide (MAG-OX) 400 mg tablet TAKE ONE TABLET BY MOUTH TWICE A  tablet 1    metoprolol tartrate (LOPRESSOR) 25 mg tablet Take 1 tablet (25 mg total) by mouth every 12 (twelve) hours 60 tablet 0    mirtazapine (REMERON) 15 mg tablet Take 1 tablet (15 mg total) by mouth daily at bedtime 90 tablet 1    nystatin (MYCOSTATIN) powder Apply topically 2 (two) times a day (Patient taking differently: Apply topically as needed ) 15 g 0    pantoprazole (PROTONIX) 40 mg tablet TAKE ONE TABLET BY MOUTH EVERY DAY IN THE MORNING 30 tablet 2    rifaximin (XIFAXAN) 550 mg tablet Take 1 tablet (550 mg total) by mouth every 12 (twelve) hours (Patient taking differently: Take 550 mg by mouth every 12 (twelve) hours Patient is waiting for medication as pharmacy has having some difficulty with medication ) 60 tablet 0    sodium chloride (OCEAN) 0 65 % nasal spray 1 spray into each nostril every hour as needed for congestion (Patient taking differently: 1 spray into each nostril as needed for congestion ) 30 mL 0    Thiamine HCl (vitamin B-1) 100 MG TABS TAKE ONE TABLET BY MOUTH EVERY DAY 90 each 1     No facility-administered medications prior to visit          MEREDITH Sandoval  [facility]

## 2021-04-16 ENCOUNTER — TELEPHONE (OUTPATIENT)
Dept: GASTROENTEROLOGY | Facility: CLINIC | Age: 76
End: 2021-04-16

## 2021-04-19 ENCOUNTER — TELEPHONE (OUTPATIENT)
Dept: GASTROENTEROLOGY | Facility: CLINIC | Age: 76
End: 2021-04-19

## 2021-04-19 DIAGNOSIS — C90.01 MULTIPLE MYELOMA IN REMISSION (HCC): Primary | ICD-10-CM

## 2021-04-19 RX ORDER — SODIUM CHLORIDE 9 MG/ML
20 INJECTION, SOLUTION INTRAVENOUS ONCE
Status: CANCELLED | OUTPATIENT
Start: 2021-04-21

## 2021-04-19 NOTE — TELEPHONE ENCOUNTER
----- Message from Monika Kelsey sent at 4/14/2021  9:27 PM EDT -----  Regarding: RE: Prescription Question  Contact: [phone]  Betsey uses Medicare and [employer] for medications

## 2021-04-20 RX ORDER — SODIUM CHLORIDE 9 MG/ML
20 INJECTION, SOLUTION INTRAVENOUS ONCE
Status: CANCELLED | OUTPATIENT
Start: 2021-04-21

## 2021-04-21 ENCOUNTER — TELEPHONE (OUTPATIENT)
Dept: HEMATOLOGY ONCOLOGY | Facility: CLINIC | Age: 76
End: 2021-04-21

## 2021-04-21 ENCOUNTER — HOSPITAL ENCOUNTER (OUTPATIENT)
Dept: INFUSION CENTER | Facility: CLINIC | Age: 76
Discharge: HOME | End: 2021-04-21

## 2021-04-21 DIAGNOSIS — C90.01 MULTIPLE MYELOMA IN REMISSION (HCC): Primary | ICD-10-CM

## 2021-04-21 NOTE — TELEPHONE ENCOUNTER
Received a message from [address] [place] [address] that patient did not get her T&S for her bi-weekly blood transfusion  The only time available, per the infusion center, is 4/26  Discussed with Dr Gearldean Skiff  Plan is patient is to go get labs completed today  If she requires a blood transfusion, patient will need to go to the ER   LVM for Kevin Medley, patient's sister, with the above information  Asked her to call us back when the labs are completed so we can look out for the results with further instruction

## 2021-04-22 ENCOUNTER — TELEPHONE (OUTPATIENT)
Dept: HEMATOLOGY ONCOLOGY | Facility: CLINIC | Age: 76
End: 2021-04-22

## 2021-04-22 DIAGNOSIS — C90.01 MULTIPLE MYELOMA IN REMISSION (HCC): Primary | ICD-10-CM

## 2021-04-22 RX ORDER — SODIUM CHLORIDE 9 MG/ML
20 INJECTION, SOLUTION INTRAVENOUS ONCE
Status: CANCELLED | OUTPATIENT
Start: 2021-04-26

## 2021-04-22 NOTE — TELEPHONE ENCOUNTER
Attempted to call Ja Castillo again however, LVM asking her take Wang Peoples for labs since it's been two weeks  Dr Tricia Henry wants to make sure patient does not require a blood transfusion  Asked her to call back

## 2021-04-22 NOTE — TELEPHONE ENCOUNTER
Salma Bean returned my call and said Saturday is when she can take her to get her labs completed including her T&S  Advised her to take her today because Dr Jose Santillan is concerned she may require a blood transfusion  Salma Bean stated she can take her Saturday  Advised her that if the hgb is low, Bethel Hui will need to go to the ER to get a blood transfusion  Salma Bean verbalized understanding

## 2021-04-23 ENCOUNTER — PATIENT OUTREACH (OUTPATIENT)
Dept: INTERNAL MEDICINE CLINIC | Facility: CLINIC | Age: 76
End: 2021-04-23

## 2021-04-23 ENCOUNTER — TELEPHONE (OUTPATIENT)
Dept: HEMATOLOGY ONCOLOGY | Facility: CLINIC | Age: 76
End: 2021-04-23

## 2021-04-23 ENCOUNTER — APPOINTMENT (INPATIENT)
Dept: RADIOLOGY | Facility: HOSPITAL | Age: 76
DRG: 682 | End: 2021-04-23
Payer: MEDICARE

## 2021-04-23 ENCOUNTER — HOSPITAL ENCOUNTER (INPATIENT)
Facility: HOSPITAL | Age: 76
LOS: 5 days | DRG: 682 | End: 2021-04-28
Attending: EMERGENCY MEDICINE | Admitting: STUDENT IN AN ORGANIZED HEALTH CARE EDUCATION/TRAINING PROGRAM
Payer: MEDICARE

## 2021-04-23 ENCOUNTER — TELEPHONE (OUTPATIENT)
Dept: INTERNAL MEDICINE CLINIC | Facility: CLINIC | Age: 76
End: 2021-04-23

## 2021-04-23 DIAGNOSIS — N17.9 AKI (ACUTE KIDNEY INJURY) (HCC): ICD-10-CM

## 2021-04-23 DIAGNOSIS — K70.30 ALCOHOLIC CIRRHOSIS, UNSPECIFIED WHETHER ASCITES PRESENT (HCC): ICD-10-CM

## 2021-04-23 DIAGNOSIS — R00.0 SINUS TACHYCARDIA: ICD-10-CM

## 2021-04-23 DIAGNOSIS — I48.91 ATRIAL FIBRILLATION (HCC): ICD-10-CM

## 2021-04-23 DIAGNOSIS — I95.9 HYPOTENSION: Primary | ICD-10-CM

## 2021-04-23 DIAGNOSIS — D64.9 ANEMIA: ICD-10-CM

## 2021-04-23 LAB
ABO GROUP BLD: NORMAL
ALBUMIN SERPL BCP-MCNC: 2.6 G/DL (ref 3.5–5)
ALP SERPL-CCNC: 259 U/L (ref 46–116)
ALT SERPL W P-5'-P-CCNC: 28 U/L (ref 12–78)
AMMONIA PLAS-SCNC: 70 UMOL/L (ref 11–35)
ANION GAP SERPL CALCULATED.3IONS-SCNC: 14 MMOL/L (ref 4–13)
ANISOCYTOSIS BLD QL SMEAR: PRESENT
AST SERPL W P-5'-P-CCNC: 69 U/L (ref 5–45)
BACTERIA UR QL AUTO: ABNORMAL /HPF
BASOPHILS # BLD MANUAL: 0 THOUSAND/UL (ref 0–0.1)
BASOPHILS NFR MAR MANUAL: 0 % (ref 0–1)
BILIRUB SERPL-MCNC: 1.23 MG/DL (ref 0.2–1)
BILIRUB UR QL STRIP: ABNORMAL
BLD GP AB SCN SERPL QL: NEGATIVE
BUN SERPL-MCNC: 73 MG/DL (ref 5–25)
CALCIUM ALBUM COR SERPL-MCNC: 9 MG/DL (ref 8.3–10.1)
CALCIUM SERPL-MCNC: 7.9 MG/DL (ref 8.3–10.1)
CHLORIDE SERPL-SCNC: 101 MMOL/L (ref 100–108)
CLARITY UR: ABNORMAL
CO2 SERPL-SCNC: 20 MMOL/L (ref 21–32)
COLOR UR: ABNORMAL
CREAT SERPL-MCNC: 8.04 MG/DL (ref 0.6–1.3)
EOSINOPHIL # BLD MANUAL: 0.09 THOUSAND/UL (ref 0–0.4)
EOSINOPHIL NFR BLD MANUAL: 3 % (ref 0–6)
ERYTHROCYTE [DISTWIDTH] IN BLOOD BY AUTOMATED COUNT: 20.8 % (ref 11.6–15.1)
GFR SERPL CREATININE-BSD FRML MDRD: 5 ML/MIN/1.73SQ M
GLUCOSE SERPL-MCNC: 91 MG/DL (ref 65–140)
GLUCOSE UR STRIP-MCNC: NEGATIVE MG/DL
HCT VFR BLD AUTO: 21.4 % (ref 34.8–46.1)
HGB BLD-MCNC: 6.5 G/DL (ref 11.5–15.4)
HGB UR QL STRIP.AUTO: ABNORMAL
HOLD SPECIMEN: NORMAL
HOLD SPECIMEN: NORMAL
HYPERCHROMIA BLD QL SMEAR: PRESENT
KETONES UR STRIP-MCNC: ABNORMAL MG/DL
LEUKOCYTE ESTERASE UR QL STRIP: NEGATIVE
LG PLATELETS BLD QL SMEAR: PRESENT
LYMPHOCYTES # BLD AUTO: 0.76 THOUSAND/UL (ref 0.6–4.47)
LYMPHOCYTES # BLD AUTO: 24 % (ref 14–44)
MCH RBC QN AUTO: 28 PG (ref 26.8–34.3)
MCHC RBC AUTO-ENTMCNC: 30.4 G/DL (ref 31.4–37.4)
MCV RBC AUTO: 92 FL (ref 82–98)
MONOCYTES # BLD AUTO: 0.32 THOUSAND/UL (ref 0–1.22)
MONOCYTES NFR BLD: 10 % (ref 4–12)
MYELOCYTES NFR BLD MANUAL: 1 % (ref 0–1)
NEUTROPHILS # BLD MANUAL: 1.67 THOUSAND/UL (ref 1.85–7.62)
NEUTS SEG NFR BLD AUTO: 53 % (ref 43–75)
NITRITE UR QL STRIP: NEGATIVE
NON-SQ EPI CELLS URNS QL MICRO: ABNORMAL /HPF
NRBC BLD AUTO-RTO: 17 /100 WBC (ref 0–2)
NRBC BLD AUTO-RTO: 17 /100 WBCS
OVALOCYTES BLD QL SMEAR: PRESENT
PH UR STRIP.AUTO: 5 [PH]
PLATELET # BLD AUTO: 42 THOUSANDS/UL (ref 149–390)
PLATELET BLD QL SMEAR: ABNORMAL
POLYCHROMASIA BLD QL SMEAR: PRESENT
POTASSIUM SERPL-SCNC: 6.8 MMOL/L (ref 3.5–5.3)
PROT SERPL-MCNC: 6 G/DL (ref 6.4–8.2)
PROT UR STRIP-MCNC: ABNORMAL MG/DL
RBC # BLD AUTO: 2.32 MILLION/UL (ref 3.81–5.12)
RBC #/AREA URNS AUTO: ABNORMAL /HPF
RH BLD: POSITIVE
SODIUM SERPL-SCNC: 135 MMOL/L (ref 136–145)
SP GR UR STRIP.AUTO: >=1.03 (ref 1–1.03)
SPECIMEN EXPIRATION DATE: NORMAL
TARGETS BLD QL SMEAR: PRESENT
TOTAL CELLS COUNTED SPEC: 100
TROPONIN I SERPL-MCNC: <0.02 NG/ML
UROBILINOGEN UR QL STRIP.AUTO: 0.2 E.U./DL
VARIANT LYMPHS # BLD AUTO: 9 %
WBC # BLD AUTO: 3.15 THOUSAND/UL (ref 4.31–10.16)
WBC #/AREA URNS AUTO: ABNORMAL /HPF

## 2021-04-23 PROCEDURE — 96368 THER/DIAG CONCURRENT INF: CPT

## 2021-04-23 PROCEDURE — 82140 ASSAY OF AMMONIA: CPT | Performed by: EMERGENCY MEDICINE

## 2021-04-23 PROCEDURE — 30233N1 TRANSFUSION OF NONAUTOLOGOUS RED BLOOD CELLS INTO PERIPHERAL VEIN, PERCUTANEOUS APPROACH: ICD-10-PCS | Performed by: INTERNAL MEDICINE

## 2021-04-23 PROCEDURE — 36415 COLL VENOUS BLD VENIPUNCTURE: CPT

## 2021-04-23 PROCEDURE — 99291 CRITICAL CARE FIRST HOUR: CPT | Performed by: EMERGENCY MEDICINE

## 2021-04-23 PROCEDURE — 36430 TRANSFUSION BLD/BLD COMPNT: CPT

## 2021-04-23 PROCEDURE — 96367 TX/PROPH/DG ADDL SEQ IV INF: CPT

## 2021-04-23 PROCEDURE — 86900 BLOOD TYPING SEROLOGIC ABO: CPT | Performed by: EMERGENCY MEDICINE

## 2021-04-23 PROCEDURE — 86923 COMPATIBILITY TEST ELECTRIC: CPT

## 2021-04-23 PROCEDURE — 85007 BL SMEAR W/DIFF WBC COUNT: CPT | Performed by: EMERGENCY MEDICINE

## 2021-04-23 PROCEDURE — 96365 THER/PROPH/DIAG IV INF INIT: CPT

## 2021-04-23 PROCEDURE — 80053 COMPREHEN METABOLIC PANEL: CPT | Performed by: EMERGENCY MEDICINE

## 2021-04-23 PROCEDURE — 99285 EMERGENCY DEPT VISIT HI MDM: CPT

## 2021-04-23 PROCEDURE — 71045 X-RAY EXAM CHEST 1 VIEW: CPT

## 2021-04-23 PROCEDURE — 81001 URINALYSIS AUTO W/SCOPE: CPT | Performed by: EMERGENCY MEDICINE

## 2021-04-23 PROCEDURE — 86850 RBC ANTIBODY SCREEN: CPT | Performed by: EMERGENCY MEDICINE

## 2021-04-23 PROCEDURE — 93005 ELECTROCARDIOGRAM TRACING: CPT

## 2021-04-23 PROCEDURE — 86901 BLOOD TYPING SEROLOGIC RH(D): CPT | Performed by: EMERGENCY MEDICINE

## 2021-04-23 PROCEDURE — 85027 COMPLETE CBC AUTOMATED: CPT | Performed by: EMERGENCY MEDICINE

## 2021-04-23 PROCEDURE — P9016 RBC LEUKOCYTES REDUCED: HCPCS

## 2021-04-23 PROCEDURE — 99223 1ST HOSP IP/OBS HIGH 75: CPT | Performed by: FAMILY MEDICINE

## 2021-04-23 PROCEDURE — 84484 ASSAY OF TROPONIN QUANT: CPT | Performed by: EMERGENCY MEDICINE

## 2021-04-23 RX ORDER — MIRTAZAPINE 15 MG/1
15 TABLET, FILM COATED ORAL
Status: DISCONTINUED | OUTPATIENT
Start: 2021-04-23 | End: 2021-04-28 | Stop reason: HOSPADM

## 2021-04-23 RX ORDER — LANOLIN ALCOHOL/MO/W.PET/CERES
100 CREAM (GRAM) TOPICAL DAILY
Status: DISCONTINUED | OUTPATIENT
Start: 2021-04-24 | End: 2021-04-27

## 2021-04-23 RX ORDER — SODIUM CHLORIDE, SODIUM GLUCONATE, SODIUM ACETATE, POTASSIUM CHLORIDE, MAGNESIUM CHLORIDE, SODIUM PHOSPHATE, DIBASIC, AND POTASSIUM PHOSPHATE .53; .5; .37; .037; .03; .012; .00082 G/100ML; G/100ML; G/100ML; G/100ML; G/100ML; G/100ML; G/100ML
1000 INJECTION, SOLUTION INTRAVENOUS ONCE
Status: COMPLETED | OUTPATIENT
Start: 2021-04-23 | End: 2021-04-23

## 2021-04-23 RX ORDER — DEXTROSE MONOHYDRATE 25 G/50ML
25 INJECTION, SOLUTION INTRAVENOUS ONCE
Status: COMPLETED | OUTPATIENT
Start: 2021-04-23 | End: 2021-04-24

## 2021-04-23 RX ORDER — PANTOPRAZOLE SODIUM 40 MG/1
40 TABLET, DELAYED RELEASE ORAL
Status: DISCONTINUED | OUTPATIENT
Start: 2021-04-24 | End: 2021-04-27

## 2021-04-23 RX ORDER — CALCIUM GLUCONATE 20 MG/ML
1 INJECTION, SOLUTION INTRAVENOUS ONCE
Status: COMPLETED | OUTPATIENT
Start: 2021-04-23 | End: 2021-04-23

## 2021-04-23 RX ORDER — ECHINACEA PURPUREA EXTRACT 125 MG
1 TABLET ORAL
Status: DISCONTINUED | OUTPATIENT
Start: 2021-04-23 | End: 2021-04-28 | Stop reason: HOSPADM

## 2021-04-23 RX ADMIN — CALCIUM GLUCONATE 1 G: 20 INJECTION, SOLUTION INTRAVENOUS at 16:16

## 2021-04-23 RX ADMIN — CEFTRIAXONE SODIUM 1000 MG: 10 INJECTION, POWDER, FOR SOLUTION INTRAVENOUS at 22:13

## 2021-04-23 RX ADMIN — SODIUM CHLORIDE, SODIUM LACTATE, POTASSIUM CHLORIDE, AND CALCIUM CHLORIDE 1000 ML: .6; .31; .03; .02 INJECTION, SOLUTION INTRAVENOUS at 14:57

## 2021-04-23 RX ADMIN — SODIUM CHLORIDE, SODIUM GLUCONATE, SODIUM ACETATE, POTASSIUM CHLORIDE, MAGNESIUM CHLORIDE, SODIUM PHOSPHATE, DIBASIC, AND POTASSIUM PHOSPHATE 1000 ML: .53; .5; .37; .037; .03; .012; .00082 INJECTION, SOLUTION INTRAVENOUS at 15:45

## 2021-04-23 NOTE — ASSESSMENT & PLAN NOTE
· Secondary to multiple myeloma  · Hemoglobin at 6 5 for which patient is receiving 2 unit packed RBC transfusion    Monitor H&H closely  · Platelet count at 77731/QWH actively bleeding hence no platelet product ordered  · White blood cell count at 3 15

## 2021-04-23 NOTE — ASSESSMENT & PLAN NOTE
· Known history of end-stage liver cirrhosis with recurrent ascites  · Currently holding Lasix in the setting of hypotension  · Also holding lactulose given history of diarrhea accident which may have contributed to the hypotension  · Continue with Xifaxan  · Continue with thiamine/folic acid  · Continue with Protonix

## 2021-04-23 NOTE — ASSESSMENT & PLAN NOTE
Patient has underlying dementia but appeared more somnolent to her sister due to which she was brought into the emergency department  · Multifactorial etiology  · Acute renal failure with creatinine of 8 04  · Hypotension with systolic blood pressure readings in the 80s  · Hemoglobin 6 5  · Urinalysis pending/chest x-ray pending  · Ammonia 70  · Neuro checks

## 2021-04-23 NOTE — TELEPHONE ENCOUNTER
Dasia Smith stated that [address] son would like to take to the ER for a blood transfusion jordan  Asked if Aida Hughes is having symptoms, Dasia Smith stated she is sleeping a lot but denies SOB, chest pain etc   Advised her to take her for her labs now so we can determine if she needs a transfusion or not  Informed her that I do not know the wait time at the ER so it is best to get labs now, before 4:30pm   Dasia Smith said she will take her

## 2021-04-23 NOTE — ASSESSMENT & PLAN NOTE
· Patient currently not on any chemotherapy  · Per Oncology, patient should be considered for hospice or palliative management  · Requested palliative care consult

## 2021-04-23 NOTE — TELEPHONE ENCOUNTER
Received a call back from [facility] stating that VNA was assessing patient and Betsey's BP was low therefore taken to the hospital   The doctor at the ER informed Betsey King they will be admitting [facility]  I thanked Betsey King for letting me know

## 2021-04-23 NOTE — PROGRESS NOTES
Telephone call received today from patient's sister, Sriram Blancas  She wanted to inform me that patient was sent to the ED again  Deepak Alisa reports that patient seen by the Visiting Nurse today and her blood pressure was very low  She also has sores on her bottom due to taking lactulose which cause loose stools  Deepak Cuellar informed me that she has the MA paperwork in her car and she is planning to drop off at the [address] [place] [address]  Deepak Cuellar reports being very overwhelmed and having to take medication for her nerves  I provided supportive listening and thanked her for contacting me with an update

## 2021-04-23 NOTE — H&P
[facility]  H&P- Damon Ferreira 1945, 77 y o  female MRN: [id number]  Unit/Bed#: FT 05 Encounter: [id number]  Primary Care Provider: Deonte Cartagena MD   Date and time admitted to hospital: 4/23/2021  1:44 PM    * Acute encephalopathy  Assessment & Plan  Patient has underlying dementia but appeared more somnolent to her sister due to which she was brought into the emergency department  · Multifactorial etiology  · Acute renal failure with creatinine of 8 04  · Hypotension with systolic blood pressure readings in the 80s  · Hemoglobin 6 5  · Urinalysis pending/chest x-ray pending  · Ammonia 70  · Neuro checks  Diastolic CHF ([facility] )  Assessment & Plan  Wt Readings from Last 3 Encounters:   04/14/21 64 1 kg (141 lb 6 4 oz)   04/08/21 63 5 kg (139 lb 15 9 oz)   03/30/21 63 3 kg (139 lb 8 oz)     · Hold Lasix for now  ·         Hypotension  Assessment & Plan  · Patient hypovolemic secondary to diarrhea at home  · Receiving half normal saline at 75 cc/hour  · Monitor blood pressure closely  · Holding Lasix  · Also currently holding metoprolol/Cardizem  Pancytopenia ([facility] [address] )  Assessment & Plan  · Secondary to multiple myeloma  · Hemoglobin at 6 5 for which patient is receiving 2 unit packed RBC transfusion  Monitor H&H closely  · [place] at [address]/OEC actively bleeding hence no platelet product ordered  · White blood cell count at 3 15     Multiple myeloma (HCC)  Assessment & Plan  · Patient currently not on any chemotherapy  · Per Oncology, patient should be considered for hospice or palliative management  · Requested palliative care consult  Hyperkalemia  Assessment & Plan  · For temporary correction patient receiving 10 units regular insulin/half ampules dextrose 50  · Also 1 g calcium gluconate given  · Likely secondary to acute on chronic renal failure however patient is not a dialysis candidate  · Monitor potassium closely  · Monitor patient on telemetry      CESAR (acute kidney injury) [facility])  Assessment & Plan  · Patient presented with creatinine of 8 04/hyperkalemia with potassium of 6 8  · Discussed with Nephrology over the phone, patient not a dialysis candidate  · Recommended 1/2 normal saline with 75 bicarb at 100 cc/hour  · Patient received calcium gluconate  Also ordering 10 units insulin with half amp D50 for temporary correction of potassium  · Recheck potassium at 10:00 p m  Today  · Hold Lasix/lactulose in the setting of acute renal failure secondary to dehydration from diarrhea  Liver cirrhosis (HCC)  Assessment & Plan  · Known history of end-stage liver cirrhosis with recurrent ascites  · Currently holding Lasix in the setting of hypotension  · Also holding lactulose given history of diarrhea accident which may have contributed to the hypotension  · Continue with Xifaxan  · Continue with thiamine/folic acid  · Continue with Protonix    Anemia  Assessment & Plan  · Patient has known history of end-stage multiple myeloma with chronic anemia related to the same  · Hemoglobin 6 5 today for which she is receiving 2 unit packed RBC transfusion (consent obtained over the phone with nurse and presents from [address] [place] [address])  · Monitor H&H closely  · No active bleeding noted  · Continue with pantoprazole  · Continue with folic acid/thiamine  VTE Prophylaxis: Pharmacologic VTE Prophylaxis contraindicated due to low Hb level/ thrombocytopenia  / sequential compression device   Code Status: full code on discussion with sister  POLST: There is no POLST form on file for this patient (pre-hospital)  Discussion with family: MARCOS sister Lucina Mott    Anticipated Length of Stay:  Patient will be admitted on an Inpatient basis with an anticipated length of stay of  Greater than 2 midnights  Justification for Hospital Stay: altered mentation,  Anemia, acute renal failure, hyperkalemia    Total Time for Visit, including Counseling / Coordination of Care: 1 hour    Greater than 50% of this total time spent on direct patient counseling and coordination of care  Chief Complaint:   confusion    History of Present Illness:    Monika Kelsey is a 77 y o  female who presents with hypotension/confusion from home accompanied by her sister  Patient has multiple medical issues including history of end-stage multiple myeloma currently not on any therapy per Oncology and recommendation for hospice/history of end-stage liver cirrhosis/history of chronic kidney disease but not a dialysis candidate due to multiple comorbidities/history of diastolic CHF  On arrival in the emergency department her initial systolic blood pressure was in the 80s  She was also noted to be hyperkalemic with potassium of 6 8/creatinine of 8 04  Ammonia at 70 which is close to her baseline  Troponin was negative  Her cell count noted a low hemoglobin at 6 5/leukopenia with white blood cell count of 3 15/thrombocytopenia with platelet count of 42  She was recently admitted from 04/01/2021 until 04/09/2021 due to acute encephalopathy secondary to UTI/hepatic encephalopathy for which she was treated with 7 days of ceftriaxone as well as lactulose/rifaximin    Patient being admitted to telemetry as inpatient level of stay  Nephrology was consulted and they recommended half-normal saline with bicarb at 75 cc/hour and no current dialysis  Above plan of care discussed with patient's sister who is the Rico Tejeda over the phone and she is in agreement with the same        Review of Systems:    Review of Systems   Unable to perform ROS: Mental status change       Past Medical and Surgical History:     Past Medical History:   Diagnosis Date    [facility]     Benign essential hypertension     last assessed - [id number]    Cirrhosis ([facility] )     CKD (chronic kidney disease) stage 3, GFR 30-59 ml/min ([facility])     Diastolic CHF ([facility] ) 88/96/2160    Gastroesophageal reflux disease without esophagitis 11/16/2017    Hepatic encephalopathy ([facility] )     Hyperbilirubinemia 5/14/2020    Hypertension     Multiple myeloma ([facility] )     Paroxysmal atrial fibrillation ([facility] )     Pneumonia 5/6/2020       Past Surgical History:   Procedure Laterality Date    APPENDECTOMY      BONE MARROW BIOPSY      IR PARACENTESIS  10/2/2020    IR PARACENTESIS  12/28/2020    IR THORACENTESIS  12/21/2020       Meds/Allergies:    Prior to Admission medications    Medication Sig Start Date End Date Taking? Authorizing Provider   diltiazem (CARDIZEM CD) 240 mg 24 hr capsule Take 1 capsule (240 mg total) by mouth daily 4/9/21 5/9/21 Yes Kan Paulino PA-C   folic acid (FOLVITE) 1 mg tablet TAKE ONE TABLET BY MOUTH EVERY DAY 3/1/21  Yes Bacilio Quach PA-C   furosemide (LASIX) 40 mg tablet Take 1 tablet (40 mg total) by mouth daily 4/9/21 5/9/21 Yes Chucky Kim PA-C   lactulose 20 g/30 mL Take 30 mL (20 g total) by mouth 3 (three) times a day 4/9/21 5/9/21 Yes Kan Paulino PA-C   magnesium oxide (MAG-OX) 400 mg tablet TAKE ONE TABLET BY MOUTH TWICE A DAY 3/1/21  Yes Bacilio Quach PA-C   metoprolol tartrate (LOPRESSOR) 25 mg tablet Take 1 tablet (25 mg total) by mouth every 12 (twelve) hours 4/9/21 5/9/21 Yes Mariia Paulino PA-C   pantoprazole (PROTONIX) 40 mg tablet TAKE ONE TABLET BY MOUTH EVERY DAY IN THE MORNING 1/30/21  Yes Sallie Arcos MD   rifaximin (XIFAXAN) 550 mg tablet Take 1 tablet (550 mg total) by mouth every 12 (twelve) hours  Patient taking differently: Take 550 mg by mouth every 12 (twelve) hours Patient is waiting for medication as pharmacy has having some difficulty with medication   4/9/21 5/9/21 Yes Mariia Paulino PA-C   Thiamine HCl (vitamin B-1) 100 MG TABS TAKE ONE TABLET BY MOUTH EVERY DAY 3/1/21  Yes Bacilio Quach PA-C   mirtazapine (REMERON) 15 mg tablet Take 1 tablet (15 mg total) by mouth daily at bedtime 1/14/21   Sallie Arcos MD   nystatin (MYCOSTATIN) powder Apply topically 2 (two) times a day  Patient taking differently: Apply topically as needed  10/8/20   Izabel Wood MD   sodium chloride (OCEAN) 0 65 % nasal spray 1 spray into each nostril every hour as needed for congestion  Patient taking differently: 1 spray into each nostril as needed for congestion  9/5/20 4/14/21  Izabel Wood MD     I have reviewed home medications with patient personally  Allergies: Allergies   Allergen Reactions    Seroquel [Quetiapine] Irritability    Zyprexa [Olanzapine] Confusion       Social History:     Marital Status:      Social History     Substance and Sexual Activity   Alcohol Use Not Currently    Frequency: 4 or more times a week    Drinks per session: 1 or 2    Binge frequency: Never    Comment: History of significant daily alcohol intake  Sister joy no alcohol intake in 6 months     Social History     Tobacco Use   Smoking Status Light Tobacco Smoker    Packs/day: 0 25    Types: Cigarettes   Smokeless Tobacco Never Used   Tobacco Comment    2-3 a day     Social History     Substance and Sexual Activity   Drug Use No       Family History:    non-contributory    Physical Exam:     Vitals:   Blood Pressure: 104/51 (04/23/21 1730)  Pulse: 90 (04/23/21 1730)  Temperature: 98 3 °F (36 8 °C) (04/23/21 1358)  Temp Source: Oral (04/23/21 1358)  Respirations: 16 (04/23/21 1730)  SpO2: 100 % (04/23/21 1730)    Physical Exam  Constitutional:       Comments: Drowsy, arousable on calling her name  orientedx1 to the fact she is in hospital   HENT:      Mouth/Throat:      Mouth: Mucous membranes are dry  Pharynx: Oropharynx is clear  Cardiovascular:      Rate and Rhythm: Normal rate and regular rhythm  Pulmonary:      Comments: Diminished BS bilaterally  No wheezes/ rales/ ronchi  Abdominal:      General: Bowel sounds are normal  There is distension  Tenderness: There is no abdominal tenderness  Comments: firm   Musculoskeletal:      Right lower leg: No edema  Left lower leg: No edema     Neurological: General: No focal deficit present  Comments: orientedx1   Psychiatric:         Mood and Affect: Mood normal          Behavior: Behavior normal          Additional Data:     Lab Results: I have personally reviewed pertinent reports  Results from last 7 days   Lab Units 04/23/21  1425   WBC Thousand/uL 3 15*   HEMOGLOBIN g/dL 6 5*   HEMATOCRIT % 21 4*   PLATELETS Thousands/uL 42*   LYMPHO PCT % 24   MONO PCT % 10   EOS PCT % 3     Results from last 7 days   Lab Units 04/23/21  1425   SODIUM mmol/L 135*   POTASSIUM mmol/L 6 8*   CHLORIDE mmol/L 101   CO2 mmol/L 20*   BUN mg/dL 73*   CREATININE mg/dL 8 04*   ANION GAP mmol/L 14*   CALCIUM mg/dL 7 9*   ALBUMIN g/dL 2 6*   TOTAL BILIRUBIN mg/dL 1 23*   ALK PHOS U/L 259*   ALT U/L 28   AST U/L 69*   GLUCOSE RANDOM mg/dL 91                       Imaging: I have personally reviewed pertinent reports  XR chest portable    (Results Pending)       [facility] / Epic Records Reviewed: Yes     ** Please Note: This note has been constructed using a voice recognition system   **

## 2021-04-23 NOTE — ASSESSMENT & PLAN NOTE
· Patient has known history of end-stage multiple myeloma with chronic anemia related to the same  · Hemoglobin 6 5 today for which she is receiving 2 unit packed RBC transfusion (consent obtained over the phone with nurse and presents from [facility])  · Monitor H&H closely  · No active bleeding noted  · Continue with pantoprazole  · Continue with folic acid/thiamine

## 2021-04-23 NOTE — ASSESSMENT & PLAN NOTE
Wt Readings from Last 3 Encounters:   04/14/21 64 1 kg (141 lb 6 4 oz)   04/08/21 63 5 kg (139 lb 15 9 oz)   03/30/21 63 3 kg (139 lb 8 oz)     · Hold Lasix for now  ·

## 2021-04-23 NOTE — TELEPHONE ENCOUNTER
Autumn Nleson / Faraz Blazing    ELIOT for Dr  patient is not doing well at all    barely eating, yesterday she had handful jelly beans and french fries  Shelvy Mingle drinks [employer]    her BP was 70/52, has a large sore on her buttocks  Brad sent her to the ED    they just went to [facility]  Doesn't look like she will come out    if she does & family wants [employer] to return they will

## 2021-04-23 NOTE — ED NOTES
Pt was placed in a [facility] due to multiple attempts to get out of bed       Wale Echavarria RN  04/23/21 3001

## 2021-04-23 NOTE — ASSESSMENT & PLAN NOTE
· For temporary correction patient receiving 10 units regular insulin/half ampules dextrose 50  · Also 1 g calcium gluconate given  · Likely secondary to acute on chronic renal failure however patient is not a dialysis candidate  · Monitor potassium closely  · Monitor patient on telemetry

## 2021-04-23 NOTE — CASE MANAGEMENT
RECENT READMISSION: 4/1/2021 - 4/9/2021 (8 days)  [facility]    AGE:75  SEX:female  DX:Acute encephalopathy  OUTCOME:  STR  Recommended,  but family chose home with [facility]  RESOURCES ON D/C (previous admission):   [facility]  Palliative   CURRENT F/U APPTS:   GI: GI is working on a [facility] [employer] [facility]    [facility] TCM: 4/14  REASON FOR READMISSION: hypotension, acute encephalopathy  Patient admitted IP  INTERVENTIONS:   Communicated with Stacy on patient return to ED

## 2021-04-23 NOTE — ASSESSMENT & PLAN NOTE
· Patient presented with creatinine of 8 04/hyperkalemia with potassium of 6 8  · Discussed with Nephrology over the phone, patient not a dialysis candidate  · Recommended 1/2 normal saline with 75 bicarb at 100 cc/hour  · Patient received calcium gluconate  Also ordering 10 units insulin with half amp D50 for temporary correction of potassium  · Recheck potassium at 10:00 p m  Today  · Hold Lasix/lactulose in the setting of acute renal failure secondary to dehydration from diarrhea

## 2021-04-23 NOTE — ASSESSMENT & PLAN NOTE
· Patient hypovolemic secondary to diarrhea at home  · Receiving half normal saline at 75 cc/hour  · Monitor blood pressure closely  · Holding Lasix  · Also currently holding metoprolol/Cardizem

## 2021-04-24 ENCOUNTER — APPOINTMENT (INPATIENT)
Dept: ULTRASOUND IMAGING | Facility: HOSPITAL | Age: 76
DRG: 682 | End: 2021-04-24
Payer: MEDICARE

## 2021-04-24 LAB
ABO GROUP BLD BPU: NORMAL
ABO GROUP BLD BPU: NORMAL
ANION GAP SERPL CALCULATED.3IONS-SCNC: 16 MMOL/L (ref 4–13)
ATRIAL RATE: 129 BPM
BPU ID: NORMAL
BPU ID: NORMAL
BUN SERPL-MCNC: 73 MG/DL (ref 5–25)
CALCIUM SERPL-MCNC: 8.1 MG/DL (ref 8.3–10.1)
CHLORIDE SERPL-SCNC: 103 MMOL/L (ref 100–108)
CO2 SERPL-SCNC: 19 MMOL/L (ref 21–32)
CREAT SERPL-MCNC: 7.75 MG/DL (ref 0.6–1.3)
CROSSMATCH: NORMAL
CROSSMATCH: NORMAL
ERYTHROCYTE [DISTWIDTH] IN BLOOD BY AUTOMATED COUNT: 17.9 % (ref 11.6–15.1)
GFR SERPL CREATININE-BSD FRML MDRD: 5 ML/MIN/1.73SQ M
GLUCOSE SERPL-MCNC: 44 MG/DL (ref 65–140)
HCT VFR BLD AUTO: 31 % (ref 34.8–46.1)
HGB BLD-MCNC: 9.8 G/DL (ref 11.5–15.4)
MAGNESIUM SERPL-MCNC: 2.1 MG/DL (ref 1.6–2.6)
MCH RBC QN AUTO: 28.3 PG (ref 26.8–34.3)
MCHC RBC AUTO-ENTMCNC: 31.6 G/DL (ref 31.4–37.4)
MCV RBC AUTO: 90 FL (ref 82–98)
PLATELET # BLD AUTO: 34 THOUSANDS/UL (ref 149–390)
POTASSIUM SERPL-SCNC: 5.1 MMOL/L (ref 3.5–5.3)
QRS AXIS: 101 DEGREES
QRSD INTERVAL: 74 MS
QT INTERVAL: 386 MS
QTC INTERVAL: 469 MS
RBC # BLD AUTO: 3.46 MILLION/UL (ref 3.81–5.12)
SODIUM SERPL-SCNC: 138 MMOL/L (ref 136–145)
T WAVE AXIS: 103 DEGREES
UNIT DISPENSE STATUS: NORMAL
UNIT DISPENSE STATUS: NORMAL
UNIT PRODUCT CODE: NORMAL
UNIT PRODUCT CODE: NORMAL
UNIT RH: NORMAL
UNIT RH: NORMAL
VENTRICULAR RATE: 89 BPM
WBC # BLD AUTO: 3.68 THOUSAND/UL (ref 4.31–10.16)

## 2021-04-24 PROCEDURE — 99223 1ST HOSP IP/OBS HIGH 75: CPT | Performed by: INTERNAL MEDICINE

## 2021-04-24 PROCEDURE — 85027 COMPLETE CBC AUTOMATED: CPT | Performed by: FAMILY MEDICINE

## 2021-04-24 PROCEDURE — 99232 SBSQ HOSP IP/OBS MODERATE 35: CPT | Performed by: FAMILY MEDICINE

## 2021-04-24 PROCEDURE — 80048 BASIC METABOLIC PNL TOTAL CA: CPT | Performed by: FAMILY MEDICINE

## 2021-04-24 PROCEDURE — 83735 ASSAY OF MAGNESIUM: CPT | Performed by: FAMILY MEDICINE

## 2021-04-24 PROCEDURE — P9016 RBC LEUKOCYTES REDUCED: HCPCS

## 2021-04-24 PROCEDURE — 93010 ELECTROCARDIOGRAM REPORT: CPT | Performed by: INTERNAL MEDICINE

## 2021-04-24 RX ADMIN — RIFAXIMIN 550 MG: 550 TABLET ORAL at 09:18

## 2021-04-24 RX ADMIN — PANTOPRAZOLE SODIUM 40 MG: 40 TABLET, DELAYED RELEASE ORAL at 06:24

## 2021-04-24 RX ADMIN — MIRTAZAPINE 15 MG: 15 TABLET, FILM COATED ORAL at 00:58

## 2021-04-24 RX ADMIN — CEFTRIAXONE SODIUM 1000 MG: 10 INJECTION, POWDER, FOR SOLUTION INTRAVENOUS at 21:11

## 2021-04-24 RX ADMIN — MIRTAZAPINE 15 MG: 15 TABLET, FILM COATED ORAL at 21:11

## 2021-04-24 RX ADMIN — RIFAXIMIN 550 MG: 550 TABLET ORAL at 21:11

## 2021-04-24 RX ADMIN — DEXTROSE MONOHYDRATE 25 ML: 500 INJECTION PARENTERAL at 00:58

## 2021-04-24 RX ADMIN — RIFAXIMIN 550 MG: 550 TABLET ORAL at 00:58

## 2021-04-24 RX ADMIN — INSULIN HUMAN 10 UNITS: 100 INJECTION, SOLUTION PARENTERAL at 00:59

## 2021-04-24 RX ADMIN — MAGNESIUM OXIDE TAB 400 MG (241.3 MG ELEMENTAL MG) 400 MG: 400 (241.3 MG) TAB at 09:18

## 2021-04-24 RX ADMIN — NICOTINE 1 PATCH: 7 PATCH, EXTENDED RELEASE TRANSDERMAL at 09:18

## 2021-04-24 RX ADMIN — SODIUM BICARBONATE: 84 INJECTION, SOLUTION INTRAVENOUS at 09:19

## 2021-04-24 RX ADMIN — MAGNESIUM OXIDE TAB 400 MG (241.3 MG ELEMENTAL MG) 400 MG: 400 (241.3 MG) TAB at 18:38

## 2021-04-24 RX ADMIN — THIAMINE HCL TAB 100 MG 100 MG: 100 TAB at 09:18

## 2021-04-24 RX ADMIN — MAGNESIUM OXIDE TAB 400 MG (241.3 MG ELEMENTAL MG) 400 MG: 400 (241.3 MG) TAB at 00:59

## 2021-04-24 NOTE — CASE MANAGEMENT
Pt is a <30 day readmission, not a current bundle  Risk of unplanned readmission score is 53 (red)  Pt admitted due to acute encephalopathy, pt has a history of dementia at baseline  Palliative Care consulted at this time  Pt documented as disoriented x4  CM attempted to contact pt's sister, Dashawn Collado ([phone]) to introduce CM role and begin discharge planning however she did not answer  CM spoke with pt's son/POA, Lorraine Napier ([phone] who confirmed the following information  Pt lives with her sister who is her primary caretaker in a 3rd floor apartment that has elevator access, [address]  Pt requires assistance with all ADLs  Pt uses a wheelchair for ambulation when out of the home  Pt is current with [facility], referral for [facility] placed via [facility] per pt's son's request  Pt's son reports that pt has no history of STR  CM department to follow for discharge concerns or needs  CM reviewed d/c planning process including the following: identifying help at home, patient preference for d/c planning needs, Discharge Lounge, [facility] Meds to Bed program, availability of treatment team to discuss questions or concerns patient and/or family may have regarding understanding medications and recognizing signs and symptoms once discharged  CM also encouraged patient to follow up with all recommended appointments after discharge  Patient advised of importance for patient and family to participate in managing patients medical well being

## 2021-04-24 NOTE — ASSESSMENT & PLAN NOTE
· Patient currently not on any chemotherapy  · Per Oncology, patient should be considered for hospice / palliative management  · Requested palliative care consult  · Spoke with patient's sister PRAVEENA/Cinthia yesterday who stated patient is a full code but would be discussing code status with patient's son Sarbina Herrera  · I attempted to reach both Lumavijaya Aristidesleni and her son Sabrina Herrera today but was not successful  I have left a voice message for Swapna Degroot to call me back

## 2021-04-24 NOTE — CASE MANAGEMENT
Call from Beryle Ducking from [facility]  She reports that patient is continuing to deteriorate and may be a candidate for palliative care or hospice care

## 2021-04-24 NOTE — ASSESSMENT & PLAN NOTE
Wt Readings from Last 3 Encounters:   04/23/21 66 2 kg (145 lb 15 1 oz)   04/14/21 64 1 kg (141 lb 6 4 oz)   04/08/21 63 5 kg (139 lb 15 9 oz)     · Hold Lasix for now  · Gentle fluids given hypotension/ renal failure  · Monitor respiratory status closely

## 2021-04-24 NOTE — PROGRESS NOTES
[facility]  Progress Note - Agnieszka Melton 1945, 77 y o  female MRN: [id number]  Unit/Bed#: -01 Encounter: [id number]  Primary Care Provider: Teresa Rasheed MD   Date and time admitted to hospital: 4/23/2021  1:44 PM    * Acute encephalopathy  Assessment & Plan  Patient has waxing and waning mental status, with underlying dementia, currently mental status at baseline    Multifactorial etiology    · Acute renal failure with creatinine of 8 04, on ivf, improving  · Hypotension with systolic blood pressure readings in the 80s, on ivf  · Hemoglobin 6 5 on presentation s/p 3 units PRBC transfusion  · Urinalysis +, C/S pending ; on ceftriaxone  · chest x-ray notes rt base effusion/ although pneumonia could not be ruled out  · Ammonia 70 ; lactulose on hold given diarrhea leading to hypotension  · Neuro checks  Diastolic CHF ([facility] [address] )  Assessment & Plan  Wt Readings from Last 3 Encounters:   04/23/21 66 2 kg (145 lb 15 1 oz)   04/14/21 64 1 kg (141 lb 6 4 oz)   04/08/21 63 5 kg (139 lb 15 9 oz)     · Hold Lasix for now  · Gentle fluids given hypotension/ renal failure  · Monitor respiratory status closely  Hypotension  Assessment & Plan  · Patient hypovolemic secondary to diarrhea at home  · Receiving half normal saline at 75 cc/hour  · Monitor blood pressure closely  · Holding Lasix  · Also currently holding metoprolol/Cardizem  Pancytopenia ([facility] [address] )  Assessment & Plan  · Secondary to multiple myeloma  · Hemoglobin at 9 8 (s/p 3units PRBC transfusion)  · Platelet count at 31569/BIW actively bleeding hence no platelet product ordered  · White blood cell count at 3 15     Multiple myeloma ([facility] [address] )  Assessment & Plan  · Patient currently not on any chemotherapy  · Per Oncology, patient should be considered for hospice / palliative management  · Requested palliative care consult    · Spoke with patient's sister PRAVEENA/Cinthia yesterday who stated patient is a full code but would be discussing code status with patient's son López Singh  · I attempted to reach both Lizy Batres and her son López Singh today but was not successful  I have left a voice message for Lizy Batres to call me back  Hyperkalemia  Assessment & Plan  · Improved  · 5 1 today  · For temporary correction patient s/p 10 units regular insulin/half ampule dextrose 50  · Also 1 g calcium gluconate given  · Likely secondary to acute on chronic renal failure however patient is not a dialysis candidate  · Monitor potassium closely  · Monitor patient on telemetry  CESAR (acute kidney injury) [facility])  Assessment & Plan  Patient presented with creatinine of 8 04/hyperkalemia with potassium of 6 8  Discussed with Nephrology over the phone, patient not a dialysis candidate    -likely prerenal in the setting of anemia/diarrhea/hypotension    · Creatinine 7 7/potassium 5 1 today  · Continue with D51/2 normal saline with 75 bicarb at 100 cc/hour  · Patient received calcium gluconate  Also S/P 10 units insulin with half amp D50 for temporary correction of potassium  · Hold Lasix/lactulose in the setting of acute renal failure secondary to dehydration from diarrhea  · Renal ultrasound pending  · Patient had urine output of over 1 L in the last 24 hours  · Nephrology on board  Appreciate input  Liver cirrhosis (HCC)  Assessment & Plan  · Known history of end-stage liver cirrhosis with recurrent ascites  · Currently holding Lasix in the setting of hypotension  · Also holding lactulose given history of diarrhea which may have contributed to the hypotension  · Continue with Xifaxan  · Continue with thiamine/folic acid  · Continue with Protonix    Anemia  Assessment & Plan  · Patient has known history of multiple myeloma with chronic anemia related to the same  · Hemoglobin 6 5 on admission status post 3 unit packed RBC transfusion, hemoglobin 9 8 this morning  · Monitor H&H closely  · No active bleeding noted    · Continue with pantoprazole  · Continue with folic acid/thiamine  VTE Pharmacologic Prophylaxis:   Pharmacologic: Pharmacologic VTE Prophylaxis contraindicated due to anemia/liver disease/ thrombocytopenia  Mechanical VTE Prophylaxis in Place: Yes    Patient Centered Rounds: I have performed bedside rounds with nursing staff today  Discussions with Specialists or Other Care Team Provider: nephrology    Education and Discussions with Family / Patient: attempting to reach sister Cinthia/ son Joyce Chaparro, unsuccesful, left message to call back    Time Spent for Care: 30 minutes  More than 50% of total time spent on counseling and coordination of care as described above  Current Length of Stay: 1 day(s)    Current Patient Status: Inpatient   Certification Statement: The patient will continue to require additional inpatient hospital stay due to renal failure needing ivf    Discharge Plan: undetermined    Code Status: Level 1 - Full Code    Subjective:   Still confused, disoriented to time/place and person  She reports some SOB, no chest pain, no nasuea/ vomiting, no abdominal pain  No fevers overnight    Objective:     Vitals:   Temp (24hrs), Av 5 °F (36 4 °C), Min:97 3 °F (36 3 °C), Max:98 3 °F (36 8 °C)    Temp:  [97 3 °F (36 3 °C)-98 3 °F (36 8 °C)] 97 5 °F (36 4 °C)  HR:  [] 100  Resp:  [16-21] 20  BP: ()/(51-77) 113/71  SpO2:  [81 %-100 %] 93 %  Body mass index is 26 69 kg/m²  Input and Output Summary (last 24 hours): Intake/Output Summary (Last 24 hours) at 2021 1339  Last data filed at 2021 1336  Gross per 24 hour   Intake 1620 ml   Output --   Net 1620 ml       Physical Exam:     Physical Exam  Constitutional:       Comments: Disoriented x 3  Chronically ill appearing  Not in any respiratory distress   HENT:      Mouth/Throat:      Mouth: Mucous membranes are dry  Pharynx: Oropharynx is clear  Cardiovascular:      Rate and Rhythm: Normal rate and regular rhythm     Pulmonary:      Effort: Pulmonary effort is normal       Comments: Diminished bs rt base  No ronchi/ rales  Abdominal:      General: Abdomen is flat  There is distension  Comments: Firm, non tender, no guarding/rigidity   Musculoskeletal:      Right lower leg: No edema  Left lower leg: No edema  Skin:     General: Skin is warm and dry  Neurological:      Mental Status: She is alert  Additional Data:     Labs:    Results from last 7 days   Lab Units 04/24/21  0537 04/23/21  1425   WBC Thousand/uL 3 68* 3 15*   HEMOGLOBIN g/dL 9 8* 6 5*   HEMATOCRIT % 31 0* 21 4*   PLATELETS Thousands/uL 34* 42*   LYMPHO PCT %  --  24   MONO PCT %  --  10   EOS PCT %  --  3     Results from last 7 days   Lab Units 04/24/21  0537 04/23/21  1425   SODIUM mmol/L 138 135*   POTASSIUM mmol/L 5 1 6 8*   CHLORIDE mmol/L 103 101   CO2 mmol/L 19* 20*   BUN mg/dL 73* 73*   CREATININE mg/dL 7 75* 8 04*   ANION GAP mmol/L 16* 14*   CALCIUM mg/dL 8 1* 7 9*   ALBUMIN g/dL  --  2 6*   TOTAL BILIRUBIN mg/dL  --  1 23*   ALK PHOS U/L  --  259*   ALT U/L  --  28   AST U/L  --  69*   GLUCOSE RANDOM mg/dL 44* 91                     * I Have Reviewed All Lab Data Listed Above  * Additional Pertinent Lab Tests Reviewed:  All Labs Within Last 24 Hours Reviewed    Recent Cultures (last 7 days):           Last 24 Hours Medication List:   Current Facility-Administered Medications   Medication Dose Route Frequency Provider Last Rate    cefTRIAXone  1,000 mg Intravenous Q24H Joy Jensen MD      magnesium oxide  400 mg Oral BID Joy Jensen MD      mirtazapine  15 mg Oral HS Joy Jensen MD      nicotine  1 patch Transdermal Daily Joy Jensen MD      pantoprazole  40 mg Oral Early Morning Joy Jensen MD      rifaximin  550 mg Oral Q12H Georgina Leventhal, MD      IV infusion builder   Intravenous Continuous Joy Jensen MD 75 mL/hr at 04/24/21 0919    sodium chloride  1 spray Each Nare Q1H PRN Joy Jensen MD      vitamin B-1  100 mg Oral Daily Joy Jensen MD Today, Patient Was Seen By: LOAN Lora      ** Please Note: Dictation voice to text software may have been used in the creation of this document   **

## 2021-04-24 NOTE — ASSESSMENT & PLAN NOTE
Patient has waxing and waning mental status, with underlying dementia, currently mental status at baseline    Multifactorial etiology    · Acute renal failure with creatinine of 8 04, on ivf, improving  · Hypotension with systolic blood pressure readings in the 80s, on ivf  · Hemoglobin 6 5 on presentation s/p 3 units PRBC transfusion  · Urinalysis +, C/S pending ; on ceftriaxone  · chest x-ray notes rt base effusion/ although pneumonia could not be ruled out  · Ammonia 70 ; lactulose on hold given diarrhea leading to hypotension  · Neuro checks

## 2021-04-24 NOTE — ASSESSMENT & PLAN NOTE
· Patient has known history of multiple myeloma with chronic anemia related to the same  · Hemoglobin 6 5 on admission status post 3 unit packed RBC transfusion, hemoglobin 9 8 this morning  · Monitor H&H closely  · No active bleeding noted  · Continue with pantoprazole  · Continue with folic acid/thiamine

## 2021-04-24 NOTE — ASSESSMENT & PLAN NOTE
Patient presented with creatinine of 8 04/hyperkalemia with potassium of 6 8  Discussed with Nephrology over the phone, patient not a dialysis candidate    -likely prerenal in the setting of anemia/diarrhea/hypotension    · Creatinine 7 7/potassium 5 1 today  · Continue with D51/2 normal saline with 75 bicarb at 100 cc/hour  · Patient received calcium gluconate  Also S/P 10 units insulin with half amp D50 for temporary correction of potassium  · Hold Lasix/lactulose in the setting of acute renal failure secondary to dehydration from diarrhea  · Renal ultrasound pending  · Patient had urine output of over 1 L in the last 24 hours  · Nephrology on board  Appreciate input

## 2021-04-24 NOTE — CONSULTS
NEPHROLOGY CONSULTATION NOTE    Patient: Sandee Franklin               Sex: female          DOA: 4/23/2021  1:44 PM   YOB: 1945        Age:  77 y o         LOS:  LOS: 1 day     REFERRING PHYSICIAN: Dr Raman Lake / CONSULTATION:  CESAR on CKD III    DATE OF CONSULTATION / SERVICE: 4/24/2021    ADMISSION DIAGNOSIS: Acute encephalopathy     CHIEF COMPLAINT     Diarrhea and confusion  HPI     This is a 71-year-old female with past medical history of CHF with grade 2 diastolic dysfunction, chronic kidney disease stage 3, dementia, liver cirrhosis decompensated, pneumonia, end-stage multiple myeloma, prior history of acute kidney injury who presents to our facility with perfuse diarrhea as well as confusion brought by sister  Upon arrival patient noted to have abnormal labs including serum potassium of 6 8 and serum creatinine of 8 0  Also noted to have a hemoglobin of 6 5 and ammonia level of 70  Chest x-ray revealed probable right-sided pleural effusion  Patient was given 1 L of isolate initiated on bicarbonate based fluids due to acidosis and hyperkalemia  This morning patient appears at her baseline and is awake, alert and in no distress           PAST MEDICAL HISTORY     Past Medical History:   Diagnosis Date    Alcoholic cirrhosis ([address] )     Benign essential hypertension     last assessed - [id number]    Cirrhosis (HCC)     CKD (chronic kidney disease) stage 3, GFR 30-59 ml/min (HCC)     Diastolic CHF ([address] ) 58/84/9037    Gastroesophageal reflux disease without esophagitis 11/16/2017    Hepatic encephalopathy ([address] )     Hyperbilirubinemia 5/14/2020    Hypertension     Multiple myeloma (HCC)     Paroxysmal atrial fibrillation ([address] )     Pneumonia 5/6/2020       PAST SURGICAL HISTORY     Past Surgical History:   Procedure Laterality Date    APPENDECTOMY      BONE MARROW BIOPSY      IR PARACENTESIS  10/2/2020    IR PARACENTESIS  12/28/2020    IR THORACENTESIS 12/21/2020       ALLERGIES     Allergies   Allergen Reactions    Seroquel [Quetiapine] Irritability    Zyprexa [Olanzapine] Confusion       SOCIAL HISTORY     Social History     Substance and Sexual Activity   Alcohol Use Not Currently    Frequency: 4 or more times a week    Drinks per session: 1 or 2    Binge frequency: Never    Comment: History of significant daily alcohol intake   Sister joy no alcohol intake in 6 months     Social History     Substance and Sexual Activity   Drug Use No     Social History     Tobacco Use   Smoking Status Light Tobacco Smoker    Packs/day: 0 25    Types: Cigarettes   Smokeless Tobacco Never Used   Tobacco Comment    2-3 a day       FAMILY HISTORY     Family History   Problem Relation Age of Onset    Heart attack Father         myocardial infarction    Heart disease Father        CURRENT MEDICATIONS       Current Facility-Administered Medications:     cefTRIAXone (ROCEPHIN) 1,000 mg in dextrose 5 % 50 mL IVPB, 1,000 mg, Intravenous, Q24H, Brett Aquino MD    magnesium oxide (MAG-OX) tablet 400 mg, 400 mg, Oral, BID, Brett Aquino MD, 400 mg at 04/24/21 0918    mirtazapine (REMERON) tablet 15 mg, 15 mg, Oral, HS, Brett Aquino MD, 15 mg at 04/24/21 0058    nicotine (NICODERM CQ) 7 mg/24hr TD 24 hr patch 1 patch, 1 patch, Transdermal, Daily, Brett Aquino MD, 1 patch at 04/24/21 0918    pantoprazole (PROTONIX) EC tablet 40 mg, 40 mg, Oral, Early Morning, Dk Evans MD, 40 mg at 04/24/21 0624    rifaximin (XIFAXAN) tablet 550 mg, 550 mg, Oral, Q12H [address], Dk Evans MD, 550 mg at 04/24/21 0918    sodium bicarbonate 75 mEq in dextrose 5 % and sodium chloride 0 45 % (D5%-0 45%NaCl) infusion, , Intravenous, Continuous, Dk Evans MD, Last Rate: 75 mL/hr at 04/24/21 0919, New Bag at 04/24/21 0919    sodium chloride (OCEAN) 0 65 % nasal spray 1 spray, 1 spray, Each Nare, Q1H PRN, Brett Aquino MD    thiamine tablet 100 mg, 100 mg, Oral, Daily, Dk Kulwinder Mcclellan MD, 100 mg at 04/24/21 0712    REVIEW OF SYSTEMS     Review of Systems   Unable to perform ROS: Dementia         OBJECTIVE     Current Weight: Weight - Scale: 66 2 kg (145 lb 15 1 oz)  Vitals:    04/24/21 0816   BP: 92/57   Pulse: 103   Resp: 16   Temp: (!) 90 5 °F (32 5 °C)   SpO2: (!) 87%     Body mass index is 26 69 kg/m²  Intake/Output Summary (Last 24 hours) at 4/24/2021 1016  Last data filed at 4/24/2021 0831  Gross per 24 hour   Intake 1520 ml   Output --   Net 1520 ml       PHYSICAL EXAMINATION     Physical Exam  Constitutional:       Appearance: She is well-developed  HENT:      Head: Normocephalic and atraumatic  Eyes:      Pupils: Pupils are equal, round, and reactive to light  Neck:      Musculoskeletal: Neck supple  Cardiovascular:      Rate and Rhythm: Normal rate and regular rhythm  Heart sounds: Normal heart sounds  Pulmonary:      Effort: Pulmonary effort is normal    Abdominal:      General: Bowel sounds are normal  There is distension  Palpations: Abdomen is soft  Musculoskeletal: Normal range of motion  Skin:     General: Skin is warm  Neurological:      Mental Status: She is alert  Comments: Oriented to Person and place only           LAB RESULTS        Results from last 7 days   Lab Units 04/24/21  0537 04/23/21  1425   WBC Thousand/uL 3 68* 3 15*   HEMOGLOBIN g/dL 9 8* 6 5*   HEMATOCRIT % 31 0* 21 4*   PLATELETS Thousands/uL 34* 42*   POTASSIUM mmol/L 5 1 6 8*   CHLORIDE mmol/L 103 101   CO2 mmol/L 19* 20*   BUN mg/dL 73* 73*   CREATININE mg/dL 7 75* 8 04*   EGFR ml/min/1 73sq m 5 5   CALCIUM mg/dL 8 1* 7 9*   MAGNESIUM mg/dL 2 1  --        I have personally reviewed the old medical records and patient's previously known baseline creatinine level is ~ 1 4    RADIOLOGY RESULTS     Results for orders placed during the hospital encounter of 04/23/21   XR chest portable    Narrative CHEST     INDICATION:   confusion, rule out pneumonia      COMPARISON: 4/1/2021    EXAM PERFORMED/VIEWS:  XR CHEST PORTABLE      FINDINGS:    Heart shadow appears unremarkable  Atherosclerotic vascular calcifications are noted  There is increased opacification in the right hemithorax, likely worsening effusion with atelectasis  No pneumothorax  Osseous structures appear within normal limits for patient age  Impression Increased opacification within the right hemithorax, likely worsening effusion with atelectasis  Underlying pneumonia cannot be excluded  Workstation performed: [id number]               PLAN / RECOMMENDATIONS      29-year-old female with past medical history of CHF with diastolic dysfunction, hepatic cirrhosis, dementia, chronic kidney disease stage 3, end-stage multiple myeloma who presents to our facility with diarrhea and confusion and noted to have severe acute kidney injury  1  Acute kidney injury on chronic kidney disease stage 3:  Baseline creatinine 1 4   -present with serum creatinine of 8 0 and worse   -etiology thought to be secondary to profuse diarrhea with decreased p o  Intake plus severe anemia resulting in hypoperfusion plus hypotension   -initiated on half-normal saline with 75 mEq sodium bicarbonate resulting in only modest improvement serum creatinine which is down to 7 7   -urinalysis revealed presence of ketones and hence will administer further fluids suggestive of prerenal azotemia   -cannot rule out myeloma kidney and so will order urine protein creatinine ratio as well as   kappa lambda chain ratio   -given patient's multiple comorbidities, she is not a candidate for hemodialysis  2  Hyperkalemia:  Presented with serum potassium 6 8 and elevated   -potassium appears to have improved to 5 1 this morning as result of temporizing medication as well as bicarbonate drip  3  Diarrhea:  Likely caused by lactulose was held  4  Anemia due to CKD:  Presented with hemoglobin of 6 5 and low    Underwent blood transfusion resulting hemoglobin 9 8 and improved  5  Hepatic cirrhosis:  Likely secondary to alcohol use with frequent decompensation requiring paracentesis  6  Thrombocytopenia:  Platelet count of 34 K noted likely secondary to # 5      7  Right lung infiltrate:  Noted on chest x-ray and initiated on antibiotics  Thank you for the consultation to participate in patient's care  I have personally discussed my plan with the referring physician       Irena Norman MD    4/24/2021

## 2021-04-24 NOTE — ASSESSMENT & PLAN NOTE
· Known history of end-stage liver cirrhosis with recurrent ascites  · Currently holding Lasix in the setting of hypotension  · Also holding lactulose given history of diarrhea which may have contributed to the hypotension  · Continue with Xifaxan  · Continue with thiamine/folic acid  · Continue with Protonix

## 2021-04-24 NOTE — ASSESSMENT & PLAN NOTE
· Improved  · 5 1 today  · For temporary correction patient s/p 10 units regular insulin/half ampule dextrose 50  · Also 1 g calcium gluconate given  · Likely secondary to acute on chronic renal failure however patient is not a dialysis candidate  · Monitor potassium closely  · Monitor patient on telemetry

## 2021-04-24 NOTE — ASSESSMENT & PLAN NOTE
· Secondary to multiple myeloma  · Hemoglobin at 9 8 (s/p 3units PRBC transfusion)  · Platelet count at 74069/TTR actively bleeding hence no platelet product ordered  · White blood cell count at 3 15

## 2021-04-25 ENCOUNTER — APPOINTMENT (INPATIENT)
Dept: ULTRASOUND IMAGING | Facility: HOSPITAL | Age: 76
DRG: 682 | End: 2021-04-25
Payer: MEDICARE

## 2021-04-25 LAB
ALBUMIN SERPL BCP-MCNC: 3 G/DL (ref 3.5–5)
ALP SERPL-CCNC: 277 U/L (ref 46–116)
ALT SERPL W P-5'-P-CCNC: 36 U/L (ref 12–78)
AMMONIA PLAS-SCNC: 80 UMOL/L (ref 11–35)
ANION GAP SERPL CALCULATED.3IONS-SCNC: 16 MMOL/L (ref 4–13)
ANISOCYTOSIS BLD QL SMEAR: PRESENT
AST SERPL W P-5'-P-CCNC: 54 U/L (ref 5–45)
ATRIAL RATE: 119 BPM
BACTERIA UR QL AUTO: ABNORMAL /HPF
BASOPHILS # BLD MANUAL: 0 THOUSAND/UL (ref 0–0.1)
BASOPHILS NFR MAR MANUAL: 0 % (ref 0–1)
BILIRUB SERPL-MCNC: 1.34 MG/DL (ref 0.2–1)
BILIRUB UR QL STRIP: NEGATIVE
BUN SERPL-MCNC: 76 MG/DL (ref 5–25)
CALCIUM ALBUM COR SERPL-MCNC: 9.1 MG/DL (ref 8.3–10.1)
CALCIUM SERPL-MCNC: 8.3 MG/DL (ref 8.3–10.1)
CHLORIDE SERPL-SCNC: 102 MMOL/L (ref 100–108)
CLARITY UR: ABNORMAL
CO2 SERPL-SCNC: 20 MMOL/L (ref 21–32)
COLOR UR: YELLOW
CREAT SERPL-MCNC: 7.14 MG/DL (ref 0.6–1.3)
EOSINOPHIL # BLD MANUAL: 0.07 THOUSAND/UL (ref 0–0.4)
EOSINOPHIL NFR BLD MANUAL: 2 % (ref 0–6)
ERYTHROCYTE [DISTWIDTH] IN BLOOD BY AUTOMATED COUNT: 18.9 % (ref 11.6–15.1)
GFR SERPL CREATININE-BSD FRML MDRD: 6 ML/MIN/1.73SQ M
GLUCOSE SERPL-MCNC: 92 MG/DL (ref 65–140)
GLUCOSE UR STRIP-MCNC: NEGATIVE MG/DL
HCT VFR BLD AUTO: 31.6 % (ref 34.8–46.1)
HGB BLD-MCNC: 10.1 G/DL (ref 11.5–15.4)
HGB UR QL STRIP.AUTO: ABNORMAL
KETONES UR STRIP-MCNC: NEGATIVE MG/DL
LEUKOCYTE ESTERASE UR QL STRIP: ABNORMAL
LYMPHOCYTES # BLD AUTO: 0.79 THOUSAND/UL (ref 0.6–4.47)
LYMPHOCYTES # BLD AUTO: 22 % (ref 14–44)
MCH RBC QN AUTO: 28.5 PG (ref 26.8–34.3)
MCHC RBC AUTO-ENTMCNC: 32 G/DL (ref 31.4–37.4)
MCV RBC AUTO: 89 FL (ref 82–98)
METAMYELOCYTES NFR BLD MANUAL: 1 % (ref 0–1)
MONOCYTES # BLD AUTO: 0.25 THOUSAND/UL (ref 0–1.22)
MONOCYTES NFR BLD: 7 % (ref 4–12)
MYELOCYTES NFR BLD MANUAL: 1 % (ref 0–1)
NEUTROPHILS # BLD MANUAL: 2.3 THOUSAND/UL (ref 1.85–7.62)
NEUTS BAND NFR BLD MANUAL: 4 % (ref 0–8)
NEUTS SEG NFR BLD AUTO: 60 % (ref 43–75)
NITRITE UR QL STRIP: NEGATIVE
NON-SQ EPI CELLS URNS QL MICRO: ABNORMAL /HPF
NRBC BLD AUTO-RTO: 19 /100 WBC (ref 0–2)
NRBC BLD AUTO-RTO: 21 /100 WBCS
PH UR STRIP.AUTO: 6 [PH]
PLATELET # BLD AUTO: 33 THOUSANDS/UL (ref 149–390)
PLATELET BLD QL SMEAR: ABNORMAL
POTASSIUM SERPL-SCNC: 4.6 MMOL/L (ref 3.5–5.3)
PROT SERPL-MCNC: 6.5 G/DL (ref 6.4–8.2)
PROT UR STRIP-MCNC: ABNORMAL MG/DL
QRS AXIS: 107 DEGREES
QRSD INTERVAL: 76 MS
QT INTERVAL: 348 MS
QTC INTERVAL: 473 MS
RBC # BLD AUTO: 3.55 MILLION/UL (ref 3.81–5.12)
RBC #/AREA URNS AUTO: ABNORMAL /HPF
SODIUM SERPL-SCNC: 138 MMOL/L (ref 136–145)
SP GR UR STRIP.AUTO: 1.02 (ref 1–1.03)
T WAVE AXIS: 78 DEGREES
TOTAL CELLS COUNTED SPEC: 100
UROBILINOGEN UR QL STRIP.AUTO: 0.2 E.U./DL
VARIANT LYMPHS # BLD AUTO: 3 %
VENTRICULAR RATE: 111 BPM
WBC # BLD AUTO: 3.6 THOUSAND/UL (ref 4.31–10.16)
WBC #/AREA URNS AUTO: ABNORMAL /HPF

## 2021-04-25 PROCEDURE — 87086 URINE CULTURE/COLONY COUNT: CPT | Performed by: FAMILY MEDICINE

## 2021-04-25 PROCEDURE — 0T9B70Z DRAINAGE OF BLADDER WITH DRAINAGE DEVICE, VIA NATURAL OR ARTIFICIAL OPENING: ICD-10-PCS | Performed by: INTERNAL MEDICINE

## 2021-04-25 PROCEDURE — 85007 BL SMEAR W/DIFF WBC COUNT: CPT | Performed by: FAMILY MEDICINE

## 2021-04-25 PROCEDURE — 87493 C DIFF AMPLIFIED PROBE: CPT | Performed by: FAMILY MEDICINE

## 2021-04-25 PROCEDURE — 87077 CULTURE AEROBIC IDENTIFY: CPT | Performed by: FAMILY MEDICINE

## 2021-04-25 PROCEDURE — 82140 ASSAY OF AMMONIA: CPT | Performed by: FAMILY MEDICINE

## 2021-04-25 PROCEDURE — 80053 COMPREHEN METABOLIC PANEL: CPT | Performed by: FAMILY MEDICINE

## 2021-04-25 PROCEDURE — 93005 ELECTROCARDIOGRAM TRACING: CPT

## 2021-04-25 PROCEDURE — 99233 SBSQ HOSP IP/OBS HIGH 50: CPT | Performed by: INTERNAL MEDICINE

## 2021-04-25 PROCEDURE — 81001 URINALYSIS AUTO W/SCOPE: CPT | Performed by: FAMILY MEDICINE

## 2021-04-25 PROCEDURE — 76770 US EXAM ABDO BACK WALL COMP: CPT

## 2021-04-25 PROCEDURE — 87106 FUNGI IDENTIFICATION YEAST: CPT | Performed by: FAMILY MEDICINE

## 2021-04-25 PROCEDURE — 87186 SC STD MICRODIL/AGAR DIL: CPT | Performed by: FAMILY MEDICINE

## 2021-04-25 PROCEDURE — 85027 COMPLETE CBC AUTOMATED: CPT | Performed by: FAMILY MEDICINE

## 2021-04-25 PROCEDURE — 93010 ELECTROCARDIOGRAM REPORT: CPT | Performed by: INTERNAL MEDICINE

## 2021-04-25 PROCEDURE — 99233 SBSQ HOSP IP/OBS HIGH 50: CPT | Performed by: FAMILY MEDICINE

## 2021-04-25 RX ORDER — LANOLIN ALCOHOL/MO/W.PET/CERES
3 CREAM (GRAM) TOPICAL
Status: DISCONTINUED | OUTPATIENT
Start: 2021-04-25 | End: 2021-04-27

## 2021-04-25 RX ORDER — ALPRAZOLAM 0.5 MG/1
0.5 TABLET ORAL ONCE
Status: COMPLETED | OUTPATIENT
Start: 2021-04-25 | End: 2021-04-26

## 2021-04-25 RX ADMIN — MELATONIN TAB 3 MG 3 MG: 3 TAB at 21:59

## 2021-04-25 RX ADMIN — MIRTAZAPINE 15 MG: 15 TABLET, FILM COATED ORAL at 22:00

## 2021-04-25 RX ADMIN — SODIUM BICARBONATE: 84 INJECTION, SOLUTION INTRAVENOUS at 00:29

## 2021-04-25 RX ADMIN — RIFAXIMIN 550 MG: 550 TABLET ORAL at 11:56

## 2021-04-25 RX ADMIN — THIAMINE HCL TAB 100 MG 100 MG: 100 TAB at 11:56

## 2021-04-25 RX ADMIN — MAGNESIUM OXIDE TAB 400 MG (241.3 MG ELEMENTAL MG) 400 MG: 400 (241.3 MG) TAB at 17:32

## 2021-04-25 RX ADMIN — SODIUM BICARBONATE: 84 INJECTION, SOLUTION INTRAVENOUS at 13:00

## 2021-04-25 RX ADMIN — RIFAXIMIN 550 MG: 550 TABLET ORAL at 21:59

## 2021-04-25 RX ADMIN — MAGNESIUM OXIDE TAB 400 MG (241.3 MG ELEMENTAL MG) 400 MG: 400 (241.3 MG) TAB at 11:56

## 2021-04-25 RX ADMIN — NICOTINE 1 PATCH: 7 PATCH, EXTENDED RELEASE TRANSDERMAL at 11:58

## 2021-04-25 NOTE — PROGRESS NOTES
[address] [facility]  Progress Note - Robby Barnett 1945, 77 y o  female MRN: [id number]  Unit/Bed#: -Cathy Encounter: [id number]  Primary Care Provider: Jolie Viveros MD   Date and time admitted to hospital: 4/23/2021  1:44 PM    * Acute encephalopathy  Assessment & Plan  Patient has waxing and waning mental status, with underlying dementia, currently mental status at baseline    Multifactorial etiology    · Acute renal failure with creatinine of 8 04, on ivf, improving  · Hypotension with systolic blood pressure readings in the 80s, on ivf  · Hemoglobin 6 5 on presentation s/p 3 units PRBC transfusion, improving  · Urinalysis +, C/S pending ; on ceftriaxone  · chest x-ray notes rt base effusion/ although pneumonia could not be ruled out  · Ammonia 80 ; lactulose on hold given diarrhea leading to hypotension  · Neuro checks  Liver cirrhosis (HCC)  Assessment & Plan  · Known history of end-stage liver cirrhosis with recurrent ascites  · Currently holding Lasix in the setting of hypotension  · Her abdomen is more distended today, unfortunately in the setting of acute renal failure with extremely poor urine output, continuing with IV fluids  Patient will need a paracentesis  · Ordered paracentesis for tomorrow  · Also holding lactulose given history of diarrhea which may have contributed to the hypotension  · Continue with Xifaxan  · Continue with thiamine/folic acid  · Continue with Protonix  · GI consult requested  CESAR (acute kidney injury) [facility])  Assessment & Plan  Patient presented with creatinine of 8 04/hyperkalemia with potassium of 6 8  Discussed with Nephrology, patient not a dialysis candidate    -likely prerenal in the setting of anemia/diarrhea/hypotension    · Creatinine 7 1/potassium 4 6 today  · Continue with D51/2 normal saline with 75 bicarb at 50 cc/hour ( reduced rate)  · Pierce placed  Very minimal urine output   Monitor I/Os closely  · Hold Lasix/lactulose in the setting of acute renal failure secondary to dehydration from diarrhea  · Renal ultrasound pending  · Nephrology on board  Appreciate input  Anemia  Assessment & Plan  · Patient has known history of multiple myeloma with chronic anemia related to the same  · Hemoglobin 6 5 on admission status post 3 unit packed RBC transfusion, hemoglobin 10 1 this morning  · Monitor H&H closely  · No active bleeding noted  · Continue with pantoprazole  · Continue with folic acid/thiamine  Pancytopenia ([address] )  Assessment & Plan  · Secondary to multiple myeloma  · Hemoglobin at 10 1 (s/p 3units PRBC transfusion)  · Platelet count at 8[address]75/VIG actively bleeding hence no platelet product ordered  · White blood cell count at 3 6  Multiple myeloma [facility])  Assessment & Plan  · Patient currently not on any chemotherapy  · Per Oncology, patient should be considered for hospice / palliative management  · Requested palliative care consult  · Spoke with patient's son [address]  over the phone/he is the health POA and he confirms that patient is a level 1  · Also discussed with patient's sister Leeann Sharp over the phone and she confirms patient is a level 1 too  · Both are aware regarding patient's fragile condition but state patient would have wanted CPR/intubation if necessary  Diastolic CHF ([address] )  Assessment & Plan  Wt Readings from Last 3 Encounters:   04/23/21 66 2 kg (145 lb 15 1 oz)   04/14/21 64 1 kg (141 lb 6 4 oz)   04/08/21 63 5 kg (139 lb 15 9 oz)     · Hold Lasix for now  · Gentle fluids given hypotension/ renal failure  · Now requiring 2 L oxygen via nasal cannula  · Chest x-ray on initial assessment did note right-sided pleural effusion  · Patient will benefit from right-sided thoracentesis as unable to give IV diuretics in the setting of acute renal failure/oliguria      Hypotension  Assessment & Plan  · Improving  · Patient hypovolemic secondary to diarrhea at home  · Receiving half normal saline at 50 cc/hour  · Monitor blood pressure closely  · Holding Lasix  · Also currently holding metoprolol/Cardizem  Hyperkalemia  Assessment & Plan  · Improved  · 4 6 today  · For temporary correction patient s/p 10 units regular insulin/half ampule dextrose 50  · Also 1 g calcium gluconate given  · Likely secondary to acute on chronic renal failure however patient is not a dialysis candidate  · Monitor potassium closely  · Monitor patient on telemetry  VTE Pharmacologic Prophylaxis:   Pharmacologic: Pharmacologic VTE Prophylaxis contraindicated due to thrombocytopenia/ liver disease  Mechanical VTE Prophylaxis in Place: Yes    Patient Centered Rounds: I have performed bedside rounds with nursing staff today  Discussions with Specialists or Other Care Team Provider: nephrology    Education and Discussions with Family / Patient: delta son tee/ sister Bev Thompson    Time Spent for Care: 45 minutes  More than 50% of total time spent on counseling and coordination of care as described above  Current Length of Stay: 2 day(s)    Current Patient Status: Inpatient   Certification Statement: The patient will continue to require additional inpatient hospital stay due to acute renal failure/ hypoxia/ ascitis    Discharge Plan: when medically stable    Code Status: Level 1 - Full Code    Subjective:   Abdomen more distended  On discussion with patient she states off and on painful  No nausea vomiting  She also requires 2 L oxygen via nasal cannula and does report some mild shortness of breath  No cough  No chest pain  She is drowsy although she did not sleep all night and has been sleeping since this morning  She was arousable on calling out her name  She was however confused regarding place and time      Objective:     Vitals:   Temp (24hrs), Av 7 °F (36 5 °C), Min:97 4 °F (36 3 °C), Max:98 1 °F (36 7 °C)    Temp:  [97 4 °F (36 3 °C)-98 1 °F (36 7 °C)] 98 1 °F (36 7 °C)  HR:  [107-124] 107  Resp:  [16-28] 16  BP: (104-145)/(64-74) 107/69  SpO2:  [91 %-100 %] 99 %  Body mass index is 26 69 kg/m²  Input and Output Summary (last 24 hours): Intake/Output Summary (Last 24 hours) at 4/25/2021 1201  Last data filed at 4/25/2021 0900  Gross per 24 hour   Intake 0 ml   Output --   Net 0 ml       Physical Exam:     Physical Exam  Constitutional:       Comments: Drowsy frail in appearance  Not in respiratory distress   disorientedx3   HENT:      Mouth/Throat:      Mouth: Mucous membranes are moist       Pharynx: Oropharynx is clear  Cardiovascular:      Rate and Rhythm: Normal rate and regular rhythm  Pulmonary:      Effort: Pulmonary effort is normal  No respiratory distress  Breath sounds: No wheezing or rales  Abdominal:      Comments: Distended, non tender, regular BS heard   Musculoskeletal:      Right lower leg: No edema  Left lower leg: No edema  Additional Data:     Labs:    Results from last 7 days   Lab Units 04/25/21  0712   WBC Thousand/uL 3 60*   HEMOGLOBIN g/dL 10 1*   HEMATOCRIT % 31 6*   PLATELETS Thousands/uL 33*   BANDS PCT % 4   LYMPHO PCT % 22   MONO PCT % 7   EOS PCT % 2     Results from last 7 days   Lab Units 04/25/21  0712   SODIUM mmol/L 138   POTASSIUM mmol/L 4 6   CHLORIDE mmol/L 102   CO2 mmol/L 20*   BUN mg/dL 76*   CREATININE mg/dL 7 14*   ANION GAP mmol/L 16*   CALCIUM mg/dL 8 3   ALBUMIN g/dL 3 0*   TOTAL BILIRUBIN mg/dL 1 34*   ALK PHOS U/L 277*   ALT U/L 36   AST U/L 54*   GLUCOSE RANDOM mg/dL 92                     * I Have Reviewed All Lab Data Listed Above  * Additional Pertinent Lab Tests Reviewed:  All Labs Within Last 24 Hours Reviewed    Recent Cultures (last 7 days):         Last 24 Hours Medication List:   Current Facility-Administered Medications   Medication Dose Route Frequency Provider Last Rate    ALPRAZolam  0 5 mg Oral Once MEREDITH Coronado      cefTRIAXone  1,000 mg Intravenous Q24H Aleshia Daly MD 1,000 mg (04/24/21 2111)    magnesium oxide 400 mg Oral BID Joy Jensen, MD      melatonin  3 mg Oral HS Joy Jensen, MD      mirtazapine  15 mg Oral HS Joy Jensen, MD      nicotine  1 patch Transdermal Daily Joy Jensen, MD      pantoprazole  40 mg Oral Early Morning Joy Jensen, MD      rifaximin  550 mg Oral Q12H Georgina Leventhal, MD      IV infusion builder   Intravenous Continuous Joy Jensen, MD      sodium chloride  1 spray Each Nare Q1H PRN Joy Jensen, MD      vitamin B-1  100 mg Oral Daily Joy Jensen, MD          Today, Patient Was Seen By: LOAN Martin      ** Please Note: Dictation voice to text software may have been used in the creation of this document   **

## 2021-04-25 NOTE — ASSESSMENT & PLAN NOTE
· Patient has known history of multiple myeloma with chronic anemia related to the same  · Hemoglobin 6 5 on admission status post 3 unit packed RBC transfusion, hemoglobin 10 1 this morning  · Monitor H&H closely  · No active bleeding noted  · Continue with pantoprazole  · Continue with folic acid/thiamine

## 2021-04-25 NOTE — ASSESSMENT & PLAN NOTE
· Improved  · 4 6 today  · For temporary correction patient s/p 10 units regular insulin/half ampule dextrose 50  · Also 1 g calcium gluconate given  · Likely secondary to acute on chronic renal failure however patient is not a dialysis candidate  · Monitor potassium closely  · Monitor patient on telemetry

## 2021-04-25 NOTE — ASSESSMENT & PLAN NOTE
Patient has waxing and waning mental status, with underlying dementia, currently mental status at baseline    Multifactorial etiology    · Acute renal failure with creatinine of 8 04, on ivf, improving  · Hypotension with systolic blood pressure readings in the 80s, on ivf  · Hemoglobin 6 5 on presentation s/p 3 units PRBC transfusion, improving  · Urinalysis +, C/S pending ; on ceftriaxone  · chest x-ray notes rt base effusion/ although pneumonia could not be ruled out  · Ammonia 80 ; lactulose on hold given diarrhea leading to hypotension  · Neuro checks

## 2021-04-25 NOTE — ASSESSMENT & PLAN NOTE
Patient presented with creatinine of 8 04/hyperkalemia with potassium of 6 8  Discussed with Nephrology, patient not a dialysis candidate    -likely prerenal in the setting of anemia/diarrhea/hypotension    · Creatinine 7 1/potassium 4 6 today  · Continue with D51/2 normal saline with 75 bicarb at 50 cc/hour ( reduced rate)  · Pierce placed  Very minimal urine output  Monitor I/Os closely  · Hold Lasix/lactulose in the setting of acute renal failure secondary to dehydration from diarrhea  · Renal ultrasound pending  · Nephrology on board  Appreciate input

## 2021-04-25 NOTE — ASSESSMENT & PLAN NOTE
Wt Readings from Last 3 Encounters:   04/23/21 66 2 kg (145 lb 15 1 oz)   04/14/21 64 1 kg (141 lb 6 4 oz)   04/08/21 63 5 kg (139 lb 15 9 oz)     · Hold Lasix for now  · Gentle fluids given hypotension/ renal failure  · Now requiring 2 L oxygen via nasal cannula  · Chest x-ray on initial assessment did note right-sided pleural effusion  · Patient will benefit from right-sided thoracentesis as unable to give IV diuretics in the setting of acute renal failure/oliguria

## 2021-04-25 NOTE — ASSESSMENT & PLAN NOTE
· Known history of end-stage liver cirrhosis with recurrent ascites  · Currently holding Lasix in the setting of hypotension  · Her abdomen is more distended today, unfortunately in the setting of acute renal failure with extremely poor urine output, continuing with IV fluids  Patient will need a paracentesis  · Ordered paracentesis for tomorrow  · Also holding lactulose given history of diarrhea which may have contributed to the hypotension  · Continue with Xifaxan  · Continue with thiamine/folic acid  · Continue with Protonix  · GI consult requested

## 2021-04-25 NOTE — ASSESSMENT & PLAN NOTE
· Patient currently not on any chemotherapy  · Per Oncology, patient should be considered for hospice / palliative management  · Requested palliative care consult  · Spoke with patient's son Jason leemail over the phone/he is the health POA and he confirms that patient is a level 1  · Also discussed with patient's sister [facility] over the phone and she confirms patient is a level 1 too  · Both are aware regarding patient's fragile condition but state patient would have wanted CPR/intubation if necessary

## 2021-04-25 NOTE — ASSESSMENT & PLAN NOTE
· Improving  · Patient hypovolemic secondary to diarrhea at home  · Receiving half normal saline at 50 cc/hour  · Monitor blood pressure closely  · Holding Lasix  · Also currently holding metoprolol/Cardizem

## 2021-04-25 NOTE — ASSESSMENT & PLAN NOTE
· Secondary to multiple myeloma  · Hemoglobin at 10 1 (s/p 3units PRBC transfusion)  · Platelet count at 24153/JERO actively bleeding hence no platelet product ordered  · White blood cell count at 3 6

## 2021-04-26 ENCOUNTER — APPOINTMENT (INPATIENT)
Dept: INTERVENTIONAL RADIOLOGY/VASCULAR | Facility: HOSPITAL | Age: 76
DRG: 682 | End: 2021-04-26
Attending: FAMILY MEDICINE
Payer: MEDICARE

## 2021-04-26 ENCOUNTER — TELEPHONE (OUTPATIENT)
Dept: PALLIATIVE MEDICINE | Facility: HOSPITAL | Age: 76
End: 2021-04-26

## 2021-04-26 PROBLEM — I48.91 ATRIAL FIBRILLATION (HCC): Status: ACTIVE | Noted: 2021-04-26

## 2021-04-26 LAB
ALBUMIN SERPL BCP-MCNC: 3.2 G/DL (ref 3.5–5)
ALP SERPL-CCNC: 273 U/L (ref 46–116)
ALT SERPL W P-5'-P-CCNC: 34 U/L (ref 12–78)
AMMONIA PLAS-SCNC: 90 UMOL/L (ref 11–35)
ANION GAP SERPL CALCULATED.3IONS-SCNC: 15 MMOL/L (ref 4–13)
ANION GAP SERPL CALCULATED.3IONS-SCNC: 16 MMOL/L (ref 4–13)
APPEARANCE FLD: ABNORMAL
APPEARANCE FLD: CLEAR
AST SERPL W P-5'-P-CCNC: 48 U/L (ref 5–45)
ATRIAL RATE: 111 BPM
ATRIAL RATE: 116 BPM
ATRIAL RATE: 154 BPM
BASOPHILS # BLD MANUAL: 0 THOUSAND/UL (ref 0–0.1)
BASOPHILS NFR MAR MANUAL: 0 % (ref 0–1)
BILIRUB SERPL-MCNC: 1.2 MG/DL (ref 0.2–1)
BUN SERPL-MCNC: 76 MG/DL (ref 5–25)
BUN SERPL-MCNC: 78 MG/DL (ref 5–25)
C DIFF TOX GENS STL QL NAA+PROBE: NEGATIVE
CALCIUM ALBUM COR SERPL-MCNC: 8.9 MG/DL (ref 8.3–10.1)
CALCIUM SERPL-MCNC: 8.3 MG/DL (ref 8.3–10.1)
CALCIUM SERPL-MCNC: 8.6 MG/DL (ref 8.3–10.1)
CHLORIDE SERPL-SCNC: 103 MMOL/L (ref 100–108)
CHLORIDE SERPL-SCNC: 103 MMOL/L (ref 100–108)
CO2 SERPL-SCNC: 21 MMOL/L (ref 21–32)
CO2 SERPL-SCNC: 21 MMOL/L (ref 21–32)
COLOR FLD: ABNORMAL
COLOR FLD: YELLOW
CREAT SERPL-MCNC: 7 MG/DL (ref 0.6–1.3)
CREAT SERPL-MCNC: 7.04 MG/DL (ref 0.6–1.3)
EOSINOPHIL # BLD MANUAL: 0 THOUSAND/UL (ref 0–0.4)
EOSINOPHIL NFR BLD MANUAL: 0 % (ref 0–6)
ERYTHROCYTE [DISTWIDTH] IN BLOOD BY AUTOMATED COUNT: 18.9 % (ref 11.6–15.1)
GFR SERPL CREATININE-BSD FRML MDRD: 6 ML/MIN/1.73SQ M
GFR SERPL CREATININE-BSD FRML MDRD: 6 ML/MIN/1.73SQ M
GLUCOSE FLD-MCNC: 103 MG/DL
GLUCOSE SERPL-MCNC: 123 MG/DL (ref 65–140)
GLUCOSE SERPL-MCNC: 73 MG/DL (ref 65–140)
HCT VFR BLD AUTO: 32 % (ref 34.8–46.1)
HGB BLD-MCNC: 10.3 G/DL (ref 11.5–15.4)
INR PPP: 1.27 (ref 0.84–1.19)
INR PPP: 1.28 (ref 0.84–1.19)
LDH FLD L TO P-CCNC: 101 U/L
LYMPHOCYTES # BLD AUTO: 0.65 THOUSAND/UL (ref 0.6–4.47)
LYMPHOCYTES # BLD AUTO: 23 % (ref 14–44)
LYMPHOCYTES NFR BLD AUTO: 60 %
LYMPHOCYTES NFR BLD AUTO: 79 %
MAGNESIUM SERPL-MCNC: 2.2 MG/DL (ref 1.6–2.6)
MCH RBC QN AUTO: 28.8 PG (ref 26.8–34.3)
MCHC RBC AUTO-ENTMCNC: 32.2 G/DL (ref 31.4–37.4)
MCV RBC AUTO: 89 FL (ref 82–98)
MONOCYTES # BLD AUTO: 0.51 THOUSAND/UL (ref 0–1.22)
MONOCYTES NFR BLD AUTO: 15 %
MONOCYTES NFR BLD AUTO: 7 %
MONOCYTES NFR BLD: 18 % (ref 4–12)
NEUTROPHILS # BLD MANUAL: 1.65 THOUSAND/UL (ref 1.85–7.62)
NEUTS BAND NFR BLD MANUAL: 4 % (ref 0–8)
NEUTS SEG NFR BLD AUTO: 14 %
NEUTS SEG NFR BLD AUTO: 25 %
NEUTS SEG NFR BLD AUTO: 54 % (ref 43–75)
NRBC BLD AUTO-RTO: 20 /100 WBC (ref 0–2)
NRBC BLD AUTO-RTO: 25 /100 WBCS
P AXIS: 80 DEGREES
P AXIS: 83 DEGREES
PH BODY FLUID: 7.5
PLATELET # BLD AUTO: 29 THOUSANDS/UL (ref 149–390)
PLATELET BLD QL SMEAR: ABNORMAL
POTASSIUM SERPL-SCNC: 4.2 MMOL/L (ref 3.5–5.3)
POTASSIUM SERPL-SCNC: 4.6 MMOL/L (ref 3.5–5.3)
PR INTERVAL: 200 MS
PR INTERVAL: 216 MS
PROT FLD-MCNC: 2.3 G/DL
PROT SERPL-MCNC: 6.6 G/DL (ref 6.4–8.2)
PROTHROMBIN TIME: 15.2 SECONDS (ref 11.6–14.5)
PROTHROMBIN TIME: 15.4 SECONDS (ref 11.6–14.5)
QRS AXIS: 108 DEGREES
QRS AXIS: 109 DEGREES
QRS AXIS: 110 DEGREES
QRSD INTERVAL: 68 MS
QRSD INTERVAL: 70 MS
QRSD INTERVAL: 76 MS
QT INTERVAL: 320 MS
QT INTERVAL: 326 MS
QT INTERVAL: 352 MS
QTC INTERVAL: 453 MS
QTC INTERVAL: 478 MS
QTC INTERVAL: 512 MS
RBC # BLD AUTO: 3.58 MILLION/UL (ref 3.81–5.12)
SITE: ABNORMAL
SITE: NORMAL
SODIUM SERPL-SCNC: 139 MMOL/L (ref 136–145)
SODIUM SERPL-SCNC: 140 MMOL/L (ref 136–145)
T WAVE AXIS: 30 DEGREES
T WAVE AXIS: 49 DEGREES
T WAVE AXIS: 77 DEGREES
TARGETS BLD QL SMEAR: PRESENT
TOTAL CELLS COUNTED SPEC: 100
TOTAL CELLS COUNTED SPEC: 100
TOTAL CELLS COUNTED SPEC: 20
TROPONIN I SERPL-MCNC: 0.02 NG/ML
VARIANT LYMPHS # BLD AUTO: 1 %
VENTRICULAR RATE: 111 BPM
VENTRICULAR RATE: 116 BPM
VENTRICULAR RATE: 154 BPM
WBC # BLD AUTO: 2.84 THOUSAND/UL (ref 4.31–10.16)
WBC # FLD MANUAL: 269 /UL
WBC # FLD MANUAL: 608 /UL

## 2021-04-26 PROCEDURE — 82140 ASSAY OF AMMONIA: CPT | Performed by: FAMILY MEDICINE

## 2021-04-26 PROCEDURE — 32555 ASPIRATE PLEURA W/ IMAGING: CPT

## 2021-04-26 PROCEDURE — 99221 1ST HOSP IP/OBS SF/LOW 40: CPT | Performed by: INTERNAL MEDICINE

## 2021-04-26 PROCEDURE — 88342 IMHCHEM/IMCYTCHM 1ST ANTB: CPT | Performed by: PATHOLOGY

## 2021-04-26 PROCEDURE — 49083 ABD PARACENTESIS W/IMAGING: CPT | Performed by: RADIOLOGY

## 2021-04-26 PROCEDURE — 99232 SBSQ HOSP IP/OBS MODERATE 35: CPT | Performed by: INTERNAL MEDICINE

## 2021-04-26 PROCEDURE — 93005 ELECTROCARDIOGRAM TRACING: CPT

## 2021-04-26 PROCEDURE — 99222 1ST HOSP IP/OBS MODERATE 55: CPT | Performed by: NURSE PRACTITIONER

## 2021-04-26 PROCEDURE — 85610 PROTHROMBIN TIME: CPT | Performed by: PHYSICIAN ASSISTANT

## 2021-04-26 PROCEDURE — 84157 ASSAY OF PROTEIN OTHER: CPT | Performed by: FAMILY MEDICINE

## 2021-04-26 PROCEDURE — 88341 IMHCHEM/IMCYTCHM EA ADD ANTB: CPT | Performed by: PATHOLOGY

## 2021-04-26 PROCEDURE — 0W993ZZ DRAINAGE OF RIGHT PLEURAL CAVITY, PERCUTANEOUS APPROACH: ICD-10-PCS | Performed by: RADIOLOGY

## 2021-04-26 PROCEDURE — 99223 1ST HOSP IP/OBS HIGH 75: CPT | Performed by: INTERNAL MEDICINE

## 2021-04-26 PROCEDURE — 88305 TISSUE EXAM BY PATHOLOGIST: CPT | Performed by: PATHOLOGY

## 2021-04-26 PROCEDURE — 83615 LACTATE (LD) (LDH) ENZYME: CPT | Performed by: FAMILY MEDICINE

## 2021-04-26 PROCEDURE — 89051 BODY FLUID CELL COUNT: CPT | Performed by: FAMILY MEDICINE

## 2021-04-26 PROCEDURE — 80053 COMPREHEN METABOLIC PANEL: CPT | Performed by: PHYSICIAN ASSISTANT

## 2021-04-26 PROCEDURE — 83735 ASSAY OF MAGNESIUM: CPT | Performed by: FAMILY MEDICINE

## 2021-04-26 PROCEDURE — 87205 SMEAR GRAM STAIN: CPT | Performed by: FAMILY MEDICINE

## 2021-04-26 PROCEDURE — 88112 CYTOPATH CELL ENHANCE TECH: CPT | Performed by: PATHOLOGY

## 2021-04-26 PROCEDURE — 0W9G3ZZ DRAINAGE OF PERITONEAL CAVITY, PERCUTANEOUS APPROACH: ICD-10-PCS | Performed by: RADIOLOGY

## 2021-04-26 PROCEDURE — 85610 PROTHROMBIN TIME: CPT | Performed by: FAMILY MEDICINE

## 2021-04-26 PROCEDURE — 93010 ELECTROCARDIOGRAM REPORT: CPT | Performed by: INTERNAL MEDICINE

## 2021-04-26 PROCEDURE — 85027 COMPLETE CBC AUTOMATED: CPT | Performed by: FAMILY MEDICINE

## 2021-04-26 PROCEDURE — 82945 GLUCOSE OTHER FLUID: CPT | Performed by: FAMILY MEDICINE

## 2021-04-26 PROCEDURE — C1769 GUIDE WIRE: HCPCS

## 2021-04-26 PROCEDURE — 87070 CULTURE OTHR SPECIMN AEROBIC: CPT | Performed by: FAMILY MEDICINE

## 2021-04-26 PROCEDURE — 49083 ABD PARACENTESIS W/IMAGING: CPT

## 2021-04-26 PROCEDURE — 80048 BASIC METABOLIC PNL TOTAL CA: CPT | Performed by: FAMILY MEDICINE

## 2021-04-26 PROCEDURE — 83986 ASSAY PH BODY FLUID NOS: CPT | Performed by: FAMILY MEDICINE

## 2021-04-26 PROCEDURE — 32555 ASPIRATE PLEURA W/ IMAGING: CPT | Performed by: RADIOLOGY

## 2021-04-26 PROCEDURE — 85007 BL SMEAR W/DIFF WBC COUNT: CPT | Performed by: FAMILY MEDICINE

## 2021-04-26 PROCEDURE — 84484 ASSAY OF TROPONIN QUANT: CPT | Performed by: PHYSICIAN ASSISTANT

## 2021-04-26 RX ORDER — LIDOCAINE WITH 8.4% SOD BICARB 0.9%(10ML)
SYRINGE (ML) INJECTION CODE/TRAUMA/SEDATION MEDICATION
Status: COMPLETED | OUTPATIENT
Start: 2021-04-26 | End: 2021-04-26

## 2021-04-26 RX ORDER — HYDROMORPHONE HCL IN WATER/PF 6 MG/30 ML
0.2 PATIENT CONTROLLED ANALGESIA SYRINGE INTRAVENOUS ONCE
Status: COMPLETED | OUTPATIENT
Start: 2021-04-26 | End: 2021-04-26

## 2021-04-26 RX ORDER — DIGOXIN 0.25 MG/ML
125 INJECTION INTRAMUSCULAR; INTRAVENOUS EVERY 6 HOURS
Status: COMPLETED | OUTPATIENT
Start: 2021-04-26 | End: 2021-04-26

## 2021-04-26 RX ORDER — MICONAZOLE NITRATE 20 MG/G
CREAM TOPICAL 2 TIMES DAILY
Status: DISCONTINUED | OUTPATIENT
Start: 2021-04-26 | End: 2021-04-28 | Stop reason: HOSPADM

## 2021-04-26 RX ORDER — DIPHENHYDRAMINE HCL 25 MG
25 TABLET ORAL EVERY 6 HOURS PRN
Status: COMPLETED | OUTPATIENT
Start: 2021-04-26 | End: 2021-04-27

## 2021-04-26 RX ORDER — ALBUMIN, HUMAN INJ 5% 5 %
12.5 SOLUTION INTRAVENOUS ONCE
Status: COMPLETED | OUTPATIENT
Start: 2021-04-26 | End: 2021-04-26

## 2021-04-26 RX ADMIN — MAGNESIUM OXIDE TAB 400 MG (241.3 MG ELEMENTAL MG) 400 MG: 400 (241.3 MG) TAB at 08:35

## 2021-04-26 RX ADMIN — DIGOXIN 125 MCG: 0.25 INJECTION INTRAMUSCULAR; INTRAVENOUS at 11:27

## 2021-04-26 RX ADMIN — MICONAZOLE NITRATE: 20 CREAM TOPICAL at 15:56

## 2021-04-26 RX ADMIN — NICOTINE 1 PATCH: 7 PATCH, EXTENDED RELEASE TRANSDERMAL at 08:37

## 2021-04-26 RX ADMIN — ALPRAZOLAM 0.5 MG: 0.5 TABLET ORAL at 00:17

## 2021-04-26 RX ADMIN — Medication 10 ML: at 16:16

## 2021-04-26 RX ADMIN — CEFTRIAXONE SODIUM 1000 MG: 10 INJECTION, POWDER, FOR SOLUTION INTRAVENOUS at 00:44

## 2021-04-26 RX ADMIN — HYDROMORPHONE HYDROCHLORIDE 0.2 MG: 0.2 INJECTION, SOLUTION INTRAMUSCULAR; INTRAVENOUS; SUBCUTANEOUS at 18:04

## 2021-04-26 RX ADMIN — SODIUM BICARBONATE: 84 INJECTION, SOLUTION INTRAVENOUS at 03:15

## 2021-04-26 RX ADMIN — RIFAXIMIN 550 MG: 550 TABLET ORAL at 08:35

## 2021-04-26 RX ADMIN — THIAMINE HCL TAB 100 MG 100 MG: 100 TAB at 08:36

## 2021-04-26 RX ADMIN — DIGOXIN 125 MCG: 0.25 INJECTION INTRAMUSCULAR; INTRAVENOUS at 18:10

## 2021-04-26 RX ADMIN — ALBUMIN (HUMAN) 12.5 G: 12.5 INJECTION, SOLUTION INTRAVENOUS at 02:32

## 2021-04-26 RX ADMIN — Medication 10 ML: at 16:43

## 2021-04-26 RX ADMIN — MICONAZOLE NITRATE: 20 CREAM TOPICAL at 18:04

## 2021-04-26 NOTE — PROGRESS NOTES
NEPHROLOGY PROGRESS NOTE    Patient: Cynthia Dial               Sex: female          DOA: 4/23/2021  1:44 PM   YOB: 1945        Age:  77 y o         LOS:  LOS: 3 days   4/26/2021    REASON FOR THE CONSULTATION:      Acute kidney injury on chronic kidney disease stage 3    SUBJECTIVE     Patient is lying in bed, awake and in no distress  Pierce catheter was inserted for accurate intake and output  Urine output noted to be less than 100 cc       CURRENT MEDICATIONS       Current Facility-Administered Medications:     cefTRIAXone (ROCEPHIN) 1,000 mg in dextrose 5 % 50 mL IVPB, 1,000 mg, Intravenous, Q24H, Li Greene MD, Last Rate: 100 mL/hr at 04/26/21 0044, 1,000 mg at 04/26/21 0044    diphenhydrAMINE (BENADRYL) tablet 25 mg, 25 mg, Oral, Q6H PRN, Gabby Harry PA-C    magnesium oxide (MAG-OX) tablet 400 mg, 400 mg, Oral, BID, Li Greene MD, 400 mg at 04/26/21 0835    melatonin tablet 3 mg, 3 mg, Oral, HS, Li Greene MD, 3 mg at 04/25/21 2159    mirtazapine (REMERON) tablet 15 mg, 15 mg, Oral, HS, Li Greene MD, 15 mg at 04/25/21 2200    nicotine (NICODERM CQ) 7 mg/24hr TD 24 hr patch 1 patch, 1 patch, Transdermal, Daily, Li Greene MD, 1 patch at 04/26/21 0837    pantoprazole (PROTONIX) EC tablet 40 mg, 40 mg, Oral, Early Morning, Li Greene MD, 40 mg at 04/24/21 0624    rifaximin (XIFAXAN) tablet 550 mg, 550 mg, Oral, Q12H [facility], Li Greene MD, 550 mg at 04/26/21 0835    sodium bicarbonate 75 mEq in sodium chloride 0 45 % infusion, , Intravenous, Continuous, Gabby Harry PA-C, Last Rate: 50 mL/hr at 04/26/21 0315, New Bag at 04/26/21 0315    sodium chloride (OCEAN) 0 65 % nasal spray 1 spray, 1 spray, Each Nare, Q1H PRN, Li Greene MD    thiamine tablet 100 mg, 100 mg, Oral, Daily, Li Greene MD, 100 mg at 04/26/21 0836    REVIEW OF SYSTEMS     Review of Systems   Unable to perform ROS: Dementia       OBJECTIVE     Current Weight: Weight - Scale: 66 3 kg (146 lb 2 6 oz)  Vitals:    04/26/21 0700   BP: 95/63   Pulse: (!) 113   Resp:    Temp: 97 6 °F (36 4 °C)   SpO2: 97%     Body mass index is 26 73 kg/m²  Intake/Output Summary (Last 24 hours) at 4/26/2021 0949  Last data filed at 4/26/2021 0840  Gross per 24 hour   Intake 170 ml   Output 300 ml   Net -130 ml       PHYSICAL EXAMINATION     Physical Exam  Constitutional:       Appearance: She is well-developed  HENT:      Head: Normocephalic and atraumatic  Eyes:      Pupils: Pupils are equal, round, and reactive to light  Neck:      Musculoskeletal: Neck supple  Cardiovascular:      Rate and Rhythm: Rhythm irregular  Heart sounds: Normal heart sounds  Pulmonary:      Effort: Pulmonary effort is normal    Abdominal:      General: Bowel sounds are normal  There is distension  Palpations: Abdomen is soft  Musculoskeletal: Normal range of motion  Skin:     General: Skin is warm  Neurological:      Mental Status: She is alert  Comments: Oriented to person place only           LAB RESULTS     Results from last 7 days   Lab Units 04/26/21  0630 04/25/21  0712 04/24/21  0537 04/23/21  1425   WBC Thousand/uL 2 84* 3 60* 3 68* 3 15*   HEMOGLOBIN g/dL 10 3* 10 1* 9 8* 6 5*   HEMATOCRIT % 32 0* 31 6* 31 0* 21 4*   PLATELETS Thousands/uL 29* 33* 34* 42*   POTASSIUM mmol/L 4 6 4 6 5 1 6 8*   CHLORIDE mmol/L 103 102 103 101   CO2 mmol/L 21 20* 19* 20*   BUN mg/dL 78* 76* 73* 73*   CREATININE mg/dL 7 04* 7 14* 7 75* 8 04*   EGFR ml/min/1 73sq m 6 6 5 5   CALCIUM mg/dL 8 6 8 3 8 1* 7 9*   MAGNESIUM mg/dL 2 2  --  2 1  --            RADIOLOGY RESULTS      Results for orders placed during the hospital encounter of 04/23/21   XR chest portable    Narrative CHEST     INDICATION:   confusion, rule out pneumonia  COMPARISON:  4/1/2021    EXAM PERFORMED/VIEWS:  XR CHEST PORTABLE      FINDINGS:    Heart shadow appears unremarkable  Atherosclerotic vascular calcifications are noted      There is increased opacification in the right hemithorax, likely worsening effusion with atelectasis  No pneumothorax  Osseous structures appear within normal limits for patient age  Impression Increased opacification within the right hemithorax, likely worsening effusion with atelectasis  Underlying pneumonia cannot be excluded  Workstation performed: [id number]           ASSESSMENT/PLAN     70-year-old female with past medical history of CHF with diastolic dysfunction, chronic kidney disease stage 3, liver cirrhosis, multiple myeloma refractory to medication, dementia who presents to our facility with lethargy and confusion and noted to have severe acute kidney injury  1  Acute kidney injury on chronic kidney disease stage 3:  Baseline serum creatinine 1 4   -  present with serum creatinine of 8 0 and worse   -etiology was thought to be secondary to hypotension plus profuse diarrhea that occurred at home resulting in ischemic ATN  -serum creatinine remains stable at 7 0 today however patient is oliguric  -given multiple comorbidities, she was never a candidate for hemodialysis  -recommend Palliative care involvement  2  Hyperkalemia:  Presented with serum potassium of 6 8 and elevated   -this was secondary to #1   -after initiation of bicarbonate based fluids, potassium has improved to 4 6     3  Volume status:  Mildly hypervolemic   -remains on IV fluids which will be held due to lack of urine output  4  Liver cirrhosis:  Often undergoes decompensation with associated hepatic encephalopathy     -lactose on hold due to diarrhea  5  Atrial fibrillation:  Rate and rhythm uncontrolled  -recommend introduction of diltiazem          Landon Infante MD  Nephrology  4/26/2021

## 2021-04-26 NOTE — ASSESSMENT & PLAN NOTE
· Improved  · 4 6 today  ·   · Likely secondary to acute on chronic renal failure however patient is not a dialysis candidate  · Monitor potassium closely  · Monitor patient on telemetry

## 2021-04-26 NOTE — ASSESSMENT & PLAN NOTE
Patient was tachycardic with soft blood pressures    Continue Cardizem, digoxin, monitor on telemetry  Cardiology recommendations appreciated

## 2021-04-26 NOTE — QUICK NOTE
Notified by RN again patient's heart rate for a brief while did jump up to 200 even with patient lying in bed now  Also patient complaining of chest pain now  Upon approach patient remains very confused, but remains calm  Heart rate 113 sustained during evaluation  For about 5 minutes monitor heart rate which remained 110-113  Even with patient moving slightly in bed, so will continue to closely monitor  Will obtain troponin x1 an EKG for now for chest pain  Continue to monitor on telemetry  Greatly appreciate cardiology consult in regards to unstable tachycardia, but on telemetry does appear to still be sinus tachycardia  Will continue to be very cautious with IV fluids as it could be more risk than benefit to give more IV fluid boluses as patient with pleural effusion with pending thoracentesis today, not having any diuretics on board with CHF, so will resume continuous IV fluids as is for now

## 2021-04-26 NOTE — WOUND OSTOMY CARE
Progress Note - Wound   Agnieszka Melton 77 y o  female MRN: [id number]  Unit/Bed#: -01 Encounter: [id number]      Assessment:   Patient admitted due to acute encephalopathy  History of cirrhosis, HTN, CKD, CHF, anemia, and a fib  Wound care consulted for sacral and perineum wounds  Patient agreeable to assessment, alert and oriented to self, assist of 2 to turn for assessment, incontinent of bowel and bladder, dependent for care, pillow support for turning and repositioning, heels elevated off of bed, accumax applied to mattress  SLIM made aware of assessment findings and plan of care  1  MASD sacrum-POA- Wound is linear, has scattered open aspects that are moist, 100% beefy red, partial thickness, scant amount of serous drainage  Liz-wounds are intact, macerated, blanchable  2  MASD liz-anal/ bilateral buttock-POA- Wound is irregular, partial thickness, moist, 100% blanchable beefy red tissue  Liz-wound is dry, intact, blanchable hyperpigmented  Noted to have areas of raised satellite lesions, suggesting fungal rash component  Recommend TRACIE antifungal cream for treatment  3  Bilateral heels and groin are dry, intact, no redness, and blanchable  Educated patient on importance of frequent offloading of pressure via turning, repositioning, and weight-shifting  No induration, fluctuance, odor, warmth, redness, or purulence noted to the above noted wound  New dressings applied  Patient tolerated well, denies pain to the wound  Primary nurse aware of plan of care  See flow sheets for more detailed assessment findings  Will follow along  Skin care plans:  1-Cleanse sacro-buttock with soap and water  Dust liz-anal skin with stoma powder and then apply TRACIE to sacrum, buttocks, liz-anal skin, and posterior upper thighs BID and PRN  2-Hydraguard to bilateral heel BID and PRN  3-Elevate heels to offload pressure  4-Ehob cushion when out of bed    5-Turn/repoisiton q2h or when medically stable for pressure re-distribution on skin  6-Moisturize skin daily with skin nourishing cream   7-Continue with Accumax pump applied to mattress  Wound 03/24/21 MASD Sacrum Mid (Active)   Wound Image   04/26/21 1002   Wound Description Beefy red;Pink 04/26/21 1002   Pressure Injury Stage 1 04/24/21 2000   Alise-wound Assessment Maceration 04/26/21 1002   Wound Length (cm) 6 cm 04/26/21 1002   Wound Width (cm) 0 4 cm 04/26/21 1002   Wound Depth (cm) 0 1 cm 04/26/21 1002   Wound Surface Area (cm^2) 2 4 cm^2 04/26/21 1002   Wound Volume (cm^3) 0 24 cm^3 04/26/21 1002   Calculated Wound Volume (cm^3) 0 24 cm^3 04/26/21 1002   Tunneling 0 cm 04/26/21 1002   Undermining 0 04/26/21 1002   Drainage Amount Scant 04/26/21 1002   Drainage Description Serous 04/26/21 1002   Non-staged Wound Description Partial thickness 04/26/21 1002   Treatments Cleansed;Site care 04/26/21 1002   Dressing Protective barrier 04/26/21 1002   Wound packed? No 04/26/21 1002   Patient Tolerance Tolerated well 04/26/21 1002       Wound 04/23/21 MASD Buttocks Inner (Active)   Wound Image   04/26/21 1000   Wound Description Beefy red;Fragile 04/26/21 1000   Alise-wound Assessment Intact; Hyperpigmented 04/26/21 1000   Wound Length (cm) 5 7 cm 04/26/21 1000   Wound Width (cm) 6 cm 04/26/21 1000   Wound Depth (cm) 0 1 cm 04/26/21 1000   Wound Surface Area (cm^2) 34 2 cm^2 04/26/21 1000   Wound Volume (cm^3) 3 42 cm^3 04/26/21 1000   Calculated Wound Volume (cm^3) 3 42 cm^3 04/26/21 1000   Tunneling 0 cm 04/26/21 1000   Undermining 0 04/26/21 1000   Drainage Amount Small 04/26/21 1000   Drainage Description Serosanguineous 04/26/21 1000   Non-staged Wound Description Partial thickness 04/26/21 1000   Treatments Cleansed;Site care 04/26/21 1000   Dressing Other (Comment) 04/26/21 1000   Wound packed?  No 04/26/21 1000   Patient Tolerance Tolerated poorly 04/26/21 1000       Call or tigertext with any questions  Wound Care will continue to follow    Rosalinda [address] [facility] RN BSN  Wound care

## 2021-04-26 NOTE — QUICK NOTE
Notified by floor call slim provider to evaluate patient as per RN patient's heart rate when she got up to go the bathroom was 200, when she is awake is 130s and at 150 and when she is at rest it goes down to 110s  Upon approach per RN patient remains confused and at times reports feels like I am dying    No explicit pain complaints including chest pain though  Upon approach patient keeps calling out for different people not in the hospital   Is resting comfortably at times as well  Throughout the remainder of being in the room patient remains sustained at 115 Heart rate  Remains hypotensive    EKG reviewed from within the hour with no significant change versus previous, revealing sinus rhythm with first-degree AV block, T-wave abnormality  Unfortunately home Cardizem and metoprolol have been held due to hypotension  Appreciate cardiology consult at this time in regards to management of tachycardia and hypotension  [place] texted cardiology on-call in regards to any interventions tonight, awaiting response    Also discussed case with ICU AP and agree for now will give small 12 5 g bolus of albumin 5%, read time continuous IV fluids to help both with hypotension most likely related to dehydration and tachycardia  Patient on strict bed rest for now as heart rate goes up when she gets up and moves around  Continue to closely monitor vital signs

## 2021-04-26 NOTE — CONSULTS
Consultation - Palliative and Supportive Care   Sandee Person 77 y o  female [id number]    Patient Active Problem List   Diagnosis    Anemia    Cigarette nicotine dependence without complication    Hyperglycemia    Paroxysmal SVT (supraventricular tachycardia) (HCC)    Sinus tachycardia    Urinary incontinence    Memory difficulties    Gait disturbance    Obesity (BMI 30 0-34  9)    Recurrent major depressive disorder, in partial remission ([facility] )    History of alcohol abuse    Metabolic encephalopathy    Liver cirrhosis (HCC)    Acute on chronic diastolic CHF    Dementia     Urinary tract infection    CESAR (acute kidney injury) (HCC)    Dysphagia    Paroxysmal atrial fibrillation (HCC)    Troponin level elevated    Neutropenia (HCC)    Hyperphosphatemia    Hyperkalemia    Hyponatremia    Multiple myeloma (HCC)    Epistaxis    Azotemia    Pancytopenia (HCC)    Pulmonary hypertension (HCC)    Hyperuricemia    Acute renal failure with acute tubular necrosis superimposed on chronic kidney disease (HCC)    Ascites    Chronic kidney disease (CKD), active medical management without dialysis, stage 4 (severe) (HCC)    Volume overload    Diarrhea    Dementia (HCC)    Acute kidney injury superimposed on chronic kidney disease (HCC)    Hypotension    Rib fracture    Pneumonia    Hypertension    Acute encephalopathy    Diastolic CHF (HCC)    CKD (chronic kidney disease) stage 3, GFR 30-59 ml/min (HCC)    Alcoholic cirrhosis (HCC)    Hypotension (arterial)    Hypernatremia    Compression fracture of first lumbar vertebra (HCC)     Active issues specifically addressed today include:   · End stage Liver disease  · Multiple Myeloma  · Acute renal failure  · Goals of care discussions/counseling    Plan:  1  Symptom management  · Per primary team     2  Goals  · Son Mickie Tarango and daughter Kira Meadows have shared decision making per Act 169  · Ongoing goals of care discussions    · Son unable to discuss until tomorrow 4/27  Code Status: Full code - Level 1   Decisional apparatus:  Patient is not competent on my exam today  If competence is lost, patient's substitute decision maker would default to sonMariana Bound by [place] Act 169  Advance Directive / Living Will / POLST:   None on file     I have reviewed the patient's controlled substance dispensing history in the Prescription Drug Monitoring Program in compliance with the [facility] regulations before prescribing any controlled substances  We appreciate the invitation to be involved in this patient's care  We will continue to follow  Please do not hesitate to reach our on call provider through our clinic answering service at  should you have acute symptom control concerns  MEREDITH Florentino  Palliative and Supportive Care  Clinic/Answering Service: [phone]  You can find me on [employer]! IDENTIFICATION:  Inpatient consult to Palliative Care  Consult performed by: MEREDITH Toure  Consult ordered by: Yordan Thayer MD        Physician Requesting Consult: James De Luna MD  Reason for Consult / Principal Problem: goals of care  Hx and PE limited by: patient unable to participate    HISTORY OF PRESENT ILLNESS:       Monika Kelsey is a 77 y o  female  With a past medical history of dementia,  Multiple myeloma no longer receiving treatment secondary to co morbidities, liver cirrhosis, who presented on 4/23 with  lethargy, confusion, diarrhea, hypotension from home  Her labs  Revealed pancytopenia, acute renal failure (creatinine 8) with hyperkalemia  She is not a candidate for HD  The patient is pleasantly confused  She states she feels okay  Spoke with daughter, Emeka Quijano who states she knows her mom I snot doing well  She understands that per Act 169, she and her brother share medical decision making  The patient lies with her sister Mey Bowles, who remains very involved in her care    Emeka Quijano is concerned about the care her mom is receiving at [facility]  She expressed concern that Rosana Mcallister may also have some confusion  Called Patient's son Shante Anderson, who returned call and left message that he will be available for a conference call with his sister tomorrow at 9:00 am   Case management and internal medicine team aware  Review of Systems   Unable to perform ROS: dementia       Past Medical History:   Diagnosis Date    [facility]     Benign essential hypertension     last assessed - [id number]    Cirrhosis (HCC)     CKD (chronic kidney disease) stage 3, GFR 30-59 ml/min (HCC)     Diastolic CHF ([facility] ) 14/44/2887    Gastroesophageal reflux disease without esophagitis 11/16/2017    Hepatic encephalopathy ([place] [address] )     Hyperbilirubinemia 5/14/2020    Hypertension     [facility]oma ([facility] )     Paroxysmal atrial fibrillation ([place] [address] )     Pneumonia 5/6/2020     Past Surgical History:   Procedure Laterality Date    APPENDECTOMY      BONE MARROW BIOPSY      IR PARACENTESIS  10/2/2020    IR PARACENTESIS  12/28/2020    IR THORACENTESIS  12/21/2020     Social History     Socioeconomic History    Marital status:      Spouse name: Not on file    Number of children: Not on file    Years of education: Not on file    Highest education level: Not on file   Occupational History    Not on file   Social Needs    Financial resource strain: Not on file    Food insecurity     Worry: Not on file     Inability: Not on file    Transportation needs     Medical: Not on file     Non-medical: Not on file   Tobacco Use    Smoking status: Light Tobacco Smoker     Packs/day: 0 25     Types: Cigarettes    Smokeless tobacco: Never Used    Tobacco comment: 2-3 a day   Substance and Sexual Activity    Alcohol use: Not Currently     Frequency: 4 or more times a week     Drinks per session: 1 or 2     Binge frequency: Never     Comment: History of significant daily alcohol intake   Sister joy no alcohol intake in 6 months    Drug use: No    Sexual activity: Not Currently   Lifestyle    Physical activity     Days per week: 0 days     Minutes per session: 0 min    Stress: Not at all   Relationships    Social connections     Talks on phone: Not on file     Gets together: Not on file     Attends Sabianism service: Not on file     Active member of club or organization: Not on file     Attends meetings of clubs or organizations: Not on file     Relationship status: Not on file    Intimate partner violence     Fear of current or ex partner: Not on file     Emotionally abused: Not on file     Physically abused: Not on file     Forced sexual activity: Not on file   Other Topics Concern    Not on file   Social History Narrative    No advance directives     Family History   Problem Relation Age of Onset    Heart attack Father         myocardial infarction    Heart disease Father        MEDICATIONS / ALLERGIES:    current meds:   Current Facility-Administered Medications   Medication Dose Route Frequency    cefTRIAXone (ROCEPHIN) 1,000 mg in dextrose 5 % 50 mL IVPB  1,000 mg Intravenous Q24H    diphenhydrAMINE (BENADRYL) tablet 25 mg  25 mg Oral Q6H PRN    magnesium oxide (MAG-OX) tablet 400 mg  400 mg Oral BID    melatonin tablet 3 mg  3 mg Oral HS    mirtazapine (REMERON) tablet 15 mg  15 mg Oral HS    nicotine (NICODERM CQ) 7 mg/24hr TD 24 hr patch 1 patch  1 patch Transdermal Daily    pantoprazole (PROTONIX) EC tablet 40 mg  40 mg Oral Early Morning    rifaximin (XIFAXAN) tablet 550 mg  550 mg Oral Q12H [address]    sodium bicarbonate 75 mEq in sodium chloride 0 45 % infusion   Intravenous Continuous    sodium chloride (OCEAN) 0 65 % nasal spray 1 spray  1 spray Each Nare Q1H PRN    thiamine tablet 100 mg  100 mg Oral Daily       Allergies   Allergen Reactions    Seroquel [Quetiapine] Irritability    Zyprexa [Olanzapine] Confusion       OBJECTIVE:    Physical Exam  Physical Exam  Constitutional: Appearance: She is ill-appearing  Interventions: Nasal cannula in place  HENT:      Head: Normocephalic and atraumatic  Mouth/Throat:      Mouth: Mucous membranes are moist       Pharynx: Oropharynx is clear  Cardiovascular:      Rate and Rhythm: Tachycardia present  Pulses: Decreased pulses  Pulmonary:      Effort: Pulmonary effort is normal    Skin:     General: Skin is cool and dry  Neurological:      Mental Status: She is alert  She is confused  Psychiatric:         Attention and Perception: She is inattentive  Behavior: Behavior is cooperative  Cognition and Memory: Cognition is impaired  Memory is impaired  Lab Results:   CBC:   Lab Results   Component Value Date    WBC 2 84 (L) 04/26/2021    HGB 10 3 (L) 04/26/2021    HCT 32 0 (L) 04/26/2021    MCV 89 04/26/2021    PLT 29 (LL) 04/26/2021    MCH 28 8 04/26/2021    MCHC 32 2 04/26/2021    RDW 18 9 (H) 04/26/2021    NRBC 25 04/26/2021    NRBC 20 (H) 04/26/2021   , CMP:   Lab Results   Component Value Date    SODIUM 140 04/26/2021    K 4 6 04/26/2021     04/26/2021    CO2 21 04/26/2021    BUN 78 (H) 04/26/2021    CREATININE 7 04 (H) 04/26/2021    CALCIUM 8 6 04/26/2021    EGFR 6 04/26/2021   , PT/PTT:No results found for: PT, PTT      Counseling / Coordination of Care    Total floor / unit time spent today 35+ minutes  Greater than 50% of total time was spent with the patient and / or family counseling and / or coordination of care   A description of the counseling / coordination of care: collaboration with primary team, communication with family, chart review,

## 2021-04-26 NOTE — ASSESSMENT & PLAN NOTE
· Patient currently not on any chemotherapy  · Per Oncology, patient should be considered for hospice / palliative management  · Requested palliative care consult  · Spoke with patient's son Melani Donato over the phone/he is the health POA and he confirms that patient is a level 1  · Also discussed with patient's sister Charley Rincon over the phone and she confirms patient is a level 1 too  · Both are aware regarding patient's fragile condition but state patient would have wanted CPR/intubation if necessary

## 2021-04-26 NOTE — CONSULTS
Consultation - [facility]  Sebastián Lopez 77 y o  female MRN: [id number]  Unit/Bed#: -01 Encounter: [id number]      Inpatient consult to gastroenterology  Consult performed by: Severa Sea, PA-C  Consult ordered by: Raymond Gibson MD       "Click on Consult Button"     Reason for Consult / Principal Problem:  Alcohol cirrhosis    HPI: Sebastián Lopez is a 77y o  year old female who presents with decompensated alcohol cirrhosis, CHF, CKD, multiple myeloma, dementia, and atrial fibrillation  Patient was in his stool a few 23rd 2021 after being brought in by her sister with confusion at home  Unfortunately patient has had a progressive decline over the last several hospital stays  Reason for GI consultation is alcohol cirrhosis  Patient is currently pleasantly confused  Patient's lactulose therapy was stopped several days ago due to severe diarrhea  Patient is currently taking Xifaxan 550 twice a day  There has been no documented rectal bleeding or melena  Patient denies any abdominal pain but is reporting significant chest pain  Apparently she has been reporting chest pain since admission  Patient's nurse reports she has had bad multiple bowel movements today  Patient is very well known to us from inpatient and outpatient setting  REVIEW OF SYSTEMS:     CONSTITUTIONAL: Denies any fever, chills, or rigors  Good appetite, and no recent weight loss  HEENT: No earache or tinnitus  Denies hearing loss or visual disturbances  CARDIOVASCULAR: No chest pain or palpitations  RESPIRATORY: Denies any cough, hemoptysis, shortness of breath or dyspnea on exertion  GASTROINTESTINAL: As noted in the History of Present Illness  GENITOURINARY: No problems with urination  Denies any hematuria or dysuria  NEUROLOGIC: No dizziness or vertigo, denies headaches  MUSCULOSKELETAL: Denies any muscle or joint pain  SKIN: Denies skin rashes or itching     ENDOCRINE: Denies excessive thirst  Denies intolerance to heat or cold  PSYCHOSOCIAL: Denies depression or anxiety  Denies any recent memory loss  Historical Information   Past Medical History:   Diagnosis Date    Alcoholic cirrhosis ([place] [address] )     Benign essential hypertension     last assessed - [id number]    Cirrhosis (HCC)     CKD (chronic kidney disease) stage 3, GFR 30-59 ml/min (HCC)     Diastolic CHF ([place] [address] ) 58/55/6542    Gastroesophageal reflux disease without esophagitis 11/16/2017    Hepatic encephalopathy ([place] [address] )     Hyperbilirubinemia 5/14/2020    Hypertension     Multiple myeloma (HCC)     Paroxysmal atrial fibrillation ([place] [address] )     Pneumonia 5/6/2020     Past Surgical History:   Procedure Laterality Date    APPENDECTOMY      BONE MARROW BIOPSY      IR PARACENTESIS  10/2/2020    IR PARACENTESIS  12/28/2020    IR THORACENTESIS  12/21/2020     Social History   Social History     Substance and Sexual Activity   Alcohol Use Not Currently    Frequency: 4 or more times a week    Drinks per session: 1 or 2    Binge frequency: Never    Comment: History of significant daily alcohol intake   Sister joy no alcohol intake in 6 months     Social History     Substance and Sexual Activity   Drug Use No     Social History     Tobacco Use   Smoking Status Light Tobacco Smoker    Packs/day: 0 25    Types: Cigarettes   Smokeless Tobacco Never Used   Tobacco Comment    2-3 a day     Family History   Problem Relation Age of Onset    Heart attack Father         myocardial infarction    Heart disease Father        Meds/Allergies     Medications Prior to Admission   Medication    diltiazem (CARDIZEM CD) 240 mg 24 hr capsule    folic acid (FOLVITE) 1 mg tablet    furosemide (LASIX) 40 mg tablet    lactulose 20 g/30 mL    magnesium oxide (MAG-OX) 400 mg tablet    metoprolol tartrate (LOPRESSOR) 25 mg tablet    pantoprazole (PROTONIX) 40 mg tablet    rifaximin (XIFAXAN) 550 mg tablet    Thiamine HCl (vitamin B-1) 100 MG TABS    mirtazapine (REMERON) 15 mg tablet    nystatin (MYCOSTATIN) powder    sodium chloride (OCEAN) 0 65 % nasal spray     Current Facility-Administered Medications   Medication Dose Route Frequency    cefTRIAXone (ROCEPHIN) 1,000 mg in dextrose 5 % 50 mL IVPB  1,000 mg Intravenous Q24H    digoxin (LANOXIN) injection 125 mcg  125 mcg Intravenous Q6H    diltiazem (CARDIZEM) tablet 30 mg  30 mg Oral Q8H [facility]    diphenhydrAMINE (BENADRYL) tablet 25 mg  25 mg Oral Q6H PRN    HYDROmorphone HCl (DILAUDID) injection 0 2 mg  0 2 mg Intravenous Once    magnesium oxide (MAG-OX) tablet 400 mg  400 mg Oral BID    melatonin tablet 3 mg  3 mg Oral HS    mirtazapine (REMERON) tablet 15 mg  15 mg Oral HS    moisture barrier miconazole 2% cream (aka TRACIE MOISTURE BARRIER ANTIFUNGAL CREAM)   Topical BID    nicotine (NICODERM CQ) 7 mg/24hr TD 24 hr patch 1 patch  1 patch Transdermal Daily    pantoprazole (PROTONIX) EC tablet 40 mg  40 mg Oral Early Morning    rifaximin (XIFAXAN) tablet 550 mg  550 mg Oral Q12H [facility]    sodium chloride (OCEAN) 0 65 % nasal spray 1 spray  1 spray Each Nare Q1H PRN    thiamine tablet 100 mg  100 mg Oral Daily       Allergies   Allergen Reactions    Seroquel [Quetiapine] Irritability    Zyprexa [Olanzapine] Confusion       Objective   Blood pressure 95/63, pulse (!) 113, temperature 97 6 °F (36 4 °C), resp  rate 22, height 5' 2" (1 575 m), weight 66 3 kg (146 lb 2 6 oz), SpO2 97 %, not currently breastfeeding      Intake/Output Summary (Last 24 hours) at 4/26/2021 1235  Last data filed at 4/26/2021 0840  Gross per 24 hour   Intake 50 ml   Output 300 ml   Net -250 ml         PHYSICAL EXAM:      General Appearance:   Alert, cooperative, no distress, appears stated age    HEENT:   Normocephalic, atraumatic, anicteric      Neck:  Supple, symmetrical, trachea midline, no adenopathy;    thyroid: no enlargement/tenderness/nodules; no carotid  bruit or JVD    Lungs:   Clear to auscultation bilaterally; no rales, rhonchi or wheezing; respirations unlabored    Heart[de-identified]   S1 and S2 normal; regular rate and rhythm; no murmur, rub, or gallop  Abdomen:   Soft, non-tender, non-distended; normal bowel sounds; no masses, no organomegaly    Genitalia:   Deferred    Rectal:   Deferred    Extremities:  No cyanosis, clubbing or edema    Pulses:  2+ and symmetric all extremities    Skin:  Skin color, texture, turgor normal, no rashes or lesions    Lymph nodes:  No palpable cervical, axillary or inguinal lymphadenopathy        Lab Results:   No results displayed because visit has over 200 results  Hospital Outpatient Visit on 04/26/2021   Component Date Value    Unit Product Code 04/24/2021 [id number]     Unit Number 04/24/2021 [id number]     Unit ABO 04/24/2021 A     Unit [facility] 04/24/2021 POS     Crossmatch 04/24/2021 Compatible     Unit Dispense Status 04/24/2021 Presumed Trans        Imaging Studies: I have personally reviewed pertinent imaging studies  ASSESSMENT & PLAN:  Decompensated alcohol cirrhosis  -Unable to calculate meld due to missing bilirubin level  -Will update PT INR and CMP for tomorrow morning   -Unfortunately patient's lactulose was stopped secondary to significant diarrhea     -Will continue Xifaxan 550 twice a day  -IR has been consulted for paracentesis  Repeat imaging on April 5th did not show any significant ascites  -Low-sodium diet as tolerated   -No plans for any endoscopic evaluation   -Unfortunately patient has had a progressive decline in her overall mental state and medical conditions  -Appreciate palliative Care input    Patient will be seen and examined by Keon Mondragon  From a GI standpoint we don't have any further input to offer at this time  Will sign off please re-consult ANA ROSA Nunez PA-C  4/26/2021,12:35 PM

## 2021-04-26 NOTE — ASSESSMENT & PLAN NOTE
Patient presented with creatinine of 8 04/hyperkalemia with potassium of 6 8 get baseline creatinine of 1 4  Discussed with Nephrology, patient not a dialysis candidate    -likely prerenal in the setting of anemia/diarrhea/hypotension/ischemic ATN    · Creatinine 7 00/potassium 4 6 today    · Pierce placed  Very minimal urine output  Monitor I/Os closely  · Hold Lasix/lactulose in the setting of acute renal failure secondary to dehydration from diarrhea  · Renal ultrasound  · Nephrology on board  Appreciate input    · Not recommending dialysis due to multiple comorbidities patient is not a candidate  · Palliative care on boardfamily meeting scheduled tomorrow

## 2021-04-26 NOTE — ASSESSMENT & PLAN NOTE
· Secondary to multiple myeloma  · Hemoglobin at 10 1 (s/p 3units PRBC transfusion)  · Platelet count at 48447/XOB actively bleeding hence no platelet product ordered  · White blood cell count at 3 6

## 2021-04-26 NOTE — BRIEF OP NOTE (RAD/CATH)
INTERVENTIONAL RADIOLOGY PROCEDURE NOTE    Date: 4/26/2021    Procedure: IR PARACENTESIS    Preoperative diagnosis:   1  Hypotension    2  CESAR (acute kidney injury) ([facility] [address] )    3  Anemia    4  Alcoholic cirrhosis, unspecified whether ascites present ([facility] [address] )    5  Sinus tachycardia         Postoperative diagnosis: Same  Surgeon: Trisha Guardado MD     Assistant: None  No qualified resident was available  Blood loss:  Trace    Specimens:  Sent to the lab as requested    Findings:  300 mL clear patricio ascites removed from right upper quadrant under ultrasound guidance  Complications: None immediate      Anesthesia: local

## 2021-04-26 NOTE — PROGRESS NOTES
[address] [facility]  Progress Note - Palmer Allen 1945, 77 y o  female MRN: [id number]  Unit/Bed#: -01 Encounter: [id number]  Primary Care Provider: Abbe Ruiz MD   Date and time admitted to hospital: 4/23/2021  1:44 PM    CESAR (acute kidney injury) [facility])  Assessment & Plan  Patient presented with creatinine of 8 04/hyperkalemia with potassium of 6 8 get baseline creatinine of 1 4  Discussed with Nephrology, patient not a dialysis candidate    -likely prerenal in the setting of anemia/diarrhea/hypotension/ischemic ATN    · Creatinine 7 00/potassium 4 6 today    · Pierce placed  Very minimal urine output  Monitor I/Os closely  · Hold Lasix/lactulose in the setting of acute renal failure secondary to dehydration from diarrhea  · Renal ultrasound  · Nephrology on board  Appreciate input  · Not recommending dialysis due to multiple comorbidities patient is not a candidate  · Palliative care on boardfamily meeting scheduled tomorrow    Atrial fibrillation with RVR [facility])  Assessment & Plan  Patient was tachycardic with soft blood pressures    Continue Cardizem, digoxin, monitor on telemetry  Cardiology recommendations appreciated    Diastolic CHF [facility])  Assessment & Plan  Wt Readings from Last 3 Encounters:   04/26/21 66 3 kg (146 lb 2 6 oz)   04/14/21 64 1 kg (141 lb 6 4 oz)   04/08/21 63 5 kg (139 lb 15 9 oz)     · Hold Lasix for now    · Now requiring 2 L oxygen via nasal cannula  · Chest x-ray on initial assessment did note right-sided pleural effusion  · Patient will benefit from right-sided thoracentesis as unable to give IV diuretics in the setting of acute renal failure/oliguria  Hypotension  Assessment & Plan  · Improving  · Patient hypovolemic secondary to diarrhea at home  · Receiving half normal saline at 50 cc/hour  · Monitor blood pressure closely  · Holding Lasix  · Also currently holding metoprolol/Cardizem      Pancytopenia ([facility] [address] )  Assessment & Plan  · Secondary to multiple myeloma  · Hemoglobin at 10 1 (s/p 3units PRBC transfusion)  · Platelet count at [address]/YQU actively bleeding hence no platelet product ordered  · White blood cell count at 3 6  Multiple myeloma [facility])  Assessment & Plan  · Patient currently not on any chemotherapy  · Per Oncology, patient should be considered for hospice / palliative management  · Requested palliative care consult  · Spoke with patient's son Patrick Ro over the phone/he is the health POA and he confirms that patient is a level 1  · Also discussed with patient's sister Kevin Medley over the phone and she confirms patient is a level 1 too  · Both are aware regarding patient's fragile condition but state patient would have wanted CPR/intubation if necessary  Hyperkalemia  Assessment & Plan  · Improved  · 4 6 today  ·   · Likely secondary to acute on chronic renal failure however patient is not a dialysis candidate  · Monitor potassium closely  · Monitor patient on telemetry  Liver cirrhosis (HCC)  Assessment & Plan  · Known history of end-stage liver cirrhosis with recurrent ascites  · Currently holding Lasix in the setting of hypotension  · Her abdomen is more distended today, unfortunately in the setting of acute renal failure with extremely poor urine output, continuing with IV fluids  Patient will need a paracentesis  · Ordered paracentesis for tomorrow  · Also holding lactulose given history of diarrhea which may have contributed to the hypotension  · Continue with Xifaxan  · Continue with thiamine/folic acid  · Continue with Protonix  · GI consult noted    Anemia  Assessment & Plan  · Patient has known history of multiple myeloma with chronic anemia related to the same  · Hemoglobin 6 5 on admission status post 3 unit packed RBC transfusion, hemoglobin 10 1 this morning  · Monitor H&H closely  · No active bleeding noted  · Continue with pantoprazole  · Continue with folic acid/thiamine      * Acute encephalopathy  Assessment & Plan  Patient has waxing and waning mental status, with underlying dementia, currently mental status at baseline    Multifactorial etiology    · Acute renal failure with creatinine of 8 04, on ivf, improving  · Hypotension with systolic blood pressure readings in the 80s, on ivf  · Hemoglobin 6 5 on presentation s/p 3 units PRBC transfusion, improving  · Urinalysis +, C/S pending ; on ceftriaxone  · chest x-ray notes rt base effusion/ although pneumonia could not be ruled out  · Ammonia 80 ; lactulose on hold given diarrhea leading to hypotension  · Neuro checks  VTE Pharmacologic Prophylaxis:   Pharmacologic: Heparin  Mechanical VTE Prophylaxis in Place: Yes    Patient Centered Rounds: I have performed bedside rounds with nursing staff today  Discussions with Specialists or Other Care Team Provider:  Discussed with Nephrology, palliative Care, Case Management, GI  Education and Discussions with Family / Patient:  Scheduled for family meeting tomorrow morning    Time Spent for Care: 45 minutes  More than 50% of total time spent on counseling and coordination of care as described above  Current Length of Stay: 3 day(s)    Current Patient Status: Inpatient   Certification Statement: The patient will continue to require additional inpatient hospital stay due to Above medical problems    Discharge Plan:  Continue hospitalization  Code Status: Level 1 - Full Code      Subjective:   Patient seen and examined  She has multiple comorbidities  She is complaining of abdominal pain this morning  She appeared to be confused intermittently  She wants to get out of the bed  She is on continued observation    Overnight events reviewed with nursing staff as well as might provider    Objective:     Vitals:   Temp (24hrs), Av 6 °F (36 4 °C), Min:97 4 °F (36 3 °C), Max:97 8 °F (36 6 °C)    Temp:  [97 4 °F (36 3 °C)-97 8 °F (36 6 °C)] 97 4 °F (36 3 °C)  HR:  [] 105  Resp:  [14-26] 20  BP: ()/(60-76) 115/76  SpO2:  [87 %-100 %] 100 %  Body mass index is 26 73 kg/m²  Input and Output Summary (last 24 hours): Intake/Output Summary (Last 24 hours) at 4/26/2021 1634  Last data filed at 4/26/2021 1500  Gross per 24 hour   Intake 150 ml   Output 450 ml   Net -300 ml       Physical Exam:     Physical Exam  Vitals signs and nursing note reviewed  Constitutional:       Appearance: She is ill-appearing  HENT:      Head: Normocephalic and atraumatic  Right Ear: Tympanic membrane normal       Left Ear: Tympanic membrane normal    Neck:      Musculoskeletal: Normal range of motion  Cardiovascular:      Rate and Rhythm: Tachycardia present  Pulses: Normal pulses  Pulmonary:      Effort: Pulmonary effort is normal  No respiratory distress  Abdominal:      General: Abdomen is flat  There is distension  Tenderness: There is abdominal tenderness  Musculoskeletal: Normal range of motion  Skin:     General: Skin is warm  Neurological:      General: No focal deficit present  Mental Status: She is alert  Mental status is at baseline  Additional Data:     Labs:    Results from last 7 days   Lab Units 04/26/21  0630   WBC Thousand/uL 2 84*   HEMOGLOBIN g/dL 10 3*   HEMATOCRIT % 32 0*   PLATELETS Thousands/uL 29*   BANDS PCT % 4   LYMPHO PCT % 23   MONO PCT % 18*   EOS PCT % 0     Results from last 7 days   Lab Units 04/26/21  1316   SODIUM mmol/L 139   POTASSIUM mmol/L 4 2   CHLORIDE mmol/L 103   CO2 mmol/L 21   BUN mg/dL 76*   CREATININE mg/dL 7 00*   ANION GAP mmol/L 15*   CALCIUM mg/dL 8 3   ALBUMIN g/dL 3 2*   TOTAL BILIRUBIN mg/dL 1 20*   ALK PHOS U/L 273*   ALT U/L 34   AST U/L 48*   GLUCOSE RANDOM mg/dL 123     Results from last 7 days   Lab Units 04/26/21  1316   INR  1 28*     * I Have Reviewed All Lab Data Listed Above  * Additional Pertinent Lab Tests Reviewed:  All Labs For Current Hospital Admission Reviewed    Imaging:    Imaging Reports Reviewed Today Include: Imaging Personally Reviewed by Myself Includes:      Recent Cultures (last 7 days):     Results from last 7 days   Lab Units 04/25/21  1841   C DIFF TOXIN B BY PCR  Negative       Last 24 Hours Medication List:   Current Facility-Administered Medications   Medication Dose Route Frequency Provider Last Rate    cefTRIAXone  1,000 mg Intravenous Q24H Adolfo Hidalgo MD 1,000 mg (04/26/21 0044)    digoxin  125 mcg Intravenous Q6H Chacha Jordan MD      diltiazem  30 mg Oral [facility] Chacha Jordan MD      diphenhydrAMINE  25 mg Oral Q6H PRN Casi Velazquez PA-C      HYDROmorphone  0 2 mg Intravenous Once Astrid Gamez MD      lidocaine 1% buffered   Infiltration Code/Trauma/Sedation Med Shellie Draper MD      magnesium oxide  400 mg Oral BID Adolfo Hidalgo MD      melatonin  3 mg Oral HS Adolfo Hidalgo MD      mirtazapine  15 mg Oral HS Adolfo Hidalgo MD     [facility] Danielle Pack ANTIFUNGAL   Topical BID Astrid Gamez MD      nicotine  1 patch Transdermal Daily Adolfo Hidalgo MD      pantoprazole  40 mg Oral Early Morning Adolfo Hidalgo MD      rifaximin  550 mg Oral Q12H Shaunna Richardson MD      sodium chloride  1 spray Each Nare Q1H PRN Adolfo Hidalgo MD      vitamin B-1  100 mg Oral Daily Adolfo Hidalgo MD          Today, Patient Was Seen By: Astrid Gamez MD    ** Please Note: Dictation voice to text software may have been used in the creation of this document   **

## 2021-04-26 NOTE — BRIEF OP NOTE (RAD/CATH)
INTERVENTIONAL RADIOLOGY PROCEDURE NOTE    Date: 4/26/2021    Procedure: IR THORACENTESIS     Preoperative diagnosis:   1  Hypotension    2  CESAR (acute kidney injury) ([address] )    3  Anemia    4  Alcoholic cirrhosis, unspecified whether ascites present ([address] )    5  Sinus tachycardia         Postoperative diagnosis: Same  Surgeon: Sandi Shabazz MD     Assistant: None  No qualified resident was available  Blood loss:  Trace    Specimens:  Sent to the lab as requested    Findings:  1600 mL dark yellow pleural fluid removed from right chest under ultrasound guidance  Complications: None immediate      Anesthesia: local

## 2021-04-26 NOTE — DISCHARGE INSTR - OTHER ORDERS
Skin care plans:  1-Cleanse sacro-buttock with soap and water  Dust liz-anal skin with stoma powder and then apply TRACIE to sacrum, buttocks, liz-anal skin, and posterior upper thighs BID and PRN  2-Hydraguard to bilateral heel BID and PRN  3-Elevate heels to offload pressure  4-Ehob cushion when out of bed  5-Turn/repoisiton q2h or when medically stable for pressure re-distribution on skin    6-Moisturize skin daily with skin nourishing cream

## 2021-04-26 NOTE — CONSULTS
Consultation - Cardiology   Chauncey Cunningham 77 y o  female MRN: [id number]  Unit/Bed#: -01 Encounter: [id number]  04/26/21  2:21 PM    Assessment/ Plan:    (1) Atrial Fibrillation with Rapid Ventricular Response: tachycardic with softer blood pressures  (I) Continue diltiazem 30 mg po q8h  (ii) Continue digoxin; will reduce to 125 mcg q6h due to advanced age   (iii) Continue telemetry  (2) Chronic HFpEF: Most recent ECHO (4/2021) showed EF of 55% and grade 2 diastolic dysfunction  (I) Continue metoprolol, and lasix  (ii) Monitor I/Os, daily weight  (3) CESAR on CKD stage 3: Presented with hyperkalemia, and elevated creatinine  Potential etiologies include, pre-renal from volume depletion vs  hepatorenal syndrome vs  cast nephropathy  (I) Continue IV fluids  (ii) Monitor creatinine   (iii) Avoid nephrotoxic agents  (iv) Nephrology on board  History of Present Illness   Physician Requesting Consult: Shorty Rucker MD    Reason for Consult / Principal Problem:     HPI: Chauncey Cunningham is a 77y o  year old female who presents with hypotension, confusion and diarrhea  Patient has a medical history of HFpEF, CKD stage 3, liver cirrhosis, end stage multiple myeloma, dementia, Afib  On presentation, patient was found to have acute kidney injury superimposed on CKD, hyperkalemia, in addition to Afib with RVR  Inpatient consult to Cardiology     Performed by  Dorothy Fajardo MD     Authorized by Gregg Ramires PA-C              EKG: Atrial Fibrillation  No acute ischemic changes        Review of Systems   Unable to perform ROS: Dementia       Historical Information   Past Medical History:   Diagnosis Date    Alcoholic cirrhosis ([facility] [address] )     Benign essential hypertension     last assessed - [id number]    Cirrhosis (HCC)     CKD (chronic kidney disease) stage 3, GFR 30-59 ml/min (HCC)     Diastolic CHF ([facility] [address] ) 87/55/0517    Gastroesophageal reflux disease without esophagitis 11/16/2017    Hepatic encephalopathy (HCC)     Hyperbilirubinemia 5/14/2020    Hypertension     Multiple myeloma (HCC)     Paroxysmal atrial fibrillation ([facility] [address] )     Pneumonia 5/6/2020     Past Surgical History:   Procedure Laterality Date    APPENDECTOMY      BONE MARROW BIOPSY      IR PARACENTESIS  10/2/2020    IR PARACENTESIS  12/28/2020    IR THORACENTESIS  12/21/2020     Social History     Substance and Sexual Activity   Alcohol Use Not Currently    Frequency: 4 or more times a week    Drinks per session: 1 or 2    Binge frequency: Never    Comment: History of significant daily alcohol intake   Sister joy no alcohol intake in 6 months     Social History     Substance and Sexual Activity   Drug Use No     Social History     Tobacco Use   Smoking Status Light Tobacco Smoker    Packs/day: 0 25    Types: Cigarettes   Smokeless Tobacco Never Used   Tobacco Comment    2-3 a day       Family History:   Family History   Problem Relation Age of Onset    Heart attack Father         myocardial infarction    Heart disease Father        Meds/Allergies   current meds:   Current Facility-Administered Medications   Medication Dose Route Frequency    cefTRIAXone (ROCEPHIN) 1,000 mg in dextrose 5 % 50 mL IVPB  1,000 mg Intravenous Q24H    digoxin (LANOXIN) injection 125 mcg  125 mcg Intravenous Q6H    diltiazem (CARDIZEM) tablet 30 mg  30 mg Oral Q8H [address]    diphenhydrAMINE (BENADRYL) tablet 25 mg  25 mg Oral Q6H PRN    HYDROmorphone HCl (DILAUDID) injection 0 2 mg  0 2 mg Intravenous Once    magnesium oxide (MAG-OX) tablet 400 mg  400 mg Oral BID    melatonin tablet 3 mg  3 mg Oral HS    mirtazapine (REMERON) tablet 15 mg  15 mg Oral HS    moisture barrier miconazole 2% cream (aka TRACIE MOISTURE BARRIER ANTIFUNGAL CREAM)   Topical BID    nicotine (NICODERM CQ) 7 mg/24hr TD 24 hr patch 1 patch  1 patch Transdermal Daily    pantoprazole (PROTONIX) EC tablet 40 mg  40 mg Oral Early Morning    rifaximin Keesha Hill) tablet 550 mg  550 mg Oral Q12H [address]    sodium chloride (OCEAN) 0 65 % nasal spray 1 spray  1 spray Each Nare Q1H PRN    thiamine tablet 100 mg  100 mg Oral Daily     Allergies   Allergen Reactions    Seroquel [Quetiapine] Irritability    Zyprexa [Olanzapine] Confusion       Objective   Vitals: Blood pressure 95/63, pulse (!) 113, temperature 97 6 °F (36 4 °C), resp  rate 22, height 5' 2" (1 575 m), weight 66 3 kg (146 lb 2 6 oz), SpO2 97 %, not currently breastfeeding , Body mass index is 26 73 kg/m² ,   Orthostatic Blood Pressures      Most Recent Value   Blood Pressure  95/63 filed at 04/26/2021 0700   Patient Position - Orthostatic VS  Lying filed at 04/26/2021 7898          Systolic (81PAI), BJZ:775 , Min:91 , JUR:355     Diastolic (58TRT), DZT:36, Min:60, Max:69        Intake/Output Summary (Last 24 hours) at 4/26/2021 1421  Last data filed at 4/26/2021 1250  Gross per 24 hour   Intake 150 ml   Output 300 ml   Net -150 ml       Invasive Devices     Peripheral Intravenous Line            Peripheral IV 04/23/21 Distal;Left;Ventral (anterior) Wrist 3 days          Drain            Urethral Catheter 16 Fr  1 day                    Physical Exam:  GEN: Alert and oriented x 3, in no acute distress  Well appearing and well nourished  HEENT: Sclera anicteric, conjunctivae pink, mucous membranes moist  Oropharynx clear  NECK: Supple, no carotid bruits, no significant JVD  Trachea midline, no thyromegaly  HEART: Regular rhythm, normal S1 and S2, no murmurs, clicks, gallops or rubs  PMI nondisplaced, no thrills  LUNGS: Clear to auscultation bilaterally; no wheezes, rales, or rhonchi  No increased work of breathing or signs of respiratory distress  ABDOMEN: Soft, nontender, nondistended, normoactive bowel sounds  EXTREMITIES: Skin warm and well perfused, no clubbing, cyanosis, or edema  NEURO: No focal findings  Normal speech   Mood and affect normal    SKIN: Normal without suspicious lesions on exposed skin        Lab Results:     Troponins:   Results from last 7 days   Lab Units 04/26/21  0633 04/23/21  1447   TROPONIN I ng/mL 0 02 <0 02       CBC with diff:   Results from last 7 days   Lab Units 04/26/21  0630 04/25/21  0712 04/24/21  0537 04/23/21  1425   WBC Thousand/uL 2 84* 3 60* 3 68* 3 15*   HEMOGLOBIN g/dL 10 3* 10 1* 9 8* 6 5*   HEMATOCRIT % 32 0* 31 6* 31 0* 21 4*   MCV fL 89 89 90 92   PLATELETS Thousands/uL 29* 33* 34* 42*   MCH pg 28 8 28 5 28 3 28 0   MCHC g/dL 32 2 32 0 31 6 30 4*   RDW % 18 9* 18 9* 17 9* 20 8*   NRBC AUTO /100 WBCs 25 21  --  17   NRBC /100 WBC 20* 19*  --  17*         CMP:   Results from last 7 days   Lab Units 04/26/21  1316 04/26/21  0630 04/25/21  0712 04/24/21  0537 04/23/21  1425   POTASSIUM mmol/L 4 2 4 6 4 6 5 1 6 8*   CHLORIDE mmol/L 103 103 102 103 101   CO2 mmol/L 21 21 20* 19* 20*   BUN mg/dL 76* 78* 76* 73* 73*   CREATININE mg/dL 7 00* 7 04* 7 14* 7 75* 8 04*   CALCIUM mg/dL 8 3 8 6 8 3 8 1* 7 9*   AST U/L 48*  --  54*  --  69*   ALT U/L 34  --  36  --  28   ALK PHOS U/L 273*  --  277*  --  259*   EGFR ml/min/1 73sq m 6 6 6 5 5

## 2021-04-26 NOTE — ASSESSMENT & PLAN NOTE
· Known history of end-stage liver cirrhosis with recurrent ascites  · Currently holding Lasix in the setting of hypotension  · Her abdomen is more distended today, unfortunately in the setting of acute renal failure with extremely poor urine output, continuing with IV fluids  Patient will need a paracentesis  · Ordered paracentesis for tomorrow    · Also holding lactulose given history of diarrhea which may have contributed to the hypotension  · Continue with Xifaxan  · Continue with thiamine/folic acid  · Continue with Protonix  · GI consult noted

## 2021-04-26 NOTE — TELEPHONE ENCOUNTER
[facility] and Lorena HARPER placed call to patient's daughter to introduce role of Palliative Care  Daughter reported that their father had  a year ago  Reported that she took care of father to make sure his needs were being met  Daughter reported that she had caregivers to help him during the day  Daughter reported that there aren't any ACP documents for patient and that there isn't a POA for patient  According to [place] law, patient's daughter/son would have equal decision making regarding patient's medical care  Daughter reported that she is aware that patient is declining and that she doesn't have much time left  Daughter was agreeable to family meeting via phone today at 2:00PM if LSW is able to get in contact with patient's son to join call       [facility] left message for patient's son to request meeting today at 2:00PM

## 2021-04-26 NOTE — DISCHARGE INSTRUCTIONS
Abdominal Paracentesis     WHAT YOU NEED TO KNOW:   Abdominal paracentesis is a procedure to remove abnormal fluid buildup in your abdomen  Fluid builds up because of liver problems, such as swelling and scarring  Heart failure, kidney disease, a mass, or problems with your pancreas may also cause fluid buildup  DISCHARGE INSTRUCTIONS:     Follow up with your healthcare provider as directed: Write down your questions so you remember to ask them during your visits  Wound care: Remove dressing after 24 hours  Leave glue in place  Return to your normal activities    Contact Interventional Radiology at [phone] Nasreen PATIENTS: Contact Interventional Radiology at [phone]) Kassi Calderon PATIENTS: Contact Interventional Radiology at [phone]) if:  · You have a fever and your wound is red and swollen  · You have yellow, green, or bad-smelling discharge coming from your wound  · You have pain or swelling in your abdomen  · You have an upset stomach or you vomit  · You have sudden, sharp pain in your abdomen  · You urinate very little or not at all  · You feel confused and more tired than usual    · Your arm or leg feels warm, tender, and painful  It may look swollen and red  · You suddenly feel lightheaded and have trouble breathing  Thoracentesis   WHAT YOU NEED TO KNOW:   A thoracentesis is a procedure to remove extra fluid or air from between your lungs and your inner chest wall  Air or fluid buildup may make it hard for you to breathe  A thoracentesis allows your lungs to expand fully so you can breathe more easily  DISCHARGE INSTRUCTIONS:     Small amount of shoulder pain and bloody sputum is normal after a Thoracentesis  Rest:  Rest when you feel it is needed  Slowly start to do more each day  Return to your daily activities as directed  Resume your normal diet  Small sips of flat soda will help mild nausea  Do not smoke:   If you smoke, it is never too late to quit  Ask for information about how to stop smoking if you need help  Contact Interventional Radiology at [phone] Nasreen PATIENTS: Contact Interventional Radiology at [phone]) Emy Victor PATIENTS: Contact Interventional Radiology at [phone]) if:   · You have a fever  · Your puncture site is red, warm, swollen, or draining pus  · You have questions or concerns about your procedure, medicine, or care  Seek care immediately or call 911 if:   · Severe chest pain with inspiration and shortness of breath    · Large amounts of blood in your sputum    Follow up with your healthcare provider as directed

## 2021-04-26 NOTE — CASE MANAGEMENT
Per rounding with SLIM, patient has a palliative consult pending  If family is not agreeable with hospice, [facility] asked for a possible care team meeting for tomorrow  Cm to follow up with [facility] following the palliative consult  Cm department will continue to follow patient through discharge

## 2021-04-26 NOTE — ASSESSMENT & PLAN NOTE
Wt Readings from Last 3 Encounters:   04/26/21 66 3 kg (146 lb 2 6 oz)   04/14/21 64 1 kg (141 lb 6 4 oz)   04/08/21 63 5 kg (139 lb 15 9 oz)     · Hold Lasix for now    · Now requiring 2 L oxygen via nasal cannula  · Chest x-ray on initial assessment did note right-sided pleural effusion  · Patient will benefit from right-sided thoracentesis as unable to give IV diuretics in the setting of acute renal failure/oliguria

## 2021-04-27 PROCEDURE — 99233 SBSQ HOSP IP/OBS HIGH 50: CPT | Performed by: INTERNAL MEDICINE

## 2021-04-27 PROCEDURE — 99232 SBSQ HOSP IP/OBS MODERATE 35: CPT | Performed by: INTERNAL MEDICINE

## 2021-04-27 RX ORDER — DILTIAZEM HYDROCHLORIDE 60 MG/1
60 TABLET, FILM COATED ORAL EVERY 8 HOURS SCHEDULED
Status: DISCONTINUED | OUTPATIENT
Start: 2021-04-27 | End: 2021-04-28 | Stop reason: HOSPADM

## 2021-04-27 RX ORDER — HYDROMORPHONE HCL/PF 1 MG/ML
0.5 SYRINGE (ML) INJECTION
Status: DISCONTINUED | OUTPATIENT
Start: 2021-04-27 | End: 2021-04-28 | Stop reason: HOSPADM

## 2021-04-27 RX ORDER — LORAZEPAM 2 MG/ML
0.5 INJECTION INTRAMUSCULAR EVERY 4 HOURS PRN
Status: DISCONTINUED | OUTPATIENT
Start: 2021-04-27 | End: 2021-04-28 | Stop reason: HOSPADM

## 2021-04-27 RX ADMIN — DILTIAZEM HYDROCHLORIDE 60 MG: 60 TABLET, FILM COATED ORAL at 23:10

## 2021-04-27 RX ADMIN — MICONAZOLE NITRATE: 20 CREAM TOPICAL at 10:45

## 2021-04-27 RX ADMIN — PANTOPRAZOLE SODIUM 40 MG: 40 TABLET, DELAYED RELEASE ORAL at 05:38

## 2021-04-27 RX ADMIN — DIPHENHYDRAMINE HYDROCHLORIDE 25 MG: 25 TABLET ORAL at 00:47

## 2021-04-27 RX ADMIN — RIFAXIMIN 550 MG: 550 TABLET ORAL at 10:41

## 2021-04-27 RX ADMIN — NICOTINE 1 PATCH: 7 PATCH, EXTENDED RELEASE TRANSDERMAL at 10:45

## 2021-04-27 RX ADMIN — LORAZEPAM 0.5 MG: 2 INJECTION INTRAMUSCULAR; INTRAVENOUS at 17:51

## 2021-04-27 RX ADMIN — CEFTRIAXONE SODIUM 1000 MG: 10 INJECTION, POWDER, FOR SOLUTION INTRAVENOUS at 23:50

## 2021-04-27 RX ADMIN — MELATONIN TAB 3 MG 3 MG: 3 TAB at 00:47

## 2021-04-27 RX ADMIN — CEFTRIAXONE SODIUM 1000 MG: 10 INJECTION, POWDER, FOR SOLUTION INTRAVENOUS at 00:47

## 2021-04-27 RX ADMIN — DILTIAZEM HYDROCHLORIDE 30 MG: 30 TABLET, FILM COATED ORAL at 05:38

## 2021-04-27 RX ADMIN — MIRTAZAPINE 15 MG: 15 TABLET, FILM COATED ORAL at 23:10

## 2021-04-27 RX ADMIN — MIRTAZAPINE 15 MG: 15 TABLET, FILM COATED ORAL at 00:47

## 2021-04-27 RX ADMIN — DILTIAZEM HYDROCHLORIDE 30 MG: 30 TABLET, FILM COATED ORAL at 00:47

## 2021-04-27 RX ADMIN — DILTIAZEM HYDROCHLORIDE 60 MG: 60 TABLET, FILM COATED ORAL at 15:21

## 2021-04-27 RX ADMIN — RIFAXIMIN 550 MG: 550 TABLET ORAL at 00:47

## 2021-04-27 RX ADMIN — MICONAZOLE NITRATE: 20 CREAM TOPICAL at 17:53

## 2021-04-27 NOTE — PROGRESS NOTES
Cardiology Progress Note - Julianne Bronson 77 y o  female MRN: [id number]    Unit/Bed#: -01 Encounter: [id number]      Assessment/Plan:    (1) Atrial Fibrillation with Rapid Ventricular Response:  patient previously a hypotensive, which limited the use of rate control medications  Patient's hemodynamics seem somewhat improved after undergoing thoracentesis and paracentesis, and status post intravascular volume repletion with IV fluids  (I) Will increase diltiazem to 60 mg po q8h  (ii) Continue digoxin at 125 mcg q6h, with dose reduced due to advanced age   (iii) Patient currently comfort care; no need for telemetry      (2) Chronic HFpEF: Most recent ECHO (4/2021) showed EF of 55% and grade 2 diastolic dysfunction      (I) Continue metoprolol, and lasix  (ii) Monitor I/Os, daily weight      (3) CESAR on CKD stage 3: Presented with hyperkalemia, and elevated creatinine  Potential etiologies include, pre-renal from volume depletion vs  hepatorenal syndrome vs  cast nephropathy       (I) Continue IV fluids  (ii) Monitor creatinine   (iii) Avoid nephrotoxic agents  (iv) Nephrology on board          Subjective:   Patient seen and examined  No significant events overnight  Patient pleasantly confused, but some improvement noted from S today's encounter  Patient states that she does not have any chest pain or difficulty breathing  Patient repeatedly asked: To her mother  Of thigh review of systems unable to be ascertained secondary to acute encephalopathy vs/or superimposed on dementia  Objective:     Vitals: Blood pressure 105/69, pulse (!) 120, temperature (!) 96 7 °F (35 9 °C), temperature source Axillary, resp   rate 17, height 5' 2" (1 575 m), weight 72 8 kg (160 lb 7 9 oz), SpO2 94 %, not currently breastfeeding , Body mass index is 29 35 kg/m² ,   Orthostatic Blood Pressures      Most Recent Value   Blood Pressure  105/69 filed at 04/27/2021 0705   Patient Position - Orthostatic VS  Lying filed at 04/26/2021 0429            Intake/Output Summary (Last 24 hours) at 4/27/2021 1416  Last data filed at 4/27/2021 1114  Gross per 24 hour   Intake 360 ml   Output 350 ml   Net 10 ml         Physical Exam:     GEN: Cynthia Dial is awake, alert and disoriented at the time encounter  HEENT: pupils equal, round, and reactive to light; extraocular muscles intact  NECK: supple, no carotid bruits     HEART: regular rhythm, normal S1 and S2, no murmurs, clicks, gallops or rubs   LUNGS:  Decreased breath sounds bilaterally; no wheezes, rales, or rhonchi   ABDOMEN: normal bowel sounds, soft, no tenderness, no distention  EXTREMITIES: peripheral pulses normal; no clubbing, cyanosis, or edema      Medications:      Current Facility-Administered Medications:     cefTRIAXone (ROCEPHIN) 1,000 mg in dextrose 5 % 50 mL IVPB, 1,000 mg, Intravenous, Q24H, Li Greene MD, Last Rate: 100 mL/hr at 04/27/21 0047, 1,000 mg at 04/27/21 0047    diltiazem (CARDIZEM) tablet 60 mg, 60 mg, Oral, Q8H [address], Shemar Bravo MD    melatonin tablet 3 mg, 3 mg, Oral, HS, Li Greene MD, 3 mg at 04/27/21 0047    mirtazapine (REMERON) tablet 15 mg, 15 mg, Oral, HS, Li Greene MD, 15 mg at 04/27/21 0047    moisture barrier miconazole 2% cream (aka TRACIE MOISTURE BARRIER ANTIFUNGAL CREAM), , Topical, BID, Trish Hutson MD, Given at 04/27/21 1045    nicotine (NICODERM CQ) 7 mg/24hr TD 24 hr patch 1 patch, 1 patch, Transdermal, Daily, Li Greene MD, 1 patch at 04/27/21 1045    pantoprazole (PROTONIX) EC tablet 40 mg, 40 mg, Oral, Early Morning, Dk Evans MD, 40 mg at 04/27/21 0538    rifaximin (XIFAXAN) tablet 550 mg, 550 mg, Oral, Q12H [address], Li Greene MD, 550 mg at 04/27/21 1041    sodium chloride (OCEAN) 0 65 % nasal spray 1 spray, 1 spray, Each Nare, Q1H PRN, Li Greene MD     Labs & Results:    Results from last 7 days   Lab Units 04/26/21  0633 04/23/21  1447   TROPONIN I ng/mL 0 02 <0 02     Results from last 7 days   Lab Units 04/26/21  0630 04/25/21  0712 04/24/21  0537   WBC Thousand/uL 2 84* 3 60* 3 68*   HEMOGLOBIN g/dL 10 3* 10 1* 9 8*   HEMATOCRIT % 32 0* 31 6* 31 0*   PLATELETS Thousands/uL 29* 33* 34*         Results from last 7 days   Lab Units 04/26/21  1316 04/26/21  0630 04/25/21  0712  04/23/21  1425   POTASSIUM mmol/L 4 2 4 6 4 6   < > 6 8*   CHLORIDE mmol/L 103 103 102   < > 101   CO2 mmol/L 21 21 20*   < > 20*   BUN mg/dL 76* 78* 76*   < > 73*   CREATININE mg/dL 7 00* 7 04* 7 14*   < > 8 04*   CALCIUM mg/dL 8 3 8 6 8 3   < > 7 9*   ALK PHOS U/L 273*  --  277*  --  259*   ALT U/L 34  --  36  --  28   AST U/L 48*  --  54*  --  69*    < > = values in this interval not displayed       Results from last 7 days   Lab Units 04/26/21  1316 04/26/21  0630   INR  1 28* 1 27*     Results from last 7 days   Lab Units 04/26/21  0630 04/24/21  0537   MAGNESIUM mg/dL 2 2 2 1

## 2021-04-27 NOTE — CASE MANAGEMENT
Cm received phone call from Nathan at [facility] who reported that patient is not appropriate for inpatient hospice  [facility] also has no beds available at this time  Cm to follow up with patient's sister in the morning

## 2021-04-27 NOTE — PROGRESS NOTES
NEPHROLOGY PROGRESS NOTE    Patient: Cynthia Dial               Sex: female          DOA: 4/23/2021  1:44 PM   YOB: 1945        Age:  77 y o         LOS:  LOS: 4 days   4/27/2021    REASON FOR THE CONSULTATION:      Acute kidney injury on chronic kidney disease stage 3    SUBJECTIVE     Patient is lying in bed, awake and in appears hyperactive and more confused than yesterday  Urine output remains low and appears to be tea colored  CURRENT MEDICATIONS       Current Facility-Administered Medications:     cefTRIAXone (ROCEPHIN) 1,000 mg in dextrose 5 % 50 mL IVPB, 1,000 mg, Intravenous, Q24H, Li Greene MD, Last Rate: 100 mL/hr at 04/27/21 0047, 1,000 mg at 04/27/21 0047    diltiazem (CARDIZEM) tablet 60 mg, 60 mg, Oral, Q8H [facility], Shemar Bravo MD    melatonin tablet 3 mg, 3 mg, Oral, HS, Li Greene MD, 3 mg at 04/27/21 0047    mirtazapine (REMERON) tablet 15 mg, 15 mg, Oral, HS, Li Greene MD, 15 mg at 04/27/21 0047    moisture barrier miconazole 2% cream (aka TRACIE MOISTURE BARRIER ANTIFUNGAL CREAM), , Topical, BID, Trish Hutson MD, Given at 04/27/21 1045    nicotine (NICODERM CQ) 7 mg/24hr TD 24 hr patch 1 patch, 1 patch, Transdermal, Daily, Li Greene MD, 1 patch at 04/27/21 1045    pantoprazole (PROTONIX) EC tablet 40 mg, 40 mg, Oral, Early Morning, Dk Evans MD, 40 mg at 04/27/21 0538    rifaximin (XIFAXAN) tablet 550 mg, 550 mg, Oral, Q12H [facility], Li Greene MD, 550 mg at 04/27/21 1041    sodium chloride (OCEAN) 0 65 % nasal spray 1 spray, 1 spray, Each Nare, Q1H PRN, Li Greene MD    REVIEW OF SYSTEMS     Review of Systems   Unable to perform ROS: Dementia       OBJECTIVE     Current Weight: Weight - Scale: 72 8 kg (160 lb 7 9 oz)  Vitals:    04/27/21 0804   BP:    Pulse: (!) 120   Resp:    Temp:    SpO2: 94%     Body mass index is 29 35 kg/m²      Intake/Output Summary (Last 24 hours) at 4/27/2021 1151  Last data filed at 4/27/2021 8761  Gross per 24 hour   Intake 220 ml   Output 350 ml   Net -130 ml       PHYSICAL EXAMINATION     Physical Exam  Constitutional:       Appearance: She is well-developed  HENT:      Head: Normocephalic and atraumatic  Eyes:      Pupils: Pupils are equal, round, and reactive to light  Neck:      Musculoskeletal: Neck supple  Cardiovascular:      Rate and Rhythm: Rhythm irregular  Heart sounds: Murmur present  Pulmonary:      Effort: Pulmonary effort is normal    Abdominal:      General: Bowel sounds are normal       Palpations: Abdomen is soft  Musculoskeletal: Normal range of motion  Right lower leg: Edema present  Left lower leg: Edema present  Skin:     General: Skin is warm  Neurological:      Mental Status: She is alert  Comments: Alert and oriented to person and place only  LAB RESULTS     Results from last 7 days   Lab Units 04/26/21  1316 04/26/21  0630 04/25/21  0712 04/24/21  0537 04/23/21  1425   WBC Thousand/uL  --  2 84* 3 60* 3 68* 3 15*   HEMOGLOBIN g/dL  --  10 3* 10 1* 9 8* 6 5*   HEMATOCRIT %  --  32 0* 31 6* 31 0* 21 4*   PLATELETS Thousands/uL  --  29* 33* 34* 42*   POTASSIUM mmol/L 4 2 4 6 4 6 5 1 6 8*   CHLORIDE mmol/L 103 103 102 103 101   CO2 mmol/L 21 21 20* 19* 20*   BUN mg/dL 76* 78* 76* 73* 73*   CREATININE mg/dL 7 00* 7 04* 7 14* 7 75* 8 04*   EGFR ml/min/1 73sq m 6 6 6 5 5   CALCIUM mg/dL 8 3 8 6 8 3 8 1* 7 9*   MAGNESIUM mg/dL  --  2 2  --  2 1  --            RADIOLOGY RESULTS      Results for orders placed during the hospital encounter of 04/23/21   XR chest portable    Narrative CHEST     INDICATION:   confusion, rule out pneumonia  COMPARISON:  4/1/2021    EXAM PERFORMED/VIEWS:  XR CHEST PORTABLE      FINDINGS:    Heart shadow appears unremarkable  Atherosclerotic vascular calcifications are noted  There is increased opacification in the right hemithorax, likely worsening effusion with atelectasis  No pneumothorax      Osseous structures appear within normal limits for patient age  Impression Increased opacification within the right hemithorax, likely worsening effusion with atelectasis  Underlying pneumonia cannot be excluded  Workstation performed: [id number]           ASSESSMENT/PLAN     64-year-old female with past medical history of CHF with diastolic dysfunction, chronic kidney disease stage 3, liver cirrhosis, multiple myeloma refractory to medication, dementia who presents to our facility with lethargy and confusion and noted to have severe acute kidney injury  1  Acute kidney injury on chronic kidney disease stage 3:  Baseline serum creatinine 1 4   -  present with serum creatinine of 8 0 and worse   -etiology was thought to be secondary to hypotension plus profuse diarrhea that occurred at home resulting in ischemic ATN  -serum creatinine remains elevated at 7 0 obtained yesterday    -given multiple comorbidities, she was never a candidate for hemodialysis  - participated in family meeting and decision made to keep patient under comfort care measures  2  Hyperkalemia:  Presented with serum potassium of 6 8 and elevated   -this was secondary to #1   -after initiation of bicarbonate based fluids, potassium has improved to 4 2 yesterday  3  Volume status:  Patient was hypovolemic on arrival and resuscitated with IVF  After oligoanuric state, IVF were held  4  Liver cirrhosis:  Often undergoes decompensation with associated hepatic encephalopathy     -lactose on hold due to diarrhea  S/p paracentesis and thoracentesis yesterday  5  Atrial fibrillation:  Rate and rhythm uncontrolled  -recommend introduction of diltiazem          Saurav Chiu MD  Nephrology  4/27/2021

## 2021-04-27 NOTE — ASSESSMENT & PLAN NOTE
· Known history of end-stage liver cirrhosis with recurrent ascites    Her abdomen is more distended today, unfortunately in the setting of acute renal failure with extremely poor urine output, continuing with IV fluids  Patient will need a paracentesis    · Status post paracentesis yesterday 04/26/2021  · Also holding lactulose given history of diarrhea which may have contributed to the hypotension  · Continue with Xifaxan  · Continue with thiamine/folic acid  · Continue with Protonix  · GI consult noted

## 2021-04-27 NOTE — PROGRESS NOTES
[address] [facility]  Progress Note - Mickey Velazco 1945, 77 y o  female MRN: [id number]  Unit/Bed#: -Cathy Encounter: [id number]  Primary Care Provider: Shaun Estrada MD   Date and time admitted to hospital: 4/23/2021  1:44 PM    CESAR (acute kidney injury) [facility])  Assessment & Plan  Patient presented with creatinine of 8 04/hyperkalemia with potassium of 6 8 get baseline creatinine of 1 4  Discussed with Nephrology, patient not a dialysis candidate    -likely prerenal in the setting of anemia/diarrhea/hypotension/ischemic ATN    · Creatinine 7 00/potassium 4 6 today    · Pierce placed  Very minimal dark-colored urine output  Monitor I/Os closely  · Hold Lasix/lactulose in the setting of acute renal failure secondary to dehydration from diarrhea  · Renal ultrasound reviewed  · Nephrology on board  Appreciate input  · Not recommending dialysis due to multiple comorbidities patient is not a candidate  · Palliative care and nephrology recommendations greatly appreciated  Discussed with patient's children on the phone along with palliative Care and Nephrology, goals of care determined to be hospice  · Consult with Case Management for hospice placed in pending    Atrial fibrillation with RVR ([facility])  Assessment & Plan  Episodes of tachycardia and hypotension    Continue Cardizem, digoxin, monitor on telemetry  Cardiology recommendations appreciated    Diastolic CHF [facility])  Assessment & Plan  Wt Readings from Last 3 Encounters:   04/27/21 72 8 kg (160 lb 7 9 oz)   04/14/21 64 1 kg (141 lb 6 4 oz)   04/08/21 63 5 kg (139 lb 15 9 oz)     · Hold Lasix for now    · Currently on room air  · Chest x-ray on initial assessment did note right-sided pleural effusion    · Status post right-sided thoracentesis 1300 cc removed 04/26/2020    Liver cirrhosis ([address] )  Assessment & Plan  · Known history of end-stage liver cirrhosis with recurrent ascites    Her abdomen is more distended today, unfortunately in the setting of acute renal failure with extremely poor urine output, continuing with IV fluids  Patient will need a paracentesis  · Status post paracentesis yesterday 04/26/2021  · Also holding lactulose given history of diarrhea which may have contributed to the hypotension  · Continue with Xifaxan  · Continue with thiamine/folic acid  · Continue with Protonix  · GI consult noted    * Acute metabolic encephalopathy-poa  Assessment & Plan  Patient has waxing and waning mental status, with underlying dementia, currently mental status at baseline    Multifactorial etiology, likely secondary to uremia/worsening creatinine function in the setting of cirrhosis/hepatic encephalopathy    · Acute renal failure with creatinine of 8 04 on admission creatinine improved only to 7    · Continues to have episodes of hypotension  · Hemoglobin 6 5 on presentation s/p 3 units PRBC transfusion, improving    · chest x-ray notes rt base effusion/ although pneumonia could not be ruled out  · Ammonia 80 ; lactulose on hold given diarrhea leading to hypotension  · Neuro checks  VTE Pharmacologic Prophylaxis:   Pharmacologic:  Discontinued secondary to being comfort  Mechanical VTE Prophylaxis in Place: Yes    Patient Centered Rounds: I have performed bedside rounds with nursing staff today  Discussions with Specialists or Other Care Team Provider:  Nephrology and palliative Care and Case Management    Education and Discussions with Family / Patient:  Participate in family meeting with palliative Care, Nephrology with patient's son and daughter over the phone and explained the plan and family decided to proceed with hospice    Time Spent for Care: 1 hour  More than 50% of total time spent on counseling and coordination of care as described above      Current Length of Stay: 4 day(s)    Current Patient Status: Inpatient   Certification Statement: The patient will continue to require additional inpatient hospital stay due to Hospice    Discharge Plan:  Patient made comfort care per family discussion this morning  Waiting for hospice placement    Code Status: Level 4 - Comfort Care      Subjective:   Patient seen and examined  She appeared very including confused today  She is on continued observation  She denies any pain at this time  She denies any abdominal pain nausea or vomiting  Not on supplemental oxygen  Denies any respiratory issues including cough or shortness of breath  Objective:     Vitals:   Temp (24hrs), Av 3 °F (36 3 °C), Min:96 7 °F (35 9 °C), Max:97 7 °F (36 5 °C)    Temp:  [96 7 °F (35 9 °C)-97 7 °F (36 5 °C)] 96 7 °F (35 9 °C)  HR:  [] 120  Resp:  [17-20] 17  BP: ()/(63-76) 105/69  SpO2:  [94 %-100 %] 94 %  Body mass index is 29 35 kg/m²  Input and Output Summary (last 24 hours): Intake/Output Summary (Last 24 hours) at 2021 1253  Last data filed at 2021 1114  Gross per 24 hour   Intake 360 ml   Output 350 ml   Net 10 ml       Physical Exam:     Physical Exam  Vitals signs and nursing note reviewed  Constitutional:       General: She is not in acute distress  Appearance: She is ill-appearing  She is not toxic-appearing  Cardiovascular:      Rate and Rhythm: Normal rate and regular rhythm  Pulses: Normal pulses  Heart sounds: Normal heart sounds  Pulmonary:      Effort: Pulmonary effort is normal  No respiratory distress  Breath sounds: Normal breath sounds  Abdominal:      General: Abdomen is flat  Palpations: Abdomen is soft  Musculoskeletal: Normal range of motion  Skin:     General: Skin is warm and dry  Neurological:      General: No focal deficit present  Mental Status: She is disoriented           Additional Data:     Labs:    Results from last 7 days   Lab Units 21  0630   WBC Thousand/uL 2 84*   HEMOGLOBIN g/dL 10 3*   HEMATOCRIT % 32 0*   PLATELETS Thousands/uL 29*   BANDS PCT % 4   LYMPHO PCT % 23   MONO PCT % 18* EOS PCT % 0     Results from last 7 days   Lab Units 04/26/21  1316   SODIUM mmol/L 139   POTASSIUM mmol/L 4 2   CHLORIDE mmol/L 103   CO2 mmol/L 21   BUN mg/dL 76*   CREATININE mg/dL 7 00*   ANION GAP mmol/L 15*   CALCIUM mg/dL 8 3   ALBUMIN g/dL 3 2*   TOTAL BILIRUBIN mg/dL 1 20*   ALK PHOS U/L 273*   ALT U/L 34   AST U/L 48*   GLUCOSE RANDOM mg/dL 123     Results from last 7 days   Lab Units 04/26/21  1316   INR  1 28*                       * I Have Reviewed All Lab Data Listed Above  * Additional Pertinent Lab Tests Reviewed:  [address] Admission Reviewed    Imaging:    Imaging Reports Reviewed Today Include:  Ultrasound kidney and bladder, chest x-ray  Imaging Personally Reviewed by Myself Includes:     Recent Cultures (last 7 days):     Results from last 7 days   Lab Units 04/26/21  1647 04/26/21  1618 04/25/21  2214 04/25/21  1841   GRAM STAIN RESULT   --  1+ Polys  No bacteria seen  --   --    URINE CULTURE   --   --  Culture too young- will reincubate  --    BODY FLUID CULTURE, STERILE  No growth No growth  --   --    C DIFF TOXIN B BY PCR   --   --   --  Negative       Last 24 Hours Medication List:   Current Facility-Administered Medications   Medication Dose Route Frequency Provider Last Rate    cefTRIAXone  1,000 mg Intravenous Q24H Madeline Yoon MD 1,000 mg (04/27/21 0047)    diltiazem  60 mg Oral Q8H [address] Pamela Jacobo MD      melatonin  3 mg Oral HS Madeline Yoon MD      mirtazapine  15 mg Oral HS MD Li Mcdonald Abo ANTIFUNGAL   Topical BID Margaret Flores MD      nicotine  1 patch Transdermal Daily Madeline Yoon MD      pantoprazole  40 mg Oral Early Morning Madeline Yoon MD      rifaximin  550 mg Oral Q12H Sendy Reyes MD      sodium chloride  1 spray Each Nare Q1H PRN Madeline Yoon MD          Today, Patient Was Seen By: Margaret Flores MD    ** Please Note: Dictation voice to text software may have been used in the creation of this document   **

## 2021-04-27 NOTE — ASSESSMENT & PLAN NOTE
Patient presented with creatinine of 8 04/hyperkalemia with potassium of 6 8 get baseline creatinine of 1 4  Discussed with Nephrology, patient not a dialysis candidate    -likely prerenal in the setting of anemia/diarrhea/hypotension/ischemic ATN    · Creatinine 7 00/potassium 4 6 today    · Pierce placed  Very minimal dark-colored urine output  Monitor I/Os closely  · Hold Lasix/lactulose in the setting of acute renal failure secondary to dehydration from diarrhea  · Renal ultrasound reviewed  · Nephrology on board  Appreciate input  · Not recommending dialysis due to multiple comorbidities patient is not a candidate  · Palliative care and nephrology recommendations greatly appreciated    Discussed with patient's children on the phone along with palliative Care and Nephrology, goals of care determined to be hospice  · Consult with Case Management for hospice placed in pending

## 2021-04-27 NOTE — HOSPICE NOTE
Hospice referral received  Reviewed patient with Dr Aditi Estrada director who approved pt for routine level of hospice care not inpatient as had been requested  Updated CM Pb of the same and she is going to talk with the family  We could provide  Hospice care at home or SNF depending on what family decides

## 2021-04-27 NOTE — ASSESSMENT & PLAN NOTE
Wt Readings from Last 3 Encounters:   04/27/21 72 8 kg (160 lb 7 9 oz)   04/14/21 64 1 kg (141 lb 6 4 oz)   04/08/21 63 5 kg (139 lb 15 9 oz)     · Hold Lasix for now    · Currently on room air  · Chest x-ray on initial assessment did note right-sided pleural effusion    · Status post right-sided thoracentesis 1300 cc removed 04/26/2020

## 2021-04-27 NOTE — CASE MANAGEMENT
Cm called patient's sister to discuss hospice  Sister reported that she missed palliative's phone call  Palliative Dr Marietta Sutton next to Cm at this time and agreeable to speak to patient's sister  Palliative discussed home hospice vs inpatient hospice  Sister is going to call her family to discuss which option the family would prefer  Cm to call back later on to discuss their choice  Cm department will continue to follow patient through discharge

## 2021-04-27 NOTE — ASSESSMENT & PLAN NOTE
Patient has waxing and waning mental status, with underlying dementia, currently mental status at baseline    Multifactorial etiology, likely secondary to uremia/worsening creatinine function in the setting of cirrhosis/hepatic encephalopathy    · Acute renal failure with creatinine of 8 04 on admission creatinine improved only to 7    · Continues to have episodes of hypotension  · Hemoglobin 6 5 on presentation s/p 3 units PRBC transfusion, improving    · chest x-ray notes rt base effusion/ although pneumonia could not be ruled out  · Ammonia 80 ; lactulose on hold given diarrhea leading to hypotension  · Neuro checks

## 2021-04-27 NOTE — CASE MANAGEMENT
Cm received voicemail from patient's sister who reported that the family has agreed on inpatient hospice  Cm sent referral to [facility] hospice via [employer]  Cm department will continue to follow patient through discharge

## 2021-04-27 NOTE — PROGRESS NOTES
Progress note - Palliative and Supportive Care   Elpidio Haney 77 y o  female [id number]    Patient Active Problem List   Diagnosis    Anemia    Cigarette nicotine dependence without complication    Hyperglycemia    Paroxysmal SVT (supraventricular tachycardia) (HCC)    Sinus tachycardia    Urinary incontinence    Memory difficulties    Gait disturbance    Obesity (BMI 30 0-34  9)    Recurrent major depressive disorder, in partial remission ([facility] [address] )    History of alcohol abuse    Metabolic encephalopathy    Liver cirrhosis (HCC)    Acute on chronic diastolic CHF    Dementia     Urinary tract infection    CESAR (acute kidney injury) (HCC)    Dysphagia    Paroxysmal atrial fibrillation (HCC)    Troponin level elevated    Neutropenia (HCC)    Hyperphosphatemia    Hyperkalemia    Hyponatremia    Multiple myeloma (HCC)    Epistaxis    Azotemia    Pancytopenia (HCC)    Pulmonary hypertension (HCC)    Hyperuricemia    Acute renal failure with acute tubular necrosis superimposed on chronic kidney disease (HCC)    Ascites    Chronic kidney disease (CKD), active medical management without dialysis, stage 4 (severe) (HCC)    Volume overload    Diarrhea    Dementia (HCC)    Acute kidney injury superimposed on chronic kidney disease (HCC)    Hypotension    Rib fracture    Pneumonia    Hypertension    Acute encephalopathy    Diastolic CHF (HCC)    CKD (chronic kidney disease) stage 3, GFR 30-59 ml/min (HCC)    Alcoholic cirrhosis (HCC)    Hypotension (arterial)    Hypernatremia    Compression fracture of first lumbar vertebra (HCC)    Atrial fibrillation with RVR (HCC)     Active issues specifically addressed today include:  Cirrhosis, multiple myeloma, acute renal failure, goals of care    Plan:  1  Symptom management -    - Will discontinue all non-comfort-focused medications   - hospice referral, consideration for IPU     2   Goals -  Extensive goals of care conversation with patient's two adult children as well as IM provider and nephrology  Outlined patient's advance illness and multiple end-stage conditions including renal failure, cirrhosis, and multiple myeloma  Unfortunately comfort care for end-of-life seems to be the only remaining option given that patient is an inappropriate candidate for HD due to the above  Family asked multiple appropriate questions and are in agreement with comfort measures, including a change in code status  Expressed my honest prognosis of likely days to short weeks given her oliguria  I then spoke to patient's sister Neno Juan and explained the outcome of the meeting  She too is in agreement with hospice, though has concerns about providing this at home  Code Status: level 4   Decisional apparatus:  Patient is not competent on my exam today  If competence is lost, patient's substitute decision maker would default to adult children by [place]  Advance Directive / Living Will / POLST:  none    Interval history:       Patient alert today but clearly confused  Denies pain  Denies SOB  approx 200ccs urine output  Denies nausea  Feels tired  Goals conversations as above       MEDICATIONS / ALLERGIES:     all current active meds have been reviewed and current meds:   Current Facility-Administered Medications   Medication Dose Route Frequency    cefTRIAXone (ROCEPHIN) 1,000 mg in dextrose 5 % 50 mL IVPB  1,000 mg Intravenous Q24H    diltiazem (CARDIZEM) tablet 60 mg  60 mg Oral Q8H [facility]    melatonin tablet 3 mg  3 mg Oral HS    mirtazapine (REMERON) tablet 15 mg  15 mg Oral HS    moisture barrier miconazole 2% cream (aka TRACIE MOISTURE BARRIER ANTIFUNGAL CREAM)   Topical BID    nicotine (NICODERM CQ) 7 mg/24hr TD 24 hr patch 1 patch  1 patch Transdermal Daily    pantoprazole (PROTONIX) EC tablet 40 mg  40 mg Oral Early Morning    rifaximin (XIFAXAN) tablet 550 mg  550 mg Oral Q12H [facility]    sodium chloride (OCEAN) 0 65 % nasal spray 1 spray  1 spray Each Nare Q1H PRN       Allergies   Allergen Reactions    Seroquel [Quetiapine] Irritability    Zyprexa [Olanzapine] Confusion       OBJECTIVE:    Physical Exam  Physical Exam  Constitutional:       General: She is not in acute distress  Appearance: She is ill-appearing  She is not toxic-appearing or diaphoretic  HENT:      Head: Atraumatic  Right Ear: External ear normal       Left Ear: External ear normal       Nose: Nose normal    Eyes:      General:         Right eye: No discharge  Left eye: No discharge  Cardiovascular:      Rate and Rhythm: Normal rate  Pulmonary:      Effort: No respiratory distress  Musculoskeletal:         General: Swelling present  Skin:     General: Skin is dry  Neurological:      Mental Status: She is alert  Comments: Alert and oriented to person but not place, time, or situation   Psychiatric:         Mood and Affect: Mood normal          Behavior: Behavior normal          Lab Results: I have personally reviewed pertinent labs  , CBC: No results found for: WBC, HGB, HCT, MCV, PLT, ADJUSTEDWBC, MCH, MCHC, RDW, MPV, NRBC, CMP: No results found for: SODIUM, K, CL, CO2, ANIONGAP, BUN, CREATININE, GLUCOSE, CALCIUM, AST, ALT, ALKPHOS, PROT, BILITOT, EGFR, BMP:No results found for: SODIUM, K, CL, CO2, BUN, CREATININE, GLUC, GLUF, CALCIUM, AGAP, EGFR      Counseling / Coordination of Care    Total floor / unit time spent today 60+ minutes  Greater than 50% of total time was spent with the patient and / or family counseling and / or coordination of care (09:00-9:30 with children, additional 10 minutes with sister)  A description of the counseling / coordination of care: care team discussions, time spent with patient, multiple goals conversations, hospice d/c planning, change in code status, med changes

## 2021-04-27 NOTE — ASSESSMENT & PLAN NOTE
Episodes of tachycardia and hypotension    Continue Cardizem, digoxin, monitor on telemetry  Cardiology recommendations appreciated

## 2021-04-28 ENCOUNTER — TRANSITIONAL CARE MANAGEMENT (OUTPATIENT)
Dept: INTERNAL MEDICINE CLINIC | Facility: CLINIC | Age: 76
End: 2021-04-28

## 2021-04-28 VITALS
TEMPERATURE: 99.5 F | RESPIRATION RATE: 18 BRPM | BODY MASS INDEX: 29.53 KG/M2 | SYSTOLIC BLOOD PRESSURE: 107 MMHG | DIASTOLIC BLOOD PRESSURE: 75 MMHG | OXYGEN SATURATION: 84 % | HEART RATE: 127 BPM | HEIGHT: 62 IN | WEIGHT: 160.5 LBS

## 2021-04-28 PROCEDURE — 99239 HOSP IP/OBS DSCHRG MGMT >30: CPT | Performed by: INTERNAL MEDICINE

## 2021-04-28 PROCEDURE — 99232 SBSQ HOSP IP/OBS MODERATE 35: CPT | Performed by: INTERNAL MEDICINE

## 2021-04-28 RX ORDER — METOPROLOL TARTRATE 5 MG/5ML
2.5 INJECTION INTRAVENOUS EVERY 6 HOURS PRN
Status: DISCONTINUED | OUTPATIENT
Start: 2021-04-28 | End: 2021-04-28 | Stop reason: HOSPADM

## 2021-04-28 RX ORDER — DILTIAZEM HYDROCHLORIDE 60 MG/1
60 TABLET, FILM COATED ORAL EVERY 8 HOURS SCHEDULED
Qty: 45 TABLET | Refills: 0 | Status: SHIPPED | OUTPATIENT
Start: 2021-04-28

## 2021-04-28 RX ADMIN — MICONAZOLE NITRATE: 20 CREAM TOPICAL at 09:23

## 2021-04-28 RX ADMIN — NICOTINE 1 PATCH: 7 PATCH, EXTENDED RELEASE TRANSDERMAL at 09:26

## 2021-04-28 RX ADMIN — HYDROMORPHONE HYDROCHLORIDE 0.5 MG: 1 INJECTION, SOLUTION INTRAMUSCULAR; INTRAVENOUS; SUBCUTANEOUS at 04:26

## 2021-04-28 RX ADMIN — LORAZEPAM 0.5 MG: 2 INJECTION INTRAMUSCULAR; INTRAVENOUS at 01:13

## 2021-04-28 NOTE — HOSPICE NOTE
Pt was approved today for the inpatient hospice house in [place]  I reached out to patients son Eliane Mercer and was reviewing hospice care and services  He stated the hospice house was too far away from her family and did not want her moved  I updated Marii KHOURY of the same   I asked to reach out to [facility] inpatient unit up there as per the sons request

## 2021-04-28 NOTE — CASE MANAGEMENT
Cm received phone call from Batool Ellington at [facility]  Nani Shaw reported that patient has been accepted for IP hospice and she will be meeting with patient's son at 1:00 today to sign consents  Nani Colin asked for transportation after 2 pm today  Cm called [facility] and spoke to Ed  Cm requested BLS transport for 2:30 pm today  [facility] to call back with transportation time  Cm completed medical necessity and placed one copy in medical records  Cm placed another copy in patient's binder  Cm department will continue to follow patient through discharge

## 2021-04-28 NOTE — PROGRESS NOTES
Cardiology Progress Note - Monroe Brown 77 y o  female MRN: [id number]    Unit/Bed#: -01 Encounter: [id number]      Assessment/Plan:    (1) Atrial Fibrillation with Rapid Ventricular Response:  patient previously a hypotensive, which limited the use of rate control medications  Patient's hemodynamics seem somewhat improved after undergoing thoracentesis and paracentesis, and status post intravascular volume repletion with IV fluids      (I) Will increase diltiazem to 60 mg po q6h  (ii) Will initiate Lopressor 2 5 mg q6h prn for refractory tachycardia with HR> 120 bpm   (iii) Patient currently comfort care; no need for telemetry      (2) Chronic HFpEF: Most recent ECHO (4/2021) showed EF of 55% and grade 2 diastolic dysfunction      Monitor I/O's and daily weight  Will provide symptomatic treatment      (3) CESAR on CKD stage 3: Presented with hyperkalemia, and elevated creatinine  Potential etiologies include, pre-renal from volume depletion vs  hepatorenal syndrome vs  cast nephropathy       (I) Continue IV fluids  (ii) Monitor creatinine   (iii) Avoid nephrotoxic agents  (iv) Nephrology on board  Subjective:   Patient seen and examined  No significant events overnight  Appears more lethargic, and more disoriented today than on previous encounter  Objective:     Vitals: Blood pressure 107/75, pulse (!) 127, temperature 99 5 °F (37 5 °C), temperature source Rectal, resp   rate 18, height 5' 2" (1 575 m), weight 72 8 kg (160 lb 7 9 oz), SpO2 (!) 84 %, not currently breastfeeding , Body mass index is 29 35 kg/m² ,   Orthostatic Blood Pressures      Most Recent Value   Blood Pressure  107/75 filed at 04/28/2021 0827   Patient Position - Orthostatic VS  Lying filed at 04/27/2021 1513            Intake/Output Summary (Last 24 hours) at 4/28/2021 1619  Last data filed at 4/28/2021 1154  Gross per 24 hour   Intake 180 ml   Output 1125 ml   Net -945 ml         Physical Exam:    GEN: Monroe Brown appears well, alert and oriented x 3, pleasant and cooperative   HEENT: pupils equal, round, and reactive to light; extraocular muscles intact  NECK: supple, no carotid bruits   HEART: regular rhythm, normal S1 and S2, no murmurs, clicks, gallops or rubs   LUNGS: clear to auscultation bilaterally; no wheezes, rales, or rhonchi   ABDOMEN: normal bowel sounds, soft, no tenderness, no distention  EXTREMITIES: peripheral pulses normal; no clubbing, cyanosis, or edema      Medications:      Current Facility-Administered Medications:     cefTRIAXone (ROCEPHIN) 1,000 mg in dextrose 5 % 50 mL IVPB, 1,000 mg, Intravenous, Q24H, Talia Jameson MD, Last Rate: 100 mL/hr at 04/27/21 2350, 1,000 mg at 04/27/21 2350    diltiazem (CARDIZEM) tablet 60 mg, 60 mg, Oral, Q8H [address], Pastor Lashay MD, Stopped at 04/28/21 0538    HYDROmorphone (DILAUDID) injection 0 5 mg, 0 5 mg, Intravenous, Q3H PRN, Geovanny Yanez MD, 0 5 mg at 04/28/21 0426    LORazepam (ATIVAN) injection 0 5 mg, 0 5 mg, Intravenous, Q4H PRN, Geovanny Yanez MD, 0 5 mg at 04/28/21 0113    metoprolol (LOPRESSOR) injection 2 5 mg, 2 5 mg, Intravenous, Q6H PRN, Pastor Lashay MD    mirtazapine (REMERON) tablet 15 mg, 15 mg, Oral, HS, Talia Jameson MD, 15 mg at 04/27/21 2310    moisture barrier miconazole 2% cream (aka TRACIE MOISTURE BARRIER ANTIFUNGAL CREAM), , Topical, BID, Robin Bass MD, Given at 04/28/21 0923    nicotine (NICODERM CQ) 7 mg/24hr TD 24 hr patch 1 patch, 1 patch, Transdermal, Daily, Talia Jameson MD, 1 patch at 04/28/21 0926    sodium chloride (OCEAN) 0 65 % nasal spray 1 spray, 1 spray, Each Nare, Q1H PRN, Talia Jameson MD    Current Outpatient Medications:     diltiazem (CARDIZEM) 60 mg tablet, Take 1 tablet (60 mg total) by mouth every 8 (eight) hours, Disp: 45 tablet, Rfl: 0    mirtazapine (REMERON) 15 mg tablet, Take 1 tablet (15 mg total) by mouth daily at bedtime, Disp: 90 tablet, Rfl: 1    sodium chloride (OCEAN) 0 65 % nasal spray, 1 spray into each nostril every hour as needed for congestion (Patient taking differently: 1 spray into each nostril as needed for congestion ), Disp: 30 mL, Rfl: 0     Labs & Results:    Results from last 7 days   Lab Units 04/26/21  0633 04/23/21  1447   TROPONIN I ng/mL 0 02 <0 02     Results from last 7 days   Lab Units 04/26/21  0630 04/25/21  0712 04/24/21  0537   WBC Thousand/uL 2 84* 3 60* 3 68*   HEMOGLOBIN g/dL 10 3* 10 1* 9 8*   HEMATOCRIT % 32 0* 31 6* 31 0*   PLATELETS Thousands/uL 29* 33* 34*         Results from last 7 days   Lab Units 04/26/21  1316 04/26/21  0630 04/25/21  0712  04/23/21  1425   POTASSIUM mmol/L 4 2 4 6 4 6   < > 6 8*   CHLORIDE mmol/L 103 103 102   < > 101   CO2 mmol/L 21 21 20*   < > 20*   BUN mg/dL 76* 78* 76*   < > 73*   CREATININE mg/dL 7 00* 7 04* 7 14*   < > 8 04*   CALCIUM mg/dL 8 3 8 6 8 3   < > 7 9*   ALK PHOS U/L 273*  --  277*  --  259*   ALT U/L 34  --  36  --  28   AST U/L 48*  --  54*  --  69*    < > = values in this interval not displayed       Results from last 7 days   Lab Units 04/26/21  1316 04/26/21  0630   INR  1 28* 1 27*     Results from last 7 days   Lab Units 04/26/21  0630 04/24/21  0537   MAGNESIUM mg/dL 2 2 2 1

## 2021-04-28 NOTE — ASSESSMENT & PLAN NOTE
Patient presented with creatinine of 8 04/hyperkalemia with potassium of 6 8 get baseline creatinine of 1 4  Discussed with Nephrology, patient not a dialysis candidate  likely prerenal in the setting of anemia/diarrhea/hypotension/ischemic ATN    · Creatinine 7 00/potassium 4 6 today  · Pierce placed  Very minimal dark-colored urine output  Monitor I/Os closely  · Hold Lasix/lactulose in the setting of acute renal failure secondary to dehydration from diarrhea  · Renal ultrasound reviewed  · Nephrology on board  Appreciate input  · Not recommending dialysis due to multiple comorbidities patient is not a candidate  · Palliative care and nephrology recommendations greatly appreciated    Discussed with patient's children on the phone along with palliative Care and Nephrology, goals of care determined to be hospice  · Patient will be discharged to inpatient hospice today, continue comfort measures

## 2021-04-28 NOTE — CASE MANAGEMENT
Cm received phone call from Toby Smart at [facility] reporting that patient will be transported by [facility] 911 truck at 2:30, so patient must be ready  Cm called RN Sonido Santiago to report  Cm met with family at bedside to report  Cm reported transport time to hospice house via [address]  Cm TT SLIM and asked to sign the OOH DNR/DNI form  Cm department will continue to follow patient through discharge

## 2021-04-28 NOTE — TRANSPORTATION MEDICAL NECESSITY
Section I - General Information    Name of Patient: Ahsan Khan                 : 1945    Medicare #: [id number]  Transport Date: 21 (PCS is valid for round trips on this date and for all repetitive trips in the 60-day range as noted below )  [facility] [address] [address] [place] [address]  Is the pt's stay covered under Medicare Part A (PPS/DRG)   [x]     Closest appropriate facility? If no, why is transport to more distant facility required? Yes  If hospice pt, is this transport related to pt's terminal illness? NA       Section II - Medical Necessity Questionnaire  Ambulance transportation is medically necessary only if other means of transport are contraindicated or would be potentially harmful to the patient  To meet this requirement, the patient must either be "bed confined" or suffer from a condition such that transport by means other than ambulance is contraindicated by the patient's condition  The following questions must be answered by the medical professional signing below for this form to be valid:    1)  Describe the MEDICAL CONDITION (physical and/or mental) of this patient AT [address] [place] [address] that requires the patient to be transported in an ambulance and why transport by other means is contraindicated by the patient's condition: Patient has decreased cognition and impaired safety awareness  She is only alert and oriented to self  Patient is limited by pain and fatigue and has decreased strength and endurance  Patient is also impulsive, so she requires a medical attendant  2) Is the patient "bed confined" as defined below?      No  To be "be confined" the patient must satisfy all three of the following conditions: (1) unable to get up from bed without Assistance; AND (2) unable to ambulate; AND (3) unable to sit in a chair or wheelchair  3) Can this patient safely be transported by car or wheelchair van (i e , seated during transport without a medical attendant or monitoring)? No    4) In addition to completing questions 1-3 above, please check any of the following conditions that apply*:   *Note: supporting documentation for any boxes checked must be maintained in the patient's medical records  If hosp-hosp transfer, describe services needed at 2nd facility not available at 1st facility? Patient is confused  Moderate/severe pain on movement   Need or possible need for restraints   Medical attendant required   Unable to tolerate seated position for time needed to transport       Section III - Signature of Physician or Healthcare Professional  I certify that the above information is true and correct based on my evaluation of this patient, and represent that the patient requires transport by ambulance and that other forms of transport are contraindicated  I understand that this information will be used by the Centers for Medicare and Medicaid Services (CMS) to support the determination of medical necessity for ambulance services, and I represent that I have personal knowledge of the patient's condition at time of transport  []  If this box is checked, I also certify that the patient is physically or mentally incapable of signing the ambulance service's claim and that the institution with which I am affiliated has furnished care, services, or assistance to the patient  My signature below is made on behalf of the patient pursuant to 42 CFR §424 36(b)(4)  In accordance with 42 CFR §424 37, the specific reason(s) that the patient is physically or mentally incapable of signing the claim form is as follows: N/A        Signature of Physician* or Healthcare Professional______________________________________    Signature Date 04/28/21 (For scheduled repetitive transports, this form is not valid for transports performed more than 60 days after this date)    Printed Name & Credentials of Physician or Healthcare Professional (MD, DO, RN, etc ) JOS Jackman    *Form must be signed by patient's attending physician for scheduled, repetitive transports   For non-repetitive, unscheduled ambulance transports, if unable to obtain the signature of the attending physician, any of the following may sign (choose appropriate option below)  [] Physician Assistant []  Clinical Nurse Specialist []  Registered Nurse  []  Nurse Practitioner  [x] Discharge Planner

## 2021-04-28 NOTE — CASE MANAGEMENT
Cm received TT from Tatyana Vogel at [facility] who reported that patient has been accepted at [facility] in [place], and they have a bed open today  However, Tatyana Vogel spoke to patient's son who reported this is too far for the family  Cm sent a referral to  Inpatient Hospice to determine if they have a bed available  Cm called both patient's sister and patient's son to report  Cm department will continue to follow patient through discharge

## 2021-04-28 NOTE — PROGRESS NOTES
Nursing report given to Dev Ortiz at the [facility]  No prescriptions are need per Dev Ortiz  Instructed to leave IV access in place  At discharge, provided transport team with facesheet, medical necessity and DNR form  Family was present at bedside and updated by CM

## 2021-04-28 NOTE — DISCHARGE SUMMARY
[facility]  Discharge- Sudeep [place] 1945, 77 y o  female MRN: [id number]  Unit/Bed#: -01 Encounter: [id number]  Primary Care Provider: He Mota MD   Date and time admitted to hospital: 4/23/2021  1:44 PM    CESAR (acute kidney injury) [facility])  Assessment & Plan  Patient presented with creatinine of 8 04/hyperkalemia with potassium of 6 8 get baseline creatinine of 1 4  Discussed with Nephrology, patient not a dialysis candidate  likely prerenal in the setting of anemia/diarrhea/hypotension/ischemic ATN    · Creatinine 7 00/potassium 4 6 today  · Pierce placed  Very minimal dark-colored urine output  Monitor I/Os closely  · Hold Lasix/lactulose in the setting of acute renal failure secondary to dehydration from diarrhea  · Renal ultrasound reviewed  · Nephrology on board  Appreciate input  · Not recommending dialysis due to multiple comorbidities patient is not a candidate  · Palliative care and nephrology recommendations greatly appreciated    Discussed with patient's children on the phone along with palliative Care and Nephrology, goals of care determined to be hospice  · Patient will be discharged to inpatient hospice today, continue comfort measures    Liver cirrhosis ([facility] [address] )  Assessment & Plan  · Known history of end-stage liver cirrhosis with recurrent ascites    · Continue comfort measures    Anemia  Assessment & Plan  · Patient is comfort care, continue comfort measures        Discharging Physician / Practitioner: Oliva Banks MD  PCP: He Mota MD  Admission Date:   Admission Orders (From admission, onward)     Ordered        04/23/21 1649  Inpatient Admission  Once                   Discharge Date: 04/28/21    Resolved Problems  Date Reviewed: 4/28/2021    None          Consultations During Hospital Stay:  · Palliative care  · Nephrology  · GI  · Cardiology    Procedures Performed:   · None    Significant Findings / Test Results:   ·     Incidental Findings: ·     Test Results Pending at Discharge (will require follow up):   ·      Outpatient Tests Requested:  ·     Complications:      Reason for Admission:     Hospital Course:     Migdalia Shields is a 77 y o  female patient with past medical history of multiple myeloma not on treatment, CKD stage 3, hypertension, alcoholic decompensated cirrhosis, Congestive heart failure, AFib, dementia who originally presented to the hospital on 4/23/2021 due to change in mental status  She had progressive decline over the last several hospital stays  She was found to have creatinine of 8 on admission and potassium of 6 8  Patient's symptoms were believed secondary to ATN from diarrhea and hypotension, Nephrology is a drain management conservative approach the patient's creatinine did not improve  She is not a candidate for dialysis due to multiple comorbidities she also had issues with heart rate including worsening AFib with RVR for which Cardiology was following patient and medications have been adjusted  Patient was also seen by GI for hepatic encephalopathy  Patient continued to have worsening mental status changes  A family meeting with palliative Care along with Nephrology and hospitalist team, it was determined the family wanted to pursue comfort care for her, arrangements were made to discharge her to inpatient hospice house at [facility]   On Day of discharge she was appearing very comfortable with no acute respiratory issues  Please see above list of diagnoses and related plan for additional information       Condition at Discharge: fair     Discharge Day Visit / Exam:     Subjective:  Patient appearing very comfortable no acute issues  Vitals: Blood Pressure: 107/75 (04/28/21 0827)  Pulse: (!) 127 (04/28/21 0827)  Temperature: 99 5 °F (37 5 °C) (04/28/21 1100)  Temp Source: Rectal (04/28/21 1100)  Respirations: 18 (04/28/21 0827)  Height: 5' 2" (157 5 cm) (04/26/21 1454)  Weight - Scale: 72 8 kg (160 lb 7 9 oz) (04/27/21 0600)  SpO2: (!) 84 % (04/28/21 0827)  Exam:   Physical Exam  Vitals signs and nursing note reviewed  Constitutional:       General: She is not in acute distress  Appearance: She is not ill-appearing or diaphoretic  Neck:      Musculoskeletal: Normal range of motion  Cardiovascular:      Rate and Rhythm: Regular rhythm  Pulses: Normal pulses  Pulmonary:      Effort: No respiratory distress  Breath sounds: No rhonchi  Chest:      Chest wall: No tenderness  Musculoskeletal: Normal range of motion  Skin:     General: Skin is warm and dry  Neurological:      Comments: Patient is comfortably sleeping         Discussion with Family at the bedside    Discharge instructions/Information to patient and family:   See after visit summary for information provided to patient and family  Provisions for Follow-Up Care:  See after visit summary for information related to follow-up care and any pertinent home health orders  Disposition:     Other: Inpatient hospice      Planned Readmission:      Discharge Statement:  I spent 45 minutes discharging the patient  This time was spent on the day of discharge  I had direct contact with the patient on the day of discharge  Greater than 50% of the total time was spent examining patient, answering all patient questions, arranging and discussing plan of care with patient as well as directly providing post-discharge instructions  Additional time then spent on discharge activities  Discharge Medications:  See after visit summary for reconciled discharge medications provided to patient and family        ** Please Note: This note has been constructed using a voice recognition system **

## 2021-04-28 NOTE — PLAN OF CARE
Problem: Prexisting or High Potential for Compromised Skin Integrity  Goal: Skin integrity is maintained or improved  Description: INTERVENTIONS:  - Identify patients at risk for skin breakdown  - Assess and monitor skin integrity  - Assess and monitor nutrition and hydration status  - Monitor labs   - Assess for incontinence   - Turn and reposition patient  - Assist with mobility/ambulation  - Relieve pressure over bony prominences  - Avoid friction and shearing  - Provide appropriate hygiene as needed including keeping skin clean and dry  - Evaluate need for skin moisturizer/barrier cream  - Collaborate with interdisciplinary team   - Patient/family teaching  - Consider wound care consult   Outcome: Adequate for Discharge     Problem: PAIN - ADULT  Goal: Verbalizes/displays adequate comfort level or baseline comfort level  Description: Interventions:  - Encourage patient to monitor pain and request assistance  - Assess pain using appropriate pain scale  - Administer analgesics based on type and severity of pain and evaluate response  - Implement non-pharmacological measures as appropriate and evaluate response  - Consider cultural and social influences on pain and pain management  - Notify physician/advanced practitioner if interventions unsuccessful or patient reports new pain  Outcome: Adequate for Discharge     Problem: SAFETY ADULT  Goal: Patient will remain free of falls  Description: INTERVENTIONS:  - Assess patient frequently for physical needs  -  Identify cognitive and physical deficits and behaviors that affect risk of falls    -  [place] fall precautions as indicated by assessment   - Educate patient/family on patient safety including physical limitations  - Instruct patient to call for assistance with activity based on assessment  - Modify environment to reduce risk of injury  - Consider OT/PT consult to assist with strengthening/mobility  Outcome: Adequate for Discharge     Problem: DISCHARGE PLANNING  Goal: Discharge to home or other facility with appropriate resources  Description: INTERVENTIONS:  - Identify barriers to discharge w/patient and caregiver  - Arrange for needed discharge resources and transportation as appropriate  - Identify discharge learning needs (meds, wound care, etc )  - Arrange for interpretive services to assist at discharge as needed  - Refer to Case Management Department for coordinating discharge planning if the patient needs post-hospital services based on physician/advanced practitioner order or complex needs related to functional status, cognitive ability, or social support system  Outcome: Adequate for Discharge     Problem: Knowledge Deficit  Goal: Patient/family/caregiver demonstrates understanding of disease process, treatment plan, medications, and discharge instructions  Description: Complete learning assessment and assess knowledge base  Interventions:  - Provide teaching at level of understanding  - Provide teaching via preferred learning methods  Outcome: Adequate for Discharge     Problem: Nutrition/Hydration-ADULT  Goal: Nutrient/Hydration intake appropriate for improving, restoring or maintaining nutritional needs  Description: Monitor and assess patient's nutrition/hydration status for malnutrition  Collaborate with interdisciplinary team and initiate plan and interventions as ordered  Monitor patient's weight and dietary intake as ordered or per policy  Utilize nutrition screening tool and intervene as necessary  Determine patient's food preferences and provide high-protein, high-caloric foods as appropriate       INTERVENTIONS:  - Monitor oral intake, urinary output, labs, and treatment plans  - Assess nutrition and hydration status and recommend course of action  - Evaluate amount of meals eaten  - Assist patient with eating if necessary   - Allow adequate time for meals  - Recommend/ encourage appropriate diets, oral nutritional supplements, and vitamin/mineral supplements  - Order, calculate, and assess calorie counts as needed  - Recommend, monitor, and adjust tube feedings and TPN/PPN based on assessed needs  - Assess need for intravenous fluids  - Provide specific nutrition/hydration education as appropriate  - Include patient/family/caregiver in decisions related to nutrition  Outcome: Adequate for Discharge     Problem: Potential for Falls  Goal: Patient will remain free of falls  Description: INTERVENTIONS:  - Assess patient frequently for physical needs  -  Identify cognitive and physical deficits and behaviors that affect risk of falls    -  [place] fall precautions as indicated by assessment   - Educate patient/family on patient safety including physical limitations  - Instruct patient to call for assistance with activity based on assessment  - Modify environment to reduce risk of injury  - Consider OT/PT consult to assist with strengthening/mobility  Outcome: Adequate for Discharge

## 2021-04-29 LAB
BACTERIA SPEC BFLD CULT: NO GROWTH
BACTERIA SPEC BFLD CULT: NO GROWTH
GRAM STN SPEC: NORMAL

## 2021-04-30 ENCOUNTER — PATIENT OUTREACH (OUTPATIENT)
Dept: INTERNAL MEDICINE CLINIC | Facility: CLINIC | Age: 76
End: 2021-04-30

## 2021-04-30 LAB
ATRIAL RATE: 154 BPM
P AXIS: 80 DEGREES
QRS AXIS: 110 DEGREES
QRSD INTERVAL: 76 MS
QT INTERVAL: 320 MS
QTC INTERVAL: 512 MS
T WAVE AXIS: 49 DEGREES
VENTRICULAR RATE: 154 BPM

## 2021-04-30 NOTE — PROGRESS NOTES
Message received from Jojo Curiel that patient  on 01    Patient's sister, Eloise Ferris was thankful for our assistance  I am closing this socially complex episode

## 2021-04-30 NOTE — PROGRESS NOTES
Spoke with patient's sister Ja Castillo to express condolences in passing of her sister Wang Peoples  Complex episode closed

## 2021-05-01 LAB
BACTERIA UR CULT: ABNORMAL
BACTERIA UR CULT: ABNORMAL

## 2021-05-06 ENCOUNTER — TELEPHONE (OUTPATIENT)
Dept: INTERNAL MEDICINE CLINIC | Facility: CLINIC | Age: 76
End: 2021-05-06

## 2021-05-14 NOTE — ED PROVIDER NOTES
History  Chief Complaint   Patient presents with    Hypotension       History provided by:  Patient (sister)   used: No    Fatigue  Severity:  Severe  Onset quality:  Gradual  Timing:  Constant  Progression:  Worsening  Chronicity:  Chronic  Relieved by:  Nothing  Worsened by:  Nothing  Ineffective treatments:  None tried  Associated symptoms: no abdominal pain, no arthralgias, no chest pain, no cough, no dysuria, no fever, no nausea, no seizures, no shortness of breath and no vomiting        Prior to Admission Medications   Prescriptions Last Dose Informant Patient Reported? Taking?    Thiamine HCl (vitamin B-1) 100 MG TABS 4/23/2021 at Unknown time Family Member No Yes   Sig: TAKE ONE TABLET BY MOUTH EVERY DAY   diltiazem (CARDIZEM CD) 240 mg 24 hr capsule 4/23/2021 at Unknown time Family Member No Yes   Sig: Take 1 capsule (240 mg total) by mouth daily   folic acid (FOLVITE) 1 mg tablet 4/23/2021 at Unknown time Family Member No Yes   Sig: TAKE ONE TABLET BY MOUTH EVERY DAY   furosemide (LASIX) 40 mg tablet 4/23/2021 at Unknown time Family Member No Yes   Sig: Take 1 tablet (40 mg total) by mouth daily   lactulose 20 g/30 mL 4/23/2021 at Unknown time Family Member No Yes   Sig: Take 30 mL (20 g total) by mouth 3 (three) times a day   magnesium oxide (MAG-OX) 400 mg tablet 4/23/2021 at Unknown time Family Member No Yes   Sig: TAKE ONE TABLET BY MOUTH TWICE A DAY   metoprolol tartrate (LOPRESSOR) 25 mg tablet 4/23/2021 at Unknown time Family Member No Yes   Sig: Take 1 tablet (25 mg total) by mouth every 12 (twelve) hours   mirtazapine (REMERON) 15 mg tablet Unknown at Unknown time Family Member No No   Sig: Take 1 tablet (15 mg total) by mouth daily at bedtime   nystatin (MYCOSTATIN) powder Unknown at Unknown time Family Member No No   Sig: Apply topically 2 (two) times a day   Patient taking differently: Apply topically as needed    pantoprazole (PROTONIX) 40 mg tablet 4/23/2021 at Unknown time Family Member No Yes   Sig: TAKE ONE TABLET BY MOUTH EVERY DAY IN THE MORNING   rifaximin (XIFAXAN) 550 mg tablet 2021 at Unknown time Family Member No Yes   Sig: Take 1 tablet (550 mg total) by mouth every 12 (twelve) hours   Patient taking differently: Take 550 mg by mouth every 12 (twelve) hours Patient is waiting for medication as pharmacy has having some difficulty with medication  sodium chloride (OCEAN) 0 65 % nasal spray  Family Member No No   Si spray into each nostril every hour as needed for congestion   Patient taking differently: 1 spray into each nostril as needed for congestion       Facility-Administered Medications: None       Past Medical History:   Diagnosis Date    Alcoholic cirrhosis ([facility] )     Benign essential hypertension     last assessed - [id number]    Cirrhosis (HCC)     CKD (chronic kidney disease) stage 3, GFR 30-59 ml/min (HCC)     Di[facility] ([facility] )     Gastroesophageal reflux disease without esophagitis 2017    [facility]     Hyperbilirubinemia 2020    Hypertension     [facility]     Paroxysmal atrial fibrillation ([facility] )     Pneumonia 2020       Past Surgical History:   Procedure Laterality Date    APPENDECTOMY      BONE MARROW BIOPSY      IR PARACENTESIS  10/2/2020    IR PARACENTESIS  2020    IR PARACENTESIS  2021    IR THORACENTESIS  2020    IR THORACENTESIS  2021       Family History   Problem Relation Age of Onset    Heart attack Father         myocardial infarction    Heart disease Father      I have reviewed and agree with the history as documented      E-Cigarette/Vaping    E-Cigarette Use Never User     Start Date 1/1/15     Quit Date 1/24/15      E-Cigarette/Vaping Substances    Nicotine No     THC No     CBD No     Flavoring No     Other No     Unknown No      Social History     Tobacco Use    Smoking status: Light Tobacco Smoker     Packs/day: 0 25 Types: Cigarettes    Smokeless tobacco: Never Used    Tobacco comment: 2-3 a day   Substance Use Topics    Alcohol use: Not Currently     Frequency: 4 or more times a week     Drinks per session: 1 or 2     Binge frequency: Never     Comment: History of significant daily alcohol intake  Sister joy no alcohol intake in 6 months    Drug use: No       Review of Systems   Constitutional: Positive for fatigue  Negative for chills and fever  HENT: Negative for ear pain and sore throat  Eyes: Negative for pain and visual disturbance  Respiratory: Negative for cough and shortness of breath  Cardiovascular: Negative for chest pain and palpitations  Gastrointestinal: Negative for abdominal pain, nausea and vomiting  Genitourinary: Negative for dysuria and hematuria  Musculoskeletal: Negative for arthralgias and back pain  Skin: Negative for color change and rash  Neurological: Positive for weakness  Negative for seizures and syncope  Psychiatric/Behavioral: Positive for confusion  All other systems reviewed and are negative  Physical Exam  Physical Exam  Vitals signs and nursing note reviewed  Constitutional:       Appearance: She is well-developed  She is ill-appearing  She is not toxic-appearing  Comments: Chronically ill elderly female   HENT:      Head: Normocephalic and atraumatic  Eyes:      Conjunctiva/sclera: Conjunctivae normal    Neck:      Musculoskeletal: Neck supple  Cardiovascular:      Rate and Rhythm: Normal rate and regular rhythm  Heart sounds: No murmur  Pulmonary:      Effort: Pulmonary effort is normal  No respiratory distress  Breath sounds: Normal breath sounds  Abdominal:      Palpations: Abdomen is soft  Tenderness: There is no abdominal tenderness  Skin:     General: Skin is warm and dry  Capillary Refill: Capillary refill takes 2 to 3 seconds  Neurological:      General: No focal deficit present        Mental Status: She is alert  Comments: Confused and unable to participate in a detailed neurologic exam, but no apparent focal deficits           Vital Signs  ED Triage Vitals   Temperature Pulse Respirations Blood Pressure SpO2   04/23/21 1358 04/23/21 1358 04/23/21 1358 04/23/21 1358 04/23/21 1354   98 3 °F (36 8 °C) 82 20 (!) 88/54 92 %      Temp Source Heart Rate Source Patient Position - Orthostatic VS BP Location FiO2 (%)   04/23/21 1358 04/23/21 1358 04/23/21 1358 04/23/21 1358 --   Oral Monitor Lying Left arm       Pain Score       04/23/21 2044       No Pain           Vitals:    04/27/21 1513 04/27/21 2310 04/27/21 2311 04/28/21 0827   BP: 121/78 105/53 105/53 107/75   Pulse: (!) 122   (!) 127   Patient Position - Orthostatic VS: Lying            Visual Acuity      ED Medications  Medications   multi-electrolyte (ISOLYTE-S PH 7 4) bolus 1,000 mL (0 mL Intravenous Stopped 4/23/21 1827)   calcium gluconate 1 g in sodium chloride 0 9% 50 mL (premix) (0 g Intravenous Stopped 4/23/21 1949)   insulin regular (HumuLIN R,NovoLIN R) injection 10 Units (10 Units Subcutaneous Given 4/24/21 0059)   dextrose 50 % IV solution 25 mL (25 mL Intravenous Given 4/24/21 0058)   ceftriaxone (ROCEPHIN) 1 g/50 mL in dextrose IVPB (0 mg Intravenous Stopped 4/24/21 0053)   ALPRAZolam (XANAX) tablet 0 5 mg (0 5 mg Oral Given 4/26/21 0017)   albumin human (FLEXBUMIN) 5 % injection 12 5 g (0 g Intravenous Stopped 4/26/21 0354)   diphenhydrAMINE (BENADRYL) tablet 25 mg (25 mg Oral Given 4/27/21 0047)   digoxin (LANOXIN) injection 125 mcg (125 mcg Intravenous Given 4/26/21 1810)   HYDROmorphone HCl (DILAUDID) injection 0 2 mg (0 2 mg Intravenous Given 4/26/21 1804)   lidocaine 1% buffered (10 mL Infiltration Given 4/26/21 1616)   lidocaine 1% buffered (10 mL Infiltration Given 4/26/21 4083)       Diagnostic Studies  Results Reviewed     Procedure Component Value Units Date/Time    UA w Reflex to Microscopic w Reflex to Culture [[id number]]  (Abnormal) Collected: 04/25/21 2214    Lab Status: Final result Specimen: Urine, Indwelling Pierce Catheter Updated: 04/25/21 2222     Color, UA Yellow     Clarity, UA Cloudy     Specific Gravity, UA 1 025     pH, UA 6 0     Leukocytes, UA Trace     Nitrite, UA Negative     Protein,  (2+) mg/dl      Glucose, UA Negative mg/dl      Ketones, UA Negative mg/dl      Urobilinogen, UA 0 2 E U /dl      Bilirubin, UA Negative     Blood, UA Large    Urine Microscopic [[id number]]  (Abnormal) Collected: 04/23/21 1705    Lab Status: Final result Specimen: Urine, Straight Cath Updated: 04/23/21 1746     RBC, UA 20-30 /hpf      WBC, UA 0-1 /hpf      Epithelial Cells Moderate /hpf      Bacteria, UA Moderate /hpf     UA (URINE) with reflex to Scope [[id number]]  (Abnormal) Collected: 04/23/21 1705    Lab Status: Final result Specimen: Urine, Straight Cath Updated: 04/23/21 1736     Color, UA Christine     Clarity, UA Cloudy     Specific Gravity, UA >=1 030     pH, UA 5 0     Leukocytes, UA Negative     Nitrite, UA Negative     Protein,  (2+) mg/dl      Glucose, UA Negative mg/dl      Ketones, UA 15 (1+) mg/dl      Urobilinogen, UA 0 2 E U /dl      Bilirubin, UA Small     Blood, UA Large    Ammonia [[id number]]  (Abnormal) Collected: 04/23/21 1533    Lab Status: Final result Specimen: Blood from Arm, Left Updated: 04/23/21 1605     Ammonia 70 umol/L     Manual Differential(PHLEBS Do Not Order) [[id number]]  (Abnormal) Collected: 04/23/21 1425    Lab Status: Final result Specimen: Blood from Arm, Left Updated: 04/23/21 1548     Segmented % 53 %      Lymphocytes % 24 %      Monocytes % 10 %      Eosinophils, % 3 %      Basophils % 0 %      Myelocytes % 1 %      Atypical Lymphocytes % 9 %      Absolute Neutrophils 1 67 Thousand/uL      Lymphocytes Absolute 0 76 Thousand/uL      Monocytes Absolute 0 32 Thousand/uL      Eosinophils Absolute 0 09 Thousand/uL      Basophils Absolute 0 00 Thousand/uL      Total Counted 100     nRBC 17 /100 WBC Anisocytosis Present     Hypochromia Present     Ovalocytes Present     Polychromasia Present     Target Cells Present     Platelet Estimate Decreased     Large Platelet Present    CBC and differential [[id number]]  (Abnormal) Collected: 04/23/21 1425    Lab Status: Final result Specimen: Blood from Arm, Left Updated: 04/23/21 1548     WBC 3 15 Thousand/uL      RBC 2 32 Million/uL      Hemoglobin 6 5 g/dL      Hematocrit 21 4 %      MCV 92 fL      MCH 28 0 pg      MCHC 30 4 g/dL      RDW 20 8 %      Platelets 42 Thousands/uL      nRBC 17 /100 WBCs     Troponin I [[id number]]  (Normal) Collected: 04/23/21 1447    Lab Status: Final result Specimen: Blood from Arm, Left Updated: 04/23/21 1507     Troponin I <0 02 ng/mL     Comprehensive metabolic panel [[id number]]  (Abnormal) Collected: 04/23/21 1425    Lab Status: Final result Specimen: Blood from Arm, Left Updated: 04/23/21 1458     Sodium 135 mmol/L      Potassium 6 8 mmol/L      Chloride 101 mmol/L      CO2 20 mmol/L      ANION GAP 14 mmol/L      BUN 73 mg/dL      Creatinine 8 04 mg/dL      Glucose 91 mg/dL      Calcium 7 9 mg/dL      Corrected Calcium 9 0 mg/dL      AST 69 U/L      ALT 28 U/L      Alkaline Phosphatase 259 U/L      Total Protein 6 0 g/dL      Albumin 2 6 g/dL      Total Bilirubin 1 23 mg/dL      eGFR 5 ml/min/1 73sq m     Narrative:      National Kidney Disease Foundation guidelines for Chronic Kidney Disease (CKD):     Stage 1 with normal or high GFR (GFR > 90 mL/min/1 73 square meters)    Stage 2 Mild CKD (GFR = 60-89 mL/min/1 73 square meters)    Stage 3A Moderate CKD (GFR = 45-59 mL/min/1 73 square meters)    Stage 3B Moderate CKD (GFR = 30-44 mL/min/1 73 square meters)    Stage 4 Severe CKD (GFR = 15-29 mL/min/1 73 square meters)    Stage 5 End Stage CKD (GFR <15 mL/min/1 73 square meters)  Note: GFR calculation is accurate only with a steady state creatinine                 IR INPATIENT Paracentesis   Final Result by Apolinar Daly MD (04/26 1739)   Impression:   1  Successful ultrasound-guided paracentesis yielding 300 mL clear patricio ascites   2  Specimens were sent to the laboratory as described above  Workstation performed: [id number]         IR IN-Patient Thoracentesis   Final Result by Maria Elena Gutierrez MD (04/26 1736)   Impression:   1  Successful ultrasound-guided thoracentesis yielding 1600 mL dark yellow pleural fluid from the right chest                Workstation performed: [id number]         US kidney and bladder   Final Result by Jose Guadalupe Bosch MD (04/25 1816)      Echogenic kidneys suggesting medical renal disease  Complex cyst in the right kidney similar to CT does not need follow-up  Incidental pleural effusion  Spleen is heterogeneous but not enlarged  Workstation performed: [id number]         XR chest portable   Final Result by Jarek Perkins MD (04/24 0920)      Increased opacification within the right hemithorax, likely worsening effusion with atelectasis  Underlying pneumonia cannot be excluded                    Workstation performed: [id number]                    Procedures  CriticalCare Time  Performed by: Jose Guadalupe Gomez MD  Authorized by: Jose Guadalupe Gomez MD     Critical care provider statement:     Critical care time (minutes):  60    Critical care time was exclusive of:  Separately billable procedures and treating other patients and teaching time    Critical care was necessary to treat or prevent imminent or life-threatening deterioration of the following conditions:  Circulatory failure    Critical care was time spent personally by me on the following activities:  Blood draw for specimens, obtaining history from patient or surrogate, development of treatment plan with patient or surrogate, discussions with consultants, evaluation of patient's response to treatment, examination of patient, ordering and performing treatments and interventions, ordering and review of laboratory studies, ordering and review of radiographic studies, re-evaluation of patient's condition and review of old charts    I assumed direction of critical care for this patient from another provider in my specialty: no               ED Course               Identification of Seniors at Risk      Most Recent Value   (ISAR) Identification of Seniors at Risk   Before the illness or injury that brought you to the Emergency, did you need someone to help you on a regular basis? 1 Filed at: 04/23/2021 1400   In the last 24 hours, have you needed more help than usual?  1 Filed at: 04/23/2021 1400   Have you been hospitalized for one or more nights during the past 6 months? 1 Filed at: 04/23/2021 1400   In general, do you see well?  0 Filed at: 04/23/2021 1400   In general, do you have serious problems with your memory? 1 Filed at: 04/23/2021 1400   Do you take more than three different medications every day? 1 Filed at: 04/23/2021 1400   ISAR Score  5 Filed at: 04/23/2021 1400                                  MDM  Number of Diagnoses or Management Options  CESAR (acute kidney injury) ([facility] ): new and requires workup  Anemia: new and requires workup  Hypotension: new and requires workup  Diagnosis management comments: Chronically ill elderly female with past medical history of multiple myeloma, severe anemia, end-stage renal disease, and alcoholic cirrhosis presents with worsening clinical picture and now hypotension today  Initiated resuscitation including IV fluids, potential blood transfusion, and antibiotics  I had a long discussion with patient's sister in regarding goals of care  Patient's sister shares medical decision making with her adult brother  Patient's sister would like to have palliative care consult, unable to reach the brother at this time  In the meantime, patient will remain full code with the caveats that in the setting of multi-system organ dysfunction, clinical prognosis is poor         Amount and/or Complexity of Data Reviewed  Clinical lab tests: reviewed and ordered  Tests in the radiology section of CPT®: reviewed and ordered  Review and summarize past medical records: yes  Discuss the patient with other providers: yes  Independent visualization of images, tracings, or specimens: yes        Disposition  Final diagnoses:   Hypotension   CESAR (acute kidney injury) ([facility] )   Anemia     Time reflects when diagnosis was documented in both MDM as applicable and the Disposition within this note     Time User Action Codes Description Comment    4/23/2021  4:45 PM Ruthie Bucker Add [I95 9] Hypotension     4/23/2021  4:45 PM Alex [place] L Add [N17 9] CESAR (acute kidney [facility]) ([facility] )     4/23/2021  4:45 PM Ruthie Bucker Add [D64 9] Anemia     4/23/2021  5:26 PM Stacia Prost, [place] Add [K77 31] Alcoholic cirrhosis, unspecified whether ascites present ([facility] )     4/23/2021  5:26 PM Stacia Prost, [address] [E14 55] Alcoholic cirrhosis, unspecified whether ascites present ([place] [address] )     4/26/2021  1:26 AM Shakira Liner Add [R00 0] Sinus tachycardia     4/28/2021 12:58 PM Mankala, Syamala Add [I48 91] Atrial fibrillation with RVR [facility])       ED Disposition     ED Disposition Condition Date/Time Comment    Admit Stable Fri Apr 23, 2021 1648 Case was discussed with [facility] and the patient's admission status was agreed to be Admission Status: inpatient status to the service of Dr Sabrina Amos MD Documentation      Most Recent Value   Accepting Facility Name, [facility]      RN Documentation      Most [address] [facility] Name, [facility] Sandhu      Follow-up Information     Follow up With Specialties Details Why Contact Info    [place] at [address] [place] [address] Street  Follow up [address], [address] [facility] [phone]  [address] [place] [address] Dr Sarah Mello MD Internal Medicine Schedule an appointment as soon as possible for a visit [address] [facility] [address]  [phone]      Hiral Alves MD Palliative Care Schedule an appointment as soon as possible for a visit  [address] [facility]  [address]  [phone]            Discharge Medication List as of 4/28/2021  2:13 PM      START taking these medications    Details   diltiazem (CARDIZEM) 60 mg tablet Take 1 tablet (60 mg total) by mouth every 8 (eight) hours, Starting Wed 4/28/2021, Normal         CONTINUE these medications which have NOT CHANGED    Details   mirtazapine (REMERON) 15 mg tablet Take 1 tablet (15 mg total) by mouth daily at bedtime, Starting Thu 1/14/2021, Normal      sodium chloride (OCEAN) 0 65 % nasal spray 1 spray into each nostril every hour as needed for congestion, Starting Sat 9/5/2020, Until Wed 4/14/2021, Normal         STOP taking these medications       diltiazem (CARDIZEM CD) 240 mg 24 hr capsule Comments:   Reason for Stopping:         folic acid (FOLVITE) 1 mg tablet Comments:   Reason for Stopping:         furosemide (LASIX) 40 mg tablet Comments:   Reason for Stopping:         lactulose 20 g/30 mL Comments:   Reason for Stopping:         magnesium oxide (MAG-OX) 400 mg tablet Comments:   Reason for Stopping:         metoprolol tartrate (LOPRESSOR) 25 mg tablet Comments:   Reason for Stopping:         pantoprazole (PROTONIX) 40 mg tablet Comments:   Reason for Stopping:         rifaximin (XIFAXAN) 550 mg tablet Comments:   Reason for Stopping:          Thiamine HCl (vitamin B-1) 100 MG TABS Comments:   Reason for Stopping:         nystatin (MYCOSTATIN) powder Comments:   Reason for Stopping:             Outpatient Discharge Orders   Activity as tolerated       PDMP Review       Value Time User    PDMP Reviewed  Yes 4/25/2021  4:19 AM Bucky Carlton, [facility]           ED Provider  Electronically Signed by           Flavia Nguyễn MD  05/14/21 3162

## 2022-06-16 NOTE — PROGRESS NOTES
Cardiology Progress Note    Assessment/Plan   HFpEF  Atrial fibrillation  Elevated troponin  Severe pancytopenia (EtOH marrow suppression versus splenic sequestration versus consumption)  Microangiopathic hemolytic anemia  Encephalopathy (dementia exacerbated by acute events vs  Hepatic vs  Wernicke's)  Hepatic cirrhosis  EtOH abuse      Echo shows evidence of volume overload, which would correlate with her hypoxic episode after EGD  Restart diuretics at this time, furosemide 40 mg IV BID  HR is mostly controlled, but metoprolol can be uptitrated if needed for better rate control  No role for invasive management of her troponin elevation  Will follow  LOS: 3 days     Subjective   No acute events  Echocardiogram shows EF 50-55% with grade 2 diastolic dysfunction  RWMA cannot be ruled out  Objective     Vital signs in last 24 hours:  Temp:  [97 6 °F (36 4 °C)-98 1 °F (36 7 °C)] 98 °F (36 7 °C)  HR:  [82-99] 82  Resp:  [17-29] 19  BP: (108-124)/(57-63) 110/58      Intake/Output Summary (Last 24 hours) at 2/1/2020 0850  Last data filed at 2/1/2020 0602  Gross per 24 hour   Intake 1320 ml   Output 885 ml   Net 435 ml     Weight (last 2 days)     Date/Time   Weight    02/01/20 0600   78 3 (172 62)    01/31/20 0600   81 (178 57)    01/30/20 1352   85 9 (189 38)                Physical Exam:  General: AAO x 1 5, NAD  HEENT: Normocephalic/Atraumatic, No thyromegaly, lymphadenopathy or carotid bruits  JVD cannot be assessed  Cardiovasc: Irregularly irregular rhythm with a normal rate  No murmurs, rubs or gallops  Radial, carotid pulses are 2+/4  Chest/Pulm: CTAB, no wheezes, rales or rhonchi  Abdomen: +BSx4, Soft, non-tender  No organomegaly, rebound, rigidity or guarding  Extremities: No edema              Scheduled Meds:  Current Facility-Administered Medications:  acetaminophen 650 mg Oral Q6H PRN MEREDITH Black    calcium carbonate 1,000 mg Oral Daily PRN MEREDITH Black cefepime 1,000 mg Intravenous Q12H MEREDITH Caballero Last Rate: 1,000 mg (61/81/04 5887)   folic acid 1 mg Oral Daily MEREDITH Caballero    haloperidol lactate 1 mg Intravenous Q6H PRN MEREDITH Caballero    lactulose 30 g Oral Q4H MEREDITH Caballero    metoprolol tartrate 12 5 mg Oral Q12H [address] MEREDITH Caballero    metroNIDAZOLE 500 mg Oral [facility] MEREDITH Caballero    nicotine 1 patch Transdermal Daily MEREDITH Caballero    ondansetron 4 mg Intravenous Q6H PRN MEREDITH Caballero    pantoprazole 40 mg Oral BID MEREDITH Goins    predniSONE 60 mg Oral Daily MEREDITH Caballero    rifaximin 550 mg Oral Q12H [address] MEREDITH Caballero    thiamine 500 mg Intravenous Daily MEREDITH Caballero Last Rate: Stopped (01/31/20 0902)   Followed by        Jodi Aguilera ON 2/2/2020] thiamine 250 mg Intravenous Daily MEREDITH aCballero    [START ON 2/2/2020] thiamine 100 mg Oral Daily MEREDITH Caballero    vancomycin 10 mg/kg (Adjusted) Intravenous Q12H MEREDITH Caballero Last Rate: 750 mg (01/31/20 2304)     Continuous Infusions:   PRN Meds:   acetaminophen    calcium carbonate    haloperidol lactate    ondansetron      Cardiographics  Echocardiogram, 1/31/2020  SUMMARY  LEFT VENTRICLE:  Size was at the upper limits of normal   Systolic function was normal  LVEF 50-55%  Although no diagnostic regional wall motion abnormality was identified, this possibility cannot be completely excluded on the basis of this study  Wall thickness was mildly increased  There was mild concentric hypertrophy  Features were consistent with a pseudonormal left ventricular filling pattern, with concomitant abnormal relaxation and increased filling pressure (grade 2 diastolic dysfunction)      RIGHT VENTRICLE:  The ventricle was mildly dilated    Systolic function was mildly reduced      LEFT ATRIUM:  The atrium was moderately to markedly dilated      RIGHT ATRIUM:  The atrium was mildly to moderately dilated      MITRAL VALVE:  There was moderate to marked annular calcification  There was mild stenosis  There was mild regurgitation      AORTIC VALVE:  The valve was trileaflet  Leaflets exhibited moderately increased thickness      TRICUSPID VALVE:  There was moderate regurgitation      IVC, HEPATIC VEINS:  The inferior vena cava was moderately dilated  The respirophasic change in diameter was less than 50%  Imaging  CT Head, 1/30/2020  IMPRESSION:  No acute intracranial abnormality          Lab Review   Results for Coralyn Baumgarten (MRN [id number]) as of 2/1/2020 08:45   2/1/2020 06:02   Sodium 139   Potassium 4 1   Chloride 105   CO2 24   Anion Gap 10   BUN 20   Creatinine 1 12   Glucose, Random 132   Calcium 8 9   eGFR 56   Magnesium 1 9     Results for Coralyn Baumgarten (MRN [id number]) as of 2/1/2020 08:45   2/1/2020 06:02   WBC 3 62 (L)   Red Blood Cell Count 2 85 (L)   Hemoglobin 8 3 (L)   HCT 25 4 (L)   MCV 89   MCH 29 1   MCHC 32 7   RDW 18 7 (H)   Platelet Count 96 (L)     Results for Coralyn Baumgarten (MRN [id number]) as of 2/1/2020 08:45   1/31/2020 13:51   FIBRINOGEN LEVEL 389   FDP <10     Results for Coralyn Baumgarten (MRN [id number]) as of 2/1/2020 08:45   1/31/2020 13:51   Protime 17 5 (H)   INR 1 43 (H) sudden onset

## 2023-09-01 NOTE — PHYSICAL THERAPY NOTE
Physical Therapy Evaluation     Patient's Name: Kamlesh Mathew    Admitting Diagnosis  Hyperbilirubinemia [E80 6]  Hyperammonemia ([address] ) [E72 20]  Weakness [R53 1]  Urinary tract infection [N39 0]  Thrombocytopenia (HCC) [D69 6]  CKD (chronic kidney disease) [N18 9]  Bandemia [D72 825]  History of cirrhosis [Z87 19]  History of multiple myeloma [Z85 79]  Chronic anemia [D64 9]  Acute encephalopathy [G93 40]    Problem List  Patient Active Problem List   Diagnosis    Anemia    Cigarette nicotine dependence without complication    Hyperglycemia    Paroxysmal SVT (supraventricular tachycardia) (HCC)    Sinus tachycardia    Urinary incontinence    Memory difficulties    Gait disturbance    Obesity (BMI 30 0-34  9)    Recurrent major depressive disorder, in partial remission (HCC)    Pancytopenia ([address] )    History of alcohol abuse    Metabolic encephalopathy    Liver cirrhosis (HCC)    Acute on chronic diastolic CHF    Dementia     Urinary tract infection    CESAR (acute kidney injury) (HCC)    Dysphagia    Paroxysmal atrial fibrillation (HCC)    Troponin level elevated    Neutropenia (HCC)    Hyperphosphatemia    Hyperkalemia    Hyponatremia    Multiple myeloma (HCC)    Epistaxis    Azotemia    Thrombocytopenia (HCC)    Pulmonary hypertension (HCC)    Hyperuricemia    Acute renal failure with acute tubular necrosis superimposed on chronic kidney disease (HCC)    Ascites    Chronic kidney disease (CKD), active medical management without dialysis, stage 4 (severe) (HCC)    Volume overload    Diarrhea    Dementia (HCC)    Acute kidney injury superimposed on chronic kidney disease (HCC)    Hypotension    Rib fracture    Pneumonia    Hypertension    Hepatic encephalopathy (HCC)    Diastolic CHF (HCC)    CKD (chronic kidney disease) stage 3, GFR 30-59 ml/min    Alcoholic cirrhosis (HCC)    Hypotension (arterial)    Bacteremia       Past Medical History  Past Medical History: Diagnosis Date    Alcoholic cirrhosis ([facility] )     Benign essential hypertension     last assessed - [id number]    Cirrhosis (HCC)     CKD (chronic kidney disease) stage 3, GFR 30-59 ml/min     Diastolic CHF ([place] [address] ) 03/69/8158    Gastroesophageal reflux disease without esophagitis 11/16/2017    Hepatic encephalopathy (HCC)     Hyperbilirubinemia 5/14/2020    Hypertension     Multiple myeloma (HCC)     Paroxysmal atrial fibrillation ([place] [address] )     Pneumonia 5/6/2020       Past Surgical History  Past Surgical History:   Procedure Laterality Date    APPENDECTOMY      BONE MARROW BIOPSY      IR PARACENTESIS  10/2/2020    IR PARACENTESIS  12/28/2020    IR THORACENTESIS  12/21/2020 04/04/21 1102   PT Last Visit   PT Visit Date 04/04/21   Note Type   Note type Evaluation   Pain Assessment   Pain Assessment Tool Pain Assessment not indicated - pt denies pain   Pain Score No Pain   Home Living   Type of Home Apartment  (3rd floor)   Home Layout One level;Elevator   Bathroom Shower/Tub   (as per chart, patient sponge bathes)   [address]   Prior Function   Level of [place] Needs assistance with IADLs; Needs assistance with ADLs and functional mobility  (as per chart, patient is "mostly independent with ADLs" )   Lives With Family  (sister)   Receives Help From Family;Home health   ADL Assistance Needs assistance   IADLs Needs assistance   Falls in the last 6 months   (unknown; patient unreliable/poor historian)     Restrictions/Precautions   Weight Bearing Precautions Per Order No   Braces or Orthoses   (none per chart review)   Other Precautions Bed Alarm;Cognitive; Restraints; Fall Risk;Multiple lines   General   Family/Caregiver Present No   Cognition   Overall Cognitive Status Impaired   Arousal/Participation Cooperative   Orientation Level Oriented to person;Disoriented to place; Disoriented to time;Disoriented to no situation   Memory Decreased short term memory;Decreased recall of biographical information;Decreased recall of recent events;Decreased recall of precautions   Following Commands Follows one step commands inconsistently   Comments patient agreeable to participate in PT evaluation   RUE Assessment   RUE Assessment X   LUE Assessment   LUE Assessment X   RLE Assessment   RLE Assessment X  (grossly assessed with functional mobility: at least 3/5)   LLE Assessment   LLE Assessment X  (grossly assessed with functional mobility: at least 3/5)   Coordination   Movements are Fluid and Coordinated 1   Sensation WFL   Light Touch   RLE Light Touch Grossly intact   LLE Light Touch Grossly intact   Bed Mobility   Rolling R 4  Minimal assistance   Additional items Assist x 1; Increased time required;Verbal cues;LE management; Bedrails   Rolling L 4  Minimal assistance   Additional items Assist x 1; Increased time required;Verbal cues;LE management; Bedrails   Supine to Sit 4  Minimal assistance   Additional items Assist x 2; Increased time required;Verbal cues;LE management;HOB elevated   Sit to Supine 4  Minimal assistance   Additional items Assist x 2; Increased time required;Verbal cues;LE management   Transfers   Sit to Stand Unable to assess   Additional Comments unable to assess due to elevated HR with minimal activity   Ambulation/Elevation   Gait pattern Not appropriate; Not tested   Balance   Static Sitting Fair +   Dynamic Sitting Fair   Endurance Deficit   Endurance Deficit Yes   Activity Tolerance   Activity Tolerance Treatment limited secondary to medical complications (Comment)  (elevated HR with minimal activity; patient asymptomatic)   Medical Staff Made Aware OT [address] made aware of session outcomes   Assessment   Prognosis Fair   Problem List Decreased strength;Decreased endurance; Impaired balance;Decreased mobility; Decreased cognition;Decreased safety awareness;Decreased skin integrity Assessment Pt is 77 y o  female seen for PT evaluation s/p admit to [facility] on 4/1/2021 w/ Hepatic encephalopathy ([address] )  PT consulted to assess pt's functional mobility and d/c needs  Order placed for PT eval and tx, w/ up and OOB as tolerated order  Comorbidities affecting pt's physical performance at time of assessment include: Bacteremia, Hypotension (arterial), Alcoholic cirrhosis, Diastolic CHF, Memory difficulties, Paroxysmal atrial fibrillation  PTA, pt was dependent for all mobility tasks and lives w/ sister in one level apartment with elevator access  Personal factors affecting pt at time of IE include: inaccessible home environment, inability to ambulate household distances, inability to navigate level surfaces w/o external assistance, decreased cognition, limited insight into impairments, inability to perform IADLs, inability to perform ADLs and inability to live alone  Please find objective findings from PT assessment regarding body systems outlined above with impairments and limitations including weakness, impaired balance, decreased endurance, decreased activity tolerance, decreased functional mobility tolerance, decreased safety awareness, fall risk, decreased skin integrity and decreased cognition  The following objective measures performed on IE also reveal limitations: Barthel Index: 30/100 and Modified Miller: 5 (severe disability)  Pt's clinical presentation is currently unstable/unpredictable seen in pt's presentation of ongoing medical management/monitoring, elevated HR with minimal activity impacting overall mobility status and need for input for task focus and mobility technique/safety  Pt to benefit from continued PT tx to address deficits as defined above and maximize level of functional independent mobility and consistency  From PT/mobility standpoint, recommendation at time of d/c would be STR pending progress in order to facilitate return to PLOF     Barriers to Discharge Inaccessible home environment   Goals   Patient Goals none expressed at time of evaluation   STG Expiration Date 04/14/21   Short Term Goal #1 In 7-10 days: Increase bilateral LE strength 1/2 grade to facilitate independent mobility, Perform all bed mobility tasks with SBA to decrease caregiver burden, Increase static and dynamic sitting balance 1/2 grade to decrease risk for falls and PT to see and establish goals for static and dynamic standing balance, functional transfers and ambulation when appropriate   PT Treatment Day 0   Plan   Treatment/Interventions LE strengthening/ROM; Therapeutic exercise; Endurance training;Equipment eval/education;Patient/family training;Bed mobility;Continued evaluation;Spoke to nursing;OT   PT Frequency Other (Comment)  (3-5x/wk)   Recommendation   PT Discharge Recommendation Post-Acute Rehabilitation Services   PT - OK to Discharge Yes  (when medically cleared; if to STR)   AM-PAC Basic Mobility Inpatient   Turning in Bed Without Bedrails 3   Lying on Back to Sitting on Edge of Flat Bed 2   Moving Bed to Chair 1   Standing Up From Chair 1   Walk in Room 1   Climb 3-5 Stairs 1   Basic Mobility Inpatient Raw Score 9   Turning Head Towards Sound 3   Follow Simple Instructions 2   Low Function Basic Mobility Raw Score 14   Low Function Basic Mobility Standardized Score 22 01   Modified [place] Scale   Modified [place] Scale 5   Barthel Index   Feeding 5   Bathing 0   Grooming Score 0   Dressing Score 5   Bladder Score 5   Bowels Score 5   Toilet Use Score 5   Transfers (Bed/Chair) Score 5   Mobility (Level Surface) Score 0   Stairs Score 0   Barthel Index Score 30         Kaydenlleric Socks, PT, DPT

## 2024-07-24 NOTE — ASSESSMENT & PLAN NOTE
Please bring in a copy of your advance directive at your next visit, or drop off a copy at any [facility] facility so that it can be added to your medical record.     Chaplain Suazo at [facility] in [place] is available to assist in completing your Advance Directives/Power of  for Healthcare document.  Please call him at [phone] to schedule an appointment.     .  .                                                      At [facility], one important tool we use to improve our patient services is our Patient Survey.  Following your visit you may receive our survey in the mail or by email.    Please take the time to complete the survey.    If your visit with us was great, we want to hear about it.    If we can improve, please let us know how.          · Patient has had persistent tachycardia here in the hospital, heart rate ranging from 120s to 130s on review today  · Therefore Cardiology consulted,  · EKG from 04/02 with AFib with 2-1 heart block, incomplete RBBB  · Patient's Toprol-XL and Cardizem 120 mg daily discontinued  · Started on Lopressor 25 mg b i d   And Cardizem 30 mg q 6 hours for rate control  · Echo ordered, results pending  · Patient not maintained on anticoagulation as an outpatient secondary to thrombocytopenia, dementia and fall risk